# Patient Record
Sex: FEMALE | Race: WHITE | HISPANIC OR LATINO | Employment: UNEMPLOYED | URBAN - METROPOLITAN AREA
[De-identification: names, ages, dates, MRNs, and addresses within clinical notes are randomized per-mention and may not be internally consistent; named-entity substitution may affect disease eponyms.]

---

## 2017-02-08 ENCOUNTER — TELEPHONE (OUTPATIENT)
Dept: INTERNAL MEDICINE | Facility: CLINIC | Age: 77
End: 2017-02-08

## 2017-02-08 DIAGNOSIS — Z00.00 ROUTINE GENERAL MEDICAL EXAMINATION AT A HEALTH CARE FACILITY: Primary | ICD-10-CM

## 2017-02-08 DIAGNOSIS — E78.5 HYPERLIPIDEMIA, UNSPECIFIED HYPERLIPIDEMIA TYPE: ICD-10-CM

## 2017-02-08 NOTE — TELEPHONE ENCOUNTER
----- Message from Gregoriamable Prabhakar sent at 2/7/2017  3:31 PM CST -----  Contact: Self/[phone] home  Type: Sooner appointment than  is able to schedule    When is the first available appointment? 04/21/2017    What is the nature of the appointment?Annual    What appointment type:EPP    Comments:Patient is calling request access to an earlier appointment. She states she received a letter informing her to be seen in the month on March/2017. Please call and advise.    Thank you!

## 2017-02-14 ENCOUNTER — OFFICE VISIT (OUTPATIENT)
Dept: INTERNAL MEDICINE | Facility: CLINIC | Age: 77
End: 2017-02-14
Payer: MEDICARE

## 2017-02-14 VITALS
HEIGHT: 60 IN | TEMPERATURE: 98 F | BODY MASS INDEX: 24.85 KG/M2 | OXYGEN SATURATION: 98 % | HEART RATE: 82 BPM | SYSTOLIC BLOOD PRESSURE: 121 MMHG | DIASTOLIC BLOOD PRESSURE: 74 MMHG | WEIGHT: 126.56 LBS

## 2017-02-14 DIAGNOSIS — Z91.09 ENVIRONMENTAL ALLERGIES: Primary | ICD-10-CM

## 2017-02-14 DIAGNOSIS — J06.9 VIRAL URI WITH COUGH: ICD-10-CM

## 2017-02-14 PROCEDURE — 1160F RVW MEDS BY RX/DR IN RCRD: CPT | Mod: S$GLB,,, | Performed by: NURSE PRACTITIONER

## 2017-02-14 PROCEDURE — 1126F AMNT PAIN NOTED NONE PRSNT: CPT | Mod: S$GLB,,, | Performed by: NURSE PRACTITIONER

## 2017-02-14 PROCEDURE — 1157F ADVNC CARE PLAN IN RCRD: CPT | Mod: S$GLB,,, | Performed by: NURSE PRACTITIONER

## 2017-02-14 PROCEDURE — 99213 OFFICE O/P EST LOW 20 MIN: CPT | Mod: S$GLB,,, | Performed by: NURSE PRACTITIONER

## 2017-02-14 PROCEDURE — 99999 PR PBB SHADOW E&M-EST. PATIENT-LVL III: CPT | Mod: PBBFAC,,, | Performed by: NURSE PRACTITIONER

## 2017-02-14 PROCEDURE — 1159F MED LIST DOCD IN RCRD: CPT | Mod: S$GLB,,, | Performed by: NURSE PRACTITIONER

## 2017-02-14 RX ORDER — PREDNISONE 20 MG/1
20 TABLET ORAL DAILY
Qty: 4 TABLET | Refills: 0 | Status: SHIPPED | OUTPATIENT
Start: 2017-02-14 | End: 2017-02-18

## 2017-02-14 RX ORDER — LORAZEPAM 0.5 MG/1
0.5 TABLET ORAL NIGHTLY
Qty: 90 TABLET | Refills: 1 | Status: CANCELLED | OUTPATIENT
Start: 2017-02-14

## 2017-02-14 NOTE — PROGRESS NOTES
Subjective:       Patient ID: Laverne Humphries is a 76 y.o. female.    Chief Complaint: Cough  Ms Humphries presents today for a cough, congestion, malaise, and chills for 2 -3 weeks. She has been taking her Flonase, allegra, and Singulair daily for symptoms with only mild relief.   Cough   This is a new problem. The current episode started 1 to 4 weeks ago. The problem has been unchanged. The problem occurs every few minutes. The cough is non-productive. Associated symptoms include chills, headaches and a sore throat. Pertinent negatives include no chest pain, ear congestion, ear pain, eye redness, fever, heartburn, hemoptysis, myalgias, nasal congestion, postnasal drip, rash, rhinorrhea, shortness of breath, sweats, weight loss or wheezing. Nothing aggravates the symptoms.     Review of Systems   Constitutional: Positive for activity change, chills and fatigue. Negative for fever and weight loss.   HENT: Positive for sinus pressure and sore throat. Negative for ear pain, postnasal drip, rhinorrhea, sneezing and voice change.    Eyes: Positive for itching. Negative for redness and visual disturbance.   Respiratory: Positive for cough. Negative for hemoptysis, shortness of breath and wheezing.    Cardiovascular: Negative for chest pain.   Gastrointestinal: Negative for diarrhea, heartburn, nausea and vomiting.   Genitourinary: Negative for dysuria.   Musculoskeletal: Negative for myalgias.   Skin: Negative for rash.   Neurological: Positive for headaches.   Psychiatric/Behavioral: Negative for confusion.       Objective:      Physical Exam   Constitutional: She is oriented to person, place, and time. She appears well-developed and well-nourished. No distress.   HENT:   Head: Atraumatic.   Left Ear: Tympanic membrane and ear canal normal.   Nose: No mucosal edema, rhinorrhea or sinus tenderness. Right sinus exhibits no maxillary sinus tenderness and no frontal sinus tenderness. Left sinus exhibits no maxillary sinus  tenderness and no frontal sinus tenderness.   Mouth/Throat: Oropharyngeal exudate, posterior oropharyngeal edema and posterior oropharyngeal erythema present. No tonsillar abscesses.   Right TM obscured by cerumen   Eyes: No scleral icterus.   Neck: Normal range of motion. Neck supple.   Cardiovascular: Normal rate, regular rhythm and normal heart sounds.  Exam reveals no gallop and no friction rub.    No murmur heard.  Pulmonary/Chest: Effort normal and breath sounds normal. No respiratory distress. She has no wheezes. She has no rales.   Lymphadenopathy:     She has no cervical adenopathy.   Neurological: She is alert and oriented to person, place, and time.   Skin: Skin is warm and dry. She is not diaphoretic.   Psychiatric: She has a normal mood and affect. Her behavior is normal.   Nursing note and vitals reviewed.      Assessment:       1. Environmental allergies    2. Viral URI with cough        Plan:   1. Environmental allergies  - Continue allergy medications as prescribed.     2. Viral URI with cough  - predniSONE (DELTASONE) 20 MG tablet; Take 1 tablet (20 mg total) by mouth once daily.  Dispense: 4 tablet; Refill: 0      Pt has been given instructions populated from [employer] database and has verbalized understanding of the after visit summary and information contained wherein.    Follow up with a primary care provider. May go to ER for acute shortness of breath, lightheadedness, fever, or any other emergent complaints or changes in condition.

## 2017-02-14 NOTE — MR AVS SNAPSHOT
[facility]  [address]  [facility] [address]  Phone: [phone]  Fax: [phone]                  Laverne Humphries   2017 2:00 PM   Office Visit    Description:  Female : 1940   Provider:  Mckenna Godwin NP   Department:  [facility]           Reason for Visit     Cough           Diagnoses this Visit        Comments    Environmental allergies    -  Primary     Viral URI with cough                To Do List           Future Appointments        Provider Department Dept Phone    3/15/2017 7:00 AM LAB, APPOINTMENT [facility] [phone]    3/17/2017 2:30 PM Fran Castro MD [facility] [phone]      Goals (5 Years of Data)     None       These Medications        Disp Refills Start End    predniSONE (DELTASONE) 20 MG tablet 4 tablet 0 2017    Take 1 tablet (20 mg total) by mouth once daily. - Oral    Pharmacy: [facility] #[id number] - [place] - [address]  #: [phone]         [facility]     [facility] Care [facility] -  Assistance  Registered nurses in the [facility] provide clinical advisement, health education, appointment booking, and other advisory services.  Call for this free service at [phone].             Medications           Message regarding Medications     Verify the changes and/or additions to your medication regime listed below are the same as discussed with your clinician today.  If any of these changes or additions are incorrect, please notify your healthcare provider.        START taking these NEW medications        Refills    predniSONE (DELTASONE) 20 MG tablet 0    Sig: Take 1 tablet (20 mg total) by mouth once daily.    Class: Normal    Route: Oral           Verify that the below list of medications is an accurate representation of the medications you are currently taking.  If none reported, the list may be  blank. If incorrect, please contact your healthcare provider. Carry this list with you in case of emergency.           Current Medications     albuterol 90 mcg/actuation inhaler Inhale 2 puffs into the lungs every 4 (four) hours as needed for Wheezing or Shortness of Breath.    fexofenadine (ALLEGRA) 180 MG tablet Take 180 mg by mouth once daily.    fluticasone (FLONASE) 50 mcg/actuation nasal spray 2 sprays by Each Nare route once daily.    lorazepam (ATIVAN) 0.5 MG tablet Take 1 tablet (0.5 mg total) by mouth nightly.    montelukast (SINGULAIR) 10 mg tablet Take 1 tablet (10 mg total) by mouth every evening.    pantoprazole (PROTONIX) 40 MG tablet TAKE 1 TABLET ONE TIME DAILY    predniSONE (DELTASONE) 20 MG tablet Take 1 tablet (20 mg total) by mouth once daily.           Clinical Reference Information           Your Vitals Were     BP Pulse Temp Height Weight SpO2    121/74 82 98 °F (36.7 °C) (Oral) 5' (1.524 m) 57.4 kg (126 lb 8.7 oz) 98%    BMI                24.71 kg/m2          Blood Pressure          Most Recent Value    BP  121/74      Allergies as of 2/14/2017     Codeine    Sulfa (Sulfonamide Antibiotics)      Immunizations Administered on Date of Encounter - 2/14/2017     None      [facility] Sign-Up     Activating your [facility] account is as easy as 1-2-3!     1) Visit [email or web address], select Sign Up Now, enter this activation code and your date of birth, then select Next.  [email or web address]  Expires: 3/31/2017  2:06 PM      2) Create a username and password to use when you visit [facility] in the future and select a security question in case you lose your password and select Next.    3) Enter your e-mail address and click Sign Up!    Additional Information  If you have questions, please e-mail [email or web address] or call [phone] to talk to our [facility] staff. Remember, [facility] is NOT to be used for urgent needs. For medical emergencies, dial 911.         Language Assistance Services      ATTENTION: Language assistance services are available, free of charge. Please call [phone].      ATENCIÓN: Si habla chikaañol, tiene a balderrama disposición servicios gratuitos de asistencia lingüística. Llame al [phone].     CHÚ Ý: N?u b?n nói Ti?ng Vi?t, có các d?ch v? h? tr? ngôn ng? mi?n phí dành cho b?n. G?i s? [phone].         [facility] complies with applicable Federal civil rights laws and does not discriminate on the basis of race, color, national origin, age, disability, or sex.

## 2017-02-14 NOTE — TELEPHONE ENCOUNTER
----- Message from Ángela Aguilar sent at 2/14/2017  8:08 AM CST -----  Contact: Patient  Refill    lorazepam (ATIVAN) 0.5 MG tablet   Sig - Route: Take 1 tablet (0.5 mg total) by mouth nightly. - Oral    90 day fills    [facility] - [place], [place] - [address] [facility] [phone] (Phone)  [phone] (Fax)    Thanks!

## 2017-02-14 NOTE — TELEPHONE ENCOUNTER
Chart reviewed - last rx was for 90 tablets / 90-day supply with 1 refill, prescribed 11/2016. Patient should have refill left on prescription - please have her check with pharmacy.

## 2017-03-15 ENCOUNTER — LAB VISIT (OUTPATIENT)
Dept: LAB | Facility: HOSPITAL | Age: 77
End: 2017-03-15
Attending: INTERNAL MEDICINE
Payer: MEDICARE

## 2017-03-15 DIAGNOSIS — E78.5 HYPERLIPIDEMIA, UNSPECIFIED HYPERLIPIDEMIA TYPE: ICD-10-CM

## 2017-03-15 LAB
ALBUMIN SERPL BCP-MCNC: 3.7 G/DL
ALP SERPL-CCNC: 59 U/L
ALT SERPL W/O P-5'-P-CCNC: 14 U/L
ANION GAP SERPL CALC-SCNC: 9 MMOL/L
AST SERPL-CCNC: 21 U/L
BASOPHILS # BLD AUTO: 0.02 K/UL
BASOPHILS NFR BLD: 0.3 %
BILIRUB SERPL-MCNC: 1.5 MG/DL
BUN SERPL-MCNC: 15 MG/DL
CALCIUM SERPL-MCNC: 9.8 MG/DL
CHLORIDE SERPL-SCNC: 105 MMOL/L
CHOLEST/HDLC SERPL: 4.5 {RATIO}
CO2 SERPL-SCNC: 26 MMOL/L
CREAT SERPL-MCNC: 1 MG/DL
DIFFERENTIAL METHOD: NORMAL
EOSINOPHIL # BLD AUTO: 0.2 K/UL
EOSINOPHIL NFR BLD: 2.8 %
ERYTHROCYTE [DISTWIDTH] IN BLOOD BY AUTOMATED COUNT: 13.9 %
EST. GFR  (AFRICAN AMERICAN): >60 ML/MIN/1.73 M^2
EST. GFR  (NON AFRICAN AMERICAN): 54.1 ML/MIN/1.73 M^2
GLUCOSE SERPL-MCNC: 96 MG/DL
HCT VFR BLD AUTO: 39.4 %
HDL/CHOLESTEROL RATIO: 22.4 %
HDLC SERPL-MCNC: 232 MG/DL
HDLC SERPL-MCNC: 52 MG/DL
HGB BLD-MCNC: 12.7 G/DL
LDLC SERPL CALC-MCNC: 151.2 MG/DL
LYMPHOCYTES # BLD AUTO: 2.5 K/UL
LYMPHOCYTES NFR BLD: 33.8 %
MCH RBC QN AUTO: 28.6 PG
MCHC RBC AUTO-ENTMCNC: 32.2 %
MCV RBC AUTO: 89 FL
MONOCYTES # BLD AUTO: 0.6 K/UL
MONOCYTES NFR BLD: 8.6 %
NEUTROPHILS # BLD AUTO: 3.9 K/UL
NEUTROPHILS NFR BLD: 54.1 %
NONHDLC SERPL-MCNC: 180 MG/DL
PLATELET # BLD AUTO: 257 K/UL
PMV BLD AUTO: 11.2 FL
POTASSIUM SERPL-SCNC: 4.8 MMOL/L
PROT SERPL-MCNC: 7.4 G/DL
RBC # BLD AUTO: 4.44 M/UL
SODIUM SERPL-SCNC: 140 MMOL/L
TRIGL SERPL-MCNC: 144 MG/DL
WBC # BLD AUTO: 7.24 K/UL

## 2017-03-15 PROCEDURE — 36415 COLL VENOUS BLD VENIPUNCTURE: CPT

## 2017-03-15 PROCEDURE — 80061 LIPID PANEL: CPT

## 2017-03-15 PROCEDURE — 80053 COMPREHEN METABOLIC PANEL: CPT

## 2017-03-15 PROCEDURE — 85025 COMPLETE CBC W/AUTO DIFF WBC: CPT

## 2017-03-17 ENCOUNTER — OFFICE VISIT (OUTPATIENT)
Dept: INTERNAL MEDICINE | Facility: CLINIC | Age: 77
End: 2017-03-17
Payer: MEDICARE

## 2017-03-17 VITALS
HEART RATE: 60 BPM | HEIGHT: 60 IN | DIASTOLIC BLOOD PRESSURE: 58 MMHG | BODY MASS INDEX: 24.15 KG/M2 | SYSTOLIC BLOOD PRESSURE: 110 MMHG | WEIGHT: 123 LBS

## 2017-03-17 DIAGNOSIS — N18.30 CHRONIC KIDNEY DISEASE, STAGE III (MODERATE): ICD-10-CM

## 2017-03-17 DIAGNOSIS — R19.7 DIARRHEA, UNSPECIFIED TYPE: ICD-10-CM

## 2017-03-17 DIAGNOSIS — E78.5 HYPERLIPIDEMIA, UNSPECIFIED HYPERLIPIDEMIA TYPE: Primary | ICD-10-CM

## 2017-03-17 DIAGNOSIS — K21.00 REFLUX ESOPHAGITIS: ICD-10-CM

## 2017-03-17 PROCEDURE — 1157F ADVNC CARE PLAN IN RCRD: CPT | Mod: S$GLB,,, | Performed by: INTERNAL MEDICINE

## 2017-03-17 PROCEDURE — 1160F RVW MEDS BY RX/DR IN RCRD: CPT | Mod: S$GLB,,, | Performed by: INTERNAL MEDICINE

## 2017-03-17 PROCEDURE — 1126F AMNT PAIN NOTED NONE PRSNT: CPT | Mod: S$GLB,,, | Performed by: INTERNAL MEDICINE

## 2017-03-17 PROCEDURE — 99999 PR PBB SHADOW E&M-EST. PATIENT-LVL III: CPT | Mod: PBBFAC,,, | Performed by: INTERNAL MEDICINE

## 2017-03-17 PROCEDURE — 99499 UNLISTED E&M SERVICE: CPT | Mod: S$GLB,,, | Performed by: INTERNAL MEDICINE

## 2017-03-17 PROCEDURE — 1159F MED LIST DOCD IN RCRD: CPT | Mod: S$GLB,,, | Performed by: INTERNAL MEDICINE

## 2017-03-17 PROCEDURE — 99214 OFFICE O/P EST MOD 30 MIN: CPT | Mod: S$GLB,,, | Performed by: INTERNAL MEDICINE

## 2017-03-17 RX ORDER — DIPHENOXYLATE HYDROCHLORIDE AND ATROPINE SULFATE 2.5; .025 MG/1; MG/1
1 TABLET ORAL 4 TIMES DAILY PRN
Qty: 30 TABLET | Refills: 0 | Status: SHIPPED | OUTPATIENT
Start: 2017-03-17 | End: 2017-03-27

## 2017-03-17 NOTE — PATIENT INSTRUCTIONS

## 2017-03-17 NOTE — PROGRESS NOTES
Subjective:       Patient ID: Laverne Humphries is a 76 y.o. female.    Chief Complaint: Annual Exam and Diarrhea    HPI Comments: Mrs. Laverne Humphries is here today for follow up.  She relates that she has been doing well overall.  However, she relates today that she has been experiencing diarrhea since this past Tuesday.      Diarrhea    This is a new problem. The current episode started in the past 7 days. The problem occurs 5 to 10 times per day. The problem has been gradually improving. The stool consistency is described as watery. The patient states that diarrhea does not awaken her from sleep. Associated symptoms include bloating, chills, increased flatus and sweats. Pertinent negatives include no abdominal pain, arthralgias, coughing, fever or vomiting. Exacerbated by: Did eat 2 day old beans on Monday and the next day started having diarrhea. There are no known risk factors. She has tried anti-motility drug for the symptoms. The treatment provided moderate relief. Her past medical history is significant for irritable bowel syndrome.     Review of Systems   Constitutional: Positive for chills. Negative for activity change, appetite change, fatigue and fever.   HENT: Negative for congestion, ear discharge and ear pain.    Eyes: Negative for visual disturbance.   Respiratory: Negative for cough, shortness of breath and wheezing.    Cardiovascular: Negative for chest pain, palpitations and leg swelling.   Gastrointestinal: Positive for bloating, diarrhea and flatus. Negative for abdominal pain, blood in stool, constipation and vomiting.   Endocrine: Negative for polydipsia, polyphagia and polyuria.   Genitourinary: Negative for difficulty urinating and dysuria.   Musculoskeletal: Negative for arthralgias.   Skin: Negative for color change, pallor and rash.   Allergic/Immunologic: Negative for environmental allergies and food allergies.   Neurological: Negative for syncope and numbness.   Hematological:  Negative for adenopathy. Does not bruise/bleed easily.   Psychiatric/Behavioral: Negative for behavioral problems.       Objective:      Physical Exam   Constitutional: She is oriented to person, place, and time. She appears well-developed and well-nourished.   HENT:   Head: Normocephalic and atraumatic.   Mouth/Throat: Oropharynx is clear and moist.   Eyes: Pupils are equal, round, and reactive to light.   Neck: Normal range of motion. Neck supple.   Cardiovascular: Normal rate, regular rhythm, normal heart sounds and intact distal pulses.  Exam reveals no gallop and no friction rub.    No murmur heard.  Pulmonary/Chest: Effort normal and breath sounds normal. No respiratory distress. She has no wheezes.   Abdominal: Soft. She exhibits no distension. Bowel sounds are increased. There is no tenderness.   Musculoskeletal: Normal range of motion.   Neurological: She is alert and oriented to person, place, and time.   Skin: Skin is warm and dry.   Psychiatric: She has a normal mood and affect. Her behavior is normal.   Nursing note and vitals reviewed.      Assessment:       1. Hyperlipidemia, unspecified hyperlipidemia type    2. Diarrhea, unspecified type    3. Reflux esophagitis    4. Chronic kidney disease, stage III (moderate)        Plan:       Laverne was seen today for annual exam and diarrhea.    Diagnoses and all orders for this visit:    Hyperlipidemia, unspecified hyperlipidemia type  Comments:  Elevated total cholesterol with recent labs. Encouraged proper diet and exercise; will recheck lipids panel in 6 months.  Orders:  -     CBC auto differential; Future  -     Comprehensive metabolic panel; Future  -     Lipid panel; Future    Diarrhea, unspecified type  -     diphenoxylate-atropine 2.5-0.025 mg (LOMOTIL) 2.5-0.025 mg per tablet; Take 1 tablet by mouth 4 (four) times daily as needed for Diarrhea.    Reflux esophagitis  Comments:  Stable with current medication regimen.    Chronic kidney disease,  stage III (moderate)  Comments:  Stable and controlled.  Orders:  -     CBC auto differential; Future  -     Comprehensive metabolic panel; Future            Return in about 6 months (around 9/17/2017) for Follow up with PCP, SOONER IF NEEDED.

## 2017-04-12 RX ORDER — PANTOPRAZOLE SODIUM 40 MG/1
TABLET, DELAYED RELEASE ORAL
Qty: 90 TABLET | Refills: 3 | Status: SHIPPED | OUTPATIENT
Start: 2017-04-12 | End: 2018-04-04

## 2017-04-25 ENCOUNTER — TELEPHONE (OUTPATIENT)
Dept: ALLERGY | Facility: CLINIC | Age: 77
End: 2017-04-25

## 2017-04-25 NOTE — TELEPHONE ENCOUNTER
Spoke to patient.  She states she is using Flonase and allegra in the morning and singulair in the evening.  She states the albuterol burns her chest when she uses it.  She is sleeping on 2 pillows due to difficulty breathing.  She states this started last Thursday and she doesn;t feel she can go until next week without something being done.

## 2017-04-25 NOTE — TELEPHONE ENCOUNTER
----- Message from Kiki Mata MA sent at 4/25/2017 11:27 AM CDT -----  Contact: self/[phone]  Pt c/o cough and shortness of breathe. She stated that the prescribed meds are not working. She would like to speak with someone ASAP. Please advise.    Thanks

## 2017-04-27 ENCOUNTER — OFFICE VISIT (OUTPATIENT)
Dept: INTERNAL MEDICINE | Facility: CLINIC | Age: 77
End: 2017-04-27
Payer: MEDICARE

## 2017-04-27 ENCOUNTER — HOSPITAL ENCOUNTER (OUTPATIENT)
Dept: RADIOLOGY | Facility: HOSPITAL | Age: 77
Discharge: HOME | End: 2017-04-27
Attending: INTERNAL MEDICINE
Payer: MEDICARE

## 2017-04-27 VITALS
TEMPERATURE: 98 F | WEIGHT: 125.44 LBS | SYSTOLIC BLOOD PRESSURE: 122 MMHG | HEART RATE: 81 BPM | DIASTOLIC BLOOD PRESSURE: 60 MMHG | HEIGHT: 60 IN | OXYGEN SATURATION: 99 % | BODY MASS INDEX: 24.63 KG/M2

## 2017-04-27 DIAGNOSIS — J06.9 VIRAL URI WITH COUGH: Primary | ICD-10-CM

## 2017-04-27 DIAGNOSIS — R05.9 COUGH: ICD-10-CM

## 2017-04-27 DIAGNOSIS — R06.02 SHORTNESS OF BREATH: ICD-10-CM

## 2017-04-27 PROCEDURE — 71020 XR CHEST PA AND LATERAL: CPT | Mod: TC

## 2017-04-27 PROCEDURE — 1160F RVW MEDS BY RX/DR IN RCRD: CPT | Mod: S$GLB,,, | Performed by: INTERNAL MEDICINE

## 2017-04-27 PROCEDURE — 1126F AMNT PAIN NOTED NONE PRSNT: CPT | Mod: S$GLB,,, | Performed by: INTERNAL MEDICINE

## 2017-04-27 PROCEDURE — 1159F MED LIST DOCD IN RCRD: CPT | Mod: S$GLB,,, | Performed by: INTERNAL MEDICINE

## 2017-04-27 PROCEDURE — 99999 PR PBB SHADOW E&M-EST. PATIENT-LVL IV: CPT | Mod: PBBFAC,,, | Performed by: INTERNAL MEDICINE

## 2017-04-27 PROCEDURE — 99214 OFFICE O/P EST MOD 30 MIN: CPT | Mod: S$GLB,,, | Performed by: INTERNAL MEDICINE

## 2017-04-27 PROCEDURE — 99499 UNLISTED E&M SERVICE: CPT | Mod: S$GLB,,, | Performed by: INTERNAL MEDICINE

## 2017-04-27 PROCEDURE — 71020 XR CHEST PA AND LATERAL: CPT | Mod: 26,,, | Performed by: RADIOLOGY

## 2017-04-27 RX ORDER — BENZONATATE 200 MG/1
200 CAPSULE ORAL 3 TIMES DAILY PRN
Qty: 30 CAPSULE | Refills: 0 | Status: SHIPPED | OUTPATIENT
Start: 2017-04-27 | End: 2017-05-07

## 2017-04-27 NOTE — PATIENT INSTRUCTIONS
Mucinex (not d or dm) over the counter, 600mg by mouth twice a day with lots of water.      Viral Upper Respiratory Illness (Adult)  You have a viral upper respiratory illness (URI), which is another term for the common cold. This illness is contagious during the first few days. It is spread through the air by coughing and sneezing. It may also be spread by direct contact (touching the sick person and then touching your own eyes, nose, or mouth). Frequent handwashing will decrease risk of spread. Most viral illnesses go away within 7 to 10 days with rest and simple home remedies. Sometimes the illness may last for several weeks. Antibiotics will not kill a virus, and they are generally not prescribed for this condition.    Home care  · If symptoms are severe, rest at home for the first 2 to 3 days. When you resume activity, don't let yourself get too tired.  · Avoid being exposed to cigarette smoke (yours or others).  · You may use acetaminophen or ibuprofen to control pain and fever, unless another medicine was prescribed. (Note: If you have chronic liver or kidney disease, have ever had a stomach ulcer or gastrointestinal bleeding, or are taking blood-thinning medicines, talk with your healthcare provider before using these medicines.) Aspirin should never be given to anyone under 18 years of age who is ill with a viral infection or fever. It may cause severe liver or brain damage.  · Your appetite may be poor, so a light diet is fine. Avoid dehydration by drinking 6 to 8 glasses of fluids per day (water, soft drinks, juices, tea, or soup). Extra fluids will help loosen secretions in the nose and lungs.  · Over-the-counter cold medicines will not shorten the length of time youre sick, but they may be helpful for the following symptoms: cough, sore throat, and nasal and sinus congestion. (Note: Do not use decongestants if you have high blood pressure.)  Follow-up care  Follow up with your healthcare provider, or  as advised.  When to seek medical advice  Call your healthcare provider right away if any of these occur:  · Cough with lots of colored sputum (mucus)  · Severe headache; face, neck, or ear pain  · Difficulty swallowing due to throat pain  · Fever of 100.4°F (38°C)  Call 911, or get immediate medical care  Call emergency services right away if any of these occur:  · Chest pain, shortness of breath, wheezing, or difficulty breathing  · Coughing up blood  · Inability to swallow due to throat pain  Date Last Reviewed: 9/13/2015  © 5375-7807 [employer]. [address] [place] [address]. All rights reserved. This information is not intended as a substitute for professional medical care. Always follow your healthcare professional's instructions.

## 2017-04-27 NOTE — PROGRESS NOTES
Subjective:       Patient ID: Laverne Humphries is a 76 y.o. female.    Chief Complaint: Cough    HPI  77 yo F pt of Dr. Castro and Dr. Lopez presents for urgent eval of cough with shortness of breath.     She sees allergy last in 2016 for allergic rhinitis, GERD and chronic cough. No xh asthma. singulair during winter and spring, allegra, flonase, protonix.     Cough x 4 days, fatigue, chills. No fever. Cough is non-productive except for some white sputum.   She continues on her allegra, singulair. No sick contacts.     She rarely uses albuterol because she feels burning in her chest when she takes it.   No wheezing.   Review of Systems   Constitutional: Negative for fever.   Respiratory: Positive for cough. Negative for shortness of breath.    Cardiovascular: Negative for chest pain.   Musculoskeletal: Negative.    Skin: Negative.        Objective:   /60  Pulse 81  Temp 97.9 °F (36.6 °C)  Ht 5' (1.524 m)  Wt 56.9 kg (125 lb 7.1 oz)  SpO2 99% Comment: ra  BMI 24.5 kg/m2     Physical Exam   Constitutional: She is oriented to person, place, and time. She appears well-developed and well-nourished. No distress.   HENT:   Head: Normocephalic and atraumatic.   Cardiovascular: Normal rate and regular rhythm.    Pulmonary/Chest: Effort normal. No respiratory distress. She has no wheezes. She has no rales.   Neurological: She is alert and oriented to person, place, and time.   Skin: Skin is warm and dry. She is not diaphoretic.   Psychiatric: She has a normal mood and affect. Her behavior is normal.       Assessment:       1. Viral URI with cough    2. Cough    3. Shortness of breath        Plan:       Laverne was seen today for cough.    Diagnoses and all orders for this visit:    Viral URI with cough  -     benzonatate (TESSALON) 200 MG capsule; Take 1 capsule (200 mg total) by mouth 3 (three) times daily as needed for Cough.  -     X-Ray Chest PA And Lateral; Future negative for xray

## 2017-04-27 NOTE — MR AVS SNAPSHOT
[facility] - Internal Medicine  [address]  [place] [address]  Phone: [phone]  Fax: [phone]                  Laverne Humphries   2017 10:00 AM   Office Visit    Description:  Female : 1940   Provider:  Luh Holt MD   Department:  [facility] - Internal Medicine           Reason for Visit     Cough           Diagnoses this Visit        Comments    Viral URI with cough    -  Primary     Cough         Shortness of breath                To Do List           Goals (5 Years of Data)     None       These Medications        Disp Refills Start End    benzonatate (TESSALON) 200 MG capsule 30 capsule 0 2017    Take 1 capsule (200 mg total) by mouth 3 (three) times daily as needed for Cough. - Oral    Pharmacy: [facility] [address] Ph #: [phone]       Prior authorization:  Waiting for payer response      [facility]     [facility] Nurse Care Line -  Assistance  Unless otherwise directed by your provider, please contact [facility], our nurse care line that is available for  assistance.     Registered nurses in the [facility] provide: appointment scheduling, clinical advisement, health education, and other advisory services.  Call: [phone] (toll free)               Medications           Message regarding Medications     Verify the changes and/or additions to your medication regime listed below are the same as discussed with your clinician today.  If any of these changes or additions are incorrect, please notify your healthcare provider.        START taking these NEW medications        Refills    benzonatate (TESSALON) 200 MG capsule 0    Sig: Take 1 capsule (200 mg total) by mouth 3 (three) times daily as needed for Cough.    Class: Normal    Route: Oral    Prior authorization:  Waiting for payer response           Verify that the below list of medications is an  accurate representation of the medications you are currently taking.  If none reported, the list may be blank. If incorrect, please contact your healthcare provider. Carry this list with you in case of emergency.           Current Medications     albuterol 90 mcg/actuation inhaler Inhale 2 puffs into the lungs every 4 (four) hours as needed for Wheezing or Shortness of Breath.    fexofenadine (ALLEGRA) 180 MG tablet Take 180 mg by mouth once daily.    fluticasone (FLONASE) 50 mcg/actuation nasal spray 2 sprays by Each Nare route once daily.    lorazepam (ATIVAN) 0.5 MG tablet Take 1 tablet (0.5 mg total) by mouth nightly.    montelukast (SINGULAIR) 10 mg tablet Take 1 tablet (10 mg total) by mouth every evening.    pantoprazole (PROTONIX) 40 MG tablet TAKE 1 TABLET ONE TIME DAILY    benzonatate (TESSALON) 200 MG capsule Take 1 capsule (200 mg total) by mouth 3 (three) times daily as needed for Cough.           Clinical Reference Information           Your Vitals Were     BP Pulse Temp Height Weight SpO2    122/60 81 97.9 °F (36.6 °C) 5' (1.524 m) 56.9 kg (125 lb 7.1 oz) 99%    BMI                24.5 kg/m2          Blood Pressure          Most Recent Value    BP  122/60      Allergies as of 4/27/2017     Codeine    Sulfa (Sulfonamide Antibiotics)      Immunizations Administered on Date of Encounter - 4/27/2017     None      Orders Placed During Today's Visit     Future Labs/Procedures Expected by Expires    X-Ray Chest PA And Lateral  4/27/2017 4/27/2018      Instructions    Mucinex (not d or dm) over the counter, 600mg by mouth twice a day with lots of water.      Viral Upper Respiratory Illness (Adult)  You have a viral upper respiratory illness (URI), which is another term for the common cold. This illness is contagious during the first few days. It is spread through the air by coughing and sneezing. It may also be spread by direct contact (touching the sick person and then touching your own eyes, nose, or  mouth). Frequent handwashing will decrease risk of spread. Most viral illnesses go away within 7 to 10 days with rest and simple home remedies. Sometimes the illness may last for several weeks. Antibiotics will not kill a virus, and they are generally not prescribed for this condition.    Home care  · If symptoms are severe, rest at home for the first 2 to 3 days. When you resume activity, don't let yourself get too tired.  · Avoid being exposed to cigarette smoke (yours or others).  · You may use acetaminophen or ibuprofen to control pain and fever, unless another medicine was prescribed. (Note: If you have chronic liver or kidney disease, have ever had a stomach ulcer or gastrointestinal bleeding, or are taking blood-thinning medicines, talk with your healthcare provider before using these medicines.) Aspirin should never be given to anyone under 18 years of age who is ill with a viral infection or fever. It may cause severe liver or brain damage.  · Your appetite may be poor, so a light diet is fine. Avoid dehydration by drinking 6 to 8 glasses of fluids per day (water, soft drinks, juices, tea, or soup). Extra fluids will help loosen secretions in the nose and lungs.  · Over-the-counter cold medicines will not shorten the length of time youre sick, but they may be helpful for the following symptoms: cough, sore throat, and nasal and sinus congestion. (Note: Do not use decongestants if you have high blood pressure.)  Follow-up care  Follow up with your healthcare provider, or as advised.  When to seek medical advice  Call your healthcare provider right away if any of these occur:  · Cough with lots of colored sputum (mucus)  · Severe headache; face, neck, or ear pain  · Difficulty swallowing due to throat pain  · Fever of 100.4°F (38°C)  Call 911, or get immediate medical care  Call emergency services right away if any of these occur:  · Chest pain, shortness of breath, wheezing, or difficulty  breathing  · Coughing up blood  · Inability to swallow due to throat pain  Date Last Reviewed: 9/13/2015  © 9211-1432 [employer]. [address] [place] [address], [place] [address]. All rights reserved. This information is not intended as a substitute for professional medical care. Always follow your healthcare professional's instructions.             Language Assistance Services     ATTENTION: Language assistance services are available, free of charge. Please call [phone].      ATENCIÓN: Si habla español, tiene a balderrama disposición servicios gratuitos de asistencia lingüística. Llame al [phone].     CHÚ Ý: N?u b?n nói Ti?ng Vi?t, có các d?ch v? h? tr? ngôn ng? mi?n phí dành cho b?n. G?i s? [phone].         [facility] complies with applicable Federal civil rights laws and does not discriminate on the basis of race, color, national origin, age, disability, or sex.

## 2017-04-28 RX ORDER — LORAZEPAM 0.5 MG/1
TABLET ORAL
Qty: 90 TABLET | Refills: 1 | Status: SHIPPED | OUTPATIENT
Start: 2017-04-28 | End: 2017-11-04 | Stop reason: SDUPTHER

## 2017-05-08 ENCOUNTER — OFFICE VISIT (OUTPATIENT)
Dept: ALLERGY | Facility: CLINIC | Age: 77
End: 2017-05-08
Payer: MEDICARE

## 2017-05-08 VITALS
DIASTOLIC BLOOD PRESSURE: 58 MMHG | BODY MASS INDEX: 23.55 KG/M2 | WEIGHT: 124.75 LBS | SYSTOLIC BLOOD PRESSURE: 132 MMHG | HEIGHT: 61 IN

## 2017-05-08 DIAGNOSIS — K21.9 GASTROESOPHAGEAL REFLUX DISEASE, ESOPHAGITIS PRESENCE NOT SPECIFIED: ICD-10-CM

## 2017-05-08 DIAGNOSIS — J30.1 ALLERGIC RHINITIS DUE TO POLLEN, UNSPECIFIED RHINITIS SEASONALITY: ICD-10-CM

## 2017-05-08 DIAGNOSIS — J40 BRONCHITIS: Primary | ICD-10-CM

## 2017-05-08 PROCEDURE — 99999 PR PBB SHADOW E&M-EST. PATIENT-LVL III: CPT | Mod: PBBFAC,,, | Performed by: ALLERGY & IMMUNOLOGY

## 2017-05-08 PROCEDURE — 1160F RVW MEDS BY RX/DR IN RCRD: CPT | Mod: S$GLB,,, | Performed by: ALLERGY & IMMUNOLOGY

## 2017-05-08 PROCEDURE — 99499 UNLISTED E&M SERVICE: CPT | Mod: S$GLB,,, | Performed by: ALLERGY & IMMUNOLOGY

## 2017-05-08 PROCEDURE — 1126F AMNT PAIN NOTED NONE PRSNT: CPT | Mod: S$GLB,,, | Performed by: ALLERGY & IMMUNOLOGY

## 2017-05-08 PROCEDURE — 1159F MED LIST DOCD IN RCRD: CPT | Mod: S$GLB,,, | Performed by: ALLERGY & IMMUNOLOGY

## 2017-05-08 PROCEDURE — 99214 OFFICE O/P EST MOD 30 MIN: CPT | Mod: S$GLB,,, | Performed by: ALLERGY & IMMUNOLOGY

## 2017-05-08 RX ORDER — LEVALBUTEROL TARTRATE 45 UG/1
1-2 AEROSOL, METERED ORAL EVERY 4 HOURS PRN
Qty: 1 INHALER | Refills: 3 | Status: SHIPPED | OUTPATIENT
Start: 2017-05-08 | End: 2017-06-01

## 2017-05-08 RX ORDER — AZITHROMYCIN 250 MG/1
TABLET, FILM COATED ORAL
Qty: 6 TABLET | Refills: 0 | Status: SHIPPED | OUTPATIENT
Start: 2017-05-08 | End: 2017-06-01

## 2017-05-08 NOTE — PROGRESS NOTES
Subjective:       Patient ID: Laverne Humphries is a 76 y.o. female.    Chief Complaint:    Cough x ~3 weeks    LV 5/6/16    HPI:     77 yo female with a history of allergic rhinitis, GERD, and chronic/recurrent cough.  Skin test in March 2013 w multiple positives, as below.    Presents w 3 week hx increased cough, occ sob. Saw primary care after 4-5 d of cough. Unremarkable CXR. No improvement in cough w tessalon perles. Doesn't like using albuterol b/c it seems to irritate chest. SOB is only occ. No assoc fever. Cough present day and night but doesn't disturb sleep.  Minimal rhinitis sx's assoc w cough.  Last had oral steroids in Feb for viral URI w cough    Has been taking singulair, allegra, flonase 2 sen daily since cough has started.    She does have a hx of GERD and is on protonix 40 mg daily on empty stomach. Denies obvious exacerbation of GERD sx's. For GERD precautions, eats dinner early, has HOB raised.  She denies fever.    Has DM covers in place.    No wheeze outside of infection.      Environmental History:   Pets in the home: none.   Moon: tile or linoleum floors   Climate Control: central or room air conditioning   Dust Mite Controls: Dust mite controls are already in place.   Tobacco Smoke in Home: no     Review of Systems   Constitutional: Negative for fever, chills, appetite change, fatigue and unexpected weight change.   HENT: Negative for hearing loss, ear pain, nosebleeds, congestion, sore throat, rhinorrhea, sneezing, postnasal drip, sinus pressure, tinnitus and ear discharge.    Eyes: Negative for pain, discharge, redness and itching.   Respiratory: Positive for cough. Negative for chest tightness, shortness of breath and wheezing.    Cardiovascular: Negative for chest pain, palpitations and leg swelling.   Gastrointestinal: Negative for nausea, vomiting, abdominal pain, diarrhea, constipation, blood in stool and abdominal distention.   Genitourinary: Negative for dysuria, urgency,  frequency and difficulty urinating.   Musculoskeletal: Negative for back pain, joint swelling and arthralgias.   Skin: Negative for color change, pallor, rash and wound.   Neurological: Negative for dizziness, seizures, light-headedness, numbness and headaches.   Hematological: Negative for adenopathy. Does not bruise/bleed easily.   Psychiatric/Behavioral: Negative for sleep disturbance and dysphoric mood. The patient is not nervous/anxious.         Objective:    Physical Exam   Constitutional: She is oriented to person, place, and time. She appears well-developed and well-nourished. She is cooperative. No distress.   HENT:   Head: Normocephalic and atraumatic.   Right Ear: Tympanic membrane, external ear and ear canal normal.   Left Ear: Tympanic membrane, external ear and ear canal normal.   Mouth/Throat: Normal dentition.        2+ pale nasal turbinates  Eyes: EOM are normal. Pupils are equal, round, and reactive to light. Right conjunctiva is not injected.   Neck: Neck supple. No thyromegaly present.   Cardiovascular: Normal rate, regular rhythm, normal heart sounds and intact distal pulses.  Exam reveals no gallop and no friction rub.    No murmur heard.  Pulmonary/Chest: Effort normal and breath sounds normal. No respiratory distress. She has no wheezes. She has no rales.   Abdominal: Soft. Bowel sounds are normal. She exhibits no distension. There is no tenderness.   Lymphadenopathy:     She has no cervical adenopathy.   Neurological: She is alert and oriented to person, place, and time.   Skin: Skin is warm and dry. No rash noted. No cyanosis or erythema. Nails show no clubbing.   Psychiatric: She has a normal mood and affect. Her behavior is normal.       Laboratory:     3/13/13   Inhalant Skin Testing   4+ Dust mites (D.p. And D.f.)   3+ White francesca, Mixed birch, Box elder, Bald cypress   2+ Cat, Dog, American elm, Hackberry, Red maple, Red mulberry, Mixed oak, Black willow, Lambs quarter, Marsh elder,  Bahia grass, Bermuda grass, Jared grass, Kaleb grass   1+ Cockroach, Red cedar, Eastern cottonwood, Mugwort, Rough pigweed, English plantain, Mixed ragweed, Russian thistle, Acremonium, Alternaria, Epicoccum, Fusarium, Pullularia, Rhizopus, Rhodotorula  With adequate histamine and saline controls     8/18/2010   PFTs   FEV1 1.75 89%   FVC 2.12 84%   FEV1/%           Assessment:     1. Bronchitis   2. Allergic rhinitis  3. GERD    Plan:     1. Increase flonase to 2 sen BID  2. Continue Fexofenadine 180 mg daily.  3. singulair  4. xopenex instead of albuterol prn  5. protonix as per GI  6. Azithromycin for bronchitis  7. Nasal saline rinses  FU if no improvement, o/w yearly

## 2017-06-01 ENCOUNTER — OFFICE VISIT (OUTPATIENT)
Dept: INTERNAL MEDICINE | Facility: CLINIC | Age: 77
End: 2017-06-01
Payer: MEDICARE

## 2017-06-01 ENCOUNTER — TELEPHONE (OUTPATIENT)
Dept: INTERNAL MEDICINE | Facility: CLINIC | Age: 77
End: 2017-06-01

## 2017-06-01 VITALS
SYSTOLIC BLOOD PRESSURE: 110 MMHG | BODY MASS INDEX: 23.6 KG/M2 | WEIGHT: 125 LBS | HEIGHT: 61 IN | DIASTOLIC BLOOD PRESSURE: 60 MMHG | HEART RATE: 72 BPM

## 2017-06-01 DIAGNOSIS — M72.0 DUPUYTREN'S CONTRACTURE OF LEFT HAND: Primary | ICD-10-CM

## 2017-06-01 PROCEDURE — 1125F AMNT PAIN NOTED PAIN PRSNT: CPT | Mod: S$GLB,,, | Performed by: INTERNAL MEDICINE

## 2017-06-01 PROCEDURE — 99999 PR PBB SHADOW E&M-EST. PATIENT-LVL III: CPT | Mod: PBBFAC,,, | Performed by: INTERNAL MEDICINE

## 2017-06-01 PROCEDURE — 1159F MED LIST DOCD IN RCRD: CPT | Mod: S$GLB,,, | Performed by: INTERNAL MEDICINE

## 2017-06-01 PROCEDURE — 99213 OFFICE O/P EST LOW 20 MIN: CPT | Mod: S$GLB,,, | Performed by: INTERNAL MEDICINE

## 2017-06-01 NOTE — TELEPHONE ENCOUNTER
----- Message from Jerrod Ye sent at 5/31/2017 11:17 AM CDT -----  Contact: Self [phone]  Type: Orders Request    What orders/ testing are being requested? Hand Specialist for bumps on hands    Is there a future appointment scheduled for the patient with PCP? No    Comments:Requesting a call back, advice    Thanks

## 2017-06-07 NOTE — PROGRESS NOTES
Subjective:       Patient ID: Laverne Humphries is a 76 y.o. female.    Chief Complaint: Hand Pain (L) and Arm Pain (L)    Hand Pain    The incident occurred more than 1 week ago. There was no injury mechanism. The pain is present in the left hand. The quality of the pain is described as aching. The pain does not radiate. The pain is mild.   Wrist/Forearm Injury       Review of Systems    Objective:      Physical Exam   Musculoskeletal:        Arms:      Assessment:       1. Dupuytren's contracture of left hand        Plan:       Laverne was seen today for hand pain and arm pain.    Diagnoses and all orders for this visit:    Dupuytren's contracture of left hand  Comments:  discussed disease process, etc.  Asymptomatic at present.  Ortho if worsening.        Return if symptoms worsen or fail to improve.

## 2017-08-10 ENCOUNTER — TELEPHONE (OUTPATIENT)
Dept: INTERNAL MEDICINE | Facility: CLINIC | Age: 77
End: 2017-08-10

## 2017-08-17 ENCOUNTER — OFFICE VISIT (OUTPATIENT)
Dept: INTERNAL MEDICINE | Facility: CLINIC | Age: 77
End: 2017-08-17
Payer: MEDICARE

## 2017-08-17 VITALS
HEART RATE: 80 BPM | HEIGHT: 61 IN | DIASTOLIC BLOOD PRESSURE: 60 MMHG | RESPIRATION RATE: 12 BRPM | BODY MASS INDEX: 23.35 KG/M2 | TEMPERATURE: 98 F | WEIGHT: 123.69 LBS | SYSTOLIC BLOOD PRESSURE: 120 MMHG

## 2017-08-17 DIAGNOSIS — M54.31 RIGHT SIDED SCIATICA: Primary | ICD-10-CM

## 2017-08-17 PROCEDURE — 3008F BODY MASS INDEX DOCD: CPT | Mod: S$GLB,,, | Performed by: INTERNAL MEDICINE

## 2017-08-17 PROCEDURE — 99499 UNLISTED E&M SERVICE: CPT | Mod: S$GLB,,, | Performed by: INTERNAL MEDICINE

## 2017-08-17 PROCEDURE — 99999 PR PBB SHADOW E&M-EST. PATIENT-LVL III: CPT | Mod: PBBFAC,,, | Performed by: INTERNAL MEDICINE

## 2017-08-17 PROCEDURE — 99213 OFFICE O/P EST LOW 20 MIN: CPT | Mod: S$GLB,,, | Performed by: INTERNAL MEDICINE

## 2017-08-17 PROCEDURE — 1159F MED LIST DOCD IN RCRD: CPT | Mod: S$GLB,,, | Performed by: INTERNAL MEDICINE

## 2017-08-17 RX ORDER — METHYLPREDNISOLONE 4 MG/1
TABLET ORAL
Qty: 1 PACKAGE | Refills: 0 | Status: SHIPPED | OUTPATIENT
Start: 2017-08-17 | End: 2017-09-07

## 2017-08-17 NOTE — PROGRESS NOTES
Subjective:       Patient ID: Laverne Humphries is a 76 y.o. female.    Chief Complaint: Back Pain    HPI     Patient is a 76 year old female here today for back pain.  Sx began on Monday morning when she was moving in her bed and felt right lower back pain when twisting. She reports no change in her usual activites the day before. She gardens frequently, very active and denies any heavy lifting or falls/trauma but says she is active so she could have done any different movement. She reports that the pain hurts more with twisting the torso, located right lower back into the buttocks. Does not radiate down the leg. No RLE numbness/tingling. No saddle anesthesia. No urine/stool incontinence. No fever/chills. Tried ibuprofen which helped. Tylenol did not help.    Review of Systems   Constitutional: Negative for chills and fever.   Genitourinary: Negative for dysuria.   Musculoskeletal: Positive for back pain. Negative for gait problem.       Objective:      Physical Exam   Constitutional: She is oriented to person, place, and time. She appears well-developed and well-nourished. No distress.   HENT:   Head: Normocephalic and atraumatic.   Musculoskeletal:   + TTP over Right SI joint  Normal ROM of R hip with internal/extenal rotation  No TTP of lumbar paraspinal or spine  Negative SLR bilaterally  LE strength is 5/5 bilaterally  Sensation of bilateral LE is equal intact   Neurological: She is alert and oriented to person, place, and time.   Skin: Skin is warm and dry. She is not diaphoretic.   Nursing note and vitals reviewed.      Assessment:       1. Right sided sciatica        Plan:       Lower Back Pain c/w R side sciatica  Stretching exercise reviewed  Heat compress PRN  Given CKD 3 status, discussed avoid NSAID use, would try a Medrol Dose Pack first  If pain worsens or does not improve, please let us know

## 2017-09-15 ENCOUNTER — OFFICE VISIT (OUTPATIENT)
Dept: INTERNAL MEDICINE | Facility: CLINIC | Age: 77
End: 2017-09-15
Payer: MEDICARE

## 2017-09-15 ENCOUNTER — NURSE TRIAGE (OUTPATIENT)
Dept: ADMINISTRATIVE | Facility: CLINIC | Age: 77
End: 2017-09-15

## 2017-09-15 VITALS
HEIGHT: 62 IN | HEART RATE: 77 BPM | BODY MASS INDEX: 22.68 KG/M2 | OXYGEN SATURATION: 99 % | DIASTOLIC BLOOD PRESSURE: 66 MMHG | SYSTOLIC BLOOD PRESSURE: 120 MMHG | WEIGHT: 123.25 LBS
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
## 2017-09-15 DIAGNOSIS — L50.9 URTICARIA: Primary | ICD-10-CM

## 2017-09-15 PROCEDURE — 1159F MED LIST DOCD IN RCRD: CPT | Mod: S$GLB,,, | Performed by: NURSE PRACTITIONER

## 2017-09-15 PROCEDURE — 3008F BODY MASS INDEX DOCD: CPT | Mod: S$GLB,,, | Performed by: NURSE PRACTITIONER

## 2017-09-15 PROCEDURE — 96372 THER/PROPH/DIAG INJ SC/IM: CPT | Mod: S$GLB,,, | Performed by: NURSE PRACTITIONER

## 2017-09-15 PROCEDURE — 1125F AMNT PAIN NOTED PAIN PRSNT: CPT | Mod: S$GLB,,, | Performed by: NURSE PRACTITIONER

## 2017-09-15 PROCEDURE — 99213 OFFICE O/P EST LOW 20 MIN: CPT | Mod: 25,S$GLB,, | Performed by: NURSE PRACTITIONER

## 2017-09-15 PROCEDURE — 99999 PR PBB SHADOW E&M-EST. PATIENT-LVL III: CPT | Mod: PBBFAC,,, | Performed by: NURSE PRACTITIONER

## 2017-09-15 RX ORDER — BETAMETHASONE SODIUM PHOSPHATE AND BETAMETHASONE ACETATE 3; 3 MG/ML; MG/ML
6 INJECTION, SUSPENSION INTRA-ARTICULAR; INTRALESIONAL; INTRAMUSCULAR; SOFT TISSUE
Status: COMPLETED | OUTPATIENT
Start: 2017-09-15 | End: 2017-09-15

## 2017-09-15 RX ORDER — TRIAMCINOLONE ACETONIDE 1 MG/G
OINTMENT TOPICAL 2 TIMES DAILY
Qty: 30 G | Refills: 0 | Status: SHIPPED | OUTPATIENT
Start: 2017-09-15 | End: 2018-03-08

## 2017-09-15 RX ADMIN — BETAMETHASONE SODIUM PHOSPHATE AND BETAMETHASONE ACETATE 6 MG: 3; 3 INJECTION, SUSPENSION INTRA-ARTICULAR; INTRALESIONAL; INTRAMUSCULAR; SOFT TISSUE at 11:09

## 2017-09-15 NOTE — MEDICAL/APP STUDENT
Subjective:       Patient ID: Laverne Humphries is a 76 y.o. female.    Chief Complaint: Insect Bite (R. arm this AM swollen, itch and burn )    HPI     Pt was watering garden yesterday morning and suddenly noticed burning to her right arm near elbow. Did not touch bushes. Does not recall a bite or sting. Burning and stinging traveling up arm. Occasional itching. Takes allegra in AM and montelukast in evening. Used cortisone yesterday and today. Used ice.   Review of Systems   Constitutional: Negative for chills and fever.   Respiratory: Negative for shortness of breath.    Cardiovascular: Negative for chest pain and palpitations.   Musculoskeletal: Positive for myalgias (pain to entire right arm and shoulder).   Skin: Positive for rash.   Allergic/Immunologic: Positive for environmental allergies.   Neurological: Negative for dizziness, light-headedness and headaches.       Objective:      Physical Exam   Constitutional: She appears well-developed and well-nourished.   Skin: Skin is warm and dry. Rash noted.        Vitals reviewed.      Assessment:       No diagnosis found.    Plan:

## 2017-09-15 NOTE — TELEPHONE ENCOUNTER
Reason for Disposition   Patient wants to be seen    Protocols used: ST INSECT BITE-A-OH    Laverne is calling to report that she was outside watering her plants yesterday and she felt something bite her.  Did not see what it was.  Today area is red and painful to touch.  Appointment scheduled.

## 2017-09-15 NOTE — PROGRESS NOTES
INTERNAL MEDICINE PROGRESS/URGENT CARE NOTE    CHIEF COMPLAINT     Chief Complaint   Patient presents with    Insect Bite     R. arm this AM swollen, itch and burn        HPI     Laverne Humphries is a 76 y.o. female who presents for an urgent visit today.  Yesterday was watering neighbors lawn. Felt burning and stinging to the right arm.   Treating with cortisone cream and ice without relief.   Pain to the whole arm.     3 lesion to the upper forearm/elbow     Past Medical History:  Past Medical History:   Diagnosis Date    Anxiety     Asthma     mild    Cataract     IBS (irritable bowel syndrome)     Reflux esophagitis     Torus palatinus        Home Medications:  Prior to Admission medications    Medication Sig Start Date End Date Taking? Authorizing Provider   albuterol 90 mcg/actuation inhaler Inhale 2 puffs into the lungs every 4 (four) hours as needed for Wheezing or Shortness of Breath. 11/29/16   Pasha Lopez MD   fexofenadine (ALLEGRA) 180 MG tablet Take 180 mg by mouth once daily.    Historical Provider, MD   fluticasone (FLONASE) 50 mcg/actuation nasal spray 2 sprays by Each Nare route once daily. 5/6/16   Pasha Lopez MD   lorazepam (ATIVAN) 0.5 MG tablet TAKE 1 TABLET EVERY NIGHT 4/28/17   Fran Castro MD   montelukast (SINGULAIR) 10 mg tablet Take 1 tablet (10 mg total) by mouth every evening. 5/6/16   Pasha Lopez MD   pantoprazole (PROTONIX) 40 MG tablet TAKE 1 TABLET ONE TIME DAILY 4/12/17   Fran Castro MD       Review of Systems:  Review of Systems   Constitutional: Negative for chills, fatigue, fever and unexpected weight change.   HENT: Negative for congestion, hearing loss, rhinorrhea and sinus pressure.    Eyes: Negative for pain, redness and visual disturbance.   Respiratory: Negative for cough and shortness of breath.    Cardiovascular: Negative for chest pain and palpitations.   Gastrointestinal: Negative for abdominal distention, abdominal  "pain, constipation, diarrhea, nausea and vomiting.   Endocrine: Negative for polydipsia, polyphagia and polyuria.   Genitourinary: Negative for dysuria, frequency, urgency and vaginal discharge.   Musculoskeletal: Negative for arthralgias, gait problem and myalgias.   Skin: Positive for rash. Negative for color change.   Allergic/Immunologic: Negative for environmental allergies and immunocompromised state.   Neurological: Negative for dizziness, weakness, light-headedness and headaches.   Hematological: Negative for adenopathy. Does not bruise/bleed easily.   Psychiatric/Behavioral: Negative for confusion and sleep disturbance. The patient is not nervous/anxious.        Health Maintainence:   Immunizations:  Health Maintenance       Date Due Completion Date    Influenza Vaccine 08/01/2017 9/14/2016    DEXA SCAN 04/04/2019 4/4/2016    TETANUS VACCINE 02/09/2022 2/9/2012    Override on 2/9/2012: Done    Lipid Panel 03/15/2022 3/15/2017           PHYSICAL EXAM     /66 (BP Location: Right arm, Patient Position: Sitting, BP Method: Large (Manual))   Pulse 77   Ht 5' 2" (1.575 m)   Wt 55.9 kg (123 lb 3.8 oz)   SpO2 99%   BMI 22.54 kg/m²     Physical Exam   Constitutional: She is oriented to person, place, and time. She appears well-developed and well-nourished.   HENT:   Head: Normocephalic and atraumatic.   Eyes: Pupils are equal, round, and reactive to light.   Cardiovascular: Normal rate and regular rhythm.    Pulmonary/Chest: Effort normal.   Neurological: She is alert and oriented to person, place, and time.   Skin: Rash noted. Rash is urticarial (with punctate lesion to the superior lesion. no fluctuance, no induration. +erythema, warmth to touch and TTP ).        Psychiatric: She has a normal mood and affect.       LABS     No results found for: LABA1C, HGBA1C  CMP  Sodium   Date Value Ref Range Status   03/15/2017 140 136 - 145 mmol/L Final     Potassium   Date Value Ref Range Status   03/15/2017 4.8 " 3.5 - 5.1 mmol/L Final     Chloride   Date Value Ref Range Status   03/15/2017 105 95 - 110 mmol/L Final     CO2   Date Value Ref Range Status   03/15/2017 26 23 - 29 mmol/L Final     Glucose   Date Value Ref Range Status   03/15/2017 96 70 - 110 mg/dL Final     BUN, Bld   Date Value Ref Range Status   03/15/2017 15 8 - 23 mg/dL Final     Creatinine   Date Value Ref Range Status   03/15/2017 1.0 0.5 - 1.4 mg/dL Final     Calcium   Date Value Ref Range Status   03/15/2017 9.8 8.7 - 10.5 mg/dL Final     Total Protein   Date Value Ref Range Status   03/15/2017 7.4 6.0 - 8.4 g/dL Final     Albumin   Date Value Ref Range Status   03/15/2017 3.7 3.5 - 5.2 g/dL Final     Total Bilirubin   Date Value Ref Range Status   03/15/2017 1.5 (H) 0.1 - 1.0 mg/dL Final     Comment:     For infants and newborns, interpretation of results should be based  on gestational age, weight and in agreement with clinical  observations.  Premature Infant recommended reference ranges:  Up to 24 hours.............<8.0 mg/dL  Up to 48 hours............<12.0 mg/dL  3-5 days..................<15.0 mg/dL  6-29 days.................<15.0 mg/dL       Alkaline Phosphatase   Date Value Ref Range Status   03/15/2017 59 55 - 135 U/L Final     AST   Date Value Ref Range Status   03/15/2017 21 10 - 40 U/L Final     ALT   Date Value Ref Range Status   03/15/2017 14 10 - 44 U/L Final     Anion Gap   Date Value Ref Range Status   03/15/2017 9 8 - 16 mmol/L Final     eGFR if    Date Value Ref Range Status   03/15/2017 >60.0 >60 mL/min/1.73 m^2 Final     eGFR if non    Date Value Ref Range Status   03/15/2017 54.1 (A) >60 mL/min/1.73 m^2 Final     Comment:     Calculation used to obtain the estimated glomerular filtration  rate (eGFR) is the CKD-EPI equation. Since race is unknown   in our information system, the eGFR values for   -American and Non--American patients are given   for each creatinine result.       Lab  Results   Component Value Date    WBC 7.24 03/15/2017    HGB 12.7 03/15/2017    HCT 39.4 03/15/2017    MCV 89 03/15/2017     03/15/2017     Lab Results   Component Value Date    CHOL 232 (H) 03/15/2017    CHOL 208 (H) 03/17/2016    CHOL 230 (H) 03/13/2015     Lab Results   Component Value Date    HDL 52 03/15/2017    HDL 55 03/17/2016    HDL 56 03/13/2015     Lab Results   Component Value Date    LDLCALC 151.2 03/15/2017    LDLCALC 122.0 03/17/2016    LDLCALC 144.6 03/13/2015     Lab Results   Component Value Date    TRIG 144 03/15/2017    TRIG 155 (H) 03/17/2016    TRIG 147 03/13/2015     Lab Results   Component Value Date    CHOLHDL 22.4 03/15/2017    CHOLHDL 26.4 03/17/2016    CHOLHDL 24.3 03/13/2015     Lab Results   Component Value Date    TSH 1.493 02/17/2014       ASSESSMENT/PLAN     Laverne Humphries is a 76 y.o. female with  Past Medical History:   Diagnosis Date    Anxiety     Asthma     mild    Cataract     IBS (irritable bowel syndrome)     Reflux esophagitis     Torus palatinus      Urticaria- from insect venom. Will admin IM celestone today. May use triamcinolone ointment as needed   -     betamethasone acetate-betamethasone sodium phosphate injection 6 mg; Inject 1 mL (6 mg total) into the muscle one time.  -     triamcinolone acetonide 0.1% (KENALOG) 0.1 % ointment; Apply topically 2 (two) times daily.  Dispense: 30 g; Refill: 0    Insect bite, initial encounter  -     triamcinolone acetonide 0.1% (KENALOG) 0.1 % ointment; Apply topically 2 (two) times daily.  Dispense: 30 g; Refill: 0          Follow up as needed         Patient education provided from Jennifer. Patient was counseled on when and how to seek emergent care.       Shaunna ALVARADO, APRN, FNP-c   Department of Internal Medicine - [facility]  11:22 AM

## 2017-10-10 ENCOUNTER — IMMUNIZATION (OUTPATIENT)
Dept: INTERNAL MEDICINE | Facility: CLINIC | Age: 77
End: 2017-10-10
Payer: MEDICARE

## 2017-10-10 ENCOUNTER — OFFICE VISIT (OUTPATIENT)
Dept: INTERNAL MEDICINE | Facility: CLINIC | Age: 77
End: 2017-10-10
Payer: MEDICARE

## 2017-10-10 VITALS
DIASTOLIC BLOOD PRESSURE: 58 MMHG | HEIGHT: 62 IN | HEART RATE: 88 BPM | BODY MASS INDEX: 22.78 KG/M2 | SYSTOLIC BLOOD PRESSURE: 122 MMHG | WEIGHT: 123.81 LBS

## 2017-10-10 DIAGNOSIS — Z12.31 ENCOUNTER FOR SCREENING MAMMOGRAM FOR BREAST CANCER: ICD-10-CM

## 2017-10-10 DIAGNOSIS — N18.30 CHRONIC KIDNEY DISEASE, STAGE III (MODERATE): ICD-10-CM

## 2017-10-10 DIAGNOSIS — F41.9 ANXIETY: ICD-10-CM

## 2017-10-10 DIAGNOSIS — I77.819 ECTATIC AORTA: ICD-10-CM

## 2017-10-10 DIAGNOSIS — Z23 NEED FOR INFLUENZA VACCINATION: ICD-10-CM

## 2017-10-10 DIAGNOSIS — K21.00 REFLUX ESOPHAGITIS: ICD-10-CM

## 2017-10-10 DIAGNOSIS — E78.5 HYPERLIPIDEMIA, UNSPECIFIED HYPERLIPIDEMIA TYPE: ICD-10-CM

## 2017-10-10 DIAGNOSIS — Z00.00 ENCOUNTER FOR PREVENTIVE HEALTH EXAMINATION: Primary | ICD-10-CM

## 2017-10-10 PROCEDURE — G0008 ADMIN INFLUENZA VIRUS VAC: HCPCS | Mod: S$GLB,,, | Performed by: INTERNAL MEDICINE

## 2017-10-10 PROCEDURE — 90662 IIV NO PRSV INCREASED AG IM: CPT | Mod: S$GLB,,, | Performed by: INTERNAL MEDICINE

## 2017-10-10 PROCEDURE — 99999 PR PBB SHADOW E&M-EST. PATIENT-LVL IV: CPT | Mod: PBBFAC,,, | Performed by: NURSE PRACTITIONER

## 2017-10-10 PROCEDURE — G0439 PPPS, SUBSEQ VISIT: HCPCS | Mod: S$GLB,,, | Performed by: NURSE PRACTITIONER

## 2017-10-10 PROCEDURE — 99499 UNLISTED E&M SERVICE: CPT | Mod: S$GLB,,, | Performed by: NURSE PRACTITIONER

## 2017-10-10 NOTE — PROGRESS NOTES
"Laverne Humphries presented for a  Medicare AWV and comprehensive Health Risk Assessment today. The following components were reviewed and updated:    · Medical history  · Family History  · Social history  · Allergies and Current Medications  · Health Risk Assessment  · Health Maintenance  · Care Team     ** See Completed Assessments for Annual Wellness Visit within the encounter summary.**       The following assessments were completed:  · Living Situation  · CAGE  · Depression Screening  · Timed Get Up and Go  · Whisper Test  · Cognitive Function Screening  · Nutrition Screening  · ADL Screening  · PAQ Screening            Vitals:    10/10/17 0850   BP: (!) 122/58   BP Location: Left arm   Patient Position: Sitting   Pulse: 88   Weight: 56.2 kg (123 lb 12.8 oz)   Height: 5' 2" (1.575 m)     Body mass index is 22.64 kg/m².     Physical Exam   Constitutional: She is oriented to person, place, and time. She appears well-developed and well-nourished. No distress.   HENT:   Head: Normocephalic and atraumatic.   Cardiovascular: Normal rate, regular rhythm and normal heart sounds.    No murmur heard.  Pulmonary/Chest: Effort normal and breath sounds normal. No respiratory distress. She has no wheezes. She has no rales.   Musculoskeletal: She exhibits no edema, tenderness or deformity.   Neurological: She is alert and oriented to person, place, and time.   Skin: Skin is warm and dry. She is not diaphoretic.   Psychiatric: She has a normal mood and affect. Her behavior is normal. She expresses no homicidal and no suicidal ideation. She expresses no suicidal plans and no homicidal plans.   Vitals reviewed.        Diagnoses and health risks identified today and associated recommendations/orders:    1. Encounter for preventive health examination  Abnormal Whisper Test-declined referral to Audiology  - Mammo Digital Screening Bilateral With CAD; Future  Dexa scan due 2019.    2. Ectatic aorta  Stable.   Seen on CXR from " 4/27/2017.  Followed by PCP.     3. Encounter for screening mammogram for breast cancer  - Mammo Digital Screening Bilateral With CAD; Future    4. Chronic kidney disease, stage III (moderate)  Stable.   Followed by PCP.     5. Anxiety  Stable.   Continue current medication.  Followed by PCP.     6. Hyperlipidemia, unspecified hyperlipidemia type  Stable.   Followed by PCP.     7. Reflux esophagitis  Stable.   Continue current medication.  Followed by PCP.     8. Need for influenza vaccination  Flu vaccine today.      Provided Leovidia with a 5-10 year written screening schedule and personal prevention plan. Recommendations were developed using the USPSTF age appropriate recommendations. Education, counseling, and referrals were provided as needed. After Visit Summary printed and given to patient which includes a list of additional screenings\tests needed.    Return for follow up with PCP as advised. Return in 1 year for HRA.    Connie Bermudez NP

## 2017-10-10 NOTE — PATIENT INSTRUCTIONS
Counseling and Referral of Other Preventative  (Italic type indicates deductible and co-insurance are waived)    Patient Name: Laverne Humphries  Today's Date: 10/10/2017      SERVICE LIMITATIONS RECOMMENDATION    Vaccines    · Pneumococcal (once after 65)    · Influenza (annually)    · Hepatitis B (if medium/high risk)    · Prevnar 13      Hepatitis B medium/high risk factors:       - End-stage renal disease       - Hemophiliacs who received Factor VII or         IX concentrates       - Clients of institutions for the mentally             retarded       - Persons who live in the same house as          a HepB carrier       - Homosexual men       - Illicit injectable drug abusers     Pneumococcal: Done, no repeat necessary     Influenza: Scheduled     Hepatitis B: N/A     Prevnar 13: Done, no repeat necessary    Mammogram (biennial age 50-74)  Annually (age 40 or over)  Last done 9/2016, ordered.    Pap (up to age 70 and after 70 if unknown history or abnormal study last 10 years)    N/A     The USPSTF recommends against screening for cervical cancer in women older than age 65 years who have had adequate prior screening and are not otherwise at high risk for cervical cancer.      Colorectal cancer screening (to age 75)    · Fecal occult blood test (annual)  · Flexible sigmoidoscopy (5y)  · Screening colonoscopy (10y)  · Barium enema   N/A    Diabetes self-management training (no USPSTF recommendations)  Requires referral by treating physician for patient with diabetes or renal disease. 10 hours of initial DSMT sessions of no less than 30 minutes each in a continuous 12-month period. 2 hours of follow-up DSMT in subsequent years.  N/A    Bone mass measurements (age 65 & older, biennial)  Requires diagnosis related to osteoporosis or estrogen deficiency. Biennial benefit unless patient has history of long-term glucocorticoid  Last done 4/2016, recommend to repeat every 2-4  years    Glaucoma screening (no USPSTF  recommendation)  Diabetes mellitus, family history   , age 50 or over    American, age 65 or over  Recommend follow up with eye care professional regularly    Medical nutrition therapy for diabetes or renal disease (no recommended schedule)  Requires referral by treating physician for patient with diabetes or renal disease or kidney transplant within the past 3 years.  Can be provided in same year as diabetes self-management training (DSMT), and CMS recommends medical nutrition therapy take place after DSMT. Up to 3 hours for initial year and 2 hours in subsequent years.  N/A    Cardiovascular screening blood tests (every 5 years)  · Fasting lipid panel  Order as a panel if possible  Last done 3/2017, recommend to repeat every 5  years    Diabetes screening tests (at least every 3 years, Medicare covers annually or at 6-month intervals for prediabetic patients)  · Fasting blood sugar (FBS) or glucose tolerance test (GTT)  Patient must be diagnosed with one of the following:       - Hypertension       - Dyslipidemia       - Obesity (BMI 30kg/m2)       - Previous elevated impaired FBS or GTT       ... or any two of the following:       - Overweight (BMI 25 but <30)       - Family history of diabetes       - Age 65 or older       - History of gestational diabetes or birth of baby weighing more than 9 pounds  Last done 3/2017, recommend to repeat every year    HIV screening (annually for increased risk patients)  · HIV-1 and HIV-2 by EIA, or ANTONIA, rapid antibody test or oral mucosa transudate  Patients must be at increased risk for HIV infection per USPSTF guidelines or pregnant. Tests covered annually for patient at increased risk or as requested by the patient. Pregnant patients may receive up to 3 tests during pregnancy.  Risks discussed, screening is not recommended    Smoking cessation counseling (up to 8 sessions per year)  Patients must be asymptomatic of tobacco-related conditions to  receive as a preventative service.  Non-smoker    Subsequent annual wellness visit  At least 12 months since last AWV  Return in one year     The following information is provided to all patients.  This information is to help you find resources for any of the problems found today that may be affecting your health:                Living healthy guide: www.[facility].[facility]      Understanding Diabetes: www.diabetes.org      Eating healthy: www.cdc.gov/healthyweight      CDC home safety checklist: www.cdc.gov/steadi/patient.html      Agency on Aging: www.goea.[place].[facility]      Alcoholics anonymous (AA): www.aa.org      Physical Activity: www.connie.nih.gov/tq8llvh      Tobacco use: www.quitwithusla.org

## 2017-10-17 ENCOUNTER — HOSPITAL ENCOUNTER (OUTPATIENT)
Dept: RADIOLOGY | Facility: HOSPITAL | Age: 77
Discharge: HOME | End: 2017-10-17
Attending: NURSE PRACTITIONER
Payer: MEDICARE

## 2017-10-17 VITALS — BODY MASS INDEX: 22.63 KG/M2 | HEIGHT: 62 IN | WEIGHT: 123 LBS

## 2017-10-17 DIAGNOSIS — Z12.31 VISIT FOR SCREENING MAMMOGRAM: ICD-10-CM

## 2017-10-17 DIAGNOSIS — Z00.00 ENCOUNTER FOR PREVENTIVE HEALTH EXAMINATION: ICD-10-CM

## 2017-10-17 DIAGNOSIS — Z12.31 ENCOUNTER FOR SCREENING MAMMOGRAM FOR BREAST CANCER: ICD-10-CM

## 2017-10-17 PROCEDURE — 77067 SCR MAMMO BI INCL CAD: CPT | Mod: 26,,, | Performed by: RADIOLOGY

## 2017-10-17 PROCEDURE — 77063 BREAST TOMOSYNTHESIS BI: CPT | Mod: 26,,, | Performed by: RADIOLOGY

## 2017-10-17 PROCEDURE — 77067 SCR MAMMO BI INCL CAD: CPT | Mod: TC

## 2017-10-18 ENCOUNTER — TELEPHONE (OUTPATIENT)
Dept: RADIOLOGY | Facility: HOSPITAL | Age: 77
End: 2017-10-18

## 2017-10-18 NOTE — TELEPHONE ENCOUNTER
Spoke with patient and explained mammogram findings.Patient expressed understanding of results. Patient is scheduled for a abnormal mammogram follow up appointment at [facility] on 10/23/17

## 2017-10-23 ENCOUNTER — HOSPITAL ENCOUNTER (OUTPATIENT)
Dept: RADIOLOGY | Facility: HOSPITAL | Age: 77
Discharge: HOME | End: 2017-10-23
Attending: NURSE PRACTITIONER
Payer: MEDICARE

## 2017-10-23 DIAGNOSIS — R92.8 ABNORMAL MAMMOGRAM: ICD-10-CM

## 2017-10-23 PROCEDURE — 77065 DX MAMMO INCL CAD UNI: CPT | Mod: 26,,, | Performed by: RADIOLOGY

## 2017-10-23 PROCEDURE — 77061 BREAST TOMOSYNTHESIS UNI: CPT | Mod: TC

## 2017-10-23 PROCEDURE — 77061 BREAST TOMOSYNTHESIS UNI: CPT | Mod: 26,,, | Performed by: RADIOLOGY

## 2017-11-06 RX ORDER — LORAZEPAM 0.5 MG/1
TABLET ORAL
Qty: 90 TABLET | Refills: 1 | Status: SHIPPED | OUTPATIENT
Start: 2017-11-06 | End: 2018-05-10 | Stop reason: SDUPTHER

## 2018-01-23 ENCOUNTER — TELEPHONE (OUTPATIENT)
Dept: INTERNAL MEDICINE | Facility: CLINIC | Age: 78
End: 2018-01-23

## 2018-01-23 NOTE — TELEPHONE ENCOUNTER
----- Message from Almaz Slater sent at 1/23/2018 11:39 AM CST -----  Contact: Patient [phone]  The Physical appointment is on 4/4/18. Need lab orders.    Thank you

## 2018-03-08 ENCOUNTER — OFFICE VISIT (OUTPATIENT)
Dept: INTERNAL MEDICINE | Facility: CLINIC | Age: 78
End: 2018-03-08
Payer: MEDICARE

## 2018-03-08 VITALS
WEIGHT: 123.88 LBS | SYSTOLIC BLOOD PRESSURE: 120 MMHG | HEIGHT: 62 IN | OXYGEN SATURATION: 96 % | DIASTOLIC BLOOD PRESSURE: 60 MMHG | HEART RATE: 70 BPM | BODY MASS INDEX: 22.8 KG/M2

## 2018-03-08 DIAGNOSIS — M54.42 ACUTE LEFT-SIDED LOW BACK PAIN WITH LEFT-SIDED SCIATICA: Primary | ICD-10-CM

## 2018-03-08 PROCEDURE — 99999 PR PBB SHADOW E&M-EST. PATIENT-LVL IV: CPT | Mod: PBBFAC,,, | Performed by: PHYSICIAN ASSISTANT

## 2018-03-08 PROCEDURE — 99213 OFFICE O/P EST LOW 20 MIN: CPT | Mod: S$GLB,,, | Performed by: PHYSICIAN ASSISTANT

## 2018-03-08 PROCEDURE — 99499 UNLISTED E&M SERVICE: CPT | Mod: S$GLB,,, | Performed by: PHYSICIAN ASSISTANT

## 2018-03-08 RX ORDER — METHYLPREDNISOLONE 4 MG/1
TABLET ORAL
Qty: 1 PACKAGE | Refills: 0 | Status: SHIPPED | OUTPATIENT
Start: 2018-03-08 | End: 2018-04-04

## 2018-03-08 NOTE — PATIENT INSTRUCTIONS

## 2018-03-08 NOTE — PROGRESS NOTES
Subjective:       Patient ID: Laverne Humphries is a 77 y.o. female.        Chief Complaint: Leg Pain    Laverne Humphries is an established patient of Fran Castro MD here today for urgent care visit.    Left sided low back pain radiating to hip and all the way down leg to foot.  She notes she has been working in the garden and doing yoga.  She has been using heating pad and taking Tylenol.  Pain started 1 week ago.  Yoga actually helped the pain earlier this week.  No numbness or tingling in the leg.  No loss of bowel or bladder.  No abdominal pain, nausea, or vomiting.  No fever or weight loss.  No saddle anesthesia.             Review of Systems   Constitutional: Negative for chills, diaphoresis, fatigue and fever.   HENT: Negative for congestion and sore throat.    Eyes: Negative for visual disturbance.   Respiratory: Negative for cough, chest tightness and shortness of breath.    Cardiovascular: Negative for chest pain, palpitations and leg swelling.   Gastrointestinal: Negative for abdominal pain, blood in stool, constipation, diarrhea, nausea and vomiting.   Genitourinary: Negative for dysuria, frequency, hematuria and urgency.   Musculoskeletal: Positive for back pain (radiating to leg). Negative for arthralgias.   Skin: Negative for rash.   Neurological: Negative for dizziness, syncope, weakness and headaches.   Psychiatric/Behavioral: Negative for dysphoric mood and sleep disturbance. The patient is not nervous/anxious.        Objective:      Physical Exam   Constitutional: She appears well-developed and well-nourished. No distress.   HENT:   Head: Normocephalic and atraumatic.   Right Ear: Tympanic membrane and external ear normal.   Left Ear: Tympanic membrane and external ear normal.   Nose: Nose normal.   Mouth/Throat: Oropharynx is clear and moist.   Eyes: Conjunctivae are normal. Pupils are equal, round, and reactive to light.   Cardiovascular: Normal rate, regular rhythm and normal heart sounds.   "Exam reveals no gallop.    No murmur heard.  Pulmonary/Chest: Effort normal and breath sounds normal. No respiratory distress.   Abdominal: Soft. Normal appearance. There is no tenderness. There is no rebound, no guarding and no CVA tenderness.   Musculoskeletal: She exhibits no edema.        Lumbar back: She exhibits spasm. She exhibits normal range of motion and no tenderness.   Neurological: She is alert. She has normal strength. No sensory deficit.   Negative SLR bilaterally   Skin: Skin is warm and dry. She is not diaphoretic.   Psychiatric: She has a normal mood and affect.   Nursing note and vitals reviewed.      Assessment:       1. Acute left-sided low back pain with left-sided sciatica        Plan:       Laverne was seen today for leg pain.    Diagnoses and all orders for this visit:    Acute left-sided low back pain with left-sided sciatica  -     methylPREDNISolone (MEDROL DOSEPACK) 4 mg tablet; use as directed    Will avoid NSAIDs secondary to CKD.  With sciatica in the past that responded well to medrol dosepak.  Rest, ice, muscle cream, heat, f/u if not improving.  Consider PT if not improving.      Pt has been given instructions populated from [employer] database and has verbalized understanding of the after visit summary and information contained wherein.    Follow up with a primary care provider. May go to ER for acute shortness of breath, lightheadedness, fever, or any other emergent complaints or changes in condition.    "This note will be shared with the patient"    Future Appointments  Date Time Provider Department Center   3/29/2018 7:30 AM LAB, APPOINTMENT [facility]   4/4/2018 2:00 PM Fran Castro MD [facility]               "

## 2018-03-29 ENCOUNTER — LAB VISIT (OUTPATIENT)
Dept: LAB | Facility: HOSPITAL | Age: 78
End: 2018-03-29
Attending: INTERNAL MEDICINE
Payer: MEDICARE

## 2018-03-29 DIAGNOSIS — N18.30 CHRONIC KIDNEY DISEASE, STAGE III (MODERATE): ICD-10-CM

## 2018-03-29 DIAGNOSIS — E78.5 HYPERLIPIDEMIA, UNSPECIFIED HYPERLIPIDEMIA TYPE: ICD-10-CM

## 2018-03-29 LAB
ALBUMIN SERPL BCP-MCNC: 3.8 G/DL
ALP SERPL-CCNC: 56 U/L
ALT SERPL W/O P-5'-P-CCNC: 13 U/L
ANION GAP SERPL CALC-SCNC: 10 MMOL/L
AST SERPL-CCNC: 19 U/L
BASOPHILS # BLD AUTO: 0.06 K/UL
BASOPHILS NFR BLD: 0.8 %
BILIRUB SERPL-MCNC: 1.4 MG/DL
BUN SERPL-MCNC: 20 MG/DL
CALCIUM SERPL-MCNC: 10.3 MG/DL
CHLORIDE SERPL-SCNC: 105 MMOL/L
CHOLEST SERPL-MCNC: 230 MG/DL
CHOLEST/HDLC SERPL: 4.3 {RATIO}
CO2 SERPL-SCNC: 25 MMOL/L
CREAT SERPL-MCNC: 1 MG/DL
DIFFERENTIAL METHOD: NORMAL
EOSINOPHIL # BLD AUTO: 0.3 K/UL
EOSINOPHIL NFR BLD: 3.9 %
ERYTHROCYTE [DISTWIDTH] IN BLOOD BY AUTOMATED COUNT: 13.8 %
EST. GFR  (AFRICAN AMERICAN): >60 ML/MIN/1.73 M^2
EST. GFR  (NON AFRICAN AMERICAN): 54 ML/MIN/1.73 M^2
GLUCOSE SERPL-MCNC: 98 MG/DL
HCT VFR BLD AUTO: 37.1 %
HDLC SERPL-MCNC: 54 MG/DL
HDLC SERPL: 23.5 %
HGB BLD-MCNC: 12.6 G/DL
LDLC SERPL CALC-MCNC: 144.4 MG/DL
LYMPHOCYTES # BLD AUTO: 2.8 K/UL
LYMPHOCYTES NFR BLD: 37.3 %
MCH RBC QN AUTO: 29 PG
MCHC RBC AUTO-ENTMCNC: 34 G/DL
MCV RBC AUTO: 86 FL
MONOCYTES # BLD AUTO: 0.7 K/UL
MONOCYTES NFR BLD: 9.9 %
NEUTROPHILS # BLD AUTO: 3.5 K/UL
NEUTROPHILS NFR BLD: 47.8 %
NONHDLC SERPL-MCNC: 176 MG/DL
PLATELET # BLD AUTO: 263 K/UL
PMV BLD AUTO: 10.8 FL
POTASSIUM SERPL-SCNC: 4 MMOL/L
PROT SERPL-MCNC: 7.1 G/DL
RBC # BLD AUTO: 4.34 M/UL
SODIUM SERPL-SCNC: 140 MMOL/L
TRIGL SERPL-MCNC: 158 MG/DL
WBC # BLD AUTO: 7.38 K/UL

## 2018-03-29 PROCEDURE — 36415 COLL VENOUS BLD VENIPUNCTURE: CPT

## 2018-03-29 PROCEDURE — 80061 LIPID PANEL: CPT

## 2018-03-29 PROCEDURE — 80053 COMPREHEN METABOLIC PANEL: CPT

## 2018-03-29 PROCEDURE — 85025 COMPLETE CBC W/AUTO DIFF WBC: CPT

## 2018-04-04 ENCOUNTER — OFFICE VISIT (OUTPATIENT)
Dept: INTERNAL MEDICINE | Facility: CLINIC | Age: 78
End: 2018-04-04
Payer: MEDICARE

## 2018-04-04 VITALS
HEIGHT: 62 IN | DIASTOLIC BLOOD PRESSURE: 62 MMHG | BODY MASS INDEX: 22.84 KG/M2 | HEART RATE: 72 BPM | WEIGHT: 124.13 LBS | SYSTOLIC BLOOD PRESSURE: 110 MMHG

## 2018-04-04 DIAGNOSIS — I77.819 ECTATIC AORTA: ICD-10-CM

## 2018-04-04 DIAGNOSIS — E78.5 HYPERLIPIDEMIA, UNSPECIFIED HYPERLIPIDEMIA TYPE: Primary | ICD-10-CM

## 2018-04-04 DIAGNOSIS — M25.552 PAIN OF LEFT HIP JOINT: ICD-10-CM

## 2018-04-04 PROCEDURE — 99499 UNLISTED E&M SERVICE: CPT | Mod: S$GLB,,, | Performed by: INTERNAL MEDICINE

## 2018-04-04 PROCEDURE — 99999 PR PBB SHADOW E&M-EST. PATIENT-LVL III: CPT | Mod: PBBFAC,,, | Performed by: INTERNAL MEDICINE

## 2018-04-04 PROCEDURE — 99214 OFFICE O/P EST MOD 30 MIN: CPT | Mod: S$GLB,,, | Performed by: INTERNAL MEDICINE

## 2018-04-04 RX ORDER — MONTELUKAST SODIUM 10 MG/1
10 TABLET ORAL NIGHTLY
Qty: 30 TABLET | Refills: 11 | Status: SHIPPED | OUTPATIENT
Start: 2018-04-04 | End: 2018-04-04 | Stop reason: SDUPTHER

## 2018-04-04 RX ORDER — TRIAMCINOLONE ACETONIDE 1 MG/G
OINTMENT TOPICAL 2 TIMES DAILY
Qty: 15 G | Refills: 1 | Status: SHIPPED | OUTPATIENT
Start: 2018-04-04 | End: 2018-04-04 | Stop reason: SDUPTHER

## 2018-04-04 RX ORDER — FLUTICASONE PROPIONATE 50 MCG
2 SPRAY, SUSPENSION (ML) NASAL DAILY
Qty: 1 BOTTLE | Refills: 11 | Status: SHIPPED | OUTPATIENT
Start: 2018-04-04 | End: 2018-04-04 | Stop reason: SDUPTHER

## 2018-04-04 RX ORDER — MONTELUKAST SODIUM 10 MG/1
10 TABLET ORAL NIGHTLY
Qty: 30 TABLET | Refills: 11 | Status: SHIPPED | OUTPATIENT
Start: 2018-04-04 | End: 2019-11-11 | Stop reason: SDUPTHER

## 2018-04-04 RX ORDER — FLUTICASONE PROPIONATE 50 MCG
2 SPRAY, SUSPENSION (ML) NASAL DAILY
Qty: 1 BOTTLE | Refills: 11 | Status: SHIPPED | OUTPATIENT
Start: 2018-04-04 | End: 2019-06-11

## 2018-04-04 RX ORDER — TRIAMCINOLONE ACETONIDE 1 MG/G
OINTMENT TOPICAL 2 TIMES DAILY
Qty: 15 G | Refills: 1 | Status: SHIPPED | OUTPATIENT
Start: 2018-04-04 | End: 2019-03-26

## 2018-04-05 NOTE — PROGRESS NOTES
Subjective:       Patient ID: Laverne Humphries is a 77 y.o. female.    Chief Complaint: Annual Exam; Back Pain; and Fatigue    Back Pain   This is a chronic problem. The problem occurs constantly. The problem has been waxing and waning since onset. The pain is present in the lumbar spine. The quality of the pain is described as aching. The pain radiates to the left thigh. The pain is mild. The symptoms are aggravated by position. Pertinent negatives include no abdominal pain, chest pain, dysuria or weakness.     Review of Systems   Constitutional: Negative for fatigue.   HENT: Negative for sore throat.    Eyes: Negative for visual disturbance.   Respiratory: Negative for shortness of breath.    Cardiovascular: Negative for chest pain and palpitations.   Gastrointestinal: Negative for abdominal pain.   Genitourinary: Negative for dysuria, frequency and hematuria.   Musculoskeletal: Positive for arthralgias and back pain. Negative for joint swelling.   Skin: Negative for rash.   Neurological: Negative for speech difficulty and weakness.   Psychiatric/Behavioral: Negative for dysphoric mood.       Objective:      Physical Exam   Constitutional: She is oriented to person, place, and time. She appears well-developed and well-nourished. No distress.   HENT:   Head: Normocephalic and atraumatic.   Mouth/Throat: Oropharynx is clear and moist.   Eyes: Conjunctivae are normal. Pupils are equal, round, and reactive to light.   Neck: Normal range of motion. Neck supple.   Cardiovascular: Normal rate, regular rhythm and normal heart sounds.    Pulmonary/Chest: Effort normal and breath sounds normal. She has no wheezes.   Abdominal: Soft. Bowel sounds are normal. There is no tenderness.   Musculoskeletal: Normal range of motion. She exhibits no edema, tenderness or deformity.        Left hip: She exhibits normal range of motion, normal strength, no tenderness, no bony tenderness and no deformity.   Neurological: She is alert and  oriented to person, place, and time. No cranial nerve deficit.   Skin: No erythema.   Psychiatric: She has a normal mood and affect.   Vitals reviewed.      Assessment:       1. Hyperlipidemia, unspecified hyperlipidemia type    2. Pain of left hip joint    3. Ectatic aorta        Plan:       Laverne was seen today for annual exam, back pain and fatigue.    Diagnoses and all orders for this visit:    Hyperlipidemia, unspecified hyperlipidemia type    Pain of left hip joint  -     X-Ray Hip 2 or 3 views Left; Future    Ectatic aorta    Other orders  -     Discontinue: fluticasone (FLONASE) 50 mcg/actuation nasal spray; 2 sprays (100 mcg total) by Each Nare route once daily.  -     Discontinue: montelukast (SINGULAIR) 10 mg tablet; Take 1 tablet (10 mg total) by mouth every evening.  -     Discontinue: triamcinolone acetonide 0.1% (KENALOG) 0.1 % ointment; Apply topically 2 (two) times daily.  -     triamcinolone acetonide 0.1% (KENALOG) 0.1 % ointment; Apply topically 2 (two) times daily.  -     montelukast (SINGULAIR) 10 mg tablet; Take 1 tablet (10 mg total) by mouth every evening.  -     fluticasone (FLONASE) 50 mcg/actuation nasal spray; 2 sprays (100 mcg total) by Each Nare route once daily.        Follow-up in about 1 year (around 4/4/2019) for PHYSICAL EXAM, WITH LAB BEFORE.

## 2018-05-11 RX ORDER — LORAZEPAM 0.5 MG/1
TABLET ORAL
Qty: 90 TABLET | Refills: 1 | Status: SHIPPED | OUTPATIENT
Start: 2018-05-11 | End: 2018-11-19 | Stop reason: SDUPTHER

## 2018-05-22 ENCOUNTER — PES CALL (OUTPATIENT)
Dept: ADMINISTRATIVE | Facility: CLINIC | Age: 78
End: 2018-05-22

## 2018-07-09 ENCOUNTER — OFFICE VISIT (OUTPATIENT)
Dept: INTERNAL MEDICINE | Facility: CLINIC | Age: 78
End: 2018-07-09
Payer: MEDICARE

## 2018-07-09 VITALS
HEIGHT: 62 IN | BODY MASS INDEX: 22.08 KG/M2 | DIASTOLIC BLOOD PRESSURE: 64 MMHG | WEIGHT: 120 LBS | HEART RATE: 88 BPM | SYSTOLIC BLOOD PRESSURE: 126 MMHG

## 2018-07-09 DIAGNOSIS — I77.819 ECTATIC AORTA: ICD-10-CM

## 2018-07-09 DIAGNOSIS — K21.00 REFLUX ESOPHAGITIS: ICD-10-CM

## 2018-07-09 DIAGNOSIS — N18.30 CHRONIC KIDNEY DISEASE, STAGE III (MODERATE): ICD-10-CM

## 2018-07-09 DIAGNOSIS — F41.9 ANXIETY: ICD-10-CM

## 2018-07-09 DIAGNOSIS — Z91.09 ENVIRONMENTAL ALLERGIES: ICD-10-CM

## 2018-07-09 DIAGNOSIS — Z00.00 ENCOUNTER FOR PREVENTIVE HEALTH EXAMINATION: Primary | ICD-10-CM

## 2018-07-09 DIAGNOSIS — E78.5 HYPERLIPIDEMIA, UNSPECIFIED HYPERLIPIDEMIA TYPE: ICD-10-CM

## 2018-07-09 PROCEDURE — 99499 UNLISTED E&M SERVICE: CPT | Mod: HCNC,S$GLB,, | Performed by: NURSE PRACTITIONER

## 2018-07-09 PROCEDURE — 99999 PR PBB SHADOW E&M-EST. PATIENT-LVL III: CPT | Mod: PBBFAC,,, | Performed by: NURSE PRACTITIONER

## 2018-07-09 PROCEDURE — G0439 PPPS, SUBSEQ VISIT: HCPCS | Mod: S$GLB,,, | Performed by: NURSE PRACTITIONER

## 2018-07-09 RX ORDER — FERROUS SULFATE, DRIED 160(50) MG
1 TABLET, EXTENDED RELEASE ORAL DAILY
COMMUNITY

## 2018-07-09 NOTE — PROGRESS NOTES
I offered to discuss end of life issues, including information on how to make advance directives that the patient could use to name someone who would make medical decisions on their behalf if they became too ill to make themselves.    ___Patient declined  _X_Patient has Advance Directive, advised patient to bring copy to PCP.

## 2018-07-09 NOTE — PROGRESS NOTES
"Laverne Humphries presented for a  Medicare AWV and comprehensive Health Risk Assessment today. The following components were reviewed and updated:    · Medical history  · Family History  · Social history  · Allergies and Current Medications  · Health Risk Assessment  · Health Maintenance  · Care Team     ** See Completed Assessments for Annual Wellness Visit within the encounter summary.**       The following assessments were completed:  · Living Situation  · CAGE  · Depression Screening  · Timed Get Up and Go  · Whisper Test  · Cognitive Function Screening  · Nutrition Screening  · ADL Screening  · PAQ Screening            Vitals:    07/09/18 0843   BP: 126/64   BP Location: Left arm   Patient Position: Sitting   Pulse: 88   Weight: 54.4 kg (120 lb)   Height: 5' 2" (1.575 m)     Body mass index is 21.95 kg/m².     Physical Exam   Constitutional: She is oriented to person, place, and time. She appears well-developed and well-nourished. No distress.   HENT:   Head: Normocephalic and atraumatic.   Cardiovascular: Normal rate, regular rhythm and normal heart sounds.    No murmur heard.  Pulmonary/Chest: Effort normal and breath sounds normal. No respiratory distress. She has no wheezes. She has no rales.   Musculoskeletal: She exhibits no edema, tenderness or deformity.   Neurological: She is alert and oriented to person, place, and time.   Skin: Skin is warm and dry. She is not diaphoretic.   Psychiatric: She has a normal mood and affect. Her behavior is normal. She expresses no homicidal and no suicidal ideation. She expresses no suicidal plans and no homicidal plans.   Vitals reviewed.        Diagnoses and health risks identified today and associated recommendations/orders:    1. Encounter for preventive health examination  Health Maintenance reviewed.    2. Ectatic aorta  Stable.   Noted on imaging 4/27/2017.  Followed by PCP.     3. Hyperlipidemia, unspecified hyperlipidemia type  Stable.   Followed by PCP. "     4. Chronic kidney disease, stage III (moderate)  Stable.   Followed by PCP.     5. Anxiety  Stable.   Continue current medication.  Followed by PCP.     6. Reflux esophagitis  Stable.   Continue current medication.  Followed by PCP.     7. Environmental allergies  Stable.   Continue current medications.  Followed by PCP.       Provided Leovidia with a 5-10 year written screening schedule and personal prevention plan. Recommendations were developed using the USPSTF age appropriate recommendations. Education, counseling, and referrals were provided as needed. After Visit Summary printed and given to patient which includes a list of additional screenings\tests needed.    Follow-up for annual with PCP 4/2019 and as needed. Return in 1 year for AWV.    Connie Bermudez NP

## 2018-07-09 NOTE — PATIENT INSTRUCTIONS
Counseling and Referral of Other Preventative  (Italic type indicates deductible and co-insurance are waived)    Patient Name: Laverne Diboll  Today's Date: 7/9/2018    Health Maintenance       Date Due Completion Date    Influenza Vaccine 08/01/2018 10/10/2017 (Done)    Override on 10/10/2017: Done    DEXA SCAN 04/04/2019 4/4/2016    TETANUS VACCINE 02/09/2022 2/9/2012    Override on 2/9/2012: Done    Lipid Panel 03/29/2023 3/29/2018        No orders of the defined types were placed in this encounter.    The following information is provided to all patients.  This information is to help you find resources for any of the problems found today that may be affecting your health:                Living healthy guide: www.[facility].[place].gov      Understanding Diabetes: www.diabetes.org      Eating healthy: www.cdc.gov/healthyweight      [facility] home safety checklist: www.cdc.gov/steadi/patient.html      Agency on Aging: www.goea.[place].gov      Alcoholics anonymous (AA): www.aa.org      Physical Activity: www.connie.nih.gov/we4tjzg      Tobacco use: www.quitwithusla.org

## 2018-10-04 ENCOUNTER — OFFICE VISIT (OUTPATIENT)
Dept: URGENT CARE | Facility: CLINIC | Age: 78
End: 2018-10-04
Payer: MEDICARE

## 2018-10-04 VITALS
TEMPERATURE: 97 F | WEIGHT: 120 LBS | OXYGEN SATURATION: 97 % | DIASTOLIC BLOOD PRESSURE: 75 MMHG | SYSTOLIC BLOOD PRESSURE: 135 MMHG | RESPIRATION RATE: 18 BRPM | BODY MASS INDEX: 23.56 KG/M2 | HEIGHT: 60 IN | HEART RATE: 89 BPM

## 2018-10-04 DIAGNOSIS — J40 BRONCHITIS: Primary | ICD-10-CM

## 2018-10-04 DIAGNOSIS — J30.9 ALLERGIC RHINITIS, UNSPECIFIED SEASONALITY, UNSPECIFIED TRIGGER: ICD-10-CM

## 2018-10-04 PROCEDURE — 99214 OFFICE O/P EST MOD 30 MIN: CPT | Mod: S$GLB,,, | Performed by: NURSE PRACTITIONER

## 2018-10-04 PROCEDURE — 1101F PT FALLS ASSESS-DOCD LE1/YR: CPT | Mod: CPTII,S$GLB,, | Performed by: NURSE PRACTITIONER

## 2018-10-04 RX ORDER — PREDNISONE 20 MG/1
20 TABLET ORAL DAILY
Qty: 5 TABLET | Refills: 0 | Status: SHIPPED | OUTPATIENT
Start: 2018-10-04 | End: 2018-10-09

## 2018-10-04 RX ORDER — BENZONATATE 200 MG/1
200 CAPSULE ORAL 3 TIMES DAILY PRN
Qty: 30 CAPSULE | Refills: 0 | Status: SHIPPED | OUTPATIENT
Start: 2018-10-04 | End: 2018-10-14

## 2018-10-04 NOTE — PROGRESS NOTES
Subjective:       Patient ID: Laverne Humphries is a 77 y.o. female.    Vitals:  height is 5' (1.524 m) and weight is 54.4 kg (120 lb). Her oral temperature is 97.1 °F (36.2 °C). Her blood pressure is 135/75 and her pulse is 89. Her respiration is 18 and oxygen saturation is 97%.     Chief Complaint: Cough    Patient presents with complaints of worsening cough x 5 days. Patient denies any fevers, chills, SOB, abdominal pain, nausea, vomiting, ear pains, or sore throat. Patient reports using her home Albuterol inhaler and taking her allergy medications for her symptoms.       Cough   This is a new problem. The current episode started in the past 7 days. The problem has been gradually worsening (9/30/2018). The problem occurs every few minutes. The cough is non-productive. Associated symptoms include headaches. Pertinent negatives include no chest pain, chills, ear pain, eye redness, fever, myalgias, sore throat or shortness of breath. The symptoms are aggravated by lying down. She has tried nothing for the symptoms. The treatment provided no relief. Her past medical history is significant for asthma, bronchiectasis, bronchitis, COPD, emphysema, environmental allergies and pneumonia.     Review of Systems   Constitution: Positive for malaise/fatigue. Negative for chills and fever.   HENT: Positive for congestion. Negative for ear pain, hoarse voice and sore throat.    Eyes: Negative for discharge and redness.   Cardiovascular: Negative for chest pain, dyspnea on exertion and leg swelling.   Respiratory: Positive for cough. Negative for shortness of breath and sputum production.    Musculoskeletal: Negative for myalgias.   Gastrointestinal: Negative for abdominal pain and nausea.   Neurological: Positive for headaches.   Allergic/Immunologic: Positive for environmental allergies.       Objective:      Physical Exam   Constitutional: She is oriented to person, place, and time. She appears well-developed and  well-nourished. She is cooperative.  Non-toxic appearance. She does not appear ill. No distress.   HENT:   Head: Normocephalic and atraumatic.   Right Ear: Hearing, tympanic membrane, external ear and ear canal normal.   Left Ear: Hearing, tympanic membrane, external ear and ear canal normal.   Nose: Mucosal edema present. No rhinorrhea or nasal deformity. No epistaxis. Right sinus exhibits frontal sinus tenderness. Right sinus exhibits no maxillary sinus tenderness. Left sinus exhibits frontal sinus tenderness. Left sinus exhibits no maxillary sinus tenderness.   Mouth/Throat: Uvula is midline, oropharynx is clear and moist and mucous membranes are normal. No trismus in the jaw. Normal dentition. No uvula swelling. No posterior oropharyngeal erythema.   Eyes: Conjunctivae and lids are normal. Right eye exhibits no discharge. Left eye exhibits no discharge. No scleral icterus.   Sclera clear bilat   Neck: Trachea normal, normal range of motion, full passive range of motion without pain and phonation normal. Neck supple.   Cardiovascular: Normal rate, regular rhythm, normal heart sounds, intact distal pulses and normal pulses.   Pulmonary/Chest: Effort normal and breath sounds normal. No respiratory distress. She has no decreased breath sounds. She has no wheezes. She has no rhonchi. She has no rales.   Harsh Cough.  No wheezes.   Abdominal: Soft. Normal appearance and bowel sounds are normal. She exhibits no distension, no pulsatile midline mass and no mass. There is no tenderness.   Musculoskeletal: Normal range of motion. She exhibits no edema or deformity.   Neurological: She is alert and oriented to person, place, and time. She exhibits normal muscle tone. Coordination normal.   Skin: Skin is warm, dry and intact. She is not diaphoretic. No pallor.   Psychiatric: She has a normal mood and affect. Her speech is normal and behavior is normal. Judgment and thought content normal. Cognition and memory are normal.    Nursing note and vitals reviewed.      Assessment:       1. Bronchitis    2. Allergic rhinitis, unspecified seasonality, unspecified trigger        Plan:         Bronchitis  -     benzonatate (TESSALON) 200 MG capsule; Take 1 capsule (200 mg total) by mouth 3 (three) times daily as needed.  Dispense: 30 capsule; Refill: 0  -     predniSONE (DELTASONE) 20 MG tablet; Take 1 tablet (20 mg total) by mouth once daily. for 5 days  Dispense: 5 tablet; Refill: 0    Allergic rhinitis, unspecified seasonality, unspecified trigger          Patient Instructions     Continue home Albuterol inhaler  Continue home Allergy medications  Please drink plenty of fluids.  Please get plenty of rest.  Please return here or go to the Emergency Department for any concerns or worsening of condition.  If not allergic, please take over the counter Tylenol (Acetaminophen) and/or Motrin (Ibuprofen) as directed for control of pain and/or fever.  Please follow up with your primary care doctor or specialist as needed.    If you  smoke, please stop smoking.    Bronchitis, Viral (Adult)    You have a viral bronchitis. Bronchitis is inflammation and swelling of the lining of the lungs. This is often caused by an infection. Symptoms include a dry, hacking cough that is worse at night. The cough may bring up yellow-green mucus. You may also feel short of breath or wheeze. Other symptoms may include tiredness, chest discomfort, and chills.  Bronchitis that is caused by a virus is not treated with antibiotics. Instead, medicines may be given to help relieve symptoms. Symptoms can last up to 2 weeks, although the cough may last much longer.  This illness is contagious during the first few days and is spread through the air by coughing and sneezing, or by direct contact (touching the sick person and then touching your own eyes, nose, or mouth).  Most viral illnesses resolve within 10 to 14 days with rest and simple home remedies, although they may  sometimes last for several weeks.  Home care  · If symptoms are severe, rest at home for the first 2 to 3 days. When you go back to your usual activities, don't let yourself get too tired.  · Do not smoke. Also avoid being exposed to secondhand smoke.  · You may use over-the-counter medicine to control fever or pain, unless another pain medicine was prescribed. (Note: If you have chronic liver or kidney disease or have ever had a stomach ulcer or gastrointestinal bleeding, talk with your healthcare provider before using these medicines. Also talk to your provider if you are taking medicine to prevent blood clots.) Aspirin should never be given to anyone younger than 18 years of age who is ill with a viral infection or fever. It may cause severe liver or brain damage.  · Your appetite may be poor, so a light diet is fine. Avoid dehydration by drinking 6 to 8 glasses of fluids per day (such as water, soft drinks, sports drinks, juices, tea, or soup). Extra fluids will help loosen secretions in the nose and lungs.  · Over-the-counter cough, cold, and sore-throat medicines will not shorten the length of the illness, but they may help to reduce symptoms. (Note: Do not use decongestants if you have high blood pressure.)  Follow-up care  Follow up with your healthcare provider, or as advised. If you had an X-ray or ECG (electrocardiogram), a specialist will review it. You will be notified of any new findings that may affect your care.  Note: If you are age 65 or older, or if you have a chronic lung disease or condition that affects your immune system, or you smoke, talk to your healthcare provider about having pneumococcal vaccinations and a yearly influenza vaccination (flu shot).  When to seek medical advice  Call your healthcare provider right away if any of these occur:  · Fever of 100.4°F (38°C) or higher  · Coughing up increased amounts of colored sputum  · Weakness, drowsiness, headache, facial pain, ear pain, or a  stiff neck  Call 911, or get immediate medical care  Contact emergency services right away if any of these occur:  · Coughing up blood  · Worsening weakness, drowsiness, headache, or stiff neck  · Trouble breathing, wheezing, or pain with breathing  Date Last Reviewed: 9/13/2015  © 9869-6760 [employer]. [address] [place] [address], [place] [address]. All rights reserved. This information is not intended as a substitute for professional medical care. Always follow your healthcare professional's instructions.        Allergic Rhinitis  Allergic rhinitis is an allergic reaction that affects the nose, and often the eyes. Its often known as nasal allergies. Nasal allergies are often due to things in the environment that are breathed in. Depending what you are sensitive to, nasal allergies may occur only during certain seasons. Or they may occur year round. Common indoor allergens include house dust mites, mold, cockroaches, and pet dander. Outdoor allergens include pollen from trees, grasses, and weeds.   Symptoms include a drippy, stuffy, and itchy nose. They also include sneezing and red and itchy eyes. You may feel tired more often. Severe allergies may also affect your breathing and trigger a condition called asthma.   Tests can be done to see what allergens are affecting you. You may be referred to an allergy specialist for testing and further evaluation.  Home care  Your healthcare provider may prescribe medicines to help relieve allergy symptoms. These may include oral medicines, nasal sprays, or eye drops.  Ask your provider for advice on how to avoid substances that you are allergic to. Below are a few tips for each type of allergen.  Pet dander:  · Do not have pets with fur and feathers.  · If you can't avoid having a pet, keep it out of your bedroom and off upholstered furniture.  Pollen:  · When pollen counts are high, keep windows of your car and home closed. If possible, use an air conditioner  instead.  · Wear a filter mask when mowing or doing yard work.  House dust mites:  · Wash bedding every week in warm water and detergent and dry on a hot setting.  · Cover the mattress, box spring, and pillows with allergy covers.   · If possible, sleep in a room with no carpet, curtains, or upholstered furniture.  Cockroaches:  · Store food in sealed containers.  · Remove garbage from the home promptly.  · Fix water leaks  Mold:  · Keep humidity low by using a dehumidifier or air conditioner. Keep the dehumidifier and air conditioner clean and free of mold.  · Clean moldy areas with bleach and water.  In general:  · Vacuum once or twice a week. If possible, use a vacuum with a high-efficiency particulate air (HEPA) filter.  · Do not smoke. Avoid cigarette smoke. Cigarette smoke is an irritant that can make symptoms worse.  Follow-up care  Follow up as advised by the healthcare provider or our staff. If you were referred to an allergy specialist, make this appointment promptly.  When to seek medical advice  Call your healthcare provider right away if the following occur:  · Coughing or wheezing  · Fever greater than 100.4°F (38°C)  · Hives (raised red bumps)  · Continuing symptoms, new symptoms, or worsening symptoms  Call 911 right away if you have:  · Trouble breathing  · Severe swelling of the face or severe itching of the eyes or mouth  Date Last Reviewed: 3/1/2017  © 6912-3079 [employer]. [address] [place] [address], [place] [address]. All rights reserved. This information is not intended as a substitute for professional medical care. Always follow your healthcare professional's instructions.      Please return here or go to the Emergency Department for any concerns or worsening of condition.  If you were prescribed antibiotics, please take them to completion.  If you were prescribed a narcotic medication, do not drive or operate heavy equipment or machinery while taking these medications.  Please  follow up with your primary care doctor or specialist as needed.    If you  smoke, please stop smoking.

## 2018-10-04 NOTE — PATIENT INSTRUCTIONS
Continue home Albuterol inhaler  Continue home Allergy medications  Please drink plenty of fluids.  Please get plenty of rest.  Please return here or go to the Emergency Department for any concerns or worsening of condition.  If not allergic, please take over the counter Tylenol (Acetaminophen) and/or Motrin (Ibuprofen) as directed for control of pain and/or fever.  Please follow up with your primary care doctor or specialist as needed.    If you  smoke, please stop smoking.    Bronchitis, Viral (Adult)    You have a viral bronchitis. Bronchitis is inflammation and swelling of the lining of the lungs. This is often caused by an infection. Symptoms include a dry, hacking cough that is worse at night. The cough may bring up yellow-green mucus. You may also feel short of breath or wheeze. Other symptoms may include tiredness, chest discomfort, and chills.  Bronchitis that is caused by a virus is not treated with antibiotics. Instead, medicines may be given to help relieve symptoms. Symptoms can last up to 2 weeks, although the cough may last much longer.  This illness is contagious during the first few days and is spread through the air by coughing and sneezing, or by direct contact (touching the sick person and then touching your own eyes, nose, or mouth).  Most viral illnesses resolve within 10 to 14 days with rest and simple home remedies, although they may sometimes last for several weeks.  Home care  · If symptoms are severe, rest at home for the first 2 to 3 days. When you go back to your usual activities, don't let yourself get too tired.  · Do not smoke. Also avoid being exposed to secondhand smoke.  · You may use over-the-counter medicine to control fever or pain, unless another pain medicine was prescribed. (Note: If you have chronic liver or kidney disease or have ever had a stomach ulcer or gastrointestinal bleeding, talk with your healthcare provider before using these medicines. Also talk to your  provider if you are taking medicine to prevent blood clots.) Aspirin should never be given to anyone younger than 18 years of age who is ill with a viral infection or fever. It may cause severe liver or brain damage.  · Your appetite may be poor, so a light diet is fine. Avoid dehydration by drinking 6 to 8 glasses of fluids per day (such as water, soft drinks, sports drinks, juices, tea, or soup). Extra fluids will help loosen secretions in the nose and lungs.  · Over-the-counter cough, cold, and sore-throat medicines will not shorten the length of the illness, but they may help to reduce symptoms. (Note: Do not use decongestants if you have high blood pressure.)  Follow-up care  Follow up with your healthcare provider, or as advised. If you had an X-ray or ECG (electrocardiogram), a specialist will review it. You will be notified of any new findings that may affect your care.  Note: If you are age 65 or older, or if you have a chronic lung disease or condition that affects your immune system, or you smoke, talk to your healthcare provider about having pneumococcal vaccinations and a yearly influenza vaccination (flu shot).  When to seek medical advice  Call your healthcare provider right away if any of these occur:  · Fever of 100.4°F (38°C) or higher  · Coughing up increased amounts of colored sputum  · Weakness, drowsiness, headache, facial pain, ear pain, or a stiff neck  Call 911, or get immediate medical care  Contact emergency services right away if any of these occur:  · Coughing up blood  · Worsening weakness, drowsiness, headache, or stiff neck  · Trouble breathing, wheezing, or pain with breathing  Date Last Reviewed: 9/13/2015 © 2000-2017 [employer]. [address] [place] [address], [place] [address]. All rights reserved. This information is not intended as a substitute for professional medical care. Always follow your healthcare professional's instructions.        Allergic Rhinitis  Allergic  rhinitis is an allergic reaction that affects the nose, and often the eyes. Its often known as nasal allergies. Nasal allergies are often due to things in the environment that are breathed in. Depending what you are sensitive to, nasal allergies may occur only during certain seasons. Or they may occur year round. Common indoor allergens include house dust mites, mold, cockroaches, and pet dander. Outdoor allergens include pollen from trees, grasses, and weeds.   Symptoms include a drippy, stuffy, and itchy nose. They also include sneezing and red and itchy eyes. You may feel tired more often. Severe allergies may also affect your breathing and trigger a condition called asthma.   Tests can be done to see what allergens are affecting you. You may be referred to an allergy specialist for testing and further evaluation.  Home care  Your healthcare provider may prescribe medicines to help relieve allergy symptoms. These may include oral medicines, nasal sprays, or eye drops.  Ask your provider for advice on how to avoid substances that you are allergic to. Below are a few tips for each type of allergen.  Pet dander:  · Do not have pets with fur and feathers.  · If you can't avoid having a pet, keep it out of your bedroom and off upholstered furniture.  Pollen:  · When pollen counts are high, keep windows of your car and home closed. If possible, use an air conditioner instead.  · Wear a filter mask when mowing or doing yard work.  House dust mites:  · Wash bedding every week in warm water and detergent and dry on a hot setting.  · Cover the mattress, box spring, and pillows with allergy covers.   · If possible, sleep in a room with no carpet, curtains, or upholstered furniture.  Cockroaches:  · Store food in sealed containers.  · Remove garbage from the home promptly.  · Fix water leaks  Mold:  · Keep humidity low by using a dehumidifier or air conditioner. Keep the dehumidifier and air conditioner clean and free of  mold.  · Clean moldy areas with bleach and water.  In general:  · Vacuum once or twice a week. If possible, use a vacuum with a high-efficiency particulate air (HEPA) filter.  · Do not smoke. Avoid cigarette smoke. Cigarette smoke is an irritant that can make symptoms worse.  Follow-up care  Follow up as advised by the healthcare provider or our staff. If you were referred to an allergy specialist, make this appointment promptly.  When to seek medical advice  Call your healthcare provider right away if the following occur:  · Coughing or wheezing  · Fever greater than 100.4°F (38°C)  · Hives (raised red bumps)  · Continuing symptoms, new symptoms, or worsening symptoms  Call 911 right away if you have:  · Trouble breathing  · Severe swelling of the face or severe itching of the eyes or mouth  Date Last Reviewed: 3/1/2017  © 7317-5010 [employer]. [address] [place] [address]. All rights reserved. This information is not intended as a substitute for professional medical care. Always follow your healthcare professional's instructions.      Please return here or go to the Emergency Department for any concerns or worsening of condition.  If you were prescribed antibiotics, please take them to completion.  If you were prescribed a narcotic medication, do not drive or operate heavy equipment or machinery while taking these medications.  Please follow up with your primary care doctor or specialist as needed.    If you  smoke, please stop smoking.

## 2018-10-12 ENCOUNTER — TELEPHONE (OUTPATIENT)
Dept: INTERNAL MEDICINE | Facility: CLINIC | Age: 78
End: 2018-10-12

## 2018-10-12 DIAGNOSIS — Z12.31 ENCOUNTER FOR SCREENING MAMMOGRAM FOR MALIGNANT NEOPLASM OF BREAST: Primary | ICD-10-CM

## 2018-10-12 NOTE — TELEPHONE ENCOUNTER
----- Message from Ramona Posey sent at 10/12/2018  1:39 PM CDT -----  Contact: Pt Home [phone]  Caller is requesting to schedule their annual screening mammogram appointment. Order is not listed in Epic.  Please enter order and contact patient to schedule.    Where would they like the mammogram performed?:  Patient is not sure yet.   Additional information:

## 2018-10-24 ENCOUNTER — HOSPITAL ENCOUNTER (OUTPATIENT)
Dept: RADIOLOGY | Facility: HOSPITAL | Age: 78
Discharge: HOME | End: 2018-10-24
Attending: INTERNAL MEDICINE
Payer: MEDICARE

## 2018-10-24 DIAGNOSIS — Z12.31 ENCOUNTER FOR SCREENING MAMMOGRAM FOR MALIGNANT NEOPLASM OF BREAST: ICD-10-CM

## 2018-10-24 PROCEDURE — 77067 SCR MAMMO BI INCL CAD: CPT | Mod: TC

## 2018-10-24 PROCEDURE — 77067 SCR MAMMO BI INCL CAD: CPT | Mod: 26,,, | Performed by: RADIOLOGY

## 2018-10-24 PROCEDURE — 77063 BREAST TOMOSYNTHESIS BI: CPT | Mod: 26,,, | Performed by: RADIOLOGY

## 2018-10-24 PROCEDURE — 77063 BREAST TOMOSYNTHESIS BI: CPT | Mod: TC

## 2018-11-08 ENCOUNTER — TELEPHONE (OUTPATIENT)
Dept: INTERNAL MEDICINE | Facility: CLINIC | Age: 78
End: 2018-11-08

## 2018-11-08 NOTE — TELEPHONE ENCOUNTER
----- Message from Fran Castro MD sent at 11/8/2018 11:06 AM CST -----  Mammogram OK, repeat in 1 yr

## 2018-11-08 NOTE — LETTER
November 8, 2018    Laverne Humphries  [address] [place] [id number]             [facility]  [address]  [facility] [address]  Phone: [phone]  Fax: [phone] November 8, 2018     No Recipients    Patient: Laverne Humphries   Address: [address] [place] [id number]   YOB: 1940   Date of Visit: 11/8/2018       Dear Ms. Humphries,    Your mammogram did not show any significant findings,  according to the radiologists interpretation.    Please remember that some cancers (about 10-15%) cannot be  found by mammography alone, and that early detection  requires a combination of monthly self-examination, yearly  physical examination, and periodic mammography.    Please continue regular breast self-examinations and report  any changes that concern you, even before your next  appointment.  If you have any further questions, please  contact your doctor.      Sincerely,        Fran Castro MD

## 2018-11-19 RX ORDER — LORAZEPAM 0.5 MG/1
TABLET ORAL
Qty: 90 TABLET | Refills: 1 | Status: SHIPPED | OUTPATIENT
Start: 2018-11-19 | End: 2019-04-10 | Stop reason: SDUPTHER

## 2018-12-04 ENCOUNTER — OFFICE VISIT (OUTPATIENT)
Dept: OPTOMETRY | Facility: CLINIC | Age: 78
End: 2018-12-04
Payer: COMMERCIAL

## 2018-12-04 DIAGNOSIS — Z13.5 GLAUCOMA SCREENING: ICD-10-CM

## 2018-12-04 DIAGNOSIS — H52.4 PRESBYOPIA: ICD-10-CM

## 2018-12-04 DIAGNOSIS — H04.123 DRY EYES, BILATERAL: Primary | ICD-10-CM

## 2018-12-04 PROCEDURE — 99999 PR PBB SHADOW E&M-EST. PATIENT-LVL II: CPT | Mod: PBBFAC,,, | Performed by: OPTOMETRIST

## 2018-12-04 PROCEDURE — 92015 DETERMINE REFRACTIVE STATE: CPT | Mod: S$GLB,,, | Performed by: OPTOMETRIST

## 2018-12-04 PROCEDURE — 92004 COMPRE OPH EXAM NEW PT 1/>: CPT | Mod: S$GLB,,, | Performed by: OPTOMETRIST

## 2018-12-04 NOTE — PROGRESS NOTES
HPI     DLS:4/8/15  Pt states she is not having any VA problems. States that sometimes she has   a pinching  Feeling in the corner of the right eye, doesn't happen every   day.   No f/ f  Refresh gtts     Last edited by Alfred Pond, OD on 12/4/2018 10:46 AM. (History)        ROS     Positive for: Eyes (cat surgery OU)    Negative for: Constitutional, Gastrointestinal, Neurological, Skin,   Genitourinary, Musculoskeletal, HENT, Endocrine, Cardiovascular,   Respiratory, Psychiatric, Allergic/Imm, Heme/Lymph    Last edited by Alfred Pond, OD on 12/4/2018 10:46 AM. (History)        Assessment /Plan     For exam results, see Encounter Report.    Dry eyes, bilateral    Glaucoma screening    Presbyopia      1. Mild pco sp pciol OU--pt happy w spex  2. Dry eye sx--pt to increase AT use    PLAN:    rtc 1 yr

## 2019-01-04 ENCOUNTER — OFFICE VISIT (OUTPATIENT)
Dept: INTERNAL MEDICINE | Facility: CLINIC | Age: 79
End: 2019-01-04
Payer: MEDICARE

## 2019-01-04 VITALS
HEIGHT: 60 IN | HEART RATE: 72 BPM | SYSTOLIC BLOOD PRESSURE: 120 MMHG | BODY MASS INDEX: 24.54 KG/M2 | DIASTOLIC BLOOD PRESSURE: 64 MMHG | WEIGHT: 125 LBS

## 2019-01-04 DIAGNOSIS — S83.411A SPRAIN OF MEDIAL COLLATERAL LIGAMENT OF RIGHT KNEE, INITIAL ENCOUNTER: Primary | ICD-10-CM

## 2019-01-04 DIAGNOSIS — N18.30 CHRONIC KIDNEY DISEASE, STAGE III (MODERATE): ICD-10-CM

## 2019-01-04 DIAGNOSIS — I77.819 ECTATIC AORTA: ICD-10-CM

## 2019-01-04 PROCEDURE — 1101F PR PT FALLS ASSESS DOC 0-1 FALLS W/OUT INJ PAST YR: ICD-10-PCS | Mod: CPTII,HCNC,S$GLB, | Performed by: INTERNAL MEDICINE

## 2019-01-04 PROCEDURE — 99212 PR OFFICE/OUTPT VISIT, EST, LEVL II, 10-19 MIN: ICD-10-PCS | Mod: HCNC,S$GLB,, | Performed by: INTERNAL MEDICINE

## 2019-01-04 PROCEDURE — 99212 OFFICE O/P EST SF 10 MIN: CPT | Mod: HCNC,S$GLB,, | Performed by: INTERNAL MEDICINE

## 2019-01-04 PROCEDURE — 99499 UNLISTED E&M SERVICE: CPT | Mod: HCNC,S$GLB,, | Performed by: INTERNAL MEDICINE

## 2019-01-04 PROCEDURE — 99999 PR PBB SHADOW E&M-EST. PATIENT-LVL III: CPT | Mod: PBBFAC,HCNC,, | Performed by: INTERNAL MEDICINE

## 2019-01-04 PROCEDURE — 99999 PR PBB SHADOW E&M-EST. PATIENT-LVL III: ICD-10-PCS | Mod: PBBFAC,HCNC,, | Performed by: INTERNAL MEDICINE

## 2019-01-04 PROCEDURE — 1101F PT FALLS ASSESS-DOCD LE1/YR: CPT | Mod: CPTII,HCNC,S$GLB, | Performed by: INTERNAL MEDICINE

## 2019-01-04 PROCEDURE — 99499 RISK ADDL DX/OHS AUDIT: ICD-10-PCS | Mod: HCNC,S$GLB,, | Performed by: INTERNAL MEDICINE

## 2019-01-04 RX ORDER — MELOXICAM 7.5 MG/1
7.5 TABLET ORAL DAILY
Qty: 30 TABLET | Refills: 1 | Status: SHIPPED | OUTPATIENT
Start: 2019-01-04 | End: 2019-04-12 | Stop reason: ALTCHOICE

## 2019-01-04 NOTE — PROGRESS NOTES
Subjective:       Patient ID: Laverne Humphries is a 78 y.o. female.    Chief Complaint: Leg Pain (R) and Hyperlipidemia    Leg Pain    The incident occurred more than 1 week ago. The incident occurred at the gym. The injury mechanism was a twisting injury. The pain is present in the right knee. The quality of the pain is described as aching. The pain is mild. The pain has been fluctuating since onset. Pertinent negatives include no inability to bear weight or muscle weakness. The symptoms are aggravated by palpation.     Review of Systems    Objective:      Physical Exam   Musculoskeletal:        Right knee: She exhibits normal range of motion, no swelling, no bony tenderness and no MCL laxity. Tenderness found. MCL tenderness noted.       Assessment:       1. Sprain of medial collateral ligament of right knee, initial encounter    2. Chronic kidney disease, stage III (moderate)    3. Ectatic aorta        Plan:       Laverne was seen today for leg pain and hyperlipidemia.    Diagnoses and all orders for this visit:    Sprain of medial collateral ligament of right knee, initial encounter  Comments:  ice, Mobic, support sleeve  Orders:  -     meloxicam (MOBIC) 7.5 MG tablet; Take 1 tablet (7.5 mg total) by mouth once daily.    Chronic kidney disease, stage III (moderate)  Comments:  stable    Ectatic aorta  Comments:  cont current regimen        Follow-up if symptoms worsen or fail to improve.

## 2019-03-26 ENCOUNTER — OFFICE VISIT (OUTPATIENT)
Dept: URGENT CARE | Facility: CLINIC | Age: 79
End: 2019-03-26
Payer: MEDICARE

## 2019-03-26 VITALS
BODY MASS INDEX: 24.54 KG/M2 | OXYGEN SATURATION: 100 % | WEIGHT: 125 LBS | HEART RATE: 94 BPM | RESPIRATION RATE: 19 BRPM | DIASTOLIC BLOOD PRESSURE: 71 MMHG | SYSTOLIC BLOOD PRESSURE: 142 MMHG | HEIGHT: 60 IN | TEMPERATURE: 97 F

## 2019-03-26 DIAGNOSIS — R23.8 SKIN IRRITATION: Primary | ICD-10-CM

## 2019-03-26 DIAGNOSIS — R39.15 URINARY URGENCY: ICD-10-CM

## 2019-03-26 LAB
BILIRUB UR QL STRIP: NEGATIVE
GLUCOSE UR QL STRIP: NEGATIVE
KETONES UR QL STRIP: NEGATIVE
LEUKOCYTE ESTERASE UR QL STRIP: POSITIVE
PH, POC UA: 5 (ref 5–8)
POC BLOOD, URINE: NEGATIVE
POC NITRATES, URINE: NEGATIVE
PROT UR QL STRIP: NEGATIVE
SP GR UR STRIP: 1.01 (ref 1–1.03)
UROBILINOGEN UR STRIP-ACNC: 0.8 (ref 0.1–1.1)

## 2019-03-26 PROCEDURE — 1101F PT FALLS ASSESS-DOCD LE1/YR: CPT | Mod: CPTII,S$GLB,, | Performed by: NURSE PRACTITIONER

## 2019-03-26 PROCEDURE — 81003 URINALYSIS AUTO W/O SCOPE: CPT | Mod: QW,S$GLB,, | Performed by: NURSE PRACTITIONER

## 2019-03-26 PROCEDURE — 87086 URINE CULTURE/COLONY COUNT: CPT | Mod: HCNC

## 2019-03-26 PROCEDURE — 81003 POCT URINALYSIS, DIPSTICK, AUTOMATED, W/O SCOPE: ICD-10-PCS | Mod: QW,S$GLB,, | Performed by: NURSE PRACTITIONER

## 2019-03-26 PROCEDURE — 99214 OFFICE O/P EST MOD 30 MIN: CPT | Mod: 25,S$GLB,, | Performed by: NURSE PRACTITIONER

## 2019-03-26 PROCEDURE — 1101F PR PT FALLS ASSESS DOC 0-1 FALLS W/OUT INJ PAST YR: ICD-10-PCS | Mod: CPTII,S$GLB,, | Performed by: NURSE PRACTITIONER

## 2019-03-26 PROCEDURE — 99214 PR OFFICE/OUTPT VISIT, EST, LEVL IV, 30-39 MIN: ICD-10-PCS | Mod: 25,S$GLB,, | Performed by: NURSE PRACTITIONER

## 2019-03-26 RX ORDER — TRIAMCINOLONE ACETONIDE 1 MG/G
OINTMENT TOPICAL 2 TIMES DAILY
Qty: 30 G | Refills: 0 | Status: SHIPPED | OUTPATIENT
Start: 2019-03-26 | End: 2019-06-11

## 2019-03-26 RX ORDER — MULTIVIT WITH MINERALS/HERBS
1 TABLET ORAL DAILY
COMMUNITY
End: 2019-06-11

## 2019-03-26 RX ORDER — AMOXICILLIN 500 MG
CAPSULE ORAL DAILY
COMMUNITY
End: 2022-05-12

## 2019-03-26 NOTE — PATIENT INSTRUCTIONS
Understanding Contact Dermatitis     A cool, moist compress can help reduce itching.     Contact dermatitis is a common type of skin rash. Its caused by something that touches the skin and makes it irritated and inflamed. It can occur on skin on any part of the body, such as the face, neck, hands, arms, and legs. Contact dermatitis is not spread from person to person.  Often, the reaction of contact dermatitis occurs 1 to 2 days after contact with the offending agent.  How to say it  CHIQUI-tact [id number]-YC-[id number]   What causes contact dermatitis?  Its caused by something that irritates the skin, or that creates an allergic reaction on the skin. People can get contact dermatitis from many kinds of things. These include:  · Plant oils in poison ivy, oak, and sumac  · Chemicals in household , solvents, and glue  · Chemicals in makeup, soap, laundry detergent, perfume, acne cream, and hair products  · Certain medicines, such as neomycin, bacitracin, benzocaine, and thimerosal  · Metals such as nickel, found in some jewelry and watch bands   · The sticky material on the back of bandages and tape (adhesive)  · Things that can cause tiny breaks in the skin, such as wood, fiberglass, metal tools, and plant thorns  · Rubber latex in surgical gloves and other medical supplies  Dermatitis can also be caused by the skin being damp for long periods of time. This can happen from washing your hands too often, or working with wet materials.  Symptoms of contact dermatitis  Symptoms can include skin that is:  · Blistered  · Burning  · Cracked  · Dry  · Itchy  · Painful  · Red  · Rough, thickened, and leathery  · Swollen  · Warm  The blisters may ooze fluid and form crusts.  Treatment for contact dermatitis  Treatment is done to help relieve itching and reduce inflammation. The rash should go away in a few days to a few weeks. Treatments include:  · Cool, moist compress. Use a clean damp cloth. Put it on the area for 20 to 30  minutes, 5 to 6 times a day for the first 3 days.  · Steroid cream or ointment. You can apply this medicine several times a day on clean skin.  · Oral corticosteroid. Your healthcare provider may prescribe this medicine if you have severe skin symptoms on a large part of your body.  Your healthcare provider may give you a steroid injection instead of pills.  · Oral antihistamine. This medicine can help reduce itching.  · Colloidal oatmeal bath. Soaking in water with colloidal oatmeal can help soothe skin.  · Plain cream, lotion, or ointment. Cream, lotion, or ointment without medicine can help to soothe and protect your skin.  Living with contact dermatitis  Talk with your healthcare provider about what may have caused your contact dermatitis. Patch testing may help you figure out what caused the rash so you can avoid further contact with it. Once you learn what caused your rash, make sure to avoid that substance. If your skin comes into contact with it again, make sure to wash your skin right away. If you cant avoid the substance, wear gloves or other protective clothing before you touch it. Or use a cream, lotion, or ointment to protect your skin.  When to call your healthcare provider  Call your healthcare provider right away if you have any of these:  · Fever of 100.4°F (38°C) or higher, or as directed  · Symptoms that dont get better, or get worse  · New symptoms   Date Last Reviewed: 5/1/2016  © 5559-1766 [employer]. [address] [place] [address], [place] [address]. All rights reserved. This information is not intended as a substitute for professional medical care. Always follow your healthcare professional's instructions.      -Urine culture sent to the lab, you will be contacted within 2-3 days with your results.  -Claritin or Zyrtec daily.  -Apply topical steroid to the affected skin area.  -Follow up with your primary care doctor.   Please follow up with your Primary care provider within 2-5 days  if your signs and symptoms have not resolved or worsen.     If your condition worsens or fails to improve we recommend that you receive another evaluation at the emergency room immediately or contact your primary medical clinic to discuss your concerns.   You must understand that you have received an Urgent Care treatment only and that you may be released before all of your medical problems are known or treated. You, the patient, will arrange for follow up care as instructed.

## 2019-03-26 NOTE — PROGRESS NOTES
"Subjective:       Patient ID: Laverne Humphries is a 78 y.o. female.    Vitals:  height is 5' (1.524 m) and weight is 56.7 kg (125 lb). Her oral temperature is 97 °F (36.1 °C). Her blood pressure is 142/71 (abnormal) and her pulse is 94. Her respiration is 19 and oxygen saturation is 100%.     Chief Complaint: Back Pain     Patient presents to the clinic today with complaints lower back irritation and "burning sensation across the lower back. "  Patient has an extensive history of chronic kidney disease but denies any urinary symptoms.  Claims she has been having mild urgency is requesting a urine in the clinic today.  Denies any fever or chills.  Denies any recent changes in detergents, cons medics, or lotions.  Claims that she does have an extensive history of allergies and takes Allegra as needed.    Back Pain   This is a new problem. The current episode started yesterday. The problem occurs constantly. The problem has been gradually worsening since onset. The pain is present in the lumbar spine. The quality of the pain is described as burning. The pain does not radiate. The pain is at a severity of 5/10. The pain is moderate. The pain is the same all the time. Pertinent negatives include no abdominal pain, bladder incontinence, bowel incontinence, chest pain, dysuria, fever, headaches, leg pain, numbness, paresis, paresthesias, pelvic pain, perianal numbness, tingling, weakness or weight loss. Treatments tried: Tylenol. The treatment provided no relief.       Constitution: Negative for fatigue and fever.   Cardiovascular: Negative for chest pain.   Gastrointestinal: Negative for abdominal pain and bowel incontinence.   Genitourinary: Negative for dysuria, urgency, bladder incontinence, hematuria and pelvic pain.   Musculoskeletal: Positive for back pain. Negative for muscle cramps and history of spine disorder.   Skin: Positive for erythema (  Minimal erythema seen on bilateral sacrum.  Scratch marks noted from " patient continuously scratching.  No lesions present). Negative for rash.   Neurological: Negative for coordination disturbances, headaches, numbness and tingling.       Objective:      Physical Exam   Constitutional: She is oriented to person, place, and time. She appears well-developed and well-nourished.   HENT:   Head: Normocephalic and atraumatic. Head is without abrasion, without contusion and without laceration.   Right Ear: External ear normal.   Left Ear: External ear normal.   Nose: Nose normal.   Mouth/Throat: Oropharynx is clear and moist.   Eyes: Pupils are equal, round, and reactive to light. Conjunctivae, EOM and lids are normal.   Neck: Trachea normal, full passive range of motion without pain and phonation normal. Neck supple.   Cardiovascular: Normal rate, regular rhythm and normal heart sounds.   Pulmonary/Chest: Effort normal and breath sounds normal. No stridor. No respiratory distress.   Musculoskeletal: Normal range of motion.        Lumbar back: Normal. She exhibits normal range of motion, no tenderness, no bony tenderness, no pain, no spasm and normal pulse.   Neurological: She is alert and oriented to person, place, and time.   Skin: Skin is warm, dry and intact. Capillary refill takes less than 2 seconds. No abrasion, no bruising, no burn, no ecchymosis, no laceration, no lesion and no rash noted. There is erythema (  Minimal erythema seen on bilateral sacrum.  Scratch marks noted from patient continuously scratching.  No lesions present).   Psychiatric: She has a normal mood and affect. Her speech is normal and behavior is normal. Judgment and thought content normal. Cognition and memory are normal.   Nursing note and vitals reviewed.        Results for orders placed or performed in visit on 03/26/19   POCT Urinalysis, Dipstick, Automated, W/O Scope   Result Value Ref Range    POC Blood, Urine Negative Negative    POC Bilirubin, Urine Negative Negative    POC Urobilinogen, Urine 0.8 0.1 -  1.1    POC Ketones, Urine Negative Negative    POC Protein, Urine Negative Negative    POC Nitrates, Urine Negative Negative    POC Glucose, Urine Negative Negative    pH, UA 5.0 5 - 8    POC Specific Gravity, Urine 1.010 1.003 - 1.029    POC Leukocytes, Urine Positive (A) Negative       Assessment:       1. Skin irritation    2. Urinary urgency        Plan:         Patient's urine will be sent to the lab and she will be contacted in 2-3 days with her urine culture results.  She is also educated on using the topical steroid to the affected lower back as well as antihistamines.  She is advised to continue to monitor the area for the possibility of developing shingles or other concerning lesions.  Her family and the patient are in agreement with the treatment plan.  Due to the patient describing the symptoms in the clinic today like a burning sensation as well as skin irritation she will be treated as a dermatitis there is a secondary diagnosis and concerned of possible shingles.  ER precautions red flag warnings were discussed with the patient.    Skin irritation  -     triamcinolone acetonide 0.1% (KENALOG) 0.1 % ointment; Apply topically 2 (two) times daily. for 7 days  Dispense: 30 g; Refill: 0    Urinary urgency  -     POCT Urinalysis, Dipstick, Automated, W/O Scope  -     Urine culture      Patient Instructions       Understanding Contact Dermatitis     A cool, moist compress can help reduce itching.     Contact dermatitis is a common type of skin rash. Its caused by something that touches the skin and makes it irritated and inflamed. It can occur on skin on any part of the body, such as the face, neck, hands, arms, and legs. Contact dermatitis is not spread from person to person.  Often, the reaction of contact dermatitis occurs 1 to 2 days after contact with the offending agent.  How to say it  CHIQUI-tact [id number]-PB-tis   What causes contact dermatitis?  Its caused by something that irritates the skin, or  that creates an allergic reaction on the skin. People can get contact dermatitis from many kinds of things. These include:  · Plant oils in poison ivy, oak, and sumac  · Chemicals in household , solvents, and glue  · Chemicals in makeup, soap, laundry detergent, perfume, acne cream, and hair products  · Certain medicines, such as neomycin, bacitracin, benzocaine, and thimerosal  · Metals such as nickel, found in some jewelry and watch bands   · The sticky material on the back of bandages and tape (adhesive)  · Things that can cause tiny breaks in the skin, such as wood, fiberglass, metal tools, and plant thorns  · Rubber latex in surgical gloves and other medical supplies  Dermatitis can also be caused by the skin being damp for long periods of time. This can happen from washing your hands too often, or working with wet materials.  Symptoms of contact dermatitis  Symptoms can include skin that is:  · Blistered  · Burning  · Cracked  · Dry  · Itchy  · Painful  · Red  · Rough, thickened, and leathery  · Swollen  · Warm  The blisters may ooze fluid and form crusts.  Treatment for contact dermatitis  Treatment is done to help relieve itching and reduce inflammation. The rash should go away in a few days to a few weeks. Treatments include:  · Cool, moist compress. Use a clean damp cloth. Put it on the area for 20 to 30 minutes, 5 to 6 times a day for the first 3 days.  · Steroid cream or ointment. You can apply this medicine several times a day on clean skin.  · Oral corticosteroid. Your healthcare provider may prescribe this medicine if you have severe skin symptoms on a large part of your body.  Your healthcare provider may give you a steroid injection instead of pills.  · Oral antihistamine. This medicine can help reduce itching.  · Colloidal oatmeal bath. Soaking in water with colloidal oatmeal can help soothe skin.  · Plain cream, lotion, or ointment. Cream, lotion, or ointment without medicine can help to  soothe and protect your skin.  Living with contact dermatitis  Talk with your healthcare provider about what may have caused your contact dermatitis. Patch testing may help you figure out what caused the rash so you can avoid further contact with it. Once you learn what caused your rash, make sure to avoid that substance. If your skin comes into contact with it again, make sure to wash your skin right away. If you cant avoid the substance, wear gloves or other protective clothing before you touch it. Or use a cream, lotion, or ointment to protect your skin.  When to call your healthcare provider  Call your healthcare provider right away if you have any of these:  · Fever of 100.4°F (38°C) or higher, or as directed  · Symptoms that dont get better, or get worse  · New symptoms   Date Last Reviewed: 5/1/2016 © 2000-2017 [employer]. [address] [place] [address]. All rights reserved. This information is not intended as a substitute for professional medical care. Always follow your healthcare professional's instructions.      -Urine culture sent to the lab, you will be contacted within 2-3 days with your results.  -Claritin or Zyrtec daily.  -Apply topical steroid to the affected skin area.  -Follow up with your primary care doctor.   Please follow up with your Primary care provider within 2-5 days if your signs and symptoms have not resolved or worsen.     If your condition worsens or fails to improve we recommend that you receive another evaluation at the emergency room immediately or contact your primary medical clinic to discuss your concerns.   You must understand that you have received an Urgent Care treatment only and that you may be released before all of your medical problems are known or treated. You, the patient, will arrange for follow up care as instructed.

## 2019-03-28 ENCOUNTER — TELEPHONE (OUTPATIENT)
Dept: URGENT CARE | Facility: CLINIC | Age: 79
End: 2019-03-28

## 2019-03-28 LAB — BACTERIA UR CULT: NO GROWTH

## 2019-04-10 ENCOUNTER — TELEPHONE (OUTPATIENT)
Dept: INTERNAL MEDICINE | Facility: CLINIC | Age: 79
End: 2019-04-10

## 2019-04-10 RX ORDER — LORAZEPAM 0.5 MG/1
0.5 TABLET ORAL NIGHTLY
Qty: 90 TABLET | Refills: 0 | Status: SHIPPED | OUTPATIENT
Start: 2019-04-10 | End: 2019-05-31 | Stop reason: SDUPTHER

## 2019-04-10 NOTE — TELEPHONE ENCOUNTER
----- Message from Cirilo Sheikh sent at 4/10/2019 10:41 AM CDT -----  Contact: Patient [phone]  RX request - refill or new RX.  Is this a refill or new RX:  Refill  RX name and strength:  LORazepam (ATIVAN) 0.5 MG tablet     Pharmacy name and phone # [facility] - [place], [place] - [address] [facility] [phone] (Phone) [phone] (Fax)    Please call an advise  Thank you

## 2019-04-12 ENCOUNTER — OFFICE VISIT (OUTPATIENT)
Dept: INTERNAL MEDICINE | Facility: CLINIC | Age: 79
End: 2019-04-12
Payer: MEDICARE

## 2019-04-12 VITALS
DIASTOLIC BLOOD PRESSURE: 76 MMHG | WEIGHT: 121.94 LBS | HEIGHT: 60 IN | OXYGEN SATURATION: 98 % | HEART RATE: 91 BPM | SYSTOLIC BLOOD PRESSURE: 124 MMHG | BODY MASS INDEX: 23.94 KG/M2

## 2019-04-12 DIAGNOSIS — Z91.09 ENVIRONMENTAL ALLERGIES: ICD-10-CM

## 2019-04-12 DIAGNOSIS — K21.00 REFLUX ESOPHAGITIS: ICD-10-CM

## 2019-04-12 DIAGNOSIS — I77.819 ECTATIC AORTA: ICD-10-CM

## 2019-04-12 DIAGNOSIS — N18.30 CHRONIC KIDNEY DISEASE, STAGE III (MODERATE): ICD-10-CM

## 2019-04-12 DIAGNOSIS — K58.9 IRRITABLE BOWEL SYNDROME, UNSPECIFIED TYPE: ICD-10-CM

## 2019-04-12 DIAGNOSIS — E78.5 HYPERLIPIDEMIA, UNSPECIFIED HYPERLIPIDEMIA TYPE: ICD-10-CM

## 2019-04-12 DIAGNOSIS — Z00.00 ENCOUNTER FOR PREVENTIVE HEALTH EXAMINATION: Primary | ICD-10-CM

## 2019-04-12 DIAGNOSIS — Z78.0 POST-MENOPAUSAL: ICD-10-CM

## 2019-04-12 DIAGNOSIS — F41.9 ANXIETY: ICD-10-CM

## 2019-04-12 PROCEDURE — 99999 PR PBB SHADOW E&M-EST. PATIENT-LVL IV: CPT | Mod: PBBFAC,HCNC,, | Performed by: NURSE PRACTITIONER

## 2019-04-12 PROCEDURE — G0439 PR MEDICARE ANNUAL WELLNESS SUBSEQUENT VISIT: ICD-10-PCS | Mod: HCNC,S$GLB,, | Performed by: NURSE PRACTITIONER

## 2019-04-12 PROCEDURE — 99999 PR PBB SHADOW E&M-EST. PATIENT-LVL IV: ICD-10-PCS | Mod: PBBFAC,HCNC,, | Performed by: NURSE PRACTITIONER

## 2019-04-12 PROCEDURE — G0439 PPPS, SUBSEQ VISIT: HCPCS | Mod: HCNC,S$GLB,, | Performed by: NURSE PRACTITIONER

## 2019-04-12 NOTE — PROGRESS NOTES
Laverne Humphries presented for a  Medicare AWV and comprehensive Health Risk Assessment today. The following components were reviewed and updated:    · Medical history  · Family History  · Social history  · Allergies and Current Medications  · Health Risk Assessment  · Health Maintenance  · Care Team     ** See Completed Assessments for Annual Wellness Visit within the encounter summary.**       The following assessments were completed:  · Living Situation  · CAGE  · Depression Screening  · Timed Get Up and Go  · Whisper Test  · Cognitive Function Screening  ·   ·   · Nutrition Screening  · ADL Screening  · PAQ Screening    Vitals:    04/12/19 0839   BP: 124/76   Pulse: 91   SpO2: 98%   Weight: 55.3 kg (121 lb 14.6 oz)   Height: 5' (1.524 m)     Body mass index is 23.81 kg/m².  Physical Exam   Constitutional: She is oriented to person, place, and time. She appears well-developed and well-nourished.   HENT:   Head: Normocephalic and atraumatic.   Nose: Nose normal.   Eyes: Conjunctivae and EOM are normal.   Cardiovascular: Normal rate, regular rhythm, normal heart sounds and intact distal pulses.   No murmur heard.  Pulmonary/Chest: Effort normal and breath sounds normal.   Musculoskeletal: Normal range of motion.   Neurological: She is alert and oriented to person, place, and time.   Skin: Skin is warm and dry.   Psychiatric: She has a normal mood and affect. Her behavior is normal. Judgment and thought content normal.   Nursing note and vitals reviewed.        Diagnoses and health risks identified today and associated recommendations/orders:    1. Encounter for preventive health examination  Assessment performed. Health maintenance updated. Chart review completed.    2. Post-menopausal  - DXA Bone Density Spine And Hip; Future    3. Chronic kidney disease, stage III (moderate)  Chronic. Stable. Followed by PCP.   Component      Latest Ref Rng & Units 3/29/2018   Total Bilirubin      0.1 - 1.0 mg/dL 1.4 (H)     4.  Ectatic aorta  Noted on imaging. Stable with blood pressure control. Followed by PCP.    5. Anxiety  Chronic. Stable. Followed by PCP.    6. Hyperlipidemia, unspecified hyperlipidemia type  Chronic. Elevated triglycerides. Followed by PCP.  Component      Latest Ref Rng & Units 3/29/2018   Cholesterol      120 - 199 mg/dL 230 (H)   Triglycerides      30 - 150 mg/dL 158 (H)   HDL      40 - 75 mg/dL 54   LDL Cholesterol      63.0 - 159.0 mg/dL 144.4   HDL/Chol Ratio      20.0 - 50.0 % 23.5   Total Cholesterol/HDL Ratio      2.0 - 5.0 4.3   Non-HDL Cholesterol      mg/dL 176     7. Irritable bowel syndrome, unspecified type  Chronic. Stable. Followed by PCP.    8. Reflux esophagitis  Chronic. Stable. Followed by PCP.    9. Environmental allergies  Chronic. Stable. Followed by PCP.      Provided Leovidia with a 5-10 year written screening schedule and personal prevention plan. Recommendations were developed using the USPSTF age appropriate recommendations. Education, counseling, and referrals were provided as needed. After Visit Summary printed and given to patient which includes a list of additional screenings\tests needed.    Follow up for Annual Wellness Visit in 1 year, follow up with PCP as instructed, ;sooner if problems.    IVETTE Grubbs       I offered to discuss end of life issues, including information on how to make advance directives that the patient could use to name someone who would make medical decisions on their behalf if they became too ill to make themselves.    ___Patient declined  _X_Patient is interested, has paperwork at home. Will bring PCP a copy.

## 2019-04-12 NOTE — PATIENT INSTRUCTIONS
Counseling and Referral of Other Preventative  (Italic type indicates deductible and co-insurance are waived)    Patient Name: Laverne [place]  Today's Date: 4/12/2019    Health Maintenance       Date Due Completion Date    DEXA SCAN 04/04/2019 4/4/2016    TETANUS VACCINE 02/09/2022 2/9/2012    Override on 2/9/2012: Done    Lipid Panel 03/29/2023 3/29/2018        Orders Placed This Encounter   Procedures    DXA Bone Density Spine And Hip     The following information is provided to all patients.  This information is to help you find resources for any of the problems found today that may be affecting your health:                Living healthy guide: www.[facility].[place]      Understanding Diabetes: www.diabetes.org      Eating healthy: www.cdc.gov/healthyweight      CDC home safety checklist: www.cdc.gov/steadi/patient.html      Agency on Aging: www.goea.[place].[facility]      Alcoholics anonymous (AA): www.aa.org      Physical Activity: www.connie.nih.gov/sh5ehzh      Tobacco use: www.quitwithusla.org

## 2019-05-08 ENCOUNTER — TELEPHONE (OUTPATIENT)
Dept: INTERNAL MEDICINE | Facility: CLINIC | Age: 79
End: 2019-05-08

## 2019-05-08 DIAGNOSIS — E78.5 HYPERLIPIDEMIA, UNSPECIFIED HYPERLIPIDEMIA TYPE: Primary | ICD-10-CM

## 2019-05-08 NOTE — TELEPHONE ENCOUNTER
----- Message from Emma Rodriguez sent at 5/8/2019  3:08 PM CDT -----  Contact: Patient [phone]  Doctor appointment and lab have been scheduled.  Please link lab orders to the lab appointment.  Date of doctor appointment:  05/31/19  Date of lab appointment:  05/27/19  Physical or EP: Physical        Thanks

## 2019-05-17 ENCOUNTER — PATIENT OUTREACH (OUTPATIENT)
Dept: ADMINISTRATIVE | Facility: HOSPITAL | Age: 79
End: 2019-05-17

## 2019-05-17 NOTE — PROGRESS NOTES
Health Maintenance Due   Topic Date Due    DEXA SCAN  04/04/2019     Pre-visit chart audit complete.

## 2019-05-21 ENCOUNTER — OFFICE VISIT (OUTPATIENT)
Dept: URGENT CARE | Facility: CLINIC | Age: 79
End: 2019-05-21
Payer: MEDICARE

## 2019-05-21 VITALS
BODY MASS INDEX: 23.75 KG/M2 | SYSTOLIC BLOOD PRESSURE: 136 MMHG | TEMPERATURE: 97 F | WEIGHT: 121 LBS | DIASTOLIC BLOOD PRESSURE: 68 MMHG | RESPIRATION RATE: 16 BRPM | HEART RATE: 95 BPM | HEIGHT: 60 IN | OXYGEN SATURATION: 97 %

## 2019-05-21 DIAGNOSIS — J20.8 VIRAL BRONCHITIS: Primary | ICD-10-CM

## 2019-05-21 DIAGNOSIS — R05.9 COUGH: ICD-10-CM

## 2019-05-21 PROCEDURE — 99214 PR OFFICE/OUTPT VISIT, EST, LEVL IV, 30-39 MIN: ICD-10-PCS | Mod: 25,S$GLB,, | Performed by: FAMILY MEDICINE

## 2019-05-21 PROCEDURE — 99214 OFFICE O/P EST MOD 30 MIN: CPT | Mod: 25,S$GLB,, | Performed by: FAMILY MEDICINE

## 2019-05-21 PROCEDURE — 96372 PR INJECTION,THERAP/PROPH/DIAG2ST, IM OR SUBCUT: ICD-10-PCS | Mod: S$GLB,,, | Performed by: FAMILY MEDICINE

## 2019-05-21 PROCEDURE — 96372 THER/PROPH/DIAG INJ SC/IM: CPT | Mod: S$GLB,,, | Performed by: FAMILY MEDICINE

## 2019-05-21 PROCEDURE — 1101F PT FALLS ASSESS-DOCD LE1/YR: CPT | Mod: CPTII,S$GLB,, | Performed by: FAMILY MEDICINE

## 2019-05-21 PROCEDURE — 1101F PR PT FALLS ASSESS DOC 0-1 FALLS W/OUT INJ PAST YR: ICD-10-PCS | Mod: CPTII,S$GLB,, | Performed by: FAMILY MEDICINE

## 2019-05-21 RX ORDER — BENZONATATE 200 MG/1
200 CAPSULE ORAL 3 TIMES DAILY PRN
Qty: 30 CAPSULE | Refills: 0 | Status: SHIPPED | OUTPATIENT
Start: 2019-05-21 | End: 2019-05-31

## 2019-05-21 RX ORDER — BETAMETHASONE SODIUM PHOSPHATE AND BETAMETHASONE ACETATE 3; 3 MG/ML; MG/ML
6 INJECTION, SUSPENSION INTRA-ARTICULAR; INTRALESIONAL; INTRAMUSCULAR; SOFT TISSUE
Status: COMPLETED | OUTPATIENT
Start: 2019-05-21 | End: 2019-05-21

## 2019-05-21 RX ORDER — ALBUTEROL SULFATE 90 UG/1
2 AEROSOL, METERED RESPIRATORY (INHALATION) EVERY 4 HOURS PRN
Qty: 1 INHALER | Refills: 0 | Status: SHIPPED | OUTPATIENT
Start: 2019-05-21 | End: 2019-06-11

## 2019-05-21 RX ADMIN — BETAMETHASONE SODIUM PHOSPHATE AND BETAMETHASONE ACETATE 6 MG: 3; 3 INJECTION, SUSPENSION INTRA-ARTICULAR; INTRALESIONAL; INTRAMUSCULAR; SOFT TISSUE at 09:05

## 2019-05-21 NOTE — PATIENT INSTRUCTIONS
Bronchitis, Viral (Adult)    You have a viral bronchitis. Bronchitis is inflammation and swelling of the lining of the lungs. This is often caused by an infection. Symptoms include a dry, hacking cough that is worse at night. The cough may bring up yellow-green mucus. You may also feel short of breath or wheeze. Other symptoms may include tiredness, chest discomfort, and chills.  Bronchitis that is caused by a virus is not treated with antibiotics. Instead, medicines may be given to help relieve symptoms. Symptoms can last up to 2 weeks, although the cough may last much longer.  This illness is contagious during the first few days and is spread through the air by coughing and sneezing, or by direct contact (touching the sick person and then touching your own eyes, nose, or mouth).  Most viral illnesses resolve within 10 to 14 days with rest and simple home remedies, although they may sometimes last for several weeks.  Home care  · If symptoms are severe, rest at home for the first 2 to 3 days. When you go back to your usual activities, don't let yourself get too tired.  · Do not smoke. Also avoid being exposed to secondhand smoke.  · You may use over-the-counter medicine to control fever or pain, unless another pain medicine was prescribed. (Note: If you have chronic liver or kidney disease or have ever had a stomach ulcer or gastrointestinal bleeding, talk with your healthcare provider before using these medicines. Also talk to your provider if you are taking medicine to prevent blood clots.) Aspirin should never be given to anyone younger than 18 years of age who is ill with a viral infection or fever. It may cause severe liver or brain damage.  · Your appetite may be poor, so a light diet is fine. Avoid dehydration by drinking 6 to 8 glasses of fluids per day (such as water, soft drinks, sports drinks, juices, tea, or soup). Extra fluids will help loosen secretions in the nose and lungs.  · Over-the-counter  cough, cold, and sore-throat medicines will not shorten the length of the illness, but they may help to reduce symptoms. (Note: Do not use decongestants if you have high blood pressure.)  Follow-up care  Follow up with your healthcare provider, or as advised. If you had an X-ray or ECG (electrocardiogram), a specialist will review it. You will be notified of any new findings that may affect your care.  Note: If you are age 65 or older, or if you have a chronic lung disease or condition that affects your immune system, or you smoke, talk to your healthcare provider about having pneumococcal vaccinations and a yearly influenza vaccination (flu shot).  When to seek medical advice  Call your healthcare provider right away if any of these occur:  · Fever of 100.4°F (38°C) or higher  · Coughing up increased amounts of colored sputum  · Weakness, drowsiness, headache, facial pain, ear pain, or a stiff neck  Call 911, or get immediate medical care  Contact emergency services right away if any of these occur:  · Coughing up blood  · Worsening weakness, drowsiness, headache, or stiff neck  · Trouble breathing, wheezing, or pain with breathing  Date Last Reviewed: 9/13/2015 © 2000-2017 [employer]. [address] [place] [address], [place] [address]. All rights reserved. This information is not intended as a substitute for professional medical care. Always follow your healthcare professional's instructions.      Follow up with your doctor in a few days as needed.  Return to the urgent care or go to the ER if symptoms get worse.    Sam Dobson MD

## 2019-05-21 NOTE — PROGRESS NOTES
Subjective:       Patient ID: Laverne Humphries is a 78 y.o. female.    Vitals:  height is 5' (1.524 m) and weight is 54.9 kg (121 lb). Her oral temperature is 97.4 °F (36.3 °C). Her blood pressure is 136/68 and her pulse is 95. Her respiration is 16 and oxygen saturation is 97%.     Chief Complaint: URI    Symptoms started on Sunday    URI    This is a new problem. The current episode started in the past 7 days. The problem has been unchanged. There has been no fever. Associated symptoms include congestion, coughing, ear pain and a sore throat. Pertinent negatives include no nausea, rash, sinus pain, vomiting or wheezing. She has tried acetaminophen for the symptoms. The treatment provided no relief.       Constitution: Negative for chills, sweating, fatigue and fever.   HENT: Positive for ear pain, congestion and sore throat. Negative for sinus pain, sinus pressure and voice change.    Neck: Negative for painful lymph nodes.   Eyes: Negative for eye redness.   Respiratory: Positive for cough. Negative for chest tightness, sputum production, bloody sputum, COPD, shortness of breath, stridor, wheezing and asthma.    Gastrointestinal: Negative for nausea and vomiting.   Musculoskeletal: Negative for muscle ache.   Skin: Negative for rash and erythema.   Allergic/Immunologic: Negative for seasonal allergies and asthma.   Hematologic/Lymphatic: Negative for swollen lymph nodes.       Objective:      Physical Exam   Constitutional: She is oriented to person, place, and time. She appears well-developed and well-nourished.   HENT:   Head: Normocephalic and atraumatic.   Right Ear: External ear normal.   Left Ear: External ear normal.   Mouth/Throat: Oropharynx is clear and moist.   Eyes: Pupils are equal, round, and reactive to light. EOM are normal.   Neck: Normal range of motion. Neck supple.   Cardiovascular: Normal rate, regular rhythm and normal heart sounds. Exam reveals no gallop and no friction rub.   No murmur  heard.  Pulmonary/Chest: Breath sounds normal. No respiratory distress. She has no wheezes. She has no rales.   Abdominal: Soft. Bowel sounds are normal. She exhibits no distension. There is no tenderness.   Musculoskeletal: Normal range of motion. She exhibits no edema, tenderness or deformity.   Lymphadenopathy:     She has no cervical adenopathy.   Neurological: She is alert and oriented to person, place, and time. No cranial nerve deficit. She exhibits normal muscle tone. Coordination normal.   Skin: Skin is warm. Capillary refill takes less than 2 seconds. No rash noted. No erythema. No pallor.   Psychiatric: She has a normal mood and affect. Her behavior is normal. Judgment and thought content normal.   Vitals reviewed.      Assessment:       1. Viral bronchitis    2. Cough        Plan:         Viral bronchitis  -     albuterol (PROVENTIL/VENTOLIN HFA) 90 mcg/actuation inhaler; Inhale 2 puffs into the lungs every 4 (four) hours as needed for Wheezing. Rescue  Dispense: 1 Inhaler; Refill: 0    Cough  -     benzonatate (TESSALON) 200 MG capsule; Take 1 capsule (200 mg total) by mouth 3 (three) times daily as needed for Cough.  Dispense: 30 capsule; Refill: 0  -     betamethasone acetate-betamethasone sodium phosphate injection 6 mg          Patient Instructions     Bronchitis, Viral (Adult)    You have a viral bronchitis. Bronchitis is inflammation and swelling of the lining of the lungs. This is often caused by an infection. Symptoms include a dry, hacking cough that is worse at night. The cough may bring up yellow-green mucus. You may also feel short of breath or wheeze. Other symptoms may include tiredness, chest discomfort, and chills.  Bronchitis that is caused by a virus is not treated with antibiotics. Instead, medicines may be given to help relieve symptoms. Symptoms can last up to 2 weeks, although the cough may last much longer.  This illness is contagious during the first few days and is spread  through the air by coughing and sneezing, or by direct contact (touching the sick person and then touching your own eyes, nose, or mouth).  Most viral illnesses resolve within 10 to 14 days with rest and simple home remedies, although they may sometimes last for several weeks.  Home care  · If symptoms are severe, rest at home for the first 2 to 3 days. When you go back to your usual activities, don't let yourself get too tired.  · Do not smoke. Also avoid being exposed to secondhand smoke.  · You may use over-the-counter medicine to control fever or pain, unless another pain medicine was prescribed. (Note: If you have chronic liver or kidney disease or have ever had a stomach ulcer or gastrointestinal bleeding, talk with your healthcare provider before using these medicines. Also talk to your provider if you are taking medicine to prevent blood clots.) Aspirin should never be given to anyone younger than 18 years of age who is ill with a viral infection or fever. It may cause severe liver or brain damage.  · Your appetite may be poor, so a light diet is fine. Avoid dehydration by drinking 6 to 8 glasses of fluids per day (such as water, soft drinks, sports drinks, juices, tea, or soup). Extra fluids will help loosen secretions in the nose and lungs.  · Over-the-counter cough, cold, and sore-throat medicines will not shorten the length of the illness, but they may help to reduce symptoms. (Note: Do not use decongestants if you have high blood pressure.)  Follow-up care  Follow up with your healthcare provider, or as advised. If you had an X-ray or ECG (electrocardiogram), a specialist will review it. You will be notified of any new findings that may affect your care.  Note: If you are age 65 or older, or if you have a chronic lung disease or condition that affects your immune system, or you smoke, talk to your healthcare provider about having pneumococcal vaccinations and a yearly influenza vaccination (flu  shot).  When to seek medical advice  Call your healthcare provider right away if any of these occur:  · Fever of 100.4°F (38°C) or higher  · Coughing up increased amounts of colored sputum  · Weakness, drowsiness, headache, facial pain, ear pain, or a stiff neck  Call 911, or get immediate medical care  Contact emergency services right away if any of these occur:  · Coughing up blood  · Worsening weakness, drowsiness, headache, or stiff neck  · Trouble breathing, wheezing, or pain with breathing  Date Last Reviewed: 9/13/2015 © 2000-2017 [employer]. [address] [place] [address]. All rights reserved. This information is not intended as a substitute for professional medical care. Always follow your healthcare professional's instructions.      Follow up with your doctor in a few days as needed.  Return to the urgent care or go to the ER if symptoms get worse.    Sam Dobson MD

## 2019-05-27 ENCOUNTER — LAB VISIT (OUTPATIENT)
Dept: LAB | Facility: HOSPITAL | Age: 79
End: 2019-05-27
Payer: MEDICARE

## 2019-05-27 DIAGNOSIS — E78.5 HYPERLIPIDEMIA, UNSPECIFIED HYPERLIPIDEMIA TYPE: ICD-10-CM

## 2019-05-27 LAB
ALBUMIN SERPL BCP-MCNC: 4.1 G/DL (ref 3.5–5.2)
ALP SERPL-CCNC: 63 U/L (ref 55–135)
ALT SERPL W/O P-5'-P-CCNC: 15 U/L (ref 10–44)
ANION GAP SERPL CALC-SCNC: 11 MMOL/L (ref 8–16)
AST SERPL-CCNC: 19 U/L (ref 10–40)
BASOPHILS # BLD AUTO: 0.08 K/UL (ref 0–0.2)
BASOPHILS NFR BLD: 0.9 % (ref 0–1.9)
BILIRUB SERPL-MCNC: 1.4 MG/DL (ref 0.1–1)
BUN SERPL-MCNC: 19 MG/DL (ref 8–23)
CALCIUM SERPL-MCNC: 10.5 MG/DL (ref 8.7–10.5)
CHLORIDE SERPL-SCNC: 103 MMOL/L (ref 95–110)
CHOLEST SERPL-MCNC: 210 MG/DL (ref 120–199)
CHOLEST/HDLC SERPL: 3.8 {RATIO} (ref 2–5)
CO2 SERPL-SCNC: 26 MMOL/L (ref 23–29)
CREAT SERPL-MCNC: 1 MG/DL (ref 0.5–1.4)
DIFFERENTIAL METHOD: NORMAL
EOSINOPHIL # BLD AUTO: 0.2 K/UL (ref 0–0.5)
EOSINOPHIL NFR BLD: 1.7 % (ref 0–8)
ERYTHROCYTE [DISTWIDTH] IN BLOOD BY AUTOMATED COUNT: 13.7 % (ref 11.5–14.5)
EST. GFR  (AFRICAN AMERICAN): >60 ML/MIN/1.73 M^2
EST. GFR  (NON AFRICAN AMERICAN): 53 ML/MIN/1.73 M^2
GLUCOSE SERPL-MCNC: 105 MG/DL (ref 70–110)
HCT VFR BLD AUTO: 39.5 % (ref 37–48.5)
HDLC SERPL-MCNC: 56 MG/DL (ref 40–75)
HDLC SERPL: 26.7 % (ref 20–50)
HGB BLD-MCNC: 12.9 G/DL (ref 12–16)
LDLC SERPL CALC-MCNC: 124.8 MG/DL (ref 63–159)
LYMPHOCYTES # BLD AUTO: 3.3 K/UL (ref 1–4.8)
LYMPHOCYTES NFR BLD: 37.3 % (ref 18–48)
MCH RBC QN AUTO: 28.7 PG (ref 27–31)
MCHC RBC AUTO-ENTMCNC: 32.7 G/DL (ref 32–36)
MCV RBC AUTO: 88 FL (ref 82–98)
MONOCYTES # BLD AUTO: 0.8 K/UL (ref 0.3–1)
MONOCYTES NFR BLD: 8.6 % (ref 4–15)
NEUTROPHILS # BLD AUTO: 4.5 K/UL (ref 1.8–7.7)
NEUTROPHILS NFR BLD: 50.4 % (ref 38–73)
NONHDLC SERPL-MCNC: 154 MG/DL
PLATELET # BLD AUTO: 285 K/UL (ref 150–350)
PMV BLD AUTO: 10.7 FL (ref 9.2–12.9)
POTASSIUM SERPL-SCNC: 4.7 MMOL/L (ref 3.5–5.1)
PROT SERPL-MCNC: 7.7 G/DL (ref 6–8.4)
RBC # BLD AUTO: 4.5 M/UL (ref 4–5.4)
SODIUM SERPL-SCNC: 140 MMOL/L (ref 136–145)
TRIGL SERPL-MCNC: 146 MG/DL (ref 30–150)
WBC # BLD AUTO: 8.87 K/UL (ref 3.9–12.7)

## 2019-05-27 PROCEDURE — 36415 COLL VENOUS BLD VENIPUNCTURE: CPT | Mod: HCNC

## 2019-05-27 PROCEDURE — 85025 COMPLETE CBC W/AUTO DIFF WBC: CPT | Mod: HCNC

## 2019-05-27 PROCEDURE — 80053 COMPREHEN METABOLIC PANEL: CPT | Mod: HCNC

## 2019-05-27 PROCEDURE — 80061 LIPID PANEL: CPT | Mod: HCNC

## 2019-05-31 ENCOUNTER — DOCUMENTATION ONLY (OUTPATIENT)
Dept: INTERNAL MEDICINE | Facility: CLINIC | Age: 79
End: 2019-05-31

## 2019-05-31 ENCOUNTER — OFFICE VISIT (OUTPATIENT)
Dept: INTERNAL MEDICINE | Facility: CLINIC | Age: 79
End: 2019-05-31
Payer: MEDICARE

## 2019-05-31 VITALS
DIASTOLIC BLOOD PRESSURE: 70 MMHG | HEIGHT: 60 IN | WEIGHT: 120.38 LBS | HEART RATE: 80 BPM | SYSTOLIC BLOOD PRESSURE: 110 MMHG | BODY MASS INDEX: 23.63 KG/M2

## 2019-05-31 DIAGNOSIS — J20.8 VIRAL BRONCHITIS: ICD-10-CM

## 2019-05-31 DIAGNOSIS — E78.5 HYPERLIPIDEMIA, UNSPECIFIED HYPERLIPIDEMIA TYPE: Primary | ICD-10-CM

## 2019-05-31 DIAGNOSIS — F41.9 ANXIETY: ICD-10-CM

## 2019-05-31 DIAGNOSIS — Z12.11 SCREEN FOR COLON CANCER: ICD-10-CM

## 2019-05-31 PROCEDURE — 99214 PR OFFICE/OUTPT VISIT, EST, LEVL IV, 30-39 MIN: ICD-10-PCS | Mod: HCNC,S$GLB,, | Performed by: INTERNAL MEDICINE

## 2019-05-31 PROCEDURE — 1101F PT FALLS ASSESS-DOCD LE1/YR: CPT | Mod: HCNC,CPTII,S$GLB, | Performed by: INTERNAL MEDICINE

## 2019-05-31 PROCEDURE — 99214 OFFICE O/P EST MOD 30 MIN: CPT | Mod: HCNC,S$GLB,, | Performed by: INTERNAL MEDICINE

## 2019-05-31 PROCEDURE — 99999 PR PBB SHADOW E&M-EST. PATIENT-LVL III: CPT | Mod: PBBFAC,HCNC,, | Performed by: INTERNAL MEDICINE

## 2019-05-31 PROCEDURE — 99999 PR PBB SHADOW E&M-EST. PATIENT-LVL III: ICD-10-PCS | Mod: PBBFAC,HCNC,, | Performed by: INTERNAL MEDICINE

## 2019-05-31 PROCEDURE — 1101F PR PT FALLS ASSESS DOC 0-1 FALLS W/OUT INJ PAST YR: ICD-10-PCS | Mod: HCNC,CPTII,S$GLB, | Performed by: INTERNAL MEDICINE

## 2019-05-31 RX ORDER — LORAZEPAM 0.5 MG/1
0.5 TABLET ORAL NIGHTLY
Qty: 30 TABLET | Refills: 5 | Status: SHIPPED | OUTPATIENT
Start: 2019-05-31 | End: 2019-12-09 | Stop reason: SDUPTHER

## 2019-05-31 RX ORDER — PREDNISONE 20 MG/1
20 TABLET ORAL DAILY
Qty: 10 TABLET | Refills: 0
Start: 2019-05-31 | End: 2019-06-11

## 2019-05-31 NOTE — PROGRESS NOTES
Subjective:       Patient ID: Laverne Humphries is a 78 y.o. female.    Chief Complaint: Annual Exam; Anxiety; Hyperlipidemia; Chronic Kidney Disease; Fatigue; and Breast Pain    Anxiety   Presents for follow-up visit. Patient reports no chest pain, nervous/anxious behavior, palpitations or shortness of breath.       Hyperlipidemia   This is a chronic problem. The problem is controlled. Recent lipid tests were reviewed and are normal. Pertinent negatives include no chest pain or shortness of breath. Current antihyperlipidemic treatment includes diet change. The current treatment provides significant improvement of lipids.   Fatigue   This is a new problem. The current episode started 1 to 4 weeks ago. Associated symptoms include coughing and fatigue. Pertinent negatives include no abdominal pain, chest pain, fever, joint swelling, rash, sore throat or weakness.     Review of Systems   Constitutional: Positive for fatigue. Negative for fever.   HENT: Negative for sore throat.    Eyes: Negative for visual disturbance.   Respiratory: Positive for cough. Negative for shortness of breath.    Cardiovascular: Negative for chest pain and palpitations.   Gastrointestinal: Negative for abdominal pain.   Genitourinary: Negative for dysuria, frequency and hematuria.   Musculoskeletal: Negative for joint swelling.   Skin: Negative for rash.   Neurological: Negative for speech difficulty and weakness.   Psychiatric/Behavioral: Negative for dysphoric mood. The patient is not nervous/anxious.        Objective:      Physical Exam   Constitutional: She is oriented to person, place, and time. She appears well-developed and well-nourished. No distress.   HENT:   Head: Normocephalic and atraumatic.   Mouth/Throat: Oropharynx is clear and moist.   Eyes: Pupils are equal, round, and reactive to light. Conjunctivae are normal.   Neck: Normal range of motion. Neck supple.   Cardiovascular: Normal rate, regular rhythm and normal heart  sounds.   Pulmonary/Chest: Effort normal and breath sounds normal. She has no wheezes.   Abdominal: Soft. Bowel sounds are normal. There is no tenderness.   Musculoskeletal: Normal range of motion. She exhibits no edema or tenderness.   Neurological: She is alert and oriented to person, place, and time. No cranial nerve deficit.   Skin: No erythema.   Psychiatric: She has a normal mood and affect.   Vitals reviewed.      Assessment:       1. Hyperlipidemia, unspecified hyperlipidemia type    2. Viral bronchitis    3. Screen for colon cancer    4. Anxiety        Plan:       Laverne was seen today for annual exam, anxiety, hyperlipidemia, chronic kidney disease, fatigue and breast pain.    Diagnoses and all orders for this visit:    Hyperlipidemia, unspecified hyperlipidemia type  -     CBC auto differential; Future  -     Comprehensive metabolic panel; Future  -     Lipid panel; Future    Viral bronchitis  -     predniSONE (DELTASONE) 20 MG tablet; Take 1 tablet (20 mg total) by mouth once daily. for 10 days    Screen for colon cancer  -     Fecal Immunochemical Test (iFOBT); Future    Anxiety  -     LORazepam (ATIVAN) 0.5 MG tablet; Take 1 tablet (0.5 mg total) by mouth nightly. Needs a Vacation override please        Follow up in about 1 year (around 5/31/2020) for PHYSICAL EXAM, WITH LAB BEFORE.

## 2019-06-01 ENCOUNTER — LAB VISIT (OUTPATIENT)
Dept: LAB | Facility: HOSPITAL | Age: 79
End: 2019-06-01
Attending: INTERNAL MEDICINE
Payer: MEDICARE

## 2019-06-01 DIAGNOSIS — Z12.11 SCREEN FOR COLON CANCER: ICD-10-CM

## 2019-06-01 PROCEDURE — 82274 ASSAY TEST FOR BLOOD FECAL: CPT | Mod: HCNC

## 2019-06-07 LAB — HEMOCCULT STL QL IA: NEGATIVE

## 2019-06-11 ENCOUNTER — OFFICE VISIT (OUTPATIENT)
Dept: INTERNAL MEDICINE | Facility: CLINIC | Age: 79
End: 2019-06-11
Payer: MEDICARE

## 2019-06-11 ENCOUNTER — HOSPITAL ENCOUNTER (OUTPATIENT)
Dept: RADIOLOGY | Facility: HOSPITAL | Age: 79
Discharge: HOME | End: 2019-06-11
Attending: INTERNAL MEDICINE
Payer: MEDICARE

## 2019-06-11 ENCOUNTER — TELEPHONE (OUTPATIENT)
Dept: INTERNAL MEDICINE | Facility: CLINIC | Age: 79
End: 2019-06-11

## 2019-06-11 VITALS
SYSTOLIC BLOOD PRESSURE: 123 MMHG | WEIGHT: 121.5 LBS | BODY MASS INDEX: 23.85 KG/M2 | HEIGHT: 60 IN | HEART RATE: 98 BPM | DIASTOLIC BLOOD PRESSURE: 55 MMHG

## 2019-06-11 DIAGNOSIS — M81.0 AGE-RELATED OSTEOPOROSIS WITHOUT CURRENT PATHOLOGICAL FRACTURE: ICD-10-CM

## 2019-06-11 DIAGNOSIS — R07.81 RIB PAIN ON RIGHT SIDE: Primary | ICD-10-CM

## 2019-06-11 DIAGNOSIS — R07.81 RIB PAIN ON RIGHT SIDE: ICD-10-CM

## 2019-06-11 PROCEDURE — 99999 PR PBB SHADOW E&M-EST. PATIENT-LVL III: ICD-10-PCS | Mod: PBBFAC,HCNC,, | Performed by: INTERNAL MEDICINE

## 2019-06-11 PROCEDURE — 99999 PR PBB SHADOW E&M-EST. PATIENT-LVL III: CPT | Mod: PBBFAC,HCNC,, | Performed by: INTERNAL MEDICINE

## 2019-06-11 PROCEDURE — 1101F PR PT FALLS ASSESS DOC 0-1 FALLS W/OUT INJ PAST YR: ICD-10-PCS | Mod: HCNC,CPTII,S$GLB, | Performed by: INTERNAL MEDICINE

## 2019-06-11 PROCEDURE — 99214 OFFICE O/P EST MOD 30 MIN: CPT | Mod: HCNC,S$GLB,, | Performed by: INTERNAL MEDICINE

## 2019-06-11 PROCEDURE — 1101F PT FALLS ASSESS-DOCD LE1/YR: CPT | Mod: HCNC,CPTII,S$GLB, | Performed by: INTERNAL MEDICINE

## 2019-06-11 PROCEDURE — 99214 PR OFFICE/OUTPT VISIT, EST, LEVL IV, 30-39 MIN: ICD-10-PCS | Mod: HCNC,S$GLB,, | Performed by: INTERNAL MEDICINE

## 2019-06-11 PROCEDURE — 71100 XR RIBS 2 VIEW RIGHT: ICD-10-PCS | Mod: 26,HCNC,RT, | Performed by: RADIOLOGY

## 2019-06-11 PROCEDURE — 71100 X-RAY EXAM RIBS UNI 2 VIEWS: CPT | Mod: TC,HCNC,RT

## 2019-06-11 PROCEDURE — 71100 X-RAY EXAM RIBS UNI 2 VIEWS: CPT | Mod: 26,HCNC,RT, | Performed by: RADIOLOGY

## 2019-06-11 NOTE — PROGRESS NOTES
Subjective:       Patient ID: Laverne Humphries is a 78 y.o. female.    Chief Complaint: Pain (middle back right side)    HPI - urgent care visit.  She had bronchitis a few weeks ago, with heavy coughing.  That has resolved, but right flank pain along the lower ribs has remained.  Pain is worse on palpation or with a deep breath.  Never had this before.  She says she doesn't have OP, but DXA in 2016 confirms osteoporosis.  Not a smoker    Pmh/meds:  Reviewed and reconciled in EPIC with patient during visit today.    Review of Systems   Constitutional: Negative for fever.   HENT: Negative for congestion.    Respiratory: Negative for shortness of breath.    Cardiovascular: Positive for chest pain.   Gastrointestinal: Negative for abdominal pain.   Genitourinary: Negative for difficulty urinating.   Musculoskeletal: Negative for arthralgias.   Skin: Negative for rash.   Neurological: Negative for headaches.   Psychiatric/Behavioral: Negative for sleep disturbance.       Objective:      Physical Exam   Constitutional: She is oriented to person, place, and time. She appears well-developed and well-nourished.   Friendly, lean WF in nad.    HENT:   Head: Normocephalic and atraumatic.   Cardiovascular: Normal rate, regular rhythm and normal heart sounds. Exam reveals no gallop and no friction rub.   No murmur heard.  Pulmonary/Chest: Effort normal and breath sounds normal. No respiratory distress. She has no wheezes. She has no rales. She exhibits no tenderness.   Musculoskeletal:   Tenderness along right chest wall anteriorly   Neurological: She is alert and oriented to person, place, and time.   Skin: Skin is warm and dry. No erythema.   Psychiatric: She has a normal mood and affect.   Nursing note and vitals reviewed.      Assessment:       1. Rib pain on right side    2. Age-related osteoporosis without current pathological fracture        Plan:       Laverne was seen today for pain.    Diagnoses and all orders for  this visit:    Rib pain on right side - favor fracture vs muscle strain.  Will do films for confirmation.    -     X-Ray Ribs 2 View Right; Future    Age-related osteoporosis without current pathological fracture - dxa in 2016 showed this; will repeat today and help her make a decision on therapy.  -     DXA Bone Density Spine And Hip; Future    rtc prn.    G Sebastien Kenney MD MPH  Staff Internist

## 2019-06-11 NOTE — TELEPHONE ENCOUNTER
Please call and tell her there isn't a fracture on her x-rays.  She still needs to do the dxa scan.  Tell her that the pain is most likely a muscle strain.    D

## 2019-06-15 DIAGNOSIS — J20.8 VIRAL BRONCHITIS: ICD-10-CM

## 2019-06-17 RX ORDER — ALBUTEROL SULFATE 90 UG/1
AEROSOL, METERED RESPIRATORY (INHALATION)
Qty: 18 INHALER | Refills: 0 | Status: SHIPPED | OUTPATIENT
Start: 2019-06-17 | End: 2019-10-21

## 2019-07-09 ENCOUNTER — HOSPITAL ENCOUNTER (OUTPATIENT)
Dept: RADIOLOGY | Facility: CLINIC | Age: 79
Discharge: HOME | End: 2019-07-09
Attending: INTERNAL MEDICINE
Payer: MEDICARE

## 2019-07-09 DIAGNOSIS — M81.0 AGE-RELATED OSTEOPOROSIS WITHOUT CURRENT PATHOLOGICAL FRACTURE: ICD-10-CM

## 2019-07-09 PROCEDURE — 77080 DXA BONE DENSITY AXIAL: CPT | Mod: 26,HCNC,, | Performed by: INTERNAL MEDICINE

## 2019-07-09 PROCEDURE — 77080 DEXA BONE DENSITY SPINE HIP: ICD-10-PCS | Mod: 26,HCNC,, | Performed by: INTERNAL MEDICINE

## 2019-07-09 PROCEDURE — 77080 DXA BONE DENSITY AXIAL: CPT | Mod: TC,HCNC

## 2019-07-10 ENCOUNTER — TELEPHONE (OUTPATIENT)
Dept: INTERNAL MEDICINE | Facility: CLINIC | Age: 79
End: 2019-07-10

## 2019-07-10 DIAGNOSIS — M81.0 OSTEOPOROSIS, UNSPECIFIED OSTEOPOROSIS TYPE, UNSPECIFIED PATHOLOGICAL FRACTURE PRESENCE: Primary | ICD-10-CM

## 2019-07-12 RX ORDER — ALENDRONATE SODIUM 70 MG/1
70 TABLET ORAL
Qty: 12 TABLET | Refills: 3 | Status: SHIPPED | OUTPATIENT
Start: 2019-07-12 | End: 2019-08-16

## 2019-08-14 ENCOUNTER — TELEPHONE (OUTPATIENT)
Dept: INTERNAL MEDICINE | Facility: CLINIC | Age: 79
End: 2019-08-14

## 2019-08-14 NOTE — TELEPHONE ENCOUNTER
----- Message from Sharon Scott sent at 8/14/2019 10:46 AM CDT -----  Contact: self/[phone]  Patient called in regards needing to talk with Dr Castro's medical assistant about medication that PCP order for the patient. Please call and advise. thank you.

## 2019-08-14 NOTE — TELEPHONE ENCOUNTER
You sent Fosamax to her pharmacy but wanted to let you know that she would like to wait a little while longer before taking it because of the side effects. I stressed to her that if he didn't think she needed it he would not have called her and send to her pharmacy. But she insist that she wants to wait.

## 2019-09-19 ENCOUNTER — OFFICE VISIT (OUTPATIENT)
Dept: URGENT CARE | Facility: CLINIC | Age: 79
End: 2019-09-19
Payer: MEDICARE

## 2019-09-19 VITALS
SYSTOLIC BLOOD PRESSURE: 126 MMHG | HEART RATE: 93 BPM | DIASTOLIC BLOOD PRESSURE: 70 MMHG | HEIGHT: 60 IN | WEIGHT: 115 LBS | RESPIRATION RATE: 16 BRPM | TEMPERATURE: 97 F | BODY MASS INDEX: 22.58 KG/M2 | OXYGEN SATURATION: 99 %

## 2019-09-19 DIAGNOSIS — H61.23 BILATERAL IMPACTED CERUMEN: ICD-10-CM

## 2019-09-19 DIAGNOSIS — R42 DIZZINESS: Primary | ICD-10-CM

## 2019-09-19 DIAGNOSIS — R42 VERTIGO: ICD-10-CM

## 2019-09-19 LAB
BILIRUB UR QL STRIP: NEGATIVE
GLUCOSE SERPL-MCNC: 123 MG/DL (ref 70–110)
GLUCOSE UR QL STRIP: NEGATIVE
KETONES UR QL STRIP: NEGATIVE
LEUKOCYTE ESTERASE UR QL STRIP: NEGATIVE
PH, POC UA: 5.5
POC ANION GAP: 19 MMOL/L
POC BLOOD, URINE: NEGATIVE
POC BUN: 22 MMOL/L
POC CHLORIDE: 104 MMOL/L
POC CREATININE: 0.8 MG/DL (ref 0.6–1.3)
POC HEMATOCRIT: 38 %PCV (ref 37–47)
POC HEMOGLOBIN: 12.9 G/DL (ref 12.5–16)
POC ICA: 1.36 MMOL/L
POC NITRATES, URINE: NEGATIVE
POC POTASSIUM: 4.3 MMOL/L
POC SODIUM: 140 MMOL/L
POC TCO2: 23 MMOL/L
PROT UR QL STRIP: NEGATIVE
SP GR UR STRIP: 1.02 (ref 1–1.03)
UROBILINOGEN UR STRIP-ACNC: NORMAL (ref 0.1–1.1)

## 2019-09-19 PROCEDURE — 80047 BASIC METABLC PNL IONIZED CA: CPT | Mod: QW,S$GLB,, | Performed by: NURSE PRACTITIONER

## 2019-09-19 PROCEDURE — 69209 EAR CERUMEN REMOVAL: ICD-10-PCS | Mod: 50,S$GLB,, | Performed by: NURSE PRACTITIONER

## 2019-09-19 PROCEDURE — 93005 ELECTROCARDIOGRAM TRACING: CPT | Mod: S$GLB,,, | Performed by: NURSE PRACTITIONER

## 2019-09-19 PROCEDURE — 93005 EKG 12-LEAD: ICD-10-PCS | Mod: S$GLB,,, | Performed by: NURSE PRACTITIONER

## 2019-09-19 PROCEDURE — 80047 POCT CHEMISTRY PANEL: ICD-10-PCS | Mod: QW,S$GLB,, | Performed by: NURSE PRACTITIONER

## 2019-09-19 PROCEDURE — 93010 ELECTROCARDIOGRAM REPORT: CPT | Mod: S$GLB,,, | Performed by: INTERNAL MEDICINE

## 2019-09-19 PROCEDURE — 93010 EKG 12-LEAD: ICD-10-PCS | Mod: S$GLB,,, | Performed by: INTERNAL MEDICINE

## 2019-09-19 PROCEDURE — 99214 PR OFFICE/OUTPT VISIT, EST, LEVL IV, 30-39 MIN: ICD-10-PCS | Mod: 25,S$GLB,, | Performed by: NURSE PRACTITIONER

## 2019-09-19 PROCEDURE — 81003 URINALYSIS AUTO W/O SCOPE: CPT | Mod: QW,S$GLB,, | Performed by: NURSE PRACTITIONER

## 2019-09-19 PROCEDURE — 1101F PR PT FALLS ASSESS DOC 0-1 FALLS W/OUT INJ PAST YR: ICD-10-PCS | Mod: CPTII,S$GLB,, | Performed by: NURSE PRACTITIONER

## 2019-09-19 PROCEDURE — 81003 POCT URINALYSIS, DIPSTICK, MANUAL, W/O SCOPE: ICD-10-PCS | Mod: QW,S$GLB,, | Performed by: NURSE PRACTITIONER

## 2019-09-19 PROCEDURE — 69209 REMOVE IMPACTED EAR WAX UNI: CPT | Mod: 50,S$GLB,, | Performed by: NURSE PRACTITIONER

## 2019-09-19 PROCEDURE — 1101F PT FALLS ASSESS-DOCD LE1/YR: CPT | Mod: CPTII,S$GLB,, | Performed by: NURSE PRACTITIONER

## 2019-09-19 PROCEDURE — 99214 OFFICE O/P EST MOD 30 MIN: CPT | Mod: 25,S$GLB,, | Performed by: NURSE PRACTITIONER

## 2019-09-19 RX ORDER — NEOMYCIN SULFATE, POLYMYXIN B SULFATE, HYDROCORTISONE 3.5; 10000; 1 MG/ML; [USP'U]/ML; MG/ML
3 SOLUTION/ DROPS AURICULAR (OTIC) 3 TIMES DAILY
Qty: 1 BOTTLE | Refills: 0 | Status: SHIPPED | OUTPATIENT
Start: 2019-09-19 | End: 2019-09-29

## 2019-09-19 NOTE — PROGRESS NOTES
Subjective:       Patient ID: Laverne Humphries is a 78 y.o. female.    Vitals:  height is 5' (1.524 m) and weight is 52.2 kg (115 lb). Her temperature is 97.2 °F (36.2 °C). Her blood pressure is 126/70 and her pulse is 93. Her respiration is 16 and oxygen saturation is 99%.     Chief Complaint: Dizziness      This is an 78-year-old female who presents today with complaints of dizziness that occurred this morning that lasted a few minutes.  She states that she felt the room was spinning.  She felt nauseated but did not vomit.  She is accompanied by her daughter today.  Patient denies any numbness, tingling, or weakness.  Denies any slurred speech, blurred vision, shortness of breath or chest pain. Denies any urinary changes, but daughter states that she would like to have her urine checked as well.    Dizziness:   Chronicity:  New  Onset:  Today  Progression since onset:  Resolved  Duration:  Very briefno fever, no headaches, no nausea, no vomiting, no weakness, no light-headedness and no chest pain.      Constitution: Negative for chills, fatigue and fever.   HENT: Negative for congestion and sore throat.    Neck: Negative for painful lymph nodes.   Cardiovascular: Negative for chest pain and leg swelling.   Eyes: Negative for double vision and blurred vision.   Respiratory: Negative for cough and shortness of breath.    Gastrointestinal: Positive for diarrhea. Negative for nausea and vomiting.   Genitourinary: Negative for dysuria, frequency, urgency and history of kidney stones.   Musculoskeletal: Negative for joint pain, joint swelling, muscle cramps and muscle ache.   Skin: Negative for color change, pale, rash and bruising.   Allergic/Immunologic: Negative for seasonal allergies.   Neurological: Positive for dizziness. Negative for history of vertigo, light-headedness, passing out and headaches.   Hematologic/Lymphatic: Negative for swollen lymph nodes.   Psychiatric/Behavioral: Negative for nervous/anxious,  sleep disturbance and depression. The patient is not nervous/anxious.        Objective:      Physical Exam   Constitutional: She is oriented to person, place, and time. She appears well-developed and well-nourished. She is cooperative.  Non-toxic appearance. She does not appear ill. No distress.   HENT:   Head: Normocephalic and atraumatic.   Right Ear: Hearing, tympanic membrane, external ear and ear canal normal.   Left Ear: Hearing, tympanic membrane, external ear and ear canal normal.   Nose: Nose normal. No mucosal edema, rhinorrhea or nasal deformity. No epistaxis. Right sinus exhibits no maxillary sinus tenderness and no frontal sinus tenderness. Left sinus exhibits no maxillary sinus tenderness and no frontal sinus tenderness.   Mouth/Throat: Uvula is midline, oropharynx is clear and moist and mucous membranes are normal. No trismus in the jaw. Normal dentition. No uvula swelling. No posterior oropharyngeal erythema.   Cerumen impaction noted bilaterally  Irritation noted to bilateral canals after irrigation.   Eyes: Conjunctivae and lids are normal. Right eye exhibits no discharge. Left eye exhibits no discharge. No scleral icterus.   Sclera clear bilat   Neck: Trachea normal, normal range of motion, full passive range of motion without pain and phonation normal. Neck supple.   Cardiovascular: Normal rate, regular rhythm, normal heart sounds, intact distal pulses and normal pulses.   Pulmonary/Chest: Effort normal and breath sounds normal. No respiratory distress.   Abdominal: Soft. Normal appearance and bowel sounds are normal. She exhibits no distension, no pulsatile midline mass and no mass. There is no tenderness.   Musculoskeletal: Normal range of motion. She exhibits no edema or deformity.   Neurological: She is alert and oriented to person, place, and time. She exhibits normal muscle tone. Coordination normal.   CN II-XII grossly intact.   Gait smooth and steady.   Speech is clear.   Follows all  commands.   Rapid, alternating hand movements intact.   Finger to nose intact.   Heel to shin intact.   Face is symmetrical   Hearing intact.   Strength 5/5 to all 4 extremities.   Moves all 4 extremities equally.         Skin: Skin is warm, dry and intact. She is not diaphoretic. No pallor.   Psychiatric: She has a normal mood and affect. Her speech is normal and behavior is normal. Judgment and thought content normal. Cognition and memory are normal.   Nursing note and vitals reviewed.      EK BPM, NO ECTOPY, NO ST ELEVATION  ORTHOSTATICS NEGATIVE  Results for orders placed or performed in visit on 19   POCT Chemistry Panel   Result Value Ref Range    POC Sodium 140 MMOL/L    POC Potassium 4.3 MMOL/L    POC Chloride 104 MMOL/L    POC BUN 22 MMOL/L    POC Glucose 123 (A) 70 - 110 MG/DL    POC Creatinine 0.8 0.6 - 1.3 mg/dL    POC iCA 1.36 MMOL/L    POC TCO2 23 MMOL/L    POC Hematocrit 38 37 - 47 %PCV    POC Hemoglobin 12.9 12.5 - 16 g/dL    POC Anion Gap 19 MMOL/L   POCT Urinalysis, Dipstick, Manual, W/O Scope   Result Value Ref Range    POC Blood, Urine Negative Negative    POC Bilirubin, Urine Negative Negative    POC Urobilinogen, Urine NORMAL 0.1 - 1.1    POC Ketones, Urine Negative Negative    POC Protein, Urine Negative Negative    POC Nitrates, Urine Negative Negative    POC Glucose, Urine Negative Negative    pH, UA 5.5     POC Specific Gravity, Urine 1.020 1.003 - 1.029    POC Leukocytes, Urine Negative Negative       Assessment:       1. Dizziness    2. Bilateral impacted cerumen        Plan:       EPISODE OF VERTIGO LIKELY DUE TO CERUMEN IMPACTION.  SUCCESSFULLY REMOVED WITH IRRIGATION AND ALLIGATOR CLAMPS. PATIENT STATES SHE FEELS A LOT BETTER.     Dizziness  -     EKG 12-lead; Future  -     POCT Chemistry Panel  -     Orthostatic vital signs  -     POCT Urinalysis, Dipstick, Manual, W/O Scope    Bilateral impacted cerumen  -     neomycin-polymyxin-hydrocortisone (CORTISPORIN) otic solution;  Place 3 drops into the left ear 3 (three) times daily. for 10 days  Dispense: 1 Bottle; Refill: 0            Patient Instructions     Start using debrox drops    Red Flag signs include; thunderclap headache, weakness, blurry vision or other sudden sensory deficit, new onset numbness or tingling, shortness of breath, chest pain, nausea, sweating or fatigue. Please go immediately to the Emergency Department with any of these signs.    You must understand that you've received an Urgent Care treatment only and that you may be released before all your medical problems are known or treated. You, the patient, will arrange for follow up care as instructed.  If your condition worsens we recommend that you receive another evaluation at the emergency room immediately or contact your primary medical clinics after hours call service to discuss your concerns.  Please return here or go to the Emergency Department for any concerns or worsening of condition.      Impacted Earwax    Impacted earwax is a buildup of the natural wax in the ear (cerumen). Impacted earwax is very common. It can cause symptoms such as hearing loss. It can also stop a doctor doing an exam of your ear.  Understanding earwax  Tiny glands in your ear make substances that combine with dead skin cells to form earwax. Earwax helps protect your ear canal from water, dirt, infection, and injury. Over time, earwax travels from the inner part of your ear canal to the entrance of the canal. Then it falls away naturally. But in some cases, it cant travel to the entrance of the canal. This may be because of a health condition or objects put in the ear. With age, earwax tends to become harder and less fluid. Older adults are more likely to have problems with earwax buildup.  What causes impacted earwax?  Earwax can build up because of many health conditions. Some cause a physical blockage. Others cause too much earwax to be made. Health conditions that can cause earwax  buildup include:  · Bony blockage in the ear (osteoma or exostoses)  · Infections, such as swimmers ear (external otitis)  · Skin disease, such as eczema  · Autoimmune diseases, such as lupus  · A narrowed ear canal from birth, chronic inflammation, or injury  · Too much earwax because of injury  · Too much earwax because of  water in the ear canal  Objects repeatedly placed in the ear can also cause impacted earwax. For example, putting cotton swabs in the ear may push the wax deeper into the ear. Over time, this may cause blockage. Hearing aids, swimming plugs, and swim molds can cause the same problem when used again and again.  In some cases, the cause of impacted earwax is not known.  Symptoms of impacted earwax  Excess earwax usually does not cause any symptoms, unless there is a large amount of buildup. Then it may cause symptoms such as:  · Hearing loss  · Earache  · Sense of ear fullness  · Itching in the ear  · Dizziness  · Ringing in the ears  · Cough  Treatment for impacted earwax  If you dont have symptoms, you may not need treatment. Often the earwax goes away on its own with time. If you have symptoms, you may have 1 or more treatments such as:  · Ear drops. These help to soften the earwax. This helps it leave the ear over time.  · Rinsing (irrigation) of the ear canal with water. This is done in a doctors office.  · Removal of the earwax with small tools. This is also done in a doctors office.  In rare cases, some treatments for earwax removal may cause complications such as:  · Swimmers ear (otitis external)  · Earache  · Short-term hearing loss  · Dizziness  · Water trapped in the ear canal  · Hole in the eardrum  · Ringing in the ears  · Bleeding from the ear  Talk with your health care provider about which risks apply most to you.  Dont use these at home  Health care providers do not advise use of ear candles or ear vacuum kits. These methods are not shown to work.   Preventing impacted  earwax  You may not be able to prevent impacted earwax if you have a health condition that causes it, such as eczema. In other cases, you may be able to prevent earwax buildup by:  · Using ear drops once a week  · Having routine cleaning of the ear about every 6 months  · Not using cotton swabs in the ear  When to call the health care provider  Call your health care provider right away if you have severe symptoms after earwax removal. These may include bleeding or severe ear pain.   Date Last Reviewed: 3/19/2015  © 0689-9019 [employer]. [address] [place] [address]. All rights reserved. This information is not intended as a substitute for professional medical care. Always follow your healthcare professional's instructions.        Inner Ear Problems: Causes of Dizziness (Vertigo)       Benign positional vertigo (BPV)  This is the most common cause of vertigo. BPV is also called benign positional paroxysmal vertigo (BPPV). It happens when crystals in the ear canals shift into the wrong place. Vertigo usually occurs when you move your head in a certain way. This can happen when turning in bed, bending, or looking up. Because BPV comes on quickly, you should think about if you are safe to drive or do other tasks that need your full attention.  BPV:  · Causes vertigo that last for seconds. Vertigo can occur several times a day, depending on body position.  · Doesnt cause hearing loss  · Often goes away on its own. But it but may go away sooner with treatment.  Infection or inflammation  Sometimes the semicircular canals swell and send incorrect balance signals. This problem may be caused by a viral infection. Depending on the cause, your hearing can be affected (labyrinthitis). Or your hearing can remain normal (neuronitis).  Infection or inflammation:  · Causes vertigo that lasts for hours or days. The first episode is usually the worst.  · Can cause hearing loss  · Often goes away on its  own. But it may go away sooner with treatment.  You may need vestibular rehabilitation if you have balance problems that don't go away.  Menieres disease  This condition is uncommon. It happens when there is too much fluid in the ear canals. This causes increased pressure and swelling. It affects balance and hearing signals.  Menieres disease may:  · Cause vertigo that last for hours  · Cause hearing problems that come and go. The problems are usually in one ear and get worse over time.  · Cause buzzing or ringing in the ears (tinnitus)  · Cause a feeling of fullness or pressure in the ear  · Cause any of these symptoms: vertigo, hearing loss, tinnitus, or ear fullness to last a lifetime  Date Last Reviewed: 11/1/2016  © 2220-3437 [employer]. [address] [place] [address], [place] [address]. All rights reserved. This information is not intended as a substitute for professional medical care. Always follow your healthcare professional's instructions.

## 2019-09-19 NOTE — PATIENT INSTRUCTIONS
Start using debrox drops    Red Flag signs include; thunderclap headache, weakness, blurry vision or other sudden sensory deficit, new onset numbness or tingling, shortness of breath, chest pain, nausea, sweating or fatigue. Please go immediately to the Emergency Department with any of these signs.    You must understand that you've received an Urgent Care treatment only and that you may be released before all your medical problems are known or treated. You, the patient, will arrange for follow up care as instructed.  If your condition worsens we recommend that you receive another evaluation at the emergency room immediately or contact your primary medical clinics after hours call service to discuss your concerns.  Please return here or go to the Emergency Department for any concerns or worsening of condition.      Impacted Earwax    Impacted earwax is a buildup of the natural wax in the ear (cerumen). Impacted earwax is very common. It can cause symptoms such as hearing loss. It can also stop a doctor doing an exam of your ear.  Understanding earwax  Tiny glands in your ear make substances that combine with dead skin cells to form earwax. Earwax helps protect your ear canal from water, dirt, infection, and injury. Over time, earwax travels from the inner part of your ear canal to the entrance of the canal. Then it falls away naturally. But in some cases, it cant travel to the entrance of the canal. This may be because of a health condition or objects put in the ear. With age, earwax tends to become harder and less fluid. Older adults are more likely to have problems with earwax buildup.  What causes impacted earwax?  Earwax can build up because of many health conditions. Some cause a physical blockage. Others cause too much earwax to be made. Health conditions that can cause earwax buildup include:  · Bony blockage in the ear (osteoma or exostoses)  · Infections, such as swimmers ear (external otitis)  · Skin  disease, such as eczema  · Autoimmune diseases, such as lupus  · A narrowed ear canal from birth, chronic inflammation, or injury  · Too much earwax because of injury  · Too much earwax because of  water in the ear canal  Objects repeatedly placed in the ear can also cause impacted earwax. For example, putting cotton swabs in the ear may push the wax deeper into the ear. Over time, this may cause blockage. Hearing aids, swimming plugs, and swim molds can cause the same problem when used again and again.  In some cases, the cause of impacted earwax is not known.  Symptoms of impacted earwax  Excess earwax usually does not cause any symptoms, unless there is a large amount of buildup. Then it may cause symptoms such as:  · Hearing loss  · Earache  · Sense of ear fullness  · Itching in the ear  · Dizziness  · Ringing in the ears  · Cough  Treatment for impacted earwax  If you dont have symptoms, you may not need treatment. Often the earwax goes away on its own with time. If you have symptoms, you may have 1 or more treatments such as:  · Ear drops. These help to soften the earwax. This helps it leave the ear over time.  · Rinsing (irrigation) of the ear canal with water. This is done in a doctors office.  · Removal of the earwax with small tools. This is also done in a doctors office.  In rare cases, some treatments for earwax removal may cause complications such as:  · Swimmers ear (otitis external)  · Earache  · Short-term hearing loss  · Dizziness  · Water trapped in the ear canal  · Hole in the eardrum  · Ringing in the ears  · Bleeding from the ear  Talk with your health care provider about which risks apply most to you.  Dont use these at home  Health care providers do not advise use of ear candles or ear vacuum kits. These methods are not shown to work.   Preventing impacted earwax  You may not be able to prevent impacted earwax if you have a health condition that causes it, such as eczema. In other cases,  you may be able to prevent earwax buildup by:  · Using ear drops once a week  · Having routine cleaning of the ear about every 6 months  · Not using cotton swabs in the ear  When to call the health care provider  Call your health care provider right away if you have severe symptoms after earwax removal. These may include bleeding or severe ear pain.   Date Last Reviewed: 3/19/2015  © 0482-5105 [employer]. [address] [place] [address], [place] [address]. All rights reserved. This information is not intended as a substitute for professional medical care. Always follow your healthcare professional's instructions.        Inner Ear Problems: Causes of Dizziness (Vertigo)       Benign positional vertigo (BPV)  This is the most common cause of vertigo. BPV is also called benign positional paroxysmal vertigo (BPPV). It happens when crystals in the ear canals shift into the wrong place. Vertigo usually occurs when you move your head in a certain way. This can happen when turning in bed, bending, or looking up. Because BPV comes on quickly, you should think about if you are safe to drive or do other tasks that need your full attention.  BPV:  · Causes vertigo that last for seconds. Vertigo can occur several times a day, depending on body position.  · Doesnt cause hearing loss  · Often goes away on its own. But it but may go away sooner with treatment.  Infection or inflammation  Sometimes the semicircular canals swell and send incorrect balance signals. This problem may be caused by a viral infection. Depending on the cause, your hearing can be affected (labyrinthitis). Or your hearing can remain normal (neuronitis).  Infection or inflammation:  · Causes vertigo that lasts for hours or days. The first episode is usually the worst.  · Can cause hearing loss  · Often goes away on its own. But it may go away sooner with treatment.  You may need vestibular rehabilitation if you have balance problems that don't go  away.  Menieres disease  This condition is uncommon. It happens when there is too much fluid in the ear canals. This causes increased pressure and swelling. It affects balance and hearing signals.  Menieres disease may:  · Cause vertigo that last for hours  · Cause hearing problems that come and go. The problems are usually in one ear and get worse over time.  · Cause buzzing or ringing in the ears (tinnitus)  · Cause a feeling of fullness or pressure in the ear  · Cause any of these symptoms: vertigo, hearing loss, tinnitus, or ear fullness to last a lifetime  Date Last Reviewed: 11/1/2016  © 1259-4327 [employer]. [address] [place] [address], [place] [address]. All rights reserved. This information is not intended as a substitute for professional medical care. Always follow your healthcare professional's instructions.

## 2019-09-20 NOTE — PROCEDURES
Ear Cerumen Removal  Date/Time: 9/19/2019 1:30 PM  Performed by: Kasey Pablo NP  Authorized by: Kasey Pablo NP       Local anesthetic:  None  Ceruminolytic: COLACE.  Location details:  Both ears  Procedure type: irrigation    Cerumen  Removal Results:  Cerumen completely removed  Patient tolerance:  Patient tolerated the procedure well with no immediate complications

## 2019-10-16 ENCOUNTER — TELEPHONE (OUTPATIENT)
Dept: INTERNAL MEDICINE | Facility: CLINIC | Age: 79
End: 2019-10-16

## 2019-10-16 DIAGNOSIS — Z12.31 ENCOUNTER FOR SCREENING MAMMOGRAM FOR MALIGNANT NEOPLASM OF BREAST: Primary | ICD-10-CM

## 2019-10-16 NOTE — TELEPHONE ENCOUNTER
----- Message from Ramona Posey sent at 10/16/2019 11:46 AM CDT -----  Contact: Pt self Home [phone]   Caller is requesting to schedule their annual screening mammogram appointment. Order is not listed in Epic.  Please enter order and contact patient to schedule.  Where would they like the mammogram performed?:  At the [facility] location   Would the patient rather receive a phone call back or a response via [facility]?:   A phone call please.

## 2019-10-21 ENCOUNTER — OFFICE VISIT (OUTPATIENT)
Dept: URGENT CARE | Facility: CLINIC | Age: 79
End: 2019-10-21
Payer: MEDICARE

## 2019-10-21 VITALS
DIASTOLIC BLOOD PRESSURE: 70 MMHG | WEIGHT: 115 LBS | BODY MASS INDEX: 22.58 KG/M2 | RESPIRATION RATE: 16 BRPM | SYSTOLIC BLOOD PRESSURE: 144 MMHG | HEART RATE: 83 BPM | HEIGHT: 60 IN | OXYGEN SATURATION: 99 % | TEMPERATURE: 97 F

## 2019-10-21 DIAGNOSIS — J40 BRONCHITIS: Primary | ICD-10-CM

## 2019-10-21 PROCEDURE — 99214 OFFICE O/P EST MOD 30 MIN: CPT | Mod: 25,S$GLB,, | Performed by: PHYSICIAN ASSISTANT

## 2019-10-21 PROCEDURE — 94640 PR INHAL RX, AIRWAY OBST/DX SPUTUM INDUCT: ICD-10-PCS | Mod: S$GLB,,, | Performed by: PHYSICIAN ASSISTANT

## 2019-10-21 PROCEDURE — 94640 AIRWAY INHALATION TREATMENT: CPT | Mod: S$GLB,,, | Performed by: PHYSICIAN ASSISTANT

## 2019-10-21 PROCEDURE — 1101F PR PT FALLS ASSESS DOC 0-1 FALLS W/OUT INJ PAST YR: ICD-10-PCS | Mod: CPTII,S$GLB,, | Performed by: PHYSICIAN ASSISTANT

## 2019-10-21 PROCEDURE — 1101F PT FALLS ASSESS-DOCD LE1/YR: CPT | Mod: CPTII,S$GLB,, | Performed by: PHYSICIAN ASSISTANT

## 2019-10-21 PROCEDURE — 99214 PR OFFICE/OUTPT VISIT, EST, LEVL IV, 30-39 MIN: ICD-10-PCS | Mod: 25,S$GLB,, | Performed by: PHYSICIAN ASSISTANT

## 2019-10-21 RX ORDER — AZITHROMYCIN 250 MG/1
TABLET, FILM COATED ORAL
Qty: 6 TABLET | Refills: 0 | Status: SHIPPED | OUTPATIENT
Start: 2019-10-21 | End: 2019-10-21

## 2019-10-21 RX ORDER — BENZONATATE 100 MG/1
200 CAPSULE ORAL 3 TIMES DAILY PRN
Qty: 20 CAPSULE | Refills: 0 | Status: SHIPPED | OUTPATIENT
Start: 2019-10-21 | End: 2019-10-21

## 2019-10-21 RX ORDER — ALBUTEROL SULFATE 90 UG/1
2 AEROSOL, METERED RESPIRATORY (INHALATION) EVERY 6 HOURS PRN
Qty: 1 INHALER | Refills: 0 | Status: SHIPPED | OUTPATIENT
Start: 2019-10-21 | End: 2019-11-11 | Stop reason: SDUPTHER

## 2019-10-21 RX ORDER — ALBUTEROL SULFATE 0.83 MG/ML
2.5 SOLUTION RESPIRATORY (INHALATION) EVERY 6 HOURS PRN
Qty: 1 BOX | Refills: 0 | Status: SHIPPED | OUTPATIENT
Start: 2019-10-21 | End: 2019-10-21 | Stop reason: CLARIF

## 2019-10-21 RX ORDER — ALBUTEROL SULFATE 0.83 MG/ML
2.5 SOLUTION RESPIRATORY (INHALATION) EVERY 6 HOURS PRN
Qty: 1 BOX | Refills: 0 | Status: SHIPPED | OUTPATIENT
Start: 2019-10-21 | End: 2019-10-21

## 2019-10-21 RX ORDER — AZITHROMYCIN 250 MG/1
TABLET, FILM COATED ORAL
Qty: 6 TABLET | Refills: 0 | Status: SHIPPED | OUTPATIENT
Start: 2019-10-21 | End: 2019-12-31

## 2019-10-21 RX ORDER — BENZONATATE 100 MG/1
200 CAPSULE ORAL 3 TIMES DAILY PRN
Qty: 20 CAPSULE | Refills: 0 | Status: SHIPPED | OUTPATIENT
Start: 2019-10-21 | End: 2019-12-31

## 2019-10-21 RX ORDER — ALBUTEROL SULFATE 0.83 MG/ML
2.5 SOLUTION RESPIRATORY (INHALATION)
Status: COMPLETED | OUTPATIENT
Start: 2019-10-21 | End: 2019-10-21

## 2019-10-21 RX ADMIN — ALBUTEROL SULFATE 2.5 MG: 0.83 SOLUTION RESPIRATORY (INHALATION) at 10:10

## 2019-10-21 NOTE — PATIENT INSTRUCTIONS
Bronchitis, Antibiotic Treatment (Adult)    Bronchitis is an infection of the air passages (bronchial tubes) in your lungs. It often occurs when you have a cold. This illness is contagious during the first few days and is spread through the air by coughing and sneezing, or by direct contact (touching the sick person and then touching your own eyes, nose, or mouth).  Symptoms of bronchitis include cough with mucus (phlegm) and low-grade fever. Bronchitis usually lasts 7 to 14 days. Mild cases can be treated with simple home remedies. More severe infection is treated with an antibiotic.  Home care  Follow these guidelines when caring for yourself at home:  · If your symptoms are severe, rest at home for the first 2 to 3 days. When you go back to your usual activities, don't let yourself get too tired.  · Do not smoke. Also avoid being exposed to secondhand smoke.  · You may use over-the-counter medicines to control fever or pain, unless another medicine was prescribed. (Note: If you have chronic liver or kidney disease or have ever had a stomach ulcer or gastrointestinal bleeding, talk with your healthcare provider before using these medicines. Also talk to your provider if you are taking medicine to prevent blood clots.) Aspirin should never be given to anyone younger than 18 years of age who is ill with a viral infection or fever. It may cause severe liver or brain damage.  · Your appetite may be poor, so a light diet is fine. Avoid dehydration by drinking 6 to 8 glasses of fluids per day (such as water, soft drinks, sports drinks, juices, tea, or soup). Extra fluids will help loosen secretions in the nose and lungs.  · Over-the-counter cough, cold, and sore-throat medicines will not shorten the length of the illness, but they may be helpful to reduce symptoms. (Note: Do not use decongestants if you have high blood pressure.)  · Finish all antibiotic medicine. Do this even if you are feeling better after only a  few days.  Follow-up care  Follow up with your healthcare provider, or as advised. If you had an X-ray or ECG (electrocardiogram), a specialist will review it. You will be notified of any new findings that may affect your care.  Note: If you are age 65 or older, or if you have a chronic lung disease or condition that affects your immune system, or you smoke, talk to your healthcare provider about having pneumococcal vaccinations and a yearly influenza vaccination (flu shot).  When to seek medical advice  Call your healthcare provider right away if any of these occur:  · Fever of 100.4°F (38°C) or higher  · Coughing up increased amounts of colored sputum  · Weakness, drowsiness, headache, facial pain, ear pain, or a stiff neck  Call 911, or get immediate medical care  Contact emergency services right away if any of these occur.  · Coughing up blood  · Worsening weakness, drowsiness, headache, or stiff neck  · Trouble breathing, wheezing, or pain with breathing  Date Last Reviewed: 9/13/2015 © 2000-2017 [employer]. [address] [place] [address]. All rights reserved. This information is not intended as a substitute for professional medical care. Always follow your healthcare professional's instructions.    Please follow up with your Primary care provider within 2-5 days if your signs and symptoms have not resolved or worsen.     If your condition worsens or fails to improve we recommend that you receive another evaluation at the emergency room immediately or contact your primary medical clinic to discuss your concerns.   You must understand that you have received an Urgent Care treatment only and that you may be released before all of your medical problems are known or treated. You, the patient, will arrange for follow up care as instructed.     RED FLAGS/WARNING SYMPTOMS DISCUSSED WITH PATIENT THAT WOULD WARRANT EMERGENT MEDICAL ATTENTION. PATIENT VERBALIZED UNDERSTANDING.

## 2019-10-21 NOTE — PROGRESS NOTES
Subjective:       Patient ID: Laverne Humphries is a 78 y.o. female.    Vitals:  height is 5' (1.524 m) and weight is 52.2 kg (115 lb). Her temperature is 97.2 °F (36.2 °C). Her blood pressure is 144/70 (abnormal) and her pulse is 83. Her respiration is 16 and oxygen saturation is 99%.     Chief Complaint: Cough    Patient presents with cough for about a week and a half.  She reports coughing spells and feeling short of breath.  She has had a inhaler in the past that it seems to help.  She denies any fever, chest pain, wheezing, sore throat, congestion.     Cough   The current episode started in the past 7 days. The problem has been gradually worsening. The problem occurs hourly. The cough is productive of sputum. Associated symptoms include chills and shortness of breath. Pertinent negatives include no ear pain, eye redness, fever, hemoptysis, myalgias, rash, sore throat or wheezing. Nothing aggravates the symptoms. Treatments tried: otc meds. The treatment provided mild relief.       Constitution: Positive for chills. Negative for sweating, fatigue and fever.   HENT: Negative for ear pain, congestion, sinus pain, sinus pressure, sore throat and voice change.    Neck: Negative for painful lymph nodes.   Eyes: Negative for eye redness.   Respiratory: Positive for chest tightness, cough, sputum production and shortness of breath. Negative for bloody sputum, COPD, stridor, wheezing and asthma.    Gastrointestinal: Negative for nausea and vomiting.   Musculoskeletal: Negative for muscle ache.   Skin: Negative for rash.   Allergic/Immunologic: Negative for seasonal allergies and asthma.   Hematologic/Lymphatic: Negative for swollen lymph nodes.       Objective:      Physical Exam   Constitutional: She is oriented to person, place, and time. She appears well-developed and well-nourished. She is cooperative.  Non-toxic appearance. She does not have a sickly appearance. She does not appear ill. No distress.   HENT:   Head:  Normocephalic and atraumatic.   Right Ear: Hearing, tympanic membrane, external ear and ear canal normal.   Left Ear: Hearing, tympanic membrane, external ear and ear canal normal.   Nose: Nose normal. No mucosal edema, rhinorrhea or nasal deformity. No epistaxis. Right sinus exhibits no maxillary sinus tenderness and no frontal sinus tenderness. Left sinus exhibits no maxillary sinus tenderness and no frontal sinus tenderness.   Mouth/Throat: Uvula is midline, oropharynx is clear and moist and mucous membranes are normal. No trismus in the jaw. Normal dentition. No uvula swelling. No oropharyngeal exudate, posterior oropharyngeal edema or posterior oropharyngeal erythema.   Eyes: Conjunctivae and lids are normal. No scleral icterus.   Neck: Trachea normal, full passive range of motion without pain and phonation normal. Neck supple. No neck rigidity. No edema and no erythema present.   Cardiovascular: Normal rate, regular rhythm, normal heart sounds, intact distal pulses and normal pulses.   Pulmonary/Chest: Effort normal and breath sounds normal. No respiratory distress. She has no decreased breath sounds. She has no rhonchi.   Abdominal: Normal appearance.   Musculoskeletal: Normal range of motion. She exhibits no edema or deformity.   Neurological: She is alert and oriented to person, place, and time. She exhibits normal muscle tone. Coordination normal.   Skin: Skin is warm, dry, intact, not diaphoretic and not pale.   Psychiatric: She has a normal mood and affect. Her speech is normal and behavior is normal. Judgment and thought content normal. Cognition and memory are normal.   Nursing note and vitals reviewed.        Assessment:       1. Bronchitis        Plan:         Bronchitis  -     Discontinue: azithromycin (ZITHROMAX Z-BEKAH) 250 MG tablet; Take 2 tablets (500 mg) on  Day 1,  followed by 1 tablet (250 mg) once daily on Days 2 through 5.  Dispense: 6 tablet; Refill: 0  -     albuterol (PROVENTIL/VENTOLIN  HFA) 90 mcg/actuation inhaler; Inhale 2 puffs into the lungs every 6 (six) hours as needed for Wheezing or Shortness of Breath. Rescue  Dispense: 1 Inhaler; Refill: 0  -     Discontinue: benzonatate (TESSALON PERLES) 100 MG capsule; Take 2 capsules (200 mg total) by mouth 3 (three) times daily as needed for Cough.  Dispense: 20 capsule; Refill: 0  -     Discontinue: albuterol (PROVENTIL) 2.5 mg /3 mL (0.083 %) nebulizer solution; Take 3 mLs (2.5 mg total) by nebulization every 6 (six) hours as needed for Wheezing. Rescue  Dispense: 1 Box; Refill: 0  -     albuterol nebulizer solution 2.5 mg  -     azithromycin (ZITHROMAX Z-BEKAH) 250 MG tablet; Take 2 tablets (500 mg) on  Day 1,  followed by 1 tablet (250 mg) once daily on Days 2 through 5.  Dispense: 6 tablet; Refill: 0  -     benzonatate (TESSALON PERLES) 100 MG capsule; Take 2 capsules (200 mg total) by mouth 3 (three) times daily as needed for Cough.  Dispense: 20 capsule; Refill: 0    Other orders  -     Discontinue: albuterol (PROVENTIL) 2.5 mg /3 mL (0.083 %) nebulizer solution; Take 3 mLs (2.5 mg total) by nebulization every 6 (six) hours as needed for Wheezing. Rescue  Dispense: 1 Box; Refill: 0      Patient Instructions     Bronchitis, Antibiotic Treatment (Adult)    Bronchitis is an infection of the air passages (bronchial tubes) in your lungs. It often occurs when you have a cold. This illness is contagious during the first few days and is spread through the air by coughing and sneezing, or by direct contact (touching the sick person and then touching your own eyes, nose, or mouth).  Symptoms of bronchitis include cough with mucus (phlegm) and low-grade fever. Bronchitis usually lasts 7 to 14 days. Mild cases can be treated with simple home remedies. More severe infection is treated with an antibiotic.  Home care  Follow these guidelines when caring for yourself at home:  · If your symptoms are severe, rest at home for the first 2 to 3 days. When you go  back to your usual activities, don't let yourself get too tired.  · Do not smoke. Also avoid being exposed to secondhand smoke.  · You may use over-the-counter medicines to control fever or pain, unless another medicine was prescribed. (Note: If you have chronic liver or kidney disease or have ever had a stomach ulcer or gastrointestinal bleeding, talk with your healthcare provider before using these medicines. Also talk to your provider if you are taking medicine to prevent blood clots.) Aspirin should never be given to anyone younger than 18 years of age who is ill with a viral infection or fever. It may cause severe liver or brain damage.  · Your appetite may be poor, so a light diet is fine. Avoid dehydration by drinking 6 to 8 glasses of fluids per day (such as water, soft drinks, sports drinks, juices, tea, or soup). Extra fluids will help loosen secretions in the nose and lungs.  · Over-the-counter cough, cold, and sore-throat medicines will not shorten the length of the illness, but they may be helpful to reduce symptoms. (Note: Do not use decongestants if you have high blood pressure.)  · Finish all antibiotic medicine. Do this even if you are feeling better after only a few days.  Follow-up care  Follow up with your healthcare provider, or as advised. If you had an X-ray or ECG (electrocardiogram), a specialist will review it. You will be notified of any new findings that may affect your care.  Note: If you are age 65 or older, or if you have a chronic lung disease or condition that affects your immune system, or you smoke, talk to your healthcare provider about having pneumococcal vaccinations and a yearly influenza vaccination (flu shot).  When to seek medical advice  Call your healthcare provider right away if any of these occur:  · Fever of 100.4°F (38°C) or higher  · Coughing up increased amounts of colored sputum  · Weakness, drowsiness, headache, facial pain, ear pain, or a stiff neck  Call 911, or  get immediate medical care  Contact emergency services right away if any of these occur.  · Coughing up blood  · Worsening weakness, drowsiness, headache, or stiff neck  · Trouble breathing, wheezing, or pain with breathing  Date Last Reviewed: 9/13/2015  © 7985-7427 [employer]. [address] [place] [address]. All rights reserved. This information is not intended as a substitute for professional medical care. Always follow your healthcare professional's instructions.    Please follow up with your Primary care provider within 2-5 days if your signs and symptoms have not resolved or worsen.     If your condition worsens or fails to improve we recommend that you receive another evaluation at the emergency room immediately or contact your primary medical clinic to discuss your concerns.   You must understand that you have received an Urgent Care treatment only and that you may be released before all of your medical problems are known or treated. You, the patient, will arrange for follow up care as instructed.     RED FLAGS/WARNING SYMPTOMS DISCUSSED WITH PATIENT THAT WOULD WARRANT EMERGENT MEDICAL ATTENTION. PATIENT VERBALIZED UNDERSTANDING.

## 2019-10-25 ENCOUNTER — HOSPITAL ENCOUNTER (OUTPATIENT)
Dept: RADIOLOGY | Facility: HOSPITAL | Age: 79
Discharge: HOME | End: 2019-10-25
Attending: INTERNAL MEDICINE
Payer: MEDICARE

## 2019-10-25 DIAGNOSIS — Z12.31 ENCOUNTER FOR SCREENING MAMMOGRAM FOR MALIGNANT NEOPLASM OF BREAST: ICD-10-CM

## 2019-10-25 PROCEDURE — 77063 MAMMO DIGITAL SCREENING BILAT WITH TOMOSYNTHESIS_CAD: ICD-10-PCS | Mod: 26,HCNC,, | Performed by: RADIOLOGY

## 2019-10-25 PROCEDURE — 77063 BREAST TOMOSYNTHESIS BI: CPT | Mod: 26,HCNC,, | Performed by: RADIOLOGY

## 2019-10-25 PROCEDURE — 77067 SCR MAMMO BI INCL CAD: CPT | Mod: TC,HCNC

## 2019-10-25 PROCEDURE — 77067 MAMMO DIGITAL SCREENING BILAT WITH TOMOSYNTHESIS_CAD: ICD-10-PCS | Mod: 26,HCNC,, | Performed by: RADIOLOGY

## 2019-10-25 PROCEDURE — 77067 SCR MAMMO BI INCL CAD: CPT | Mod: 26,HCNC,, | Performed by: RADIOLOGY

## 2019-11-08 ENCOUNTER — PATIENT OUTREACH (OUTPATIENT)
Dept: ADMINISTRATIVE | Facility: OTHER | Age: 79
End: 2019-11-08

## 2019-11-11 ENCOUNTER — OFFICE VISIT (OUTPATIENT)
Dept: ALLERGY | Facility: CLINIC | Age: 79
End: 2019-11-11
Payer: MEDICARE

## 2019-11-11 VITALS
BODY MASS INDEX: 23.72 KG/M2 | HEIGHT: 60 IN | DIASTOLIC BLOOD PRESSURE: 48 MMHG | WEIGHT: 120.81 LBS | SYSTOLIC BLOOD PRESSURE: 120 MMHG

## 2019-11-11 DIAGNOSIS — R05.9 COUGH: ICD-10-CM

## 2019-11-11 DIAGNOSIS — J30.89 CHRONIC NONSEASONAL ALLERGIC RHINITIS DUE TO POLLEN: Primary | ICD-10-CM

## 2019-11-11 PROCEDURE — 1101F PR PT FALLS ASSESS DOC 0-1 FALLS W/OUT INJ PAST YR: ICD-10-PCS | Mod: HCNC,CPTII,S$GLB, | Performed by: ALLERGY & IMMUNOLOGY

## 2019-11-11 PROCEDURE — 99999 PR PBB SHADOW E&M-EST. PATIENT-LVL III: CPT | Mod: PBBFAC,HCNC,, | Performed by: ALLERGY & IMMUNOLOGY

## 2019-11-11 PROCEDURE — 99214 OFFICE O/P EST MOD 30 MIN: CPT | Mod: HCNC,S$GLB,, | Performed by: ALLERGY & IMMUNOLOGY

## 2019-11-11 PROCEDURE — 1101F PT FALLS ASSESS-DOCD LE1/YR: CPT | Mod: HCNC,CPTII,S$GLB, | Performed by: ALLERGY & IMMUNOLOGY

## 2019-11-11 PROCEDURE — 99999 PR PBB SHADOW E&M-EST. PATIENT-LVL III: ICD-10-PCS | Mod: PBBFAC,HCNC,, | Performed by: ALLERGY & IMMUNOLOGY

## 2019-11-11 PROCEDURE — 99214 PR OFFICE/OUTPT VISIT, EST, LEVL IV, 30-39 MIN: ICD-10-PCS | Mod: HCNC,S$GLB,, | Performed by: ALLERGY & IMMUNOLOGY

## 2019-11-11 RX ORDER — FLUTICASONE PROPIONATE 50 MCG
2 SPRAY, SUSPENSION (ML) NASAL DAILY
Qty: 1 BOTTLE | Refills: 6 | Status: SHIPPED | OUTPATIENT
Start: 2019-11-11 | End: 2020-10-05 | Stop reason: SDUPTHER

## 2019-11-11 RX ORDER — ALBUTEROL SULFATE 90 UG/1
2 AEROSOL, METERED RESPIRATORY (INHALATION) EVERY 6 HOURS PRN
Qty: 1 INHALER | Refills: 0 | Status: SHIPPED | OUTPATIENT
Start: 2019-11-11 | End: 2020-10-05 | Stop reason: SDUPTHER

## 2019-11-11 RX ORDER — MONTELUKAST SODIUM 10 MG/1
10 TABLET ORAL NIGHTLY
Qty: 30 TABLET | Refills: 11 | Status: SHIPPED | OUTPATIENT
Start: 2019-11-11 | End: 2021-02-01

## 2019-11-11 NOTE — PROGRESS NOTES
Subjective:       Patient ID: Laverne Humphries is a 78 y.o. female.    Chief Complaint:    Cough x ~3 weeks    LV 5/6/16    HPI:     79 yo female with a history of allergic rhinitis, GERD, and chronic/recurrent cough.  Skin test in March 2013 w multiple positives, as below.    Presents w flare of cough x ~ 2 months, coincident w running out of montelukast. Continues allegra, flonase.  No relief w tessalon perles. Denies overt GERD sx's. Denies wheeze, SOB.  No fever. Only sl rhinorrhea. No sleep disturbance    Environmental History:   Pets in the home: none.   Moon: tile or linoleum floors   Climate Control: central or room air conditioning   Dust Mite Controls: Dust mite controls are already in place.   Tobacco Smoke in Home: no     Review of Systems   Constitutional: Negative for fever, chills, appetite change, fatigue and unexpected weight change.   HENT: Negative for hearing loss, ear pain, nosebleeds, congestion, sore throat, rhinorrhea, sneezing, postnasal drip, sinus pressure, tinnitus and ear discharge.    Eyes: Negative for pain, discharge, redness and itching.   Respiratory: Positive for cough. Negative for chest tightness, shortness of breath and wheezing.    Cardiovascular: Negative for chest pain, palpitations and leg swelling.   Gastrointestinal: Negative for nausea, vomiting, abdominal pain, diarrhea, constipation, blood in stool and abdominal distention.   Genitourinary: Negative for dysuria, urgency, frequency and difficulty urinating.   Musculoskeletal: Negative for back pain, joint swelling and arthralgias.   Skin: Negative for color change, pallor, rash and wound.   Neurological: Negative for dizziness, seizures, light-headedness, numbness and headaches.   Hematological: Negative for adenopathy. Does not bruise/bleed easily.   Psychiatric/Behavioral: Negative for sleep disturbance and dysphoric mood. The patient is not nervous/anxious.         Objective:    Physical Exam   Constitutional:  She is oriented to person, place, and time. She appears well-developed and well-nourished. She is cooperative. No distress.   HENT:   Head: Normocephalic and atraumatic.   Right Ear: Tympanic membrane, external ear and ear canal normal.   Left Ear: Tympanic membrane, external ear and ear canal normal.   Mouth/Throat: Normal dentition.        2+ pale nasal turbinates  Eyes: EOM are normal. Pupils are equal, round, and reactive to light. Right conjunctiva is not injected.   Neck: Neck supple. No thyromegaly present.   Cardiovascular: Normal rate, regular rhythm, normal heart sounds and intact distal pulses.  Exam reveals no gallop and no friction rub.    No murmur heard.  Pulmonary/Chest: Effort normal and breath sounds normal. No respiratory distress. She has no wheezes. She has no rales.   Abdominal: Soft. Bowel sounds are normal. She exhibits no distension. There is no tenderness.   Lymphadenopathy:     She has no cervical adenopathy.   Neurological: She is alert and oriented to person, place, and time.   Skin: Skin is warm and dry. No rash noted. No cyanosis or erythema. Nails show no clubbing.   Psychiatric: She has a normal mood and affect. Her behavior is normal.       Laboratory:     3/13/13   Inhalant Skin Testing   4+ Dust mites (D.p. And D.f.)   3+ White francesca, Mixed birch, Box elder, Bald cypress   2+ Cat, Dog, American elm, Hackberry, Red maple, Red mulberry, Mixed oak, Black willow, Lambs quarter, Marsh elder, Bahia grass, Bermuda grass, Jared grass, Kaleb grass   1+ Cockroach, Red cedar, Eastern cottonwood, Mugwort, Rough pigweed, English plantain, Mixed ragweed, Russian thistle, Acremonium, Alternaria, Epicoccum, Fusarium, Pullularia, Rhizopus, Rhodotorula  With adequate histamine and saline controls             Assessment:     1.Allergic rhinitis  2. cough    Plan:     1. Increase flonase to 2 sen BID  2. Continue Fexofenadine 180 mg daily.  3. Restart singulair  4. Nasal saline rinses  5. Albuterol  prn  FU if no improvement, o/w yearly

## 2019-11-25 ENCOUNTER — IMMUNIZATION (OUTPATIENT)
Dept: PHARMACY | Facility: CLINIC | Age: 79
End: 2019-11-25
Payer: MEDICARE

## 2019-12-09 DIAGNOSIS — F41.9 ANXIETY: ICD-10-CM

## 2019-12-09 NOTE — PROGRESS NOTES
Refill Routing Note     Medication(s) are not appropriate for processing by [facility]:    Medication Outside of Protocol    Appointments  past 15m or future 3m with PCP    Date Provider   Last Visit   5/31/2019 Fran Castro MD   Next Visit   Visit date not found Fran Castro MD       Automatic Epic Protocol Generated Data:    Requested Prescriptions   Pending Prescriptions Disp Refills    LORazepam (ATIVAN) 0.5 MG tablet [Pharmacy Med Name: LORAZEPAM 0.5 MG TABLET] 30 tablet      Sig: TAKE 1 TABLET (0.5 MG TOTAL) BY MOUTH NIGHTLY. NEEDS A VACATION OVERRIDE PLEASE       Anticonvulsants Protocol Passed - 12/9/2019  8:05 AM        Passed - Visit with Authorizing provider in past 9 months or upcoming 90 days        Passed - No active pregnancy on record

## 2019-12-10 RX ORDER — LORAZEPAM 0.5 MG/1
0.5 TABLET ORAL NIGHTLY
Qty: 30 TABLET | Refills: 2 | Status: SHIPPED | OUTPATIENT
Start: 2019-12-10 | End: 2020-03-10

## 2019-12-31 ENCOUNTER — OFFICE VISIT (OUTPATIENT)
Dept: INTERNAL MEDICINE | Facility: CLINIC | Age: 79
End: 2019-12-31
Payer: MEDICARE

## 2019-12-31 VITALS
HEIGHT: 60 IN | OXYGEN SATURATION: 99 % | SYSTOLIC BLOOD PRESSURE: 124 MMHG | DIASTOLIC BLOOD PRESSURE: 64 MMHG | WEIGHT: 121.06 LBS | BODY MASS INDEX: 23.77 KG/M2 | HEART RATE: 84 BPM

## 2019-12-31 DIAGNOSIS — B37.31 VAGINAL YEAST INFECTION: Primary | ICD-10-CM

## 2019-12-31 DIAGNOSIS — R10.9 ABDOMINAL DISCOMFORT: ICD-10-CM

## 2019-12-31 LAB
BACTERIA #/AREA URNS AUTO: NORMAL /HPF
BILIRUB UR QL STRIP: NEGATIVE
CLARITY UR REFRACT.AUTO: ABNORMAL
COLOR UR AUTO: YELLOW
GLUCOSE UR QL STRIP: NEGATIVE
HGB UR QL STRIP: NEGATIVE
KETONES UR QL STRIP: NEGATIVE
LEUKOCYTE ESTERASE UR QL STRIP: ABNORMAL
MICROSCOPIC COMMENT: NORMAL
NITRITE UR QL STRIP: NEGATIVE
PH UR STRIP: 7 [PH] (ref 5–8)
PROT UR QL STRIP: NEGATIVE
SP GR UR STRIP: 1 (ref 1–1.03)
SQUAMOUS #/AREA URNS AUTO: 1 /HPF
URN SPEC COLLECT METH UR: ABNORMAL
WBC #/AREA URNS AUTO: 1 /HPF (ref 0–5)

## 2019-12-31 PROCEDURE — 81001 URINALYSIS AUTO W/SCOPE: CPT | Mod: HCNC

## 2019-12-31 PROCEDURE — 1101F PR PT FALLS ASSESS DOC 0-1 FALLS W/OUT INJ PAST YR: ICD-10-PCS | Mod: HCNC,CPTII,S$GLB, | Performed by: INTERNAL MEDICINE

## 2019-12-31 PROCEDURE — 1101F PT FALLS ASSESS-DOCD LE1/YR: CPT | Mod: HCNC,CPTII,S$GLB, | Performed by: INTERNAL MEDICINE

## 2019-12-31 PROCEDURE — 99999 PR PBB SHADOW E&M-EST. PATIENT-LVL III: CPT | Mod: PBBFAC,HCNC,, | Performed by: INTERNAL MEDICINE

## 2019-12-31 PROCEDURE — 99214 OFFICE O/P EST MOD 30 MIN: CPT | Mod: HCNC,S$GLB,, | Performed by: INTERNAL MEDICINE

## 2019-12-31 PROCEDURE — 1159F PR MEDICATION LIST DOCUMENTED IN MEDICAL RECORD: ICD-10-PCS | Mod: HCNC,S$GLB,, | Performed by: INTERNAL MEDICINE

## 2019-12-31 PROCEDURE — 99214 PR OFFICE/OUTPT VISIT, EST, LEVL IV, 30-39 MIN: ICD-10-PCS | Mod: HCNC,S$GLB,, | Performed by: INTERNAL MEDICINE

## 2019-12-31 PROCEDURE — 99999 PR PBB SHADOW E&M-EST. PATIENT-LVL III: ICD-10-PCS | Mod: PBBFAC,HCNC,, | Performed by: INTERNAL MEDICINE

## 2019-12-31 PROCEDURE — 1126F PR PAIN SEVERITY QUANTIFIED, NO PAIN PRESENT: ICD-10-PCS | Mod: HCNC,S$GLB,, | Performed by: INTERNAL MEDICINE

## 2019-12-31 PROCEDURE — 1159F MED LIST DOCD IN RCRD: CPT | Mod: HCNC,S$GLB,, | Performed by: INTERNAL MEDICINE

## 2019-12-31 PROCEDURE — 1126F AMNT PAIN NOTED NONE PRSNT: CPT | Mod: HCNC,S$GLB,, | Performed by: INTERNAL MEDICINE

## 2019-12-31 RX ORDER — HYDROCORTISONE 25 MG/G
CREAM TOPICAL 2 TIMES DAILY
Qty: 20 G | Refills: 0 | Status: SHIPPED | OUTPATIENT
Start: 2019-12-31 | End: 2021-02-01 | Stop reason: ALTCHOICE

## 2019-12-31 RX ORDER — FLUCONAZOLE 150 MG/1
150 TABLET ORAL DAILY
Qty: 1 TABLET | Refills: 1 | Status: SHIPPED | OUTPATIENT
Start: 2019-12-31 | End: 2020-01-01

## 2019-12-31 NOTE — PATIENT INSTRUCTIONS
Please take the fluconazole (Diflucan) pill once. If after 3 days the itching persist, please refill the medication and take another tablet.    Please use the hydrocortisone cream to the external area 2 times a day for 1 week.    You can also use Allegra once a day to see if this helps a little with the itching symptoms as well.    Please use over-the-counter lotions to the low back for any dry skin that can cause redness; I recommend lotions such as Eucerin, Cetaphil, Aveeno, or Neutrogena.    Please notify the office if the symptoms persist or worsen.

## 2019-12-31 NOTE — PROGRESS NOTES
Subjective:       Patient ID: Laverne Humphries is a 79 y.o. female.    Chief Complaint: Vaginal Itching    HPI    Patient of Fran Castro MD, here today for Urgent Care visit.  Seen in past by [facility].     Pt reports vaginal itching going on for the last week. No vaginal discharge. Notes urine is cloudy but denies dysuria. Tried over-the-counter creams and vaginal inserts without relief.     Notes some mild lower abdominal pain, but no diarrhea, no nausea or vomiting. Low back with some mild itching, daughter notes it was red today.    Review of Systems   All other systems reviewed and are negative.      Objective:      Physical Exam   Constitutional: No distress.   Cardiovascular: Normal rate, regular rhythm and normal heart sounds.   Pulmonary/Chest: Effort normal and breath sounds normal. No stridor. She has no wheezes. She has no rales.   Abdominal: Soft. She exhibits no distension and no mass. There is tenderness (to deep palpation b/L lower abdomen). There is no guarding.   Musculoskeletal:        Lumbar back: She exhibits no tenderness, no swelling, no laceration and no spasm.   Skin: Skin is warm and dry.   Skin of low back slightly dry but no erythema or excoriation   Nursing note and vitals reviewed.      Vitals:    12/31/19 1053   BP: 124/64   BP Location: Right arm   Patient Position: Sitting   BP Method: Medium (Manual)   Pulse: 84   SpO2: 99%   Weight: 54.9 kg (121 lb 0.5 oz)   Height: 5' (1.524 m)     Body mass index is 23.64 kg/m².    RESULTS: Reviewed labs from last 12 months    Assessment:       1. Vaginal yeast infection    2. Abdominal discomfort        Plan:   Laverne was seen today for vaginal itching.    Diagnoses and all orders for this visit:    Vaginal yeast infection:  New problem. Tx with Diflucan, mild steroid cream for itching.   -     fluconazole (DIFLUCAN) 150 MG Tab; Take 1 tablet (150 mg total) by mouth once daily. for 1 day  -     Urinalysis, Reflex to  Urine Culture Urine, Clean Catch  -     hydrocortisone 2.5 % cream; Apply topically 2 (two) times daily.    Abdominal discomfort:  Unclear etiology, check for UTI, if itching resolves but abdominal pain persists please notify the office.  -     fluconazole (DIFLUCAN) 150 MG Tab; Take 1 tablet (150 mg total) by mouth once daily. for 1 day  -     Urinalysis, Reflex to Urine Culture Urine, Clean Catch  -     hydrocortisone 2.5 % cream; Apply topically 2 (two) times daily.    Keep regular follow up appointments with Fran Castro MD.   Alfred Sheth MD  Internal Medicine    Portions of this note were completed using medical dictation software. Please excuse typographical or syntax errors that were missed on review.

## 2020-01-02 ENCOUNTER — TELEPHONE (OUTPATIENT)
Dept: INTERNAL MEDICINE | Facility: CLINIC | Age: 80
End: 2020-01-02

## 2020-01-02 NOTE — TELEPHONE ENCOUNTER
Received call from pt, informed that her urine tests are normal. There is no sign of infection, bleeding, or kidney damage. Symptoms were likely due to a yeast infection and should resolve with the fluconazole, Please notify the office if the symptoms persist or worsen. Understanding voiced.

## 2020-01-02 NOTE — TELEPHONE ENCOUNTER
Please call the patient to notify that her urine tests are normal. There is no sign of infection, bleeding, or kidney damage. I think her symptoms were likely due to a yeast infection and should resolve with the fluconazole, Please notify the office if the symptoms persist or worsen.     Please sign and close this encounter once completed and/or scheduled.

## 2020-01-27 ENCOUNTER — PES CALL (OUTPATIENT)
Dept: ADMINISTRATIVE | Facility: CLINIC | Age: 80
End: 2020-01-27

## 2020-02-05 ENCOUNTER — TELEPHONE (OUTPATIENT)
Dept: INTERNAL MEDICINE | Facility: CLINIC | Age: 80
End: 2020-02-05

## 2020-02-06 ENCOUNTER — OFFICE VISIT (OUTPATIENT)
Dept: INTERNAL MEDICINE | Facility: CLINIC | Age: 80
End: 2020-02-06
Payer: MEDICARE

## 2020-02-06 VITALS
WEIGHT: 122.56 LBS | HEART RATE: 91 BPM | BODY MASS INDEX: 24.06 KG/M2 | HEIGHT: 60 IN | SYSTOLIC BLOOD PRESSURE: 122 MMHG | DIASTOLIC BLOOD PRESSURE: 60 MMHG

## 2020-02-06 DIAGNOSIS — M81.0 AGE-RELATED OSTEOPOROSIS WITHOUT CURRENT PATHOLOGICAL FRACTURE: ICD-10-CM

## 2020-02-06 DIAGNOSIS — F41.9 ANXIETY: ICD-10-CM

## 2020-02-06 DIAGNOSIS — J84.10 CALCIFIED GRANULOMA OF LUNG: ICD-10-CM

## 2020-02-06 DIAGNOSIS — K21.00 REFLUX ESOPHAGITIS: ICD-10-CM

## 2020-02-06 DIAGNOSIS — I77.819 ECTATIC AORTA: ICD-10-CM

## 2020-02-06 DIAGNOSIS — Z00.00 ENCOUNTER FOR PREVENTIVE HEALTH EXAMINATION: Primary | ICD-10-CM

## 2020-02-06 DIAGNOSIS — E78.5 HYPERLIPIDEMIA, UNSPECIFIED HYPERLIPIDEMIA TYPE: ICD-10-CM

## 2020-02-06 DIAGNOSIS — K58.9 IRRITABLE BOWEL SYNDROME, UNSPECIFIED TYPE: ICD-10-CM

## 2020-02-06 DIAGNOSIS — N18.30 CHRONIC KIDNEY DISEASE, STAGE III (MODERATE): ICD-10-CM

## 2020-02-06 PROBLEM — J98.4 CALCIFIED GRANULOMA OF LUNG: Status: ACTIVE | Noted: 2020-02-06

## 2020-02-06 PROCEDURE — G0439 PR MEDICARE ANNUAL WELLNESS SUBSEQUENT VISIT: ICD-10-PCS | Mod: HCNC,S$GLB,, | Performed by: NURSE PRACTITIONER

## 2020-02-06 PROCEDURE — 99999 PR PBB SHADOW E&M-EST. PATIENT-LVL IV: CPT | Mod: PBBFAC,HCNC,, | Performed by: NURSE PRACTITIONER

## 2020-02-06 PROCEDURE — 99499 UNLISTED E&M SERVICE: CPT | Mod: HCNC,S$GLB,, | Performed by: NURSE PRACTITIONER

## 2020-02-06 PROCEDURE — 99999 PR PBB SHADOW E&M-EST. PATIENT-LVL IV: ICD-10-PCS | Mod: PBBFAC,HCNC,, | Performed by: NURSE PRACTITIONER

## 2020-02-06 PROCEDURE — G0439 PPPS, SUBSEQ VISIT: HCPCS | Mod: HCNC,S$GLB,, | Performed by: NURSE PRACTITIONER

## 2020-02-06 PROCEDURE — 99499 RISK ADDL DX/OHS AUDIT: ICD-10-PCS | Mod: HCNC,S$GLB,, | Performed by: NURSE PRACTITIONER

## 2020-02-06 NOTE — PATIENT INSTRUCTIONS
Counseling and Referral of Other Preventative  (Italic type indicates deductible and co-insurance are waived)    Patient Name: Laverne [place]  Today's Date: 2/6/2020    Health Maintenance       Date Due Completion Date    Shingles Vaccine (2 of 3) 04/18/2013 2/21/2013 - Obtain new shingles vaccine - SHINGRIX - when available    TETANUS VACCINE 02/09/2022 2/9/2012    Override on 2/9/2012: Done    DEXA SCAN 07/09/2022 7/9/2019    Lipid Panel 05/27/2024 5/27/2019        No orders of the defined types were placed in this encounter.    The following information is provided to all patients.  This information is to help you find resources for any of the problems found today that may be affecting your health:                Living healthy guide: www.[facility].[place].gov      Understanding Diabetes: www.diabetes.org      Eating healthy: www.cdc.gov/healthyweight      CDC home safety checklist: www.cdc.gov/steadi/patient.html      Agency on Aging: www.goea.[place].[facility]      Alcoholics anonymous (AA): www.aa.org      Physical Activity: www.connie.nih.gov/zn2quch      Tobacco use: www.quitwithusla.org

## 2020-02-06 NOTE — PROGRESS NOTES
Laverne Humphries presented for a  Medicare AWV and comprehensive Health Risk Assessment today. The following components were reviewed and updated:    · Medical history  · Family History  · Social history  · Allergies and Current Medications  · Health Risk Assessment  · Health Maintenance  · Care Team     ** See Completed Assessments for Annual Wellness Visit within the encounter summary.**       The following assessments were completed:  · Living Situation  · CAGE  · Depression Screening  · Timed Get Up and Go  · Whisper Test  · Cognitive Function Screening      ·   · Nutrition Screening  · ADL Screening  · PAQ Screening    Vitals:    02/06/20 0832 02/06/20 0854   BP: 132/68 122/60   BP Location: Right arm Right arm   Pulse: 91    Weight: 55.6 kg (122 lb 9.2 oz)    Height: 5' (1.524 m)      Body mass index is 23.94 kg/m².  Physical Exam   Constitutional: She is oriented to person, place, and time. She appears well-developed and well-nourished.   HENT:   Head: Normocephalic.   Cardiovascular: Normal rate and regular rhythm.   Pulmonary/Chest: Effort normal and breath sounds normal.   Abdominal: Soft. Bowel sounds are normal.   Musculoskeletal: Normal range of motion. She exhibits no edema.   Neurological: She is alert and oriented to person, place, and time.   Skin: Skin is warm and dry.   Psychiatric: She has a normal mood and affect.   Nursing note and vitals reviewed.        Diagnoses and health risks identified today and associated recommendations/orders:    1. Encounter for preventive health examination  Here for Health Risk Assessment/Annual Wellness Visit.  Health maintenance reviewed and updated. Follow up in one year.  Prescription given for Shingrix.    2. Ectatic aorta  Chronic, stable on current medication. Noted CXR 4/27/17. Followed by PCP..    3. Hyperlipidemia, unspecified hyperlipidemia type  Chronic, stable on current medications. Followed by PCP.    4. Calcified granuloma of lung  Chronic, stable.  Noted CXR 4/27/17. Followed by PCP.    5. Chronic kidney disease, stage III (moderate)  Chronic, stable. Followed by PCP.    6. Irritable bowel syndrome, unspecified type  Chronic, stable with diet. Followed by PCP.    7. Reflux esophagitis  Chronic, stable with diet. Followed by PCP.    8. Anxiety  Chronic, stable on current medication. Followed by PCP.    9. Age-related osteoporosis without current pathological fracture  Chronic, stable on current medication. Followed by PCP.      Provided Leovidia with a 5-10 year written screening schedule and personal prevention plan. Recommendations were developed using the USPSTF age appropriate recommendations. Education, counseling, and referrals were provided as needed. After Visit Summary printed and given to patient which includes a list of additional screenings\tests needed.    Follow up in about 4 months (around 5/31/2020).with PCP    Jessie Waller NP

## 2020-03-02 ENCOUNTER — TELEPHONE (OUTPATIENT)
Dept: INTERNAL MEDICINE | Facility: CLINIC | Age: 80
End: 2020-03-02

## 2020-03-02 DIAGNOSIS — H92.09 EAR ACHE: Primary | ICD-10-CM

## 2020-03-02 NOTE — TELEPHONE ENCOUNTER
----- Message from Neil Soto sent at 3/2/2020  9:28 AM CST -----  Contact: Self [phone]  Patient will like for MsAndrae Lisa to call her back she have to speak to her (personal), please advise.

## 2020-03-03 ENCOUNTER — OFFICE VISIT (OUTPATIENT)
Dept: OTOLARYNGOLOGY | Facility: CLINIC | Age: 80
End: 2020-03-03
Payer: MEDICARE

## 2020-03-03 VITALS — BODY MASS INDEX: 24.06 KG/M2 | WEIGHT: 122.56 LBS | HEIGHT: 60 IN

## 2020-03-03 DIAGNOSIS — H92.09 EAR ACHE: ICD-10-CM

## 2020-03-03 DIAGNOSIS — M54.2 CERVICALGIA: Primary | ICD-10-CM

## 2020-03-03 PROCEDURE — 99203 PR OFFICE/OUTPT VISIT, NEW, LEVL III, 30-44 MIN: ICD-10-PCS | Mod: HCNC,S$GLB,, | Performed by: OTOLARYNGOLOGY

## 2020-03-03 PROCEDURE — 1159F MED LIST DOCD IN RCRD: CPT | Mod: HCNC,S$GLB,, | Performed by: OTOLARYNGOLOGY

## 2020-03-03 PROCEDURE — 1126F PR PAIN SEVERITY QUANTIFIED, NO PAIN PRESENT: ICD-10-PCS | Mod: HCNC,S$GLB,, | Performed by: OTOLARYNGOLOGY

## 2020-03-03 PROCEDURE — 1101F PT FALLS ASSESS-DOCD LE1/YR: CPT | Mod: HCNC,CPTII,S$GLB, | Performed by: OTOLARYNGOLOGY

## 2020-03-03 PROCEDURE — 1126F AMNT PAIN NOTED NONE PRSNT: CPT | Mod: HCNC,S$GLB,, | Performed by: OTOLARYNGOLOGY

## 2020-03-03 PROCEDURE — 99999 PR PBB SHADOW E&M-EST. PATIENT-LVL III: CPT | Mod: PBBFAC,HCNC,, | Performed by: OTOLARYNGOLOGY

## 2020-03-03 PROCEDURE — 99203 OFFICE O/P NEW LOW 30 MIN: CPT | Mod: HCNC,S$GLB,, | Performed by: OTOLARYNGOLOGY

## 2020-03-03 PROCEDURE — 1159F PR MEDICATION LIST DOCUMENTED IN MEDICAL RECORD: ICD-10-PCS | Mod: HCNC,S$GLB,, | Performed by: OTOLARYNGOLOGY

## 2020-03-03 PROCEDURE — 1101F PR PT FALLS ASSESS DOC 0-1 FALLS W/OUT INJ PAST YR: ICD-10-PCS | Mod: HCNC,CPTII,S$GLB, | Performed by: OTOLARYNGOLOGY

## 2020-03-03 PROCEDURE — 99999 PR PBB SHADOW E&M-EST. PATIENT-LVL III: ICD-10-PCS | Mod: PBBFAC,HCNC,, | Performed by: OTOLARYNGOLOGY

## 2020-03-03 NOTE — PROGRESS NOTES
Otology/Neurotology Consultation Report       Chief Complaint: left head/neck/ear pain    History of Present Illness: 79 y.o.  female presents with left sided head, neck and ear pain. She states this has been occurring for the past two weeks. She states it starts in her neck and spreads up behind her ear, possible in her ear, and up to the side and top of her head. She denies any changes in hearing but notes she may have some hearing loss. Denies any tinnitus, ear drainage, ear surgery or trauma. Denies any recent ear problems. Does note pain when turning her head to the left. States she has taken tylenol which helped her symptoms.     Review of Systems   Constitutional: Negative.    HENT: Positive for ear pain.    Eyes: Negative.    Respiratory: Negative.    Cardiovascular: Negative.    Gastrointestinal: Negative.    Musculoskeletal: Positive for neck pain.   Skin: Negative.    Neurological: Positive for headaches.   Endo/Heme/Allergies: Positive for environmental allergies.          Past Medical History: Patient has a past medical history of Anxiety, Asthma, Cataract, IBS (irritable bowel syndrome), Reflux esophagitis, and Torus palatinus.    Past Surgical History: Patient has a past surgical history that includes Cataract extraction, bilateral; Cataract extraction w/  intraocular lens implant (Bilateral); Blepharoptosis repair (Bilateral); Cholecystectomy; Hysterectomy; and Eye surgery.    Social History: Patient reports that she has never smoked. She has never used smokeless tobacco. She reports that she does not drink alcohol or use drugs.    Family History: family history includes Asthma in her sister; Cancer in her mother; Cataracts in her father and sister; Diabetes in her father; No Known Problems in her daughter and son.    Medications:   Current Outpatient Medications on File Prior to Visit   Medication Sig Dispense Refill    albuterol (PROVENTIL/VENTOLIN HFA) 90 mcg/actuation inhaler Inhale 2 puffs into  the lungs every 6 (six) hours as needed for Wheezing or Shortness of Breath (generic preferred). Rescue 1 Inhaler 0    calcium-vitamin D3 500 mg(1,250mg) -200 unit per tablet Take 1 tablet by mouth Daily.       fish oil-omega-3 fatty acids 300-1,000 mg capsule Take by mouth once daily.      fluticasone propionate (FLONASE) 50 mcg/actuation nasal spray 2 sprays (100 mcg total) by Each Nostril route once daily. (Patient taking differently: 2 sprays by Each Nostril route daily as needed. ) 1 Bottle 6    hydrocortisone 2.5 % cream Apply topically 2 (two) times daily. 20 g 0    LORazepam (ATIVAN) 0.5 MG tablet TAKE 1 TABLET (0.5 MG TOTAL) BY MOUTH NIGHTLY. NEEDS A VACATION OVERRIDE PLEASE 30 tablet 2    montelukast (SINGULAIR) 10 mg tablet Take 1 tablet (10 mg total) by mouth every evening. (Patient taking differently: Take 10 mg by mouth nightly as needed. ) 30 tablet 11     No current facility-administered medications on file prior to visit.          Allergies: Patient is allergic to codeine and sulfa (sulfonamide antibiotics).    Physical Exam    Constitutional: Well appearing / communicating.  NAD.  Eyes: EOM I Bilaterally  Head/Face: Normocephalic.  Negative paranasal sinus pressure/tenderness.  Salivary glands WNL.  House Brackmann I Bilaterally.    Right Ear: External Auditory Canal WNL,TM w/o masses/lesions/perforations.  Auricle WNL.  Left Ear: External Auditory Canal WNL,TM w/o masses/lesions/perforations. Auricle WNL.  Nose: No gross nasal septal deviation. Inferior Turbinates 2+ bilaterally. No septal perforation. No masses/lesions. External nasal skin without masses/lesions.  Oral Cavity: Gingiva/lips WNL.  FOM Soft, no masses palpated. Oral Tongue mobile. Hard Palate WNL.   Oropharynx: BOT WNL. No masses/lesions noted. Tonsillar fossa/pharyngeal wall without lesions. Posterior oropharynx WNL.  Soft palate without masses. Midline uvula.   Neck/Lymphatic: No LAD I-VI bilaterally.  No thyromegaly.  No  masses noted on exam. Notes mild pain when turning head to left  Neuro/Psychiatric: AOx3.  Normal mood and affect.   Cardiovascular: Normal carotid pulses bilaterally, no increasing jugular venous distention noted at cervical region bilaterally.    Respiratory: Normal respiratory effort, no stridor, no retractions noted.                   Diagnoses and all orders for this visit:    Ear ache  -     Ambulatory referral/consult to ENT    Cervicalgia   patient to follow up with her primary care physician

## 2020-03-08 DIAGNOSIS — F41.9 ANXIETY: ICD-10-CM

## 2020-03-09 NOTE — PROGRESS NOTES
Refill Routing Note     Medication(s) are not appropriate for processing by [facility]:    Medication Outside of Protocol    Appointments  past 12m or future 3m with PCP    Date Provider   Last Visit   5/31/2019 Fran Castro MD   Next Visit   Visit date not found Fran Castro MD           Automatic Epic Protocol Generated Data:    Requested Prescriptions   Pending Prescriptions Disp Refills    LORazepam (ATIVAN) 0.5 MG tablet [Pharmacy Med Name: LORAZEPAM 0.5 MG TABLET] 30 tablet 2     Sig: TAKE 1 TABLET (0.5 MG TOTAL) BY MOUTH NIGHTLY. NEEDS A VACATION OVERRIDE PLEASE       Anticonvulsants Protocol Passed - 3/8/2020  1:01 PM        Passed - Visit with Authorizing provider in past 9 months or upcoming 90 days        Passed - No active pregnancy on record           Note composed:8:25 AM 03/09/2020

## 2020-03-10 DIAGNOSIS — F41.9 ANXIETY: ICD-10-CM

## 2020-03-10 RX ORDER — LORAZEPAM 0.5 MG/1
0.5 TABLET ORAL NIGHTLY
Qty: 30 TABLET | Refills: 2 | Status: SHIPPED | OUTPATIENT
Start: 2020-03-10 | End: 2020-08-13 | Stop reason: SDUPTHER

## 2020-03-10 NOTE — TELEPHONE ENCOUNTER
----- Message from Cirilo Sheikh sent at 3/10/2020  7:46 AM CDT -----  Contact: Patient [phone]  RX request - refill or new RX.  Is this a refill or new RX:  Refill  RX name and strength:  LORazepam (ATIVAN) 0.5 MG tablet   Directions:   Is this a 30 day or 90 day RX:    Pharmacy name and phone # [id number] [place] - [address] [phone] (Phone) [phone] (Fax    Please call an advise  Thank you

## 2020-03-11 RX ORDER — LORAZEPAM 0.5 MG/1
0.5 TABLET ORAL NIGHTLY
Qty: 30 TABLET | Refills: 2 | Status: SHIPPED | OUTPATIENT
Start: 2020-03-11 | End: 2020-06-22 | Stop reason: SDUPTHER

## 2020-04-30 ENCOUNTER — PATIENT MESSAGE (OUTPATIENT)
Dept: INTERNAL MEDICINE | Facility: CLINIC | Age: 80
End: 2020-04-30

## 2020-05-02 ENCOUNTER — OFFICE VISIT (OUTPATIENT)
Dept: INTERNAL MEDICINE | Facility: CLINIC | Age: 80
End: 2020-05-02
Payer: MEDICARE

## 2020-05-02 ENCOUNTER — PATIENT MESSAGE (OUTPATIENT)
Dept: INTERNAL MEDICINE | Facility: CLINIC | Age: 80
End: 2020-05-02

## 2020-05-02 ENCOUNTER — TELEPHONE (OUTPATIENT)
Dept: INTERNAL MEDICINE | Facility: CLINIC | Age: 80
End: 2020-05-02

## 2020-05-02 DIAGNOSIS — E78.5 HYPERLIPIDEMIA, UNSPECIFIED HYPERLIPIDEMIA TYPE: ICD-10-CM

## 2020-05-02 DIAGNOSIS — R20.2 NUMBNESS AND TINGLING OF BOTH FEET: Primary | ICD-10-CM

## 2020-05-02 DIAGNOSIS — N18.30 CHRONIC KIDNEY DISEASE, STAGE III (MODERATE): ICD-10-CM

## 2020-05-02 DIAGNOSIS — R20.0 NUMBNESS AND TINGLING OF BOTH FEET: Primary | ICD-10-CM

## 2020-05-02 PROCEDURE — 1101F PR PT FALLS ASSESS DOC 0-1 FALLS W/OUT INJ PAST YR: ICD-10-PCS | Mod: HCNC,CPTII,95, | Performed by: INTERNAL MEDICINE

## 2020-05-02 PROCEDURE — 1159F PR MEDICATION LIST DOCUMENTED IN MEDICAL RECORD: ICD-10-PCS | Mod: HCNC,95,, | Performed by: INTERNAL MEDICINE

## 2020-05-02 PROCEDURE — 1101F PT FALLS ASSESS-DOCD LE1/YR: CPT | Mod: HCNC,CPTII,95, | Performed by: INTERNAL MEDICINE

## 2020-05-02 PROCEDURE — 99214 OFFICE O/P EST MOD 30 MIN: CPT | Mod: HCNC,95,, | Performed by: INTERNAL MEDICINE

## 2020-05-02 PROCEDURE — 99214 PR OFFICE/OUTPT VISIT, EST, LEVL IV, 30-39 MIN: ICD-10-PCS | Mod: HCNC,95,, | Performed by: INTERNAL MEDICINE

## 2020-05-02 PROCEDURE — 1159F MED LIST DOCD IN RCRD: CPT | Mod: HCNC,95,, | Performed by: INTERNAL MEDICINE

## 2020-05-02 NOTE — PROGRESS NOTES
Subjective:       Patient ID: Laverne Humphries is a 79 y.o. female.    Chief Complaint: Follow-up (numbness in feet)    The patient location is: Home  The chief complaint leading to consultation is: Numbness bottom of feet and itching  Visit type: audiovisual  Total time spent with patient: 15 minutes    Each patient to whom he or she provides medical services by telemedicine is:  (1) informed of the relationship between the physician and patient and the respective role of any other health care provider with respect to management of the patient; and (2) notified that he or she may decline to receive medical services by telemedicine and may withdraw from such care at any time.    Notes:  HPI:  Patient seen via video visit.  Her daughter was also present.  The patient states for a week or so she is had numbness to the bottom of both feet and they feel funny when she walks.  She also has noticed some itching to the back of her hands and shoulder and side but no rash.  No trauma, minor arthritis but no severe back pain.  No change in medications.  No rash.  She has had some sciatic in the past but it usually involved up pain running down the leg and so this seemed different.  She had labs about a year ago that looked fairly good except for mild elevated cholesterol.  She did not have a thyroid level or has not had a B12 level done.      Review of Systems   Constitutional: Negative for appetite change, chills and fever.   HENT: Negative for nosebleeds, sinus pain and sore throat.    Eyes: Negative for visual disturbance.   Respiratory: Negative for cough, shortness of breath and wheezing.    Cardiovascular: Negative for chest pain and leg swelling.   Gastrointestinal: Negative for abdominal pain, constipation and diarrhea.   Genitourinary: Negative for difficulty urinating and hematuria.   Musculoskeletal: Positive for gait problem. Negative for neck pain and neck stiffness.   Skin: Negative for pallor and rash.         Some generalized itching including the back of the hands shoulder and torso but no rash   Neurological: Positive for weakness (May be slight weakness verses unusual feeling in the feet) and numbness. Negative for headaches.   Psychiatric/Behavioral: Negative for dysphoric mood and suicidal ideas. The patient is not nervous/anxious.        Objective:      Physical Exam   Constitutional: She is oriented to person, place, and time. She appears well-developed and well-nourished.   Pulmonary/Chest: No respiratory distress.   Neurological: She is alert and oriented to person, place, and time.   Psychiatric: She has a normal mood and affect. Her behavior is normal.       Assessment:       1. Numbness and tingling of both feet    2. Hyperlipidemia, unspecified hyperlipidemia type    3. Chronic kidney disease, stage III (moderate)        Plan:       Laverne was seen today for follow-up.    Diagnoses and all orders for this visit:    Numbness and tingling of both feet  -     Lipid Panel; Future  -     CBC auto differential; Future  -     Comprehensive metabolic panel; Future  -     TSH; Future  -     Vitamin B12; Future  -     Folate; Future    Hyperlipidemia, unspecified hyperlipidemia type  -     Lipid Panel; Future  -     CBC auto differential; Future  -     Comprehensive metabolic panel; Future  -     TSH; Future  -     Vitamin B12; Future  -     Folate; Future    Chronic kidney disease, stage III (moderate)  -     Lipid Panel; Future  -     CBC auto differential; Future  -     Comprehensive metabolic panel; Future  -     TSH; Future  -     Vitamin B12; Future  -     Folate; Future        review labs.  Depending on results and in-person evaluation may be needed.  Patient and daughter expressed understanding

## 2020-05-04 ENCOUNTER — LAB VISIT (OUTPATIENT)
Dept: LAB | Facility: HOSPITAL | Age: 80
End: 2020-05-04
Attending: INTERNAL MEDICINE
Payer: MEDICARE

## 2020-05-04 ENCOUNTER — PATIENT MESSAGE (OUTPATIENT)
Dept: INTERNAL MEDICINE | Facility: CLINIC | Age: 80
End: 2020-05-04

## 2020-05-04 DIAGNOSIS — E78.5 HYPERLIPIDEMIA, UNSPECIFIED HYPERLIPIDEMIA TYPE: ICD-10-CM

## 2020-05-04 DIAGNOSIS — N18.30 CHRONIC KIDNEY DISEASE, STAGE III (MODERATE): ICD-10-CM

## 2020-05-04 DIAGNOSIS — R20.2 NUMBNESS AND TINGLING OF BOTH FEET: ICD-10-CM

## 2020-05-04 DIAGNOSIS — R20.0 NUMBNESS AND TINGLING OF BOTH FEET: ICD-10-CM

## 2020-05-04 LAB
ALBUMIN SERPL BCP-MCNC: 3.8 G/DL (ref 3.5–5.2)
ALP SERPL-CCNC: 52 U/L (ref 55–135)
ALT SERPL W/O P-5'-P-CCNC: 14 U/L (ref 10–44)
ANION GAP SERPL CALC-SCNC: 10 MMOL/L (ref 8–16)
AST SERPL-CCNC: 22 U/L (ref 10–40)
BASOPHILS # BLD AUTO: 0.08 K/UL (ref 0–0.2)
BASOPHILS NFR BLD: 1.1 % (ref 0–1.9)
BILIRUB SERPL-MCNC: 1.2 MG/DL (ref 0.1–1)
BUN SERPL-MCNC: 13 MG/DL (ref 8–23)
CALCIUM SERPL-MCNC: 9.8 MG/DL (ref 8.7–10.5)
CHLORIDE SERPL-SCNC: 104 MMOL/L (ref 95–110)
CHOLEST SERPL-MCNC: 198 MG/DL (ref 120–199)
CHOLEST/HDLC SERPL: 3.2 {RATIO} (ref 2–5)
CO2 SERPL-SCNC: 25 MMOL/L (ref 23–29)
CREAT SERPL-MCNC: 0.9 MG/DL (ref 0.5–1.4)
DIFFERENTIAL METHOD: ABNORMAL
EOSINOPHIL # BLD AUTO: 0.2 K/UL (ref 0–0.5)
EOSINOPHIL NFR BLD: 2.8 % (ref 0–8)
ERYTHROCYTE [DISTWIDTH] IN BLOOD BY AUTOMATED COUNT: 13.3 % (ref 11.5–14.5)
EST. GFR  (AFRICAN AMERICAN): >60 ML/MIN/1.73 M^2
EST. GFR  (NON AFRICAN AMERICAN): >60 ML/MIN/1.73 M^2
FOLATE SERPL-MCNC: 19.7 NG/ML (ref 4–24)
GLUCOSE SERPL-MCNC: 104 MG/DL (ref 70–110)
HCT VFR BLD AUTO: 40.1 % (ref 37–48.5)
HDLC SERPL-MCNC: 61 MG/DL (ref 40–75)
HDLC SERPL: 30.8 % (ref 20–50)
HGB BLD-MCNC: 12.5 G/DL (ref 12–16)
IMM GRANULOCYTES # BLD AUTO: 0.03 K/UL (ref 0–0.04)
IMM GRANULOCYTES NFR BLD AUTO: 0.4 % (ref 0–0.5)
LDLC SERPL CALC-MCNC: 114 MG/DL (ref 63–159)
LYMPHOCYTES # BLD AUTO: 3.3 K/UL (ref 1–4.8)
LYMPHOCYTES NFR BLD: 46.1 % (ref 18–48)
MCH RBC QN AUTO: 28.4 PG (ref 27–31)
MCHC RBC AUTO-ENTMCNC: 31.2 G/DL (ref 32–36)
MCV RBC AUTO: 91 FL (ref 82–98)
MONOCYTES # BLD AUTO: 0.6 K/UL (ref 0.3–1)
MONOCYTES NFR BLD: 8.1 % (ref 4–15)
NEUTROPHILS # BLD AUTO: 3 K/UL (ref 1.8–7.7)
NEUTROPHILS NFR BLD: 41.5 % (ref 38–73)
NONHDLC SERPL-MCNC: 137 MG/DL
NRBC BLD-RTO: 0 /100 WBC
PLATELET # BLD AUTO: 245 K/UL (ref 150–350)
PMV BLD AUTO: 11.4 FL (ref 9.2–12.9)
POTASSIUM SERPL-SCNC: 4.6 MMOL/L (ref 3.5–5.1)
PROT SERPL-MCNC: 7.6 G/DL (ref 6–8.4)
RBC # BLD AUTO: 4.4 M/UL (ref 4–5.4)
SODIUM SERPL-SCNC: 139 MMOL/L (ref 136–145)
TRIGL SERPL-MCNC: 115 MG/DL (ref 30–150)
TSH SERPL DL<=0.005 MIU/L-ACNC: 1.71 UIU/ML (ref 0.4–4)
VIT B12 SERPL-MCNC: 703 PG/ML (ref 210–950)
WBC # BLD AUTO: 7.24 K/UL (ref 3.9–12.7)

## 2020-05-04 PROCEDURE — 84443 ASSAY THYROID STIM HORMONE: CPT | Mod: HCNC

## 2020-05-04 PROCEDURE — 82607 VITAMIN B-12: CPT | Mod: HCNC

## 2020-05-04 PROCEDURE — 80053 COMPREHEN METABOLIC PANEL: CPT | Mod: HCNC

## 2020-05-04 PROCEDURE — 82746 ASSAY OF FOLIC ACID SERUM: CPT | Mod: HCNC

## 2020-05-04 PROCEDURE — 85025 COMPLETE CBC W/AUTO DIFF WBC: CPT | Mod: HCNC

## 2020-05-04 PROCEDURE — 80061 LIPID PANEL: CPT | Mod: HCNC

## 2020-05-04 PROCEDURE — 36415 COLL VENOUS BLD VENIPUNCTURE: CPT | Mod: HCNC

## 2020-06-15 ENCOUNTER — TELEPHONE (OUTPATIENT)
Dept: INTERNAL MEDICINE | Facility: CLINIC | Age: 80
End: 2020-06-15

## 2020-06-15 NOTE — TELEPHONE ENCOUNTER
----- Message from Sharon Scott sent at 6/15/2020  3:24 PM CDT -----  Regarding: self/[phone]  Caller is requesting an earlier appt than we can schedule.  Caller declined first available appointment listed below. Caller will not accept being placed on the wait list and is requesting a message be sent to the provider.  When is the next available appointment:  07/22/20  Did you offer to schedule the next available appt and put the patient on the wait list?:   yes, no  What visit type: ep   Symptoms:  numbess in bottom feet/cazabon  Patient preference of timeframe to be scheduled:  ASAP  What is the reason the patient is requesting a sooner appointment? (insurance terminating, changing jobs):    Would the patient rather a call back or a response via [facility]?:    Comments:  patient stated that she was told by Dr Puente that he did not found anything wrong with her, but to seek another opinion, preferable from PCP. Please call and advise. Patient does not want to see anyone else. Thank you

## 2020-06-16 ENCOUNTER — TELEPHONE (OUTPATIENT)
Dept: INTERNAL MEDICINE | Facility: CLINIC | Age: 80
End: 2020-06-16

## 2020-06-16 NOTE — TELEPHONE ENCOUNTER
----- Message from Lisa Morgan MA sent at 6/15/2020  3:59 PM CDT -----  Regarding: FW: self/[phone]    ----- Message -----  From: Sharon Scott  Sent: 6/15/2020   3:24 PM CDT  To: [facility] Staff  Subject: self/[phone]                                Caller is requesting an earlier appt than we can schedule.  Caller declined first available appointment listed below. Caller will not accept being placed on the wait list and is requesting a message be sent to the provider.  When is the next available appointment:  07/22/20  Did you offer to schedule the next available appt and put the patient on the wait list?:   yes, no  What visit type: ep   Symptoms:  numbess in bottom feet/cazabon  Patient preference of timeframe to be scheduled:  ASAP  What is the reason the patient is requesting a sooner appointment? (insurance terminating, changing jobs):    Would the patient rather a call back or a response via [facility]?:    Comments:  patient stated that she was told by Dr Puente that he did not found anything wrong with her, but to seek another opinion, preferable from PCP. Please call and advise. Patient does not want to see anyone else. Thank you

## 2020-06-17 ENCOUNTER — OFFICE VISIT (OUTPATIENT)
Dept: INTERNAL MEDICINE | Facility: CLINIC | Age: 80
End: 2020-06-17
Payer: MEDICARE

## 2020-06-17 VITALS
HEIGHT: 60 IN | HEART RATE: 72 BPM | SYSTOLIC BLOOD PRESSURE: 120 MMHG | DIASTOLIC BLOOD PRESSURE: 68 MMHG | WEIGHT: 121.06 LBS | BODY MASS INDEX: 23.77 KG/M2

## 2020-06-17 DIAGNOSIS — M54.16 LUMBAR RADICULOPATHY: ICD-10-CM

## 2020-06-17 DIAGNOSIS — L30.9 DERMATITIS: Primary | ICD-10-CM

## 2020-06-17 PROCEDURE — 99999 PR PBB SHADOW E&M-EST. PATIENT-LVL III: ICD-10-PCS | Mod: PBBFAC,HCNC,, | Performed by: INTERNAL MEDICINE

## 2020-06-17 PROCEDURE — 1126F AMNT PAIN NOTED NONE PRSNT: CPT | Mod: HCNC,S$GLB,, | Performed by: INTERNAL MEDICINE

## 2020-06-17 PROCEDURE — 1159F PR MEDICATION LIST DOCUMENTED IN MEDICAL RECORD: ICD-10-PCS | Mod: HCNC,S$GLB,, | Performed by: INTERNAL MEDICINE

## 2020-06-17 PROCEDURE — 99213 PR OFFICE/OUTPT VISIT, EST, LEVL III, 20-29 MIN: ICD-10-PCS | Mod: HCNC,S$GLB,, | Performed by: INTERNAL MEDICINE

## 2020-06-17 PROCEDURE — 99999 PR PBB SHADOW E&M-EST. PATIENT-LVL III: CPT | Mod: PBBFAC,HCNC,, | Performed by: INTERNAL MEDICINE

## 2020-06-17 PROCEDURE — 1126F PR PAIN SEVERITY QUANTIFIED, NO PAIN PRESENT: ICD-10-PCS | Mod: HCNC,S$GLB,, | Performed by: INTERNAL MEDICINE

## 2020-06-17 PROCEDURE — 99213 OFFICE O/P EST LOW 20 MIN: CPT | Mod: HCNC,S$GLB,, | Performed by: INTERNAL MEDICINE

## 2020-06-17 PROCEDURE — 1101F PT FALLS ASSESS-DOCD LE1/YR: CPT | Mod: HCNC,CPTII,S$GLB, | Performed by: INTERNAL MEDICINE

## 2020-06-17 PROCEDURE — 1159F MED LIST DOCD IN RCRD: CPT | Mod: HCNC,S$GLB,, | Performed by: INTERNAL MEDICINE

## 2020-06-17 PROCEDURE — 1101F PR PT FALLS ASSESS DOC 0-1 FALLS W/OUT INJ PAST YR: ICD-10-PCS | Mod: HCNC,CPTII,S$GLB, | Performed by: INTERNAL MEDICINE

## 2020-06-17 RX ORDER — MELOXICAM 7.5 MG/1
7.5 TABLET ORAL DAILY
Qty: 30 TABLET | Refills: 1 | Status: SHIPPED | OUTPATIENT
Start: 2020-06-17 | End: 2021-02-01 | Stop reason: SDUPTHER

## 2020-06-17 RX ORDER — TRIAMCINOLONE ACETONIDE 1 MG/G
CREAM TOPICAL 2 TIMES DAILY
Qty: 30 G | Refills: 1 | Status: SHIPPED | OUTPATIENT
Start: 2020-06-17 | End: 2021-02-01 | Stop reason: ALTCHOICE

## 2020-06-22 ENCOUNTER — PATIENT MESSAGE (OUTPATIENT)
Dept: INTERNAL MEDICINE | Facility: CLINIC | Age: 80
End: 2020-06-22

## 2020-06-22 NOTE — PROGRESS NOTES
Subjective:       Patient ID: Laverne Humphries is a 79 y.o. female.    Chief Complaint: Leg Problem (numbness), Foot Problem (numbness), Breast Problem (nipple sensitive), Vaginal Itching (no discharge ), and Itching (hands and feet)    Vaginal Itching  The patient's primary symptoms include genital itching. The patient's pertinent negatives include no genital lesions or pelvic pain. This is a new problem. The current episode started in the past 7 days. The problem occurs daily. The problem has been waxing and waning. The pain is mild. Pertinent negatives include no abdominal pain, dysuria, frequency, hematuria, rash or sore throat.     Review of Systems   Constitutional: Negative for fatigue.   HENT: Negative for sore throat.    Eyes: Negative for visual disturbance.   Respiratory: Negative for shortness of breath.    Cardiovascular: Negative for chest pain and palpitations.   Gastrointestinal: Negative for abdominal pain.   Genitourinary: Negative for dysuria, frequency, hematuria and pelvic pain.   Musculoskeletal: Negative for joint swelling.   Skin: Negative for rash.        Breast nipple irrit at times   Neurological: Positive for numbness (below knees bilt, positional). Negative for speech difficulty and weakness.   Psychiatric/Behavioral: Negative for dysphoric mood.       Objective:      Physical Exam  Exam conducted with a chaperone present.   Chest:       Neurological:      General: No focal deficit present.      Sensory: Sensation is intact.      Motor: Motor function is intact. No weakness, atrophy or abnormal muscle tone.      Gait: Gait is intact.         Assessment:       1. Dermatitis    2. Lumbar radiculopathy        Plan:       Laverne was seen today for leg problem, foot problem, breast problem, vaginal itching and itching.    Diagnoses and all orders for this visit:    Dermatitis  Comments:  external vulvitis mght be contact dermatitis (neosporin) or atrophic-  trial of topical steroid, GYN  referral if no improvemnt  Orders:  -     triamcinolone acetonide 0.1% (KENALOG) 0.1 % cream; Apply topically 2 (two) times daily.    Lumbar radiculopathy  Comments:  trial of PT-  MRI if not helpful (?spinal stenosis)  Orders:  -     meloxicam (MOBIC) 7.5 MG tablet; Take 1 tablet (7.5 mg total) by mouth once daily.  -     Ambulatory referral/consult to Physical/Occupational Therapy; Future        Follow up if symptoms worsen or fail to improve.

## 2020-06-23 ENCOUNTER — TELEPHONE (OUTPATIENT)
Dept: INTERNAL MEDICINE | Facility: CLINIC | Age: 80
End: 2020-06-23

## 2020-06-23 DIAGNOSIS — N89.8 VAGINAL ITCHING: Primary | ICD-10-CM

## 2020-06-23 NOTE — TELEPHONE ENCOUNTER
----- Message from Kierra Cordoba DNP sent at 6/23/2020 10:38 AM CDT -----  Regarding: Referral to GYN  I placed a referral for pt based on recent phone call from daughter regarding pt's vaginal itching and reviewed Dr. Castro's recommendation during 6-17 visit. Please schedule pt with GYN, daughter states she prefers [facility] location instead of [facility].    Thanks,    Kierra Cordoba DNP, FNP-C

## 2020-06-23 NOTE — TELEPHONE ENCOUNTER
----- Message from Kasey Saeed sent at 6/22/2020  6:42 PM CDT -----  Regarding: Referral  Type:  Needs Medical Advice    Who Called: shar Alamo's daughter  Reason: Patient has vaginal itching and daughter wants a referral from you to see an obgyn doctor  Would the patient rather a call back or a response via [facility]? callback  Best Call Back Number: [phone]  Additional Information: Patient is scheduled for Wed 6/24 at 3pm

## 2020-06-24 ENCOUNTER — OFFICE VISIT (OUTPATIENT)
Dept: OBSTETRICS AND GYNECOLOGY | Facility: CLINIC | Age: 80
End: 2020-06-24
Payer: MEDICARE

## 2020-06-24 VITALS
SYSTOLIC BLOOD PRESSURE: 126 MMHG | DIASTOLIC BLOOD PRESSURE: 68 MMHG | WEIGHT: 120.81 LBS | BODY MASS INDEX: 23.72 KG/M2 | HEIGHT: 60 IN

## 2020-06-24 DIAGNOSIS — N89.8 VAGINAL ITCHING: ICD-10-CM

## 2020-06-24 DIAGNOSIS — N95.2 VAGINAL ATROPHY: Primary | ICD-10-CM

## 2020-06-24 PROCEDURE — 99999 PR PBB SHADOW E&M-EST. PATIENT-LVL IV: ICD-10-PCS | Mod: PBBFAC,HCNC,, | Performed by: OBSTETRICS & GYNECOLOGY

## 2020-06-24 PROCEDURE — 1101F PR PT FALLS ASSESS DOC 0-1 FALLS W/OUT INJ PAST YR: ICD-10-PCS | Mod: HCNC,CPTII,S$GLB, | Performed by: OBSTETRICS & GYNECOLOGY

## 2020-06-24 PROCEDURE — 1126F PR PAIN SEVERITY QUANTIFIED, NO PAIN PRESENT: ICD-10-PCS | Mod: HCNC,S$GLB,, | Performed by: OBSTETRICS & GYNECOLOGY

## 2020-06-24 PROCEDURE — 87801 DETECT AGNT MULT DNA AMPLI: CPT | Mod: HCNC

## 2020-06-24 PROCEDURE — 1159F MED LIST DOCD IN RCRD: CPT | Mod: HCNC,S$GLB,, | Performed by: OBSTETRICS & GYNECOLOGY

## 2020-06-24 PROCEDURE — 1159F PR MEDICATION LIST DOCUMENTED IN MEDICAL RECORD: ICD-10-PCS | Mod: HCNC,S$GLB,, | Performed by: OBSTETRICS & GYNECOLOGY

## 2020-06-24 PROCEDURE — 99999 PR PBB SHADOW E&M-EST. PATIENT-LVL IV: CPT | Mod: PBBFAC,HCNC,, | Performed by: OBSTETRICS & GYNECOLOGY

## 2020-06-24 PROCEDURE — 99204 OFFICE O/P NEW MOD 45 MIN: CPT | Mod: HCNC,S$GLB,, | Performed by: OBSTETRICS & GYNECOLOGY

## 2020-06-24 PROCEDURE — 87481 CANDIDA DNA AMP PROBE: CPT | Mod: 59,HCNC

## 2020-06-24 PROCEDURE — 1126F AMNT PAIN NOTED NONE PRSNT: CPT | Mod: HCNC,S$GLB,, | Performed by: OBSTETRICS & GYNECOLOGY

## 2020-06-24 PROCEDURE — 99204 PR OFFICE/OUTPT VISIT, NEW, LEVL IV, 45-59 MIN: ICD-10-PCS | Mod: HCNC,S$GLB,, | Performed by: OBSTETRICS & GYNECOLOGY

## 2020-06-24 PROCEDURE — 1101F PT FALLS ASSESS-DOCD LE1/YR: CPT | Mod: HCNC,CPTII,S$GLB, | Performed by: OBSTETRICS & GYNECOLOGY

## 2020-06-24 RX ORDER — ESTRADIOL 0.1 MG/G
1 CREAM VAGINAL
Qty: 42.5 G | Refills: 3 | Status: SHIPPED | OUTPATIENT
Start: 2020-06-25 | End: 2021-04-06

## 2020-06-24 RX ORDER — HYDROCORTISONE ACETATE PRAMOXINE HCL 2.5; 1 G/100G; G/100G
CREAM TOPICAL 3 TIMES DAILY
Qty: 1 TUBE | Refills: 3 | Status: SHIPPED | OUTPATIENT
Start: 2020-06-24 | End: 2022-03-30 | Stop reason: CLARIF

## 2020-06-24 NOTE — LETTER
August 5, 2020      Fran Castro MD  [address]  [facility] [address]           [facility] [address]  [address]  [facility] [address]  Phone: [phone]  Fax: [phone]          Patient: Laverne Humphries   MR Number: [id number]   YOB: 1940   Date of Visit: 6/24/2020       Dear Dr. Fran Castro:    Thank you for referring Laverne Humphries to me for evaluation. Attached you will find relevant portions of my assessment and plan of care.    If you have questions, please do not hesitate to call me. I look forward to following Laverne Humphries along with you.    Sincerely,    Sandra Youssef MD    Enclosure  CC:  No Recipients    If you would like to receive this communication electronically, please contact [email or web address] or [phone] to request more information on [facility] Link access.    For providers and/or their staff who would like to refer a patient to [facility], please contact us through our one-stop-shop provider referral line, [facility], at [phone].    If you feel you have received this communication in error or would no longer like to receive these types of communications, please e-mail [email or web address]

## 2020-06-24 NOTE — PROGRESS NOTES
CC: vulvar pain   Vulvar itching    Laverne Humphries is a 79 y.o. female  presents for vulvar and vaginal itching.   She feels it is swollen.       Past Medical History:   Diagnosis Date    Anxiety     Asthma     mild    Cataract     IBS (irritable bowel syndrome)     Reflux esophagitis     Torus palatinus      Past Surgical History:   Procedure Laterality Date    BELPHAROPTOSIS REPAIR Bilateral     CATARACT EXTRACTION W/  INTRAOCULAR LENS IMPLANT Bilateral     CATARACT EXTRACTION, BILATERAL      CHOLECYSTECTOMY      EYE SURGERY      HYSTERECTOMY       Family History   Problem Relation Age of Onset    Cancer Mother         pancreatic    Asthma Sister     Cataracts Sister     Cataracts Father     Diabetes Father     No Known Problems Daughter     No Known Problems Son     Amblyopia Neg Hx     Blindness Neg Hx     Glaucoma Neg Hx     Macular degeneration Neg Hx     Retinal detachment Neg Hx     Strabismus Neg Hx     Breast cancer Neg Hx     Colon cancer Neg Hx     Ovarian cancer Neg Hx      Social History     Tobacco Use    Smoking status: Never Smoker    Smokeless tobacco: Never Used   Substance Use Topics    Alcohol use: No    Drug use: No     OB History        2    Para   2    Term   2            AB        Living           SAB        TAB        Ectopic        Multiple        Live Births                     /68   Ht 5' (1.524 m)   Wt 54.8 kg (120 lb 13 oz)   LMP  (LMP Unknown)   BMI 23.59 kg/m²     ROS:  GENERAL: Denies weight gain or weight loss. Feeling well overall.   SKIN: Denies rash or lesions.   HEAD: Denies head injury or headache.   NODES: Denies enlarged lymph nodes.   CHEST: Denies chest pain or shortness of breath.   CARDIOVASCULAR: Denies palpitations or left sided chest pain.   ABDOMEN: No abdominal pain, constipation, diarrhea, nausea, vomiting or rectal bleeding.   URINARY: No frequency, dysuria, hematuria, or burning on  urination.  REPRODUCTIVE: See HPI.   BREASTS: The patient performs breast self-examination and denies pain, lumps, or nipple discharge.   HEMATOLOGIC: No easy bruisability or excessive bleeding.   MUSCULOSKELETAL: Denies joint pain or swelling.   NEUROLOGIC: Denies syncope or weakness.   PSYCHIATRIC: Denies depression, anxiety or mood swings.    PE:   APPEARANCE: Well nourished, well developed, in no acute distress.  AFFECT: WNL, alert and oriented x 3.  SKIN: No acne or hirsutism.  NECK: Neck symmetric without masses or thyromegaly.  NODES: No inguinal, cervical, axillary or femoral lymph node enlargement.  CHEST: Good respiratory effort.   ABDOMEN: Soft. No tenderness or masses. No hepatosplenomegaly. No hernias.  BREASTS: Symmetrical, no skin changes or visible lesions. No palpable masses, nipple discharge bilaterally.  PELVIC: ATROPHIC external female genitalia without lesions. Normal hair distribution. Adequate perineal body, normal urethral meatus. Vagina atrophic without lesions or discharge. No significant cystocele or rectocele. Bimanual exam shows uterus and cervix to be surgically absent. Adnexa without masses or tenderness.  RECTAL: Rectovaginal exam confirms above with normal sphincter tone, HEMORRHOIDS  EXTREMITIES: No edema.    DIAGNOSIS      ICD-10-CM ICD-9-CM    1. Vaginal itching  N89.8 698.1 Ambulatory referral/consult to Gynecology      Vaginosis Screen by DNA Probe      estradioL (ESTRACE) 0.01 % (0.1 mg/gram) vaginal cream      hydrocortisone-pramoxine (ANALPRAM-HC) 2.5-1 % Crea     Vaginitis prevention including :   a. avoiding feminine products such as deoderant soaps, body wash, bubble bath, douches, scented toilet paper, deoderant tampons or pads, baby or feminine wipes, chronic pad use, etc. and   b. avoiding other vulvovaginal irritants such as long hot baths, humidity, tight, synthetic clothing, chlorine and sitting around in wet bathing suits and   c. wearing cotton underwear, avoiding  thong underwear and no underwear to bed and   d. taking showers instead of baths and use a hair dryer on cool setting afterwards to dry and   e.wearing cotton to exercise and shower immediately after exercise and change clothes and   f. using polyurethane condoms without spermicide if sexually active and symptoms are triggered by intercourse;   Diflucan and Mycolog cream use and potential side effects;   -pelvic rest until symptoms resolve.           Patient was counseled today on A.C.S. Pap guidelines and recommendations for yearly pelvic exams, mammograms and monthly self breast exams; to see her PCP for other health maintenance.     Follow up in about 1 year (around 6/24/2021), or if symptoms worsen or fail to improve.

## 2020-06-26 ENCOUNTER — CLINICAL SUPPORT (OUTPATIENT)
Dept: REHABILITATION | Facility: HOSPITAL | Age: 80
End: 2020-06-26
Attending: INTERNAL MEDICINE
Payer: MEDICARE

## 2020-06-26 DIAGNOSIS — R29.898 WEAKNESS OF BOTH LOWER EXTREMITIES: ICD-10-CM

## 2020-06-26 DIAGNOSIS — M54.16 LUMBAR RADICULOPATHY: ICD-10-CM

## 2020-06-26 PROCEDURE — 97161 PT EVAL LOW COMPLEX 20 MIN: CPT | Mod: HCNC

## 2020-06-26 NOTE — PLAN OF CARE
[facility]  Physical Therapy Initial Evaluation    Name: Laverne Humphries  Clinic Number: [id number]    Therapy Diagnosis:   Encounter Diagnoses   Name Primary?    Lumbar radiculopathy     Weakness of both lower extremities      Physician: Fran Castro MD    Physician Orders: PT Eval and Treat   Medical Diagnosis from Referral: M54.16 (ICD-10-CM) - Lumbar radiculopathy  Evaluation Date: 6/26/2020  Authorization Period Expiration: TBD  Plan of Care Expiration: 9/04/2020  Visit # / Visits authorized: 1/ TBD    Time In: 835  Time Out: 910  Total Billable Time: 0 minutes    Visit: 82   Total: 82    Precautions: Standard    Subjective   Date of onset: 2 months ago  History of current condition - Laverne reports: her chief complaint is numbness in her legs and feet. Patient reports her numbness began about 2 months ago. Patient reports she was tested for diabetes, cholesterol, blood pressure, and her results were all normal. Patient reports her symptoms have stayed the same since it started    Major Trauma-  No  History of Cancer- No  Chills/Fever/Weight Loss- No  Night Pain- No  Constant Pain- No   Severe Progressive Weakness- No  Bowel/Bladder Dysfunction- No       Medical History:   Past Medical History:   Diagnosis Date    Anxiety     Asthma     mild    Cataract     IBS (irritable bowel syndrome)     Reflux esophagitis     Torus palatinus        Surgical History:   Laverne Humphries  has a past surgical history that includes Cataract extraction, bilateral; Cataract extraction w/  intraocular lens implant (Bilateral); Blepharoptosis repair (Bilateral); Cholecystectomy; Hysterectomy; and Eye surgery.    Medications:   Laverne has a current medication list which includes the following prescription(s): albuterol, calcium-vitamin d3, estradiol, fish oil-omega-3 fatty acids, fluticasone propionate, hydrocortisone, hydrocortisone-pramoxine, lorazepam, meloxicam, montelukast, and  triamcinolone acetonide 0.1%.    Allergies:   Review of patient's allergies indicates:   Allergen Reactions    Codeine      Other reaction(s): Syncope    Sulfa (sulfonamide antibiotics)      Other reaction(s): Stomach upset        Imaging, : None    Prior Therapy: Yes   Social History:  lives alone  Occupation: Retired  Prior Level of Function: Cooking, cleaning  Current Level of Function: Same as above     Pain: None        Pts goals: To relieve numbness     Objective     Lumbar AROM: Pain/Dysfunction with Movement:   Flexion: 90 degrees    Extension: 20 degrees    Right side bendin degrees Pain in back   Left side bendin degrees Pain in back   Right rotation: WNL degrees Pain in back   Left rotation: WNL degrees Pain in back     Myotome Right Left Pain/Dysfunction with Movement   L1,2- Hip Flexion (Femoral Nerve) 4/5 4/5    L3,4- Knee Extension (Femoral Nerve) 5/5 5/5    L4,5- Ankle Dorsiflexion (Deep Fibular Nerve) 4+/5 4+/5    L5- Big Toe Extension (Deep Fibular Nerve) 4+/5 4+/5    L5, S1- Ankle plantarflexion (Tibial Nerve) 4+/5 4+/5           Neural Tension Positive/Negative   Passive SLR w/ DF  Negative          Sensory Test (cotton ball): 100% accuracy on lower legs and feet      CMS Impairment/Limitation/Restriction for FOTO Lumbar Survey    Therapist reviewed FOTO scores for Laverne Kristel on 2020.   FOTO documents entered into [employer] - see Media section.    Limitation Score: 26%  Category: Mobility             TREATMENT     Next Visit: Nu Step, HSS, Gastroc stretch    Home Exercises and Patient Education Provided    Education provided:   - Purpose of PT    Assessment   Laverne is a 79 y.o. female referred to outpatient Physical Therapy with a medical diagnosis of lumbar radiculopathy. Pts chief complaint is increased numbness in her bilateral lower legs and feet, however, patient exhibited normal sensation with cotton ball testing. Patient was educated on the limitations that physical  therapy may have with improving her symptoms. Patient understood the max potential that physical therapy could have on her symptoms and agreed to try PT for a couple weeks.    Pt prognosis is Good.   Pt will benefit from skilled outpatient Physical Therapy to address the deficits stated above and in the chart below, provide pt/family education, and to maximize pt's level of independence.     Plan of care discussed with patient: Yes  Pt's spiritual, cultural and educational needs considered and patient is agreeable to the plan of care and goals as stated below:     Anticipated Barriers for therapy: None    Medical Necessity is demonstrated by the following  History  Co-morbidities and personal factors that may impact the plan of care Co-morbidities:   anxiety    Personal Factors:   no deficits     moderate   Examination  Body Structures and Functions, activity limitations and participation restrictions that may impact the plan of care Body Regions:   back  lower extremities    Body Systems:    Sensory    Participation Restrictions:   None    Activity limitations:   Learning and applying knowledge  no deficits    General Tasks and Commands  no deficits    Communication  no deficits    Mobility  no deficits    Self care  no deficits    Domestic Life  no deficits    Interactions/Relationships  no deficits    Life Areas  no deficits    Community and Social Life  no deficits         moderate   Clinical Presentation stable and uncomplicated low   Decision Making/ Complexity Score: low     Goals:    Long Term Goals (6 Weeks):   1. Pt will improve FOTO score to </= 10% limited to decrease perceived limitation with maintaining/changing body position.   2. Pt will improve impaired LE MMTs by 1 score to improve strength for functional tasks.  3. Pt will be compliant with HEP to supplement PT in decreasing pain with functional mobility.  4. Pt will improve LE strength by >/= 1 score to improve functional strength    Plan   Plan  of care Certification: 6/26/2020 to 9/04/2020.    Outpatient Physical Therapy 3 times weekly for 10 weeks to include the following interventions: Cervical/Lumbar Traction, Gait Training, Manual Therapy, Moist Heat/ Ice, Neuromuscular Re-ed, Patient Education, Self Care, Therapeutic Activites and Therapeutic Exercise.     Yo Esteves, PT

## 2020-06-29 ENCOUNTER — TELEPHONE (OUTPATIENT)
Dept: OBSTETRICS AND GYNECOLOGY | Facility: CLINIC | Age: 80
End: 2020-06-29

## 2020-06-29 DIAGNOSIS — B96.89 BACTERIAL VAGINOSIS: Primary | ICD-10-CM

## 2020-06-29 DIAGNOSIS — N76.0 BACTERIAL VAGINOSIS: Primary | ICD-10-CM

## 2020-06-29 LAB
BACTERIAL VAGINOSIS DNA: POSITIVE
CANDIDA GLABRATA DNA: NEGATIVE
CANDIDA KRUSEI DNA: NEGATIVE
CANDIDA RRNA VAG QL PROBE: NEGATIVE
T VAGINALIS RRNA GENITAL QL PROBE: NEGATIVE

## 2020-06-29 RX ORDER — METRONIDAZOLE 500 MG/1
500 TABLET ORAL 2 TIMES DAILY
Qty: 14 TABLET | Refills: 0 | Status: SHIPPED | OUTPATIENT
Start: 2020-06-29 | End: 2020-07-06

## 2020-06-29 NOTE — TELEPHONE ENCOUNTER
Patient was contacted and informed of lab results, patient expressed understanding and will comply.

## 2020-07-02 ENCOUNTER — PATIENT MESSAGE (OUTPATIENT)
Dept: OBSTETRICS AND GYNECOLOGY | Facility: CLINIC | Age: 80
End: 2020-07-02

## 2020-07-02 NOTE — TELEPHONE ENCOUNTER
Advised patient that it is her insurance blocking her refill not the pharmacy. Patient verbalized understanding stating that she paid a $41 copay and does not wish to pay cash. She will wait until 7/17 to refill.

## 2020-07-27 ENCOUNTER — CLINICAL SUPPORT (OUTPATIENT)
Dept: REHABILITATION | Facility: HOSPITAL | Age: 80
End: 2020-07-27
Attending: INTERNAL MEDICINE
Payer: MEDICARE

## 2020-07-27 DIAGNOSIS — R29.898 WEAKNESS OF BOTH LOWER EXTREMITIES: ICD-10-CM

## 2020-07-27 PROCEDURE — 97110 THERAPEUTIC EXERCISES: CPT | Mod: HCNC

## 2020-07-27 NOTE — PROGRESS NOTES
"    Physical Therapy Treatment Note     Name: Laverne [facility] Number: [id number]    Therapy Diagnosis:   Encounter Diagnosis   Name Primary?    Weakness of both lower extremities      Physician: Fran Castro MD    Visit Date: 7/27/2020    Physician Orders: PT Eval and Treat   Medical Diagnosis from Referral: M54.16 (ICD-10-CM) - Lumbar radiculopathy  Evaluation Date: 6/26/2020  Authorization Period Expiration: 12/31/20  Plan of Care Expiration: 9/04/2020  Visit # / Visits authorized: 2/ TBD     Time In: 7:45 am  Time Out: 8:25  Total Billable Time: 40 minutes     Visit: 91  Total: 173     Precautions: Standard    Subjective     Pt reports: both feet have been numb for about 2 months. She reports trying exercises she used to do in yoga since she has not been since March but is not getting any relief.   She was not compliant with home exercise program.  Response to previous treatment: last visit initial eval  Functional change:     Pain: 0/10  Location: bilateral feet/legs      Objective     Laverne received therapeutic exercises to develop strength, endurance and flexibility for 40 minutes including:    Nustep 5'  Gastroc wedge stretch 30" 3x  HSS 30" 3x  Piriformis stretch 30" 3x  Bridging 3x10  Clamshells 3x10  Sit<>stand 2x10       Home Exercises Provided and Patient Education Provided     Education provided:   - HEP    Written Home Exercises Provided: yes.  Exercises were reviewed and Laverne was able to demonstrate them prior to the end of the session.  Laverne demonstrated good  understanding of the education provided.     See EMR under Patient Instructions for exercises provided 7/27/2020.    Assessment     Pt tolerated first follow up visit well today with no increases in pain. Pt required cueing for all new therex today for correct performance. Pt given handout of exercises to continue at home.   Laverne is progressing well towards her goals.   Pt prognosis is Good.     Pt will continue to " benefit from skilled outpatient physical therapy to address the deficits listed in the problem list box on initial evaluation, provide pt/family education and to maximize pt's level of independence in the home and community environment.     Pt's spiritual, cultural and educational needs considered and pt agreeable to plan of care and goals.     Anticipated barriers to physical therapy: none    Goals:     Long Term Goals (6 Weeks):   1. Pt will improve FOTO score to </= 10% limited to decrease perceived limitation with maintaining/changing body position.   2. Pt will improve impaired LE MMTs by 1 score to improve strength for functional tasks.  3. Pt will be compliant with HEP to supplement PT in decreasing pain with functional mobility.  4. Pt will improve LE strength by >/= 1 score to improve functional strength    Plan     Continue with current POC.     Nu Nuñez, PT

## 2020-07-31 ENCOUNTER — CLINICAL SUPPORT (OUTPATIENT)
Dept: REHABILITATION | Facility: HOSPITAL | Age: 80
End: 2020-07-31
Attending: INTERNAL MEDICINE
Payer: MEDICARE

## 2020-07-31 DIAGNOSIS — R29.898 WEAKNESS OF BOTH LOWER EXTREMITIES: ICD-10-CM

## 2020-07-31 PROCEDURE — 97110 THERAPEUTIC EXERCISES: CPT | Mod: HCNC

## 2020-07-31 NOTE — PROGRESS NOTES
"    Physical Therapy Treatment Note     Name: Laverne [facility] Number: [id number]    Therapy Diagnosis:   Encounter Diagnosis   Name Primary?    Weakness of both lower extremities      Physician: Fran Castro MD    Visit Date: 7/31/2020    Physician Orders: PT Eval and Treat   Medical Diagnosis from Referral: M54.16 (ICD-10-CM) - Lumbar radiculopathy  Evaluation Date: 6/26/2020  Authorization Period Expiration: 12/31/20  Plan of Care Expiration: 9/04/2020  Visit # / Visits authorized: 3/ TBD     Time In: 745  Time Out: 827  Total Billable Time: 42 minutes     Visit: 90  Total: 263     Precautions: Standard    Subjective     Pt reports: she is doing good with her home exercises at home, however, she is still having numbness in both of her lower legs.   She was not compliant with home exercise program.  Response to previous treatment: last visit initial eval  Functional change:     Pain: 0/10  Location: bilateral feet/legs      Objective     Laverne received therapeutic exercises to develop strength, endurance and flexibility for 43 minutes including:    Nustep 5'  Gastroc wedge stretch 30" 3x  Solus Stretch 30 sec x 3  Seated HSS 30 sec x 3  Supine Nerve Flossing 30x  Piriformis stretch 30" 3x  Bridging 3x10  Clamshells 3x10  Sit<>stand 2x10       Home Exercises Provided and Patient Education Provided     Education provided:   - HEP    Written Home Exercises Provided: yes.  Exercises were reviewed and Laverne was able to demonstrate them prior to the end of the session.  Laverne demonstrated good  understanding of the education provided.     See EMR under Patient Instructions for exercises provided 7/27/2020.    Assessment   Patient was able to tolerate progression of therapy which included nerve gliding and stretching. Continue to progress as tolerated.   Laverne is progressing well towards her goals.   Pt prognosis is Good.     Pt will continue to benefit from skilled outpatient physical therapy to " address the deficits listed in the problem list box on initial evaluation, provide pt/family education and to maximize pt's level of independence in the home and community environment.     Pt's spiritual, cultural and educational needs considered and pt agreeable to plan of care and goals.     Anticipated barriers to physical therapy: none    Goals:     Long Term Goals (6 Weeks):   1. Pt will improve FOTO score to </= 10% limited to decrease perceived limitation with maintaining/changing body position.   2. Pt will improve impaired LE MMTs by 1 score to improve strength for functional tasks.  3. Pt will be compliant with HEP to supplement PT in decreasing pain with functional mobility.  4. Pt will improve LE strength by >/= 1 score to improve functional strength    Plan     Continue with current POC.     Yo Esteves, PT

## 2020-08-05 ENCOUNTER — CLINICAL SUPPORT (OUTPATIENT)
Dept: REHABILITATION | Facility: HOSPITAL | Age: 80
End: 2020-08-05
Attending: INTERNAL MEDICINE
Payer: MEDICARE

## 2020-08-05 DIAGNOSIS — R29.898 WEAKNESS OF BOTH LOWER EXTREMITIES: ICD-10-CM

## 2020-08-05 PROCEDURE — 97110 THERAPEUTIC EXERCISES: CPT | Mod: HCNC

## 2020-08-05 NOTE — PROGRESS NOTES
"    Physical Therapy Treatment Note     Name: Laverne [facility] Number: [id number]    Therapy Diagnosis:   Encounter Diagnosis   Name Primary?    Weakness of both lower extremities      Physician: Fran Castro MD    Visit Date: 8/5/2020    Physician Orders: PT Eval and Treat   Medical Diagnosis from Referral: M54.16 (ICD-10-CM) - Lumbar radiculopathy  Evaluation Date: 6/26/2020  Authorization Period Expiration: 12/31/20  Plan of Care Expiration: 9/04/2020  Visit # / Visits authorized: 4/ TBD     Time In: 745  Time Out: 830  Total Billable Time: 45 minutes     Visit: 90  Total: 353     Precautions: Standard    Subjective     Pt reports: she feels the same, nothing better and nothing worse.   She was not compliant with home exercise program.  Response to previous treatment: last visit initial eval  Functional change:     Pain: 0/10  Location: bilateral feet/legs      Objective     Laverne received therapeutic exercises to develop strength, endurance and flexibility for 45 minutes including:    Nustep 5'  Gastroc wedge stretch 30" 3x  Solus Stretch 30 sec x 3  Seated HSS 30 sec x 3  Supine Nerve Flossing 30x  Piriformis stretch 30" 3x  Bridging 3x10  Clamshells 3x10  Sit<>stand 2x10   Plantar Fascia Roll [id number]      Home Exercises Provided and Patient Education Provided     Education provided:   - HEP    Written Home Exercises Provided: yes.  Exercises were reviewed and Laverne was able to demonstrate them prior to the end of the session.  Laverne demonstrated good  understanding of the education provided.     See EMR under Patient Instructions for exercises provided 7/27/2020.    Assessment   Patient tolerated all therapy with improvements of symptoms after session. Patient enjoyed doing the plantar fascia roll to the plantar aspect of her feet and reported improvements of symptoms. Continue to progress   Laverne is progressing well towards her goals.   Pt prognosis is Good.     Pt will continue to " benefit from skilled outpatient physical therapy to address the deficits listed in the problem list box on initial evaluation, provide pt/family education and to maximize pt's level of independence in the home and community environment.     Pt's spiritual, cultural and educational needs considered and pt agreeable to plan of care and goals.     Anticipated barriers to physical therapy: none    Goals:     Long Term Goals (6 Weeks):   1. Pt will improve FOTO score to </= 10% limited to decrease perceived limitation with maintaining/changing body position.   2. Pt will improve impaired LE MMTs by 1 score to improve strength for functional tasks.  3. Pt will be compliant with HEP to supplement PT in decreasing pain with functional mobility.  4. Pt will improve LE strength by >/= 1 score to improve functional strength    Plan     Continue with current POC.     Yo Esteves, PT

## 2020-08-07 NOTE — PROGRESS NOTES
"    Physical Therapy Treatment Note     Name: Laverne [facility] Number: [id number]    Therapy Diagnosis:   Encounter Diagnosis   Name Primary?    Weakness of both lower extremities      Physician: Fran Castro MD    Visit Date: 8/10/2020    Physician Orders: PT Eval and Treat   Medical Diagnosis from Referral: M54.16 (ICD-10-CM) - Lumbar radiculopathy  Evaluation Date: 6/26/2020  Authorization Period Expiration: 12/31/20  Plan of Care Expiration: 9/04/2020  Visit # / Visits authorized: 2/2, 3/ 12     Time In: 745am  Time Out: 830am  Total Billable Time: 45 minutes     Visit: 90  Total: 443     Precautions: Standard    Subjective     Pt reports: she is feeling a little better   She was not compliant with home exercise program.  Response to previous treatment: no adverse effects  Functional change: none    Pain: 0/10  Location: bilateral feet/legs      Objective     Laverne received therapeutic exercises to develop strength, endurance and flexibility for 45 minutes including:    Nustep 5'  Gastroc wedge stretch 30" 3x  Solus Stretch 30 sec x 3  Seated HSS 30 sec x 3  Supine Nerve Flossing 30x -not performed   Piriformis stretch 30" 3x  Bridging 3x10  Clamshells 3x10  Sit<>stand 2x10   Plantar Fascia Roll x2 minutes  standing hip abd/ext  2x10 ea      Home Exercises Provided and Patient Education Provided     Education provided:   - HEP    Written Home Exercises Provided: yes.  Exercises were reviewed and Laverne was able to demonstrate them prior to the end of the session.  Laverne demonstrated good  understanding of the education provided.     See EMR under Patient Instructions for exercises provided 8/10/2020.    Assessment   Patient tolerated therex well today. She progressed functional BLE strength training in standing with no adverse effects.     Laverne is progressing well towards her goals.   Pt prognosis is Good.     Pt will continue to benefit from skilled outpatient physical therapy to address " the deficits listed in the problem list box on initial evaluation, provide pt/family education and to maximize pt's level of independence in the home and community environment.     Pt's spiritual, cultural and educational needs considered and pt agreeable to plan of care and goals.     Anticipated barriers to physical therapy: none    Goals:     Long Term Goals (6 Weeks):   1. Pt will improve FOTO score to </= 10% limited to decrease perceived limitation with maintaining/changing body position.   2. Pt will improve impaired LE MMTs by 1 score to improve strength for functional tasks.  3. Pt will be compliant with HEP to supplement PT in decreasing pain with functional mobility.  4. Pt will improve LE strength by >/= 1 score to improve functional strength    Plan     Continue with current POC.     Carolyne Meyer, PT

## 2020-08-10 ENCOUNTER — CLINICAL SUPPORT (OUTPATIENT)
Dept: REHABILITATION | Facility: HOSPITAL | Age: 80
End: 2020-08-10
Attending: INTERNAL MEDICINE
Payer: MEDICARE

## 2020-08-10 DIAGNOSIS — R29.898 WEAKNESS OF BOTH LOWER EXTREMITIES: ICD-10-CM

## 2020-08-10 PROCEDURE — 97110 THERAPEUTIC EXERCISES: CPT | Mod: HCNC

## 2020-08-13 DIAGNOSIS — F41.9 ANXIETY: ICD-10-CM

## 2020-08-17 RX ORDER — LORAZEPAM 0.5 MG/1
0.5 TABLET ORAL NIGHTLY
Qty: 90 TABLET | Refills: 1 | Status: SHIPPED | OUTPATIENT
Start: 2020-08-17 | End: 2021-03-05

## 2020-08-18 ENCOUNTER — CLINICAL SUPPORT (OUTPATIENT)
Dept: REHABILITATION | Facility: HOSPITAL | Age: 80
End: 2020-08-18
Attending: INTERNAL MEDICINE
Payer: MEDICARE

## 2020-08-18 DIAGNOSIS — R29.898 WEAKNESS OF BOTH LOWER EXTREMITIES: ICD-10-CM

## 2020-08-18 PROCEDURE — 97110 THERAPEUTIC EXERCISES: CPT | Mod: HCNC

## 2020-08-18 NOTE — PROGRESS NOTES
"    Physical Therapy Treatment Note     Name: Laverne [place]  Clinic Number: [id number]    Therapy Diagnosis:   Encounter Diagnosis   Name Primary?    Weakness of both lower extremities      Physician: Fran Castro MD    Visit Date: 8/18/2020    Physician Orders: PT Eval and Treat   Medical Diagnosis from Referral: M54.16 (ICD-10-CM) - Lumbar radiculopathy  Evaluation Date: 6/26/2020  Authorization Period Expiration: 12/31/20  Plan of Care Expiration: 9/04/2020  Visit # / Visits authorized: 2/2, 4/12     Time In: 1000  Time Out: 1044  Total Billable Time: 44 minutes     Visit: 90  Total: 533     Precautions: Standard    Subjective     Pt reports: she continues to feel better and is noticing improvements from rolling the frozen water bottle on the bottom of her feet.   She was not compliant with home exercise program.  Response to previous treatment: no adverse effects  Functional change: none    Pain: 0/10  Location: bilateral feet/legs      Objective     Laverne received therapeutic exercises to develop strength, endurance and flexibility for 44 minutes including:    Nustep 5'  Gastroc wedge stretch 30" 3x  Solus Stretch 30 sec x 3  Seated HSS 30 sec x 3  Supine Nerve Flossing 30x -not performed   Piriformis stretch 30" 3x  Bridging 3x10  Clamshells 3x10  SKTC 20x for 5 sec  Sit<>stand 2x10   Plantar Fascia Roll x2 minutes  standing hip abd/ext  2x10 ea      Home Exercises Provided and Patient Education Provided     Education provided:   - HEP    Written Home Exercises Provided: yes.  Exercises were reviewed and Laverne was able to demonstrate them prior to the end of the session.  Laverne demonstrated good  understanding of the education provided.     See EMR under Patient Instructions for exercises provided 8/10/2020.    Assessment   Pt continues to tolerate all therex without increased symptoms in lower legs and feet. Pt continues to report improvement of symptoms with exercises. Pt needs cueing for " technique occasionally with exercises.     Laverne is progressing well towards her goals.   Pt prognosis is Good.     Pt will continue to benefit from skilled outpatient physical therapy to address the deficits listed in the problem list box on initial evaluation, provide pt/family education and to maximize pt's level of independence in the home and community environment.     Pt's spiritual, cultural and educational needs considered and pt agreeable to plan of care and goals.     Anticipated barriers to physical therapy: none    Goals:     Long Term Goals (6 Weeks):   1. Pt will improve FOTO score to </= 10% limited to decrease perceived limitation with maintaining/changing body position.   2. Pt will improve impaired LE MMTs by 1 score to improve strength for functional tasks.  3. Pt will be compliant with HEP to supplement PT in decreasing pain with functional mobility.  4. Pt will improve LE strength by >/= 1 score to improve functional strength    Plan     Continue with current POC.     Yo Esteves, PT

## 2020-08-31 ENCOUNTER — CLINICAL SUPPORT (OUTPATIENT)
Dept: REHABILITATION | Facility: HOSPITAL | Age: 80
End: 2020-08-31
Attending: INTERNAL MEDICINE
Payer: MEDICARE

## 2020-08-31 DIAGNOSIS — R29.898 WEAKNESS OF BOTH LOWER EXTREMITIES: ICD-10-CM

## 2020-08-31 PROCEDURE — 97110 THERAPEUTIC EXERCISES: CPT | Mod: HCNC

## 2020-08-31 NOTE — PLAN OF CARE
Outpatient Therapy Discharge Summary     Name: Laverne Humphries  [facility] Number: [id number]    Therapy Diagnosis:   Encounter Diagnosis   Name Primary?    Weakness of both lower extremities      Physician: Fran Castro MD    Physician Orders: PT Eval and Treat   Medical Diagnosis from Referral: M54.16 (ICD-10-CM) - Lumbar radiculopathy  Evaluation Date: 6/26/2020        Date of Last visit: 8/31/2020  Total Visits Received: 7  Cancelled Visits: 0  No Show Visits: 0    Assessment    Goals:   Long Term Goals (6 Weeks):   1. Pt will improve FOTO score to </= 10% limited to decrease perceived limitation with maintaining/changing body position. NOT MET 8/31/2020  2. Pt will improve impaired LE MMTs by 1 score to improve strength for functional tasks. NOT MET 8/31/2020  3. Pt will be compliant with HEP to supplement PT in decreasing pain with functional mobility. MET 8/31/2020  4. Pt will improve LE strength by >/= 1 score to improve functional strength NOT MET 8/31/2020      Plan     Discharge patient from physical therapy        Discharge reason: Patient has reached the maximum rehab potential for the present time    Plan   This patient is discharged from Physical Therapy

## 2020-08-31 NOTE — PROGRESS NOTES
"    Physical Therapy Treatment Note/Discharge      Name: Laverne Humphries  [facility] Number: [id number]    Therapy Diagnosis:   Encounter Diagnosis   Name Primary?    Weakness of both lower extremities      Physician: Fran Castro MD    Visit Date: 2020    Physician Orders: PT Eval and Treat   Medical Diagnosis from Referral: M54.16 (ICD-10-CM) - Lumbar radiculopathy  Evaluation Date: 2020  Authorization Period Expiration: 20  Plan of Care Expiration: 2020  Visit # / Visits authorized: ,      Time In: 745  Time Out: 830  Total Billable Time: 45 minutes     Visit: 90  Total: 623     Precautions: Standard    Subjective     Pt reports: she is feeling good today and is comfortable with today being her last day for physical therapy.   She was compliant with home exercise program.  Response to previous treatment: no adverse effects  Functional change: none    Pain: 0/10  Location: bilateral feet/legs      Objective     Laverne received therapeutic exercises to develop strength, endurance and flexibility for 45 minutes including:    Lumbar AROM: Pain/Dysfunction with Movement:   Flexion: 90 degrees     Extension: 25 degrees     Right side bendin degrees Pain in back   Left side bendin degrees    Right rotation: WNL degrees    Left rotation: WNL degrees       Myotome Right Left Pain/Dysfunction with Movement   L1,2- Hip Flexion (Femoral Nerve) 4/5 4/5     L3,4- Knee Extension (Femoral Nerve) 5/5 5/5     L4,5- Ankle Dorsiflexion (Deep Fibular Nerve) 4+/5 4+/5     L5- Big Toe Extension (Deep Fibular Nerve) 4+/5 4+/5     L5, S1- Ankle plantarflexion (Tibial Nerve) 4+/5 4+/5                         Nustep 5'  Gastroc wedge stretch 30" 3x  Solus Stretch 30 sec x 3  Seated HSS 30 sec x 3  Supine Nerve Flossing 30x -not performed   Piriformis stretch 30" 3x  Bridging 3x10  Clamshells 3x10  SKTC 20x for 5 sec  Sit<>stand 2x10   Plantar Fascia Roll x2 minutes  standing hip abd/ext  2x10 " ea      Home Exercises Provided and Patient Education Provided     Education provided:   - HEP    Written Home Exercises Provided: yes.  Exercises were reviewed and Laverne was able to demonstrate them prior to the end of the session.  Michelleboojaylinmable demonstrated good  understanding of the education provided.     See EMR under Patient Instructions for exercises provided 8/10/2020.    Assessment   Patient has progressed well but has hit a plateau with therapy and is comfortable with continuing therapy at home. Patient is independent in all exercises and is happy with her progress and her functional ability.    Laverne is progressing well towards her goals.   Pt prognosis is Good.     Pt's spiritual, cultural and educational needs considered and pt agreeable to plan of care and goals.     Anticipated barriers to physical therapy: none    Goals:     Long Term Goals (6 Weeks):   1. Pt will improve FOTO score to </= 10% limited to decrease perceived limitation with maintaining/changing body position. NOT MET 8/31/2020  2. Pt will improve impaired LE MMTs by 1 score to improve strength for functional tasks. NOT MET 8/31/2020  3. Pt will be compliant with HEP to supplement PT in decreasing pain with functional mobility. MET 8/31/2020  4. Pt will improve LE strength by >/= 1 score to improve functional strength NOT MET 8/31/2020      Plan     Discharge patient from physical therapy    Yo Esteves, PT

## 2020-10-04 ENCOUNTER — PATIENT OUTREACH (OUTPATIENT)
Dept: ADMINISTRATIVE | Facility: OTHER | Age: 80
End: 2020-10-04

## 2020-10-05 ENCOUNTER — OFFICE VISIT (OUTPATIENT)
Dept: ALLERGY | Facility: CLINIC | Age: 80
End: 2020-10-05
Payer: MEDICARE

## 2020-10-05 ENCOUNTER — IMMUNIZATION (OUTPATIENT)
Dept: PHARMACY | Facility: CLINIC | Age: 80
End: 2020-10-05
Payer: MEDICARE

## 2020-10-05 VITALS — HEIGHT: 60 IN | WEIGHT: 121.94 LBS | BODY MASS INDEX: 23.94 KG/M2

## 2020-10-05 DIAGNOSIS — J30.89 CHRONIC NONSEASONAL ALLERGIC RHINITIS DUE TO POLLEN: Primary | ICD-10-CM

## 2020-10-05 DIAGNOSIS — R05.9 COUGH: ICD-10-CM

## 2020-10-05 PROCEDURE — 99999 PR PBB SHADOW E&M-EST. PATIENT-LVL III: ICD-10-PCS | Mod: PBBFAC,HCNC,, | Performed by: ALLERGY & IMMUNOLOGY

## 2020-10-05 PROCEDURE — 99213 PR OFFICE/OUTPT VISIT, EST, LEVL III, 20-29 MIN: ICD-10-PCS | Mod: HCNC,S$GLB,, | Performed by: ALLERGY & IMMUNOLOGY

## 2020-10-05 PROCEDURE — 1126F AMNT PAIN NOTED NONE PRSNT: CPT | Mod: HCNC,S$GLB,, | Performed by: ALLERGY & IMMUNOLOGY

## 2020-10-05 PROCEDURE — 1126F PR PAIN SEVERITY QUANTIFIED, NO PAIN PRESENT: ICD-10-PCS | Mod: HCNC,S$GLB,, | Performed by: ALLERGY & IMMUNOLOGY

## 2020-10-05 PROCEDURE — 1159F MED LIST DOCD IN RCRD: CPT | Mod: HCNC,S$GLB,, | Performed by: ALLERGY & IMMUNOLOGY

## 2020-10-05 PROCEDURE — 1101F PT FALLS ASSESS-DOCD LE1/YR: CPT | Mod: HCNC,CPTII,S$GLB, | Performed by: ALLERGY & IMMUNOLOGY

## 2020-10-05 PROCEDURE — 1101F PR PT FALLS ASSESS DOC 0-1 FALLS W/OUT INJ PAST YR: ICD-10-PCS | Mod: HCNC,CPTII,S$GLB, | Performed by: ALLERGY & IMMUNOLOGY

## 2020-10-05 PROCEDURE — 99213 OFFICE O/P EST LOW 20 MIN: CPT | Mod: HCNC,S$GLB,, | Performed by: ALLERGY & IMMUNOLOGY

## 2020-10-05 PROCEDURE — 1159F PR MEDICATION LIST DOCUMENTED IN MEDICAL RECORD: ICD-10-PCS | Mod: HCNC,S$GLB,, | Performed by: ALLERGY & IMMUNOLOGY

## 2020-10-05 PROCEDURE — 99999 PR PBB SHADOW E&M-EST. PATIENT-LVL III: CPT | Mod: PBBFAC,HCNC,, | Performed by: ALLERGY & IMMUNOLOGY

## 2020-10-05 RX ORDER — FLUTICASONE PROPIONATE 50 MCG
2 SPRAY, SUSPENSION (ML) NASAL DAILY
Qty: 48 G | Refills: 4 | Status: SHIPPED | OUTPATIENT
Start: 2020-10-05 | End: 2022-07-27

## 2020-10-05 RX ORDER — ALBUTEROL SULFATE 90 UG/1
2 AEROSOL, METERED RESPIRATORY (INHALATION) EVERY 6 HOURS PRN
Qty: 18 G | Refills: 3 | Status: SHIPPED | OUTPATIENT
Start: 2020-10-05 | End: 2021-07-22 | Stop reason: SDUPTHER

## 2020-10-05 NOTE — PROGRESS NOTES
Subjective:       Patient ID: Laverne Humphries is a 79 y.o. female.    Chief Complaint:    Fu allergic rhinitis    LV 11/11/19    HPI:     80 yo female with a history of allergic rhinitis, GERD, and chronic/recurrent cough.  Skin test in March 2013 w multiple positives, as below.    AR sx's well controlled over last year w routine flonase 2 sen 1-2 times daily, routine fexofenadine. Also on montelukast, sometimes taken prn.  No relief w tessalon perles. Denies overt GERD sx's. Denies wheeze, SOB.  Over last week has noted cough w exertion on few occasions. Needs albuterol refill. States it's been about a year since she last recalls needing albuterol.  Denies interval sinus or lower resp infections.    Environmental History:   Pets in the home: none.    Moon: tile or linoleum floors   Climate Control: central or room air conditioning   Dust Mite Controls: Dust mite controls are already in place.   Tobacco Smoke in Home: no     Review of Systems   Constitutional: Negative for fever, chills, appetite change, fatigue and unexpected weight change.   HENT: Negative for hearing loss, ear pain, nosebleeds, congestion, sore throat, rhinorrhea, sneezing, postnasal drip, sinus pressure, tinnitus and ear discharge.    Eyes: Negative for pain, discharge, redness and itching.   Respiratory: Positive for cough. Negative for chest tightness, shortness of breath and wheezing.    Cardiovascular: Negative for chest pain, palpitations and leg swelling.   Gastrointestinal: Negative for nausea, vomiting, abdominal pain, diarrhea, constipation, blood in stool and abdominal distention.   Genitourinary: Negative for dysuria, urgency, frequency and difficulty urinating.   Musculoskeletal: Negative for back pain, joint swelling and arthralgias.   Skin: Negative for color change, pallor, rash and wound.   Neurological: Negative for dizziness, seizures, light-headedness, numbness and headaches.   Hematological: Negative for adenopathy.  Does not bruise/bleed easily.   Psychiatric/Behavioral: Negative for sleep disturbance and dysphoric mood. The patient is not nervous/anxious.         Objective:    Physical Exam   Constitutional: She is oriented to person, place, and time. She appears well-developed and well-nourished. She is cooperative. No distress.   HENT:   Head: Normocephalic and atraumatic.   Right Ear: Tympanic membrane, external ear and ear canal normal.   Left Ear: Tympanic membrane, external ear and ear canal normal.   Mouth/Throat: Normal dentition.        2+ pale nasal turbinates  Eyes: EOM are normal. Pupils are equal, round, and reactive to light. Right conjunctiva is not injected.   Neck: Neck supple. No thyromegaly present.   Cardiovascular: Normal rate, regular rhythm, normal heart sounds and intact distal pulses.  Exam reveals no gallop and no friction rub.    No murmur heard.  Pulmonary/Chest: Effort normal and breath sounds normal. No respiratory distress. She has no wheezes. She has no rales.   Abdominal: Soft. Bowel sounds are normal. She exhibits no distension. There is no tenderness.   Lymphadenopathy:     She has no cervical adenopathy.   Neurological: She is alert and oriented to person, place, and time.   Skin: Skin is warm and dry. No rash noted. No cyanosis or erythema. Nails show no clubbing.   Psychiatric: She has a normal mood and affect. Her behavior is normal.       Laboratory:     3/13/13   Inhalant Skin Testing   4+ Dust mites (D.p. And D.f.)   3+ White francesca, Mixed birch, Box elder, Bald cypress   2+ Cat, Dog, American elm, Hackberry, Red maple, Red mulberry, Mixed oak, Black willow, Lambs quarter, Marsh elder, Bahia grass, Bermuda grass, Jared grass, Kaleb grass   1+ Cockroach, Red cedar, Eastern cottonwood, Mugwort, Rough pigweed, English plantain, Mixed ragweed, Russian thistle, Acremonium, Alternaria, Epicoccum, Fusarium, Pullularia, Rhizopus, Rhodotorula  With adequate histamine and saline controls              Assessment:     1.Allergic rhinitis  2. cough    Plan:     1.  flonase to 2 sen 1-2 times daily  2. Continue Fexofenadine 180 mg daily prn  3. singulair  4. Nasal saline rinses  5. Albuterol prn  FU if no improvement, o/w yearly

## 2020-10-26 ENCOUNTER — TELEPHONE (OUTPATIENT)
Dept: INTERNAL MEDICINE | Facility: CLINIC | Age: 80
End: 2020-10-26

## 2020-10-26 DIAGNOSIS — Z12.31 ENCOUNTER FOR SCREENING MAMMOGRAM FOR MALIGNANT NEOPLASM OF BREAST: Primary | ICD-10-CM

## 2020-10-26 NOTE — TELEPHONE ENCOUNTER
----- Message from Jillian Augustin sent at 10/26/2020  2:31 PM CDT -----  WOULD LIKE TO GET MEDICAL ADVICE.        Would the patient rather a call back or a response via [facility]?:  CALL BACK     Comments:  DARLEEN [phone] WOULD LIKE TO HAVE ORDERS OUT IN FOR HER MAMMOGRAM

## 2020-11-05 ENCOUNTER — HOSPITAL ENCOUNTER (OUTPATIENT)
Dept: RADIOLOGY | Facility: HOSPITAL | Age: 80
Discharge: HOME | End: 2020-11-05
Attending: INTERNAL MEDICINE
Payer: MEDICARE

## 2020-11-05 DIAGNOSIS — Z12.31 ENCOUNTER FOR SCREENING MAMMOGRAM FOR MALIGNANT NEOPLASM OF BREAST: ICD-10-CM

## 2020-11-05 PROCEDURE — 77067 MAMMO DIGITAL SCREENING BILAT WITH TOMO: ICD-10-PCS | Mod: 26,HCNC,, | Performed by: RADIOLOGY

## 2020-11-05 PROCEDURE — 77067 SCR MAMMO BI INCL CAD: CPT | Mod: 26,HCNC,, | Performed by: RADIOLOGY

## 2020-11-05 PROCEDURE — 77063 MAMMO DIGITAL SCREENING BILAT WITH TOMO: ICD-10-PCS | Mod: 26,HCNC,, | Performed by: RADIOLOGY

## 2020-11-05 PROCEDURE — 77063 BREAST TOMOSYNTHESIS BI: CPT | Mod: 26,HCNC,, | Performed by: RADIOLOGY

## 2020-11-05 PROCEDURE — 77067 SCR MAMMO BI INCL CAD: CPT | Mod: TC,HCNC

## 2020-11-06 ENCOUNTER — OFFICE VISIT (OUTPATIENT)
Dept: URGENT CARE | Facility: CLINIC | Age: 80
End: 2020-11-06
Payer: MEDICARE

## 2020-11-06 VITALS
RESPIRATION RATE: 17 BRPM | WEIGHT: 121 LBS | HEIGHT: 60 IN | OXYGEN SATURATION: 98 % | SYSTOLIC BLOOD PRESSURE: 148 MMHG | HEART RATE: 86 BPM | BODY MASS INDEX: 23.75 KG/M2 | TEMPERATURE: 98 F | DIASTOLIC BLOOD PRESSURE: 56 MMHG

## 2020-11-06 DIAGNOSIS — H92.02 ACUTE OTALGIA, LEFT: Primary | ICD-10-CM

## 2020-11-06 PROCEDURE — 69210 REMOVE IMPACTED EAR WAX UNI: CPT | Mod: S$GLB,,, | Performed by: STUDENT IN AN ORGANIZED HEALTH CARE EDUCATION/TRAINING PROGRAM

## 2020-11-06 PROCEDURE — 69210 EAR CERUMEN REMOVAL: ICD-10-PCS | Mod: S$GLB,,, | Performed by: STUDENT IN AN ORGANIZED HEALTH CARE EDUCATION/TRAINING PROGRAM

## 2020-11-06 PROCEDURE — 99213 PR OFFICE/OUTPT VISIT, EST, LEVL III, 20-29 MIN: ICD-10-PCS | Mod: 25,S$GLB,, | Performed by: STUDENT IN AN ORGANIZED HEALTH CARE EDUCATION/TRAINING PROGRAM

## 2020-11-06 PROCEDURE — 99213 OFFICE O/P EST LOW 20 MIN: CPT | Mod: 25,S$GLB,, | Performed by: STUDENT IN AN ORGANIZED HEALTH CARE EDUCATION/TRAINING PROGRAM

## 2020-11-06 RX ORDER — NEOMYCIN SULFATE, POLYMYXIN B SULFATE AND HYDROCORTISONE 10; 3.5; 1 MG/ML; MG/ML; [USP'U]/ML
3 SUSPENSION/ DROPS AURICULAR (OTIC) 3 TIMES DAILY
Qty: 10 ML | Refills: 0 | Status: SHIPPED | OUTPATIENT
Start: 2020-11-06 | End: 2020-11-09

## 2020-11-06 NOTE — PROGRESS NOTES
Subjective:       Patient ID: Laverne Humphries is a 79 y.o. female.    Vitals:  height is 5' (1.524 m) and weight is 54.9 kg (121 lb). Her oral temperature is 97.6 °F (36.4 °C). Her blood pressure is 148/56 (abnormal) and her pulse is 86. Her respiration is 17 and oxygen saturation is 98%.     Chief Complaint: Otalgia    Otalgia   There is pain in the left ear. This is a new problem. The current episode started in the past 7 days (x2 days). The problem occurs constantly. The problem has been gradually worsening. There has been no fever. The pain is at a severity of 4/10. The pain is moderate. Pertinent negatives include no coughing, rash, sore throat or vomiting. She has tried acetaminophen for the symptoms. The treatment provided mild relief.       Constitution: Negative for chills, sweating, fatigue and fever.   HENT: Positive for ear pain. Negative for congestion, sinus pain, sinus pressure, sore throat and voice change.    Neck: Negative for painful lymph nodes.   Eyes: Negative for eye redness.   Respiratory: Negative for chest tightness, cough, sputum production, bloody sputum, COPD, shortness of breath, stridor, wheezing and asthma.    Gastrointestinal: Negative for nausea and vomiting.   Musculoskeletal: Negative for muscle ache.   Skin: Negative for rash.   Allergic/Immunologic: Negative for seasonal allergies and asthma.   Hematologic/Lymphatic: Negative for swollen lymph nodes.       Objective:      Physical Exam   Constitutional: She is oriented to person, place, and time. She does not appear ill. No distress.   HENT:   Ears:   Right Ear: Tympanic membrane and ear canal normal.   Left Ear: Tympanic membrane and external ear normal.      Comments: Hardened cerumen just beyond opening of ear canal. No impaction  Nose: Nose normal.   Mouth/Throat: Mucous membranes are moist. No oropharyngeal exudate or posterior oropharyngeal erythema. Oropharynx is clear.   Neck: Neck supple.   Pulmonary/Chest: No  respiratory distress.   Lymphadenopathy:     She has no cervical adenopathy.   Neurological: She is alert and oriented to person, place, and time.   Skin: Skin is warm and dry. Psychiatric: Her behavior is normal. Mood normal.   Nursing note and vitals reviewed.        Assessment:       1. Acute otalgia, left        Plan:         Acute otalgia, left  -     neomycin-polymyxin-hydrocortisone (CORTISPORIN) 3.5-10,000-1 mg/mL-unit/mL-% otic suspension; Place 3 drops into the left ear 3 (three) times daily. for 3 days  Dispense: 10 mL; Refill: 0  -     Ear Cerumen Removal           No signs of infection. Hard cerumen removed from canal with a curette. Post exam normal. Patient inquired about antibiotic ear drops just in case. I agreed to send to pharmacy as a prophylactic measure. Diagnosis and plan discussed with the patient as well as the expected course and duration of her  symptoms.  Use prescription and/or OTC medications as directed. She was advised to follow up with her PCP. Ok to return to [facility] sooner if needed.  All questions and concerns were addressed prior to discharge. Patient verbalized understanding and was happy with the plan of care.

## 2020-11-06 NOTE — PROCEDURES
Ear Cerumen Removal    Date/Time: 11/6/2020 9:00 AM  Performed by: Francine Hester MD  Authorized by: Francine Hester MD       Local anesthetic:  None  Location details:  Left ear  Procedure type: curette    Cerumen  Removal Results:  Cerumen completely removed  Patient tolerance:  Patient tolerated the procedure well with no immediate complications

## 2020-11-12 ENCOUNTER — OFFICE VISIT (OUTPATIENT)
Dept: INTERNAL MEDICINE | Facility: CLINIC | Age: 80
End: 2020-11-12
Payer: MEDICARE

## 2020-11-12 ENCOUNTER — OFFICE VISIT (OUTPATIENT)
Dept: OPTOMETRY | Facility: CLINIC | Age: 80
End: 2020-11-12
Payer: COMMERCIAL

## 2020-11-12 VITALS
BODY MASS INDEX: 23.5 KG/M2 | OXYGEN SATURATION: 99 % | WEIGHT: 119.69 LBS | HEART RATE: 96 BPM | DIASTOLIC BLOOD PRESSURE: 60 MMHG | SYSTOLIC BLOOD PRESSURE: 130 MMHG | HEIGHT: 60 IN

## 2020-11-12 DIAGNOSIS — Z13.5 GLAUCOMA SCREENING: ICD-10-CM

## 2020-11-12 DIAGNOSIS — M54.12 CERVICAL RADICULOPATHY: Primary | ICD-10-CM

## 2020-11-12 DIAGNOSIS — H52.4 PRESBYOPIA: Primary | ICD-10-CM

## 2020-11-12 PROCEDURE — 1101F PT FALLS ASSESS-DOCD LE1/YR: CPT | Mod: HCNC,CPTII,S$GLB, | Performed by: INTERNAL MEDICINE

## 2020-11-12 PROCEDURE — 1159F MED LIST DOCD IN RCRD: CPT | Mod: HCNC,S$GLB,, | Performed by: INTERNAL MEDICINE

## 2020-11-12 PROCEDURE — 99999 PR PBB SHADOW E&M-EST. PATIENT-LVL III: CPT | Mod: PBBFAC,,, | Performed by: OPTOMETRIST

## 2020-11-12 PROCEDURE — 1159F PR MEDICATION LIST DOCUMENTED IN MEDICAL RECORD: ICD-10-PCS | Mod: HCNC,S$GLB,, | Performed by: INTERNAL MEDICINE

## 2020-11-12 PROCEDURE — 99213 OFFICE O/P EST LOW 20 MIN: CPT | Mod: HCNC,S$GLB,, | Performed by: INTERNAL MEDICINE

## 2020-11-12 PROCEDURE — 1125F AMNT PAIN NOTED PAIN PRSNT: CPT | Mod: HCNC,S$GLB,, | Performed by: INTERNAL MEDICINE

## 2020-11-12 PROCEDURE — 99999 PR PBB SHADOW E&M-EST. PATIENT-LVL III: ICD-10-PCS | Mod: PBBFAC,,, | Performed by: OPTOMETRIST

## 2020-11-12 PROCEDURE — 1125F PR PAIN SEVERITY QUANTIFIED, PAIN PRESENT: ICD-10-PCS | Mod: HCNC,S$GLB,, | Performed by: INTERNAL MEDICINE

## 2020-11-12 PROCEDURE — 92015 DETERMINE REFRACTIVE STATE: CPT | Mod: S$GLB,,, | Performed by: OPTOMETRIST

## 2020-11-12 PROCEDURE — 3288F PR FALLS RISK ASSESSMENT DOCUMENTED: ICD-10-PCS | Mod: HCNC,CPTII,S$GLB, | Performed by: INTERNAL MEDICINE

## 2020-11-12 PROCEDURE — 92015 PR REFRACTION: ICD-10-PCS | Mod: S$GLB,,, | Performed by: OPTOMETRIST

## 2020-11-12 PROCEDURE — 99999 PR PBB SHADOW E&M-EST. PATIENT-LVL III: CPT | Mod: PBBFAC,HCNC,, | Performed by: INTERNAL MEDICINE

## 2020-11-12 PROCEDURE — 99213 PR OFFICE/OUTPT VISIT, EST, LEVL III, 20-29 MIN: ICD-10-PCS | Mod: HCNC,S$GLB,, | Performed by: INTERNAL MEDICINE

## 2020-11-12 PROCEDURE — 92014 COMPRE OPH EXAM EST PT 1/>: CPT | Mod: S$GLB,,, | Performed by: OPTOMETRIST

## 2020-11-12 PROCEDURE — 1101F PR PT FALLS ASSESS DOC 0-1 FALLS W/OUT INJ PAST YR: ICD-10-PCS | Mod: HCNC,CPTII,S$GLB, | Performed by: INTERNAL MEDICINE

## 2020-11-12 PROCEDURE — 99999 PR PBB SHADOW E&M-EST. PATIENT-LVL III: ICD-10-PCS | Mod: PBBFAC,HCNC,, | Performed by: INTERNAL MEDICINE

## 2020-11-12 PROCEDURE — 92014 PR EYE EXAM, EST PATIENT,COMPREHESV: ICD-10-PCS | Mod: S$GLB,,, | Performed by: OPTOMETRIST

## 2020-11-12 PROCEDURE — 3288F FALL RISK ASSESSMENT DOCD: CPT | Mod: HCNC,CPTII,S$GLB, | Performed by: INTERNAL MEDICINE

## 2020-11-12 RX ORDER — METHYLPREDNISOLONE 4 MG/1
TABLET ORAL
Qty: 1 PACKAGE | Refills: 0 | Status: SHIPPED | OUTPATIENT
Start: 2020-11-12 | End: 2020-12-03

## 2020-11-12 RX ORDER — GABAPENTIN 100 MG/1
100 CAPSULE ORAL 3 TIMES DAILY
Qty: 90 CAPSULE | Refills: 11 | Status: SHIPPED | OUTPATIENT
Start: 2020-11-12 | End: 2020-12-29

## 2020-11-12 NOTE — PROGRESS NOTES
HPI     DLS 12/4/2018  Pt states she has notice VA changes. St states a few days ago while   watching TV her VA became blurry for a few minutes.  No f/f  Refresh gtts    Last edited by Caryn Patel MA on 11/12/2020  7:29 AM. (History)        ROS     Positive for: Eyes (cat surgery OU)    Negative for: Constitutional, Gastrointestinal, Neurological, Skin,   Genitourinary, Musculoskeletal, HENT, Endocrine, Cardiovascular,   Respiratory, Psychiatric, Allergic/Imm, Heme/Lymph    Last edited by Alfred Pond, OD on 11/12/2020  7:40 AM. (History)        Assessment /Plan     For exam results, see Encounter Report.    Presbyopia    Glaucoma screening      1. Mild pco sp pciol OU--pt wishes new Rx  2. Dry eye sx--again advised pt to increase REFRESH ATs to QID    PLAN:    rtc 1 yr

## 2020-11-12 NOTE — PROGRESS NOTES
Subjective:       Patient ID: Laverne Humphries is a 79 y.o. female.    Chief Complaint: Otalgia    79 year odl lady complains of severe pain over left scalp from occiput over to ear and up to crown of head.  Stops at midline.  Scalp hurts to lightest touch.  No rash    Review of Systems   Constitutional: Negative for activity change, chills, fatigue and fever.   HENT: Negative for congestion, ear pain, nosebleeds, postnasal drip, sinus pressure and sore throat.    Eyes: Negative.  Negative for visual disturbance.   Respiratory: Negative for cough, chest tightness, shortness of breath and wheezing.    Cardiovascular: Negative for chest pain.   Gastrointestinal: Negative for abdominal pain, diarrhea, nausea and vomiting.   Genitourinary: Negative for difficulty urinating, dysuria, frequency and urgency.   Musculoskeletal: Negative for arthralgias and neck stiffness.   Skin: Negative for rash.   Neurological: Negative for dizziness, weakness and headaches.   Psychiatric/Behavioral: Negative for sleep disturbance. The patient is not nervous/anxious.        Objective:      Physical Exam  Constitutional:       General: She is awake.      Appearance: Normal appearance. She is well-developed, well-groomed and normal weight.   HENT:      Head:     Neurological:      Mental Status: She is alert.   Psychiatric:         Behavior: Behavior is cooperative.         Assessment:       1. Cervical radiculopathy        Plan:   Laverne was seen today for otalgia.    Diagnoses and all orders for this visit:    Cervical radiculopathy  -     Ambulatory referral/consult to Neurology; Future    Other orders  -     gabapentin (NEURONTIN) 100 MG capsule; Take 1 capsule (100 mg total) by mouth 3 (three) times daily.  -     methylPREDNISolone (MEDROL DOSEPACK) 4 mg tablet; use as directed

## 2020-11-17 ENCOUNTER — TELEPHONE (OUTPATIENT)
Dept: INTERNAL MEDICINE | Facility: CLINIC | Age: 80
End: 2020-11-17

## 2020-11-17 NOTE — TELEPHONE ENCOUNTER
----- Message from Sharon Scott sent at 11/17/2020 10:15 AM CST -----  Contact: [phone]  Patient is returning a phone call.  Who left a message for the patient: Dr Castro's ma  Does patient know what this is regarding:  yes  Comments:       
Spoke to pt and rescheduled her neurology appointment for a sooner appointment   
Awake/Alert/Cooperative

## 2020-11-17 NOTE — TELEPHONE ENCOUNTER
----- Message from Sharon Scott sent at 11/17/2020  8:06 AM CST -----  Contact: [phone]  Patient called, requested a courtesy call from Dr Castro's medical assistant about neurology appointment. Thank you.

## 2020-12-11 ENCOUNTER — PES CALL (OUTPATIENT)
Dept: ADMINISTRATIVE | Facility: CLINIC | Age: 80
End: 2020-12-11

## 2020-12-29 ENCOUNTER — OFFICE VISIT (OUTPATIENT)
Dept: NEUROLOGY | Facility: CLINIC | Age: 80
End: 2020-12-29
Payer: MEDICARE

## 2020-12-29 VITALS
HEIGHT: 60 IN | SYSTOLIC BLOOD PRESSURE: 128 MMHG | BODY MASS INDEX: 24.71 KG/M2 | HEART RATE: 95 BPM | DIASTOLIC BLOOD PRESSURE: 67 MMHG | WEIGHT: 125.88 LBS

## 2020-12-29 DIAGNOSIS — M54.9 DORSALGIA, UNSPECIFIED: ICD-10-CM

## 2020-12-29 DIAGNOSIS — M54.12 RADICULOPATHY, CERVICAL REGION: ICD-10-CM

## 2020-12-29 DIAGNOSIS — M54.81 OCCIPITAL NEURALGIA OF LEFT SIDE: Primary | ICD-10-CM

## 2020-12-29 PROCEDURE — 99204 PR OFFICE/OUTPT VISIT, NEW, LEVL IV, 45-59 MIN: ICD-10-PCS | Mod: HCNC,S$GLB,, | Performed by: PSYCHIATRY & NEUROLOGY

## 2020-12-29 PROCEDURE — 1159F MED LIST DOCD IN RCRD: CPT | Mod: HCNC,S$GLB,, | Performed by: PSYCHIATRY & NEUROLOGY

## 2020-12-29 PROCEDURE — 1126F PR PAIN SEVERITY QUANTIFIED, NO PAIN PRESENT: ICD-10-PCS | Mod: HCNC,S$GLB,, | Performed by: PSYCHIATRY & NEUROLOGY

## 2020-12-29 PROCEDURE — 1101F PR PT FALLS ASSESS DOC 0-1 FALLS W/OUT INJ PAST YR: ICD-10-PCS | Mod: HCNC,CPTII,S$GLB, | Performed by: PSYCHIATRY & NEUROLOGY

## 2020-12-29 PROCEDURE — 1101F PT FALLS ASSESS-DOCD LE1/YR: CPT | Mod: HCNC,CPTII,S$GLB, | Performed by: PSYCHIATRY & NEUROLOGY

## 2020-12-29 PROCEDURE — 99204 OFFICE O/P NEW MOD 45 MIN: CPT | Mod: HCNC,S$GLB,, | Performed by: PSYCHIATRY & NEUROLOGY

## 2020-12-29 PROCEDURE — 3288F FALL RISK ASSESSMENT DOCD: CPT | Mod: HCNC,CPTII,S$GLB, | Performed by: PSYCHIATRY & NEUROLOGY

## 2020-12-29 PROCEDURE — 99999 PR PBB SHADOW E&M-EST. PATIENT-LVL IV: CPT | Mod: PBBFAC,HCNC,, | Performed by: PSYCHIATRY & NEUROLOGY

## 2020-12-29 PROCEDURE — 3288F PR FALLS RISK ASSESSMENT DOCUMENTED: ICD-10-PCS | Mod: HCNC,CPTII,S$GLB, | Performed by: PSYCHIATRY & NEUROLOGY

## 2020-12-29 PROCEDURE — 99999 PR PBB SHADOW E&M-EST. PATIENT-LVL IV: ICD-10-PCS | Mod: PBBFAC,HCNC,, | Performed by: PSYCHIATRY & NEUROLOGY

## 2020-12-29 PROCEDURE — 1159F PR MEDICATION LIST DOCUMENTED IN MEDICAL RECORD: ICD-10-PCS | Mod: HCNC,S$GLB,, | Performed by: PSYCHIATRY & NEUROLOGY

## 2020-12-29 PROCEDURE — 1126F AMNT PAIN NOTED NONE PRSNT: CPT | Mod: HCNC,S$GLB,, | Performed by: PSYCHIATRY & NEUROLOGY

## 2020-12-29 RX ORDER — GABAPENTIN 300 MG/1
300 CAPSULE ORAL 3 TIMES DAILY
Qty: 270 CAPSULE | Refills: 3 | Status: SHIPPED | OUTPATIENT
Start: 2020-12-29 | End: 2021-02-01

## 2020-12-29 NOTE — PROGRESS NOTES
[facility] NEUROLOGY  [facility], [place]    Date: 12/29/20  Patient Name: Laverne Humphries   MRN: [id number]   PCP: Fran Castro  Referring Provider: Self, Aaareferral    Assessment:   Laverne Humphries is a 80 y.o. female Presenting for evaluation of multifocal neuropathic pain.  Head pain most consistent with occipital neuralgia, increasing gabapentin- patient willing to consider injections in future  Patient has had persistent cervical lumbar radicular pain no improvement after 2 months of physical therapy.  Proceeding to MRI C and L-spine for possible involvement of pain management.  Increasing gabapentin dose to 300 mg t.i.d..  Plan:     Problem List Items Addressed This Visit     None      Visit Diagnoses     Occipital neuralgia of left side    -  Primary    Relevant Medications    gabapentin (NEURONTIN) 300 MG capsule    Dorsalgia, unspecified        Relevant Orders    MRI Lumbar Spine Without Contrast    Radiculopathy, cervical region        Relevant Medications    gabapentin (NEURONTIN) 300 MG capsule    Other Relevant Orders    MRI Cervical Spine Without Contrast        Coleman Guerra MD  [facility]   Department of Neurology    Patient note was created using MModal Dictation.  Any errors in syntax or even information may not have been identified and edited on initial review prior to signing this note.  Subjective:   HPI:   Ms. Laverne Humphries is a 80 y.o. female presenting for evaluation of multifocal neuropathic pain.  Patient presents today with her daughter who contributes to the history.  The patient reports pain from head to toe.  She complains burning and stinging pain radiating from her left occiput up and over the parietal region her head.  She describes extreme scalp tenderness stating that even blowing her hair dry hurts.  She endorses chronic neck pain and stiffness as well as chronic lumbago with occasional associated sciatica.  She describes burning  and stinging paresthesias across the balls of both feet without distinct sensory loss.  She has completed 2 months of physical therapy with no improvement in her symptoms and has been tried on low-dose gabapentin without substantial change in her symptoms      PAST MEDICAL HISTORY:  Past Medical History:   Diagnosis Date    Anxiety     Asthma     mild    Cataract     IBS (irritable bowel syndrome)     Reflux esophagitis     Torus palatinus        PAST SURGICAL HISTORY:  Past Surgical History:   Procedure Laterality Date    BELPHAROPTOSIS REPAIR Bilateral     CATARACT EXTRACTION W/  INTRAOCULAR LENS IMPLANT Bilateral     CATARACT EXTRACTION, BILATERAL      CHOLECYSTECTOMY      EYE SURGERY      HYSTERECTOMY         CURRENT MEDS:  Current Outpatient Medications   Medication Sig Dispense Refill    albuterol (PROVENTIL/VENTOLIN HFA) 90 mcg/actuation inhaler Inhale 2 puffs into the lungs every 6 (six) hours as needed for Wheezing or Shortness of Breath (generic preferred). Rescue 18 g 3    calcium-vitamin D3 500 mg(1,250mg) -200 unit per tablet Take 1 tablet by mouth Daily.       estradioL (ESTRACE) 0.01 % (0.1 mg/gram) vaginal cream Place 1 g vaginally twice a week. 42.5 g 3    fish oil-omega-3 fatty acids 300-1,000 mg capsule Take by mouth once daily.      fluticasone propionate (FLONASE) 50 mcg/actuation nasal spray 2 sprays (100 mcg total) by Each Nostril route once daily. 3 mo supply 48 g 4    gabapentin (NEURONTIN) 300 MG capsule Take 1 capsule (300 mg total) by mouth 3 (three) times daily. 270 capsule 3    hydrocortisone 2.5 % cream Apply topically 2 (two) times daily. 20 g 0    hydrocortisone-pramoxine (ANALPRAM-HC) 2.5-1 % Crea Place rectally 3 (three) times daily. 1 Tube 3    LORazepam (ATIVAN) 0.5 MG tablet Take 1 tablet (0.5 mg total) by mouth nightly. Needs a Vacation override please 90 tablet 1    meloxicam (MOBIC) 7.5 MG tablet Take 1 tablet (7.5 mg total) by mouth once daily. 30  tablet 1    montelukast (SINGULAIR) 10 mg tablet Take 1 tablet (10 mg total) by mouth every evening. (Patient taking differently: Take 10 mg by mouth nightly as needed. ) 30 tablet 11    triamcinolone acetonide 0.1% (KENALOG) 0.1 % cream Apply topically 2 (two) times daily. 30 g 1     No current facility-administered medications for this visit.        ALLERGIES:  Review of patient's allergies indicates:   Allergen Reactions    Codeine      Other reaction(s): Syncope    Sulfa (sulfonamide antibiotics)      Other reaction(s): Stomach upset       FAMILY HISTORY:  Family History   Problem Relation Age of Onset    Cancer Mother         pancreatic    Asthma Sister     Cataracts Sister     Cataracts Father     Diabetes Father     No Known Problems Daughter     No Known Problems Son     Amblyopia Neg Hx     Blindness Neg Hx     Glaucoma Neg Hx     Macular degeneration Neg Hx     Retinal detachment Neg Hx     Strabismus Neg Hx     Breast cancer Neg Hx     Colon cancer Neg Hx     Ovarian cancer Neg Hx        SOCIAL HISTORY:  Social History     Tobacco Use    Smoking status: Never Smoker    Smokeless tobacco: Never Used   Substance Use Topics    Alcohol use: No    Drug use: No       Review of Systems:  12 system review of systems is negative except for the symptoms mentioned in HPI.      Objective:     Vitals:    12/29/20 0931   BP: 128/67   Pulse: 95   Weight: 57.1 kg (125 lb 14.1 oz)   Height: 5' (1.524 m)     General: NAD, well nourished   Eyes: no tearing, discharge, no erythema   ENT: moist mucous membranes of the oral cavity, nares patent    Neck: Supple, full range of motion  Cardiovascular: Warm and well perfused, pulses equal and symmetrical  Lungs: Normal work of breathing, normal chest wall excursions  Skin: No rash, lesions, or breakdown on exposed skin  Psychiatry: Mood and affect are appropriate   Abdomen: soft, non tender, non distended  Extremeties: No cyanosis, clubbing or  edema.    Neurological   MENTAL STATUS: Alert and oriented to person, place, and time. Attention and concentration within normal limits. Speech without dysarthria, able to name and repeat without difficulty. Recent and remote memory within normal limits   CRANIAL NERVES: Visual fields intact. PERRL. EOMI. Facial sensation intact. Face symmetrical. Hearing grossly intact. Full shoulder shrug bilaterally. Tongue protrudes midline   SENSORY: Sensation is intact to light touch throughout.    MOTOR: Normal bulk and tone.   5/5 deltoid, biceps, triceps, interosseous, hand  bilaterally. 4/5 iliopsoas, knee extension/flexion, foot dorsi/plantarflexion bilaterally.    REFLEXES: Symmetric and 1+ throughout.   CEREBELLAR/COORDINATION/GAIT: Gait steady with normal arm swing and stride length. Normal rapid alternating movements.

## 2021-01-05 ENCOUNTER — HOSPITAL ENCOUNTER (OUTPATIENT)
Dept: RADIOLOGY | Facility: HOSPITAL | Age: 81
Discharge: HOME | End: 2021-01-05
Attending: PSYCHIATRY & NEUROLOGY
Payer: MEDICARE

## 2021-01-05 DIAGNOSIS — M54.9 DORSALGIA, UNSPECIFIED: ICD-10-CM

## 2021-01-05 DIAGNOSIS — M54.12 RADICULOPATHY, CERVICAL REGION: ICD-10-CM

## 2021-01-05 PROCEDURE — 72148 MRI LUMBAR SPINE WITHOUT CONTRAST: ICD-10-PCS | Mod: 26,HCNC,, | Performed by: RADIOLOGY

## 2021-01-05 PROCEDURE — 72148 MRI LUMBAR SPINE W/O DYE: CPT | Mod: TC,HCNC

## 2021-01-05 PROCEDURE — 72141 MRI NECK SPINE W/O DYE: CPT | Mod: 26,HCNC,, | Performed by: RADIOLOGY

## 2021-01-05 PROCEDURE — 72141 MRI NECK SPINE W/O DYE: CPT | Mod: TC,HCNC

## 2021-01-05 PROCEDURE — 72141 MRI CERVICAL SPINE WITHOUT CONTRAST: ICD-10-PCS | Mod: 26,HCNC,, | Performed by: RADIOLOGY

## 2021-01-05 PROCEDURE — 72148 MRI LUMBAR SPINE W/O DYE: CPT | Mod: 26,HCNC,, | Performed by: RADIOLOGY

## 2021-01-06 ENCOUNTER — PATIENT MESSAGE (OUTPATIENT)
Dept: NEUROLOGY | Facility: CLINIC | Age: 81
End: 2021-01-06

## 2021-01-07 ENCOUNTER — TELEPHONE (OUTPATIENT)
Dept: INTERNAL MEDICINE | Facility: CLINIC | Age: 81
End: 2021-01-07

## 2021-01-08 ENCOUNTER — IMMUNIZATION (OUTPATIENT)
Dept: INTERNAL MEDICINE | Facility: CLINIC | Age: 81
End: 2021-01-08
Payer: MEDICARE

## 2021-01-08 DIAGNOSIS — Z23 NEED FOR VACCINATION: ICD-10-CM

## 2021-01-08 PROCEDURE — 91300 COVID-19, MRNA, LNP-S, PF, 30 MCG/0.3 ML DOSE VACCINE: CPT | Mod: PBBFAC | Performed by: INTERNAL MEDICINE

## 2021-01-14 DIAGNOSIS — M54.16 LUMBAR RADICULOPATHY: ICD-10-CM

## 2021-01-14 DIAGNOSIS — M54.12 CERVICAL RADICULOPATHY: Primary | ICD-10-CM

## 2021-01-27 ENCOUNTER — LAB VISIT (OUTPATIENT)
Dept: LAB | Facility: HOSPITAL | Age: 81
End: 2021-01-27
Attending: INTERNAL MEDICINE
Payer: MEDICARE

## 2021-01-27 DIAGNOSIS — E78.5 HYPERLIPIDEMIA, UNSPECIFIED HYPERLIPIDEMIA TYPE: ICD-10-CM

## 2021-01-27 LAB
ALBUMIN SERPL BCP-MCNC: 3.6 G/DL (ref 3.5–5.2)
ALP SERPL-CCNC: 57 U/L (ref 55–135)
ALT SERPL W/O P-5'-P-CCNC: 12 U/L (ref 10–44)
ANION GAP SERPL CALC-SCNC: 8 MMOL/L (ref 8–16)
AST SERPL-CCNC: 19 U/L (ref 10–40)
BASOPHILS # BLD AUTO: 0.08 K/UL (ref 0–0.2)
BASOPHILS NFR BLD: 0.9 % (ref 0–1.9)
BILIRUB SERPL-MCNC: 1.4 MG/DL (ref 0.1–1)
BUN SERPL-MCNC: 19 MG/DL (ref 8–23)
CALCIUM SERPL-MCNC: 10.2 MG/DL (ref 8.7–10.5)
CHLORIDE SERPL-SCNC: 103 MMOL/L (ref 95–110)
CHOLEST SERPL-MCNC: 185 MG/DL (ref 120–199)
CHOLEST/HDLC SERPL: 3 {RATIO} (ref 2–5)
CO2 SERPL-SCNC: 28 MMOL/L (ref 23–29)
CREAT SERPL-MCNC: 0.9 MG/DL (ref 0.5–1.4)
DIFFERENTIAL METHOD: ABNORMAL
EOSINOPHIL # BLD AUTO: 0.3 K/UL (ref 0–0.5)
EOSINOPHIL NFR BLD: 3.4 % (ref 0–8)
ERYTHROCYTE [DISTWIDTH] IN BLOOD BY AUTOMATED COUNT: 13.3 % (ref 11.5–14.5)
EST. GFR  (AFRICAN AMERICAN): >60 ML/MIN/1.73 M^2
EST. GFR  (NON AFRICAN AMERICAN): 59.7 ML/MIN/1.73 M^2
GLUCOSE SERPL-MCNC: 96 MG/DL (ref 70–110)
HCT VFR BLD AUTO: 35.1 % (ref 37–48.5)
HDLC SERPL-MCNC: 62 MG/DL (ref 40–75)
HDLC SERPL: 33.5 % (ref 20–50)
HGB BLD-MCNC: 11.3 G/DL (ref 12–16)
IMM GRANULOCYTES # BLD AUTO: 0.06 K/UL (ref 0–0.04)
IMM GRANULOCYTES NFR BLD AUTO: 0.7 % (ref 0–0.5)
LDLC SERPL CALC-MCNC: 106.2 MG/DL (ref 63–159)
LYMPHOCYTES # BLD AUTO: 3 K/UL (ref 1–4.8)
LYMPHOCYTES NFR BLD: 34.7 % (ref 18–48)
MCH RBC QN AUTO: 29 PG (ref 27–31)
MCHC RBC AUTO-ENTMCNC: 32.2 G/DL (ref 32–36)
MCV RBC AUTO: 90 FL (ref 82–98)
MONOCYTES # BLD AUTO: 0.7 K/UL (ref 0.3–1)
MONOCYTES NFR BLD: 7.9 % (ref 4–15)
NEUTROPHILS # BLD AUTO: 4.4 K/UL (ref 1.8–7.7)
NEUTROPHILS NFR BLD: 52.4 % (ref 38–73)
NONHDLC SERPL-MCNC: 123 MG/DL
NRBC BLD-RTO: 0 /100 WBC
PLATELET # BLD AUTO: 301 K/UL (ref 150–350)
PMV BLD AUTO: 11.2 FL (ref 9.2–12.9)
POTASSIUM SERPL-SCNC: 4.5 MMOL/L (ref 3.5–5.1)
PROT SERPL-MCNC: 7.6 G/DL (ref 6–8.4)
RBC # BLD AUTO: 3.9 M/UL (ref 4–5.4)
SODIUM SERPL-SCNC: 139 MMOL/L (ref 136–145)
TRIGL SERPL-MCNC: 84 MG/DL (ref 30–150)
WBC # BLD AUTO: 8.49 K/UL (ref 3.9–12.7)

## 2021-01-27 PROCEDURE — 85025 COMPLETE CBC W/AUTO DIFF WBC: CPT

## 2021-01-27 PROCEDURE — 80061 LIPID PANEL: CPT

## 2021-01-27 PROCEDURE — 36415 COLL VENOUS BLD VENIPUNCTURE: CPT

## 2021-01-27 PROCEDURE — 80053 COMPREHEN METABOLIC PANEL: CPT

## 2021-01-30 ENCOUNTER — IMMUNIZATION (OUTPATIENT)
Dept: INTERNAL MEDICINE | Facility: CLINIC | Age: 81
End: 2021-01-30
Payer: MEDICARE

## 2021-01-30 DIAGNOSIS — Z23 NEED FOR VACCINATION: Primary | ICD-10-CM

## 2021-01-30 PROCEDURE — 0002A PR IMMUNIZ ADMIN, SARS-COV-2 COVID-19 VACC, 30MCG/0.3ML, 2ND DOSE: CPT | Mod: PBBFAC | Performed by: INTERNAL MEDICINE

## 2021-01-30 PROCEDURE — 91300 PR SARS-COV- 2 COVID-19 VACCINE, NO PRSV, 30MCG/0.3ML, IM: CPT | Mod: PBBFAC | Performed by: INTERNAL MEDICINE

## 2021-01-30 RX ADMIN — RNA INGREDIENT BNT-162B2 0.3 ML: 0.23 INJECTION, SUSPENSION INTRAMUSCULAR at 09:01

## 2021-02-01 ENCOUNTER — OFFICE VISIT (OUTPATIENT)
Dept: INTERNAL MEDICINE | Facility: CLINIC | Age: 81
End: 2021-02-01
Payer: MEDICARE

## 2021-02-01 VITALS
WEIGHT: 123.88 LBS | HEART RATE: 68 BPM | HEIGHT: 60 IN | DIASTOLIC BLOOD PRESSURE: 68 MMHG | BODY MASS INDEX: 24.32 KG/M2 | SYSTOLIC BLOOD PRESSURE: 110 MMHG

## 2021-02-01 DIAGNOSIS — J84.10 CALCIFIED GRANULOMA OF LUNG: ICD-10-CM

## 2021-02-01 DIAGNOSIS — E78.5 HYPERLIPIDEMIA, UNSPECIFIED HYPERLIPIDEMIA TYPE: Primary | ICD-10-CM

## 2021-02-01 DIAGNOSIS — I77.819 ECTATIC AORTA: ICD-10-CM

## 2021-02-01 DIAGNOSIS — M54.81 OCCIPITAL NEURALGIA OF LEFT SIDE: ICD-10-CM

## 2021-02-01 DIAGNOSIS — M54.12 RADICULOPATHY, CERVICAL REGION: ICD-10-CM

## 2021-02-01 DIAGNOSIS — M54.16 LUMBAR RADICULOPATHY: ICD-10-CM

## 2021-02-01 DIAGNOSIS — N18.31 STAGE 3A CHRONIC KIDNEY DISEASE: ICD-10-CM

## 2021-02-01 PROCEDURE — 1101F PT FALLS ASSESS-DOCD LE1/YR: CPT | Mod: CPTII,S$GLB,, | Performed by: INTERNAL MEDICINE

## 2021-02-01 PROCEDURE — 99999 PR PBB SHADOW E&M-EST. PATIENT-LVL III: CPT | Mod: PBBFAC,,, | Performed by: INTERNAL MEDICINE

## 2021-02-01 PROCEDURE — 1159F PR MEDICATION LIST DOCUMENTED IN MEDICAL RECORD: ICD-10-PCS | Mod: S$GLB,,, | Performed by: INTERNAL MEDICINE

## 2021-02-01 PROCEDURE — 1125F AMNT PAIN NOTED PAIN PRSNT: CPT | Mod: S$GLB,,, | Performed by: INTERNAL MEDICINE

## 2021-02-01 PROCEDURE — 99214 OFFICE O/P EST MOD 30 MIN: CPT | Mod: S$GLB,,, | Performed by: INTERNAL MEDICINE

## 2021-02-01 PROCEDURE — 99499 UNLISTED E&M SERVICE: CPT | Mod: S$GLB,,, | Performed by: INTERNAL MEDICINE

## 2021-02-01 PROCEDURE — 3288F FALL RISK ASSESSMENT DOCD: CPT | Mod: CPTII,S$GLB,, | Performed by: INTERNAL MEDICINE

## 2021-02-01 PROCEDURE — 1101F PR PT FALLS ASSESS DOC 0-1 FALLS W/OUT INJ PAST YR: ICD-10-PCS | Mod: CPTII,S$GLB,, | Performed by: INTERNAL MEDICINE

## 2021-02-01 PROCEDURE — 99499 RISK ADDL DX/OHS AUDIT: ICD-10-PCS | Mod: S$GLB,,, | Performed by: INTERNAL MEDICINE

## 2021-02-01 PROCEDURE — 99999 PR PBB SHADOW E&M-EST. PATIENT-LVL III: ICD-10-PCS | Mod: PBBFAC,,, | Performed by: INTERNAL MEDICINE

## 2021-02-01 PROCEDURE — 3288F PR FALLS RISK ASSESSMENT DOCUMENTED: ICD-10-PCS | Mod: CPTII,S$GLB,, | Performed by: INTERNAL MEDICINE

## 2021-02-01 PROCEDURE — 1125F PR PAIN SEVERITY QUANTIFIED, PAIN PRESENT: ICD-10-PCS | Mod: S$GLB,,, | Performed by: INTERNAL MEDICINE

## 2021-02-01 PROCEDURE — 99214 PR OFFICE/OUTPT VISIT, EST, LEVL IV, 30-39 MIN: ICD-10-PCS | Mod: S$GLB,,, | Performed by: INTERNAL MEDICINE

## 2021-02-01 PROCEDURE — 1159F MED LIST DOCD IN RCRD: CPT | Mod: S$GLB,,, | Performed by: INTERNAL MEDICINE

## 2021-02-01 RX ORDER — GABAPENTIN 300 MG/1
600 CAPSULE ORAL NIGHTLY
Qty: 180 CAPSULE | Refills: 3 | Status: SHIPPED | OUTPATIENT
Start: 2021-02-01 | End: 2021-05-21 | Stop reason: SDUPTHER

## 2021-02-01 RX ORDER — MELOXICAM 7.5 MG/1
7.5 TABLET ORAL DAILY
Qty: 30 TABLET | Refills: 12 | Status: SHIPPED | OUTPATIENT
Start: 2021-02-01 | End: 2021-05-21 | Stop reason: SDUPTHER

## 2021-02-11 ENCOUNTER — CLINICAL SUPPORT (OUTPATIENT)
Dept: REHABILITATION | Facility: HOSPITAL | Age: 81
End: 2021-02-11
Attending: PSYCHIATRY & NEUROLOGY
Payer: MEDICARE

## 2021-02-11 DIAGNOSIS — M54.12 CERVICAL RADICULOPATHY: ICD-10-CM

## 2021-02-11 DIAGNOSIS — R29.898 UPPER EXTREMITY WEAKNESS: ICD-10-CM

## 2021-02-11 DIAGNOSIS — M54.16 LUMBAR RADICULOPATHY: ICD-10-CM

## 2021-02-11 DIAGNOSIS — M53.82 DECREASED ROM OF INTERVERTEBRAL DISCS OF CERVICAL SPINE: ICD-10-CM

## 2021-02-11 DIAGNOSIS — R29.898 WEAKNESS OF BOTH LOWER EXTREMITIES: Primary | ICD-10-CM

## 2021-02-11 DIAGNOSIS — M54.2 CERVICAL PAIN: ICD-10-CM

## 2021-02-11 PROCEDURE — 97110 THERAPEUTIC EXERCISES: CPT

## 2021-02-11 PROCEDURE — 97161 PT EVAL LOW COMPLEX 20 MIN: CPT

## 2021-02-18 ENCOUNTER — TELEPHONE (OUTPATIENT)
Dept: RESEARCH | Facility: HOSPITAL | Age: 81
End: 2021-02-18

## 2021-02-23 ENCOUNTER — CLINICAL SUPPORT (OUTPATIENT)
Dept: REHABILITATION | Facility: HOSPITAL | Age: 81
End: 2021-02-23
Attending: PSYCHIATRY & NEUROLOGY
Payer: MEDICARE

## 2021-02-23 DIAGNOSIS — M54.2 PAINFUL CERVICAL ROM: ICD-10-CM

## 2021-02-23 DIAGNOSIS — M53.82 DECREASED ROM OF INTERVERTEBRAL DISCS OF CERVICAL SPINE: ICD-10-CM

## 2021-02-23 DIAGNOSIS — R29.898 UPPER EXTREMITY WEAKNESS: ICD-10-CM

## 2021-02-23 PROCEDURE — 97110 THERAPEUTIC EXERCISES: CPT

## 2021-03-03 ENCOUNTER — CLINICAL SUPPORT (OUTPATIENT)
Dept: REHABILITATION | Facility: HOSPITAL | Age: 81
End: 2021-03-03
Payer: MEDICARE

## 2021-03-03 DIAGNOSIS — M54.2 PAINFUL CERVICAL ROM: ICD-10-CM

## 2021-03-03 DIAGNOSIS — R29.898 UPPER EXTREMITY WEAKNESS: ICD-10-CM

## 2021-03-03 DIAGNOSIS — M53.82 DECREASED ROM OF INTERVERTEBRAL DISCS OF CERVICAL SPINE: ICD-10-CM

## 2021-03-03 PROCEDURE — 97110 THERAPEUTIC EXERCISES: CPT

## 2021-03-08 ENCOUNTER — PES CALL (OUTPATIENT)
Dept: ADMINISTRATIVE | Facility: CLINIC | Age: 81
End: 2021-03-08

## 2021-03-08 ENCOUNTER — TELEPHONE (OUTPATIENT)
Dept: INTERNAL MEDICINE | Facility: CLINIC | Age: 81
End: 2021-03-08

## 2021-03-09 ENCOUNTER — OFFICE VISIT (OUTPATIENT)
Dept: INTERNAL MEDICINE | Facility: CLINIC | Age: 81
End: 2021-03-09
Payer: MEDICARE

## 2021-03-09 VITALS
BODY MASS INDEX: 25.45 KG/M2 | SYSTOLIC BLOOD PRESSURE: 132 MMHG | DIASTOLIC BLOOD PRESSURE: 64 MMHG | HEART RATE: 81 BPM | OXYGEN SATURATION: 100 % | HEIGHT: 60 IN | WEIGHT: 129.63 LBS

## 2021-03-09 DIAGNOSIS — N18.31 STAGE 3A CHRONIC KIDNEY DISEASE: ICD-10-CM

## 2021-03-09 DIAGNOSIS — M53.82 DECREASED ROM OF INTERVERTEBRAL DISCS OF CERVICAL SPINE: ICD-10-CM

## 2021-03-09 DIAGNOSIS — K58.9 IRRITABLE BOWEL SYNDROME, UNSPECIFIED TYPE: ICD-10-CM

## 2021-03-09 DIAGNOSIS — I77.819 ECTATIC AORTA: ICD-10-CM

## 2021-03-09 DIAGNOSIS — M54.2 PAINFUL CERVICAL ROM: ICD-10-CM

## 2021-03-09 DIAGNOSIS — M81.0 AGE-RELATED OSTEOPOROSIS WITHOUT CURRENT PATHOLOGICAL FRACTURE: ICD-10-CM

## 2021-03-09 DIAGNOSIS — Z00.00 ENCOUNTER FOR PREVENTIVE HEALTH EXAMINATION: Primary | ICD-10-CM

## 2021-03-09 DIAGNOSIS — J84.10 CALCIFIED GRANULOMA OF LUNG: ICD-10-CM

## 2021-03-09 DIAGNOSIS — E78.5 HYPERLIPIDEMIA, UNSPECIFIED HYPERLIPIDEMIA TYPE: ICD-10-CM

## 2021-03-09 DIAGNOSIS — D22.9 CHANGE IN SKIN MOLE: ICD-10-CM

## 2021-03-09 PROCEDURE — 99999 PR PBB SHADOW E&M-EST. PATIENT-LVL IV: CPT | Mod: PBBFAC,,, | Performed by: NURSE PRACTITIONER

## 2021-03-09 PROCEDURE — 3288F PR FALLS RISK ASSESSMENT DOCUMENTED: ICD-10-PCS | Mod: CPTII,S$GLB,, | Performed by: NURSE PRACTITIONER

## 2021-03-09 PROCEDURE — 1125F PR PAIN SEVERITY QUANTIFIED, PAIN PRESENT: ICD-10-PCS | Mod: S$GLB,,, | Performed by: NURSE PRACTITIONER

## 2021-03-09 PROCEDURE — 3288F FALL RISK ASSESSMENT DOCD: CPT | Mod: CPTII,S$GLB,, | Performed by: NURSE PRACTITIONER

## 2021-03-09 PROCEDURE — 1125F AMNT PAIN NOTED PAIN PRSNT: CPT | Mod: S$GLB,,, | Performed by: NURSE PRACTITIONER

## 2021-03-09 PROCEDURE — G0439 PPPS, SUBSEQ VISIT: HCPCS | Mod: S$GLB,,, | Performed by: NURSE PRACTITIONER

## 2021-03-09 PROCEDURE — 1158F ADVNC CARE PLAN TLK DOCD: CPT | Mod: S$GLB,,, | Performed by: NURSE PRACTITIONER

## 2021-03-09 PROCEDURE — G0439 PR MEDICARE ANNUAL WELLNESS SUBSEQUENT VISIT: ICD-10-PCS | Mod: S$GLB,,, | Performed by: NURSE PRACTITIONER

## 2021-03-09 PROCEDURE — 99999 PR PBB SHADOW E&M-EST. PATIENT-LVL IV: ICD-10-PCS | Mod: PBBFAC,,, | Performed by: NURSE PRACTITIONER

## 2021-03-09 PROCEDURE — 1158F PR ADVANCE CARE PLANNING DISCUSS DOCUMENTED IN MEDICAL RECORD: ICD-10-PCS | Mod: S$GLB,,, | Performed by: NURSE PRACTITIONER

## 2021-03-09 PROCEDURE — 1101F PT FALLS ASSESS-DOCD LE1/YR: CPT | Mod: CPTII,S$GLB,, | Performed by: NURSE PRACTITIONER

## 2021-03-09 PROCEDURE — 1101F PR PT FALLS ASSESS DOC 0-1 FALLS W/OUT INJ PAST YR: ICD-10-PCS | Mod: CPTII,S$GLB,, | Performed by: NURSE PRACTITIONER

## 2021-03-09 RX ORDER — DICLOFENAC SODIUM 10 MG/G
2 GEL TOPICAL DAILY
Qty: 150 G | Refills: 2 | Status: SHIPPED | OUTPATIENT
Start: 2021-03-09 | End: 2024-03-06

## 2021-03-10 ENCOUNTER — CLINICAL SUPPORT (OUTPATIENT)
Dept: REHABILITATION | Facility: HOSPITAL | Age: 81
End: 2021-03-10
Payer: MEDICARE

## 2021-03-10 DIAGNOSIS — M53.82 DECREASED ROM OF INTERVERTEBRAL DISCS OF CERVICAL SPINE: ICD-10-CM

## 2021-03-10 DIAGNOSIS — M54.2 PAINFUL CERVICAL ROM: ICD-10-CM

## 2021-03-10 DIAGNOSIS — R29.898 UPPER EXTREMITY WEAKNESS: ICD-10-CM

## 2021-03-10 PROCEDURE — 97110 THERAPEUTIC EXERCISES: CPT

## 2021-03-10 PROCEDURE — 97140 MANUAL THERAPY 1/> REGIONS: CPT

## 2021-03-15 ENCOUNTER — OFFICE VISIT (OUTPATIENT)
Dept: DERMATOLOGY | Facility: CLINIC | Age: 81
End: 2021-03-15
Payer: MEDICARE

## 2021-03-15 DIAGNOSIS — L82.1 SEBORRHEIC KERATOSIS: Primary | ICD-10-CM

## 2021-03-15 DIAGNOSIS — L72.0 MILIA: ICD-10-CM

## 2021-03-15 DIAGNOSIS — D22.9 CHANGE IN SKIN MOLE: ICD-10-CM

## 2021-03-15 DIAGNOSIS — R20.8 DYSESTHESIA: ICD-10-CM

## 2021-03-15 DIAGNOSIS — D18.00 HEMANGIOMA, UNSPECIFIED SITE: ICD-10-CM

## 2021-03-15 PROCEDURE — 99999 PR PBB SHADOW E&M-EST. PATIENT-LVL III: ICD-10-PCS | Mod: PBBFAC,,,

## 2021-03-15 PROCEDURE — 99999 PR PBB SHADOW E&M-EST. PATIENT-LVL III: CPT | Mod: PBBFAC,,,

## 2021-03-15 PROCEDURE — 99202 OFFICE O/P NEW SF 15 MIN: CPT | Mod: S$GLB,,, | Performed by: DERMATOLOGY

## 2021-03-15 PROCEDURE — 1159F PR MEDICATION LIST DOCUMENTED IN MEDICAL RECORD: ICD-10-PCS | Mod: S$GLB,,, | Performed by: DERMATOLOGY

## 2021-03-15 PROCEDURE — 99202 PR OFFICE/OUTPT VISIT, NEW, LEVL II, 15-29 MIN: ICD-10-PCS | Mod: S$GLB,,, | Performed by: DERMATOLOGY

## 2021-03-15 PROCEDURE — 1159F MED LIST DOCD IN RCRD: CPT | Mod: S$GLB,,, | Performed by: DERMATOLOGY

## 2021-03-31 ENCOUNTER — CLINICAL SUPPORT (OUTPATIENT)
Dept: REHABILITATION | Facility: HOSPITAL | Age: 81
End: 2021-03-31
Payer: MEDICARE

## 2021-03-31 DIAGNOSIS — R29.898 UPPER EXTREMITY WEAKNESS: ICD-10-CM

## 2021-03-31 DIAGNOSIS — M54.2 PAINFUL CERVICAL ROM: ICD-10-CM

## 2021-03-31 DIAGNOSIS — M53.82 DECREASED ROM OF INTERVERTEBRAL DISCS OF CERVICAL SPINE: ICD-10-CM

## 2021-03-31 PROCEDURE — 97110 THERAPEUTIC EXERCISES: CPT

## 2021-05-21 DIAGNOSIS — M54.12 RADICULOPATHY, CERVICAL REGION: ICD-10-CM

## 2021-05-21 DIAGNOSIS — M54.81 OCCIPITAL NEURALGIA OF LEFT SIDE: ICD-10-CM

## 2021-05-21 DIAGNOSIS — M54.16 LUMBAR RADICULOPATHY: ICD-10-CM

## 2021-05-21 RX ORDER — MELOXICAM 7.5 MG/1
7.5 TABLET ORAL DAILY
Qty: 30 TABLET | Refills: 12 | Status: SHIPPED | OUTPATIENT
Start: 2021-05-21 | End: 2023-02-03

## 2021-05-21 RX ORDER — GABAPENTIN 300 MG/1
600 CAPSULE ORAL NIGHTLY
Qty: 180 CAPSULE | Refills: 3 | Status: SHIPPED | OUTPATIENT
Start: 2021-05-21 | End: 2023-12-28

## 2021-07-20 ENCOUNTER — TELEPHONE (OUTPATIENT)
Dept: ALLERGY | Facility: CLINIC | Age: 81
End: 2021-07-20

## 2021-07-20 ENCOUNTER — OFFICE VISIT (OUTPATIENT)
Dept: INTERNAL MEDICINE | Facility: CLINIC | Age: 81
End: 2021-07-20
Payer: MEDICARE

## 2021-07-20 VITALS
SYSTOLIC BLOOD PRESSURE: 138 MMHG | HEIGHT: 60 IN | BODY MASS INDEX: 24.28 KG/M2 | DIASTOLIC BLOOD PRESSURE: 66 MMHG | HEART RATE: 90 BPM | WEIGHT: 123.69 LBS | OXYGEN SATURATION: 98 %

## 2021-07-20 DIAGNOSIS — J06.9 UPPER RESPIRATORY TRACT INFECTION, UNSPECIFIED TYPE: Primary | ICD-10-CM

## 2021-07-20 DIAGNOSIS — R30.0 DYSURIA: ICD-10-CM

## 2021-07-20 LAB
BILIRUB SERPL-MCNC: NORMAL MG/DL
BLOOD, POC UA: NORMAL
CLARITY UR: CLEAR
COLOR UR: YELLOW
GLUCOSE UR QL STRIP: NORMAL
KETONES UR QL STRIP: NORMAL
LEUKOCYTE ESTERASE URINE, POC: NORMAL
NITRITE, POC UA: NORMAL
PH, POC UA: 5
PROTEIN, POC: NORMAL
SPECIFIC GRAVITY, POC UA: 1.01
UROBILINOGEN, POC UA: NORMAL

## 2021-07-20 PROCEDURE — 1159F PR MEDICATION LIST DOCUMENTED IN MEDICAL RECORD: ICD-10-PCS | Mod: CPTII,S$GLB,, | Performed by: INTERNAL MEDICINE

## 2021-07-20 PROCEDURE — 81003 URINALYSIS AUTO W/O SCOPE: CPT | Performed by: INTERNAL MEDICINE

## 2021-07-20 PROCEDURE — 1159F MED LIST DOCD IN RCRD: CPT | Mod: CPTII,S$GLB,, | Performed by: INTERNAL MEDICINE

## 2021-07-20 PROCEDURE — 1126F AMNT PAIN NOTED NONE PRSNT: CPT | Mod: CPTII,S$GLB,, | Performed by: INTERNAL MEDICINE

## 2021-07-20 PROCEDURE — 1101F PR PT FALLS ASSESS DOC 0-1 FALLS W/OUT INJ PAST YR: ICD-10-PCS | Mod: CPTII,S$GLB,, | Performed by: INTERNAL MEDICINE

## 2021-07-20 PROCEDURE — 99213 OFFICE O/P EST LOW 20 MIN: CPT | Mod: 25,S$GLB,, | Performed by: INTERNAL MEDICINE

## 2021-07-20 PROCEDURE — 81003 URINALYSIS AUTO W/O SCOPE: CPT | Mod: QW,S$GLB,, | Performed by: INTERNAL MEDICINE

## 2021-07-20 PROCEDURE — 99999 PR PBB SHADOW E&M-EST. PATIENT-LVL IV: CPT | Mod: PBBFAC,,, | Performed by: INTERNAL MEDICINE

## 2021-07-20 PROCEDURE — 1101F PT FALLS ASSESS-DOCD LE1/YR: CPT | Mod: CPTII,S$GLB,, | Performed by: INTERNAL MEDICINE

## 2021-07-20 PROCEDURE — 81003 POCT URINALYSIS: ICD-10-PCS | Mod: QW,S$GLB,, | Performed by: INTERNAL MEDICINE

## 2021-07-20 PROCEDURE — 3288F FALL RISK ASSESSMENT DOCD: CPT | Mod: CPTII,S$GLB,, | Performed by: INTERNAL MEDICINE

## 2021-07-20 PROCEDURE — 99999 PR PBB SHADOW E&M-EST. PATIENT-LVL IV: ICD-10-PCS | Mod: PBBFAC,,, | Performed by: INTERNAL MEDICINE

## 2021-07-20 PROCEDURE — 3288F PR FALLS RISK ASSESSMENT DOCUMENTED: ICD-10-PCS | Mod: CPTII,S$GLB,, | Performed by: INTERNAL MEDICINE

## 2021-07-20 PROCEDURE — 1126F PR PAIN SEVERITY QUANTIFIED, NO PAIN PRESENT: ICD-10-PCS | Mod: CPTII,S$GLB,, | Performed by: INTERNAL MEDICINE

## 2021-07-20 PROCEDURE — 99213 PR OFFICE/OUTPT VISIT, EST, LEVL III, 20-29 MIN: ICD-10-PCS | Mod: 25,S$GLB,, | Performed by: INTERNAL MEDICINE

## 2021-07-21 LAB
BILIRUB UR QL STRIP: NEGATIVE
CLARITY UR REFRACT.AUTO: ABNORMAL
COLOR UR AUTO: YELLOW
GLUCOSE UR QL STRIP: NEGATIVE
HGB UR QL STRIP: NEGATIVE
KETONES UR QL STRIP: NEGATIVE
LEUKOCYTE ESTERASE UR QL STRIP: NEGATIVE
NITRITE UR QL STRIP: NEGATIVE
PH UR STRIP: 6 [PH] (ref 5–8)
PROT UR QL STRIP: NEGATIVE
SP GR UR STRIP: 1.01 (ref 1–1.03)
URN SPEC COLLECT METH UR: ABNORMAL

## 2021-07-23 ENCOUNTER — PATIENT MESSAGE (OUTPATIENT)
Dept: INTERNAL MEDICINE | Facility: CLINIC | Age: 81
End: 2021-07-23

## 2021-08-02 ENCOUNTER — TELEPHONE (OUTPATIENT)
Dept: INTERNAL MEDICINE | Facility: CLINIC | Age: 81
End: 2021-08-02

## 2021-08-02 DIAGNOSIS — K64.9 BLEEDING HEMORRHOID: Primary | ICD-10-CM

## 2021-08-04 ENCOUNTER — OFFICE VISIT (OUTPATIENT)
Dept: SURGERY | Facility: CLINIC | Age: 81
End: 2021-08-04
Payer: MEDICARE

## 2021-08-04 VITALS
HEIGHT: 60 IN | WEIGHT: 122.81 LBS | DIASTOLIC BLOOD PRESSURE: 66 MMHG | HEART RATE: 100 BPM | SYSTOLIC BLOOD PRESSURE: 144 MMHG | BODY MASS INDEX: 24.11 KG/M2

## 2021-08-04 DIAGNOSIS — K64.9 BLEEDING HEMORRHOID: ICD-10-CM

## 2021-08-04 PROCEDURE — 99204 OFFICE O/P NEW MOD 45 MIN: CPT | Mod: 25,S$GLB,, | Performed by: NURSE PRACTITIONER

## 2021-08-04 PROCEDURE — 3078F DIAST BP <80 MM HG: CPT | Mod: CPTII,S$GLB,, | Performed by: NURSE PRACTITIONER

## 2021-08-04 PROCEDURE — 3288F FALL RISK ASSESSMENT DOCD: CPT | Mod: CPTII,S$GLB,, | Performed by: NURSE PRACTITIONER

## 2021-08-04 PROCEDURE — 3288F PR FALLS RISK ASSESSMENT DOCUMENTED: ICD-10-PCS | Mod: CPTII,S$GLB,, | Performed by: NURSE PRACTITIONER

## 2021-08-04 PROCEDURE — 3077F SYST BP >= 140 MM HG: CPT | Mod: CPTII,S$GLB,, | Performed by: NURSE PRACTITIONER

## 2021-08-04 PROCEDURE — 46600 DIAGNOSTIC ANOSCOPY SPX: CPT | Mod: S$GLB,,, | Performed by: NURSE PRACTITIONER

## 2021-08-04 PROCEDURE — 1101F PR PT FALLS ASSESS DOC 0-1 FALLS W/OUT INJ PAST YR: ICD-10-PCS | Mod: CPTII,S$GLB,, | Performed by: NURSE PRACTITIONER

## 2021-08-04 PROCEDURE — 1159F PR MEDICATION LIST DOCUMENTED IN MEDICAL RECORD: ICD-10-PCS | Mod: CPTII,S$GLB,, | Performed by: NURSE PRACTITIONER

## 2021-08-04 PROCEDURE — 1126F AMNT PAIN NOTED NONE PRSNT: CPT | Mod: CPTII,S$GLB,, | Performed by: NURSE PRACTITIONER

## 2021-08-04 PROCEDURE — 1101F PT FALLS ASSESS-DOCD LE1/YR: CPT | Mod: CPTII,S$GLB,, | Performed by: NURSE PRACTITIONER

## 2021-08-04 PROCEDURE — 99999 PR PBB SHADOW E&M-EST. PATIENT-LVL III: ICD-10-PCS | Mod: PBBFAC,,, | Performed by: NURSE PRACTITIONER

## 2021-08-04 PROCEDURE — 3078F PR MOST RECENT DIASTOLIC BLOOD PRESSURE < 80 MM HG: ICD-10-PCS | Mod: CPTII,S$GLB,, | Performed by: NURSE PRACTITIONER

## 2021-08-04 PROCEDURE — 46600 PR DIAG2STIC A2SCOPY: ICD-10-PCS | Mod: S$GLB,,, | Performed by: NURSE PRACTITIONER

## 2021-08-04 PROCEDURE — 99204 PR OFFICE/OUTPT VISIT, NEW, LEVL IV, 45-59 MIN: ICD-10-PCS | Mod: 25,S$GLB,, | Performed by: NURSE PRACTITIONER

## 2021-08-04 PROCEDURE — 1159F MED LIST DOCD IN RCRD: CPT | Mod: CPTII,S$GLB,, | Performed by: NURSE PRACTITIONER

## 2021-08-04 PROCEDURE — 99999 PR PBB SHADOW E&M-EST. PATIENT-LVL III: CPT | Mod: PBBFAC,,, | Performed by: NURSE PRACTITIONER

## 2021-08-04 PROCEDURE — 3077F PR MOST RECENT SYSTOLIC BLOOD PRESSURE >= 140 MM HG: ICD-10-PCS | Mod: CPTII,S$GLB,, | Performed by: NURSE PRACTITIONER

## 2021-08-04 PROCEDURE — 1126F PR PAIN SEVERITY QUANTIFIED, NO PAIN PRESENT: ICD-10-PCS | Mod: CPTII,S$GLB,, | Performed by: NURSE PRACTITIONER

## 2021-08-04 RX ORDER — HYDROCORTISONE ACETATE 25 MG/1
25 SUPPOSITORY RECTAL 2 TIMES DAILY
Qty: 20 SUPPOSITORY | Refills: 0 | Status: SHIPPED | OUTPATIENT
Start: 2021-08-04 | End: 2021-08-14

## 2021-08-09 ENCOUNTER — OFFICE VISIT (OUTPATIENT)
Dept: INTERNAL MEDICINE | Facility: CLINIC | Age: 81
End: 2021-08-09
Payer: MEDICARE

## 2021-08-09 ENCOUNTER — LAB VISIT (OUTPATIENT)
Dept: LAB | Facility: HOSPITAL | Age: 81
End: 2021-08-09
Payer: MEDICARE

## 2021-08-09 ENCOUNTER — TELEPHONE (OUTPATIENT)
Dept: INTERNAL MEDICINE | Facility: CLINIC | Age: 81
End: 2021-08-09

## 2021-08-09 ENCOUNTER — TELEPHONE (OUTPATIENT)
Dept: OBSTETRICS AND GYNECOLOGY | Facility: CLINIC | Age: 81
End: 2021-08-09

## 2021-08-09 VITALS
OXYGEN SATURATION: 99 % | SYSTOLIC BLOOD PRESSURE: 130 MMHG | BODY MASS INDEX: 23.94 KG/M2 | HEART RATE: 86 BPM | WEIGHT: 121.94 LBS | HEIGHT: 60 IN | DIASTOLIC BLOOD PRESSURE: 62 MMHG

## 2021-08-09 DIAGNOSIS — R30.9 PAINFUL URINATION: ICD-10-CM

## 2021-08-09 DIAGNOSIS — R10.84 GENERALIZED ABDOMINAL PAIN: ICD-10-CM

## 2021-08-09 DIAGNOSIS — R10.84 GENERALIZED ABDOMINAL PAIN: Primary | ICD-10-CM

## 2021-08-09 LAB
BASOPHILS # BLD AUTO: 0.07 K/UL (ref 0–0.2)
BASOPHILS NFR BLD: 0.8 % (ref 0–1.9)
BILIRUB SERPL-MCNC: NORMAL MG/DL
BLOOD URINE, POC: NORMAL
CLARITY, POC UA: CLEAR
COLOR, POC UA: YELLOW
DIFFERENTIAL METHOD: ABNORMAL
EOSINOPHIL # BLD AUTO: 0.3 K/UL (ref 0–0.5)
EOSINOPHIL NFR BLD: 3.4 % (ref 0–8)
ERYTHROCYTE [DISTWIDTH] IN BLOOD BY AUTOMATED COUNT: 13.6 % (ref 11.5–14.5)
GLUCOSE UR QL STRIP: NORMAL
HCT VFR BLD AUTO: 36 % (ref 37–48.5)
HGB BLD-MCNC: 11.5 G/DL (ref 12–16)
IMM GRANULOCYTES # BLD AUTO: 0.07 K/UL (ref 0–0.04)
IMM GRANULOCYTES NFR BLD AUTO: 0.8 % (ref 0–0.5)
KETONES UR QL STRIP: NORMAL
LEUKOCYTE ESTERASE URINE, POC: NORMAL
LYMPHOCYTES # BLD AUTO: 3 K/UL (ref 1–4.8)
LYMPHOCYTES NFR BLD: 35.7 % (ref 18–48)
MCH RBC QN AUTO: 28.2 PG (ref 27–31)
MCHC RBC AUTO-ENTMCNC: 31.9 G/DL (ref 32–36)
MCV RBC AUTO: 88 FL (ref 82–98)
MONOCYTES # BLD AUTO: 0.7 K/UL (ref 0.3–1)
MONOCYTES NFR BLD: 7.8 % (ref 4–15)
NEUTROPHILS # BLD AUTO: 4.4 K/UL (ref 1.8–7.7)
NEUTROPHILS NFR BLD: 51.5 % (ref 38–73)
NITRITE, POC UA: NORMAL
NRBC BLD-RTO: 0 /100 WBC
PH, POC UA: 5
PLATELET # BLD AUTO: 296 K/UL (ref 150–450)
PMV BLD AUTO: 10.7 FL (ref 9.2–12.9)
PROTEIN, POC: NORMAL
RBC # BLD AUTO: 4.08 M/UL (ref 4–5.4)
SPECIFIC GRAVITY, POC UA: 1.02
UROBILINOGEN, POC UA: NORMAL
WBC # BLD AUTO: 8.49 K/UL (ref 3.9–12.7)

## 2021-08-09 PROCEDURE — 3075F SYST BP GE 130 - 139MM HG: CPT | Mod: CPTII,S$GLB,, | Performed by: NURSE PRACTITIONER

## 2021-08-09 PROCEDURE — 1125F AMNT PAIN NOTED PAIN PRSNT: CPT | Mod: CPTII,S$GLB,, | Performed by: NURSE PRACTITIONER

## 2021-08-09 PROCEDURE — 1125F PR PAIN SEVERITY QUANTIFIED, PAIN PRESENT: ICD-10-PCS | Mod: CPTII,S$GLB,, | Performed by: NURSE PRACTITIONER

## 2021-08-09 PROCEDURE — 85025 COMPLETE CBC W/AUTO DIFF WBC: CPT | Performed by: NURSE PRACTITIONER

## 2021-08-09 PROCEDURE — 99214 OFFICE O/P EST MOD 30 MIN: CPT | Mod: S$GLB,,, | Performed by: NURSE PRACTITIONER

## 2021-08-09 PROCEDURE — 99999 PR PBB SHADOW E&M-EST. PATIENT-LVL IV: ICD-10-PCS | Mod: PBBFAC,,, | Performed by: NURSE PRACTITIONER

## 2021-08-09 PROCEDURE — 3288F PR FALLS RISK ASSESSMENT DOCUMENTED: ICD-10-PCS | Mod: CPTII,S$GLB,, | Performed by: NURSE PRACTITIONER

## 2021-08-09 PROCEDURE — 3078F PR MOST RECENT DIASTOLIC BLOOD PRESSURE < 80 MM HG: ICD-10-PCS | Mod: CPTII,S$GLB,, | Performed by: NURSE PRACTITIONER

## 2021-08-09 PROCEDURE — 3288F FALL RISK ASSESSMENT DOCD: CPT | Mod: CPTII,S$GLB,, | Performed by: NURSE PRACTITIONER

## 2021-08-09 PROCEDURE — 1159F MED LIST DOCD IN RCRD: CPT | Mod: CPTII,S$GLB,, | Performed by: NURSE PRACTITIONER

## 2021-08-09 PROCEDURE — 3078F DIAST BP <80 MM HG: CPT | Mod: CPTII,S$GLB,, | Performed by: NURSE PRACTITIONER

## 2021-08-09 PROCEDURE — 1159F PR MEDICATION LIST DOCUMENTED IN MEDICAL RECORD: ICD-10-PCS | Mod: CPTII,S$GLB,, | Performed by: NURSE PRACTITIONER

## 2021-08-09 PROCEDURE — 80053 COMPREHEN METABOLIC PANEL: CPT | Performed by: NURSE PRACTITIONER

## 2021-08-09 PROCEDURE — 1101F PR PT FALLS ASSESS DOC 0-1 FALLS W/OUT INJ PAST YR: ICD-10-PCS | Mod: CPTII,S$GLB,, | Performed by: NURSE PRACTITIONER

## 2021-08-09 PROCEDURE — 36415 COLL VENOUS BLD VENIPUNCTURE: CPT | Performed by: NURSE PRACTITIONER

## 2021-08-09 PROCEDURE — 1101F PT FALLS ASSESS-DOCD LE1/YR: CPT | Mod: CPTII,S$GLB,, | Performed by: NURSE PRACTITIONER

## 2021-08-09 PROCEDURE — 87086 URINE CULTURE/COLONY COUNT: CPT | Performed by: NURSE PRACTITIONER

## 2021-08-09 PROCEDURE — 99999 PR PBB SHADOW E&M-EST. PATIENT-LVL IV: CPT | Mod: PBBFAC,,, | Performed by: NURSE PRACTITIONER

## 2021-08-09 PROCEDURE — 81002 URINALYSIS NONAUTO W/O SCOPE: CPT | Mod: S$GLB,,, | Performed by: NURSE PRACTITIONER

## 2021-08-09 PROCEDURE — 81002 POCT URINE DIPSTICK WITHOUT MICROSCOPE: ICD-10-PCS | Mod: S$GLB,,, | Performed by: NURSE PRACTITIONER

## 2021-08-09 PROCEDURE — 3075F PR MOST RECENT SYSTOLIC BLOOD PRESS GE 130-139MM HG: ICD-10-PCS | Mod: CPTII,S$GLB,, | Performed by: NURSE PRACTITIONER

## 2021-08-09 PROCEDURE — 99214 PR OFFICE/OUTPT VISIT, EST, LEVL IV, 30-39 MIN: ICD-10-PCS | Mod: S$GLB,,, | Performed by: NURSE PRACTITIONER

## 2021-08-09 RX ORDER — ZOSTER VACCINE RECOMBINANT, ADJUVANTED 50 MCG/0.5
KIT INTRAMUSCULAR
COMMUNITY
Start: 2021-05-24 | End: 2022-09-12

## 2021-08-10 LAB
ALBUMIN SERPL BCP-MCNC: 3.7 G/DL (ref 3.5–5.2)
ALP SERPL-CCNC: 56 U/L (ref 55–135)
ALT SERPL W/O P-5'-P-CCNC: 11 U/L (ref 10–44)
ANION GAP SERPL CALC-SCNC: 13 MMOL/L (ref 8–16)
AST SERPL-CCNC: 20 U/L (ref 10–40)
BACTERIA UR CULT: NORMAL
BACTERIA UR CULT: NORMAL
BILIRUB SERPL-MCNC: 1 MG/DL (ref 0.1–1)
BUN SERPL-MCNC: 18 MG/DL (ref 8–23)
CALCIUM SERPL-MCNC: 9.9 MG/DL (ref 8.7–10.5)
CHLORIDE SERPL-SCNC: 105 MMOL/L (ref 95–110)
CO2 SERPL-SCNC: 21 MMOL/L (ref 23–29)
CREAT SERPL-MCNC: 0.8 MG/DL (ref 0.5–1.4)
EST. GFR  (AFRICAN AMERICAN): >60 ML/MIN/1.73 M^2
EST. GFR  (NON AFRICAN AMERICAN): >60 ML/MIN/1.73 M^2
GLUCOSE SERPL-MCNC: 94 MG/DL (ref 70–110)
POTASSIUM SERPL-SCNC: 4.2 MMOL/L (ref 3.5–5.1)
PROT SERPL-MCNC: 7.4 G/DL (ref 6–8.4)
SODIUM SERPL-SCNC: 139 MMOL/L (ref 136–145)

## 2021-08-11 ENCOUNTER — PATIENT MESSAGE (OUTPATIENT)
Dept: INTERNAL MEDICINE | Facility: CLINIC | Age: 81
End: 2021-08-11

## 2021-08-11 ENCOUNTER — HOSPITAL ENCOUNTER (OUTPATIENT)
Dept: RADIOLOGY | Facility: HOSPITAL | Age: 81
Discharge: HOME | End: 2021-08-11
Attending: NURSE PRACTITIONER
Payer: MEDICARE

## 2021-08-11 DIAGNOSIS — R10.84 GENERALIZED ABDOMINAL PAIN: ICD-10-CM

## 2021-08-11 PROCEDURE — 76700 US EXAM ABDOM COMPLETE: CPT | Mod: 26,,, | Performed by: INTERNAL MEDICINE

## 2021-08-11 PROCEDURE — 76700 US EXAM ABDOM COMPLETE: CPT | Mod: TC

## 2021-08-11 PROCEDURE — 76700 US ABDOMEN COMPLETE: ICD-10-PCS | Mod: 26,,, | Performed by: INTERNAL MEDICINE

## 2021-08-12 ENCOUNTER — HOSPITAL ENCOUNTER (OUTPATIENT)
Dept: RADIOLOGY | Facility: OTHER | Age: 81
Discharge: HOME | End: 2021-08-12
Attending: NURSE PRACTITIONER
Payer: MEDICARE

## 2021-08-12 ENCOUNTER — PATIENT MESSAGE (OUTPATIENT)
Dept: OBSTETRICS AND GYNECOLOGY | Facility: CLINIC | Age: 81
End: 2021-08-12

## 2021-08-12 DIAGNOSIS — N95.0 POST-MENOPAUSE BLEEDING: Primary | ICD-10-CM

## 2021-08-12 DIAGNOSIS — N95.0 POST-MENOPAUSE BLEEDING: ICD-10-CM

## 2021-08-12 PROCEDURE — 76856 US EXAM PELVIC COMPLETE: CPT | Mod: TC

## 2021-08-12 PROCEDURE — 76856 US EXAM PELVIC COMPLETE: CPT | Mod: 26,,, | Performed by: RADIOLOGY

## 2021-08-12 PROCEDURE — 76830 TRANSVAGINAL US NON-OB: CPT | Mod: 26,,, | Performed by: RADIOLOGY

## 2021-08-12 PROCEDURE — 76830 US PELVIS COMP WITH TRANSVAG NON-OB (XPD): ICD-10-PCS | Mod: 26,,, | Performed by: RADIOLOGY

## 2021-08-12 PROCEDURE — 76856 US PELVIS COMP WITH TRANSVAG NON-OB (XPD): ICD-10-PCS | Mod: 26,,, | Performed by: RADIOLOGY

## 2021-08-17 ENCOUNTER — OFFICE VISIT (OUTPATIENT)
Dept: OBSTETRICS AND GYNECOLOGY | Facility: CLINIC | Age: 81
End: 2021-08-17
Payer: MEDICARE

## 2021-08-17 VITALS
SYSTOLIC BLOOD PRESSURE: 132 MMHG | WEIGHT: 120.56 LBS | DIASTOLIC BLOOD PRESSURE: 60 MMHG | BODY MASS INDEX: 23.67 KG/M2 | HEIGHT: 60 IN

## 2021-08-17 DIAGNOSIS — R14.0 ABDOMINAL BLOATING: ICD-10-CM

## 2021-08-17 DIAGNOSIS — Z90.710 S/P HYSTERECTOMY: Primary | ICD-10-CM

## 2021-08-17 DIAGNOSIS — N95.2 VAGINAL ATROPHY: ICD-10-CM

## 2021-08-17 DIAGNOSIS — R10.2 PELVIC PAIN IN FEMALE: ICD-10-CM

## 2021-08-17 PROCEDURE — 3078F DIAST BP <80 MM HG: CPT | Mod: CPTII,S$GLB,, | Performed by: OBSTETRICS & GYNECOLOGY

## 2021-08-17 PROCEDURE — 1159F MED LIST DOCD IN RCRD: CPT | Mod: CPTII,S$GLB,, | Performed by: OBSTETRICS & GYNECOLOGY

## 2021-08-17 PROCEDURE — 1159F PR MEDICATION LIST DOCUMENTED IN MEDICAL RECORD: ICD-10-PCS | Mod: CPTII,S$GLB,, | Performed by: OBSTETRICS & GYNECOLOGY

## 2021-08-17 PROCEDURE — 3288F FALL RISK ASSESSMENT DOCD: CPT | Mod: CPTII,S$GLB,, | Performed by: OBSTETRICS & GYNECOLOGY

## 2021-08-17 PROCEDURE — 1126F AMNT PAIN NOTED NONE PRSNT: CPT | Mod: CPTII,S$GLB,, | Performed by: OBSTETRICS & GYNECOLOGY

## 2021-08-17 PROCEDURE — 1126F PR PAIN SEVERITY QUANTIFIED, NO PAIN PRESENT: ICD-10-PCS | Mod: CPTII,S$GLB,, | Performed by: OBSTETRICS & GYNECOLOGY

## 2021-08-17 PROCEDURE — 1160F RVW MEDS BY RX/DR IN RCRD: CPT | Mod: CPTII,S$GLB,, | Performed by: OBSTETRICS & GYNECOLOGY

## 2021-08-17 PROCEDURE — 3078F PR MOST RECENT DIASTOLIC BLOOD PRESSURE < 80 MM HG: ICD-10-PCS | Mod: CPTII,S$GLB,, | Performed by: OBSTETRICS & GYNECOLOGY

## 2021-08-17 PROCEDURE — 1160F PR REVIEW ALL MEDS BY PRESCRIBER/CLIN PHARMACIST DOCUMENTED: ICD-10-PCS | Mod: CPTII,S$GLB,, | Performed by: OBSTETRICS & GYNECOLOGY

## 2021-08-17 PROCEDURE — 1101F PR PT FALLS ASSESS DOC 0-1 FALLS W/OUT INJ PAST YR: ICD-10-PCS | Mod: CPTII,S$GLB,, | Performed by: OBSTETRICS & GYNECOLOGY

## 2021-08-17 PROCEDURE — 3075F SYST BP GE 130 - 139MM HG: CPT | Mod: CPTII,S$GLB,, | Performed by: OBSTETRICS & GYNECOLOGY

## 2021-08-17 PROCEDURE — 1101F PT FALLS ASSESS-DOCD LE1/YR: CPT | Mod: CPTII,S$GLB,, | Performed by: OBSTETRICS & GYNECOLOGY

## 2021-08-17 PROCEDURE — 99999 PR PBB SHADOW E&M-EST. PATIENT-LVL III: CPT | Mod: PBBFAC,,, | Performed by: OBSTETRICS & GYNECOLOGY

## 2021-08-17 PROCEDURE — 99999 PR PBB SHADOW E&M-EST. PATIENT-LVL III: ICD-10-PCS | Mod: PBBFAC,,, | Performed by: OBSTETRICS & GYNECOLOGY

## 2021-08-17 PROCEDURE — 3075F PR MOST RECENT SYSTOLIC BLOOD PRESS GE 130-139MM HG: ICD-10-PCS | Mod: CPTII,S$GLB,, | Performed by: OBSTETRICS & GYNECOLOGY

## 2021-08-17 PROCEDURE — 3288F PR FALLS RISK ASSESSMENT DOCUMENTED: ICD-10-PCS | Mod: CPTII,S$GLB,, | Performed by: OBSTETRICS & GYNECOLOGY

## 2021-08-17 PROCEDURE — 99214 PR OFFICE/OUTPT VISIT, EST, LEVL IV, 30-39 MIN: ICD-10-PCS | Mod: S$GLB,,, | Performed by: OBSTETRICS & GYNECOLOGY

## 2021-08-17 PROCEDURE — 99214 OFFICE O/P EST MOD 30 MIN: CPT | Mod: S$GLB,,, | Performed by: OBSTETRICS & GYNECOLOGY

## 2021-08-17 RX ORDER — ESTRADIOL 0.1 MG/G
1 CREAM VAGINAL
Qty: 42.5 G | Refills: 3 | Status: SHIPPED | OUTPATIENT
Start: 2021-08-19 | End: 2022-03-08

## 2021-09-02 ENCOUNTER — PATIENT MESSAGE (OUTPATIENT)
Dept: NEUROLOGY | Facility: CLINIC | Age: 81
End: 2021-09-02

## 2021-09-21 ENCOUNTER — PATIENT MESSAGE (OUTPATIENT)
Dept: OTOLARYNGOLOGY | Facility: CLINIC | Age: 81
End: 2021-09-21

## 2021-09-22 ENCOUNTER — TELEPHONE (OUTPATIENT)
Dept: OTOLARYNGOLOGY | Facility: CLINIC | Age: 81
End: 2021-09-22

## 2021-09-24 ENCOUNTER — OFFICE VISIT (OUTPATIENT)
Dept: OTOLARYNGOLOGY | Facility: CLINIC | Age: 81
End: 2021-09-24
Payer: MEDICARE

## 2021-09-24 VITALS — BODY MASS INDEX: 23.38 KG/M2 | WEIGHT: 119.69 LBS

## 2021-09-24 DIAGNOSIS — H61.21 IMPACTED CERUMEN OF RIGHT EAR: ICD-10-CM

## 2021-09-24 DIAGNOSIS — H81.10 BPPV (BENIGN PAROXYSMAL POSITIONAL VERTIGO), UNSPECIFIED LATERALITY: Primary | ICD-10-CM

## 2021-09-24 PROCEDURE — 99999 PR PBB SHADOW E&M-EST. PATIENT-LVL III: CPT | Mod: PBBFAC,HCNC,, | Performed by: OTOLARYNGOLOGY

## 2021-09-24 PROCEDURE — 69210 EAR CERUMEN REMOVAL: ICD-10-PCS | Mod: HCNC,S$GLB,, | Performed by: OTOLARYNGOLOGY

## 2021-09-24 PROCEDURE — 1160F RVW MEDS BY RX/DR IN RCRD: CPT | Mod: HCNC,CPTII,S$GLB, | Performed by: OTOLARYNGOLOGY

## 2021-09-24 PROCEDURE — 1159F PR MEDICATION LIST DOCUMENTED IN MEDICAL RECORD: ICD-10-PCS | Mod: HCNC,CPTII,S$GLB, | Performed by: OTOLARYNGOLOGY

## 2021-09-24 PROCEDURE — 1126F AMNT PAIN NOTED NONE PRSNT: CPT | Mod: HCNC,CPTII,S$GLB, | Performed by: OTOLARYNGOLOGY

## 2021-09-24 PROCEDURE — 99213 OFFICE O/P EST LOW 20 MIN: CPT | Mod: 25,HCNC,S$GLB, | Performed by: OTOLARYNGOLOGY

## 2021-09-24 PROCEDURE — 99999 PR PBB SHADOW E&M-EST. PATIENT-LVL III: ICD-10-PCS | Mod: PBBFAC,HCNC,, | Performed by: OTOLARYNGOLOGY

## 2021-09-24 PROCEDURE — 1159F MED LIST DOCD IN RCRD: CPT | Mod: HCNC,CPTII,S$GLB, | Performed by: OTOLARYNGOLOGY

## 2021-09-24 PROCEDURE — 1160F PR REVIEW ALL MEDS BY PRESCRIBER/CLIN PHARMACIST DOCUMENTED: ICD-10-PCS | Mod: HCNC,CPTII,S$GLB, | Performed by: OTOLARYNGOLOGY

## 2021-09-24 PROCEDURE — 99213 PR OFFICE/OUTPT VISIT, EST, LEVL III, 20-29 MIN: ICD-10-PCS | Mod: 25,HCNC,S$GLB, | Performed by: OTOLARYNGOLOGY

## 2021-09-24 PROCEDURE — 1126F PR PAIN SEVERITY QUANTIFIED, NO PAIN PRESENT: ICD-10-PCS | Mod: HCNC,CPTII,S$GLB, | Performed by: OTOLARYNGOLOGY

## 2021-09-24 PROCEDURE — 69210 REMOVE IMPACTED EAR WAX UNI: CPT | Mod: HCNC,S$GLB,, | Performed by: OTOLARYNGOLOGY

## 2021-09-28 ENCOUNTER — PATIENT MESSAGE (OUTPATIENT)
Dept: OBSTETRICS AND GYNECOLOGY | Facility: CLINIC | Age: 81
End: 2021-09-28

## 2021-09-30 ENCOUNTER — PATIENT OUTREACH (OUTPATIENT)
Dept: ADMINISTRATIVE | Facility: OTHER | Age: 81
End: 2021-09-30

## 2021-10-01 ENCOUNTER — OFFICE VISIT (OUTPATIENT)
Dept: OBSTETRICS AND GYNECOLOGY | Facility: CLINIC | Age: 81
End: 2021-10-01
Payer: MEDICARE

## 2021-10-01 VITALS
WEIGHT: 119.69 LBS | SYSTOLIC BLOOD PRESSURE: 126 MMHG | HEIGHT: 60 IN | BODY MASS INDEX: 23.5 KG/M2 | DIASTOLIC BLOOD PRESSURE: 64 MMHG

## 2021-10-01 DIAGNOSIS — Z78.0 POSTMENOPAUSAL: ICD-10-CM

## 2021-10-01 DIAGNOSIS — Z01.419 ENCOUNTER FOR GYNECOLOGICAL EXAMINATION WITHOUT ABNORMAL FINDING: ICD-10-CM

## 2021-10-01 DIAGNOSIS — N93.9 VAGINAL BLEEDING: Primary | ICD-10-CM

## 2021-10-01 DIAGNOSIS — L92.9 GRANULATION TISSUE: ICD-10-CM

## 2021-10-01 DIAGNOSIS — N89.8 VAGINAL DISCHARGE: ICD-10-CM

## 2021-10-01 PROCEDURE — 88305 TISSUE EXAM BY PATHOLOGIST: CPT | Mod: HCNC | Performed by: PATHOLOGY

## 2021-10-01 PROCEDURE — 88305 TISSUE EXAM BY PATHOLOGIST: ICD-10-PCS | Mod: 26,HCNC,, | Performed by: PATHOLOGY

## 2021-10-01 PROCEDURE — 3078F PR MOST RECENT DIASTOLIC BLOOD PRESSURE < 80 MM HG: ICD-10-PCS | Mod: HCNC,CPTII,S$GLB, | Performed by: OBSTETRICS & GYNECOLOGY

## 2021-10-01 PROCEDURE — 1159F PR MEDICATION LIST DOCUMENTED IN MEDICAL RECORD: ICD-10-PCS | Mod: HCNC,CPTII,S$GLB, | Performed by: OBSTETRICS & GYNECOLOGY

## 2021-10-01 PROCEDURE — 99999 PR PBB SHADOW E&M-EST. PATIENT-LVL III: CPT | Mod: PBBFAC,HCNC,, | Performed by: OBSTETRICS & GYNECOLOGY

## 2021-10-01 PROCEDURE — 1159F MED LIST DOCD IN RCRD: CPT | Mod: HCNC,CPTII,S$GLB, | Performed by: OBSTETRICS & GYNECOLOGY

## 2021-10-01 PROCEDURE — 1160F RVW MEDS BY RX/DR IN RCRD: CPT | Mod: HCNC,CPTII,S$GLB, | Performed by: OBSTETRICS & GYNECOLOGY

## 2021-10-01 PROCEDURE — 99214 PR OFFICE/OUTPT VISIT, EST, LEVL IV, 30-39 MIN: ICD-10-PCS | Mod: HCNC,S$GLB,, | Performed by: OBSTETRICS & GYNECOLOGY

## 2021-10-01 PROCEDURE — 1126F PR PAIN SEVERITY QUANTIFIED, NO PAIN PRESENT: ICD-10-PCS | Mod: HCNC,CPTII,S$GLB, | Performed by: OBSTETRICS & GYNECOLOGY

## 2021-10-01 PROCEDURE — 87481 CANDIDA DNA AMP PROBE: CPT | Mod: 59,HCNC | Performed by: OBSTETRICS & GYNECOLOGY

## 2021-10-01 PROCEDURE — 3078F DIAST BP <80 MM HG: CPT | Mod: HCNC,CPTII,S$GLB, | Performed by: OBSTETRICS & GYNECOLOGY

## 2021-10-01 PROCEDURE — 88305 TISSUE EXAM BY PATHOLOGIST: CPT | Mod: 26,HCNC,, | Performed by: PATHOLOGY

## 2021-10-01 PROCEDURE — 3074F PR MOST RECENT SYSTOLIC BLOOD PRESSURE < 130 MM HG: ICD-10-PCS | Mod: HCNC,CPTII,S$GLB, | Performed by: OBSTETRICS & GYNECOLOGY

## 2021-10-01 PROCEDURE — 3074F SYST BP LT 130 MM HG: CPT | Mod: HCNC,CPTII,S$GLB, | Performed by: OBSTETRICS & GYNECOLOGY

## 2021-10-01 PROCEDURE — 99999 PR PBB SHADOW E&M-EST. PATIENT-LVL III: ICD-10-PCS | Mod: PBBFAC,HCNC,, | Performed by: OBSTETRICS & GYNECOLOGY

## 2021-10-01 PROCEDURE — 3288F PR FALLS RISK ASSESSMENT DOCUMENTED: ICD-10-PCS | Mod: HCNC,CPTII,S$GLB, | Performed by: OBSTETRICS & GYNECOLOGY

## 2021-10-01 PROCEDURE — 99214 OFFICE O/P EST MOD 30 MIN: CPT | Mod: HCNC,S$GLB,, | Performed by: OBSTETRICS & GYNECOLOGY

## 2021-10-01 PROCEDURE — 1101F PR PT FALLS ASSESS DOC 0-1 FALLS W/OUT INJ PAST YR: ICD-10-PCS | Mod: HCNC,CPTII,S$GLB, | Performed by: OBSTETRICS & GYNECOLOGY

## 2021-10-01 PROCEDURE — 1101F PT FALLS ASSESS-DOCD LE1/YR: CPT | Mod: HCNC,CPTII,S$GLB, | Performed by: OBSTETRICS & GYNECOLOGY

## 2021-10-01 PROCEDURE — 3288F FALL RISK ASSESSMENT DOCD: CPT | Mod: HCNC,CPTII,S$GLB, | Performed by: OBSTETRICS & GYNECOLOGY

## 2021-10-01 PROCEDURE — 1126F AMNT PAIN NOTED NONE PRSNT: CPT | Mod: HCNC,CPTII,S$GLB, | Performed by: OBSTETRICS & GYNECOLOGY

## 2021-10-01 PROCEDURE — 1160F PR REVIEW ALL MEDS BY PRESCRIBER/CLIN PHARMACIST DOCUMENTED: ICD-10-PCS | Mod: HCNC,CPTII,S$GLB, | Performed by: OBSTETRICS & GYNECOLOGY

## 2021-10-01 PROCEDURE — 88142 CYTOPATH C/V THIN LAYER: CPT | Mod: HCNC | Performed by: OBSTETRICS & GYNECOLOGY

## 2021-10-05 LAB
BACTERIAL VAGINOSIS DNA: NEGATIVE
CANDIDA GLABRATA DNA: NEGATIVE
CANDIDA KRUSEI DNA: NEGATIVE
CANDIDA RRNA VAG QL PROBE: NEGATIVE
T VAGINALIS RRNA GENITAL QL PROBE: NEGATIVE

## 2021-10-07 ENCOUNTER — CLINICAL SUPPORT (OUTPATIENT)
Dept: REHABILITATION | Facility: HOSPITAL | Age: 81
End: 2021-10-07
Attending: OTOLARYNGOLOGY
Payer: MEDICARE

## 2021-10-07 DIAGNOSIS — H81.11 BPPV (BENIGN PAROXYSMAL POSITIONAL VERTIGO), RIGHT: ICD-10-CM

## 2021-10-07 DIAGNOSIS — H81.10 BPPV (BENIGN PAROXYSMAL POSITIONAL VERTIGO), UNSPECIFIED LATERALITY: ICD-10-CM

## 2021-10-07 PROCEDURE — 97162 PT EVAL MOD COMPLEX 30 MIN: CPT | Mod: HCNC,PO

## 2021-10-07 PROCEDURE — 95992 CANALITH REPOSITIONING PROC: CPT | Mod: HCNC,PO

## 2021-10-08 LAB
FINAL PATHOLOGIC DIAGNOSIS: NORMAL
FINAL PATHOLOGIC DIAGNOSIS: NORMAL
GROSS: NORMAL
Lab: NORMAL
Lab: NORMAL

## 2021-10-11 ENCOUNTER — CLINICAL SUPPORT (OUTPATIENT)
Dept: REHABILITATION | Facility: HOSPITAL | Age: 81
End: 2021-10-11
Attending: OTOLARYNGOLOGY
Payer: MEDICARE

## 2021-10-11 DIAGNOSIS — H81.11 BPPV (BENIGN PAROXYSMAL POSITIONAL VERTIGO), RIGHT: ICD-10-CM

## 2021-10-11 PROCEDURE — 97112 NEUROMUSCULAR REEDUCATION: CPT | Mod: HCNC,PO

## 2021-10-19 ENCOUNTER — OFFICE VISIT (OUTPATIENT)
Dept: INTERNAL MEDICINE | Facility: CLINIC | Age: 81
End: 2021-10-19
Payer: MEDICARE

## 2021-10-19 ENCOUNTER — PATIENT MESSAGE (OUTPATIENT)
Dept: INTERNAL MEDICINE | Facility: CLINIC | Age: 81
End: 2021-10-19

## 2021-10-19 ENCOUNTER — LAB VISIT (OUTPATIENT)
Dept: LAB | Facility: HOSPITAL | Age: 81
End: 2021-10-19
Attending: INTERNAL MEDICINE
Payer: MEDICARE

## 2021-10-19 VITALS
BODY MASS INDEX: 22.78 KG/M2 | SYSTOLIC BLOOD PRESSURE: 116 MMHG | WEIGHT: 116 LBS | DIASTOLIC BLOOD PRESSURE: 74 MMHG | HEART RATE: 90 BPM | OXYGEN SATURATION: 98 % | HEIGHT: 60 IN

## 2021-10-19 DIAGNOSIS — R19.7 DIARRHEA, UNSPECIFIED TYPE: ICD-10-CM

## 2021-10-19 DIAGNOSIS — R10.30 LOWER ABDOMINAL PAIN: ICD-10-CM

## 2021-10-19 DIAGNOSIS — R10.30 LOWER ABDOMINAL PAIN: Primary | ICD-10-CM

## 2021-10-19 LAB
ANION GAP SERPL CALC-SCNC: 12 MMOL/L (ref 8–16)
BASOPHILS # BLD AUTO: 0.06 K/UL (ref 0–0.2)
BASOPHILS NFR BLD: 0.5 % (ref 0–1.9)
BUN SERPL-MCNC: 18 MG/DL (ref 8–23)
CALCIUM SERPL-MCNC: 9.8 MG/DL (ref 8.7–10.5)
CHLORIDE SERPL-SCNC: 100 MMOL/L (ref 95–110)
CO2 SERPL-SCNC: 24 MMOL/L (ref 23–29)
CREAT SERPL-MCNC: 0.9 MG/DL (ref 0.5–1.4)
CRP SERPL-MCNC: 313.6 MG/L (ref 0–8.2)
DIFFERENTIAL METHOD: ABNORMAL
EOSINOPHIL # BLD AUTO: 0.1 K/UL (ref 0–0.5)
EOSINOPHIL NFR BLD: 1 % (ref 0–8)
ERYTHROCYTE [DISTWIDTH] IN BLOOD BY AUTOMATED COUNT: 14.3 % (ref 11.5–14.5)
EST. GFR  (AFRICAN AMERICAN): >60 ML/MIN/1.73 M^2
EST. GFR  (NON AFRICAN AMERICAN): 59.7 ML/MIN/1.73 M^2
GLUCOSE SERPL-MCNC: 90 MG/DL (ref 70–110)
HCT VFR BLD AUTO: 35.3 % (ref 37–48.5)
HGB BLD-MCNC: 11.5 G/DL (ref 12–16)
IMM GRANULOCYTES # BLD AUTO: 0.06 K/UL (ref 0–0.04)
IMM GRANULOCYTES NFR BLD AUTO: 0.5 % (ref 0–0.5)
LYMPHOCYTES # BLD AUTO: 2.9 K/UL (ref 1–4.8)
LYMPHOCYTES NFR BLD: 25.7 % (ref 18–48)
MCH RBC QN AUTO: 28.3 PG (ref 27–31)
MCHC RBC AUTO-ENTMCNC: 32.6 G/DL (ref 32–36)
MCV RBC AUTO: 87 FL (ref 82–98)
MONOCYTES # BLD AUTO: 0.9 K/UL (ref 0.3–1)
MONOCYTES NFR BLD: 7.9 % (ref 4–15)
NEUTROPHILS # BLD AUTO: 7.3 K/UL (ref 1.8–7.7)
NEUTROPHILS NFR BLD: 64.4 % (ref 38–73)
NRBC BLD-RTO: 0 /100 WBC
PLATELET # BLD AUTO: 259 K/UL (ref 150–450)
PMV BLD AUTO: 11.1 FL (ref 9.2–12.9)
POTASSIUM SERPL-SCNC: 4.5 MMOL/L (ref 3.5–5.1)
RBC # BLD AUTO: 4.06 M/UL (ref 4–5.4)
SODIUM SERPL-SCNC: 136 MMOL/L (ref 136–145)
TSH SERPL DL<=0.005 MIU/L-ACNC: 2.65 UIU/ML (ref 0.4–4)
WBC # BLD AUTO: 11.34 K/UL (ref 3.9–12.7)

## 2021-10-19 PROCEDURE — 3288F PR FALLS RISK ASSESSMENT DOCUMENTED: ICD-10-PCS | Mod: HCNC,CPTII,S$GLB, | Performed by: INTERNAL MEDICINE

## 2021-10-19 PROCEDURE — 1159F MED LIST DOCD IN RCRD: CPT | Mod: HCNC,CPTII,S$GLB, | Performed by: INTERNAL MEDICINE

## 2021-10-19 PROCEDURE — 85025 COMPLETE CBC W/AUTO DIFF WBC: CPT | Mod: HCNC | Performed by: INTERNAL MEDICINE

## 2021-10-19 PROCEDURE — 1160F RVW MEDS BY RX/DR IN RCRD: CPT | Mod: HCNC,CPTII,S$GLB, | Performed by: INTERNAL MEDICINE

## 2021-10-19 PROCEDURE — 3288F FALL RISK ASSESSMENT DOCD: CPT | Mod: HCNC,CPTII,S$GLB, | Performed by: INTERNAL MEDICINE

## 2021-10-19 PROCEDURE — 84443 ASSAY THYROID STIM HORMONE: CPT | Mod: HCNC | Performed by: INTERNAL MEDICINE

## 2021-10-19 PROCEDURE — 99214 PR OFFICE/OUTPT VISIT, EST, LEVL IV, 30-39 MIN: ICD-10-PCS | Mod: HCNC,S$GLB,, | Performed by: INTERNAL MEDICINE

## 2021-10-19 PROCEDURE — 3078F PR MOST RECENT DIASTOLIC BLOOD PRESSURE < 80 MM HG: ICD-10-PCS | Mod: HCNC,CPTII,S$GLB, | Performed by: INTERNAL MEDICINE

## 2021-10-19 PROCEDURE — 99999 PR PBB SHADOW E&M-EST. PATIENT-LVL III: CPT | Mod: PBBFAC,HCNC,, | Performed by: INTERNAL MEDICINE

## 2021-10-19 PROCEDURE — 36415 COLL VENOUS BLD VENIPUNCTURE: CPT | Mod: HCNC | Performed by: INTERNAL MEDICINE

## 2021-10-19 PROCEDURE — 1101F PR PT FALLS ASSESS DOC 0-1 FALLS W/OUT INJ PAST YR: ICD-10-PCS | Mod: HCNC,CPTII,S$GLB, | Performed by: INTERNAL MEDICINE

## 2021-10-19 PROCEDURE — 3074F PR MOST RECENT SYSTOLIC BLOOD PRESSURE < 130 MM HG: ICD-10-PCS | Mod: HCNC,CPTII,S$GLB, | Performed by: INTERNAL MEDICINE

## 2021-10-19 PROCEDURE — 86140 C-REACTIVE PROTEIN: CPT | Mod: HCNC | Performed by: INTERNAL MEDICINE

## 2021-10-19 PROCEDURE — 99999 PR PBB SHADOW E&M-EST. PATIENT-LVL III: ICD-10-PCS | Mod: PBBFAC,HCNC,, | Performed by: INTERNAL MEDICINE

## 2021-10-19 PROCEDURE — 1159F PR MEDICATION LIST DOCUMENTED IN MEDICAL RECORD: ICD-10-PCS | Mod: HCNC,CPTII,S$GLB, | Performed by: INTERNAL MEDICINE

## 2021-10-19 PROCEDURE — 3074F SYST BP LT 130 MM HG: CPT | Mod: HCNC,CPTII,S$GLB, | Performed by: INTERNAL MEDICINE

## 2021-10-19 PROCEDURE — 99214 OFFICE O/P EST MOD 30 MIN: CPT | Mod: HCNC,S$GLB,, | Performed by: INTERNAL MEDICINE

## 2021-10-19 PROCEDURE — 3078F DIAST BP <80 MM HG: CPT | Mod: HCNC,CPTII,S$GLB, | Performed by: INTERNAL MEDICINE

## 2021-10-19 PROCEDURE — 1160F PR REVIEW ALL MEDS BY PRESCRIBER/CLIN PHARMACIST DOCUMENTED: ICD-10-PCS | Mod: HCNC,CPTII,S$GLB, | Performed by: INTERNAL MEDICINE

## 2021-10-19 PROCEDURE — 80048 BASIC METABOLIC PNL TOTAL CA: CPT | Mod: HCNC | Performed by: INTERNAL MEDICINE

## 2021-10-19 PROCEDURE — 1101F PT FALLS ASSESS-DOCD LE1/YR: CPT | Mod: HCNC,CPTII,S$GLB, | Performed by: INTERNAL MEDICINE

## 2021-10-20 ENCOUNTER — DOCUMENTATION ONLY (OUTPATIENT)
Dept: INTERNAL MEDICINE | Facility: CLINIC | Age: 81
End: 2021-10-20

## 2021-10-20 ENCOUNTER — HOSPITAL ENCOUNTER (OUTPATIENT)
Dept: RADIOLOGY | Facility: HOSPITAL | Age: 81
Discharge: HOME | End: 2021-10-20
Attending: INTERNAL MEDICINE
Payer: MEDICARE

## 2021-10-20 ENCOUNTER — PATIENT MESSAGE (OUTPATIENT)
Dept: INTERNAL MEDICINE | Facility: CLINIC | Age: 81
End: 2021-10-20
Payer: MEDICARE

## 2021-10-20 ENCOUNTER — PATIENT MESSAGE (OUTPATIENT)
Dept: INTERNAL MEDICINE | Facility: CLINIC | Age: 81
End: 2021-10-20

## 2021-10-20 DIAGNOSIS — R10.32 LEFT LOWER QUADRANT PAIN: ICD-10-CM

## 2021-10-20 PROCEDURE — 74177 CT ABD & PELVIS W/CONTRAST: CPT | Mod: TC,HCNC

## 2021-10-20 PROCEDURE — 74177 CT ABDOMEN PELVIS WITH CONTRAST: ICD-10-PCS | Mod: 26,HCNC,, | Performed by: RADIOLOGY

## 2021-10-20 PROCEDURE — 74177 CT ABD & PELVIS W/CONTRAST: CPT | Mod: 26,HCNC,, | Performed by: RADIOLOGY

## 2021-10-20 PROCEDURE — 25500020 PHARM REV CODE 255: Mod: HCNC | Performed by: INTERNAL MEDICINE

## 2021-10-20 PROCEDURE — A9698 NON-RAD CONTRAST MATERIALNOC: HCPCS | Mod: HCNC | Performed by: INTERNAL MEDICINE

## 2021-10-20 RX ORDER — METRONIDAZOLE 500 MG/1
500 TABLET ORAL EVERY 8 HOURS
Qty: 42 TABLET | Refills: 0 | Status: SHIPPED | OUTPATIENT
Start: 2021-10-20 | End: 2021-11-03

## 2021-10-20 RX ORDER — CIPROFLOXACIN 500 MG/1
500 TABLET ORAL 2 TIMES DAILY
Qty: 28 TABLET | Refills: 0 | Status: SHIPPED | OUTPATIENT
Start: 2021-10-20 | End: 2021-11-03

## 2021-10-20 RX ADMIN — IOHEXOL 75 ML: 350 INJECTION, SOLUTION INTRAVENOUS at 03:10

## 2021-10-20 RX ADMIN — IOHEXOL 1000 ML: 9 SOLUTION ORAL at 02:10

## 2021-10-21 ENCOUNTER — PATIENT MESSAGE (OUTPATIENT)
Dept: INTERNAL MEDICINE | Facility: CLINIC | Age: 81
End: 2021-10-21
Payer: MEDICARE

## 2021-10-21 ENCOUNTER — PATIENT MESSAGE (OUTPATIENT)
Dept: OBSTETRICS AND GYNECOLOGY | Facility: CLINIC | Age: 81
End: 2021-10-21
Payer: MEDICARE

## 2021-10-21 DIAGNOSIS — K57.92 DIVERTICULITIS: Primary | ICD-10-CM

## 2021-10-22 ENCOUNTER — LAB VISIT (OUTPATIENT)
Dept: LAB | Facility: HOSPITAL | Age: 81
End: 2021-10-22
Attending: INTERNAL MEDICINE
Payer: MEDICARE

## 2021-10-22 DIAGNOSIS — K57.92 DIVERTICULITIS: ICD-10-CM

## 2021-10-22 LAB
ANION GAP SERPL CALC-SCNC: 12 MMOL/L (ref 8–16)
BASOPHILS # BLD AUTO: 0.07 K/UL (ref 0–0.2)
BASOPHILS NFR BLD: 0.9 % (ref 0–1.9)
BUN SERPL-MCNC: 12 MG/DL (ref 8–23)
CALCIUM SERPL-MCNC: 10.2 MG/DL (ref 8.7–10.5)
CHLORIDE SERPL-SCNC: 101 MMOL/L (ref 95–110)
CO2 SERPL-SCNC: 21 MMOL/L (ref 23–29)
CREAT SERPL-MCNC: 0.8 MG/DL (ref 0.5–1.4)
CRP SERPL-MCNC: 63.1 MG/L (ref 0–8.2)
DIFFERENTIAL METHOD: ABNORMAL
EOSINOPHIL # BLD AUTO: 0.2 K/UL (ref 0–0.5)
EOSINOPHIL NFR BLD: 2.1 % (ref 0–8)
ERYTHROCYTE [DISTWIDTH] IN BLOOD BY AUTOMATED COUNT: 14.2 % (ref 11.5–14.5)
EST. GFR  (AFRICAN AMERICAN): >60 ML/MIN/1.73 M^2
EST. GFR  (NON AFRICAN AMERICAN): >60 ML/MIN/1.73 M^2
GLUCOSE SERPL-MCNC: 82 MG/DL (ref 70–110)
HCT VFR BLD AUTO: 34.4 % (ref 37–48.5)
HGB BLD-MCNC: 11.2 G/DL (ref 12–16)
IMM GRANULOCYTES # BLD AUTO: 0.06 K/UL (ref 0–0.04)
IMM GRANULOCYTES NFR BLD AUTO: 0.8 % (ref 0–0.5)
LYMPHOCYTES # BLD AUTO: 3.8 K/UL (ref 1–4.8)
LYMPHOCYTES NFR BLD: 47.7 % (ref 18–48)
MCH RBC QN AUTO: 28.4 PG (ref 27–31)
MCHC RBC AUTO-ENTMCNC: 32.6 G/DL (ref 32–36)
MCV RBC AUTO: 87 FL (ref 82–98)
MONOCYTES # BLD AUTO: 0.6 K/UL (ref 0.3–1)
MONOCYTES NFR BLD: 7.9 % (ref 4–15)
NEUTROPHILS # BLD AUTO: 3.2 K/UL (ref 1.8–7.7)
NEUTROPHILS NFR BLD: 40.6 % (ref 38–73)
NRBC BLD-RTO: 0 /100 WBC
PLATELET # BLD AUTO: 323 K/UL (ref 150–450)
PMV BLD AUTO: 10.5 FL (ref 9.2–12.9)
POTASSIUM SERPL-SCNC: 4.6 MMOL/L (ref 3.5–5.1)
RBC # BLD AUTO: 3.94 M/UL (ref 4–5.4)
SODIUM SERPL-SCNC: 134 MMOL/L (ref 136–145)
WBC # BLD AUTO: 7.98 K/UL (ref 3.9–12.7)

## 2021-10-22 PROCEDURE — 85025 COMPLETE CBC W/AUTO DIFF WBC: CPT | Mod: HCNC | Performed by: INTERNAL MEDICINE

## 2021-10-22 PROCEDURE — 86140 C-REACTIVE PROTEIN: CPT | Mod: HCNC | Performed by: INTERNAL MEDICINE

## 2021-10-22 PROCEDURE — 80048 BASIC METABOLIC PNL TOTAL CA: CPT | Mod: HCNC | Performed by: INTERNAL MEDICINE

## 2021-10-22 PROCEDURE — 36415 COLL VENOUS BLD VENIPUNCTURE: CPT | Mod: HCNC | Performed by: INTERNAL MEDICINE

## 2021-10-23 ENCOUNTER — PATIENT MESSAGE (OUTPATIENT)
Dept: INTERNAL MEDICINE | Facility: CLINIC | Age: 81
End: 2021-10-23
Payer: MEDICARE

## 2021-10-26 ENCOUNTER — PATIENT MESSAGE (OUTPATIENT)
Dept: INTERNAL MEDICINE | Facility: CLINIC | Age: 81
End: 2021-10-26
Payer: MEDICARE

## 2021-10-26 DIAGNOSIS — K57.92 DIVERTICULITIS: Primary | ICD-10-CM

## 2021-10-27 ENCOUNTER — PATIENT MESSAGE (OUTPATIENT)
Dept: INTERNAL MEDICINE | Facility: CLINIC | Age: 81
End: 2021-10-27
Payer: MEDICARE

## 2021-10-27 ENCOUNTER — LAB VISIT (OUTPATIENT)
Dept: LAB | Facility: HOSPITAL | Age: 81
End: 2021-10-27
Attending: INTERNAL MEDICINE
Payer: MEDICARE

## 2021-10-27 DIAGNOSIS — K57.92 DIVERTICULITIS: ICD-10-CM

## 2021-10-27 LAB
BASOPHILS # BLD AUTO: 0.08 K/UL (ref 0–0.2)
BASOPHILS NFR BLD: 0.9 % (ref 0–1.9)
CRP SERPL-MCNC: 7.8 MG/L (ref 0–8.2)
DIFFERENTIAL METHOD: ABNORMAL
EOSINOPHIL # BLD AUTO: 0.1 K/UL (ref 0–0.5)
EOSINOPHIL NFR BLD: 1.4 % (ref 0–8)
ERYTHROCYTE [DISTWIDTH] IN BLOOD BY AUTOMATED COUNT: 14.4 % (ref 11.5–14.5)
HCT VFR BLD AUTO: 38.7 % (ref 37–48.5)
HGB BLD-MCNC: 12.2 G/DL (ref 12–16)
IMM GRANULOCYTES # BLD AUTO: 0.08 K/UL (ref 0–0.04)
IMM GRANULOCYTES NFR BLD AUTO: 0.9 % (ref 0–0.5)
LYMPHOCYTES # BLD AUTO: 4 K/UL (ref 1–4.8)
LYMPHOCYTES NFR BLD: 47.3 % (ref 18–48)
MCH RBC QN AUTO: 27.8 PG (ref 27–31)
MCHC RBC AUTO-ENTMCNC: 31.5 G/DL (ref 32–36)
MCV RBC AUTO: 88 FL (ref 82–98)
MONOCYTES # BLD AUTO: 0.7 K/UL (ref 0.3–1)
MONOCYTES NFR BLD: 8.3 % (ref 4–15)
NEUTROPHILS # BLD AUTO: 3.5 K/UL (ref 1.8–7.7)
NEUTROPHILS NFR BLD: 41.2 % (ref 38–73)
NRBC BLD-RTO: 0 /100 WBC
PLATELET # BLD AUTO: 442 K/UL (ref 150–450)
PMV BLD AUTO: 10 FL (ref 9.2–12.9)
RBC # BLD AUTO: 4.39 M/UL (ref 4–5.4)
WBC # BLD AUTO: 8.52 K/UL (ref 3.9–12.7)

## 2021-10-27 PROCEDURE — 86140 C-REACTIVE PROTEIN: CPT | Mod: HCNC | Performed by: INTERNAL MEDICINE

## 2021-10-27 PROCEDURE — 85025 COMPLETE CBC W/AUTO DIFF WBC: CPT | Mod: HCNC | Performed by: INTERNAL MEDICINE

## 2021-10-27 PROCEDURE — 36415 COLL VENOUS BLD VENIPUNCTURE: CPT | Mod: HCNC | Performed by: INTERNAL MEDICINE

## 2021-10-28 ENCOUNTER — PATIENT MESSAGE (OUTPATIENT)
Dept: INTERNAL MEDICINE | Facility: CLINIC | Age: 81
End: 2021-10-28
Payer: MEDICARE

## 2021-11-05 ENCOUNTER — PATIENT MESSAGE (OUTPATIENT)
Dept: INTERNAL MEDICINE | Facility: CLINIC | Age: 81
End: 2021-11-05
Payer: MEDICARE

## 2021-11-05 DIAGNOSIS — K65.1 ABDOMINAL VISCERAL ABSCESS: Primary | ICD-10-CM

## 2021-11-05 DIAGNOSIS — N89.8 VAGINAL DISCHARGE: ICD-10-CM

## 2021-11-10 ENCOUNTER — HOSPITAL ENCOUNTER (OUTPATIENT)
Dept: RADIOLOGY | Facility: HOSPITAL | Age: 81
Discharge: HOME | End: 2021-11-10
Attending: INTERNAL MEDICINE
Payer: MEDICARE

## 2021-11-10 DIAGNOSIS — K65.1 ABDOMINAL VISCERAL ABSCESS: ICD-10-CM

## 2021-11-10 PROCEDURE — A9698 NON-RAD CONTRAST MATERIALNOC: HCPCS | Mod: HCNC | Performed by: INTERNAL MEDICINE

## 2021-11-10 PROCEDURE — 74177 CT ABDOMEN PELVIS WITH CONTRAST: ICD-10-PCS | Mod: 26,HCNC,, | Performed by: RADIOLOGY

## 2021-11-10 PROCEDURE — 74177 CT ABD & PELVIS W/CONTRAST: CPT | Mod: TC,HCNC

## 2021-11-10 PROCEDURE — 25500020 PHARM REV CODE 255: Mod: HCNC | Performed by: INTERNAL MEDICINE

## 2021-11-10 PROCEDURE — 74177 CT ABD & PELVIS W/CONTRAST: CPT | Mod: 26,HCNC,, | Performed by: RADIOLOGY

## 2021-11-10 RX ADMIN — IOHEXOL 1000 ML: 9 SOLUTION ORAL at 02:11

## 2021-11-10 RX ADMIN — IOHEXOL 75 ML: 350 INJECTION, SOLUTION INTRAVENOUS at 04:11

## 2021-11-11 ENCOUNTER — PATIENT MESSAGE (OUTPATIENT)
Dept: INTERNAL MEDICINE | Facility: CLINIC | Age: 81
End: 2021-11-11
Payer: MEDICARE

## 2021-11-11 DIAGNOSIS — K57.40 DIVERTICULITIS OF BOTH LARGE AND SMALL INTESTINE WITH PERFORATION AND ABSCESS WITHOUT BLEEDING: Primary | ICD-10-CM

## 2021-11-11 DIAGNOSIS — K57.92 DIVERTICULITIS: Primary | ICD-10-CM

## 2021-11-12 ENCOUNTER — OFFICE VISIT (OUTPATIENT)
Dept: SURGERY | Facility: CLINIC | Age: 81
End: 2021-11-12
Payer: MEDICARE

## 2021-11-12 VITALS
HEIGHT: 60 IN | HEART RATE: 103 BPM | SYSTOLIC BLOOD PRESSURE: 127 MMHG | DIASTOLIC BLOOD PRESSURE: 65 MMHG | BODY MASS INDEX: 22.33 KG/M2 | WEIGHT: 113.75 LBS

## 2021-11-12 DIAGNOSIS — K57.40 DIVERTICULITIS OF BOTH LARGE AND SMALL INTESTINE WITH PERFORATION AND ABSCESS WITHOUT BLEEDING: Primary | ICD-10-CM

## 2021-11-12 PROCEDURE — 3074F PR MOST RECENT SYSTOLIC BLOOD PRESSURE < 130 MM HG: ICD-10-PCS | Mod: HCNC,CPTII,S$GLB, | Performed by: NURSE PRACTITIONER

## 2021-11-12 PROCEDURE — 99214 OFFICE O/P EST MOD 30 MIN: CPT | Mod: HCNC,S$GLB,, | Performed by: NURSE PRACTITIONER

## 2021-11-12 PROCEDURE — 1159F MED LIST DOCD IN RCRD: CPT | Mod: HCNC,CPTII,S$GLB, | Performed by: NURSE PRACTITIONER

## 2021-11-12 PROCEDURE — 3074F SYST BP LT 130 MM HG: CPT | Mod: HCNC,CPTII,S$GLB, | Performed by: NURSE PRACTITIONER

## 2021-11-12 PROCEDURE — 3288F FALL RISK ASSESSMENT DOCD: CPT | Mod: HCNC,CPTII,S$GLB, | Performed by: NURSE PRACTITIONER

## 2021-11-12 PROCEDURE — 1126F PR PAIN SEVERITY QUANTIFIED, NO PAIN PRESENT: ICD-10-PCS | Mod: HCNC,CPTII,S$GLB, | Performed by: NURSE PRACTITIONER

## 2021-11-12 PROCEDURE — 3288F PR FALLS RISK ASSESSMENT DOCUMENTED: ICD-10-PCS | Mod: HCNC,CPTII,S$GLB, | Performed by: NURSE PRACTITIONER

## 2021-11-12 PROCEDURE — 1101F PT FALLS ASSESS-DOCD LE1/YR: CPT | Mod: HCNC,CPTII,S$GLB, | Performed by: NURSE PRACTITIONER

## 2021-11-12 PROCEDURE — 99999 PR PBB SHADOW E&M-EST. PATIENT-LVL IV: ICD-10-PCS | Mod: PBBFAC,HCNC,, | Performed by: NURSE PRACTITIONER

## 2021-11-12 PROCEDURE — 1101F PR PT FALLS ASSESS DOC 0-1 FALLS W/OUT INJ PAST YR: ICD-10-PCS | Mod: HCNC,CPTII,S$GLB, | Performed by: NURSE PRACTITIONER

## 2021-11-12 PROCEDURE — 1159F PR MEDICATION LIST DOCUMENTED IN MEDICAL RECORD: ICD-10-PCS | Mod: HCNC,CPTII,S$GLB, | Performed by: NURSE PRACTITIONER

## 2021-11-12 PROCEDURE — 99999 PR PBB SHADOW E&M-EST. PATIENT-LVL IV: CPT | Mod: PBBFAC,HCNC,, | Performed by: NURSE PRACTITIONER

## 2021-11-12 PROCEDURE — 1126F AMNT PAIN NOTED NONE PRSNT: CPT | Mod: HCNC,CPTII,S$GLB, | Performed by: NURSE PRACTITIONER

## 2021-11-12 PROCEDURE — 3078F PR MOST RECENT DIASTOLIC BLOOD PRESSURE < 80 MM HG: ICD-10-PCS | Mod: HCNC,CPTII,S$GLB, | Performed by: NURSE PRACTITIONER

## 2021-11-12 PROCEDURE — 99214 PR OFFICE/OUTPT VISIT, EST, LEVL IV, 30-39 MIN: ICD-10-PCS | Mod: HCNC,S$GLB,, | Performed by: NURSE PRACTITIONER

## 2021-11-12 PROCEDURE — 3078F DIAST BP <80 MM HG: CPT | Mod: HCNC,CPTII,S$GLB, | Performed by: NURSE PRACTITIONER

## 2021-11-15 ENCOUNTER — PATIENT MESSAGE (OUTPATIENT)
Dept: OBSTETRICS AND GYNECOLOGY | Facility: CLINIC | Age: 81
End: 2021-11-15

## 2021-11-15 ENCOUNTER — PATIENT MESSAGE (OUTPATIENT)
Dept: SURGERY | Facility: CLINIC | Age: 81
End: 2021-11-15
Payer: MEDICARE

## 2021-11-15 ENCOUNTER — OFFICE VISIT (OUTPATIENT)
Dept: OBSTETRICS AND GYNECOLOGY | Facility: CLINIC | Age: 81
End: 2021-11-15
Payer: MEDICARE

## 2021-11-15 VITALS — BODY MASS INDEX: 22.2 KG/M2 | WEIGHT: 113.63 LBS | DIASTOLIC BLOOD PRESSURE: 58 MMHG | SYSTOLIC BLOOD PRESSURE: 130 MMHG

## 2021-11-15 DIAGNOSIS — N89.8 VAGINAL DISCHARGE: ICD-10-CM

## 2021-11-15 DIAGNOSIS — K52.9 GASTROENTERITIS: Primary | ICD-10-CM

## 2021-11-15 PROCEDURE — 99213 PR OFFICE/OUTPT VISIT, EST, LEVL III, 20-29 MIN: ICD-10-PCS | Mod: HCNC,S$GLB,, | Performed by: OBSTETRICS & GYNECOLOGY

## 2021-11-15 PROCEDURE — 1101F PR PT FALLS ASSESS DOC 0-1 FALLS W/OUT INJ PAST YR: ICD-10-PCS | Mod: HCNC,CPTII,S$GLB, | Performed by: OBSTETRICS & GYNECOLOGY

## 2021-11-15 PROCEDURE — 1126F AMNT PAIN NOTED NONE PRSNT: CPT | Mod: HCNC,CPTII,S$GLB, | Performed by: OBSTETRICS & GYNECOLOGY

## 2021-11-15 PROCEDURE — 99213 OFFICE O/P EST LOW 20 MIN: CPT | Mod: HCNC,S$GLB,, | Performed by: OBSTETRICS & GYNECOLOGY

## 2021-11-15 PROCEDURE — 1126F PR PAIN SEVERITY QUANTIFIED, NO PAIN PRESENT: ICD-10-PCS | Mod: HCNC,CPTII,S$GLB, | Performed by: OBSTETRICS & GYNECOLOGY

## 2021-11-15 PROCEDURE — 3078F PR MOST RECENT DIASTOLIC BLOOD PRESSURE < 80 MM HG: ICD-10-PCS | Mod: HCNC,CPTII,S$GLB, | Performed by: OBSTETRICS & GYNECOLOGY

## 2021-11-15 PROCEDURE — 3288F PR FALLS RISK ASSESSMENT DOCUMENTED: ICD-10-PCS | Mod: HCNC,CPTII,S$GLB, | Performed by: OBSTETRICS & GYNECOLOGY

## 2021-11-15 PROCEDURE — 3288F FALL RISK ASSESSMENT DOCD: CPT | Mod: HCNC,CPTII,S$GLB, | Performed by: OBSTETRICS & GYNECOLOGY

## 2021-11-15 PROCEDURE — 3075F SYST BP GE 130 - 139MM HG: CPT | Mod: HCNC,CPTII,S$GLB, | Performed by: OBSTETRICS & GYNECOLOGY

## 2021-11-15 PROCEDURE — 99999 PR PBB SHADOW E&M-EST. PATIENT-LVL III: CPT | Mod: PBBFAC,HCNC,, | Performed by: OBSTETRICS & GYNECOLOGY

## 2021-11-15 PROCEDURE — 1159F PR MEDICATION LIST DOCUMENTED IN MEDICAL RECORD: ICD-10-PCS | Mod: HCNC,CPTII,S$GLB, | Performed by: OBSTETRICS & GYNECOLOGY

## 2021-11-15 PROCEDURE — 87481 CANDIDA DNA AMP PROBE: CPT | Mod: 59,HCNC | Performed by: OBSTETRICS & GYNECOLOGY

## 2021-11-15 PROCEDURE — 1159F MED LIST DOCD IN RCRD: CPT | Mod: HCNC,CPTII,S$GLB, | Performed by: OBSTETRICS & GYNECOLOGY

## 2021-11-15 PROCEDURE — 99999 PR PBB SHADOW E&M-EST. PATIENT-LVL III: ICD-10-PCS | Mod: PBBFAC,HCNC,, | Performed by: OBSTETRICS & GYNECOLOGY

## 2021-11-15 PROCEDURE — 3075F PR MOST RECENT SYSTOLIC BLOOD PRESS GE 130-139MM HG: ICD-10-PCS | Mod: HCNC,CPTII,S$GLB, | Performed by: OBSTETRICS & GYNECOLOGY

## 2021-11-15 PROCEDURE — 1101F PT FALLS ASSESS-DOCD LE1/YR: CPT | Mod: HCNC,CPTII,S$GLB, | Performed by: OBSTETRICS & GYNECOLOGY

## 2021-11-15 PROCEDURE — 3078F DIAST BP <80 MM HG: CPT | Mod: HCNC,CPTII,S$GLB, | Performed by: OBSTETRICS & GYNECOLOGY

## 2021-11-15 RX ORDER — METRONIDAZOLE 250 MG/1
250 TABLET ORAL 3 TIMES DAILY
Qty: 15 TABLET | Refills: 0 | Status: SHIPPED | OUTPATIENT
Start: 2021-11-15 | End: 2021-11-22

## 2021-11-15 RX ORDER — FLUCONAZOLE 100 MG/1
100 TABLET ORAL DAILY
Qty: 5 TABLET | Refills: 2 | Status: SHIPPED | OUTPATIENT
Start: 2021-11-15 | End: 2022-03-20 | Stop reason: ALTCHOICE

## 2021-11-26 ENCOUNTER — TELEPHONE (OUTPATIENT)
Dept: OBSTETRICS AND GYNECOLOGY | Facility: CLINIC | Age: 81
End: 2021-11-26
Payer: MEDICARE

## 2021-11-29 ENCOUNTER — PATIENT MESSAGE (OUTPATIENT)
Dept: ENDOSCOPY | Facility: HOSPITAL | Age: 81
End: 2021-11-29
Payer: MEDICARE

## 2021-11-29 DIAGNOSIS — Z12.11 SPECIAL SCREENING FOR MALIGNANT NEOPLASMS, COLON: Primary | ICD-10-CM

## 2021-11-29 RX ORDER — SODIUM, POTASSIUM,MAG SULFATES 17.5-3.13G
1 SOLUTION, RECONSTITUTED, ORAL ORAL DAILY
Qty: 1 KIT | Refills: 0 | Status: SHIPPED | OUTPATIENT
Start: 2021-11-29 | End: 2021-12-01

## 2021-12-02 ENCOUNTER — PATIENT MESSAGE (OUTPATIENT)
Dept: OBSTETRICS AND GYNECOLOGY | Facility: CLINIC | Age: 81
End: 2021-12-02
Payer: MEDICARE

## 2021-12-02 DIAGNOSIS — Z12.31 ENCOUNTER FOR SCREENING MAMMOGRAM FOR MALIGNANT NEOPLASM OF BREAST: ICD-10-CM

## 2021-12-02 DIAGNOSIS — Z12.39 ENCOUNTER FOR SCREENING FOR MALIGNANT NEOPLASM OF BREAST, UNSPECIFIED SCREENING MODALITY: Primary | ICD-10-CM

## 2021-12-12 ENCOUNTER — PATIENT MESSAGE (OUTPATIENT)
Dept: INTERNAL MEDICINE | Facility: CLINIC | Age: 81
End: 2021-12-12
Payer: MEDICARE

## 2021-12-17 ENCOUNTER — TELEPHONE (OUTPATIENT)
Dept: INTERNAL MEDICINE | Facility: CLINIC | Age: 81
End: 2021-12-17
Payer: MEDICARE

## 2021-12-17 NOTE — TELEPHONE ENCOUNTER
----- Message from Prasad Salas sent at 12/17/2021  9:05 AM CST -----  Contact: self [phone]  Pt requesting a call back in regards to annual does not want to see any other provider.    Please call and advise

## 2022-01-05 ENCOUNTER — HOSPITAL ENCOUNTER (OUTPATIENT)
Dept: RADIOLOGY | Facility: HOSPITAL | Age: 82
Discharge: HOME | End: 2022-01-05
Attending: OBSTETRICS & GYNECOLOGY
Payer: MEDICARE

## 2022-01-05 ENCOUNTER — IMMUNIZATION (OUTPATIENT)
Dept: PRIMARY CARE CLINIC | Facility: CLINIC | Age: 82
End: 2022-01-05
Payer: MEDICARE

## 2022-01-05 VITALS — HEIGHT: 60 IN | BODY MASS INDEX: 21.79 KG/M2 | WEIGHT: 111 LBS

## 2022-01-05 DIAGNOSIS — Z12.39 ENCOUNTER FOR SCREENING FOR MALIGNANT NEOPLASM OF BREAST, UNSPECIFIED SCREENING MODALITY: ICD-10-CM

## 2022-01-05 DIAGNOSIS — Z23 NEED FOR VACCINATION: Primary | ICD-10-CM

## 2022-01-05 DIAGNOSIS — Z12.31 ENCOUNTER FOR SCREENING MAMMOGRAM FOR MALIGNANT NEOPLASM OF BREAST: ICD-10-CM

## 2022-01-05 PROCEDURE — 0004A COVID-19, MRNA, LNP-S, PF, 30 MCG/0.3 ML DOSE VACCINE: CPT | Mod: CV19,PBBFAC | Performed by: INTERNAL MEDICINE

## 2022-01-05 PROCEDURE — 77067 SCR MAMMO BI INCL CAD: CPT | Mod: 26,HCNC,, | Performed by: RADIOLOGY

## 2022-01-05 PROCEDURE — 77063 BREAST TOMOSYNTHESIS BI: CPT | Mod: TC,HCNC

## 2022-01-05 PROCEDURE — 77063 MAMMO DIGITAL SCREENING BILAT WITH TOMO: ICD-10-PCS | Mod: 26,HCNC,, | Performed by: RADIOLOGY

## 2022-01-05 PROCEDURE — 77067 SCR MAMMO BI INCL CAD: CPT | Mod: TC,HCNC

## 2022-01-05 PROCEDURE — 77063 BREAST TOMOSYNTHESIS BI: CPT | Mod: 26,HCNC,, | Performed by: RADIOLOGY

## 2022-01-05 PROCEDURE — 77067 MAMMO DIGITAL SCREENING BILAT WITH TOMO: ICD-10-PCS | Mod: 26,HCNC,, | Performed by: RADIOLOGY

## 2022-01-10 ENCOUNTER — PATIENT MESSAGE (OUTPATIENT)
Dept: INTERNAL MEDICINE | Facility: CLINIC | Age: 82
End: 2022-01-10
Payer: MEDICARE

## 2022-01-13 DIAGNOSIS — Z01.818 PRE-OP TESTING: ICD-10-CM

## 2022-01-17 ENCOUNTER — LAB VISIT (OUTPATIENT)
Dept: PRIMARY CARE CLINIC | Facility: CLINIC | Age: 82
End: 2022-01-17
Payer: MEDICARE

## 2022-01-17 DIAGNOSIS — Z01.818 PRE-OP TESTING: ICD-10-CM

## 2022-01-17 PROCEDURE — U0003 INFECTIOUS AGENT DETECTION BY NUCLEIC ACID (DNA OR RNA); SEVERE ACUTE RESPIRATORY SYNDROME CORONAVIRUS 2 (SARS-COV-2) (CORONAVIRUS DISEASE [COVID-19]), AMPLIFIED PROBE TECHNIQUE, MAKING USE OF HIGH THROUGHPUT TECHNOLOGIES AS DESCRIBED BY CMS-2020-01-R: HCPCS | Mod: HCNC | Performed by: FAMILY MEDICINE

## 2022-01-17 PROCEDURE — U0005 INFEC AGEN DETEC AMPLI PROBE: HCPCS | Performed by: FAMILY MEDICINE

## 2022-01-18 LAB
SARS-COV-2 RNA RESP QL NAA+PROBE: NOT DETECTED
SARS-COV-2- CYCLE NUMBER: NORMAL

## 2022-01-20 ENCOUNTER — HOSPITAL ENCOUNTER (OUTPATIENT)
Facility: HOSPITAL | Age: 82
Discharge: HOME | End: 2022-01-20
Attending: INTERNAL MEDICINE | Admitting: INTERNAL MEDICINE
Payer: MEDICARE

## 2022-01-20 ENCOUNTER — PATIENT MESSAGE (OUTPATIENT)
Dept: OBSTETRICS AND GYNECOLOGY | Facility: CLINIC | Age: 82
End: 2022-01-20
Payer: MEDICARE

## 2022-01-20 ENCOUNTER — ANESTHESIA (OUTPATIENT)
Dept: ENDOSCOPY | Facility: HOSPITAL | Age: 82
End: 2022-01-20
Payer: MEDICARE

## 2022-01-20 ENCOUNTER — ANESTHESIA EVENT (OUTPATIENT)
Dept: ENDOSCOPY | Facility: HOSPITAL | Age: 82
End: 2022-01-20
Payer: MEDICARE

## 2022-01-20 ENCOUNTER — PATIENT MESSAGE (OUTPATIENT)
Dept: SURGERY | Facility: CLINIC | Age: 82
End: 2022-01-20
Payer: MEDICARE

## 2022-01-20 VITALS
OXYGEN SATURATION: 97 % | HEIGHT: 60 IN | RESPIRATION RATE: 16 BRPM | SYSTOLIC BLOOD PRESSURE: 111 MMHG | DIASTOLIC BLOOD PRESSURE: 67 MMHG | BODY MASS INDEX: 21.79 KG/M2 | WEIGHT: 111 LBS | HEART RATE: 110 BPM | TEMPERATURE: 98 F

## 2022-01-20 DIAGNOSIS — K57.92 DIVERTICULITIS: ICD-10-CM

## 2022-01-20 PROCEDURE — 45378 DIAGNOSTIC COLONOSCOPY: CPT | Mod: HCNC | Performed by: INTERNAL MEDICINE

## 2022-01-20 PROCEDURE — 63600175 PHARM REV CODE 636 W HCPCS: Mod: HCNC | Performed by: REGISTERED NURSE

## 2022-01-20 PROCEDURE — 43239 EGD BIOPSY SINGLE/MULTIPLE: CPT | Mod: HCNC | Performed by: INTERNAL MEDICINE

## 2022-01-20 PROCEDURE — E9220 PRA ENDO ANESTHESIA: HCPCS | Mod: HCNC,,, | Performed by: REGISTERED NURSE

## 2022-01-20 PROCEDURE — 43239 PR EGD, FLEX, W/BIOPSY, SGL/MULTI: ICD-10-PCS | Mod: HCNC,,, | Performed by: INTERNAL MEDICINE

## 2022-01-20 PROCEDURE — 88305 TISSUE EXAM BY PATHOLOGIST: CPT | Mod: 26,HCNC,, | Performed by: PATHOLOGY

## 2022-01-20 PROCEDURE — 37000009 HC ANESTHESIA EA ADD 15 MINS: Mod: HCNC | Performed by: INTERNAL MEDICINE

## 2022-01-20 PROCEDURE — 37000008 HC ANESTHESIA 1ST 15 MINUTES: Mod: HCNC | Performed by: INTERNAL MEDICINE

## 2022-01-20 PROCEDURE — 43239 EGD BIOPSY SINGLE/MULTIPLE: CPT | Mod: HCNC,,, | Performed by: INTERNAL MEDICINE

## 2022-01-20 PROCEDURE — 25000003 PHARM REV CODE 250: Mod: HCNC | Performed by: REGISTERED NURSE

## 2022-01-20 PROCEDURE — 88305 TISSUE EXAM BY PATHOLOGIST: ICD-10-PCS | Mod: 26,HCNC,, | Performed by: PATHOLOGY

## 2022-01-20 PROCEDURE — 88305 TISSUE EXAM BY PATHOLOGIST: CPT | Mod: HCNC | Performed by: PATHOLOGY

## 2022-01-20 PROCEDURE — 25000003 PHARM REV CODE 250: Mod: HCNC | Performed by: INTERNAL MEDICINE

## 2022-01-20 PROCEDURE — E9220 PRA ENDO ANESTHESIA: ICD-10-PCS | Mod: HCNC,,, | Performed by: REGISTERED NURSE

## 2022-01-20 PROCEDURE — 27201012 HC FORCEPS, HOT/COLD, DISP: Mod: HCNC | Performed by: INTERNAL MEDICINE

## 2022-01-20 PROCEDURE — 45378 PR COLONOSCOPY,DIAGNOSTIC: ICD-10-PCS | Mod: 51,52,HCNC, | Performed by: INTERNAL MEDICINE

## 2022-01-20 PROCEDURE — 45378 DIAGNOSTIC COLONOSCOPY: CPT | Mod: 51,52,HCNC, | Performed by: INTERNAL MEDICINE

## 2022-01-20 RX ORDER — PROPOFOL 10 MG/ML
VIAL (ML) INTRAVENOUS CONTINUOUS PRN
Status: DISCONTINUED | OUTPATIENT
Start: 2022-01-20 | End: 2022-01-20

## 2022-01-20 RX ORDER — SODIUM CHLORIDE 9 MG/ML
INJECTION, SOLUTION INTRAVENOUS CONTINUOUS
Status: DISCONTINUED | OUTPATIENT
Start: 2022-01-20 | End: 2022-01-20 | Stop reason: HOSPADM

## 2022-01-20 RX ORDER — LIDOCAINE HYDROCHLORIDE 20 MG/ML
INJECTION INTRAVENOUS
Status: DISCONTINUED | OUTPATIENT
Start: 2022-01-20 | End: 2022-01-20

## 2022-01-20 RX ORDER — SODIUM CHLORIDE 9 MG/ML
INJECTION, SOLUTION INTRAVENOUS CONTINUOUS PRN
Status: DISCONTINUED | OUTPATIENT
Start: 2022-01-20 | End: 2022-01-20

## 2022-01-20 RX ORDER — PROPOFOL 10 MG/ML
VIAL (ML) INTRAVENOUS
Status: DISCONTINUED | OUTPATIENT
Start: 2022-01-20 | End: 2022-01-20

## 2022-01-20 RX ADMIN — LIDOCAINE HYDROCHLORIDE 100 MG: 20 INJECTION, SOLUTION INTRAVENOUS at 10:01

## 2022-01-20 RX ADMIN — SODIUM CHLORIDE: 0.9 INJECTION, SOLUTION INTRAVENOUS at 09:01

## 2022-01-20 RX ADMIN — PROPOFOL 215 MCG/KG/MIN: 10 INJECTION, EMULSION INTRAVENOUS at 10:01

## 2022-01-20 RX ADMIN — PROPOFOL 70 MG: 10 INJECTION, EMULSION INTRAVENOUS at 10:01

## 2022-01-20 RX ADMIN — GLYCOPYRROLATE 0.2 MG: 0.2 INJECTION, SOLUTION INTRAMUSCULAR; INTRAVITREAL at 10:01

## 2022-01-20 NOTE — PROVATION PATIENT INSTRUCTIONS
Discharge Summary/Instructions after an Endoscopic Procedure  Patient Name: Laverne Humphries  Patient MRN: [id number]  Patient YOB: 1940 Thursday, January 20, 2022  Emmanuel Sandoval MD  Dear patient,  As a result of recent federal legislation (The Federal Cures Act), you may   receive lab or pathology results from your procedure in your [facility]   account before your physician is able to contact you. Your physician or   their representative will relay the results to you with their   recommendations at their soonest availability.  Thank you,  RESTRICTIONS:  During your procedure today, you received medications for sedation.  These   medications may affect your judgment, balance and coordination.  Therefore,   for 24 hours, you have the following restrictions:   - DO NOT drive a car, operate machinery, make legal/financial decisions,   sign important papers or drink alcohol.    ACTIVITY:  Today: no heavy lifting, straining or running due to procedural   sedation/anesthesia.  The following day: return to full activity including work.  DIET:  Eat and drink normally unless instructed otherwise.     TREATMENT FOR COMMON SIDE EFFECTS:  - Mild abdominal pain, nausea, belching, bloating or excessive gas:  rest,   eat lightly and use a heating pad.  - Sore Throat: treat with throat lozenges and/or gargle with warm salt   water.  - Because air was used during the procedure, expelling large amounts of air   from your rectum or belching is normal.  - If a bowel prep was taken, you may not have a bowel movement for 1-3 days.    This is normal.  SYMPTOMS TO WATCH FOR AND REPORT TO YOUR PHYSICIAN:  1. Abdominal pain or bloating, other than gas cramps.  2. Chest pain.  3. Back pain.  4. Signs of infection such as: chills or fever occurring within 24 hours   after the procedure.  5. Rectal bleeding, which would show as bright red, maroon, or black stools.   (A tablespoon of blood from the rectum is not serious,  especially if   hemorrhoids are present.)  6. Vomiting.  7. Weakness or dizziness.  GO DIRECTLY TO THE NEAREST EMERGENCY ROOM IF YOU HAVE ANY OF THE FOLLOWING:      Difficulty breathing              Chills and/or fever over 101 F   Persistent vomiting and/or vomiting blood   Severe abdominal pain   Severe chest pain   Black, tarry stools   Bleeding- more than one tablespoon   Any other symptom or condition that you feel may need urgent attention  Your doctor recommends these additional instructions:  If any biopsies were taken, your doctors clinic will contact you in 1 to 2   weeks with any results.  - Discharge patient to home.   - Resume previous diet today.   - Perform a colonoscopy today.   - Continue present medications.   - Await pathology results.   - Return to referring physician as previously scheduled.  For questions, problems or results please call your physician - Emmanuel Sandoval MD at Work:  [phone].  [facility] [place], EMERGENCY ROOM PHONE NUMBER: [phone]  IF A COMPLICATION OR EMERGENCY SITUATION ARISES AND YOU ARE UNABLE TO REACH   YOUR PHYSICIAN - GO DIRECTLY TO THE EMERGENCY ROOM.  Emmanuel Sandoval MD  1/20/2022 10:18:33 AM  This report has been verified and signed electronically.  Dear patient,  As a result of recent federal legislation (The Federal Cures Act), you may   receive lab or pathology results from your procedure in your [facility]   account before your physician is able to contact you. Your physician or   their representative will relay the results to you with their   recommendations at their soonest availability.  Thank you,  PROVATION

## 2022-01-20 NOTE — ANESTHESIA PREPROCEDURE EVALUATION
01/20/2022  Laverne Humphries is a 81 y.o., female.    Patient Active Problem List   Diagnosis    IBS (irritable bowel syndrome)    Reflux esophagitis    Torus palatinus    Environmental allergies    Hyperlipidemia    Chronic kidney disease, stage III (moderate)    Anxiety    Ectatic aorta    Age-related osteoporosis without current pathological fracture    Calcified granuloma of lung    Lower extremity weakness    Painful cervical ROM    Decreased ROM of intervertebral discs of cervical spine    Upper extremity weakness    BPPV (benign paroxysmal positional vertigo), right     Past Medical History:   Diagnosis Date    Anxiety     Asthma     mild    Cataract     IBS (irritable bowel syndrome)     Reflux esophagitis     Torus palatinus      Past Surgical History:   Procedure Laterality Date    BELPHAROPTOSIS REPAIR Bilateral     CATARACT EXTRACTION W/  INTRAOCULAR LENS IMPLANT Bilateral     CATARACT EXTRACTION, BILATERAL      CHOLECYSTECTOMY      EYE SURGERY      HYSTERECTOMY      OOPHORECTOMY             Anesthesia Evaluation    I have reviewed the Patient Summary Reports.   I have reviewed the NPO Status.   I have reviewed the Medications.     Review of Systems  Anesthesia Hx:  No problems with previous Anesthesia    Pulmonary:   Asthma    Renal/:   Chronic Renal Disease        Physical Exam  General:  Well nourished    Airway/Jaw/Neck:  Airway Findings: Mouth Opening: Normal Tongue: Normal  General Airway Assessment: Adult  Jaw/Neck Findings:     Neck ROM: Normal ROM                 Anesthesia Plan  Type of Anesthesia, risks & benefits discussed:  Anesthesia Type:  general, MAC    Patient's Preference:   Plan Factors:          Intra-op Monitoring Plan: standard ASA monitors  Intra-op Monitoring Plan Comments:   Post Op Pain Control Plan:   Post Op Pain Control Plan Comments:      Induction:   IV  Beta Blocker:  Patient is not currently on a Beta-Blocker (No further documentation required).       Informed Consent: Patient understands risks and agrees with Anesthesia plan.  Questions answered. Anesthesia consent signed with patient.  ASA Score: 2     Day of Surgery Review of History & Physical: I have interviewed and examined the patient. I have reviewed the patient's H&P dated:  There are no significant changes.          Ready For Surgery From Anesthesia Perspective.

## 2022-01-20 NOTE — H&P
Short Stay Endoscopy History and Physical    PCP - Fran Castro MD    Procedure - EGD/Colonoscopy  ASA - per anesthesia  Mallampati - per anesthesia  History of Anesthesia problems - no  Family history Anesthesia problems -  no   Plan of anesthesia - MAC    HPI:  This is a 81 y.o. female here for evaluation of :     EGD - GERD  Colon - diverticulititis, previous polyps, ?RV fistula (patient reports passing stool from vagina)    ROS:  Constitutional: No fevers, chills, No weight loss  CV: No chest pain  Pulm: No cough, No shortness of breath  Ophtho: No vision changes  GI: see HPI  Derm: No rash    Medical History:  has a past medical history of Anxiety, Asthma, Cataract, IBS (irritable bowel syndrome), Reflux esophagitis, and Torus palatinus.    Surgical History:  has a past surgical history that includes Cataract extraction, bilateral; Cataract extraction w/  intraocular lens implant (Bilateral); Blepharoptosis repair (Bilateral); Cholecystectomy; Hysterectomy; Eye surgery; and Oophorectomy.    Family History: family history includes Asthma in her sister; Cancer in her mother; Cataracts in her father and sister; Diabetes in her father; No Known Problems in her daughter and son.. Otherwise no colon cancer, inflammatory bowel disease, or GI malignancies.    Social History:  reports that she has never smoked. She has never used smokeless tobacco. She reports that she does not drink alcohol and does not use drugs.    Review of patient's allergies indicates:   Allergen Reactions    Codeine      Other reaction(s): Syncope    Sulfa (sulfonamide antibiotics)      Other reaction(s): Stomach upset       Medications:   Medications Prior to Admission   Medication Sig Dispense Refill Last Dose    albuterol (PROVENTIL/VENTOLIN HFA) 90 mcg/actuation inhaler Inhale 2 puffs into the lungs every 6 (six) hours as needed for Wheezing or Shortness of Breath (generic preferred). Rescue 18 g 3 Past Week at Unknown time     gabapentin (NEURONTIN) 300 MG capsule Take 2 capsules (600 mg total) by mouth every evening. 180 capsule 3 Past Week at Unknown time    calcium-vitamin D3 500 mg(1,250mg) -200 unit per tablet Take 1 tablet by mouth Daily.    1/18/2022    diclofenac sodium (VOLTAREN) 1 % Gel Apply 2 g topically once daily. 150 g 2 Unknown at Unknown time    estradioL (ESTRACE) 0.01 % (0.1 mg/gram) vaginal cream PLACE 1 GRAM VAGINALLY TWICE A WEEK (Patient not taking: No sig reported) 42.5 g 3 Unknown at Unknown time    estradioL (ESTRACE) 0.01 % (0.1 mg/gram) vaginal cream Place 1 g vaginally twice a week. 42.5 g 3 Unknown at Unknown time    fish oil-omega-3 fatty acids 300-1,000 mg capsule Take by mouth once daily.   1/18/2022    fluconazole (DIFLUCAN) 100 MG tablet Take 1 tablet (100 mg total) by mouth once daily. 5 tablet 2 More than a month at Unknown time    fluticasone propionate (FLONASE) 50 mcg/actuation nasal spray 2 sprays (100 mcg total) by Each Nostril route once daily. 3 mo supply 48 g 4 1/18/2022    hydrocortisone-pramoxine (ANALPRAM-HC) 2.5-1 % Crea Place rectally 3 (three) times daily. 1 Tube 3 Unknown at Unknown time    LORazepam (ATIVAN) 0.5 MG tablet Take 1 tablet (0.5 mg total) by mouth every evening. 90 tablet 1 1/18/2022    meloxicam (MOBIC) 7.5 MG tablet Take 1 tablet (7.5 mg total) by mouth once daily. 30 tablet 12 More than a month at Unknown time    SHINGRIX, PF, 50 mcg/0.5 mL injection    Unknown at Unknown time       Physical Exam:    Vital Signs:   Vitals:    01/20/22 0905   BP: 138/65   Pulse: (!) 111   Resp: 16   Temp: 97.7 °F (36.5 °C)       Gen: NAD, lying comfortably  HENT: NCAT, oropharynx clear  Eyes: anicteric sclerae, EOMI grossly  Neck: supple, no visible masses/goiter  Cardiac: RRR  Lungs: non-labored breathing  Abd: soft, NT/ND, normoactive BS  Ext: no LE edema, warm, well perfused  Skin: skin intact on exposed body parts, no visible rashes, lesions  Neuro: A&Ox4, neuro exam grossly  intact, moves all extremities  Psych: appropriate mood, affect      Labs:  Lab Results   Component Value Date    WBC 8.52 10/27/2021    HGB 12.2 10/27/2021    HCT 38.7 10/27/2021     10/27/2021    CHOL 185 01/27/2021    TRIG 84 01/27/2021    HDL 62 01/27/2021    ALT 11 08/09/2021    AST 20 08/09/2021     (L) 10/22/2021    K 4.6 10/22/2021     10/22/2021    CREATININE 0.8 10/22/2021    BUN 12 10/22/2021    CO2 21 (L) 10/22/2021    TSH 2.647 10/19/2021       Plan:  EGD for GERD  Colonoscopy for diverticulitis f/u, previous polyps    I have explained the risks and benefits of endoscopy procedures to the patient including but not limited to bleeding, perforation, infection, and death.  The patient was asked if they understand and allowed to ask any further questions to their satisfaction.    Robbin Packer MD

## 2022-01-20 NOTE — TELEPHONE ENCOUNTER
That would be something I would refer to surgeon,   To evaluate,  LMK if they dont have one  (a fistula would be coming from bowel , so may need workup)  thanks

## 2022-01-20 NOTE — PROVATION PATIENT INSTRUCTIONS
Discharge Summary/Instructions after an Endoscopic Procedure  Patient Name: Laverne Humphries  Patient MRN: [id number]  Patient YOB: 1940 Thursday, January 20, 2022  Emmanuel Sandoval MD  Dear patient,  As a result of recent federal legislation (The Federal Cures Act), you may   receive lab or pathology results from your procedure in your [facility]   account before your physician is able to contact you. Your physician or   their representative will relay the results to you with their   recommendations at their soonest availability.  Thank you,  RESTRICTIONS:  During your procedure today, you received medications for sedation.  These   medications may affect your judgment, balance and coordination.  Therefore,   for 24 hours, you have the following restrictions:   - DO NOT drive a car, operate machinery, make legal/financial decisions,   sign important papers or drink alcohol.    ACTIVITY:  Today: no heavy lifting, straining or running due to procedural   sedation/anesthesia.  The following day: return to full activity including work.  DIET:  Eat and drink normally unless instructed otherwise.     TREATMENT FOR COMMON SIDE EFFECTS:  - Mild abdominal pain, nausea, belching, bloating or excessive gas:  rest,   eat lightly and use a heating pad.  - Sore Throat: treat with throat lozenges and/or gargle with warm salt   water.  - Because air was used during the procedure, expelling large amounts of air   from your rectum or belching is normal.  - If a bowel prep was taken, you may not have a bowel movement for 1-3 days.    This is normal.  SYMPTOMS TO WATCH FOR AND REPORT TO YOUR PHYSICIAN:  1. Abdominal pain or bloating, other than gas cramps.  2. Chest pain.  3. Back pain.  4. Signs of infection such as: chills or fever occurring within 24 hours   after the procedure.  5. Rectal bleeding, which would show as bright red, maroon, or black stools.   (A tablespoon of blood from the rectum is not serious,  especially if   hemorrhoids are present.)  6. Vomiting.  7. Weakness or dizziness.  GO DIRECTLY TO THE NEAREST EMERGENCY ROOM IF YOU HAVE ANY OF THE FOLLOWING:      Difficulty breathing              Chills and/or fever over 101 F   Persistent vomiting and/or vomiting blood   Severe abdominal pain   Severe chest pain   Black, tarry stools   Bleeding- more than one tablespoon   Any other symptom or condition that you feel may need urgent attention  Your doctor recommends these additional instructions:  If any biopsies were taken, your doctors clinic will contact you in 1 to 2   weeks with any results.  - Discharge patient to home.   - High fiber diet indefinitely.   - Continue present medications.   - Await pathology results.   - Repeat colonoscopy is not recommended due to current age (66 years or   older) for screening purposes.   - If further evaluation of the colon is needed consider a CT colonography or   CT enterography.  - Patient has a contact number available for emergencies.  The signs and   symptoms of potential delayed complications were discussed with the   patient.  Return to normal activities tomorrow.  Written discharge   instructions were provided to the patient.  For questions, problems or results please call your physician - Emmanuel Sandoval MD at Work:  [phone].  [facility] [place], EMERGENCY ROOM PHONE NUMBER: [phone]  IF A COMPLICATION OR EMERGENCY SITUATION ARISES AND YOU ARE UNABLE TO REACH   YOUR PHYSICIAN - GO DIRECTLY TO THE EMERGENCY ROOM.  Emmanuel Sandoval MD  1/20/2022 10:35:15 AM  This report has been verified and signed electronically.  Dear patient,  As a result of recent federal legislation (The Federal Cures Act), you may   receive lab or pathology results from your procedure in your [facility]   account before your physician is able to contact you. Your physician or   their representative will relay the results to you with their   recommendations at their  soonest availability.  Thank you,  PROVATION

## 2022-01-20 NOTE — TRANSFER OF CARE
Anesthesia Transfer of Care Note    Patient: Laverne Humphries    Procedure(s) Performed: Procedure(s) (LRB):  EGD (ESOPHAGOGASTRODUODENOSCOPY) any GI doc, please perform colonoscopy and EGD at same time (N/A)  COLONOSCOPY (N/A)    Patient location: PACU    Anesthesia Type: general and MAC    Transport from OR: Transported from OR on room air with adequate spontaneous ventilation    Post pain: adequate analgesia    Post assessment: no apparent anesthetic complications and tolerated procedure well    Post vital signs: stable    Level of consciousness: awake, alert and oriented    Nausea/Vomiting: no nausea/vomiting    Complications: none    Transfer of care protocol was followed      Last vitals:   Visit Vitals  /65 (BP Location: Left arm, Patient Position: Lying)   Pulse (!) 111   Temp 36.5 °C (97.7 °F) (Temporal)   Resp 16   Ht 5' (1.524 m)   Wt 50.3 kg (111 lb)   LMP  (LMP Unknown)   SpO2 100%   BMI 21.68 kg/m²

## 2022-01-25 NOTE — ANESTHESIA POSTPROCEDURE EVALUATION
Anesthesia Post Evaluation    Patient: Laverne Humphries    Procedure(s) Performed: Procedure(s) (LRB):  EGD (ESOPHAGOGASTRODUODENOSCOPY) any GI doc, please perform colonoscopy and EGD at same time (N/A)  COLONOSCOPY (N/A)    Final Anesthesia Type: general      Patient location during evaluation: GI PACU  Patient participation: Yes- Able to Participate  Level of consciousness: awake and alert and oriented  Post-procedure vital signs: reviewed and stable  Pain management: adequate  Airway patency: patent    PONV status at discharge: No PONV  Anesthetic complications: no      Cardiovascular status: hemodynamically stable  Respiratory status: unassisted, spontaneous ventilation and room air  Hydration status: euvolemic  Follow-up not needed.          Vitals Value Taken Time   /67 01/20/22 1055   Temp 36.6 °C (97.9 °F) 01/20/22 1040   Pulse 110 01/20/22 1055   Resp 16 01/20/22 1055   SpO2 97 % 01/20/22 1055         Event Time   Out of Recovery 11:14:12         Pain/Elmer Score: No data recorded

## 2022-01-27 LAB
FINAL PATHOLOGIC DIAGNOSIS: NORMAL
GROSS: NORMAL
Lab: NORMAL

## 2022-02-03 ENCOUNTER — OFFICE VISIT (OUTPATIENT)
Dept: OPTOMETRY | Facility: CLINIC | Age: 82
End: 2022-02-03
Payer: COMMERCIAL

## 2022-02-03 DIAGNOSIS — Z13.5 GLAUCOMA SCREENING: ICD-10-CM

## 2022-02-03 DIAGNOSIS — H00.15 CHALAZION LEFT LOWER EYELID: ICD-10-CM

## 2022-02-03 DIAGNOSIS — H52.4 PRESBYOPIA: Primary | ICD-10-CM

## 2022-02-03 PROCEDURE — 92014 PR EYE EXAM, EST PATIENT,COMPREHESV: ICD-10-PCS | Mod: S$GLB,,, | Performed by: OPTOMETRIST

## 2022-02-03 PROCEDURE — 99999 PR PBB SHADOW E&M-EST. PATIENT-LVL III: ICD-10-PCS | Mod: PBBFAC,,, | Performed by: OPTOMETRIST

## 2022-02-03 PROCEDURE — 92014 COMPRE OPH EXAM EST PT 1/>: CPT | Mod: S$GLB,,, | Performed by: OPTOMETRIST

## 2022-02-03 PROCEDURE — 92015 DETERMINE REFRACTIVE STATE: CPT | Mod: S$GLB,,, | Performed by: OPTOMETRIST

## 2022-02-03 PROCEDURE — 92015 PR REFRACTION: ICD-10-PCS | Mod: S$GLB,,, | Performed by: OPTOMETRIST

## 2022-02-03 PROCEDURE — 99999 PR PBB SHADOW E&M-EST. PATIENT-LVL III: CPT | Mod: PBBFAC,,, | Performed by: OPTOMETRIST

## 2022-02-03 NOTE — PROGRESS NOTES
HOWARD HU - 11/12/2020    Presents today for ocular health check.  Pt noticeable vision changes.  Wearing old spex - did not get rx fill from last visit.  No f/f  Refresh gtts  S/P PCIOL OU    Last edited by Caryn Patel MA on 2/3/2022  8:29 AM. (History)        ROS     Positive for: Eyes (cat surgery OU)    Negative for: Constitutional, Gastrointestinal, Neurological, Skin,   Genitourinary, Musculoskeletal, HENT, Endocrine, Cardiovascular,   Respiratory, Psychiatric, Allergic/Imm, Heme/Lymph    Last edited by Alfred Pond, OD on 2/3/2022  8:59 AM. (History)        Assessment /Plan     For exam results, see Encounter Report.    Presbyopia    Glaucoma screening    Chalazion left lower eyelid      1. Mild pco sp pciol OU--pt wishes new Rx  2. Dry eye sx--pt to cont w REFRESH ATs   3. Chalazion RLL X 4 weeks--discussed excision, but pt wishes to wait    PLAN:    rtc 1 yr, or will call sooner if wishes MD consult for chalazion excision

## 2022-02-04 ENCOUNTER — TELEPHONE (OUTPATIENT)
Dept: INTERNAL MEDICINE | Facility: CLINIC | Age: 82
End: 2022-02-04
Payer: MEDICARE

## 2022-02-11 ENCOUNTER — OFFICE VISIT (OUTPATIENT)
Dept: INTERNAL MEDICINE | Facility: CLINIC | Age: 82
End: 2022-02-11
Payer: MEDICARE

## 2022-02-11 VITALS
HEIGHT: 60 IN | SYSTOLIC BLOOD PRESSURE: 130 MMHG | DIASTOLIC BLOOD PRESSURE: 70 MMHG | BODY MASS INDEX: 22.03 KG/M2 | WEIGHT: 112.19 LBS | HEART RATE: 88 BPM | OXYGEN SATURATION: 98 %

## 2022-02-11 DIAGNOSIS — H00.015 HORDEOLUM EXTERNUM OF LEFT LOWER EYELID: Primary | ICD-10-CM

## 2022-02-11 PROCEDURE — 99999 PR PBB SHADOW E&M-EST. PATIENT-LVL IV: ICD-10-PCS | Mod: PBBFAC,HCNC,, | Performed by: INTERNAL MEDICINE

## 2022-02-11 PROCEDURE — 1159F PR MEDICATION LIST DOCUMENTED IN MEDICAL RECORD: ICD-10-PCS | Mod: HCNC,CPTII,S$GLB, | Performed by: INTERNAL MEDICINE

## 2022-02-11 PROCEDURE — 99213 PR OFFICE/OUTPT VISIT, EST, LEVL III, 20-29 MIN: ICD-10-PCS | Mod: HCNC,S$GLB,, | Performed by: INTERNAL MEDICINE

## 2022-02-11 PROCEDURE — 1160F PR REVIEW ALL MEDS BY PRESCRIBER/CLIN PHARMACIST DOCUMENTED: ICD-10-PCS | Mod: HCNC,CPTII,S$GLB, | Performed by: INTERNAL MEDICINE

## 2022-02-11 PROCEDURE — 99213 OFFICE O/P EST LOW 20 MIN: CPT | Mod: HCNC,S$GLB,, | Performed by: INTERNAL MEDICINE

## 2022-02-11 PROCEDURE — 3075F PR MOST RECENT SYSTOLIC BLOOD PRESS GE 130-139MM HG: ICD-10-PCS | Mod: HCNC,CPTII,S$GLB, | Performed by: INTERNAL MEDICINE

## 2022-02-11 PROCEDURE — 1126F PR PAIN SEVERITY QUANTIFIED, NO PAIN PRESENT: ICD-10-PCS | Mod: HCNC,CPTII,S$GLB, | Performed by: INTERNAL MEDICINE

## 2022-02-11 PROCEDURE — 3288F PR FALLS RISK ASSESSMENT DOCUMENTED: ICD-10-PCS | Mod: HCNC,CPTII,S$GLB, | Performed by: INTERNAL MEDICINE

## 2022-02-11 PROCEDURE — 3288F FALL RISK ASSESSMENT DOCD: CPT | Mod: HCNC,CPTII,S$GLB, | Performed by: INTERNAL MEDICINE

## 2022-02-11 PROCEDURE — 1101F PT FALLS ASSESS-DOCD LE1/YR: CPT | Mod: HCNC,CPTII,S$GLB, | Performed by: INTERNAL MEDICINE

## 2022-02-11 PROCEDURE — 3078F PR MOST RECENT DIASTOLIC BLOOD PRESSURE < 80 MM HG: ICD-10-PCS | Mod: HCNC,CPTII,S$GLB, | Performed by: INTERNAL MEDICINE

## 2022-02-11 PROCEDURE — 1160F RVW MEDS BY RX/DR IN RCRD: CPT | Mod: HCNC,CPTII,S$GLB, | Performed by: INTERNAL MEDICINE

## 2022-02-11 PROCEDURE — 1101F PR PT FALLS ASSESS DOC 0-1 FALLS W/OUT INJ PAST YR: ICD-10-PCS | Mod: HCNC,CPTII,S$GLB, | Performed by: INTERNAL MEDICINE

## 2022-02-11 PROCEDURE — 3075F SYST BP GE 130 - 139MM HG: CPT | Mod: HCNC,CPTII,S$GLB, | Performed by: INTERNAL MEDICINE

## 2022-02-11 PROCEDURE — 99999 PR PBB SHADOW E&M-EST. PATIENT-LVL IV: CPT | Mod: PBBFAC,HCNC,, | Performed by: INTERNAL MEDICINE

## 2022-02-11 PROCEDURE — 1126F AMNT PAIN NOTED NONE PRSNT: CPT | Mod: HCNC,CPTII,S$GLB, | Performed by: INTERNAL MEDICINE

## 2022-02-11 PROCEDURE — 1159F MED LIST DOCD IN RCRD: CPT | Mod: HCNC,CPTII,S$GLB, | Performed by: INTERNAL MEDICINE

## 2022-02-11 PROCEDURE — 3078F DIAST BP <80 MM HG: CPT | Mod: HCNC,CPTII,S$GLB, | Performed by: INTERNAL MEDICINE

## 2022-02-11 NOTE — PROGRESS NOTES
Subjective:       Patient ID: Laverne Humphries is a 81 y.o. female.    Chief Complaint:   Belepharitis (Left lower eyelid)    HPI - urgent care visit for this patient of Dr. Viera.  She has noticed a lesion on the lateral part of her left lower eyelid.  This itches, but has no pain.  She has rinsed her eyes with cool water without relief.  Hasn't had this before.  She has had her covid-19 vaccinations.    Pmh/meds:  Reviewed and reconciled in EPIC with patient during visit today.    Review of Systems   Constitutional: Negative for fever.   HENT: Negative for congestion.    Eyes: Positive for itching.   Respiratory: Negative for shortness of breath.    Cardiovascular: Negative for chest pain.   Gastrointestinal: Negative for abdominal pain.   Genitourinary: Negative for difficulty urinating.   Musculoskeletal: Negative for arthralgias.   Skin: Negative for rash.   Neurological: Negative for headaches.   Psychiatric/Behavioral: Negative for sleep disturbance.       Objective:      Physical Exam  Vitals reviewed.   Constitutional:       Appearance: Normal appearance.      Comments: Small, frail elderly woman   HENT:      Head: Normocephalic and atraumatic.   Eyes:      Comments: Stye left lower lid, outer portion   Neurological:      General: No focal deficit present.      Mental Status: She is alert.   Psychiatric:         Behavior: Behavior normal.         Assessment:       1. Hordeolum externum of left lower eyelid        Plan:       Laverne was seen today for belepharitis.    Diagnoses and all orders for this visit:    Hordeolum externum of left lower eyelid - discussed warm compresses, time.  May use diluted baby shampoo and a q-tip to wash inner portions of the eyelid    rtc prn    SHAHID Kenney MD MPH  Staff Internist

## 2022-02-21 ENCOUNTER — LAB VISIT (OUTPATIENT)
Dept: LAB | Facility: HOSPITAL | Age: 82
End: 2022-02-21
Payer: MEDICARE

## 2022-02-21 ENCOUNTER — PATIENT OUTREACH (OUTPATIENT)
Dept: ADMINISTRATIVE | Facility: HOSPITAL | Age: 82
End: 2022-02-21
Payer: MEDICARE

## 2022-02-21 DIAGNOSIS — E78.5 HYPERLIPIDEMIA, UNSPECIFIED HYPERLIPIDEMIA TYPE: ICD-10-CM

## 2022-02-21 LAB
ALBUMIN SERPL BCP-MCNC: 3.7 G/DL (ref 3.5–5.2)
ALP SERPL-CCNC: 58 U/L (ref 55–135)
ALT SERPL W/O P-5'-P-CCNC: 13 U/L (ref 10–44)
ANION GAP SERPL CALC-SCNC: 10 MMOL/L (ref 8–16)
AST SERPL-CCNC: 22 U/L (ref 10–40)
BASOPHILS # BLD AUTO: 0.06 K/UL (ref 0–0.2)
BASOPHILS NFR BLD: 0.9 % (ref 0–1.9)
BILIRUB SERPL-MCNC: 1.3 MG/DL (ref 0.1–1)
BUN SERPL-MCNC: 15 MG/DL (ref 8–23)
CALCIUM SERPL-MCNC: 10.6 MG/DL (ref 8.7–10.5)
CHLORIDE SERPL-SCNC: 102 MMOL/L (ref 95–110)
CHOLEST SERPL-MCNC: 182 MG/DL (ref 120–199)
CHOLEST/HDLC SERPL: 3.1 {RATIO} (ref 2–5)
CO2 SERPL-SCNC: 29 MMOL/L (ref 23–29)
CREAT SERPL-MCNC: 0.8 MG/DL (ref 0.5–1.4)
DIFFERENTIAL METHOD: ABNORMAL
EOSINOPHIL # BLD AUTO: 0.2 K/UL (ref 0–0.5)
EOSINOPHIL NFR BLD: 2.6 % (ref 0–8)
ERYTHROCYTE [DISTWIDTH] IN BLOOD BY AUTOMATED COUNT: 13.2 % (ref 11.5–14.5)
EST. GFR  (AFRICAN AMERICAN): >60 ML/MIN/1.73 M^2
EST. GFR  (NON AFRICAN AMERICAN): >60 ML/MIN/1.73 M^2
GLUCOSE SERPL-MCNC: 92 MG/DL (ref 70–110)
HCT VFR BLD AUTO: 38 % (ref 37–48.5)
HDLC SERPL-MCNC: 58 MG/DL (ref 40–75)
HDLC SERPL: 31.9 % (ref 20–50)
HGB BLD-MCNC: 11.7 G/DL (ref 12–16)
IMM GRANULOCYTES # BLD AUTO: 0.02 K/UL (ref 0–0.04)
IMM GRANULOCYTES NFR BLD AUTO: 0.3 % (ref 0–0.5)
LDLC SERPL CALC-MCNC: 106 MG/DL (ref 63–159)
LYMPHOCYTES # BLD AUTO: 3.3 K/UL (ref 1–4.8)
LYMPHOCYTES NFR BLD: 46.7 % (ref 18–48)
MCH RBC QN AUTO: 28.6 PG (ref 27–31)
MCHC RBC AUTO-ENTMCNC: 30.8 G/DL (ref 32–36)
MCV RBC AUTO: 93 FL (ref 82–98)
MONOCYTES # BLD AUTO: 0.5 K/UL (ref 0.3–1)
MONOCYTES NFR BLD: 7.4 % (ref 4–15)
NEUTROPHILS # BLD AUTO: 3 K/UL (ref 1.8–7.7)
NEUTROPHILS NFR BLD: 42.1 % (ref 38–73)
NONHDLC SERPL-MCNC: 124 MG/DL
NRBC BLD-RTO: 0 /100 WBC
PLATELET # BLD AUTO: 260 K/UL (ref 150–450)
PMV BLD AUTO: 11.3 FL (ref 9.2–12.9)
POTASSIUM SERPL-SCNC: 4.2 MMOL/L (ref 3.5–5.1)
PROT SERPL-MCNC: 7.5 G/DL (ref 6–8.4)
RBC # BLD AUTO: 4.09 M/UL (ref 4–5.4)
SODIUM SERPL-SCNC: 141 MMOL/L (ref 136–145)
TRIGL SERPL-MCNC: 90 MG/DL (ref 30–150)
WBC # BLD AUTO: 7.03 K/UL (ref 3.9–12.7)

## 2022-02-21 PROCEDURE — 85025 COMPLETE CBC W/AUTO DIFF WBC: CPT | Mod: HCNC | Performed by: INTERNAL MEDICINE

## 2022-02-21 PROCEDURE — 80053 COMPREHEN METABOLIC PANEL: CPT | Mod: HCNC | Performed by: INTERNAL MEDICINE

## 2022-02-21 PROCEDURE — 80061 LIPID PANEL: CPT | Mod: HCNC | Performed by: INTERNAL MEDICINE

## 2022-02-21 PROCEDURE — 36415 COLL VENOUS BLD VENIPUNCTURE: CPT | Mod: HCNC | Performed by: INTERNAL MEDICINE

## 2022-02-21 NOTE — PROGRESS NOTES
Health Maintenance Due   Topic Date Due    DEXA SCAN  07/09/2021    TETANUS VACCINE  02/09/2022       HM updated.    Monica Knowles LPN   Clinical Care Coordinator  [facility]ness

## 2022-02-28 ENCOUNTER — OFFICE VISIT (OUTPATIENT)
Dept: INTERNAL MEDICINE | Facility: CLINIC | Age: 82
End: 2022-02-28
Payer: MEDICARE

## 2022-02-28 VITALS
HEART RATE: 94 BPM | WEIGHT: 113.31 LBS | HEIGHT: 60 IN | RESPIRATION RATE: 18 BRPM | SYSTOLIC BLOOD PRESSURE: 122 MMHG | OXYGEN SATURATION: 99 % | BODY MASS INDEX: 22.25 KG/M2 | DIASTOLIC BLOOD PRESSURE: 80 MMHG

## 2022-02-28 DIAGNOSIS — K57.92 DIVERTICULITIS: ICD-10-CM

## 2022-02-28 DIAGNOSIS — L57.0 AK (ACTINIC KERATOSIS): ICD-10-CM

## 2022-02-28 DIAGNOSIS — I77.819 ECTATIC AORTA: ICD-10-CM

## 2022-02-28 DIAGNOSIS — N82.3 RECTOVAGINAL FISTULA: Primary | ICD-10-CM

## 2022-02-28 DIAGNOSIS — N18.31 STAGE 3A CHRONIC KIDNEY DISEASE: ICD-10-CM

## 2022-02-28 DIAGNOSIS — J84.10 CALCIFIED GRANULOMA OF LUNG: ICD-10-CM

## 2022-02-28 DIAGNOSIS — F41.9 ANXIETY: ICD-10-CM

## 2022-02-28 PROBLEM — M81.0 AGE-RELATED OSTEOPOROSIS WITHOUT CURRENT PATHOLOGICAL FRACTURE: Chronic | Status: ACTIVE | Noted: 2019-06-11

## 2022-02-28 PROBLEM — J98.4 CALCIFIED GRANULOMA OF LUNG: Chronic | Status: ACTIVE | Noted: 2020-02-06

## 2022-02-28 PROCEDURE — 3288F PR FALLS RISK ASSESSMENT DOCUMENTED: ICD-10-PCS | Mod: HCNC,CPTII,S$GLB, | Performed by: INTERNAL MEDICINE

## 2022-02-28 PROCEDURE — 99999 PR PBB SHADOW E&M-EST. PATIENT-LVL V: ICD-10-PCS | Mod: PBBFAC,HCNC,, | Performed by: INTERNAL MEDICINE

## 2022-02-28 PROCEDURE — 3079F PR MOST RECENT DIASTOLIC BLOOD PRESSURE 80-89 MM HG: ICD-10-PCS | Mod: HCNC,CPTII,S$GLB, | Performed by: INTERNAL MEDICINE

## 2022-02-28 PROCEDURE — 1160F RVW MEDS BY RX/DR IN RCRD: CPT | Mod: HCNC,CPTII,S$GLB, | Performed by: INTERNAL MEDICINE

## 2022-02-28 PROCEDURE — 1159F PR MEDICATION LIST DOCUMENTED IN MEDICAL RECORD: ICD-10-PCS | Mod: HCNC,CPTII,S$GLB, | Performed by: INTERNAL MEDICINE

## 2022-02-28 PROCEDURE — 99499 UNLISTED E&M SERVICE: CPT | Mod: S$GLB,,, | Performed by: INTERNAL MEDICINE

## 2022-02-28 PROCEDURE — 99499 RISK ADDL DX/OHS AUDIT: ICD-10-PCS | Mod: S$GLB,,, | Performed by: INTERNAL MEDICINE

## 2022-02-28 PROCEDURE — 99214 PR OFFICE/OUTPT VISIT, EST, LEVL IV, 30-39 MIN: ICD-10-PCS | Mod: HCNC,S$GLB,, | Performed by: INTERNAL MEDICINE

## 2022-02-28 PROCEDURE — 1126F PR PAIN SEVERITY QUANTIFIED, NO PAIN PRESENT: ICD-10-PCS | Mod: HCNC,CPTII,S$GLB, | Performed by: INTERNAL MEDICINE

## 2022-02-28 PROCEDURE — 99999 PR PBB SHADOW E&M-EST. PATIENT-LVL V: CPT | Mod: PBBFAC,HCNC,, | Performed by: INTERNAL MEDICINE

## 2022-02-28 PROCEDURE — 1126F AMNT PAIN NOTED NONE PRSNT: CPT | Mod: HCNC,CPTII,S$GLB, | Performed by: INTERNAL MEDICINE

## 2022-02-28 PROCEDURE — 3288F FALL RISK ASSESSMENT DOCD: CPT | Mod: HCNC,CPTII,S$GLB, | Performed by: INTERNAL MEDICINE

## 2022-02-28 PROCEDURE — 1101F PR PT FALLS ASSESS DOC 0-1 FALLS W/OUT INJ PAST YR: ICD-10-PCS | Mod: HCNC,CPTII,S$GLB, | Performed by: INTERNAL MEDICINE

## 2022-02-28 PROCEDURE — 3074F PR MOST RECENT SYSTOLIC BLOOD PRESSURE < 130 MM HG: ICD-10-PCS | Mod: HCNC,CPTII,S$GLB, | Performed by: INTERNAL MEDICINE

## 2022-02-28 PROCEDURE — 3074F SYST BP LT 130 MM HG: CPT | Mod: HCNC,CPTII,S$GLB, | Performed by: INTERNAL MEDICINE

## 2022-02-28 PROCEDURE — 3079F DIAST BP 80-89 MM HG: CPT | Mod: HCNC,CPTII,S$GLB, | Performed by: INTERNAL MEDICINE

## 2022-02-28 PROCEDURE — 99214 OFFICE O/P EST MOD 30 MIN: CPT | Mod: HCNC,S$GLB,, | Performed by: INTERNAL MEDICINE

## 2022-02-28 PROCEDURE — 1159F MED LIST DOCD IN RCRD: CPT | Mod: HCNC,CPTII,S$GLB, | Performed by: INTERNAL MEDICINE

## 2022-02-28 PROCEDURE — 1101F PT FALLS ASSESS-DOCD LE1/YR: CPT | Mod: HCNC,CPTII,S$GLB, | Performed by: INTERNAL MEDICINE

## 2022-02-28 PROCEDURE — 1160F PR REVIEW ALL MEDS BY PRESCRIBER/CLIN PHARMACIST DOCUMENTED: ICD-10-PCS | Mod: HCNC,CPTII,S$GLB, | Performed by: INTERNAL MEDICINE

## 2022-02-28 RX ORDER — LORAZEPAM 0.5 MG/1
0.5 TABLET ORAL NIGHTLY
Qty: 30 TABLET | Refills: 3 | Status: SHIPPED | OUTPATIENT
Start: 2022-02-28 | End: 2022-03-30 | Stop reason: CLARIF

## 2022-02-28 NOTE — PROGRESS NOTES
Subjective:       Patient ID: Laverne Humphries is a 81 y.o. female.    Chief Complaint: Annual Exam    Abdominal Pain  This is a new problem. The current episode started more than 1 month ago. The problem has been resolved. The pain is located in the suprapubic region. The pain is moderate. The quality of the pain is aching. Associated symptoms include a fever. Pertinent negatives include no dysuria, frequency or hematuria. Associated symptoms comments: Vaginal discharge- fecal material, and occ air released.     Review of Systems   Constitutional: Positive for fever. Negative for fatigue.   HENT: Negative for sore throat.    Eyes: Negative for visual disturbance.   Respiratory: Negative for shortness of breath.    Cardiovascular: Negative for chest pain and palpitations.   Gastrointestinal: Positive for abdominal pain.   Genitourinary: Negative for dysuria, frequency and hematuria.   Musculoskeletal: Negative for joint swelling.   Skin: Negative for rash.   Neurological: Negative for speech difficulty and weakness.   Psychiatric/Behavioral: Negative for dysphoric mood.       Objective:      Physical Exam  Vitals reviewed.   Constitutional:       General: She is not in acute distress.     Appearance: She is well-developed.   HENT:      Head: Normocephalic and atraumatic.   Eyes:      Conjunctiva/sclera: Conjunctivae normal.      Pupils: Pupils are equal, round, and reactive to light.   Cardiovascular:      Rate and Rhythm: Normal rate and regular rhythm.      Heart sounds: Normal heart sounds.   Pulmonary:      Effort: Pulmonary effort is normal.      Breath sounds: Normal breath sounds. No wheezing.   Abdominal:      General: Bowel sounds are normal.      Palpations: Abdomen is soft.      Tenderness: There is no abdominal tenderness.   Musculoskeletal:         General: No tenderness. Normal range of motion.      Cervical back: Normal range of motion and neck supple.   Skin:     Findings: No erythema.    Neurological:      Mental Status: She is alert and oriented to person, place, and time.      Cranial Nerves: No cranial nerve deficit.         Assessment:       1. Rectovaginal fistula    2. AK (actinic keratosis)    3. Anxiety    4. Ectatic aorta    5. Calcified granuloma of lung    6. Stage 3a chronic kidney disease    7. Diverticulitis        Plan:       Laverne was seen today for annual exam.    Diagnoses and all orders for this visit:    Rectovaginal fistula  Comments:  discussed with DR Anderson-  needs Urogyn and LIZZETH fallon  Orders:  -     Ambulatory referral/consult to Urogynecology; Future  -     Ambulatory referral/consult to Colorectal Surgery; Future    AK (actinic keratosis)  Comments:  right ear pinna-  will refer to derm  Orders:  -     Ambulatory referral/consult to Dermatology; Future    Anxiety  Comments:  chr, stable  Orders:  -     LORazepam (ATIVAN) 0.5 MG tablet; Take 1 tablet (0.5 mg total) by mouth every evening.    Ectatic aorta  Comments:  stable per recent scans    Calcified granuloma of lung  Comments:  stable    Stage 3a chronic kidney disease    Diverticulitis  Comments:  recent event-  may be related to RVF        Follow up in about 6 months (around 8/28/2022) for F/U APPOINTMENT WITH ME.

## 2022-03-07 ENCOUNTER — PATIENT OUTREACH (OUTPATIENT)
Dept: ADMINISTRATIVE | Facility: OTHER | Age: 82
End: 2022-03-07
Payer: MEDICARE

## 2022-03-07 NOTE — PROGRESS NOTES
Health Maintenance Due   Topic Date Due    DEXA Scan  07/09/2021    TETANUS VACCINE  02/09/2022     Updates were requested from care everywhere.  Chart was reviewed for overdue Proactive [facility] Encounters ([facility]) topics (CRS, Breast Cancer Screening, Eye exam)  Health Maintenance has been updated.  LINKS immunization registry triggered.  Immunizations were reconciled.

## 2022-03-08 ENCOUNTER — OFFICE VISIT (OUTPATIENT)
Dept: UROGYNECOLOGY | Facility: CLINIC | Age: 82
End: 2022-03-08
Payer: MEDICARE

## 2022-03-08 VITALS
DIASTOLIC BLOOD PRESSURE: 80 MMHG | SYSTOLIC BLOOD PRESSURE: 144 MMHG | WEIGHT: 113.56 LBS | BODY MASS INDEX: 22.29 KG/M2 | HEIGHT: 60 IN

## 2022-03-08 DIAGNOSIS — N95.2 VAGINAL ATROPHY: ICD-10-CM

## 2022-03-08 DIAGNOSIS — K57.90 DIVERTICULOSIS: ICD-10-CM

## 2022-03-08 DIAGNOSIS — L92.9 GRANULATION TISSUE: ICD-10-CM

## 2022-03-08 DIAGNOSIS — N82.3 RECTOVAGINAL FISTULA: Primary | ICD-10-CM

## 2022-03-08 PROCEDURE — 51701 PR INSERTION OF NON-INDWELLING BLADDER CATHETERIZATION FOR RESIDUAL UR: ICD-10-PCS | Mod: S$GLB,,, | Performed by: NURSE PRACTITIONER

## 2022-03-08 PROCEDURE — 1101F PT FALLS ASSESS-DOCD LE1/YR: CPT | Mod: CPTII,S$GLB,, | Performed by: NURSE PRACTITIONER

## 2022-03-08 PROCEDURE — 99999 PR PBB SHADOW E&M-EST. PATIENT-LVL IV: ICD-10-PCS | Mod: PBBFAC,,, | Performed by: NURSE PRACTITIONER

## 2022-03-08 PROCEDURE — 87086 URINE CULTURE/COLONY COUNT: CPT | Performed by: NURSE PRACTITIONER

## 2022-03-08 PROCEDURE — 1160F PR REVIEW ALL MEDS BY PRESCRIBER/CLIN PHARMACIST DOCUMENTED: ICD-10-PCS | Mod: CPTII,S$GLB,, | Performed by: NURSE PRACTITIONER

## 2022-03-08 PROCEDURE — 3077F PR MOST RECENT SYSTOLIC BLOOD PRESSURE >= 140 MM HG: ICD-10-PCS | Mod: CPTII,S$GLB,, | Performed by: NURSE PRACTITIONER

## 2022-03-08 PROCEDURE — 99214 PR OFFICE/OUTPT VISIT, EST, LEVL IV, 30-39 MIN: ICD-10-PCS | Mod: 25,S$GLB,, | Performed by: NURSE PRACTITIONER

## 2022-03-08 PROCEDURE — 1159F MED LIST DOCD IN RCRD: CPT | Mod: CPTII,S$GLB,, | Performed by: NURSE PRACTITIONER

## 2022-03-08 PROCEDURE — 51701 INSERT BLADDER CATHETER: CPT | Mod: S$GLB,,, | Performed by: NURSE PRACTITIONER

## 2022-03-08 PROCEDURE — 1101F PR PT FALLS ASSESS DOC 0-1 FALLS W/OUT INJ PAST YR: ICD-10-PCS | Mod: CPTII,S$GLB,, | Performed by: NURSE PRACTITIONER

## 2022-03-08 PROCEDURE — 1160F RVW MEDS BY RX/DR IN RCRD: CPT | Mod: CPTII,S$GLB,, | Performed by: NURSE PRACTITIONER

## 2022-03-08 PROCEDURE — 99214 OFFICE O/P EST MOD 30 MIN: CPT | Mod: 25,S$GLB,, | Performed by: NURSE PRACTITIONER

## 2022-03-08 PROCEDURE — 3079F PR MOST RECENT DIASTOLIC BLOOD PRESSURE 80-89 MM HG: ICD-10-PCS | Mod: CPTII,S$GLB,, | Performed by: NURSE PRACTITIONER

## 2022-03-08 PROCEDURE — 3288F PR FALLS RISK ASSESSMENT DOCUMENTED: ICD-10-PCS | Mod: CPTII,S$GLB,, | Performed by: NURSE PRACTITIONER

## 2022-03-08 PROCEDURE — 1126F PR PAIN SEVERITY QUANTIFIED, NO PAIN PRESENT: ICD-10-PCS | Mod: CPTII,S$GLB,, | Performed by: NURSE PRACTITIONER

## 2022-03-08 PROCEDURE — 3077F SYST BP >= 140 MM HG: CPT | Mod: CPTII,S$GLB,, | Performed by: NURSE PRACTITIONER

## 2022-03-08 PROCEDURE — 3079F DIAST BP 80-89 MM HG: CPT | Mod: CPTII,S$GLB,, | Performed by: NURSE PRACTITIONER

## 2022-03-08 PROCEDURE — 3288F FALL RISK ASSESSMENT DOCD: CPT | Mod: CPTII,S$GLB,, | Performed by: NURSE PRACTITIONER

## 2022-03-08 PROCEDURE — 1126F AMNT PAIN NOTED NONE PRSNT: CPT | Mod: CPTII,S$GLB,, | Performed by: NURSE PRACTITIONER

## 2022-03-08 PROCEDURE — 99999 PR PBB SHADOW E&M-EST. PATIENT-LVL IV: CPT | Mod: PBBFAC,,, | Performed by: NURSE PRACTITIONER

## 2022-03-08 PROCEDURE — 1159F PR MEDICATION LIST DOCUMENTED IN MEDICAL RECORD: ICD-10-PCS | Mod: CPTII,S$GLB,, | Performed by: NURSE PRACTITIONER

## 2022-03-08 RX ORDER — ESTRADIOL 0.1 MG/G
1 CREAM VAGINAL DAILY
Qty: 42.5 G | Refills: 3 | Status: SHIPPED | OUTPATIENT
Start: 2022-03-08 | End: 2023-02-03

## 2022-03-08 NOTE — PATIENT INSTRUCTIONS
1. Concern for rectovaginal fistula  --CT of abdomen pelvis with and without oral and rectal contrast  --keep appointment with colon rectal surgery    2. Vaginal granulation tissue  --vaginal estrogen cream 1 gm nightly    3. Diverticulosis/ diverticulitis  --  Start daily fiber.  Take 1 tsp of fiber powder (psyllium or other sugar-free powder).  Mix in 8 oz of water.  Take x 3-5 days.  Then, increase fiber by 1 tsp every 3-5 days until stool is easy to pass.  Stop and continue at that dose.   Do not exceed 6 tsps/day.  May also use over the counter stool softener 1-2 x/day.  AVOID laxatives.    4. Will call with a plan after you have CT scan and seeing colon rectal surgery.

## 2022-03-08 NOTE — PROGRESS NOTES
[facility]EENA [facility] - OBGYN [facility]  [address]  [facility] [id number]    Laverne [place]  [id number]  1940 March 8, 2022    Consulting Physician: Fran Castro MD     Primary M.D.: Fran Castro MD    Chief Complaint   Patient presents with    Consult       HPI:     1)  UI:  (--) DREAD < (+) UUI  Mostly at night insensible loss  (+) pads:3/day, usually minimum wetness   Daytime frequency: Q 1 hours.  Nocturia: No:  (--) dysuria,  (--) hematuria,  (--) frequent UTIs.  (+) complete bladder emptying.     2)  POP:  Absent..  Symptoms:(+)  Vaginal discharge.  (+) vaginal bleeding. (+) vaginal discharge--reports intermittent stool. (--) sexually active.   (+)  Vaginal dryness.  (--) vaginal estrogen use.   01/2022  Vaginal biopsy: VAGINAL BIOPSY SHOWS SQUAMOUS MUCOSA WITH MARKED ACUTE AND CHRONIC   INFLAMMATION AND GRANULATION TISSUE FORMATION PRESENT.  NO MALIGNANCY OR   DYSPLASIA IDENTIFIED.     3)  BM:  (--) constipation/straining.  (--) chronic diarrhea. (--) hematochezia.  (--) fecal incontinence. (--)fecal smearing/urgency.  (+) complete evacuation.  Eats high fiber diet. Adds flax seed to smoothie    4)concern for RVF  --sees stool when wiping almost daily  --has periods of time when she does not see stool (when she in on antibiotics for diverticulitis)    Past Medical History  Past Medical History:   Diagnosis Date    Anxiety     Asthma     mild    Cataract     IBS (irritable bowel syndrome)     Reflux esophagitis     Torus palatinus         Past Surgical History  Past Surgical History:   Procedure Laterality Date    BELPHAROPTOSIS REPAIR Bilateral     CATARACT EXTRACTION W/  INTRAOCULAR LENS IMPLANT Bilateral     CATARACT EXTRACTION, BILATERAL      CHOLECYSTECTOMY      COLONOSCOPY N/A 1/20/2022    Procedure: COLONOSCOPY;  Surgeon: Emmanuel Sandoval MD;  Location: [facility] ([address] [place] [address]);  Service: Endoscopy;  Laterality: N/A;    ESOPHAGOGASTRODUODENOSCOPY N/A 1/20/2022    Procedure: EGD  "(ESOPHAGOGASTRODUODENOSCOPY) any GI doc, please perform colonoscopy and EGD at same time;  Surgeon: Emmanuel Sandoval MD;  Location: [facility] ([address] [place] [address]);  Service: Endoscopy;  Laterality: N/A;  EGD & Colonoscopy orders combined-MS  fully vaccinated   covid test  @ [facility]    EYE SURGERY      HYSTERECTOMY      OOPHORECTOMY        Appendectomy age 18    Hysterectomy: Yes - Date: 42.  Indication: fibroids.    Type: xlap  Cervix present: No  Ovaries present: No  Other procedures at time of hysterectomy:  Open pascual    Past Ob History     x 2.  .    Largest infant weight: " normal size"      Gynecologic History  LMP: No LMP recorded (lmp unknown). Patient is postmenopausal.  Age of menarche: unsure  Age of menopause: 40's  Menstrual history: reports normal  Pap test: 10/2021 normal.  History of abnormal paps: No.  History of STIs:  No  Mammogram: Date of last: 2022.  Result: Normal  Colonoscopy: Date of last: 2022.  Result:  Internal hemorrhoids.                          - Diverticulosis in the sigmoid colon.                          - The colonoscope was only able to be passed to                          25cm proximal to the anus. Due to dense pelvic                          adhesions and restricted mobility of the colon the                          scope was unable to be advanced beyond this                          location in spite of using pressure and placing                          the patient on her back. .   DEXA:  Date of last: 2016  Result:  Osteoporosis of the femoral neck. There is a significant increase in BMD at the lumbar spine (2.9%) when compared to previous study.   RECOMMENDATIONS:  1) Adequate calcium and Vitamin D therapy  2) Appropriate exercise  3) Consider medical therapy including bisphosphonates, raloxifene, calcitonin, denosumab, teriparatide.  4) Consider repeat BMD in 2- 4 years.     Family History  Family History   Problem Relation Age of Onset    " Cancer Mother         pancreatic    Asthma Sister     Cataracts Sister     Cataracts Father     Diabetes Father     No Known Problems Daughter     No Known Problems Son     Amblyopia Neg Hx     Blindness Neg Hx     Glaucoma Neg Hx     Macular degeneration Neg Hx     Retinal detachment Neg Hx     Strabismus Neg Hx     Breast cancer Neg Hx     Colon cancer Neg Hx     Ovarian cancer Neg Hx       Colon CA: No  Breast CA: No  GYN CA: No   CA: No    Social History  Social History     Tobacco Use   Smoking Status Never Smoker   Smokeless Tobacco Never Used   .    Social History     Substance and Sexual Activity   Alcohol Use No   .    Social History     Substance and Sexual Activity   Drug Use No   .  The patient is .  Resides in [place] [address].      Allergies  Review of patient's allergies indicates:   Allergen Reactions    Codeine      Other reaction(s): Syncope    Sulfa (sulfonamide antibiotics)      Other reaction(s): Stomach upset       Medications  Current Outpatient Medications on File Prior to Visit   Medication Sig Dispense Refill    albuterol (PROVENTIL/VENTOLIN HFA) 90 mcg/actuation inhaler Inhale 2 puffs into the lungs every 6 (six) hours as needed for Wheezing or Shortness of Breath (generic preferred). Rescue 18 g 3    calcium-vitamin D3 500 mg(1,250mg) -200 unit per tablet Take 1 tablet by mouth Daily.       diclofenac sodium (VOLTAREN) 1 % Gel Apply 2 g topically once daily. 150 g 2    estradioL (ESTRACE) 0.01 % (0.1 mg/gram) vaginal cream PLACE 1 GRAM VAGINALLY TWICE A WEEK 42.5 g 3    estradioL (ESTRACE) 0.01 % (0.1 mg/gram) vaginal cream Place 1 g vaginally twice a week. 42.5 g 3    fish oil-omega-3 fatty acids 300-1,000 mg capsule Take by mouth once daily.      fluticasone propionate (FLONASE) 50 mcg/actuation nasal spray 2 sprays (100 mcg total) by Each Nostril route once daily. 3 mo supply 48 g 4    gabapentin (NEURONTIN) 300 MG capsule Take 2 capsules (600 mg  total) by mouth every evening. 180 capsule 3    LORazepam (ATIVAN) 0.5 MG tablet Take 1 tablet (0.5 mg total) by mouth every evening. 30 tablet 3    fluconazole (DIFLUCAN) 100 MG tablet Take 1 tablet (100 mg total) by mouth once daily. (Patient not taking: Reported on 3/8/2022) 5 tablet 2    hydrocortisone-pramoxine (ANALPRAM-HC) 2.5-1 % Crea Place rectally 3 (three) times daily. (Patient not taking: No sig reported) 1 Tube 3    meloxicam (MOBIC) 7.5 MG tablet Take 1 tablet (7.5 mg total) by mouth once daily. (Patient not taking: Reported on 3/8/2022) 30 tablet 12    SHINGRIX, PF, 50 mcg/0.5 mL injection        No current facility-administered medications on file prior to visit.       Review of Systems A 14 point ROS was reviewed with pertinent positives as noted above in the history of present illness.      Constitutional: negative  Eyes: negative  Endocrine: negative  Gastrointestinal: negative  Cardiovascular: negative  Respiratory: negative  Allergic/Immunologic: negative  Integumentary: negative  Psychiatric: negative  Musculoskeletal: negative   Ear/Nose/Throat: negative  Neurologic: negative  Genitourinary: SEE HPI  Hematologic/Lymphatic: negative   Breast: negative    Urogynecologic Exam  BP (!) 144/80 (BP Location: Left arm, Patient Position: Sitting, BP Method: Medium (Manual))   Ht 5' (1.524 m)   Wt 51.5 kg (113 lb 8.6 oz)   LMP  (LMP Unknown)   BMI 22.17 kg/m²     GENERAL APPEARANCE:  The patient is well-developed, well-nourished.  Neck:  Supple with no thyromegaly, no carotid bruits.  Heart:  Regular rate and rhythm, no murmurs, rubs or gallops.  Lungs:  Clear.  No CVA tenderness.  Abdomen:  Soft, nontender, nondistended, no hepatosplenomegaly.      PELVIC:    External genitalia:  Normal Bartholins, Skenes and labia bilaterally.    Urethra:  No caruncle, diverticulum or masses.  (+) hypermobility.    Vagina:  Atrophy (+) , no bladder masses or tender, no discharge.  0.5 cm area in L cuff angle  noted-- tender to palpation  Cervix:  absent  Uterus: uterus absent  Adnexa: approximately 3cm fullness noted just above L cuff.    POP-Q:  Aa -2; Ba -2; C -6; Ap -1; Bp -1; D n/a.  Genital hiatus 3, perineal body 2 total vaginal length 9.      NEUROLOGIC:  Cranial nerves 2 through 12 intact.  Strength 5/5.  DTRs 2+ lower extremities.  S2 through 4 normal.  Sacral reflexes intact.    EXT: ROJAS, 2+ pulses bilaterally, no C/C/E    COUGH STRESS TEST:  negative  KEGEL: 1 /5    RECTAL:    External:  Normal, (+) hemorrhoids, (--) dovetailing.   Internal:   (--) tenderness, (--) masses, Normal resting tone, Normal active tone.    PVR: 20 mL    Dr. Gimenez present during exam    Impression    1. Rectovaginal fistula        Initial Plan  The patient was counseled regarding these issues. The patient was given a summary sheet containing each of these issues with possible options for evaluation and management. When appropriate, we also reviewed computer-generated diagrams specific to their diagnoses..  All questions were addressed to the patient's satisfaction.     1. Concern for rectovaginal fistula  --CT of abdomen pelvis with and without oral and rectal contrast  --keep appointment with colon rectal surgery    2. Vaginal granulation tissue  --vaginal estrogen cream 1 gm nightly    3. Diverticulosis/ diverticulitis  --  Start daily fiber.  Take 1 tsp of fiber powder (psyllium or other sugar-free powder).  Mix in 8 oz of water.  Take x 3-5 days.  Then, increase fiber by 1 tsp every 3-5 days until stool is easy to pass.  Stop and continue at that dose.   Do not exceed 6 tsps/day.  May also use over the counter stool softener 1-2 x/day.  AVOID laxatives.    4. Will call with a plan after you have CT scan and seeing colon rectal surgery.     I spent a total of 30 minutes on the day of the visit.  This includes face to face time and non-face to face time preparing to see the patient (eg, review of tests), obtaining and/or reviewing  separately obtained history, documenting clinical information in the electronic or other health record, independently interpreting results and communicating results to the patient/family/caregiver, or care coordinator.    Thank you for requesting consultation of your patient.  I look forward to participating in their care.    Ana Charles  Female Pelvic Medicine and Reconstructive Surgery  [facility] [place], [place]

## 2022-03-09 ENCOUNTER — TELEPHONE (OUTPATIENT)
Dept: SURGERY | Facility: CLINIC | Age: 82
End: 2022-03-09
Payer: MEDICARE

## 2022-03-09 ENCOUNTER — HOSPITAL ENCOUNTER (OUTPATIENT)
Dept: RADIOLOGY | Facility: HOSPITAL | Age: 82
Discharge: HOME | End: 2022-03-09
Attending: NURSE PRACTITIONER
Payer: MEDICARE

## 2022-03-09 DIAGNOSIS — N82.3 RECTOVAGINAL FISTULA: ICD-10-CM

## 2022-03-09 LAB — BACTERIA UR CULT: NO GROWTH

## 2022-03-09 PROCEDURE — 74178 CT ABD&PLV WO CNTR FLWD CNTR: CPT | Mod: TC

## 2022-03-09 PROCEDURE — 74178 CT ABDOMEN PELVIS W WO CONTRAST: ICD-10-PCS | Mod: 26,,, | Performed by: RADIOLOGY

## 2022-03-09 PROCEDURE — 74178 CT ABD&PLV WO CNTR FLWD CNTR: CPT | Mod: 26,,, | Performed by: RADIOLOGY

## 2022-03-11 ENCOUNTER — OFFICE VISIT (OUTPATIENT)
Dept: SURGERY | Facility: CLINIC | Age: 82
End: 2022-03-11
Payer: MEDICARE

## 2022-03-11 VITALS
BODY MASS INDEX: 22.06 KG/M2 | HEIGHT: 60 IN | HEART RATE: 106 BPM | WEIGHT: 112.38 LBS | SYSTOLIC BLOOD PRESSURE: 142 MMHG | DIASTOLIC BLOOD PRESSURE: 63 MMHG

## 2022-03-11 DIAGNOSIS — N82.3 RECTOVAGINAL FISTULA: ICD-10-CM

## 2022-03-11 PROCEDURE — 3288F PR FALLS RISK ASSESSMENT DOCUMENTED: ICD-10-PCS | Mod: CPTII,S$GLB,, | Performed by: SURGERY

## 2022-03-11 PROCEDURE — 3077F SYST BP >= 140 MM HG: CPT | Mod: CPTII,S$GLB,, | Performed by: SURGERY

## 2022-03-11 PROCEDURE — 3077F PR MOST RECENT SYSTOLIC BLOOD PRESSURE >= 140 MM HG: ICD-10-PCS | Mod: CPTII,S$GLB,, | Performed by: SURGERY

## 2022-03-11 PROCEDURE — 3078F PR MOST RECENT DIASTOLIC BLOOD PRESSURE < 80 MM HG: ICD-10-PCS | Mod: CPTII,S$GLB,, | Performed by: SURGERY

## 2022-03-11 PROCEDURE — 99214 OFFICE O/P EST MOD 30 MIN: CPT | Mod: S$GLB,,, | Performed by: SURGERY

## 2022-03-11 PROCEDURE — 1101F PT FALLS ASSESS-DOCD LE1/YR: CPT | Mod: CPTII,S$GLB,, | Performed by: SURGERY

## 2022-03-11 PROCEDURE — 99214 PR OFFICE/OUTPT VISIT, EST, LEVL IV, 30-39 MIN: ICD-10-PCS | Mod: S$GLB,,, | Performed by: SURGERY

## 2022-03-11 PROCEDURE — 1126F AMNT PAIN NOTED NONE PRSNT: CPT | Mod: CPTII,S$GLB,, | Performed by: SURGERY

## 2022-03-11 PROCEDURE — 99999 PR PBB SHADOW E&M-EST. PATIENT-LVL V: CPT | Mod: PBBFAC,,, | Performed by: SURGERY

## 2022-03-11 PROCEDURE — 99999 PR PBB SHADOW E&M-EST. PATIENT-LVL V: ICD-10-PCS | Mod: PBBFAC,,, | Performed by: SURGERY

## 2022-03-11 PROCEDURE — 1126F PR PAIN SEVERITY QUANTIFIED, NO PAIN PRESENT: ICD-10-PCS | Mod: CPTII,S$GLB,, | Performed by: SURGERY

## 2022-03-11 PROCEDURE — 1159F PR MEDICATION LIST DOCUMENTED IN MEDICAL RECORD: ICD-10-PCS | Mod: CPTII,S$GLB,, | Performed by: SURGERY

## 2022-03-11 PROCEDURE — 1101F PR PT FALLS ASSESS DOC 0-1 FALLS W/OUT INJ PAST YR: ICD-10-PCS | Mod: CPTII,S$GLB,, | Performed by: SURGERY

## 2022-03-11 PROCEDURE — 3288F FALL RISK ASSESSMENT DOCD: CPT | Mod: CPTII,S$GLB,, | Performed by: SURGERY

## 2022-03-11 PROCEDURE — 1159F MED LIST DOCD IN RCRD: CPT | Mod: CPTII,S$GLB,, | Performed by: SURGERY

## 2022-03-11 PROCEDURE — 3078F DIAST BP <80 MM HG: CPT | Mod: CPTII,S$GLB,, | Performed by: SURGERY

## 2022-03-11 RX ORDER — NEOMYCIN SULFATE 500 MG/1
TABLET ORAL
Qty: 6 TABLET | Refills: 0 | Status: ON HOLD | OUTPATIENT
Start: 2022-03-11 | End: 2022-04-01 | Stop reason: HOSPADM

## 2022-03-11 RX ORDER — METRONIDAZOLE 500 MG/1
TABLET ORAL
Qty: 3 TABLET | Refills: 0 | Status: ON HOLD | OUTPATIENT
Start: 2022-03-11 | End: 2022-04-01 | Stop reason: HOSPADM

## 2022-03-11 NOTE — H&P (VIEW-ONLY)
CRS Office Visit History and Physical    Referring Md:   Fran Castro Md  [address] [place] [address]    SUBJECTIVE:     Chief Complaint: colovaginal fistula     History of Present Illness:  The patient is a new patient to this practice.   Course is as follows:  Laverne Humphries is a 81 y.o. 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  She reports drainage from her vagina that started about 6 months ago.  She was then diagnosed with diverticulitis in November and treated with antibiotics.  She has not had any further episodes since that time.  She had an attempted colonoscopy that was incomplete due to pelvic adhesions.  No gross evidence of malignancy.  She is slowly regaining her appetite since her episode of diverticulitis and regaining her strength.  She remains physically active, performing all household activities independently.       Last Colonoscopy: 1/2022, incomplete due to pelvic adhesions    Impression:            - Internal hemorrhoids.                          - Diverticulosis in the sigmoid colon.                          - The colonoscope was only able to be passed to                          25cm proximal to the anus. Due to dense pelvic                          adhesions and restricted mobility of the colon the                          scope was unable to be advanced beyond this                          location in spite of using pressure and placing                          the patient on her back.                          - No specimens collected.   Recommendation:        - Discharge patient to home.                          - High fiber diet indefinitely.                          - Continue present medications.                          - Await pathology results.                          - Repeat colonoscopy is not recommended due to                          current age (66 years or older) for screening                           Pt to call rohan for forteo purposes.                          - If further evaluation of the colon is needed                          consider a CT colonography or CT enterography.                          - Patient has a contact number available for                          emergencies. The signs and symptoms of potential                          delayed complications were discussed with the                          patient. Return to normal activities tomorrow.                          Written discharge instructions were provided to                          the patient.   Attending Participation:        I was present and participated during the entire procedure,        including non-key portions.   Emmanuel Sandoval MD   1/20/2022 10:35:15 AM     Review of patient's allergies indicates:   Allergen Reactions    Codeine      Other reaction(s): Syncope    Sulfa (sulfonamide antibiotics)      Other reaction(s): Stomach upset       Past Medical History:   Diagnosis Date    Anxiety     Asthma     mild    Cataract     IBS (irritable bowel syndrome)     Reflux esophagitis     Torus palatinus      Past Surgical History:   Procedure Laterality Date    BELPHAROPTOSIS REPAIR Bilateral     CATARACT EXTRACTION W/  INTRAOCULAR LENS IMPLANT Bilateral     CATARACT EXTRACTION, BILATERAL      CHOLECYSTECTOMY      COLONOSCOPY N/A 1/20/2022    Procedure: COLONOSCOPY;  Surgeon: Emmanuel Sandoval MD;  Location: [facility] ([address] [place] [address]);  Service: Endoscopy;  Laterality: N/A;    ESOPHAGOGASTRODUODENOSCOPY N/A 1/20/2022    Procedure: EGD (ESOPHAGOGASTRODUODENOSCOPY) any GI doc, please perform colonoscopy and EGD at same time;  Surgeon: Emmanuel Sandoval MD;  Location: [facility] ([address] [place] [address]);  Service: Endoscopy;  Laterality: N/A;  EGD & Colonoscopy orders combined-MS  fully vaccinated  1/13 covid test 1/17 @ [facility]    EYE SURGERY      HYSTERECTOMY      OOPHORECTOMY       Family History   Problem Relation Age of Onset    Cancer Mother          pancreatic    Asthma Sister     Cataracts Sister     Cataracts Father     Diabetes Father     No Known Problems Daughter     No Known Problems Son     Amblyopia Neg Hx     Blindness Neg Hx     Glaucoma Neg Hx     Macular degeneration Neg Hx     Retinal detachment Neg Hx     Strabismus Neg Hx     Breast cancer Neg Hx     Colon cancer Neg Hx     Ovarian cancer Neg Hx      Social History     Tobacco Use    Smoking status: Never Smoker    Smokeless tobacco: Never Used   Substance Use Topics    Alcohol use: No    Drug use: No        Review of Systems:  Review of Systems   Constitutional: Negative.    HENT: Negative.    Eyes: Negative.    Respiratory: Negative.    Cardiovascular: Negative.    Gastrointestinal:        See HPI   Genitourinary: Negative.    Musculoskeletal: Negative.    Skin: Negative.    Neurological: Negative.    Psychiatric/Behavioral: Negative.        OBJECTIVE:     Vital Signs (Most Recent)  BP (!) 142/63 (BP Location: Left arm, Patient Position: Sitting, BP Method: Medium (Automatic))   Pulse 106   Ht 5' (1.524 m)   Wt 51 kg (112 lb 6.4 oz)   LMP  (LMP Unknown)   BMI 21.95 kg/m²     Physical Exam:  General: 81 y.o. female in no distress   Neuro: alert and oriented x 4.  Moves all extremities.     HEENT: normocephalic, atraumatic, PERRL, EOMI   Respiratory: respirations are even and unlabored  Cardiac: regular rate and rhythm  Abdomen: soft, NTND   Extremities: Warm dry and intact  Skin: no rashes    Labs:   Component Ref Range & Units 2 wk ago   (2/21/22) 4 mo ago   (10/27/21) 4 mo ago   (10/22/21) 4 mo ago   (10/19/21) 7 mo ago   (8/9/21) 1 yr ago   (1/27/21) 1 yr ago   (5/4/20)   WBC 3.90 - 12.70 K/uL 7.03  8.52  7.98  11.34  8.49  8.49  7.24    RBC 4.00 - 5.40 M/uL 4.09  4.39  3.94 Low   4.06  4.08  3.90 Low   4.40    Hemoglobin 12.0 - 16.0 g/dL 11.7 Low   12.2  11.2 Low   11.5 Low   11.5 Low   11.3 Low   12.5    Hematocrit 37.0 - 48.5 % 38.0  38.7  34.4 Low   35.3 Low    36.0 Low   35.1 Low   40.1    MCV 82 - 98 fL 93  88  87  87  88  90  91    MCH 27.0 - 31.0 pg 28.6  27.8  28.4  28.3  28.2  29.0  28.4    MCHC 32.0 - 36.0 g/dL 30.8 Low   31.5 Low   32.6  32.6  31.9 Low   32.2  31.2 Low     RDW 11.5 - 14.5 % 13.2  14.4  14.2  14.3  13.6  13.3  13.3    Platelets 150 - 450 K/uL 260  442  323  259  296  301 R  245 R    MPV 9.2 - 12.9 fL 11.3  10.0  10.5  11.1  10.7  11.2  11.4    Immature Granulocytes 0.0 - 0.5 % 0.3  0.9 High   0.8 High   0.5  0.8 High   0.7 High   0.4    Gran # (ANC) 1.8 - 7.7 K/uL 3.0  3.5  3.2  7.3  4.4  4.4  3.0    Immature Grans (Abs) 0.00 - 0.04 K/uL 0.02  0.08 High  CM  0.06 High  CM  0.06 High  CM  0.07 High  CM        Component Ref Range & Units 2 wk ago   (2/21/22) 4 mo ago   (10/22/21) 4 mo ago   (10/19/21) 7 mo ago   (8/9/21) 1 yr ago   (1/27/21) 1 yr ago   (5/4/20) 2 yr ago   (5/27/19)   Sodium 136 - 145 mmol/L 141  134 Low   136  139  139  139  140    Potassium 3.5 - 5.1 mmol/L 4.2  4.6  4.5  4.2  4.5  4.6  4.7    Chloride 95 - 110 mmol/L 102  101  100  105  103  104  103    CO2 23 - 29 mmol/L 29  21 Low   24  21 Low   28  25  26    Glucose 70 - 110 mg/dL 92  82  90  94  96  104  105    BUN 8 - 23 mg/dL 15  12  18  18  19  13  19    Creatinine 0.5 - 1.4 mg/dL 0.8  0.8  0.9  0.8  0.9  0.9  1.0    Calcium 8.7 - 10.5 mg/dL 10.6 High   10.2  9.8  9.9  10.2  9.8  10.5    Total Protein 6.0 - 8.4 g/dL 7.5    7.4  7.6  7.6  7.7    Albumin 3.5 - 5.2 g/dL 3.7    3.7  3.6  3.8  4.1    Total Bilirubin 0.1 - 1.0 mg/dL 1.3 High     1.0 CM  1.4 High  CM  1.2 High  CM  1.4 High  CM       Alkaline Phosphatase 55 - 135 U/L 58    56  57  52 Low   63    AST 10 - 40 U/L 22    20  19  22  19    ALT 10 - 44 U/L 13    11  12  14  15    Anion Gap 8 - 16 mmol/L 10  12  12  13  8  10  11    eGFR if African American >60 mL/min/1.73 m^2 >60.0  >60.0  >60.0  >60.0  >60.0  >60.0  >60    eGFR if non African American >60 mL/min/1.73 m^2 >60.0  >60.0 CM  59.7 Abnormal  CM  >60.0 CM   59.7 Abnormal  CM  >60.0 CM  53 Abnormal  CM    Comment: Calculation used to obtain the estimated glomerular filtration   rate (eGFR) is the CKD-EPI equation.    Resulting Agency  [facility]            Imaging:   CT abd/pel  Impression:     Colovaginal fistula, as above.  Mild wall thickening/inflammatory changes of the colon and adjacent bladder noted.  No fluid collections, free air, or evidence of bowel obstruction.     This report was flagged in Epic as abnormal.        Electronically signed by: Albert Guzmán MD  Date:                                            03/09/2022  Time:                                           15:50      ASSESSMENT/PLAN:     Laverne was seen today for recto vaginal fistula.    Diagnoses and all orders for this visit:    Rectovaginal fistula  Comments:  discussed with DR Anderson-  needs Urogyn and LIZZETH fallon  Orders:  -     Ambulatory referral/consult to Colorectal Surgery  -     Case Request Operating Room: XI ROBOTIC COLECTOMY with flexible sigmoidoscopy    Other orders  -     neomycin (MYCIFRADIN) 500 mg Tab; Take 2 tabs at 1400 (2pm) 1500 (3 pm) 2300 (11 pm)  -     metroNIDAZOLE (FLAGYL) 500 MG tablet; Take 1 tab at 1400 (2pm) 1500  (3 pm) 2300 (11pm)        81 y.o. 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  Patient's clinical picture consistent with diverticular disease.   - I discussed the etiology of the patient's disease with her and her daughter. I suspect she has a colovaginal fistula from diverticular disease, cannot completely exclude malignancy given incomplete colonoscopy. We discussed the risks, benefits and alternatives of robotic sigmoid colectomy.  Although she is 81, she has an excellent functional status and very few medical comorbidities.  We discussed the risk of anastomotic leak.  Her nephew, Dr. Robert Enrique inquired about  ureteral catheters and intraoperative IcG.  I discussed with the patient and her daughter that I do not routinely use ureteral catheters or IcG as there is no literature to date that shows decreased rates of ureteral injury only increased detection of injury.   - Will plan for robotic sigmoid colectomy pending scheduling of robotic cases  - mechanical and antibiotic bowel prep     Alison Mcclain MD  Staff Surgeon  Colon & Rectal Surgery

## 2022-03-11 NOTE — PROGRESS NOTES
CRS Office Visit History and Physical    Referring Md:   Fran Castro Md  [address] [place] [address]    SUBJECTIVE:     Chief Complaint: colovaginal fistula     History of Present Illness:  The patient is a new patient to this practice.   Course is as follows:  Laverne Humphries is a 81 y.o. 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  She reports drainage from her vagina that started about 6 months ago.  She was then diagnosed with diverticulitis in November and treated with antibiotics.  She has not had any further episodes since that time.  She had an attempted colonoscopy that was incomplete due to pelvic adhesions.  No gross evidence of malignancy.  She is slowly regaining her appetite since her episode of diverticulitis and regaining her strength.  She remains physically active, performing all household activities independently.       Last Colonoscopy: 1/2022, incomplete due to pelvic adhesions    Impression:            - Internal hemorrhoids.                          - Diverticulosis in the sigmoid colon.                          - The colonoscope was only able to be passed to                          25cm proximal to the anus. Due to dense pelvic                          adhesions and restricted mobility of the colon the                          scope was unable to be advanced beyond this                          location in spite of using pressure and placing                          the patient on her back.                          - No specimens collected.   Recommendation:        - Discharge patient to home.                          - High fiber diet indefinitely.                          - Continue present medications.                          - Await pathology results.                          - Repeat colonoscopy is not recommended due to                          current age (66 years or older) for screening                           purposes.                          - If further evaluation of the colon is needed                          consider a CT colonography or CT enterography.                          - Patient has a contact number available for                          emergencies. The signs and symptoms of potential                          delayed complications were discussed with the                          patient. Return to normal activities tomorrow.                          Written discharge instructions were provided to                          the patient.   Attending Participation:        I was present and participated during the entire procedure,        including non-key portions.   Emmanuel Sandoval MD   1/20/2022 10:35:15 AM     Review of patient's allergies indicates:   Allergen Reactions    Codeine      Other reaction(s): Syncope    Sulfa (sulfonamide antibiotics)      Other reaction(s): Stomach upset       Past Medical History:   Diagnosis Date    Anxiety     Asthma     mild    Cataract     IBS (irritable bowel syndrome)     Reflux esophagitis     Torus palatinus      Past Surgical History:   Procedure Laterality Date    BELPHAROPTOSIS REPAIR Bilateral     CATARACT EXTRACTION W/  INTRAOCULAR LENS IMPLANT Bilateral     CATARACT EXTRACTION, BILATERAL      CHOLECYSTECTOMY      COLONOSCOPY N/A 1/20/2022    Procedure: COLONOSCOPY;  Surgeon: Emmanuel Sandoval MD;  Location: [facility] ([address] [place] [address]);  Service: Endoscopy;  Laterality: N/A;    ESOPHAGOGASTRODUODENOSCOPY N/A 1/20/2022    Procedure: EGD (ESOPHAGOGASTRODUODENOSCOPY) any GI doc, please perform colonoscopy and EGD at same time;  Surgeon: Emmanuel Sandoval MD;  Location: [facility] ([address] [place] [address]);  Service: Endoscopy;  Laterality: N/A;  EGD & Colonoscopy orders combined-MS  fully vaccinated  1/13 [facility]id test [facility]/17 [facility]    EYE SURGERY      HYSTERECTOMY      OOPHORECTOMY       Family History   Problem Relation Age of Onset    Cancer Mother          pancreatic    Asthma Sister     Cataracts Sister     Cataracts Father     Diabetes Father     No Known Problems Daughter     No Known Problems Son     Amblyopia Neg Hx     Blindness Neg Hx     Glaucoma Neg Hx     Macular degeneration Neg Hx     Retinal detachment Neg Hx     Strabismus Neg Hx     Breast cancer Neg Hx     Colon cancer Neg Hx     Ovarian cancer Neg Hx      Social History     Tobacco Use    Smoking status: Never Smoker    Smokeless tobacco: Never Used   Substance Use Topics    Alcohol use: No    Drug use: No        Review of Systems:  Review of Systems   Constitutional: Negative.    HENT: Negative.    Eyes: Negative.    Respiratory: Negative.    Cardiovascular: Negative.    Gastrointestinal:        See HPI   Genitourinary: Negative.    Musculoskeletal: Negative.    Skin: Negative.    Neurological: Negative.    Psychiatric/Behavioral: Negative.        OBJECTIVE:     Vital Signs (Most Recent)  BP (!) 142/63 (BP Location: Left arm, Patient Position: Sitting, BP Method: Medium (Automatic))   Pulse 106   Ht 5' (1.524 m)   Wt 51 kg (112 lb 6.4 oz)   LMP  (LMP Unknown)   BMI 21.95 kg/m²     Physical Exam:  General: 81 y.o. female in no distress   Neuro: alert and oriented x 4.  Moves all extremities.     HEENT: normocephalic, atraumatic, PERRL, EOMI   Respiratory: respirations are even and unlabored  Cardiac: regular rate and rhythm  Abdomen: soft, NTND   Extremities: Warm dry and intact  Skin: no rashes    Labs:   Component Ref Range & Units 2 wk ago   (2/21/22) 4 mo ago   (10/27/21) 4 mo ago   (10/22/21) 4 mo ago   (10/19/21) 7 mo ago   (8/9/21) 1 yr ago   (1/27/21) 1 yr ago   (5/4/20)   WBC 3.90 - 12.70 K/uL 7.03  8.52  7.98  11.34  8.49  8.49  7.24    RBC 4.00 - 5.40 M/uL 4.09  4.39  3.94 Low   4.06  4.08  3.90 Low   4.40    Hemoglobin 12.0 - 16.0 g/dL 11.7 Low   12.2  11.2 Low   11.5 Low   11.5 Low   11.3 Low   12.5    Hematocrit 37.0 - 48.5 % 38.0  38.7  34.4 Low   35.3 Low    36.0 Low   35.1 Low   40.1    MCV 82 - 98 fL 93  88  87  87  88  90  91    MCH 27.0 - 31.0 pg 28.6  27.8  28.4  28.3  28.2  29.0  28.4    MCHC 32.0 - 36.0 g/dL 30.8 Low   31.5 Low   32.6  32.6  31.9 Low   32.2  31.2 Low     RDW 11.5 - 14.5 % 13.2  14.4  14.2  14.3  13.6  13.3  13.3    Platelets 150 - 450 K/uL 260  442  323  259  296  301 R  245 R    MPV 9.2 - 12.9 fL 11.3  10.0  10.5  11.1  10.7  11.2  11.4    Immature Granulocytes 0.0 - 0.5 % 0.3  0.9 High   0.8 High   0.5  0.8 High   0.7 High   0.4    Gran # (ANC) 1.8 - 7.7 K/uL 3.0  3.5  3.2  7.3  4.4  4.4  3.0    Immature Grans (Abs) 0.00 - 0.04 K/uL 0.02  0.08 High  CM  0.06 High  CM  0.06 High  CM  0.07 High  CM        Component Ref Range & Units 2 wk ago   (2/21/22) 4 mo ago   (10/22/21) 4 mo ago   (10/19/21) 7 mo ago   (8/9/21) 1 yr ago   (1/27/21) 1 yr ago   (5/4/20) 2 yr ago   (5/27/19)   Sodium 136 - 145 mmol/L 141  134 Low   136  139  139  139  140    Potassium 3.5 - 5.1 mmol/L 4.2  4.6  4.5  4.2  4.5  4.6  4.7    Chloride 95 - 110 mmol/L 102  101  100  105  103  104  103    CO2 23 - 29 mmol/L 29  21 Low   24  21 Low   28  25  26    Glucose 70 - 110 mg/dL 92  82  90  94  96  104  105    BUN 8 - 23 mg/dL 15  12  18  18  19  13  19    Creatinine 0.5 - 1.4 mg/dL 0.8  0.8  0.9  0.8  0.9  0.9  1.0    Calcium 8.7 - 10.5 mg/dL 10.6 High   10.2  9.8  9.9  10.2  9.8  10.5    Total Protein 6.0 - 8.4 g/dL 7.5    7.4  7.6  7.6  7.7    Albumin 3.5 - 5.2 g/dL 3.7    3.7  3.6  3.8  4.1    Total Bilirubin 0.1 - 1.0 mg/dL 1.3 High     1.0 CM  1.4 High  CM  1.2 High  CM  1.4 High  CM       Alkaline Phosphatase 55 - 135 U/L 58    56  57  52 Low   63    AST 10 - 40 U/L 22    20  19  22  19    ALT 10 - 44 U/L 13    11  12  14  15    Anion Gap 8 - 16 mmol/L 10  12  12  13  8  10  11    eGFR if African American >60 mL/min/1.73 m^2 >60.0  >60.0  >60.0  >60.0  >60.0  >60.0  >60    eGFR if non African American >60 mL/min/1.73 m^2 >60.0  >60.0 CM  59.7 Abnormal  CM  >60.0 CM   59.7 Abnormal  CM  >60.0 CM  53 Abnormal  CM    Comment: Calculation used to obtain the estimated glomerular filtration   rate (eGFR) is the CKD-EPI equation.    Resulting Agency  [facility]            Imaging:   CT abd/pel  Impression:     Colovaginal fistula, as above.  Mild wall thickening/inflammatory changes of the colon and adjacent bladder noted.  No fluid collections, free air, or evidence of bowel obstruction.     This report was flagged in Epic as abnormal.        Electronically signed by: Albert Guzmán MD  Date:                                            03/09/2022  Time:                                           15:50      ASSESSMENT/PLAN:     Laverne was seen today for recto vaginal fistula.    Diagnoses and all orders for this visit:    Rectovaginal fistula  Comments:  discussed with DR Anderson-  needs Urogyn and LIZZETH fallon  Orders:  -     Ambulatory referral/consult to Colorectal Surgery  -     Case Request Operating Room: XI ROBOTIC COLECTOMY with flexible sigmoidoscopy    Other orders  -     neomycin (MYCIFRADIN) 500 mg Tab; Take 2 tabs at 1400 (2pm) 1500 (3 pm) 2300 (11 pm)  -     metroNIDAZOLE (FLAGYL) 500 MG tablet; Take 1 tab at 1400 (2pm) 1500  (3 pm) 2300 (11pm)        81 y.o. 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  Patient's clinical picture consistent with diverticular disease.   - I discussed the etiology of the patient's disease with her and her daughter. I suspect she has a colovaginal fistula from diverticular disease, cannot completely exclude malignancy given incomplete colonoscopy. We discussed the risks, benefits and alternatives of robotic sigmoid colectomy.  Although she is 81, she has an excellent functional status and very few medical comorbidities.  We discussed the risk of anastomotic leak.  Her nephew, Dr. Robert Enrique inquired about  ureteral catheters and intraoperative IcG.  I discussed with the patient and her daughter that I do not routinely use ureteral catheters or IcG as there is no literature to date that shows decreased rates of ureteral injury only increased detection of injury.   - Will plan for robotic sigmoid colectomy pending scheduling of robotic cases  - mechanical and antibiotic bowel prep     Alison Mcclain MD  Staff Surgeon  Colon & Rectal Surgery

## 2022-03-15 NOTE — TELEPHONE ENCOUNTER
----- Message from Quynh Delacruz sent at 2/4/2022 10:46 AM CST -----  Regarding: lab order needed  Contact: patient [phone]  Doctor appointment and lab have been scheduled.  Please link lab orders to the lab appointment.  Date of doctor appointment:  2-  Date of lab appointment:  2-  Physical or F/U: annual  Comments:           Patient is here today for pelvic US follow up and EMB.      Latex:  Patient denies allergy to latex.  Medications reviewed with patient.  Tobacco use verified.  Allergies verified.    Patient would like communication of their results via:     Cell Phone:   Telephone Information:   Mobile [phone]     Okay to leave a message containing results? Yes     Patient's current [facility] status: Inactive - Information given.     Chaperone needed:  No

## 2022-03-21 ENCOUNTER — PATIENT MESSAGE (OUTPATIENT)
Dept: SURGERY | Facility: CLINIC | Age: 82
End: 2022-03-21
Payer: MEDICARE

## 2022-03-28 ENCOUNTER — OFFICE VISIT (OUTPATIENT)
Dept: INTERNAL MEDICINE | Facility: CLINIC | Age: 82
End: 2022-03-28
Attending: FAMILY MEDICINE
Payer: MEDICARE

## 2022-03-28 VITALS
HEIGHT: 60 IN | HEART RATE: 97 BPM | BODY MASS INDEX: 22.21 KG/M2 | WEIGHT: 113.13 LBS | OXYGEN SATURATION: 98 % | SYSTOLIC BLOOD PRESSURE: 132 MMHG | DIASTOLIC BLOOD PRESSURE: 68 MMHG | TEMPERATURE: 98 F

## 2022-03-28 DIAGNOSIS — R05.9 COUGH: Primary | ICD-10-CM

## 2022-03-28 LAB
SARS-COV-2 RNA RESP QL NAA+PROBE: NOT DETECTED
SARS-COV-2- CYCLE NUMBER: NORMAL

## 2022-03-28 PROCEDURE — 1101F PR PT FALLS ASSESS DOC 0-1 FALLS W/OUT INJ PAST YR: ICD-10-PCS | Mod: CPTII,S$GLB,, | Performed by: FAMILY MEDICINE

## 2022-03-28 PROCEDURE — 99999 PR PBB SHADOW E&M-EST. PATIENT-LVL III: CPT | Mod: PBBFAC,,, | Performed by: FAMILY MEDICINE

## 2022-03-28 PROCEDURE — 3078F PR MOST RECENT DIASTOLIC BLOOD PRESSURE < 80 MM HG: ICD-10-PCS | Mod: CPTII,S$GLB,, | Performed by: FAMILY MEDICINE

## 2022-03-28 PROCEDURE — 1159F MED LIST DOCD IN RCRD: CPT | Mod: CPTII,S$GLB,, | Performed by: FAMILY MEDICINE

## 2022-03-28 PROCEDURE — 3075F SYST BP GE 130 - 139MM HG: CPT | Mod: CPTII,S$GLB,, | Performed by: FAMILY MEDICINE

## 2022-03-28 PROCEDURE — 3075F PR MOST RECENT SYSTOLIC BLOOD PRESS GE 130-139MM HG: ICD-10-PCS | Mod: CPTII,S$GLB,, | Performed by: FAMILY MEDICINE

## 2022-03-28 PROCEDURE — 1160F PR REVIEW ALL MEDS BY PRESCRIBER/CLIN PHARMACIST DOCUMENTED: ICD-10-PCS | Mod: CPTII,S$GLB,, | Performed by: FAMILY MEDICINE

## 2022-03-28 PROCEDURE — U0003 INFECTIOUS AGENT DETECTION BY NUCLEIC ACID (DNA OR RNA); SEVERE ACUTE RESPIRATORY SYNDROME CORONAVIRUS 2 (SARS-COV-2) (CORONAVIRUS DISEASE [COVID-19]), AMPLIFIED PROBE TECHNIQUE, MAKING USE OF HIGH THROUGHPUT TECHNOLOGIES AS DESCRIBED BY CMS-2020-01-R: HCPCS | Performed by: FAMILY MEDICINE

## 2022-03-28 PROCEDURE — 99999 PR PBB SHADOW E&M-EST. PATIENT-LVL III: ICD-10-PCS | Mod: PBBFAC,,, | Performed by: FAMILY MEDICINE

## 2022-03-28 PROCEDURE — 1126F AMNT PAIN NOTED NONE PRSNT: CPT | Mod: CPTII,S$GLB,, | Performed by: FAMILY MEDICINE

## 2022-03-28 PROCEDURE — 3078F DIAST BP <80 MM HG: CPT | Mod: CPTII,S$GLB,, | Performed by: FAMILY MEDICINE

## 2022-03-28 PROCEDURE — 3288F FALL RISK ASSESSMENT DOCD: CPT | Mod: CPTII,S$GLB,, | Performed by: FAMILY MEDICINE

## 2022-03-28 PROCEDURE — 1101F PT FALLS ASSESS-DOCD LE1/YR: CPT | Mod: CPTII,S$GLB,, | Performed by: FAMILY MEDICINE

## 2022-03-28 PROCEDURE — 1159F PR MEDICATION LIST DOCUMENTED IN MEDICAL RECORD: ICD-10-PCS | Mod: CPTII,S$GLB,, | Performed by: FAMILY MEDICINE

## 2022-03-28 PROCEDURE — 3288F PR FALLS RISK ASSESSMENT DOCUMENTED: ICD-10-PCS | Mod: CPTII,S$GLB,, | Performed by: FAMILY MEDICINE

## 2022-03-28 PROCEDURE — 1126F PR PAIN SEVERITY QUANTIFIED, NO PAIN PRESENT: ICD-10-PCS | Mod: CPTII,S$GLB,, | Performed by: FAMILY MEDICINE

## 2022-03-28 PROCEDURE — U0005 INFEC AGEN DETEC AMPLI PROBE: HCPCS | Performed by: FAMILY MEDICINE

## 2022-03-28 PROCEDURE — 99213 PR OFFICE/OUTPT VISIT, EST, LEVL III, 20-29 MIN: ICD-10-PCS | Mod: S$GLB,,, | Performed by: FAMILY MEDICINE

## 2022-03-28 PROCEDURE — 99213 OFFICE O/P EST LOW 20 MIN: CPT | Mod: S$GLB,,, | Performed by: FAMILY MEDICINE

## 2022-03-28 PROCEDURE — 1160F RVW MEDS BY RX/DR IN RCRD: CPT | Mod: CPTII,S$GLB,, | Performed by: FAMILY MEDICINE

## 2022-03-28 RX ORDER — BENZONATATE 200 MG/1
200 CAPSULE ORAL 3 TIMES DAILY PRN
Qty: 30 CAPSULE | Refills: 1 | Status: SHIPPED | OUTPATIENT
Start: 2022-03-28 | End: 2022-07-27

## 2022-03-28 NOTE — PROGRESS NOTES
Subjective:       Patient ID: Laverne Humphries is a 81 y.o. female.    Chief Complaint: Cough    Patient complains of cough that is not improving. No fever, chills or significant dyspnea. Clear Sputum production. No  fatigue and malaise noted.    Cough  This is a new problem. The current episode started in the past 7 days. The problem has been waxing and waning. The problem occurs every few minutes. The cough is non-productive. Pertinent negatives include no chest pain, chills, fever, headaches, myalgias, postnasal drip, rash, rhinorrhea, sore throat, shortness of breath or wheezing. Nothing aggravates the symptoms. She has tried nothing for the symptoms. Her past medical history is significant for environmental allergies. There is no history of asthma, bronchiectasis, bronchitis, COPD, emphysema or pneumonia.     Review of Systems   Constitutional: Positive for fatigue. Negative for appetite change, chills, diaphoresis and fever.   HENT: Negative for congestion, postnasal drip, rhinorrhea, sore throat and trouble swallowing.    Eyes: Negative for visual disturbance.   Respiratory: Positive for cough. Negative for choking, chest tightness, shortness of breath and wheezing.    Cardiovascular: Negative for chest pain and leg swelling.   Gastrointestinal: Negative for abdominal distention, abdominal pain, diarrhea, nausea and vomiting.   Genitourinary: Negative for difficulty urinating and hematuria.   Musculoskeletal: Negative for arthralgias and myalgias.   Skin: Negative for rash.   Allergic/Immunologic: Positive for environmental allergies.   Neurological: Negative for weakness, light-headedness and headaches.   Hematological: Does not bruise/bleed easily.   Psychiatric/Behavioral: Negative for decreased concentration and dysphoric mood.       Objective:      Physical Exam  Vitals and nursing note reviewed.   Constitutional:       General: She is not in acute distress.     Appearance: She is well-developed. She is  not ill-appearing, toxic-appearing or diaphoretic.   HENT:      Head: Normocephalic.      Right Ear: Tympanic membrane, ear canal and external ear normal.      Left Ear: Tympanic membrane, ear canal and external ear normal.      Nose: Nose normal.      Mouth/Throat:      Pharynx: No oropharyngeal exudate.   Eyes:      General: No scleral icterus.        Right eye: No discharge.         Left eye: No discharge.      Conjunctiva/sclera: Conjunctivae normal.   Neck:      Thyroid: No thyromegaly.   Cardiovascular:      Rate and Rhythm: Normal rate and regular rhythm.      Heart sounds: Normal heart sounds. No murmur heard.    No friction rub. No gallop.   Pulmonary:      Effort: Pulmonary effort is normal. No respiratory distress.      Breath sounds: Normal breath sounds. No wheezing or rales.   Chest:      Chest wall: No tenderness.   Abdominal:      Palpations: Abdomen is soft.      Tenderness: There is no abdominal tenderness.   Musculoskeletal:      Cervical back: Normal range of motion and neck supple.   Lymphadenopathy:      Cervical: No cervical adenopathy.   Skin:     General: Skin is warm and dry.      Findings: No rash.   Neurological:      Mental Status: She is alert and oriented to person, place, and time.         Assessment:       1. Cough        Plan:     Medication List with Changes/Refills   New Medications    BENZONATATE (TESSALON) 200 MG CAPSULE    Take 1 capsule (200 mg total) by mouth 3 (three) times daily as needed for Cough.   Current Medications    ALBUTEROL (PROVENTIL/VENTOLIN HFA) 90 MCG/ACTUATION INHALER    Inhale 2 puffs into the lungs every 6 (six) hours as needed for Wheezing or Shortness of Breath (generic preferred). Rescue    CALCIUM-VITAMIN D3 500 MG(1,250MG) -200 UNIT PER TABLET    Take 1 tablet by mouth Daily.     DICLOFENAC SODIUM (VOLTAREN) 1 % GEL    Apply 2 g topically once daily.    ESTRADIOL (ESTRACE) 0.01 % (0.1 MG/GRAM) VAGINAL CREAM    Place 1 g vaginally once daily.    FISH  OIL-OMEGA-3 FATTY ACIDS 300-1,000 MG CAPSULE    Take by mouth once daily.    FLUTICASONE PROPIONATE (FLONASE) 50 MCG/ACTUATION NASAL SPRAY    2 sprays (100 mcg total) by Each Nostril route once daily. 3 mo supply    GABAPENTIN (NEURONTIN) 300 MG CAPSULE    Take 2 capsules (600 mg total) by mouth every evening.    HYDROCORTISONE-PRAMOXINE (ANALPRAM-HC) 2.5-1 % CREA    Place rectally 3 (three) times daily.    LORAZEPAM (ATIVAN) 0.5 MG TABLET    Take 1 tablet (0.5 mg total) by mouth every evening.    MELOXICAM (MOBIC) 7.5 MG TABLET    Take 1 tablet (7.5 mg total) by mouth once daily.    METRONIDAZOLE (FLAGYL) 500 MG TABLET    Take 1 tab at 1400 (2pm) 1500  (3 pm) 2300 (11pm)    NEOMYCIN (MYCIFRADIN) 500 MG TAB    Take 2 tabs at 1400 (2pm) 1500 (3 pm) 2300 (11 pm)    SHINGRIX, PF, 50 MCG/0.5 ML INJECTION         Laverne was seen today for cough.    Diagnoses and all orders for this visit:    Cough  -     COVID-19 Routine Screening  -     benzonatate (TESSALON) 200 MG capsule; Take 1 capsule (200 mg total) by mouth 3 (three) times daily as needed for Cough.      See meds, orders, follow up, routing and instructions sections of encounter and AVS. Discussed with patient and provided on AVS.    Call prn for worsening or persisting sx.  Has surgery schedule for Friday - notify pcp and surgical team if still having problems.

## 2022-03-30 ENCOUNTER — TELEPHONE (OUTPATIENT)
Dept: SURGERY | Facility: CLINIC | Age: 82
End: 2022-03-30
Payer: MEDICARE

## 2022-03-30 ENCOUNTER — ANESTHESIA EVENT (OUTPATIENT)
Dept: SURGERY | Facility: OTHER | Age: 82
DRG: 392 | End: 2022-03-30
Payer: MEDICARE

## 2022-03-30 ENCOUNTER — HOSPITAL ENCOUNTER (OUTPATIENT)
Dept: PREADMISSION TESTING | Facility: OTHER | Age: 82
Discharge: HOME | DRG: 392 | End: 2022-03-30
Attending: SURGERY
Payer: MEDICARE

## 2022-03-30 VITALS
TEMPERATURE: 98 F | OXYGEN SATURATION: 99 % | HEART RATE: 89 BPM | WEIGHT: 113 LBS | HEIGHT: 60 IN | DIASTOLIC BLOOD PRESSURE: 66 MMHG | SYSTOLIC BLOOD PRESSURE: 151 MMHG | BODY MASS INDEX: 22.19 KG/M2

## 2022-03-30 LAB
ABO + RH BLD: NORMAL
BLD GP AB SCN CELLS X3 SERPL QL: NORMAL

## 2022-03-30 PROCEDURE — 36415 COLL VENOUS BLD VENIPUNCTURE: CPT | Performed by: NURSE PRACTITIONER

## 2022-03-30 PROCEDURE — 86901 BLOOD TYPING SEROLOGIC RH(D): CPT | Performed by: NURSE PRACTITIONER

## 2022-03-30 PROCEDURE — 86900 BLOOD TYPING SEROLOGIC ABO: CPT | Performed by: NURSE PRACTITIONER

## 2022-03-30 RX ORDER — LIDOCAINE HYDROCHLORIDE 10 MG/ML
0.5 INJECTION, SOLUTION EPIDURAL; INFILTRATION; INTRACAUDAL; PERINEURAL ONCE
Status: CANCELLED | OUTPATIENT
Start: 2022-03-30 | End: 2022-03-30

## 2022-03-30 RX ORDER — SODIUM CHLORIDE, SODIUM LACTATE, POTASSIUM CHLORIDE, CALCIUM CHLORIDE 600; 310; 30; 20 MG/100ML; MG/100ML; MG/100ML; MG/100ML
INJECTION, SOLUTION INTRAVENOUS CONTINUOUS
Status: CANCELLED | OUTPATIENT
Start: 2022-03-30

## 2022-03-30 NOTE — ANESTHESIA PREPROCEDURE EVALUATION
03/30/2022  Laverne Humphries is a 81 y.o., female.      Pre-op Assessment    I have reviewed the Patient Summary Reports.     I have reviewed the Nursing Notes. I have reviewed the NPO Status.   I have reviewed the Medications.     Review of Systems  Anesthesia Hx:  Denies Family Hx of Anesthesia complications.   Denies Personal Hx of Anesthesia complications.   Social:  Non-Smoker    Hematology/Oncology:     Oncology Normal     EENT/Dental:EENT/Dental Normal   Cardiovascular:  Cardiovascular Normal     Pulmonary:   Asthma    Renal/:   Chronic Renal Disease, CRI    Hepatic/GI:   GERD    Musculoskeletal:  Musculoskeletal Normal    Neurological:  Neurology Normal    Endocrine:  Endocrine Normal    Dermatological:  Skin Normal    Psych:  Psychiatric Normal           Physical Exam    Airway:  Mallampati: II       Dental:  Partial Dentures        Anesthesia Plan  Type of Anesthesia, risks & benefits discussed:    Anesthesia Type: Gen ETT  Intra-op Monitoring Plan: Standard ASA Monitors  Post Op Pain Control Plan: multimodal analgesia  Induction:  IV  Airway Plan: Video  Informed Consent: Informed consent signed with the Patient and all parties understand the risks and agree with anesthesia plan.  All questions answered.   ASA Score: 2  Anesthesia Plan Notes: Hct. 38 on 2/21    Ready For Surgery From Anesthesia Perspective.     .

## 2022-03-30 NOTE — DISCHARGE INSTRUCTIONS
Information to Prepare you for your Surgery    PRE-ADMIT TESTING -  [phone]    [facility]          Your surgery has been scheduled at [facility]. We are pleased to have the opportunity to serve you. For Further Information please call [phone].    On the day of surgery please report to the Information Desk on the 1st floor.    CONTACT YOUR PHYSICIAN'S OFFICE THE DAY PRIOR TO YOUR SURGERY TO OBTAIN YOUR ARRIVAL TIME.     The evening before surgery do not eat anything after 9 p.m. ( this includes hard candy, chewing gum and mints).  You may only have GATORADE, POWERADE AND WATER  from 9 p.m. until you leave your home.   DO NOT DRINK ANY LIQUIDS ON THE WAY TO THE HOSPITAL.      Why does your anesthesiologist allow you to drink Gatorade/Powerade before surgery?  Gatorade/Powerade helps to increase your comfort before surgery and to decrease your nausea after surgery. The carbohydrates in Gatorade/Powerade help reduce your body's stress response to surgery.  If you are a diabetic-drink only water prior to surgery.      Current Visitor policy(12/27/2021) - Patients may have 2 visitors pre and post procedure. Only 2 visitors will be allowed in the Surgical building with the patient.     SPECIAL MEDICATION INSTRUCTIONS: TAKE medications checked off by the Anesthesiologist on your Medication List.    Angiogram Patients: Take medications as instructed by your physician, including aspirin.     Surgery Patients:    If you take ASPIRIN - Your PHYSICIAN/SURGEON will need to inform you IF/OR when you need to stop taking aspirin prior to your surgery.     Do Not take any medications containing IBUPROFEN.    Do Not Wear any make-up (especially eye make-up) to surgery. Please remove any false eyelashes or eyelash extensions. If you arrive the day of surgery with makeup/eyelashes on you will be required to remove prior to surgery. (There is a risk of corneal  abrasions if eye makeup/eyelash extensions are not removed)      Leave all valuables at home.   Do Not wear any jewelry or watches, including any metal in body piercings. Jewelry must be removed prior to coming to the hospital.  There is a possibility that rings that are unable to be removed may be cut off if they are on the surgical extremity.    Please remove all hair extensions, wigs, clips and any other metal accessories/ ornaments from your hair.  These items may pose a flammable/fire risk in Surgery and must be removed.    Do not shave your surgical area at least 5 days prior to your surgery. The surgical prep will be performed at the hospital according to Infection Control regulations.    Contact Lens must be removed before surgery. Either do not wear the contact lens or bring a case and solution for storage.  Please bring a container for eyeglasses or dentures as required.  Bring any paperwork your physician has provided, such as consent forms,  history and physicals, doctor's orders, etc.   Bring comfortable clothes that are loose fitting to wear upon discharge. Take into consideration the type of surgery being performed.  Maintain your diet as advised per your physician the day prior to surgery.      Adequate rest the night before surgery is advised.   Park in the Parking lot behind the hospital or in the [email or web address] [facility] across the street from the parking lot. Parking is complimentary.  If you will be discharged the same day as your procedure, please arrange for a responsible adult to drive you home or to accompany you if traveling by taxi.   YOU WILL NOT BE PERMITTED TO DRIVE OR TO LEAVE THE HOSPITAL ALONE AFTER SURGERY.   If you are being discharged the same day, it is strongly recommended that you arrange for someone to remain with you for the first 24 hrs following your surgery.    The Surgeon will speak to your family/visitor after your surgery regarding the outcome of your surgery and post op  care.  The Surgeon may speak to you after your surgery, but there is a possibility you may not remember the details.  Please check with your family members regarding the conversation with the Surgeon.    We strongly recommend whoever is bringing you home be present for discharge instructions.  This will ensure a thorough understanding for your post op home care.    ALL CHILDREN MUST ALWAYS BE ACCOMPANIED BY AN ADULT.    Visitors-Refer to current Visitor policy handouts.    Thank you for your cooperation.  The Staff of [facility].            Bathing Instructions with Hibiclens    Shower the evening before and morning of your procedure with Hibiclens:  Wash your face with water and your regular face wash/soap  Apply Hibiclens directly on your skin or on a wet washcloth and wash gently. When showering: Move away from the shower stream when applying Hibiclens to avoid rinsing off too soon.  Rinse thoroughly with warm water  Do not dilute Hibiclens        Dry off as usual, do not use any deodorant, powder, body lotions, perfume, after shave or cologne.

## 2022-04-01 ENCOUNTER — PATIENT MESSAGE (OUTPATIENT)
Dept: ADMINISTRATIVE | Facility: OTHER | Age: 82
End: 2022-04-01
Payer: MEDICARE

## 2022-04-01 ENCOUNTER — HOSPITAL ENCOUNTER (INPATIENT)
Facility: OTHER | Age: 82
LOS: 1 days | Discharge: HOME | DRG: 392 | End: 2022-04-01
Attending: SURGERY | Admitting: SURGERY
Payer: MEDICARE

## 2022-04-01 ENCOUNTER — ANESTHESIA (OUTPATIENT)
Dept: SURGERY | Facility: OTHER | Age: 82
DRG: 392 | End: 2022-04-01
Payer: MEDICARE

## 2022-04-01 DIAGNOSIS — I48.91 ATRIAL FIBRILLATION, UNSPECIFIED TYPE: Primary | ICD-10-CM

## 2022-04-01 DIAGNOSIS — N82.3 RECTOVAGINAL FISTULA: Primary | ICD-10-CM

## 2022-04-01 DIAGNOSIS — I48.0 PAROXYSMAL ATRIAL FIBRILLATION: ICD-10-CM

## 2022-04-01 DIAGNOSIS — K57.92 DIVERTICULITIS: ICD-10-CM

## 2022-04-01 DIAGNOSIS — I48.91 ATRIAL FIBRILLATION: ICD-10-CM

## 2022-04-01 LAB
AV MEAN GRADIENT: 13 MMHG
AV PEAK GRADIENT: 18 MMHG
AV REGURGITATION PRESSURE HALF TIME: 833 MS
BSA FOR ECHO PROCEDURE: 1.47 M2
CV ECHO LV RWT: 0.4 CM
DOP CALC AO PEAK VEL: 2.13 M/S
DOP CALC AO VTI: 42.05 CM
DOP CALC LVOT AREA: 2.8 CM2
DOP CALC LVOT DIAMETER: 1.9 CM
E WAVE DECELERATION TIME: 158.94 MSEC
E/A RATIO: 0.97
E/E' RATIO: 30 M/S
ECHO LV POSTERIOR WALL: 0.66 CM (ref 0.6–1.1)
EJECTION FRACTION: 65 %
FRACTIONAL SHORTENING: 29 % (ref 28–44)
INTERVENTRICULAR SEPTUM: 0.73 CM (ref 0.6–1.1)
IVRT: 83.73 MSEC
LA MAJOR: 3.6 CM
LA MINOR: 3.49 CM
LA WIDTH: 3.55 CM
LEFT ATRIUM SIZE: 2.62 CM
LEFT ATRIUM VOLUME INDEX MOD: 20.5 ML/M2
LEFT ATRIUM VOLUME INDEX: 19.2 ML/M2
LEFT ATRIUM VOLUME MOD: 30 CM3
LEFT ATRIUM VOLUME: 28.02 CM3
LEFT INTERNAL DIMENSION IN SYSTOLE: 2.35 CM (ref 2.1–4)
LEFT VENTRICLE DIASTOLIC VOLUME INDEX: 30.8 ML/M2
LEFT VENTRICLE DIASTOLIC VOLUME: 44.97 ML
LEFT VENTRICLE MASS INDEX: 39 G/M2
LEFT VENTRICLE SYSTOLIC VOLUME INDEX: 13.1 ML/M2
LEFT VENTRICLE SYSTOLIC VOLUME: 19.13 ML
LEFT VENTRICULAR INTERNAL DIMENSION IN DIASTOLE: 3.33 CM (ref 3.5–6)
LEFT VENTRICULAR MASS: 57.37 G
LV LATERAL E/E' RATIO: 30 M/S
LV SEPTAL E/E' RATIO: 30 M/S
MV PEAK A VEL: 0.93 M/S
MV PEAK E VEL: 0.9 M/S
MV STENOSIS PRESSURE HALF TIME: 46.09 MS
MV VALVE AREA P 1/2 METHOD: 4.77 CM2
PISA MRMAX VEL: 0.05 M/S
PISA TR MAX VEL: 2.87 M/S
PULM VEIN S/D RATIO: 1.33
PV PEAK D VEL: 0.33 M/S
PV PEAK S VEL: 0.44 M/S
PV PEAK VELOCITY: 0.51 CM/S
RA MAJOR: 3.71 CM
RA PRESSURE: 3 MMHG
RA WIDTH: 3.65 CM
SINUS: 2.5 CM
STJ: 2.2 CM
TDI LATERAL: 0.03 M/S
TDI SEPTAL: 0.03 M/S
TDI: 0.03 M/S
TR MAX PG: 33 MMHG
TRICUSPID ANNULAR PLANE SYSTOLIC EXCURSION: 1.62 CM
TV REST PULMONARY ARTERY PRESSURE: 36 MMHG

## 2022-04-01 PROCEDURE — C9290 INJ, BUPIVACAINE LIPOSOME: HCPCS | Performed by: ANESTHESIOLOGY

## 2022-04-01 PROCEDURE — 99223 1ST HOSP IP/OBS HIGH 75: CPT | Mod: ,,, | Performed by: INTERNAL MEDICINE

## 2022-04-01 PROCEDURE — 63600175 PHARM REV CODE 636 W HCPCS

## 2022-04-01 PROCEDURE — 12000002 HC ACUTE/MED SURGE SEMI-PRIVATE ROOM

## 2022-04-01 PROCEDURE — 64488 TAP BLOCK BI INJECTION: CPT | Performed by: ANESTHESIOLOGY

## 2022-04-01 PROCEDURE — 93010 EKG 12-LEAD: ICD-10-PCS | Mod: ,,, | Performed by: INTERNAL MEDICINE

## 2022-04-01 PROCEDURE — 63600175 PHARM REV CODE 636 W HCPCS: Performed by: ANESTHESIOLOGY

## 2022-04-01 PROCEDURE — 93010 ELECTROCARDIOGRAM REPORT: CPT | Mod: ,,, | Performed by: INTERNAL MEDICINE

## 2022-04-01 PROCEDURE — 99223 PR INITIAL HOSPITAL CARE,LEVL III: ICD-10-PCS | Mod: ,,, | Performed by: INTERNAL MEDICINE

## 2022-04-01 PROCEDURE — 25000003 PHARM REV CODE 250: Performed by: ANESTHESIOLOGY

## 2022-04-01 PROCEDURE — 71000016 HC POSTOP RECOV ADDL HR: Performed by: SURGERY

## 2022-04-01 PROCEDURE — 93005 ELECTROCARDIOGRAM TRACING: CPT

## 2022-04-01 PROCEDURE — 25000003 PHARM REV CODE 250: Performed by: NURSE PRACTITIONER

## 2022-04-01 PROCEDURE — 63600175 PHARM REV CODE 636 W HCPCS: Performed by: NURSE PRACTITIONER

## 2022-04-01 PROCEDURE — 25000003 PHARM REV CODE 250: Performed by: INTERNAL MEDICINE

## 2022-04-01 PROCEDURE — 71000015 HC POSTOP RECOV 1ST HR: Performed by: SURGERY

## 2022-04-01 RX ORDER — SODIUM CHLORIDE 0.9 % (FLUSH) 0.9 %
3 SYRINGE (ML) INJECTION
Status: DISCONTINUED | OUTPATIENT
Start: 2022-04-01 | End: 2022-04-01 | Stop reason: HOSPADM

## 2022-04-01 RX ORDER — METOPROLOL TARTRATE 25 MG/1
25 TABLET, FILM COATED ORAL 2 TIMES DAILY
Status: DISCONTINUED | OUTPATIENT
Start: 2022-04-01 | End: 2022-04-01 | Stop reason: HOSPADM

## 2022-04-01 RX ORDER — SODIUM CHLORIDE, SODIUM LACTATE, POTASSIUM CHLORIDE, CALCIUM CHLORIDE 600; 310; 30; 20 MG/100ML; MG/100ML; MG/100ML; MG/100ML
INJECTION, SOLUTION INTRAVENOUS CONTINUOUS
Status: DISCONTINUED | OUTPATIENT
Start: 2022-04-01 | End: 2022-04-01 | Stop reason: HOSPADM

## 2022-04-01 RX ORDER — MUPIROCIN 20 MG/G
OINTMENT TOPICAL
Status: DISCONTINUED | OUTPATIENT
Start: 2022-04-01 | End: 2022-04-26

## 2022-04-01 RX ORDER — SCOLOPAMINE TRANSDERMAL SYSTEM 1 MG/1
1 PATCH, EXTENDED RELEASE TRANSDERMAL
Status: DISCONTINUED | OUTPATIENT
Start: 2022-04-01 | End: 2022-04-26

## 2022-04-01 RX ORDER — ONDANSETRON 2 MG/ML
INJECTION INTRAMUSCULAR; INTRAVENOUS
Status: DISCONTINUED | OUTPATIENT
Start: 2022-04-01 | End: 2022-04-01 | Stop reason: HOSPADM

## 2022-04-01 RX ORDER — LIDOCAINE HYDROCHLORIDE 10 MG/ML
0.5 INJECTION, SOLUTION EPIDURAL; INFILTRATION; INTRACAUDAL; PERINEURAL ONCE
Status: DISCONTINUED | OUTPATIENT
Start: 2022-04-01 | End: 2022-04-01 | Stop reason: HOSPADM

## 2022-04-01 RX ORDER — METRONIDAZOLE 500 MG/100ML
500 INJECTION, SOLUTION INTRAVENOUS
Status: DISCONTINUED | OUTPATIENT
Start: 2022-04-01 | End: 2022-04-26

## 2022-04-01 RX ORDER — METOPROLOL TARTRATE 25 MG/1
25 TABLET, FILM COATED ORAL 2 TIMES DAILY
Qty: 60 TABLET | Refills: 11 | Status: SHIPPED | OUTPATIENT
Start: 2022-04-01 | End: 2022-04-05

## 2022-04-01 RX ORDER — PROCHLORPERAZINE EDISYLATE 5 MG/ML
5 INJECTION INTRAMUSCULAR; INTRAVENOUS EVERY 30 MIN PRN
Status: CANCELLED | OUTPATIENT
Start: 2022-04-01

## 2022-04-01 RX ORDER — METOPROLOL TARTRATE 1 MG/ML
5 INJECTION, SOLUTION INTRAVENOUS ONCE
Status: COMPLETED | OUTPATIENT
Start: 2022-04-01 | End: 2022-04-01

## 2022-04-01 RX ORDER — LORAZEPAM 0.5 MG/1
0.5 TABLET ORAL NIGHTLY
COMMUNITY
End: 2022-07-01

## 2022-04-01 RX ORDER — MEPERIDINE HYDROCHLORIDE 25 MG/ML
12.5 INJECTION INTRAMUSCULAR; INTRAVENOUS; SUBCUTANEOUS ONCE AS NEEDED
Status: CANCELLED | OUTPATIENT
Start: 2022-04-01 | End: 2022-04-02

## 2022-04-01 RX ORDER — IBUPROFEN 600 MG/1
600 TABLET ORAL
Status: DISCONTINUED | OUTPATIENT
Start: 2022-04-01 | End: 2022-04-01

## 2022-04-01 RX ORDER — HEPARIN SODIUM 5000 [USP'U]/ML
5000 INJECTION, SOLUTION INTRAVENOUS; SUBCUTANEOUS
Status: DISCONTINUED | OUTPATIENT
Start: 2022-04-01 | End: 2022-04-26

## 2022-04-01 RX ORDER — METOPROLOL TARTRATE 1 MG/ML
INJECTION, SOLUTION INTRAVENOUS
Status: DISCONTINUED
Start: 2022-04-01 | End: 2022-04-01 | Stop reason: HOSPADM

## 2022-04-01 RX ORDER — CEFTRIAXONE 2 G/50ML
2 INJECTION, SOLUTION INTRAVENOUS
Status: DISCONTINUED | OUTPATIENT
Start: 2022-04-01 | End: 2022-04-26

## 2022-04-01 RX ORDER — BUPIVACAINE HYDROCHLORIDE 2.5 MG/ML
INJECTION, SOLUTION EPIDURAL; INFILTRATION; INTRACAUDAL
Status: COMPLETED | OUTPATIENT
Start: 2022-04-01 | End: 2022-04-01

## 2022-04-01 RX ORDER — HYDROMORPHONE HYDROCHLORIDE 2 MG/ML
0.4 INJECTION, SOLUTION INTRAMUSCULAR; INTRAVENOUS; SUBCUTANEOUS EVERY 5 MIN PRN
Status: CANCELLED | OUTPATIENT
Start: 2022-04-01

## 2022-04-01 RX ORDER — OXYCODONE HYDROCHLORIDE 5 MG/1
5 TABLET ORAL
Status: CANCELLED | OUTPATIENT
Start: 2022-04-01

## 2022-04-01 RX ORDER — CARBOXYMETHYLCELLULOSE SODIUM 5 MG/ML
1 SOLUTION/ DROPS OPHTHALMIC 3 TIMES DAILY PRN
COMMUNITY

## 2022-04-01 RX ORDER — LIDOCAINE HYDROCHLORIDE 10 MG/ML
1 INJECTION, SOLUTION EPIDURAL; INFILTRATION; INTRACAUDAL; PERINEURAL ONCE
Status: DISCONTINUED | OUTPATIENT
Start: 2022-04-01 | End: 2022-04-26

## 2022-04-01 RX ORDER — SODIUM CHLORIDE 9 MG/ML
INJECTION, SOLUTION INTRAVENOUS CONTINUOUS
Status: DISCONTINUED | OUTPATIENT
Start: 2022-04-01 | End: 2022-04-26

## 2022-04-01 RX ORDER — FENTANYL CITRATE 50 UG/ML
INJECTION, SOLUTION INTRAMUSCULAR; INTRAVENOUS
Status: DISCONTINUED | OUTPATIENT
Start: 2022-04-01 | End: 2022-04-01 | Stop reason: HOSPADM

## 2022-04-01 RX ORDER — ACETAMINOPHEN 500 MG
1000 TABLET ORAL
Status: COMPLETED | OUTPATIENT
Start: 2022-04-01 | End: 2022-04-01

## 2022-04-01 RX ADMIN — ACETAMINOPHEN 1000 MG: 500 TABLET, FILM COATED ORAL at 06:04

## 2022-04-01 RX ADMIN — HEPARIN SODIUM 5000 UNITS: 5000 INJECTION INTRAVENOUS; SUBCUTANEOUS at 06:04

## 2022-04-01 RX ADMIN — METOPROLOL TARTRATE 5 MG: 5 INJECTION, SOLUTION INTRAVENOUS at 08:04

## 2022-04-01 RX ADMIN — SODIUM CHLORIDE, SODIUM LACTATE, POTASSIUM CHLORIDE, AND CALCIUM CHLORIDE: 600; 310; 30; 20 INJECTION, SOLUTION INTRAVENOUS at 07:04

## 2022-04-01 RX ADMIN — MUPIROCIN: 20 OINTMENT TOPICAL at 06:04

## 2022-04-01 RX ADMIN — ONDANSETRON HYDROCHLORIDE 4 MG: 2 INJECTION INTRAMUSCULAR; INTRAVENOUS at 07:04

## 2022-04-01 RX ADMIN — METOPROLOL TARTRATE 25 MG: 25 TABLET, FILM COATED ORAL at 09:04

## 2022-04-01 RX ADMIN — FENTANYL CITRATE 100 MCG: 50 INJECTION, SOLUTION INTRAMUSCULAR; INTRAVENOUS at 07:04

## 2022-04-01 RX ADMIN — BUPIVACAINE 20 ML: 13.3 INJECTION, SUSPENSION, LIPOSOMAL INFILTRATION at 07:04

## 2022-04-01 RX ADMIN — SCOLOPAMINE TRANSDERMAL SYSTEM 1 PATCH: 1 PATCH, EXTENDED RELEASE TRANSDERMAL at 06:04

## 2022-04-01 RX ADMIN — BUPIVACAINE HYDROCHLORIDE 30 ML: 2.5 INJECTION, SOLUTION EPIDURAL; INFILTRATION; INTRACAUDAL; PERINEURAL at 07:04

## 2022-04-01 NOTE — PLAN OF CARE
Colorectal surgery interim summary:     Ms. Humphries developed new onset atrial fibrillation while in the holding area.  -150s.  Cardiology called, at bedside assessing patient.  I called Ms. Humphries' daughter and updated her.  Given that she has no history of a fib, will cancel her surgery pending cardiac workup.      Alison Mcclain MD  Staff Surgeon   Colon & Rectal Surgery

## 2022-04-01 NOTE — PATIENT INSTRUCTIONS
Take metoprolol daily  Follow up with Dr. Yeh as outpatient   Will tentatively plan to re-schedule surgery for 4/26.

## 2022-04-01 NOTE — ANESTHESIA PROCEDURE NOTES
Peripheral Block    Patient location during procedure: holding area   Block not for primary anesthetic.  Reason for block: at surgeon's request and post-op pain management   Post-op Pain Location: abdomen   Timeout: 4/1/2022 7:25 AM     Staffing  Authorizing Provider: Esau Foster MD  Performing Provider: Esau Foster MD    Preanesthetic Checklist  Completed: patient identified, IV checked, site marked, risks and benefits discussed, surgical consent, monitors and equipment checked, pre-op evaluation and timeout performed  Peripheral Block  Patient position: supine  Prep: ChloraPrep  Patient monitoring: heart rate, cardiac monitor, continuous pulse ox, continuous capnometry and frequent blood pressure checks  Block type: TAP  Laterality: bilateral  Injection technique: single shot  Needle  Needle type: Tuohy   Needle gauge: 21 G  Needle length: 4 in  Needle localization: anatomical landmarks and ultrasound guidance   -ultrasound image captured on disc.  Assessment  Injection assessment: negative aspiration, negative parasthesia and local visualized surrounding nerve  Paresthesia pain: none  Heart rate change: no  Slow fractionated injection: yes  Pain Tolerance: no complaints and comfortable throughout block  Medications:    Medications: bupivacaine (pf) (MARCAINE) injection 0.25% - Perineural   30 mL - 4/1/2022 7:30:00 AM    Additional Notes  Patient tolerated well.  See [facility] RN record for vitals.    Exparel 20ml total

## 2022-04-01 NOTE — PLAN OF CARE
Laverne CELIS [place] has met all discharge criteria from Phase II. Vital Signs are stable, ambulating  without difficulty. Discharge instructions given, patient verbalized understanding. Discharged from facility via wheelchair in stable condition.

## 2022-04-01 NOTE — CONSULTS
[facility])  Cardiology  Consult Note    Patient Name: Lvaerne Humphries  MRN: [id number]  Admission Date: 4/1/2022  Hospital Length of Stay: 0 days  Code Status: No Order   Attending Provider: Alison Mcclain MD   Consulting Provider: Lisa Yeh MD  Primary Care Physician: Fran Castro MD  Principal Problem:<principal problem not specified>    Patient information was obtained from patient and past medical records.     Inpatient consult to Cardiology  Consult performed by: Lisa Yeh MD  Consult ordered by: Alison Mcclain MD  Reason for consult: Atrial fibrillation        Subjective:     Chief Complaint:  Elective surgery for colovaginal fistula.     HPI:     Laverne Humphries is a 81 y.o.female with no history of any cardiac issues. She has had diverticulitis and developed a colovaginal fistula. She came in with plans for elective surgery today on 4/1/2022. She was noted to be in atrial fibrillation with a ventricular response rate of about 120-140 bpm. She denies any palpitations, chest pain or shortness of breath. She says she has fair exercise tolerance. It is unclear for how long she has been in atrial fibrillation. An ECG from 2019 reveals she was in sinus rhythm at that time.    Past Medical History:   Diagnosis Date    Anxiety     Asthma     mild    Cataract     IBS (irritable bowel syndrome)     Reflux esophagitis     Torus palatinus        Past Surgical History:   Procedure Laterality Date    BELPHAROPTOSIS REPAIR Bilateral     CATARACT EXTRACTION W/  INTRAOCULAR LENS IMPLANT Bilateral     CATARACT EXTRACTION, BILATERAL      CHOLECYSTECTOMY      COLONOSCOPY N/A 1/20/2022    Procedure: COLONOSCOPY;  Surgeon: Emmanuel Sandoval MD;  Location: [address];  Service: Endoscopy;  Laterality: N/A;    ESOPHAGOGASTRODUODENOSCOPY N/A 1/20/2022    Procedure: EGD (ESOPHAGOGASTRODUODENOSCOPY) any GI doc, please perform colonoscopy and EGD at same time;  Surgeon: Emmanuel  BENIGNO Sandoval MD;  Location: [address]);  Service: Endoscopy;  Laterality: N/A;  EGD & Colonoscopy orders combined-MS  fully vaccinated  1/13 covid test 1/17 @ [facility]    EYE SURGERY      HYSTERECTOMY      OOPHORECTOMY         Review of patient's allergies indicates:   Allergen Reactions    Codeine      Other reaction(s): Syncope    Sulfa (sulfonamide antibiotics)      Other reaction(s): Stomach upset       No current facility-administered medications on file prior to encounter.     Current Outpatient Medications on File Prior to Encounter   Medication Sig    albuterol (PROVENTIL/VENTOLIN HFA) 90 mcg/actuation inhaler Inhale 2 puffs into the lungs every 6 (six) hours as needed for Wheezing or Shortness of Breath (generic preferred). Rescue    calcium-vitamin D3 500 mg(1,250mg) -200 unit per tablet Take 1 tablet by mouth Daily.     diclofenac sodium (VOLTAREN) 1 % Gel Apply 2 g topically once daily.    estradioL (ESTRACE) 0.01 % (0.1 mg/gram) vaginal cream Place 1 g vaginally once daily.    fish oil-omega-3 fatty acids 300-1,000 mg capsule Take by mouth once daily.    fluticasone propionate (FLONASE) 50 mcg/actuation nasal spray 2 sprays (100 mcg total) by Each Nostril route once daily. 3 mo supply    metroNIDAZOLE (FLAGYL) 500 MG tablet Take 1 tab at 1400 (2pm) 1500  (3 pm) 2300 (11pm)    neomycin (MYCIFRADIN) 500 mg Tab Take 2 tabs at 1400 (2pm) 1500 (3 pm) 2300 (11 pm)    carboxymethylcellulose (REFRESH PLUS) 0.5 % Dpet 1 drop 3 (three) times daily as needed.    gabapentin (NEURONTIN) 300 MG capsule Take 2 capsules (600 mg total) by mouth every evening.    LORazepam (ATIVAN) 0.5 MG tablet Take 0.5 mg by mouth every evening.    meloxicam (MOBIC) 7.5 MG tablet Take 1 tablet (7.5 mg total) by mouth once daily.    SHINGRIX, PF, 50 mcg/0.5 mL injection      Family History     Problem Relation (Age of Onset)    Asthma Sister    Cancer Mother    Cataracts Sister, Father    Diabetes Father     No Known Problems Daughter, Son        Tobacco Use    Smoking status: Never Smoker    Smokeless tobacco: Never Used   Substance and Sexual Activity    Alcohol use: No    Drug use: No    Sexual activity: Not Currently     Partners: Male     Review of Systems   Constitutional: Negative for chills, fever and malaise/fatigue.   HENT: Negative for nosebleeds and tinnitus.    Eyes: Negative for double vision, vision loss in left eye and vision loss in right eye.   Cardiovascular: Negative for chest pain, claudication, dyspnea on exertion, irregular heartbeat, leg swelling, near-syncope, orthopnea, palpitations, paroxysmal nocturnal dyspnea and syncope.   Respiratory: Negative for cough, hemoptysis, shortness of breath and wheezing.    Endocrine: Negative for cold intolerance and heat intolerance.   Hematologic/Lymphatic: Negative for bleeding problem. Does not bruise/bleed easily.   Skin: Negative for color change and rash.   Musculoskeletal: Negative for back pain, falls, muscle weakness and myalgias.   Gastrointestinal: Negative for abdominal pain, diarrhea, dysphagia, heartburn, hematemesis, hematochezia, hemorrhoids, jaundice, melena, nausea and vomiting.   Genitourinary: Negative for dysuria and hematuria.        Vaginal drainage.   Neurological: Negative for dizziness, focal weakness, headaches, light-headedness, loss of balance, numbness, tremors, vertigo and weakness.   Psychiatric/Behavioral: Negative for altered mental status, depression and memory loss. The patient is not nervous/anxious.    Allergic/Immunologic: Negative for hives and persistent infections.     Objective:     Vital Signs (Most Recent):  Temp: 97.6 °F (36.4 °C) (04/01/22 0611)  Pulse: 90 (04/01/22 0611)  Resp: 18 (04/01/22 0611)  BP: 135/73 (04/01/22 0611)  SpO2: 98 % (04/01/22 0611) Vital Signs (24h Range):  Temp:  [97.6 °F (36.4 °C)] 97.6 °F (36.4 °C)  Pulse:  [90] 90  Resp:  [18] 18  SpO2:  [98 %] 98 %  BP: (135)/(73) 135/73     Weight:  51.3 kg (113 lb)  Body mass index is 22.07 kg/m².    SpO2: 98 %       No intake or output data in the 24 hours ending 04/01/22 0843    Lines/Drains/Airways     Peripheral Intravenous Line  Duration                Peripheral IV - Single Lumen 11/10/21 1603 22 G Left Antecubital 141 days         Peripheral IV - Single Lumen 04/01/22 0719 18 G Left Forearm <1 day                Physical Exam  Constitutional:       General: She is not in acute distress.     Appearance: Normal appearance. She is well-developed. She is not ill-appearing or toxic-appearing.   Eyes:      Conjunctiva/sclera:      Right eye: Right conjunctiva is not injected. No hemorrhage.     Left eye: Left conjunctiva is not injected. No hemorrhage.  Neck:      Vascular: No JVD.   Cardiovascular:      Rate and Rhythm: Tachycardia present. Rhythm irregularly irregular.      Heart sounds: S1 normal and S2 normal. No murmur heard.    No gallop.   Pulmonary:      Effort: Pulmonary effort is normal.      Breath sounds: Normal breath sounds.   Chest:      Chest wall: No swelling or tenderness.   Abdominal:      Tenderness: There is no abdominal tenderness.   Musculoskeletal:      Right ankle: No swelling.      Left ankle: No swelling.   Skin:     General: Skin is warm and dry.      Findings: No rash.   Neurological:      General: No focal deficit present.      Mental Status: She is alert and oriented to person, place, and time. She is not disoriented.   Psychiatric:         Attention and Perception: Attention normal.         Mood and Affect: Mood normal.         Speech: Speech normal.         Behavior: Behavior normal.         Thought Content: Thought content normal.         Cognition and Memory: Cognition and memory normal.         Judgment: Judgment normal.       Current Medications:     LIDOcaine (PF) 10 mg/ml (1%)  1 mL Intradermal Once    LIDOcaine (PF) 10 mg/ml (1%)  0.5 mL Intradermal Once    metoprolol        metoprolol  5 mg Intravenous Once     scopolamine  1 patch Transdermal Q3 Days     Current Laboratory Results:    No results found for this or any previous visit (from the past 24 hour(s)).  Current Imaging Results:    No orders to display       Assessment and Plan:     There are no hospital problems to display for this patient.    Assessment and Plan:    1. Atrial Fibrillation    4/1/2022: Diagnosed with paroxysmal atrial fibrillation.    Fast VRR. Asymptomatic.   CHA2DS2VASc=3 (A2Sc).   Metoprolol 5 mg ivp now and metoprolol 25 mg Q12.   Do Echo.   Appears reasonable to delay initiation of anticoagulation until after surgery completed in setting of low [id number] score.    2. Chronic Anticoagulation   4/1/2022: Diagnosed with paroxysmal atrial fibrillation.    CHA2DS2VASc=3 (A2Sc).   Plan anticoagulation with apixiban 2.5 mg Q12 after planned surgery.    3. Colovaginal Fistula   4/1/2022: Plan was surgery.   Dr. Alison Mcclain.    4. Primary Care   Dr. Joe Forde.       VTE Risk Mitigation (From admission, onward)         Ordered     IP VTE LOW RISK PATIENT  Once         04/01/22 0549     Place HUMERA hose  Until discontinued         04/01/22 0549     Place sequential compression device  Until discontinued         04/01/22 0549     heparin (porcine) injection 5,000 Units  On Call Procedure         04/01/22 0549                Thank you for your consult.    I will follow-up with patient. Please contact us if you have any additional questions.    Lisa Yeh MD  Cardiology   [facility]

## 2022-04-01 NOTE — INTERVAL H&P NOTE
The patient has been examined and the H&P has been reviewed:    I concur with the findings and no changes have occurred since H&P was written.    Surgery risks, benefits and alternative options discussed and understood by patient/family.      Discussed expected post-op course.  Potential need for an ostomy.     Alison Mcclain MD  Staff Surgeon   Colon & Rectal Surgery

## 2022-04-01 NOTE — PLAN OF CARE
The patient verbalized understanding of the surgical procedure as explained and documented on the consent form  by the physician and  has no further questions at this time. The surgical consent was signed by the patient and the patient's signature was verified by the RN.

## 2022-04-01 NOTE — DISCHARGE SUMMARY
[facility] - Surgery ([place])  Discharge Note  Short Stay    Procedure: cancelled    OUTCOME: Patient developed new atrial fibrillation in holding area and case was canclled.     DISPOSITION: Home or Self Care    FINAL DIAGNOSIS:  <principal problem not specified>    FOLLOWUP:   1. With Dr. Mcclain and Dr. Yeh (cardiology)      DISCHARGE INSTRUCTIONS:     1. Take metoprolol daily   2. Dr. Mcclain's office will contact you for follow up appointment and to re-schedule surgery   3. Follow up with Dr. Ruba Mcclain MD  Staff Surgeon   Colon & Rectal Surgery

## 2022-04-04 ENCOUNTER — PATIENT MESSAGE (OUTPATIENT)
Dept: SURGERY | Facility: CLINIC | Age: 82
End: 2022-04-04
Payer: MEDICARE

## 2022-04-04 VITALS
TEMPERATURE: 98 F | DIASTOLIC BLOOD PRESSURE: 58 MMHG | OXYGEN SATURATION: 99 % | HEIGHT: 60 IN | WEIGHT: 113 LBS | BODY MASS INDEX: 22.19 KG/M2 | RESPIRATION RATE: 16 BRPM | HEART RATE: 98 BPM | SYSTOLIC BLOOD PRESSURE: 104 MMHG

## 2022-04-05 ENCOUNTER — PATIENT MESSAGE (OUTPATIENT)
Dept: CARDIOLOGY | Facility: CLINIC | Age: 82
End: 2022-04-05

## 2022-04-05 ENCOUNTER — OFFICE VISIT (OUTPATIENT)
Dept: CARDIOLOGY | Facility: CLINIC | Age: 82
End: 2022-04-05
Attending: INTERNAL MEDICINE
Payer: MEDICARE

## 2022-04-05 VITALS
BODY MASS INDEX: 22.03 KG/M2 | OXYGEN SATURATION: 97 % | SYSTOLIC BLOOD PRESSURE: 138 MMHG | HEART RATE: 73 BPM | HEIGHT: 60 IN | DIASTOLIC BLOOD PRESSURE: 62 MMHG | WEIGHT: 112.19 LBS

## 2022-04-05 DIAGNOSIS — Z01.810 PRE-OPERATIVE CARDIOVASCULAR EXAMINATION: ICD-10-CM

## 2022-04-05 DIAGNOSIS — I48.0 PAROXYSMAL ATRIAL FIBRILLATION: ICD-10-CM

## 2022-04-05 DIAGNOSIS — I48.11 LONGSTANDING PERSISTENT ATRIAL FIBRILLATION: ICD-10-CM

## 2022-04-05 PROBLEM — I48.91 ATRIAL FIBRILLATION: Status: ACTIVE | Noted: 2022-04-05

## 2022-04-05 PROCEDURE — 1111F PR DISCHARGE MEDS RECONCILED W/ CURRENT OUTPATIENT MED LIST: ICD-10-PCS | Mod: CPTII,S$GLB,, | Performed by: INTERNAL MEDICINE

## 2022-04-05 PROCEDURE — 3288F FALL RISK ASSESSMENT DOCD: CPT | Mod: CPTII,S$GLB,, | Performed by: INTERNAL MEDICINE

## 2022-04-05 PROCEDURE — 3078F DIAST BP <80 MM HG: CPT | Mod: CPTII,S$GLB,, | Performed by: INTERNAL MEDICINE

## 2022-04-05 PROCEDURE — 99215 OFFICE O/P EST HI 40 MIN: CPT | Mod: 25,S$GLB,, | Performed by: INTERNAL MEDICINE

## 2022-04-05 PROCEDURE — 3078F PR MOST RECENT DIASTOLIC BLOOD PRESSURE < 80 MM HG: ICD-10-PCS | Mod: CPTII,S$GLB,, | Performed by: INTERNAL MEDICINE

## 2022-04-05 PROCEDURE — 1126F AMNT PAIN NOTED NONE PRSNT: CPT | Mod: CPTII,S$GLB,, | Performed by: INTERNAL MEDICINE

## 2022-04-05 PROCEDURE — 1126F PR PAIN SEVERITY QUANTIFIED, NO PAIN PRESENT: ICD-10-PCS | Mod: CPTII,S$GLB,, | Performed by: INTERNAL MEDICINE

## 2022-04-05 PROCEDURE — 99999 PR PBB SHADOW E&M-EST. PATIENT-LVL IV: CPT | Mod: PBBFAC,,, | Performed by: INTERNAL MEDICINE

## 2022-04-05 PROCEDURE — 3075F SYST BP GE 130 - 139MM HG: CPT | Mod: CPTII,S$GLB,, | Performed by: INTERNAL MEDICINE

## 2022-04-05 PROCEDURE — 1160F PR REVIEW ALL MEDS BY PRESCRIBER/CLIN PHARMACIST DOCUMENTED: ICD-10-PCS | Mod: CPTII,S$GLB,, | Performed by: INTERNAL MEDICINE

## 2022-04-05 PROCEDURE — 1159F MED LIST DOCD IN RCRD: CPT | Mod: CPTII,S$GLB,, | Performed by: INTERNAL MEDICINE

## 2022-04-05 PROCEDURE — 99215 PR OFFICE/OUTPT VISIT, EST, LEVL V, 40-54 MIN: ICD-10-PCS | Mod: 25,S$GLB,, | Performed by: INTERNAL MEDICINE

## 2022-04-05 PROCEDURE — 93000 ELECTROCARDIOGRAM COMPLETE: CPT | Mod: S$GLB,,, | Performed by: INTERNAL MEDICINE

## 2022-04-05 PROCEDURE — 1160F RVW MEDS BY RX/DR IN RCRD: CPT | Mod: CPTII,S$GLB,, | Performed by: INTERNAL MEDICINE

## 2022-04-05 PROCEDURE — 93000 PR ELECTROCARDIOGRAM, COMPLETE: ICD-10-PCS | Mod: S$GLB,,, | Performed by: INTERNAL MEDICINE

## 2022-04-05 PROCEDURE — 1101F PT FALLS ASSESS-DOCD LE1/YR: CPT | Mod: CPTII,S$GLB,, | Performed by: INTERNAL MEDICINE

## 2022-04-05 PROCEDURE — 1159F PR MEDICATION LIST DOCUMENTED IN MEDICAL RECORD: ICD-10-PCS | Mod: CPTII,S$GLB,, | Performed by: INTERNAL MEDICINE

## 2022-04-05 PROCEDURE — 93005 ELECTROCARDIOGRAM TRACING: CPT

## 2022-04-05 PROCEDURE — 3075F PR MOST RECENT SYSTOLIC BLOOD PRESS GE 130-139MM HG: ICD-10-PCS | Mod: CPTII,S$GLB,, | Performed by: INTERNAL MEDICINE

## 2022-04-05 PROCEDURE — 1111F DSCHRG MED/CURRENT MED MERGE: CPT | Mod: CPTII,S$GLB,, | Performed by: INTERNAL MEDICINE

## 2022-04-05 PROCEDURE — 99999 PR PBB SHADOW E&M-EST. PATIENT-LVL IV: ICD-10-PCS | Mod: PBBFAC,,, | Performed by: INTERNAL MEDICINE

## 2022-04-05 PROCEDURE — 3288F PR FALLS RISK ASSESSMENT DOCUMENTED: ICD-10-PCS | Mod: CPTII,S$GLB,, | Performed by: INTERNAL MEDICINE

## 2022-04-05 PROCEDURE — 1101F PR PT FALLS ASSESS DOC 0-1 FALLS W/OUT INJ PAST YR: ICD-10-PCS | Mod: CPTII,S$GLB,, | Performed by: INTERNAL MEDICINE

## 2022-04-05 RX ORDER — METOPROLOL SUCCINATE 50 MG/1
50 TABLET, EXTENDED RELEASE ORAL DAILY
Qty: 90 TABLET | Refills: 3 | Status: SHIPPED | OUTPATIENT
Start: 2022-04-05 | End: 2022-11-09 | Stop reason: SDUPTHER

## 2022-04-05 NOTE — PROGRESS NOTES
Subjective:     Laverne Humphries is a 81 y.o. female with no history of any cardiac issues. She has had diverticulitis and developed a colovaginal fistula. She came in with plans for elective surgery on 4/1/2022. She was noted to be in atrial fibrillation with a ventricular response rate of about 120-140 bpm. She denied any palpitations, chest pain or shortness of breath. She said she had fair exercise tolerance. It was unclear for how long she had been in atrial fibrillation. An electrocardiogram from 2019 revealed she was in sinus rhythm at that time.  She was prescribed metoprolol 25 mg Q12. The surgery got rescheduled for 4/26/2022.       Atrial Fibrillation  Presents for follow-up visit. Symptoms are negative for an AICD problem, bradycardia, chest pain, dizziness, hemodynamic instability, hypertension, hypotension, palpitations, shortness of breath, syncope, tachycardia and weakness. The symptoms have been stable. Past medical history includes atrial fibrillation.       Review of Systems   Constitutional: Negative for chills, fever and malaise/fatigue.   HENT: Negative for nosebleeds and tinnitus.    Eyes: Negative for double vision, vision loss in left eye and vision loss in right eye.   Cardiovascular: Negative for chest pain, claudication, dyspnea on exertion, irregular heartbeat, leg swelling, near-syncope, orthopnea, palpitations, paroxysmal nocturnal dyspnea and syncope.   Respiratory: Negative for cough, hemoptysis, shortness of breath and wheezing.    Endocrine: Negative for cold intolerance and heat intolerance.   Hematologic/Lymphatic: Negative for bleeding problem. Does not bruise/bleed easily.   Skin: Negative for color change and rash.   Musculoskeletal: Negative for back pain, falls, muscle weakness and myalgias.   Gastrointestinal: Negative for abdominal pain, diarrhea, dysphagia, heartburn, hematemesis, hematochezia, hemorrhoids, jaundice, melena, nausea and vomiting.   Genitourinary:  Negative for dysuria and hematuria.   Neurological: Negative for dizziness, focal weakness, headaches, light-headedness, loss of balance, numbness, tremors, vertigo and weakness.   Psychiatric/Behavioral: Negative for altered mental status, depression and memory loss. The patient is not nervous/anxious.    Allergic/Immunologic: Negative for hives and persistent infections.     Current Outpatient Medications on File Prior to Visit   Medication Sig Dispense Refill    LORazepam (ATIVAN) 0.5 MG tablet Take 0.5 mg by mouth every evening.      metoprolol tartrate (LOPRESSOR) 25 MG tablet Take 1 tablet (25 mg total) by mouth 2 (two) times daily. 60 tablet 11    albuterol (PROVENTIL/VENTOLIN HFA) 90 mcg/actuation inhaler Inhale 2 puffs into the lungs every 6 (six) hours as needed for Wheezing or Shortness of Breath (generic preferred). Rescue (Patient not taking: Reported on 4/5/2022) 18 g 3    benzonatate (TESSALON) 200 MG capsule Take 1 capsule (200 mg total) by mouth 3 (three) times daily as needed for Cough. (Patient not taking: Reported on 4/5/2022) 30 capsule 1    calcium-vitamin D3 500 mg(1,250mg) -200 unit per tablet Take 1 tablet by mouth Daily.       carboxymethylcellulose (REFRESH PLUS) 0.5 % Dpet 1 drop 3 (three) times daily as needed.      diclofenac sodium (VOLTAREN) 1 % Gel Apply 2 g topically once daily. (Patient not taking: Reported on 4/5/2022) 150 g 2    estradioL (ESTRACE) 0.01 % (0.1 mg/gram) vaginal cream Place 1 g vaginally once daily. (Patient not taking: Reported on 4/5/2022) 42.5 g 3    fish oil-omega-3 fatty acids 300-1,000 mg capsule Take by mouth once daily.      fluticasone propionate (FLONASE) 50 mcg/actuation nasal spray 2 sprays (100 mcg total) by Each Nostril route once daily. 3 mo supply (Patient not taking: Reported on 4/5/2022) 48 g 4    gabapentin (NEURONTIN) 300 MG capsule Take 2 capsules (600 mg total) by mouth every evening. (Patient not taking: Reported on 4/5/2022) 180  capsule 3    meloxicam (MOBIC) 7.5 MG tablet Take 1 tablet (7.5 mg total) by mouth once daily. (Patient not taking: Reported on 4/5/2022) 30 tablet 12    SHINGRIX, PF, 50 mcg/0.5 mL injection        Current Facility-Administered Medications on File Prior to Visit   Medication Dose Route Frequency Provider Last Rate Last Admin    0.9%  NaCl infusion   Intravenous Continuous Audrey Caicedo NP        cefTRIAXone 2 gram/50 mL IVPB 2 g  2 g Intravenous On Call Procedure Audrey Caicedo NP        And    metronidazole IVPB 500 mg  500 mg Intravenous On Call Procedure Audrey Caicedo NP        heparin (porcine) injection 5,000 Units  5,000 Units Subcutaneous On Call Procedure Audrey Caicedo NP   5,000 Units at 04/01/22 0638    LIDOcaine (PF) 10 mg/ml (1%) injection 10 mg  1 mL Intradermal Once Audrey Caicedo NP        mupirocin 2 % ointment   Nasal On Call Procedure Audrey Caicedo NP   Given at 04/01/22 0637    scopolamine 1.3-1.5 mg (1 mg over 3 days) 1 patch  1 patch Transdermal Q3 Days Audrey Caicedo NP   1 patch at 04/01/22 0638       /62 (BP Location: Right arm, Patient Position: Sitting, BP Method: Medium (Manual))   Pulse 73   Ht 5' (1.524 m)   Wt 50.9 kg (112 lb 3.4 oz)   LMP  (LMP Unknown)   SpO2 97%   BMI 21.92 kg/m²       Objective:     Physical Exam  Constitutional:       General: She is not in acute distress.     Appearance: Normal appearance. She is well-developed. She is not toxic-appearing or diaphoretic.   HENT:      Head: Normocephalic and atraumatic.      Nose: Nose normal.   Eyes:      General:         Right eye: No discharge.         Left eye: No discharge.      Conjunctiva/sclera:      Right eye: Right conjunctiva is not injected.      Left eye: Left conjunctiva is not injected.      Pupils: Pupils are equal.      Right eye: Pupil is round.      Left eye: Pupil is round.   Neck:      Thyroid: No thyromegaly.      Vascular: No carotid bruit or JVD.    Cardiovascular:      Rate and Rhythm: Normal rate and regular rhythm.  No extrasystoles are present.     Chest Wall: PMI is not displaced.      Pulses:           Radial pulses are 2+ on the right side and 2+ on the left side.        Femoral pulses are 2+ on the right side and 2+ on the left side.       Dorsalis pedis pulses are 2+ on the right side and 2+ on the left side.        Posterior tibial pulses are 2+ on the right side and 2+ on the left side.      Heart sounds: S1 normal and S2 normal. Murmur heard.   High-pitched blowing holosystolic murmur is present with a grade of 2/6 at the apex.    No gallop.   Pulmonary:      Effort: Pulmonary effort is normal.      Breath sounds: Normal breath sounds.   Abdominal:      Palpations: Abdomen is soft.      Tenderness: There is no abdominal tenderness.   Musculoskeletal:      Cervical back: Neck supple.      Right lower leg: Normal. No swelling. No edema.      Left lower leg: Normal. No swelling. No edema.   Lymphadenopathy:      Head:      Right side of head: No submandibular adenopathy.      Left side of head: No submandibular adenopathy.      Cervical: No cervical adenopathy.   Skin:     General: Skin is warm and dry.      Findings: No rash.      Nails: There is no clubbing.   Neurological:      General: No focal deficit present.      Mental Status: She is alert and oriented to person, place, and time. She is not disoriented.      Cranial Nerves: No cranial nerve deficit.   Psychiatric:         Attention and Perception: Attention and perception normal.         Mood and Affect: Mood and affect normal.         Speech: Speech normal.         Behavior: Behavior normal.         Thought Content: Thought content normal.         Cognition and Memory: Cognition and memory normal.         Judgment: Judgment normal.         Assessment:     1. Paroxysmal atrial fibrillation    2. Longstanding persistent atrial fibrillation    3. Pre-operative cardiovascular examination         Plan:     1. Atrial Fibrillation               4/1/2022: Diagnosed with paroxysmal atrial fibrillation.               Fast VRR. Asymptomatic.              CHA2DS2VASc=3 (A2Sc).   4/1/2022: Echo: Normal left ventricular size and systolic function. Mildly sigmoid septum. EF 65%. AF. Moderate aortic valve sclerosis. Mild AR. Moderate MR.   On metoprolol 25 mg Q12.              Appears reasonable to delay initiation of anticoagulation until after surgery completed in setting of low [id number] score.   4/5/2022: Metoprolol 25 mg Q24 to be changed to metoprolol 50 mg Q24.     2. Chronic Anticoagulation              4/1/2022: Diagnosed with paroxysmal atrial fibrillation.               CHA2DS2VASc=3 (A2Sc).              Plan anticoagulation with apixiban 2.5 mg Q12 after planned surgery.     3. Colovaginal Fistula              4/1/2022: Plan was surgery.   4/26/2022: Rescheduled.              Dr. Alison Mcclain.    4. Primary Care              Dr. Joe Forde.     5. Pre Operative Cardiovascular Evaluation   4/26/2022: Plan is partial colectomy.   Low cardiac risk.    F/u 1 months.    Lisa Yeh M.D.

## 2022-04-10 ENCOUNTER — PATIENT MESSAGE (OUTPATIENT)
Dept: CARDIOLOGY | Facility: CLINIC | Age: 82
End: 2022-04-10
Payer: MEDICARE

## 2022-04-12 ENCOUNTER — OFFICE VISIT (OUTPATIENT)
Dept: DERMATOLOGY | Facility: CLINIC | Age: 82
End: 2022-04-12
Payer: MEDICARE

## 2022-04-12 DIAGNOSIS — L30.9 DERMATITIS: Primary | ICD-10-CM

## 2022-04-12 DIAGNOSIS — L29.9 PRURITUS: ICD-10-CM

## 2022-04-12 DIAGNOSIS — L85.3 XEROSIS CUTIS: ICD-10-CM

## 2022-04-12 PROCEDURE — 99213 PR OFFICE/OUTPT VISIT, EST, LEVL III, 20-29 MIN: ICD-10-PCS | Mod: S$GLB,,, | Performed by: STUDENT IN AN ORGANIZED HEALTH CARE EDUCATION/TRAINING PROGRAM

## 2022-04-12 PROCEDURE — 1111F PR DISCHARGE MEDS RECONCILED W/ CURRENT OUTPATIENT MED LIST: ICD-10-PCS | Mod: CPTII,S$GLB,, | Performed by: STUDENT IN AN ORGANIZED HEALTH CARE EDUCATION/TRAINING PROGRAM

## 2022-04-12 PROCEDURE — 1111F DSCHRG MED/CURRENT MED MERGE: CPT | Mod: CPTII,S$GLB,, | Performed by: STUDENT IN AN ORGANIZED HEALTH CARE EDUCATION/TRAINING PROGRAM

## 2022-04-12 PROCEDURE — 99999 PR PBB SHADOW E&M-EST. PATIENT-LVL III: CPT | Mod: PBBFAC,,,

## 2022-04-12 PROCEDURE — 99999 PR PBB SHADOW E&M-EST. PATIENT-LVL III: ICD-10-PCS | Mod: PBBFAC,,,

## 2022-04-12 PROCEDURE — 99213 OFFICE O/P EST LOW 20 MIN: CPT | Mod: S$GLB,,, | Performed by: STUDENT IN AN ORGANIZED HEALTH CARE EDUCATION/TRAINING PROGRAM

## 2022-04-12 RX ORDER — TRIAMCINOLONE ACETONIDE 1 MG/G
CREAM TOPICAL 2 TIMES DAILY
Qty: 80 G | Refills: 1 | Status: SHIPPED | OUTPATIENT
Start: 2022-04-12 | End: 2023-03-14

## 2022-04-12 NOTE — PROGRESS NOTES
Subjective:       Patient ID:  Laverne Humphries is a 81 y.o. female who presents for   Chief Complaint   Patient presents with    Rash     Patient last seen by derm 3/2021 for SKs, Milia, Angiomas, and scalp dysthesia.     Presenting today for a 1week rash that start to abdomen and then spread to shoulder, back, and groin.   Reports it started as red tiny spots skin that then becomes very itchy.     Using calamine lotion to the area that has helped.   Reports starting to take metoprolol April second.   She was supposed to have a surgery April 1st for a fistular but it was delayed due to paroxysmal atrial fibrillation.  She reports using a new antibacterial soap prior to her procedure and has used it some since then.     History of seasonal allergies.   No changes in topical recently. Uses dove sensitive soap. No topical moisturizers  Reports all malignancy screening up to day      Review of Systems   Constitutional: Negative for fever, chills, fatigue and night sweats.   Gastrointestinal: Negative for nausea and vomiting.   Genitourinary: Negative for genital sores and genital itching.   Skin: Positive for itching, rash and dry skin.        Objective:    Physical Exam   Skin:   Areas Examined (abnormalities noted in diagram):   Head / Face Inspection Performed  Neck Inspection Performed  Chest / Axilla Inspection Performed  Abdomen Inspection Performed  Back Inspection Performed  RUE Inspected  LUE Inspection Performed  RLE Inspected  LLE Inspection Performed              Diagram Legend     Erythematous scaling macule/papule c/w actinic keratosis       Vascular papule c/w angioma      Pigmented verrucoid papule/plaque c/w seborrheic keratosis      Yellow umbilicated papule c/w sebaceous hyperplasia      Irregularly shaped tan macule c/w lentigo     1-2 mm smooth white papules consistent with Milia      Movable subcutaneous cyst with punctum c/w epidermal inclusion cyst      Subcutaneous movable cyst c/w  pilar cyst      Firm pink to brown papule c/w dermatofibroma      Pedunculated fleshy papule(s) c/w skin tag(s)      Evenly pigmented macule c/w junctional nevus     Mildly variegated pigmented, slightly irregular-bordered macule c/w mildly atypical nevus      Flesh colored to evenly pigmented papule c/w intradermal nevus       Pink pearly papule/plaque c/w basal cell carcinoma      Erythematous hyperkeratotic cursted plaque c/w SCC      Surgical scar with no sign of skin cancer recurrence      Open and closed comedones      Inflammatory papules and pustules      Verrucoid papule consistent consistent with wart     Erythematous eczematous patches and plaques     Dystrophic onycholytic nail with subungual debris c/w onychomycosis     Umbilicated papule    Erythematous-base heme-crusted tan verrucoid plaque consistent with inflamed seborrheic keratosis     Erythematous Silvery Scaling Plaque c/w Psoriasis     See annotation      Assessment / Plan:        Dermatitis  Pruritus  Xerosis cutis  - non specific pruritis for 1 week with no primary skin lesion noted today  - started metoprolol April 2nd and used new soap prior to her scheduled surgery April 1st  - many excoriations scatted to trunk today  - pruritis 2/2 xerosis vs new antibacterial soap vs metoprolol  - if no improvement at f/u, consider discontinue metoprolol or biopsy    - discussed dry skin care   - avoid new soap product  - start Cerave cream and anti itch  -     triamcinolone acetonide 0.1% (KENALOG) 0.1 % cream; Apply topically 2 (two) times daily. Apply to itchy irritated rash twice daily. Do not apply to face, groin, or skin folds  Dispense: 80 g; Refill: 1    Follow up in about 4 weeks (around 5/10/2022).     Patient seen by and discussed with Dr. Beckman

## 2022-04-18 ENCOUNTER — OFFICE VISIT (OUTPATIENT)
Dept: SURGERY | Facility: OTHER | Age: 82
End: 2022-04-18
Attending: SURGERY
Payer: MEDICARE

## 2022-04-18 VITALS
DIASTOLIC BLOOD PRESSURE: 60 MMHG | HEART RATE: 71 BPM | HEIGHT: 60 IN | WEIGHT: 110.88 LBS | SYSTOLIC BLOOD PRESSURE: 127 MMHG | BODY MASS INDEX: 21.77 KG/M2

## 2022-04-18 DIAGNOSIS — N82.3 RECTOVAGINAL FISTULA: Primary | ICD-10-CM

## 2022-04-18 PROCEDURE — 1159F PR MEDICATION LIST DOCUMENTED IN MEDICAL RECORD: ICD-10-PCS | Mod: CPTII,S$GLB,, | Performed by: SURGERY

## 2022-04-18 PROCEDURE — 1126F PR PAIN SEVERITY QUANTIFIED, NO PAIN PRESENT: ICD-10-PCS | Mod: CPTII,S$GLB,, | Performed by: SURGERY

## 2022-04-18 PROCEDURE — 1126F AMNT PAIN NOTED NONE PRSNT: CPT | Mod: CPTII,S$GLB,, | Performed by: SURGERY

## 2022-04-18 PROCEDURE — 3288F PR FALLS RISK ASSESSMENT DOCUMENTED: ICD-10-PCS | Mod: CPTII,S$GLB,, | Performed by: SURGERY

## 2022-04-18 PROCEDURE — 1159F MED LIST DOCD IN RCRD: CPT | Mod: CPTII,S$GLB,, | Performed by: SURGERY

## 2022-04-18 PROCEDURE — 99999 PR PBB SHADOW E&M-EST. PATIENT-LVL IV: ICD-10-PCS | Mod: PBBFAC,,, | Performed by: SURGERY

## 2022-04-18 PROCEDURE — 3288F FALL RISK ASSESSMENT DOCD: CPT | Mod: CPTII,S$GLB,, | Performed by: SURGERY

## 2022-04-18 PROCEDURE — 99214 OFFICE O/P EST MOD 30 MIN: CPT | Mod: S$GLB,,, | Performed by: SURGERY

## 2022-04-18 PROCEDURE — 1101F PT FALLS ASSESS-DOCD LE1/YR: CPT | Mod: CPTII,S$GLB,, | Performed by: SURGERY

## 2022-04-18 PROCEDURE — 1111F PR DISCHARGE MEDS RECONCILED W/ CURRENT OUTPATIENT MED LIST: ICD-10-PCS | Mod: CPTII,S$GLB,, | Performed by: SURGERY

## 2022-04-18 PROCEDURE — 1101F PR PT FALLS ASSESS DOC 0-1 FALLS W/OUT INJ PAST YR: ICD-10-PCS | Mod: CPTII,S$GLB,, | Performed by: SURGERY

## 2022-04-18 PROCEDURE — 3074F PR MOST RECENT SYSTOLIC BLOOD PRESSURE < 130 MM HG: ICD-10-PCS | Mod: CPTII,S$GLB,, | Performed by: SURGERY

## 2022-04-18 PROCEDURE — 3078F DIAST BP <80 MM HG: CPT | Mod: CPTII,S$GLB,, | Performed by: SURGERY

## 2022-04-18 PROCEDURE — 3074F SYST BP LT 130 MM HG: CPT | Mod: CPTII,S$GLB,, | Performed by: SURGERY

## 2022-04-18 PROCEDURE — 1111F DSCHRG MED/CURRENT MED MERGE: CPT | Mod: CPTII,S$GLB,, | Performed by: SURGERY

## 2022-04-18 PROCEDURE — 99214 PR OFFICE/OUTPT VISIT, EST, LEVL IV, 30-39 MIN: ICD-10-PCS | Mod: S$GLB,,, | Performed by: SURGERY

## 2022-04-18 PROCEDURE — 99999 PR PBB SHADOW E&M-EST. PATIENT-LVL IV: CPT | Mod: PBBFAC,,, | Performed by: SURGERY

## 2022-04-18 PROCEDURE — 3078F PR MOST RECENT DIASTOLIC BLOOD PRESSURE < 80 MM HG: ICD-10-PCS | Mod: CPTII,S$GLB,, | Performed by: SURGERY

## 2022-04-18 RX ORDER — METRONIDAZOLE 500 MG/1
TABLET ORAL
Qty: 3 TABLET | Refills: 0 | Status: SHIPPED | OUTPATIENT
Start: 2022-04-18 | End: 2022-04-22 | Stop reason: CLARIF

## 2022-04-18 RX ORDER — NEOMYCIN SULFATE 500 MG/1
TABLET ORAL
Qty: 6 TABLET | Refills: 0 | Status: SHIPPED | OUTPATIENT
Start: 2022-04-18 | End: 2022-04-22 | Stop reason: CLARIF

## 2022-04-18 NOTE — PROGRESS NOTES
CRS Office Visit History and Physical    Referring Md:   No referring provider defined for this encounter.    SUBJECTIVE:     Chief Complaint: pre-op    History of Present Illness:  The patient is a new patient to this practice.   Course is as follows:  Laverne Humphries is a 81 y.o. female presents for follow up after recent surgery for colovaginal fistula was cancelled due to new onset a fib.  She was seen by cardiology and started on metoprolol.  Since that time, she has done well.  No further episodes of a fib.  No chest pain, no shortness of breath.  Continues to have intermittent vaginal drainage.     Last Colonoscopy: 1/20/22    Review of patient's allergies indicates:   Allergen Reactions    Codeine      Other reaction(s): Syncope    Sulfa (sulfonamide antibiotics)      Other reaction(s): Stomach upset       Past Medical History:   Diagnosis Date    Anxiety     Asthma     mild    Cataract     IBS (irritable bowel syndrome)     Reflux esophagitis     Torus palatinus      Past Surgical History:   Procedure Laterality Date    BELPHAROPTOSIS REPAIR Bilateral     CATARACT EXTRACTION W/  INTRAOCULAR LENS IMPLANT Bilateral     CATARACT EXTRACTION, BILATERAL      CHOLECYSTECTOMY      COLONOSCOPY N/A 1/20/2022    Procedure: COLONOSCOPY;  Surgeon: Emmanuel Sandoval MD;  Location: [address]);  Service: Endoscopy;  Laterality: N/A;    ESOPHAGOGASTRODUODENOSCOPY N/A 1/20/2022    Procedure: EGD (ESOPHAGOGASTRODUODENOSCOPY) any GI doc, please perform colonoscopy and EGD at same time;  Surgeon: Emmanuel Sandoval MD;  Location: [address]);  Service: Endoscopy;  Laterality: N/A;  EGD & Colonoscopy orders combined-MS  fully vaccinated  1/13 covid test 1/17 @ [facility]    EYE SURGERY      HYSTERECTOMY      OOPHORECTOMY       Family History   Problem Relation Age of Onset    Cancer Mother         pancreatic    Asthma Sister     Cataracts Sister     Cataracts Father     Diabetes  Father     No Known Problems Daughter     No Known Problems Son     Amblyopia Neg Hx     Blindness Neg Hx     Glaucoma Neg Hx     Macular degeneration Neg Hx     Retinal detachment Neg Hx     Strabismus Neg Hx     Breast cancer Neg Hx     Colon cancer Neg Hx     Ovarian cancer Neg Hx      Social History     Tobacco Use    Smoking status: Never Smoker    Smokeless tobacco: Never Used   Substance Use Topics    Alcohol use: No    Drug use: No        Review of Systems:  Review of Systems   Constitutional: Negative.    HENT: Negative.    Eyes: Negative.    Respiratory: Negative.    Cardiovascular: Negative.    Gastrointestinal:        See HPI   Genitourinary: Negative.    Musculoskeletal: Negative.    Skin: Negative.    Neurological: Negative.    Psychiatric/Behavioral: Negative.        OBJECTIVE:     Vital Signs (Most Recent)  /60 (BP Location: Right arm, Patient Position: Sitting, BP Method: Large (Automatic))   Pulse 71   Ht 5' (1.524 m)   Wt 50.3 kg (110 lb 14.3 oz)   LMP  (LMP Unknown)   BMI 21.66 kg/m²     Physical Exam:  General: 81 y.o. female in no distress   Neuro: alert and oriented x 4.  Moves all extremities.     HEENT: normocephalic, atraumatic, PERRL, EOMI   Respiratory: respirations are even and unlabored  Cardiac: regular rate and rhythm  Abdomen: soft, NTND  Extremities: Warm dry and intact  Skin: no rashes      Labs:     Component Ref Range & Units 1 mo ago   (2/21/22) 5 mo ago   (10/27/21) 5 mo ago   (10/22/21) 6 mo ago   (10/19/21) 8 mo ago   (8/9/21) 1 yr ago   (1/27/21) 1 yr ago   (5/4/20)   WBC 3.90 - 12.70 K/uL 7.03  8.52  7.98  11.34  8.49  8.49  7.24    RBC 4.00 - 5.40 M/uL 4.09  4.39  3.94 Low   4.06  4.08  3.90 Low   4.40    Hemoglobin 12.0 - 16.0 g/dL 11.7 Low   12.2  11.2 Low   11.5 Low   11.5 Low   11.3 Low   12.5    Hematocrit 37.0 - 48.5 % 38.0  38.7  34.4 Low   35.3 Low   36.0 Low   35.1 Low   40.1    MCV 82 - 98 fL 93  88  87  87  88  90  91    MCH 27.0 - 31.0  pg 28.6  27.8  28.4  28.3  28.2  29.0  28.4    MCHC 32.0 - 36.0 g/dL 30.8 Low   31.5 Low   32.6  32.6  31.9 Low   32.2  31.2 Low     RDW 11.5 - 14.5 % 13.2  14.4  14.2  14.3  13.6  13.3  13.3    Platelets 150 - 450 K/uL 260  442  323  259  296            Imaging:     Impression:     Colovaginal fistula, as above.  Mild wall thickening/inflammatory changes of the colon and adjacent bladder noted.  No fluid collections, free air, or evidence of bowel obstruction.     This report was flagged in Epic as abnormal.        Electronically signed by: Albert Guzmán MD  Date:                                            03/09/2022    ASSESSMENT/PLAN:     Diagnoses and all orders for this visit:    Rectovaginal fistula    Other orders  -     neomycin (MYCIFRADIN) 500 mg Tab; Take 2 tabs at 1400 (2pm) 1500 (3 pm) 2300 (11 pm) the day before surgery  -     metroNIDAZOLE (FLAGYL) 500 MG tablet; Take 1 tab at 1400 (2pm) 1500  (3 pm) 2300 (11pm)        81 y.o. 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  Continues on metoprolol.  Has had cardiology follow up.  Continues to do all ADLs and exercise without chest pain or shortness of breath  - Discussed doing limited bowel prep this time.  Will take antibiotics and laxative, but if nausea or vomiting, will stop.  Okay if stool is not clear.    - Pre-op appt.   - To OR on 4/26.  Consent obtained.     Alison Mcclain MD  Staff Surgeon  Colon & Rectal Surgery

## 2022-04-18 NOTE — H&P (VIEW-ONLY)
CRS Office Visit History and Physical    Referring Md:   No referring provider defined for this encounter.    SUBJECTIVE:     Chief Complaint: pre-op    History of Present Illness:  The patient is a new patient to this practice.   Course is as follows:  Laverne Humphries is a 81 y.o. female presents for follow up after recent surgery for colovaginal fistula was cancelled due to new onset a fib.  She was seen by cardiology and started on metoprolol.  Since that time, she has done well.  No further episodes of a fib.  No chest pain, no shortness of breath.  Continues to have intermittent vaginal drainage.     Last Colonoscopy: 1/20/22    Review of patient's allergies indicates:   Allergen Reactions    Codeine      Other reaction(s): Syncope    Sulfa (sulfonamide antibiotics)      Other reaction(s): Stomach upset       Past Medical History:   Diagnosis Date    Anxiety     Asthma     mild    Cataract     IBS (irritable bowel syndrome)     Reflux esophagitis     Torus palatinus      Past Surgical History:   Procedure Laterality Date    BELPHAROPTOSIS REPAIR Bilateral     CATARACT EXTRACTION W/  INTRAOCULAR LENS IMPLANT Bilateral     CATARACT EXTRACTION, BILATERAL      CHOLECYSTECTOMY      COLONOSCOPY N/A 1/20/2022    Procedure: COLONOSCOPY;  Surgeon: Emmanuel Sandoval MD;  Location: [address]);  Service: Endoscopy;  Laterality: N/A;    ESOPHAGOGASTRODUODENOSCOPY N/A 1/20/2022    Procedure: EGD (ESOPHAGOGASTRODUODENOSCOPY) any GI doc, please perform colonoscopy and EGD at same time;  Surgeon: Emmanuel Sandoval MD;  Location: [address]);  Service: Endoscopy;  Laterality: N/A;  EGD & Colonoscopy orders combined-MS  fully vaccinated  1/13 covid test 1/17 @ [facility]    EYE SURGERY      HYSTERECTOMY      OOPHORECTOMY       Family History   Problem Relation Age of Onset    Cancer Mother         pancreatic    Asthma Sister     Cataracts Sister     Cataracts Father     Diabetes  Father     No Known Problems Daughter     No Known Problems Son     Amblyopia Neg Hx     Blindness Neg Hx     Glaucoma Neg Hx     Macular degeneration Neg Hx     Retinal detachment Neg Hx     Strabismus Neg Hx     Breast cancer Neg Hx     Colon cancer Neg Hx     Ovarian cancer Neg Hx      Social History     Tobacco Use    Smoking status: Never Smoker    Smokeless tobacco: Never Used   Substance Use Topics    Alcohol use: No    Drug use: No        Review of Systems:  Review of Systems   Constitutional: Negative.    HENT: Negative.    Eyes: Negative.    Respiratory: Negative.    Cardiovascular: Negative.    Gastrointestinal:        See HPI   Genitourinary: Negative.    Musculoskeletal: Negative.    Skin: Negative.    Neurological: Negative.    Psychiatric/Behavioral: Negative.        OBJECTIVE:     Vital Signs (Most Recent)  /60 (BP Location: Right arm, Patient Position: Sitting, BP Method: Large (Automatic))   Pulse 71   Ht 5' (1.524 m)   Wt 50.3 kg (110 lb 14.3 oz)   LMP  (LMP Unknown)   BMI 21.66 kg/m²     Physical Exam:  General: 81 y.o. female in no distress   Neuro: alert and oriented x 4.  Moves all extremities.     HEENT: normocephalic, atraumatic, PERRL, EOMI   Respiratory: respirations are even and unlabored  Cardiac: regular rate and rhythm  Abdomen: soft, NTND  Extremities: Warm dry and intact  Skin: no rashes      Labs:     Component Ref Range & Units 1 mo ago   (2/21/22) 5 mo ago   (10/27/21) 5 mo ago   (10/22/21) 6 mo ago   (10/19/21) 8 mo ago   (8/9/21) 1 yr ago   (1/27/21) 1 yr ago   (5/4/20)   WBC 3.90 - 12.70 K/uL 7.03  8.52  7.98  11.34  8.49  8.49  7.24    RBC 4.00 - 5.40 M/uL 4.09  4.39  3.94 Low   4.06  4.08  3.90 Low   4.40    Hemoglobin 12.0 - 16.0 g/dL 11.7 Low   12.2  11.2 Low   11.5 Low   11.5 Low   11.3 Low   12.5    Hematocrit 37.0 - 48.5 % 38.0  38.7  34.4 Low   35.3 Low   36.0 Low   35.1 Low   40.1    MCV 82 - 98 fL 93  88  87  87  88  90  91    MCH 27.0 - 31.0  pg 28.6  27.8  28.4  28.3  28.2  29.0  28.4    MCHC 32.0 - 36.0 g/dL 30.8 Low   31.5 Low   32.6  32.6  31.9 Low   32.2  31.2 Low     RDW 11.5 - 14.5 % 13.2  14.4  14.2  14.3  13.6  13.3  13.3    Platelets 150 - 450 K/uL 260  442  323  259  296            Imaging:     Impression:     Colovaginal fistula, as above.  Mild wall thickening/inflammatory changes of the colon and adjacent bladder noted.  No fluid collections, free air, or evidence of bowel obstruction.     This report was flagged in Epic as abnormal.        Electronically signed by: Albert Guzmán MD  Date:                                            03/09/2022    ASSESSMENT/PLAN:     Diagnoses and all orders for this visit:    Rectovaginal fistula    Other orders  -     neomycin (MYCIFRADIN) 500 mg Tab; Take 2 tabs at 1400 (2pm) 1500 (3 pm) 2300 (11 pm) the day before surgery  -     metroNIDAZOLE (FLAGYL) 500 MG tablet; Take 1 tab at 1400 (2pm) 1500  (3 pm) 2300 (11pm)        81 y.o. 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  Continues on metoprolol.  Has had cardiology follow up.  Continues to do all ADLs and exercise without chest pain or shortness of breath  - Discussed doing limited bowel prep this time.  Will take antibiotics and laxative, but if nausea or vomiting, will stop.  Okay if stool is not clear.    - Pre-op appt.   - To OR on 4/26.  Consent obtained.     Alison Mcclain MD  Staff Surgeon  Colon & Rectal Surgery

## 2022-04-21 ENCOUNTER — PATIENT MESSAGE (OUTPATIENT)
Dept: DERMATOLOGY | Facility: CLINIC | Age: 82
End: 2022-04-21
Payer: MEDICARE

## 2022-04-21 ENCOUNTER — PATIENT MESSAGE (OUTPATIENT)
Dept: DERMATOLOGY | Facility: CLINIC | Age: 82
End: 2022-04-21

## 2022-04-21 ENCOUNTER — OFFICE VISIT (OUTPATIENT)
Dept: DERMATOLOGY | Facility: CLINIC | Age: 82
End: 2022-04-21
Payer: MEDICARE

## 2022-04-21 DIAGNOSIS — L29.9 PRURITUS: Primary | ICD-10-CM

## 2022-04-21 DIAGNOSIS — L85.3 XEROSIS CUTIS: ICD-10-CM

## 2022-04-21 PROCEDURE — 1101F PT FALLS ASSESS-DOCD LE1/YR: CPT | Mod: CPTII,S$GLB,, | Performed by: STUDENT IN AN ORGANIZED HEALTH CARE EDUCATION/TRAINING PROGRAM

## 2022-04-21 PROCEDURE — 1126F PR PAIN SEVERITY QUANTIFIED, NO PAIN PRESENT: ICD-10-PCS | Mod: CPTII,S$GLB,, | Performed by: STUDENT IN AN ORGANIZED HEALTH CARE EDUCATION/TRAINING PROGRAM

## 2022-04-21 PROCEDURE — 1111F DSCHRG MED/CURRENT MED MERGE: CPT | Mod: CPTII,S$GLB,, | Performed by: STUDENT IN AN ORGANIZED HEALTH CARE EDUCATION/TRAINING PROGRAM

## 2022-04-21 PROCEDURE — 3288F PR FALLS RISK ASSESSMENT DOCUMENTED: ICD-10-PCS | Mod: CPTII,S$GLB,, | Performed by: STUDENT IN AN ORGANIZED HEALTH CARE EDUCATION/TRAINING PROGRAM

## 2022-04-21 PROCEDURE — 1126F AMNT PAIN NOTED NONE PRSNT: CPT | Mod: CPTII,S$GLB,, | Performed by: STUDENT IN AN ORGANIZED HEALTH CARE EDUCATION/TRAINING PROGRAM

## 2022-04-21 PROCEDURE — 99213 OFFICE O/P EST LOW 20 MIN: CPT | Mod: S$GLB,,, | Performed by: STUDENT IN AN ORGANIZED HEALTH CARE EDUCATION/TRAINING PROGRAM

## 2022-04-21 PROCEDURE — 1159F MED LIST DOCD IN RCRD: CPT | Mod: CPTII,S$GLB,, | Performed by: STUDENT IN AN ORGANIZED HEALTH CARE EDUCATION/TRAINING PROGRAM

## 2022-04-21 PROCEDURE — 1111F PR DISCHARGE MEDS RECONCILED W/ CURRENT OUTPATIENT MED LIST: ICD-10-PCS | Mod: CPTII,S$GLB,, | Performed by: STUDENT IN AN ORGANIZED HEALTH CARE EDUCATION/TRAINING PROGRAM

## 2022-04-21 PROCEDURE — 3288F FALL RISK ASSESSMENT DOCD: CPT | Mod: CPTII,S$GLB,, | Performed by: STUDENT IN AN ORGANIZED HEALTH CARE EDUCATION/TRAINING PROGRAM

## 2022-04-21 PROCEDURE — 1159F PR MEDICATION LIST DOCUMENTED IN MEDICAL RECORD: ICD-10-PCS | Mod: CPTII,S$GLB,, | Performed by: STUDENT IN AN ORGANIZED HEALTH CARE EDUCATION/TRAINING PROGRAM

## 2022-04-21 PROCEDURE — 1160F PR REVIEW ALL MEDS BY PRESCRIBER/CLIN PHARMACIST DOCUMENTED: ICD-10-PCS | Mod: CPTII,S$GLB,, | Performed by: STUDENT IN AN ORGANIZED HEALTH CARE EDUCATION/TRAINING PROGRAM

## 2022-04-21 PROCEDURE — 99999 PR PBB SHADOW E&M-EST. PATIENT-LVL III: ICD-10-PCS | Mod: PBBFAC,,, | Performed by: STUDENT IN AN ORGANIZED HEALTH CARE EDUCATION/TRAINING PROGRAM

## 2022-04-21 PROCEDURE — 1101F PR PT FALLS ASSESS DOC 0-1 FALLS W/OUT INJ PAST YR: ICD-10-PCS | Mod: CPTII,S$GLB,, | Performed by: STUDENT IN AN ORGANIZED HEALTH CARE EDUCATION/TRAINING PROGRAM

## 2022-04-21 PROCEDURE — 99213 PR OFFICE/OUTPT VISIT, EST, LEVL III, 20-29 MIN: ICD-10-PCS | Mod: S$GLB,,, | Performed by: STUDENT IN AN ORGANIZED HEALTH CARE EDUCATION/TRAINING PROGRAM

## 2022-04-21 PROCEDURE — 1160F RVW MEDS BY RX/DR IN RCRD: CPT | Mod: CPTII,S$GLB,, | Performed by: STUDENT IN AN ORGANIZED HEALTH CARE EDUCATION/TRAINING PROGRAM

## 2022-04-21 PROCEDURE — 99999 PR PBB SHADOW E&M-EST. PATIENT-LVL III: CPT | Mod: PBBFAC,,, | Performed by: STUDENT IN AN ORGANIZED HEALTH CARE EDUCATION/TRAINING PROGRAM

## 2022-04-21 NOTE — PROGRESS NOTES
Subjective:       Patient ID:  Laverne Humphries is a 81 y.o. female who presents for   Chief Complaint   Patient presents with    Dermatitis     Back     Presents for f/u pruritus on back and arms. LOV 1wk ago when pt was starrted on TAC cream and Cerave anti-itch cream. Pt notes that rash has greatly improved, but she still notices itching at the previous areas of involvement. Using TAC cream very sparingly, using anti-itch cream daily. Uses dove soap in shower, no long using hot showers.    Pt has surgery in 1 week, asking if she can still get this done.    Dermatitis - Follow-up  Symptom course: unchanged  Affected locations: back  Signs / symptoms: burning and itching  Severity: moderate        Review of Systems   Constitutional: Negative.    Skin: Positive for itching and dry skin. Negative for rash.        Objective:    Physical Exam   Constitutional: She appears well-developed and well-nourished. No distress.   Neurological: She is alert and oriented to person, place, and time. She is not disoriented.   Psychiatric: She has a normal mood and affect.   Skin:   Areas Examined (abnormalities noted in diagram):   Head / Face Inspection Performed  Neck Inspection Performed  Chest / Axilla Inspection Performed  Abdomen Inspection Performed  Back Inspection Performed  RUE Inspected  LUE Inspection Performed  RLE Inspected  LLE Inspection Performed              Diagram Legend     Erythematous scaling macule/papule c/w actinic keratosis       Vascular papule c/w angioma      Pigmented verrucoid papule/plaque c/w seborrheic keratosis      Yellow umbilicated papule c/w sebaceous hyperplasia      Irregularly shaped tan macule c/w lentigo     1-2 mm smooth white papules consistent with Milia      Movable subcutaneous cyst with punctum c/w epidermal inclusion cyst      Subcutaneous movable cyst c/w pilar cyst      Firm pink to brown papule c/w dermatofibroma      Pedunculated fleshy papule(s) c/w skin tag(s)       Evenly pigmented macule c/w junctional nevus     Mildly variegated pigmented, slightly irregular-bordered macule c/w mildly atypical nevus      Flesh colored to evenly pigmented papule c/w intradermal nevus       Pink pearly papule/plaque c/w basal cell carcinoma      Erythematous hyperkeratotic cursted plaque c/w SCC      Surgical scar with no sign of skin cancer recurrence      Open and closed comedones      Inflammatory papules and pustules      Verrucoid papule consistent consistent with wart     Erythematous eczematous patches and plaques     Dystrophic onycholytic nail with subungual debris c/w onychomycosis     Umbilicated papule    Erythematous-base heme-crusted tan verrucoid plaque consistent with inflamed seborrheic keratosis     Erythematous Silvery Scaling Plaque c/w Psoriasis     See annotation      Assessment / Plan:        Pruritus  Discussed that pruritus can persist with xerosis. Encouraged pt to continue following good skin care regimen with moisturizing cream and gentle soap in luke warm showers.  - Increase use of TAC cream AAA    Xerosis cutis  Improving with gentle skin care.  - Pt is only 1 wk out since changing habits. She is encouraged to continue these  - Pt is cleared for surgery from derm perspective           No follow-ups on file.

## 2022-04-22 NOTE — PRE ADMISSION SCREENING
Pre admit phone call completed.    Instructions given to patient about NPO status as follows:     The evening before surgery do not eat anything after 9 p.m. ( this includes hard candy, chewing gum and mints).  You may only have GATORADE, POWERADE AND WATER from 9 p.m. until you leave your home. DO NOT  DRINK ANY LIQUIDS ON THE WAY TO THE HOSPITAL.      Patient was also instructed on the below information:    2 visitors allowed.     Park in the Parking lot behind the hospital or in the [employer] [facility] across the street from the parking lot.  Parking is complimentary.  If you will be discharged the same day as your procedure, please arrange for a responsible adult to drive you home or  to accompany you if traveling by taxi.  YOU WILL NOT BE PERMITTED TO DRIVE OR TO LEAVE THE HOSPITAL ALONE AFTER SURGERY.  It is strongly recommended that you arrange for someone to remain with you for the first 24 hrs following your surgery.

## 2022-04-25 ENCOUNTER — TELEPHONE (OUTPATIENT)
Dept: SURGERY | Facility: CLINIC | Age: 82
End: 2022-04-25
Payer: MEDICARE

## 2022-04-25 NOTE — TELEPHONE ENCOUNTER
----- Message from Dandy Platt sent at 4/25/2022  1:05 PM CDT -----  Contact: Cally(daughter)[phone]  Caller requesting a call back regarding her moms procedure scheduled tomorrow.  Call would like to know arrival time.

## 2022-04-26 ENCOUNTER — ANESTHESIA (OUTPATIENT)
Dept: SURGERY | Facility: OTHER | Age: 82
DRG: 330 | End: 2022-04-26
Payer: MEDICARE

## 2022-04-26 ENCOUNTER — HOSPITAL ENCOUNTER (INPATIENT)
Facility: OTHER | Age: 82
LOS: 3 days | Discharge: HOME | DRG: 330 | End: 2022-04-29
Attending: SURGERY | Admitting: SURGERY
Payer: MEDICARE

## 2022-04-26 ENCOUNTER — ANESTHESIA EVENT (OUTPATIENT)
Dept: SURGERY | Facility: OTHER | Age: 82
DRG: 330 | End: 2022-04-26
Payer: MEDICARE

## 2022-04-26 DIAGNOSIS — R29.898 WEAKNESS OF BOTH LOWER EXTREMITIES: Primary | ICD-10-CM

## 2022-04-26 DIAGNOSIS — K63.89 COLONIC MASS: ICD-10-CM

## 2022-04-26 LAB
ABO + RH BLD: NORMAL
BLD GP AB SCN CELLS X3 SERPL QL: NORMAL

## 2022-04-26 PROCEDURE — 63600175 PHARM REV CODE 636 W HCPCS

## 2022-04-26 PROCEDURE — 25000003 PHARM REV CODE 250

## 2022-04-26 PROCEDURE — 71000039 HC RECOVERY, EACH ADD'L HOUR: Performed by: SURGERY

## 2022-04-26 PROCEDURE — 64488 TAP BLOCK BI INJECTION: CPT | Performed by: ANESTHESIOLOGY

## 2022-04-26 PROCEDURE — 63600175 PHARM REV CODE 636 W HCPCS: Performed by: ANESTHESIOLOGY

## 2022-04-26 PROCEDURE — 44213 LAP MOBIL SPLENIC FL ADD-ON: CPT | Mod: ,,, | Performed by: SURGERY

## 2022-04-26 PROCEDURE — 25000003 PHARM REV CODE 250: Performed by: NURSE PRACTITIONER

## 2022-04-26 PROCEDURE — 86901 BLOOD TYPING SEROLOGIC RH(D): CPT | Performed by: SURGERY

## 2022-04-26 PROCEDURE — 71000033 HC RECOVERY, INTIAL HOUR: Performed by: SURGERY

## 2022-04-26 PROCEDURE — 63600175 PHARM REV CODE 636 W HCPCS: Performed by: SURGERY

## 2022-04-26 PROCEDURE — 88307 TISSUE EXAM BY PATHOLOGIST: CPT | Performed by: PATHOLOGY

## 2022-04-26 PROCEDURE — 27000221 HC OXYGEN, UP TO 24 HOURS

## 2022-04-26 PROCEDURE — 88307 TISSUE EXAM BY PATHOLOGIST: CPT | Mod: 26,,, | Performed by: PATHOLOGY

## 2022-04-26 PROCEDURE — 25000003 PHARM REV CODE 250: Performed by: SURGERY

## 2022-04-26 PROCEDURE — 36415 COLL VENOUS BLD VENIPUNCTURE: CPT | Performed by: SURGERY

## 2022-04-26 PROCEDURE — 25000003 PHARM REV CODE 250: Performed by: ANESTHESIOLOGY

## 2022-04-26 PROCEDURE — 37000008 HC ANESTHESIA 1ST 15 MINUTES: Performed by: SURGERY

## 2022-04-26 PROCEDURE — C9290 INJ, BUPIVACAINE LIPOSOME: HCPCS | Performed by: ANESTHESIOLOGY

## 2022-04-26 PROCEDURE — 94761 N-INVAS EAR/PLS OXIMETRY MLT: CPT

## 2022-04-26 PROCEDURE — 11000001 HC ACUTE MED/SURG PRIVATE ROOM

## 2022-04-26 PROCEDURE — 44207 L COLECTOMY/COLOPROCTOSTOMY: CPT | Mod: ,,, | Performed by: SURGERY

## 2022-04-26 PROCEDURE — 37000009 HC ANESTHESIA EA ADD 15 MINS: Performed by: SURGERY

## 2022-04-26 PROCEDURE — 94799 UNLISTED PULMONARY SVC/PX: CPT

## 2022-04-26 PROCEDURE — 44213 PR LAP, SURG MOBIL SPLENIC FL DUR PTL COLECTOMY: ICD-10-PCS | Mod: ,,, | Performed by: SURGERY

## 2022-04-26 PROCEDURE — 63600175 PHARM REV CODE 636 W HCPCS: Performed by: NURSE PRACTITIONER

## 2022-04-26 PROCEDURE — S0030 INJECTION, METRONIDAZOLE: HCPCS | Performed by: NURSE PRACTITIONER

## 2022-04-26 PROCEDURE — 36000712 HC OR TIME LEV V 1ST 15 MIN: Performed by: SURGERY

## 2022-04-26 PROCEDURE — 27100025 HC TUBING, SET FLUID WARMER: Performed by: ANESTHESIOLOGY

## 2022-04-26 PROCEDURE — P9045 ALBUMIN (HUMAN), 5%, 250 ML: HCPCS | Mod: JG

## 2022-04-26 PROCEDURE — 36000713 HC OR TIME LEV V EA ADD 15 MIN: Performed by: SURGERY

## 2022-04-26 PROCEDURE — 88307 PR  SURG PATH,LEVEL V: ICD-10-PCS | Mod: 26,,, | Performed by: PATHOLOGY

## 2022-04-26 PROCEDURE — 27201423 OPTIME MED/SURG SUP & DEVICES STERILE SUPPLY: Performed by: SURGERY

## 2022-04-26 PROCEDURE — 44207 PR LAP,SURG,COLECTOMY,W/ANAST: ICD-10-PCS | Mod: ,,, | Performed by: SURGERY

## 2022-04-26 RX ORDER — ENOXAPARIN SODIUM 100 MG/ML
40 INJECTION SUBCUTANEOUS EVERY 24 HOURS
Status: DISCONTINUED | OUTPATIENT
Start: 2022-04-27 | End: 2022-04-29 | Stop reason: HOSPADM

## 2022-04-26 RX ORDER — SODIUM CHLORIDE 9 MG/ML
INJECTION, SOLUTION INTRAVENOUS CONTINUOUS
Status: DISCONTINUED | OUTPATIENT
Start: 2022-04-26 | End: 2022-04-27

## 2022-04-26 RX ORDER — LIDOCAINE HYDROCHLORIDE 10 MG/ML
1 INJECTION, SOLUTION EPIDURAL; INFILTRATION; INTRACAUDAL; PERINEURAL ONCE
Status: DISCONTINUED | OUTPATIENT
Start: 2022-04-26 | End: 2023-11-09

## 2022-04-26 RX ORDER — ACETAMINOPHEN 10 MG/ML
INJECTION, SOLUTION INTRAVENOUS
Status: DISCONTINUED | OUTPATIENT
Start: 2022-04-26 | End: 2022-04-26

## 2022-04-26 RX ORDER — IBUPROFEN 600 MG/1
600 TABLET ORAL
Status: DISCONTINUED | OUTPATIENT
Start: 2022-04-26 | End: 2022-04-26

## 2022-04-26 RX ORDER — MUPIROCIN 20 MG/G
OINTMENT TOPICAL
Status: DISCONTINUED | OUTPATIENT
Start: 2022-04-26 | End: 2023-11-09

## 2022-04-26 RX ORDER — BUPIVACAINE HYDROCHLORIDE 2.5 MG/ML
INJECTION, SOLUTION EPIDURAL; INFILTRATION; INTRACAUDAL
Status: COMPLETED | OUTPATIENT
Start: 2022-04-26 | End: 2022-04-26

## 2022-04-26 RX ORDER — KETAMINE HCL IN 0.9 % NACL 50 MG/5 ML
SYRINGE (ML) INTRAVENOUS
Status: DISCONTINUED | OUTPATIENT
Start: 2022-04-26 | End: 2022-04-26

## 2022-04-26 RX ORDER — INDOCYANINE GREEN AND WATER 25 MG
KIT INJECTION
Status: DISCONTINUED | OUTPATIENT
Start: 2022-04-26 | End: 2022-04-26

## 2022-04-26 RX ORDER — ROCURONIUM BROMIDE 10 MG/ML
INJECTION, SOLUTION INTRAVENOUS
Status: DISCONTINUED | OUTPATIENT
Start: 2022-04-26 | End: 2022-04-26

## 2022-04-26 RX ORDER — ACETAMINOPHEN 500 MG
1000 TABLET ORAL
Status: COMPLETED | OUTPATIENT
Start: 2022-04-26 | End: 2022-04-26

## 2022-04-26 RX ORDER — CARBOXYMETHYLCELLULOSE SODIUM 10 MG/ML
1 GEL OPHTHALMIC 3 TIMES DAILY
Status: DISCONTINUED | OUTPATIENT
Start: 2022-04-26 | End: 2022-04-29 | Stop reason: HOSPADM

## 2022-04-26 RX ORDER — LIDOCAINE HYDROCHLORIDE 20 MG/ML
INJECTION INTRAVENOUS
Status: DISCONTINUED | OUTPATIENT
Start: 2022-04-26 | End: 2022-04-26

## 2022-04-26 RX ORDER — HYDROMORPHONE HYDROCHLORIDE 2 MG/ML
INJECTION, SOLUTION INTRAMUSCULAR; INTRAVENOUS; SUBCUTANEOUS
Status: DISCONTINUED | OUTPATIENT
Start: 2022-04-26 | End: 2022-04-26

## 2022-04-26 RX ORDER — ONDANSETRON 2 MG/ML
4 INJECTION INTRAMUSCULAR; INTRAVENOUS EVERY 8 HOURS PRN
Status: DISCONTINUED | OUTPATIENT
Start: 2022-04-26 | End: 2022-04-29 | Stop reason: HOSPADM

## 2022-04-26 RX ORDER — ONDANSETRON 2 MG/ML
INJECTION INTRAMUSCULAR; INTRAVENOUS
Status: DISCONTINUED | OUTPATIENT
Start: 2022-04-26 | End: 2022-04-26

## 2022-04-26 RX ORDER — HYDROMORPHONE HYDROCHLORIDE 2 MG/ML
0.25 INJECTION, SOLUTION INTRAMUSCULAR; INTRAVENOUS; SUBCUTANEOUS EVERY 4 HOURS PRN
Status: DISCONTINUED | OUTPATIENT
Start: 2022-04-26 | End: 2022-04-29 | Stop reason: HOSPADM

## 2022-04-26 RX ORDER — ALBUTEROL SULFATE 90 UG/1
2 AEROSOL, METERED RESPIRATORY (INHALATION) EVERY 6 HOURS PRN
Status: DISCONTINUED | OUTPATIENT
Start: 2022-04-26 | End: 2022-04-29 | Stop reason: HOSPADM

## 2022-04-26 RX ORDER — FENTANYL CITRATE 50 UG/ML
INJECTION, SOLUTION INTRAMUSCULAR; INTRAVENOUS
Status: DISCONTINUED | OUTPATIENT
Start: 2022-04-26 | End: 2022-04-26

## 2022-04-26 RX ORDER — ACETAMINOPHEN 500 MG
1000 TABLET ORAL EVERY 8 HOURS
Status: DISCONTINUED | OUTPATIENT
Start: 2022-04-27 | End: 2022-04-29 | Stop reason: HOSPADM

## 2022-04-26 RX ORDER — METRONIDAZOLE 500 MG/100ML
500 INJECTION, SOLUTION INTRAVENOUS
Status: COMPLETED | OUTPATIENT
Start: 2022-04-26 | End: 2022-04-26

## 2022-04-26 RX ORDER — HYDROMORPHONE HYDROCHLORIDE 2 MG/ML
0.4 INJECTION, SOLUTION INTRAMUSCULAR; INTRAVENOUS; SUBCUTANEOUS EVERY 5 MIN PRN
Status: DISCONTINUED | OUTPATIENT
Start: 2022-04-26 | End: 2022-04-26 | Stop reason: HOSPADM

## 2022-04-26 RX ORDER — GABAPENTIN 100 MG/1
200 CAPSULE ORAL
Status: DISCONTINUED | OUTPATIENT
Start: 2022-04-26 | End: 2022-04-26

## 2022-04-26 RX ORDER — GABAPENTIN 300 MG/1
600 CAPSULE ORAL NIGHTLY
Status: DISCONTINUED | OUTPATIENT
Start: 2022-04-26 | End: 2022-04-29 | Stop reason: HOSPADM

## 2022-04-26 RX ORDER — PROCHLORPERAZINE EDISYLATE 5 MG/ML
5 INJECTION INTRAMUSCULAR; INTRAVENOUS EVERY 30 MIN PRN
Status: DISCONTINUED | OUTPATIENT
Start: 2022-04-26 | End: 2022-04-26 | Stop reason: HOSPADM

## 2022-04-26 RX ORDER — IBUPROFEN 400 MG/1
800 TABLET ORAL EVERY 8 HOURS
Status: DISCONTINUED | OUTPATIENT
Start: 2022-04-27 | End: 2022-04-29 | Stop reason: HOSPADM

## 2022-04-26 RX ORDER — ALVIMOPAN 12 MG/1
12 CAPSULE ORAL 2 TIMES DAILY
Status: DISCONTINUED | OUTPATIENT
Start: 2022-04-26 | End: 2022-04-29 | Stop reason: HOSPADM

## 2022-04-26 RX ORDER — METRONIDAZOLE 500 MG/100ML
500 INJECTION, SOLUTION INTRAVENOUS
Status: DISPENSED | OUTPATIENT
Start: 2022-04-26 | End: 2022-04-26

## 2022-04-26 RX ORDER — ACETAMINOPHEN 10 MG/ML
1000 INJECTION, SOLUTION INTRAVENOUS EVERY 8 HOURS
Status: COMPLETED | OUTPATIENT
Start: 2022-04-26 | End: 2022-04-27

## 2022-04-26 RX ORDER — MUPIROCIN 20 MG/G
OINTMENT TOPICAL 2 TIMES DAILY
Status: DISCONTINUED | OUTPATIENT
Start: 2022-04-26 | End: 2022-04-29 | Stop reason: HOSPADM

## 2022-04-26 RX ORDER — OXYCODONE HYDROCHLORIDE 5 MG/1
5 TABLET ORAL
Status: DISCONTINUED | OUTPATIENT
Start: 2022-04-26 | End: 2022-04-26 | Stop reason: HOSPADM

## 2022-04-26 RX ORDER — SODIUM CHLORIDE 0.9 % (FLUSH) 0.9 %
10 SYRINGE (ML) INJECTION
Status: DISCONTINUED | OUTPATIENT
Start: 2022-04-26 | End: 2022-04-29 | Stop reason: HOSPADM

## 2022-04-26 RX ORDER — OXYCODONE HYDROCHLORIDE 5 MG/1
5 TABLET ORAL EVERY 4 HOURS PRN
Status: DISCONTINUED | OUTPATIENT
Start: 2022-04-26 | End: 2022-04-29 | Stop reason: HOSPADM

## 2022-04-26 RX ORDER — ALBUMIN HUMAN 50 G/1000ML
SOLUTION INTRAVENOUS CONTINUOUS PRN
Status: DISCONTINUED | OUTPATIENT
Start: 2022-04-26 | End: 2022-04-26

## 2022-04-26 RX ORDER — PROPOFOL 10 MG/ML
VIAL (ML) INTRAVENOUS
Status: DISCONTINUED | OUTPATIENT
Start: 2022-04-26 | End: 2022-04-26

## 2022-04-26 RX ORDER — MEPERIDINE HYDROCHLORIDE 25 MG/ML
12.5 INJECTION INTRAMUSCULAR; INTRAVENOUS; SUBCUTANEOUS ONCE AS NEEDED
Status: DISCONTINUED | OUTPATIENT
Start: 2022-04-26 | End: 2022-04-26 | Stop reason: HOSPADM

## 2022-04-26 RX ORDER — DEXAMETHASONE SODIUM PHOSPHATE 4 MG/ML
INJECTION, SOLUTION INTRA-ARTICULAR; INTRALESIONAL; INTRAMUSCULAR; INTRAVENOUS; SOFT TISSUE
Status: DISCONTINUED | OUTPATIENT
Start: 2022-04-26 | End: 2022-04-26

## 2022-04-26 RX ORDER — HEPARIN SODIUM 5000 [USP'U]/ML
5000 INJECTION, SOLUTION INTRAVENOUS; SUBCUTANEOUS ONCE
Status: COMPLETED | OUTPATIENT
Start: 2022-04-26 | End: 2022-04-26

## 2022-04-26 RX ORDER — CEFTRIAXONE 2 G/50ML
2 INJECTION, SOLUTION INTRAVENOUS
Status: COMPLETED | OUTPATIENT
Start: 2022-04-26 | End: 2022-04-26

## 2022-04-26 RX ORDER — SODIUM CHLORIDE 0.9 % (FLUSH) 0.9 %
3 SYRINGE (ML) INJECTION
Status: DISCONTINUED | OUTPATIENT
Start: 2022-04-26 | End: 2022-04-29 | Stop reason: HOSPADM

## 2022-04-26 RX ORDER — METOPROLOL SUCCINATE 50 MG/1
50 TABLET, EXTENDED RELEASE ORAL DAILY
Status: DISCONTINUED | OUTPATIENT
Start: 2022-04-27 | End: 2022-04-29 | Stop reason: HOSPADM

## 2022-04-26 RX ORDER — TRAMADOL HYDROCHLORIDE 50 MG/1
50 TABLET ORAL EVERY 6 HOURS PRN
Status: DISCONTINUED | OUTPATIENT
Start: 2022-04-26 | End: 2022-04-29 | Stop reason: HOSPADM

## 2022-04-26 RX ORDER — FLUTICASONE PROPIONATE 50 MCG
2 SPRAY, SUSPENSION (ML) NASAL DAILY
Status: DISCONTINUED | OUTPATIENT
Start: 2022-04-26 | End: 2022-04-29 | Stop reason: HOSPADM

## 2022-04-26 RX ORDER — PHENYLEPHRINE HYDROCHLORIDE 10 MG/ML
INJECTION INTRAVENOUS
Status: DISCONTINUED | OUTPATIENT
Start: 2022-04-26 | End: 2022-04-26

## 2022-04-26 RX ADMIN — TRAMADOL HYDROCHLORIDE 50 MG: 50 TABLET, COATED ORAL at 07:04

## 2022-04-26 RX ADMIN — PHENYLEPHRINE HYDROCHLORIDE 100 MCG: 10 INJECTION INTRAVENOUS at 12:04

## 2022-04-26 RX ADMIN — ONDANSETRON HYDROCHLORIDE 4 MG: 2 INJECTION INTRAMUSCULAR; INTRAVENOUS at 01:04

## 2022-04-26 RX ADMIN — IBUPROFEN 800 MG: 800 INJECTION INTRAVENOUS at 10:04

## 2022-04-26 RX ADMIN — BUPIVACAINE HYDROCHLORIDE 40 ML: 2.5 INJECTION, SOLUTION EPIDURAL; INFILTRATION; INTRACAUDAL; PERINEURAL at 08:04

## 2022-04-26 RX ADMIN — ACETAMINOPHEN 1000 MG: 10 INJECTION INTRAVENOUS at 09:04

## 2022-04-26 RX ADMIN — ALVIMOPAN 12 MG: 12 CAPSULE ORAL at 09:04

## 2022-04-26 RX ADMIN — SODIUM CHLORIDE, SODIUM LACTATE, POTASSIUM CHLORIDE, AND CALCIUM CHLORIDE: .6; .31; .03; .02 INJECTION, SOLUTION INTRAVENOUS at 07:04

## 2022-04-26 RX ADMIN — ALBUMIN (HUMAN): 12.5 SOLUTION INTRAVENOUS at 08:04

## 2022-04-26 RX ADMIN — ROCURONIUM BROMIDE 10 MG: 10 SOLUTION INTRAVENOUS at 12:04

## 2022-04-26 RX ADMIN — SODIUM CHLORIDE, SODIUM LACTATE, POTASSIUM CHLORIDE, AND CALCIUM CHLORIDE: .6; .31; .03; .02 INJECTION, SOLUTION INTRAVENOUS at 10:04

## 2022-04-26 RX ADMIN — MUPIROCIN: 20 OINTMENT TOPICAL at 09:04

## 2022-04-26 RX ADMIN — Medication 15 MG: at 09:04

## 2022-04-26 RX ADMIN — BUPIVACAINE 20 ML: 13.3 INJECTION, SUSPENSION, LIPOSOMAL INFILTRATION at 08:04

## 2022-04-26 RX ADMIN — ROCURONIUM BROMIDE 10 MG: 10 SOLUTION INTRAVENOUS at 10:04

## 2022-04-26 RX ADMIN — PHENYLEPHRINE HYDROCHLORIDE 100 MCG: 10 INJECTION INTRAVENOUS at 11:04

## 2022-04-26 RX ADMIN — ACETAMINOPHEN 1000 MG: 500 TABLET, FILM COATED ORAL at 07:04

## 2022-04-26 RX ADMIN — PHENYLEPHRINE HYDROCHLORIDE 200 MCG: 10 INJECTION INTRAVENOUS at 09:04

## 2022-04-26 RX ADMIN — FENTANYL CITRATE 100 MCG: 50 INJECTION, SOLUTION INTRAMUSCULAR; INTRAVENOUS at 08:04

## 2022-04-26 RX ADMIN — CEFTRIAXONE 2 G: 2 INJECTION, SOLUTION INTRAVENOUS at 08:04

## 2022-04-26 RX ADMIN — Medication 10 MG: at 10:04

## 2022-04-26 RX ADMIN — DEXAMETHASONE SODIUM PHOSPHATE 8 MG: 4 INJECTION, SOLUTION INTRAMUSCULAR; INTRAVENOUS at 08:04

## 2022-04-26 RX ADMIN — SUGAMMADEX 200 MG: 100 INJECTION, SOLUTION INTRAVENOUS at 01:04

## 2022-04-26 RX ADMIN — CARBOXYMETHYLCELLULOSE SODIUM 1 DROP: 10 GEL OPHTHALMIC at 09:04

## 2022-04-26 RX ADMIN — HEPARIN SODIUM 5000 UNITS: 5000 INJECTION INTRAVENOUS; SUBCUTANEOUS at 07:04

## 2022-04-26 RX ADMIN — FENTANYL CITRATE 50 MCG: 50 INJECTION, SOLUTION INTRAMUSCULAR; INTRAVENOUS at 08:04

## 2022-04-26 RX ADMIN — LIDOCAINE HYDROCHLORIDE 50 MG: 20 INJECTION, SOLUTION INTRAVENOUS at 08:04

## 2022-04-26 RX ADMIN — Medication 25 MG: at 08:04

## 2022-04-26 RX ADMIN — ROCURONIUM BROMIDE 40 MG: 10 SOLUTION INTRAVENOUS at 08:04

## 2022-04-26 RX ADMIN — PHENYLEPHRINE HYDROCHLORIDE 100 MCG: 10 INJECTION INTRAVENOUS at 10:04

## 2022-04-26 RX ADMIN — FENTANYL CITRATE 50 MCG: 50 INJECTION, SOLUTION INTRAMUSCULAR; INTRAVENOUS at 12:04

## 2022-04-26 RX ADMIN — CARBOXYMETHYLCELLULOSE SODIUM 2 DROP: 2.5 SOLUTION/ DROPS OPHTHALMIC at 08:04

## 2022-04-26 RX ADMIN — MUPIROCIN: 20 OINTMENT TOPICAL at 07:04

## 2022-04-26 RX ADMIN — SODIUM CHLORIDE: 0.9 INJECTION, SOLUTION INTRAVENOUS at 03:04

## 2022-04-26 RX ADMIN — PROPOFOL 150 MG: 10 INJECTION, EMULSION INTRAVENOUS at 08:04

## 2022-04-26 RX ADMIN — METRONIDAZOLE 500 MG: 500 INJECTION, SOLUTION INTRAVENOUS at 08:04

## 2022-04-26 RX ADMIN — ROCURONIUM BROMIDE 10 MG: 10 SOLUTION INTRAVENOUS at 11:04

## 2022-04-26 RX ADMIN — PHENYLEPHRINE HYDROCHLORIDE 100 MCG: 10 INJECTION INTRAVENOUS at 01:04

## 2022-04-26 RX ADMIN — INDOCYANINE GREEN 12.5 MG: KIT INTRAVENOUS at 12:04

## 2022-04-26 RX ADMIN — ROCURONIUM BROMIDE 10 MG: 10 SOLUTION INTRAVENOUS at 09:04

## 2022-04-26 RX ADMIN — ALBUMIN (HUMAN): 12.5 SOLUTION INTRAVENOUS at 12:04

## 2022-04-26 RX ADMIN — ACETAMINOPHEN 1000 MG: 10 INJECTION, SOLUTION INTRAVENOUS at 01:04

## 2022-04-26 RX ADMIN — GLYCOPYRROLATE 0.2 MG: 0.2 INJECTION, SOLUTION INTRAMUSCULAR; INTRAVITREAL at 08:04

## 2022-04-26 RX ADMIN — GABAPENTIN 600 MG: 300 CAPSULE ORAL at 09:04

## 2022-04-26 RX ADMIN — PHENYLEPHRINE HYDROCHLORIDE 100 MCG: 10 INJECTION INTRAVENOUS at 09:04

## 2022-04-26 RX ADMIN — HYDROMORPHONE HYDROCHLORIDE 0.5 MG: 2 INJECTION INTRAMUSCULAR; INTRAVENOUS; SUBCUTANEOUS at 12:04

## 2022-04-26 RX ADMIN — CARBOXYMETHYLCELLULOSE SODIUM 1 DROP: 10 GEL OPHTHALMIC at 05:04

## 2022-04-26 NOTE — ANESTHESIA POSTPROCEDURE EVALUATION
Anesthesia Post Evaluation    Patient: Laverne Humphries    Procedure(s) Performed: Procedure(s) (LRB):  XI ROBOTIC COLECTOMY with flexible sigmoidoscopy (N/A)  SIGMOIDOSCOPY, FLEXIBLE    Final Anesthesia Type: general      Patient location during evaluation: PACU  Patient participation: Yes- Able to Participate  Level of consciousness: awake and alert  Post-procedure vital signs: reviewed and stable  Pain management: adequate  Airway patency: patent    PONV status at discharge: No PONV  Anesthetic complications: no      Cardiovascular status: blood pressure returned to baseline  Respiratory status: unassisted  Hydration status: euvolemic  Follow-up not needed.          Vitals Value Taken Time   /63 04/26/22 1527   Temp 36.6 °C (97.8 °F) 04/26/22 1410   Pulse 74 04/26/22 1527   Resp 18 04/26/22 1527   SpO2 95 % 04/26/22 1527         Event Time   Out of Recovery 15:04:35         Pain/Elmer Score: Pain Rating Prior to Med Admin: 0 (4/26/2022  7:26 AM)  Elmer Score: 9 (4/26/2022  2:40 PM)

## 2022-04-26 NOTE — TRANSFER OF CARE
Anesthesia Transfer of Care Note    Patient: Laverne Humhpries    Procedure(s) Performed: Procedure(s) (LRB):  XI ROBOTIC COLECTOMY with flexible sigmoidoscopy (N/A)  SIGMOIDOSCOPY, FLEXIBLE    Patient location: PACU    Anesthesia Type: general    Transport from OR: Transported from OR on 6-10 L/min O2 by face mask with adequate spontaneous ventilation    Post pain: adequate analgesia    Post assessment: no apparent anesthetic complications and tolerated procedure well    Post vital signs: stable    Level of consciousness: sedated and responds to stimulation    Nausea/Vomiting: no nausea/vomiting    Complications: none    Transfer of care protocol was followed      Last vitals:   Visit Vitals  /61 (BP Location: Right arm, Patient Position: Sitting)   Pulse 70   Temp 36.6 °C (97.8 °F) (Oral)   Resp 18   Ht 5' (1.524 m)   Wt 51.3 kg (113 lb)   LMP  (LMP Unknown)   SpO2 99%   Breastfeeding No   BMI 22.07 kg/m²

## 2022-04-26 NOTE — OP NOTE
Date of surgery: 04/26/2022    Operative Report  Pre-operative diagnosis: colovaginal fistula  Post-operative diagnosis: Same as above    Procedure:  1. Robotic-assisted sigmoid colectomy   2. Mobilization of splenic flexure     Findings:  1. Dense intra-abdominal adhesions   2. Negative leak test     Surgeon: Alison Mcclain MD   Assistant: Chilo Houston MD     Indication: Ms. Humphries is an 81 year old woman with a history of recurrent diverticulitis and colovaginal fistula  who is scheduled to undergo robotic sigmoid colectomy. The benefits, risks, and alternatives were discussed with the patient, they were given the opportunity to ask questions and they elected to proceed with operative intervention after signing written consent.    Procedure:  After pre-operative assessment and review of informed consent, the patient was taken to the operating room and received general anesthesia. A maldonado catheter was then placed under strict sterile precautions. Pre-operative antibiotics were administered if indicated and the patient was placed in lithotomy position. The abdomen was prepped and draped in the usual sterile fashion and a timeout was performed according to [facility] Quality and Safety guidelines.      An incision was made at duggan's point and a verress needle was inserted into the abdominal cavity.  Insufflation was turned on and pneumoperitoneum achieved. An 8 mm trocar with a laparoscope was used to enter the abdominal cavity. Contents were inspected and there were no injuries from entry.  Three additional trocars were placed in a line from ASIS to palmers point under direct laparoscopic visualization.  The patient was noted to have significant adhesions that were cleared bluntly in order to facilitate trocar placement.  An additional 5 mm airseal trocar was placed in the right upper quadrant.  The patient was then placed in steep trendelenberg. The [employer] robot was then brought onto the field and docked. A  small grasping retractor, fenestrated bipolar and monopolar scissors were introduced.      The patient's omentum was adherent to the abdominal wall.  This was mobilized and retracted.  The small bowel was significantly adherent to the pelvis and the sigmoid colon. This was carefully mobilized and retracted out of the pelvis. The sigmoid colon was redundant and adherent to the vaginal cuff.  This was mobilized sharply and the sigmoid was able to be retracted out of the pelvis.  The proximal portion of the sigmoid was densely adherent to the left sidewall.  We performed a medical to lateral dissection.   The sigmoid mesentery was scored along the groove and we were able to identify the plane between the mesentery and the retroperitoneum. We were able to identify the ureter and this was swept down and out of our field of dissection.  We developed this plane cephalad.  The PILAR pedicle was then skeletonized.  This was isolated and a high ligation performed with the vessel sealer.  Hemostasis was appropriate.       We then began our lateral mobilization of the colon. We mobilized the lateral attachments along the white line of toldt and were able to join our medial dissection plane.  We then turned our attention distally and began our mobilization posteriorly in the mesorectal plane.  We carried this dissection out laterally, until we were distal to the involved segment of sigmoid.  We then carefully dissected the sigmoid away from the pelvic sidewall.  The ureter was identified throughout this dissection.   Once the sigmoid was fully mobilized, we identified a healthy area of rectum to transect.  I divided mesorectum with the vessel sealer and transected the rectum with two sureform 45 blue loads.      In order to have adequate reach of our colon conduit to the pelvis, we mobilized the splenic flexure.  The omentum was dissected off of the transverse colon and we were able to enter the lesser sac.  The splenocolic  attachments were divided and the colon was fully mobilized off of the retroperitoneum.  At this point, we felt that we would have a tension free conduit.     We the identified our divided PILAR pedicle and divided the mesentery just proximal to this.  The colon conduit was healthy appearing without significant diverticula.  At this point, anesthesia gave 5 cc of IcG and we assessed perfusion of our colon conduit, which was well perfused.      I then made a large colotomy at the point of our divided mesentery.  The 29 mm EEA anvil was passed into the abdominal cavity and guided into the colon conduit.  The colon distal to this was transected with a sureform blue 45 mm stapler.  The specimen was placed out of the way. The colon conduit easily reached to the rectal stump. Dr. Houston then went below and guided the 29 mm EEA stapler through the rectal stump.  The two ends of the stapler were mated, and we ensured that the colon was properly oriented. The stapler was fired and a baker-type anastomosis was created.  The stapler was removed, and the anastomotic rings were inspected and were intact.  The pelvis was then filled with saline, the proximal colon was occluded and flexible sigmoidoscopy was performed. The anastomosis was healthy appearing without active bleeding and the leak test was negative.  The irrigation was suctioned from the pelvis.  The omentum was brought down to the pelvis and placed over the anastomosis.     The robotic instruments were then removed and the robot was undocked.  A locking grasper was placed on the colon specimen.  We then enlarged our 12 mm trocar incision and placed a small wound protector in the wound.  The specimens were delivered through this site and sent for pathology.  We then closed the fascia at the colon extraction site with a running #1 PDS.  The wounds were irrigated and skin was reapproximated monocryl suture and dermabond.  Prior to closure and after final closure instrument,  needle, and sponge counts were correct.    The procedure was completed without complication and was well-tolerated. The patient was then brought to the post-anesthesia care unit in stable condition. I was present for the entire operation.    Complications: None  Estimated blood loss: 150 mL  Disposition: PACU    Alison Mcclain MD  Staff Surgeon   Colon & Rectal Surgery

## 2022-04-26 NOTE — ANESTHESIA PROCEDURE NOTES
Intubation    Date/Time: 4/26/2022 8:47 AM  Performed by: Donny Hooks CRNA  Authorized by: Elfego Saini MD     Intubation:     Induction:  Intravenous    Intubated:  Postinduction    Mask Ventilation:  Easy mask    Attempts:  1    Attempted By:  CRNA    Method of Intubation:  Video laryngoscopy    Blade:  Minaya 3    Laryngeal View Grade: Grade I - full view of cords      Difficult Airway Encountered?: No      Complications:  None    Airway Device:  Oral endotracheal tube    Airway Device Size:  7.0    Style/Cuff Inflation:  Cuffed (inflated to minimal occlusive pressure)    Tube secured:  21    Secured at:  The lips    Placement Verified By:  Capnometry    Complicating Factors:  None    Findings Post-Intubation:  BS equal bilateral and atraumatic/condition of teeth unchanged

## 2022-04-26 NOTE — BRIEF OP NOTE
[facility] ([place])  Brief Operative Note    SUMMARY     Surgery Date: 4/26/2022     Surgeon(s) and Role:     * Alison Mcclain MD - Primary    Assisting Surgeon: None    Pre-op Diagnosis:  Rectovaginal fistula [N82.3]    Post-op Diagnosis:  Post-Op Diagnosis Codes:     * Rectovaginal fistula [N82.3]    Procedure(s) (LRB):  XI ROBOTIC COLECTOMY with flexible sigmoidoscopy (N/A)  SIGMOIDOSCOPY, FLEXIBLE    Anesthesia: Monitor Anesthesia Care    Operative Findings: Significant dense adhesions from previous surgeries, diverticular disease, and colovaginal fistula    Estimated Blood Loss: 100 mL    Estimated Blood Loss has been documented.         Specimens:   Specimen (24h ago, onward)             Start     Ordered    04/26/22 1331  Specimen to Pathology, Surgery General Surgery  Once        Comments: Pre-op Diagnosis: Rectovaginal fistula [N82.3]Procedure(s):XI ROBOTIC COLECTOMY with flexible sigmoidoscopySIGMOIDOSCOPY, FLEXIBLE Number of specimens: 1Name of specimens: 1. Sigmoid colon     References:    Click here for ordering Quick Tip   Question Answer Comment   Procedure Type: General Surgery    Release to patient Immediate        04/26/22 2907                [id number]

## 2022-04-26 NOTE — INTERVAL H&P NOTE
The patient has been examined and the H&P has been reviewed:    I concur with the findings and no changes have occurred since H&P was written.    Surgery risks, benefits and alternative options discussed and understood by patient/family.      Alison Mcclain MD  Staff Surgeon   Colon & Rectal Surgery

## 2022-04-26 NOTE — ANESTHESIA PREPROCEDURE EVALUATION
04/26/2022  Laverne Humphries is a 81 y.o., female.      Pre-op Assessment    I have reviewed the Patient Summary Reports.     I have reviewed the Nursing Notes. I have reviewed the NPO Status.   I have reviewed the Medications.     Review of Systems  Anesthesia Hx:  Denies Family Hx of Anesthesia complications.   Denies Personal Hx of Anesthesia complications.   Social:  Non-Smoker    Hematology/Oncology:     Oncology Normal     EENT/Dental:EENT/Dental Normal   Cardiovascular:  Cardiovascular Normal     Pulmonary:   Asthma    Renal/:   Chronic Renal Disease, CRI    Hepatic/GI:   GERD    Musculoskeletal:  Musculoskeletal Normal    Neurological:  Neurology Normal    Endocrine:  Endocrine Normal    Dermatological:  Skin Normal    Psych:  Psychiatric Normal           Physical Exam    Airway:  Mallampati: II       Dental:  Partial Dentures        Anesthesia Plan  Type of Anesthesia, risks & benefits discussed:    Anesthesia Type: Gen ETT  Intra-op Monitoring Plan: Standard ASA Monitors  Post Op Pain Control Plan: multimodal analgesia and peripheral nerve block  Induction:  IV  Airway Plan: Video  Informed Consent: Informed consent signed with the Patient and all parties understand the risks and agree with anesthesia plan.  All questions answered.   ASA Score: 3  Anesthesia Plan Notes: Hct. 38 on 2/21  TAPS  Surgery cancelled on 4/1/2022 due to new onset afib with rapid response    4/1/2022:  Summary    · The left ventricle is normal in size with mild eccentric hypertrophy - mildly sigmoid septum - and normal systolic function.  · The estimated ejection fraction is 65%.  · A diastolic pattern consistent with atrial fibrillation observed.  · Normal right ventricular size with normal right ventricular systolic function.  · Mild aortic regurgitation.  · Moderate aortic valve sclerosis.  · Moderate mitral  regurgitation.  · Moderate tricuspid regurgitation.  · The estimated PA systolic pressure is 36 mmHg.  · Normal central venous pressure (3 mmHg).        Ready For Surgery From Anesthesia Perspective.     .

## 2022-04-26 NOTE — ANESTHESIA PROCEDURE NOTES
Peripheral Block    Patient location during procedure: holding area   Block not for primary anesthetic.  Reason for block: at surgeon's request and post-op pain management   Post-op Pain Location: bowel resection   Timeout: 4/26/2022 8:04 AM   End time: 4/26/2022 8:12 AM    Staffing  Authorizing Provider: Elfego Saini MD  Performing Provider: Elfego Saini MD    Preanesthetic Checklist  Completed: patient identified, IV checked, site marked, risks and benefits discussed, surgical consent, monitors and equipment checked, pre-op evaluation and timeout performed  Peripheral Block  Patient position: supine  Prep: ChloraPrep  Patient monitoring: heart rate, cardiac monitor, continuous pulse ox and frequent blood pressure checks  Block type: TAP  Laterality: bilateral  Injection technique: single shot  Needle  Needle gauge: 22 G  Needle length: 3.5 in  Needle localization: ultrasound guidance   -ultrasound image captured on disc.  Assessment  Injection assessment: negative aspiration, negative parasthesia and local visualized surrounding nerve  Paresthesia pain: none  Heart rate change: no  Slow fractionated injection: yes  Pain Tolerance: comfortable throughout block and no complaints  Medications:    Medications: bupivacaine (pf) (MARCAINE) injection 0.25% - Perineural   40 mL - 4/26/2022 8:12:00 AM    Additional Notes  Bilateral TAPS with bupiv 0.25% 20cc plus exparel 10cc to each side

## 2022-04-27 LAB
ANION GAP SERPL CALC-SCNC: 10 MMOL/L (ref 8–16)
ANISOCYTOSIS BLD QL SMEAR: SLIGHT
BASOPHILS # BLD AUTO: ABNORMAL K/UL (ref 0–0.2)
BASOPHILS NFR BLD: 0 % (ref 0–1.9)
BUN SERPL-MCNC: 14 MG/DL (ref 8–23)
BURR CELLS BLD QL SMEAR: ABNORMAL
CALCIUM SERPL-MCNC: 9.5 MG/DL (ref 8.7–10.5)
CHLORIDE SERPL-SCNC: 102 MMOL/L (ref 95–110)
CO2 SERPL-SCNC: 23 MMOL/L (ref 23–29)
CREAT SERPL-MCNC: 0.9 MG/DL (ref 0.5–1.4)
DIFFERENTIAL METHOD: ABNORMAL
EOSINOPHIL # BLD AUTO: ABNORMAL K/UL (ref 0–0.5)
EOSINOPHIL NFR BLD: 0 % (ref 0–8)
ERYTHROCYTE [DISTWIDTH] IN BLOOD BY AUTOMATED COUNT: 13.8 % (ref 11.5–14.5)
EST. GFR  (AFRICAN AMERICAN): >60 ML/MIN/1.73 M^2
EST. GFR  (NON AFRICAN AMERICAN): 59 ML/MIN/1.73 M^2
GLUCOSE SERPL-MCNC: 135 MG/DL (ref 70–110)
HCT VFR BLD AUTO: 33.7 % (ref 37–48.5)
HGB BLD-MCNC: 11.1 G/DL (ref 12–16)
IMM GRANULOCYTES # BLD AUTO: ABNORMAL K/UL (ref 0–0.04)
IMM GRANULOCYTES NFR BLD AUTO: ABNORMAL % (ref 0–0.5)
LYMPHOCYTES # BLD AUTO: ABNORMAL K/UL (ref 1–4.8)
LYMPHOCYTES NFR BLD: 17 % (ref 18–48)
MCH RBC QN AUTO: 29.7 PG (ref 27–31)
MCHC RBC AUTO-ENTMCNC: 32.9 G/DL (ref 32–36)
MCV RBC AUTO: 90 FL (ref 82–98)
MONOCYTES # BLD AUTO: ABNORMAL K/UL (ref 0.3–1)
MONOCYTES NFR BLD: 6 % (ref 4–15)
NEUTROPHILS NFR BLD: 67 % (ref 38–73)
NEUTS BAND NFR BLD MANUAL: 10 %
NRBC BLD-RTO: 0 /100 WBC
OVALOCYTES BLD QL SMEAR: ABNORMAL
PLATELET # BLD AUTO: 199 K/UL (ref 150–450)
PMV BLD AUTO: 10.4 FL (ref 9.2–12.9)
POIKILOCYTOSIS BLD QL SMEAR: SLIGHT
POTASSIUM SERPL-SCNC: 4.4 MMOL/L (ref 3.5–5.1)
RBC # BLD AUTO: 3.74 M/UL (ref 4–5.4)
SODIUM SERPL-SCNC: 135 MMOL/L (ref 136–145)
WBC # BLD AUTO: 8.04 K/UL (ref 3.9–12.7)

## 2022-04-27 PROCEDURE — 97166 OT EVAL MOD COMPLEX 45 MIN: CPT

## 2022-04-27 PROCEDURE — 97161 PT EVAL LOW COMPLEX 20 MIN: CPT

## 2022-04-27 PROCEDURE — 25000003 PHARM REV CODE 250: Performed by: SURGERY

## 2022-04-27 PROCEDURE — 80048 BASIC METABOLIC PNL TOTAL CA: CPT | Performed by: SURGERY

## 2022-04-27 PROCEDURE — 85007 BL SMEAR W/DIFF WBC COUNT: CPT | Performed by: SURGERY

## 2022-04-27 PROCEDURE — 85027 COMPLETE CBC AUTOMATED: CPT | Performed by: SURGERY

## 2022-04-27 PROCEDURE — 11000001 HC ACUTE MED/SURG PRIVATE ROOM

## 2022-04-27 PROCEDURE — 63600175 PHARM REV CODE 636 W HCPCS: Performed by: SURGERY

## 2022-04-27 PROCEDURE — 99900035 HC TECH TIME PER 15 MIN (STAT)

## 2022-04-27 PROCEDURE — 36415 COLL VENOUS BLD VENIPUNCTURE: CPT | Performed by: SURGERY

## 2022-04-27 PROCEDURE — 94761 N-INVAS EAR/PLS OXIMETRY MLT: CPT

## 2022-04-27 RX ADMIN — ACETAMINOPHEN 1000 MG: 500 TABLET, FILM COATED ORAL at 09:04

## 2022-04-27 RX ADMIN — CARBOXYMETHYLCELLULOSE SODIUM 1 DROP: 10 GEL OPHTHALMIC at 09:04

## 2022-04-27 RX ADMIN — ACETAMINOPHEN 1000 MG: 10 INJECTION INTRAVENOUS at 03:04

## 2022-04-27 RX ADMIN — IBUPROFEN 800 MG: 400 TABLET, FILM COATED ORAL at 09:04

## 2022-04-27 RX ADMIN — METOPROLOL SUCCINATE 50 MG: 50 TABLET, EXTENDED RELEASE ORAL at 08:04

## 2022-04-27 RX ADMIN — CARBOXYMETHYLCELLULOSE SODIUM 1 DROP: 10 GEL OPHTHALMIC at 08:04

## 2022-04-27 RX ADMIN — CARBOXYMETHYLCELLULOSE SODIUM 1 DROP: 10 GEL OPHTHALMIC at 03:04

## 2022-04-27 RX ADMIN — MUPIROCIN: 20 OINTMENT TOPICAL at 08:04

## 2022-04-27 RX ADMIN — ACETAMINOPHEN 1000 MG: 10 INJECTION INTRAVENOUS at 06:04

## 2022-04-27 RX ADMIN — IBUPROFEN 800 MG: 800 INJECTION INTRAVENOUS at 02:04

## 2022-04-27 RX ADMIN — ENOXAPARIN SODIUM 40 MG: 100 INJECTION SUBCUTANEOUS at 04:04

## 2022-04-27 RX ADMIN — GABAPENTIN 600 MG: 300 CAPSULE ORAL at 09:04

## 2022-04-27 RX ADMIN — ALVIMOPAN 12 MG: 12 CAPSULE ORAL at 08:04

## 2022-04-27 RX ADMIN — ALVIMOPAN 12 MG: 12 CAPSULE ORAL at 09:04

## 2022-04-27 RX ADMIN — MUPIROCIN: 20 OINTMENT TOPICAL at 09:04

## 2022-04-27 RX ADMIN — IBUPROFEN 800 MG: 800 INJECTION INTRAVENOUS at 05:04

## 2022-04-27 NOTE — PLAN OF CARE
04/27/22 1752   Discharge Assessment   Assessment Type Discharge Planning Assessment   Confirmed/corrected address, phone number and insurance Yes   Confirmed Demographics Correct on Facesheet   Source of Information family   Communicated ULISES with patient/caregiver Yes   Reason For Admission Rectovaginal fistula   Lives With alone   Do you expect to return to your current living situation? Yes   Do you have help at home or someone to help you manage your care at home? Yes   Who are your caregiver(s) and their phone number(s)? She has no regular caregivers, as she was independent.  Cally Clements (Daughter)   [phone]  /  Al Kristel - son [phone] will assist after d/c as needed..   Prior to hospitilization cognitive status: Alert/Oriented   Current cognitive status: Alert/Oriented   Walking or Climbing Stairs Difficulty none   Dressing/Bathing Difficulty none   Equipment Currently Used at Home none   Readmission within 30 days? No   Patient currently being followed by outpatient case management? No   Do you currently have service(s) that help you manage your care at home? No   Do you take prescription medications? Yes   Do you have prescription coverage? Yes   Coverage HU[employer]NA MANAGED MEDICARE - HUMANA MEDICARE HMO   Do you have any problems affording any of your prescribed medications? No   Is the patient taking medications as prescribed? yes   Who is going to help you get home at discharge? Cally Clements (Daughter)   [phone]   How do you get to doctors appointments? car, drives self   Are you on dialysis? No   Do you take coumadin? No   Discharge Plan A Home   DME Needed Upon Discharge  none   Discharge Plan discussed with: Adult children   Discharge Barriers Identified None   Restorationist - Med Surg ([facility])  Initial Discharge Assessment       Primary Care Provider: Fran Castro MD    Admission Diagnosis: Rectovaginal fistula [N82.3]  Colonic mass [K63.89]    Admission Date: 4/26/2022  Expected  Discharge Date:     Discharge Barriers Identified: (P) None    Payor: HUMANA MANAGED MEDICARE / Plan: HUMANA MEDICARE HMO / Product Type: Capitation /     Extended Emergency Contact Information  Primary Emergency Contact: Cally Clements   [facility]  Home Phone: [phone]  Work Phone: [phone]  Mobile Phone: [phone]  Relation: Daughter    Discharge Plan A: (P) Home         [id number] [place] - [address]  [address]  [place] [address]  Phone: [phone] Fax: [phone]    [facility] - [place], [place] - [address]  [address] [facility] [address] [facility] [address]  Phone: [phone] Fax: [phone]    [facility]  [address]  [place] [address]  Phone: [phone] Fax: [phone]    [facility]  [address]  [facility] [address]  Phone: [phone] Fax: [phone]      Initial Assessment (most recent)       Adult Discharge Assessment - 04/27/22 1752          Discharge Assessment    Assessment Type Discharge Planning Assessment     Confirmed/corrected address, phone number and insurance Yes     Confirmed Demographics Correct on Facesheet     Source of Information family     Communicated ULISES with patient/caregiver Yes     Reason For Admission Rectovaginal fistula (P)      Lives With alone (P)      Do you expect to return to your current living situation? Yes (P)      Do you have help at home or someone to help you manage your care at home? Yes (P)      Who are your caregiver(s) and their phone number(s)? She has no regular caregivers, as she was independent.  Cally Clements (Daughter)   [phone]  /  Catalino Humphries - son [phone] will assist after d/c as needed.. (P)      Prior to hospitilization cognitive status: Alert/Oriented (P)      Current cognitive status: Alert/Oriented (P)      Walking or Climbing Stairs Difficulty none (P)      Dressing/Bathing Difficulty none (P)       Equipment Currently Used at Home none (P)      Readmission within 30 days? No (P)      Patient currently being followed by outpatient case management? No (P)      Do you currently have service(s) that help you manage your care at home? No (P)      Do you take prescription medications? Yes (P)      Do you have prescription coverage? Yes (P)      Coverage HUMANA MANAGED MEDICARE - HUMANA MEDICARE O (P)      Do you have any problems affording any of your prescribed medications? No (P)      Is the patient taking medications as prescribed? yes (P)      Who is going to help you get home at discharge? Cally Clements (Daughter)   [phone] (P)      How do you get to doctors appointments? car, drives self (P)      Are you on dialysis? No (P)      Do you take coumadin? No (P)      Discharge Plan A Home (P)      DME Needed Upon Discharge  none (P)      Discharge Plan discussed with: Adult children (P)      Discharge Barriers Identified None (P)                     Cally Clements (Daughter)   [phone]  /  Catalino Humphries - son [phone] will assist after d/c as needed..

## 2022-04-27 NOTE — PLAN OF CARE
Problem: Physical Therapy  Goal: Physical Therapy Goal  Description: Goals to be met by: 4/27/22    Patient will perform the following to increase strength, improve mobility, and return to prior level of function:    1. Supine <> sit with modified independence.  2. Sit<>stand with SPV with least restrictive assistive device.  3. Gait x 50 feet with SBA with least restrictive assistive device.  4. Ascend/descend 1 step(s) without AD and with CGA.       Outcome: Ongoing, Progressing     PT orders received. PT evaluation completed and goals/POC established. Pt tolerated evaluation well with no adverse reactions. Pt performed bedside ambulation with assistance. PT will continue to follow and progress goals as tolerated. Recommend discharge to home with HHPT and family assistance as needed.

## 2022-04-27 NOTE — PT/OT/SLP EVAL
Occupational Therapy   Co-Evaluation    Name: Laverne Humphries  MRN: [id number]  Admitting Diagnosis:  Rectovaginal fistula  Recent Surgery: Procedure(s) (LRB):  XI ROBOTIC COLECTOMY with flexible sigmoidoscopy (N/A)  SIGMOIDOSCOPY, FLEXIBLE 1 Day Post-Op    Recommendations:     Discharge Recommendations: home with home health, home health PT, home health OT, other (see comments) (and family assist)  Discharge Equipment Recommendations:  bedside commode, other (see comments) (TTB vs shower chair, defer to HH)  Barriers to discharge:  None    Assessment:     Laverne Humphries is a 81 y.o. female with a medical diagnosis of Rectovaginal fistula.  She presents with the following performance deficits affecting function: weakness, gait instability, impaired endurance, impaired balance, decreased lower extremity function, decreased safety awareness, impaired self care skills, impaired functional mobilty.      Initial OT evaluation completed, with treatment to follow.  Balance impaired, endorses weakness.  Continue OT services to restore patient functioning.     Rehab Prognosis: Good; patient would benefit from acute skilled OT services to address these deficits and reach maximum level of function.       Plan:     Patient to be seen 4 x/week to address the above listed problems via self-care/home management, therapeutic activities, therapeutic exercises  · Plan of Care Expires: 06/01/22  · Plan of Care Reviewed with: patient, grandchild(conor)    Subjective     Chief Complaint: Weakness  Patient/Family Comments/goals: Home    Occupational Profile:  Living Environment: Patient lives alone in a one story home with one step to enter and one step within the home.  There is a tub/shower combination with grab bars and standard height toilet.  Previous level of function: Independent  Roles and Routines: Mother, grandmother; drives; completes all IADL tasks including washing car; Jew every Sunday  Equipment Used at Home:   "none  Assistance upon Discharge: Daughter, son and granddaughter    Pain/Comfort:  · Pain Rating 1: 0/10 ("weakness")  · Pain Rating Post-Intervention 1: 0/10 (no change)    Patients cultural, spiritual, Scientologist conflicts given the current situation: no    Objective:     Communicated with: Lisset prior to session.  Patient found HOB elevated with bed alarm, peripheral IV, telemetry upon OT entry to room.    General Precautions: Standard, anti-coagulation medicine, fall   Orthopedic Precautions:N/A   Braces: N/A  Respiratory Status: Room air    Occupational Performance:    Bed Mobility:    · Patient completed Scooting/Bridging with stand by assistance  · Patient completed Supine to Sit with stand by assistance    Functional Mobility/Transfers:  · Patient completed Sit <> Stand Transfer with contact guard assistance  with  hand-held assist   · Patient completed Bed <> Chair Transfer using Step Transfer technique with minimum assistance with hand-held assist for balance    Activities of Daily Living:  · Lower Body Dressing: contact guard assistance doffing socks, donning slip on shoes at EOB    Cognitive/Visual Perceptual:  Cognitive/Psychosocial Skills:     -       Oriented to: Person, Place, Time and Situation   -       Follows Commands/attention:Follows two-step commands  -       Communication: clear/fluent  -       Memory: No Deficits noted  -       Safety awareness/insight to disability: impaired   -       Mood/Affect/Coping skills/emotional control: Appropriate to situation, Cooperative and Pleasant  Visual/Perceptual:      -No overt deficits noted    Physical Exam:  Postural examination/scapula alignment:    -       Rounded shoulders  Skin integrity: Visible skin intact  Edema:  None noted  Sensation:    -       Intact  light/touch (B) UEs  Upper Extremity Range of Motion:     -       Right Upper Extremity: WFL  -       Left Upper Extremity: WFL  Upper Extremity Strength:    -       Right Upper Extremity: " WFL  -       Left Upper Extremity: WFL   Strength:    -       Right Upper Extremity: WFL  -       Left Upper Extremity: WFL    AMPAC 6 Click ADL:  AMPAC Total Score: 17    Treatment & Education:  Educated on role of OT, POC, assist with transfers with staff, not to leave patient UIC alone.  Patient and granddaughter verbalized understanding.  Education:    Patient left up in chair with all lines intact, call button in reach, Rakeshia notified and granddaughter and PCT present    GOALS:   Multidisciplinary Problems     Occupational Therapy Goals        Problem: Occupational Therapy    Goal Priority Disciplines Outcome Interventions   Occupational Therapy Goal     OT, PT/OT Ongoing, Progressing    Description: Goals to be met by: 05/04/2022     Patient will increase functional independence with ADLs by performing:    LE Dressing with Supervision.  Grooming while standing at sink with Supervision.  Toileting from bedside commode with Supervision for hygiene and clothing management.   Stand pivot transfers with Supervision.  Step transfer with Supervision  Toilet transfer to bedside commode with Supervision.                     History:     Past Medical History:   Diagnosis Date    Allergy     Anxiety     Arthritis     Asthma     mild    Cataract     Hepatitis     IBS (irritable bowel syndrome)     Reflux esophagitis     Torus palatinus        Past Surgical History:   Procedure Laterality Date    BELPHAROPTOSIS REPAIR Bilateral     CATARACT EXTRACTION W/  INTRAOCULAR LENS IMPLANT Bilateral     CATARACT EXTRACTION, BILATERAL      CHOLECYSTECTOMY      COLONOSCOPY N/A 01/20/2022    Procedure: COLONOSCOPY;  Surgeon: Emmanuel Sandoval MD;  Location: [address];  Service: Endoscopy;  Laterality: N/A;    ESOPHAGOGASTRODUODENOSCOPY N/A 01/20/2022    Procedure: EGD (ESOPHAGOGASTRODUODENOSCOPY) any GI doc, please perform colonoscopy and EGD at same time;  Surgeon: Emmanuel Sandoval MD;  [facility] (4[facility] FLR);  Service: Endoscopy;  Laterality: N/A;  EGD & Colonoscopy orders combined-MS  fully vaccinated  1/13 covid test 1/17 @ [facility]    EYE SURGERY      FLEXIBLE SIGMOIDOSCOPY  4/26/2022    Procedure: SIGMOIDOSCOPY, FLEXIBLE;  Surgeon: Alison Mcclain MD;  [facility];  Service: Colon and Rectal;;    HYSTERECTOMY      OOPHORECTOMY      ROBOT-ASSISTED LAPAROSCOPIC COLECTOMY USING DA DIANA XI N/A 4/26/2022    Procedure: XI ROBOTIC COLECTOMY with flexible sigmoidoscopy;  Surgeon: Alison Mcclain MD;  Location: [facility];  Service: Colon and Rectal;  Laterality: N/A;       Time Tracking:     OT Date of Treatment: 04/27/22  OT Start Time: 0742  OT Stop Time: 0756  OT Total Time (min): 14 min    Billable Minutes:Evaluation 14     Overlap with PT for portions of session due to complex nature of patient, tolerance for therapeutic modalities, and safety with mobility to decrease fall risk for patient and caregiver injury requiring two skilled therapists to provide interventions.    4/27/2022

## 2022-04-27 NOTE — PROGRESS NOTES
Surgery Inpatient Progress Note    Date: 04/27/2022    Overnight events: Passing flatus.  Burping as well.  Up to chair this am.      O:   Vitals:    04/27/22 0832   BP: (!) 100/49   Pulse:    Resp:    Temp:        Physical Exam   Gen: well developed female, NAD   HEENT: normocephalic, atraumatic, PERRL, EOMI   Cv: RRR, no murmurs  Resp: nonlabored, CTAB   Abd: soft, distended, appropriately tender with incisions well approximated, no erythema or drainage   MSk: no gross deformities     UOP 1450     Labs: pending     Assessment and plan:   Laverne Humphries is a 81 y.o. female who is POD 1 s/p robotic sigmoid colectomy and colovaginal fistula takedown     - Patient with hypotension this am, asymptomatic, labs pending.   - Continue IVF at 40 cc for now  - Briseno discontinued, UOP appropriate over past 24 hours  - Continue ambulation, OOB   - Patient with return of bowel function.  Possible DC tomorrow.       Alison Mcclain MD  Staff Surgeon   Colon & Rectal Surgery

## 2022-04-27 NOTE — PLAN OF CARE
Problem: Occupational Therapy  Goal: Occupational Therapy Goal  Description: Goals to be met by: 05/04/2022     Patient will increase functional independence with ADLs by performing:    LE Dressing with Supervision.  Grooming while standing at sink with Supervision.  Toileting from bedside commode with Supervision for hygiene and clothing management.   Stand pivot transfers with Supervision.  Step transfer with Supervision  Toilet transfer to bedside commode with Supervision.    Outcome: Ongoing, Progressing  Initial OT evaluation completed, with treatment to follow.  Balance impaired, endorses weakness.  Continue OT services to restore patient functioning.

## 2022-04-27 NOTE — PT/OT/SLP EVAL
Physical Therapy Evaluation    Patient Name:  Laverne Humphries   MRN:  [id number]    Recommendations:     Discharge Recommendations:  home health PT, home health OT   Discharge Equipment Recommendations: bedside commode (TTB vs. SC (defer to HH))   Barriers to discharge: medical treatment    Assessment:     Laverne Humphries is a 81 y.o. female admitted with a medical diagnosis of Rectovaginal fistula; she is s/p robotic colectomy with flexible sigmoidoscopy. She has a past medical history that includes but is not limited to CKD, A-fib, HLD, anxiety, BPPV, UE weakness, IBS, reflux esophagitis, and osteoporosis.  She presents with the following impairments/functional limitations:  weakness, impaired endurance, impaired self care skills, impaired functional mobilty, impaired balance, gait instability, decreased lower extremity function.    PT orders received. PT evaluation completed and goals/POC established. Pt tolerated evaluation well with no adverse reactions. Pt performed bedside ambulation with assistance. PT will continue to follow and progress goals as tolerated. Recommend discharge to home with HHPT and family assistance as needed.     Rehab Prognosis: Good; patient would benefit from acute skilled PT services to address these deficits and reach maximum level of function.    Recent Surgery: Procedure(s) (LRB):  XI ROBOTIC COLECTOMY with flexible sigmoidoscopy (N/A)  SIGMOIDOSCOPY, FLEXIBLE 1 Day Post-Op    Plan:     During this hospitalization, patient to be seen 4 x/week to address the identified rehab impairments via gait training, therapeutic activities, therapeutic exercises, neuromuscular re-education and progress toward the following goals:    · Plan of Care Expires:  05/27/22    Subjective     Chief Complaint: weakness with mobility  Patient/Family Comments/goals: Pt noted she feels better than she did yesterday.   Pain/Comfort:  · Pain Rating 1: 0/10    Patients cultural, spiritual, Mormonism  conflicts given the current situation: no    Living Environment:  Pt lives alone in a SSH with threshold step to enter; there is also a step between the kitchen and the living room in the home. Prior to admission, patients level of function was independent and ambulatory, and very active.  Equipment used at home: none.  DME owned (not currently used): none.  Upon discharge, patient will have assistance from family.    Objective:     Communicated with KRISTAN Fleming prior to session.  Patient found HOB elevated with bed alarm, peripheral IV  upon PT entry to room.    General Precautions: Standard, fall   Orthopedic Precautions:N/A   Braces: N/A  Respiratory Status: Room air    Exams:  · Cognition:   · Patient is oriented to self, place, time and situation.  · Pt follows approximately 100% of simple commands.    · Mood: Pleasant and cooperative.   · Safety Awareness: intact  · Musculoskeletal:  · Posture:  WFL  · LE ROM/Strength:   · R ROM: WFL  · L ROM: WFL  · R Strength:   · WFL  · L Strength:   · WFL   · Neuromuscular:  · Sensation: Intact to light touch bilateral LEs.   · Tone/Reflexes: No impairments identified with functional mobility. No formal testing performed.   · Balance:   · Static sitting: SPV  · Dynamic sitting: SBA  · Static standing: CGA  · Dynamic standing: Min A  · Visual-vestibular: No impairments identified with functional mobility. No formal testing performed.  · Integument: Visible skin intact  · Cardiopulmonary:  · Edema: none noted     Functional Mobility:  · Bed Mobility:     · Supine to Sit: stand by assistance with use of bed features  · Transfers:     · Sit to Stand:  contact guard assistance with hand-held assist  · Gait: x12 feet with minimum assistance with hand-held assist. Pt with decreased speed, jordan, and bilateral step length. Pt with decreased stability with two losses of balance requiring minimum assistance to recover.     Therapeutic Activities and Exercises:  Bed mobility,  transfer, and gait training as described above.    PT educated patient re:   · PT plan of care/role of PT  · Fall and safety precautions  · Gait deviations  · Use of call light (don't get up without assistance)  Pt verbalized understanding     The patient is safe to transfer with RN assist, whiteboard updated.   Patient encouraged to sit up in chair throughout the day to prevent deconditioning.     AM-PAC 6 CLICK MOBILITY  Total Score:17     Patient left up in chair with all lines intact, call button in reach, RN notified and granddaughter present.    GOALS:   Multidisciplinary Problems     Physical Therapy Goals        Problem: Physical Therapy    Goal Priority Disciplines Outcome Goal Variances Interventions   Physical Therapy Goal     PT, PT/OT Ongoing, Progressing     Description: Goals to be met by: 4/27/22    Patient will perform the following to increase strength, improve mobility, and return to prior level of function:    1. Supine <> sit with modified independence.  2. Sit<>stand with SPV with least restrictive assistive device.  3. Gait x 50 feet with SBA with least restrictive assistive device.  4. Ascend/descend 1 step(s) without AD and with CGA.                        History:     Past Medical History:   Diagnosis Date    Allergy     Anxiety     Arthritis     Asthma     mild    Cataract     Hepatitis     IBS (irritable bowel syndrome)     Reflux esophagitis     Torus palatinus        Past Surgical History:   Procedure Laterality Date    BELPHAROPTOSIS REPAIR Bilateral     CATARACT EXTRACTION W/  INTRAOCULAR LENS IMPLANT Bilateral     CATARACT EXTRACTION, BILATERAL      CHOLECYSTECTOMY      COLONOSCOPY N/A 01/20/2022    Procedure: COLONOSCOPY;  Surgeon: Emmanuel Sandoval MD;  Location: [address];  Service: Endoscopy;  Laterality: N/A;    ESOPHAGOGASTRODUODENOSCOPY N/A 01/20/2022    Procedure: EGD (ESOPHAGOGASTRODUODENOSCOPY) any GI doc, please perform colonoscopy and EGD at  same time;  Surgeon: Emmanuel Sandoval MD;  Location: [facility] (4TH FLR);  Service: Endoscopy;  Laterality: N/A;  EGD & Colonoscopy orders combined-MS  fully vaccinated  1/13 covid test 1/17 @ [facility]    EYE SURGERY      FLEXIBLE SIGMOIDOSCOPY  4/26/2022    Procedure: SIGMOIDOSCOPY, FLEXIBLE;  Surgeon: Alison Mcclain MD;  Location: [facility];  Service: Colon and Rectal;;    HYSTERECTOMY      OOPHORECTOMY      ROBOT-ASSISTED LAPAROSCOPIC COLECTOMY USING DA DIANA XI N/A 4/26/2022    Procedure: XI ROBOTIC COLECTOMY with flexible sigmoidoscopy;  Surgeon: Alison Mcclain MD;  Location: [facility];  Service: Colon and Rectal;  Laterality: N/A;       Time Tracking:     PT Received On: 04/27/22  PT Start Time: 0741     PT Stop Time: 0755  PT Total Time (min): 14 min     Billable Minutes: Evaluation 14      04/27/2022

## 2022-04-28 ENCOUNTER — PATIENT MESSAGE (OUTPATIENT)
Dept: SURGERY | Facility: OTHER | Age: 82
End: 2022-04-28
Payer: MEDICARE

## 2022-04-28 PROCEDURE — 25000003 PHARM REV CODE 250: Performed by: SURGERY

## 2022-04-28 PROCEDURE — 97116 GAIT TRAINING THERAPY: CPT | Mod: CQ

## 2022-04-28 PROCEDURE — 94761 N-INVAS EAR/PLS OXIMETRY MLT: CPT

## 2022-04-28 PROCEDURE — 99900035 HC TECH TIME PER 15 MIN (STAT)

## 2022-04-28 PROCEDURE — 11000001 HC ACUTE MED/SURG PRIVATE ROOM

## 2022-04-28 PROCEDURE — 63600175 PHARM REV CODE 636 W HCPCS: Performed by: SURGERY

## 2022-04-28 PROCEDURE — 97110 THERAPEUTIC EXERCISES: CPT | Mod: CQ

## 2022-04-28 RX ORDER — FAMOTIDINE 20 MG/1
20 TABLET, FILM COATED ORAL DAILY
Status: DISCONTINUED | OUTPATIENT
Start: 2022-04-28 | End: 2022-04-29 | Stop reason: HOSPADM

## 2022-04-28 RX ADMIN — FAMOTIDINE 20 MG: 20 TABLET ORAL at 09:04

## 2022-04-28 RX ADMIN — MUPIROCIN: 20 OINTMENT TOPICAL at 10:04

## 2022-04-28 RX ADMIN — GABAPENTIN 600 MG: 300 CAPSULE ORAL at 10:04

## 2022-04-28 RX ADMIN — IBUPROFEN 800 MG: 400 TABLET, FILM COATED ORAL at 06:04

## 2022-04-28 RX ADMIN — CARBOXYMETHYLCELLULOSE SODIUM 1 DROP: 10 GEL OPHTHALMIC at 10:04

## 2022-04-28 RX ADMIN — MUPIROCIN: 20 OINTMENT TOPICAL at 09:04

## 2022-04-28 RX ADMIN — ALVIMOPAN 12 MG: 12 CAPSULE ORAL at 10:04

## 2022-04-28 RX ADMIN — ACETAMINOPHEN 1000 MG: 500 TABLET, FILM COATED ORAL at 10:04

## 2022-04-28 RX ADMIN — ACETAMINOPHEN 1000 MG: 500 TABLET, FILM COATED ORAL at 02:04

## 2022-04-28 RX ADMIN — ACETAMINOPHEN 1000 MG: 500 TABLET, FILM COATED ORAL at 06:04

## 2022-04-28 RX ADMIN — ALVIMOPAN 12 MG: 12 CAPSULE ORAL at 09:04

## 2022-04-28 RX ADMIN — IBUPROFEN 800 MG: 400 TABLET, FILM COATED ORAL at 10:04

## 2022-04-28 RX ADMIN — METOPROLOL SUCCINATE 50 MG: 50 TABLET, EXTENDED RELEASE ORAL at 09:04

## 2022-04-28 RX ADMIN — IBUPROFEN 800 MG: 400 TABLET, FILM COATED ORAL at 02:04

## 2022-04-28 RX ADMIN — CARBOXYMETHYLCELLULOSE SODIUM 1 DROP: 10 GEL OPHTHALMIC at 02:04

## 2022-04-28 RX ADMIN — ONDANSETRON 4 MG: 2 INJECTION INTRAMUSCULAR; INTRAVENOUS at 04:04

## 2022-04-28 RX ADMIN — CARBOXYMETHYLCELLULOSE SODIUM 1 DROP: 10 GEL OPHTHALMIC at 09:04

## 2022-04-28 RX ADMIN — ENOXAPARIN SODIUM 40 MG: 100 INJECTION SUBCUTANEOUS at 04:04

## 2022-04-28 NOTE — PT/OT/SLP PROGRESS
Physical Therapy Treatment    Patient Name:  Laverne Humphries   MRN:  [id number]    Recommendations:     Discharge Recommendations:  home with home health, home health PT, home health OT (and family assist)   Discharge Equipment Recommendations: bedside commode, walker, rolling   Barriers to discharge: None    Assessment:     Laverne Humphries is a 81 y.o. female admitted with a medical diagnosis of Rectovaginal fistula.  She presents with the following impairments/functional limitations:  weakness, impaired endurance, impaired self care skills, impaired functional mobilty, gait instability, impaired balance, decreased safety awareness, impaired cardiopulmonary response to activity ; pt w/ good mobility today, daughter requesting a walker for home use (would make her more independent).    Rehab Prognosis: Good; patient would benefit from acute skilled PT services to address these deficits and reach maximum level of function.    Recent Surgery: Procedure(s) (LRB):  XI ROBOTIC COLECTOMY with flexible sigmoidoscopy (N/A)  SIGMOIDOSCOPY, FLEXIBLE 2 Days Post-Op    Plan:     During this hospitalization, patient to be seen 4 x/week to address the identified rehab impairments via gait training, therapeutic activities, therapeutic exercises, neuromuscular re-education and progress toward the following goals:    · Plan of Care Expires:  05/27/22    Subjective     Chief Complaint: none stated  Patient/Family Comments/goals: pt agreeable to session, son and daughter present in room.   Pain/Comfort:  · Pain Rating 1: 0/10  · Pain Rating Post-Intervention 1: 0/10      Objective:     Communicated with nurse prior to session.  Patient found ambulatory in bathroom with assistance from daughter,with peripheral IV, telemetry upon PT entry to room.     General Precautions: Standard, anti-coagulation medicine, fall   Orthopedic Precautions:N/A   Braces: N/A  Respiratory Status: Room air     Functional Mobility:  · Transfers:      · Sit to Stand:  contact guard assistance with rolling walker  · Gait: amb'd ~120' w/ RW and CGA, cueing for safety w/ RW, O2:95% on RA at rest, 92% on RA w/ amb.HR:80's      AM-PAC 6 CLICK MOBILITY  Turning over in bed (including adjusting bedclothes, sheets and blankets)?: 3  Sitting down on and standing up from a chair with arms (e.g., wheelchair, bedside commode, etc.): 3  Moving from lying on back to sitting on the side of the bed?: 3  Moving to and from a bed to a chair (including a wheelchair)?: 3  Need to walk in hospital room?: 3  Climbing 3-5 steps with a railing?: 3  Basic Mobility Total Score: 18       Therapeutic Activities and Exercises:   perfd' seated LE ex's of AP's, hip flex, LAQ's x 10 ea.     Patient left up in chair with all lines intact, call button in reach, nurse notified and family present..    GOALS:   Multidisciplinary Problems     Physical Therapy Goals        Problem: Physical Therapy    Goal Priority Disciplines Outcome Goal Variances Interventions   Physical Therapy Goal     PT, PT/OT Ongoing, Progressing     Description: Goals to be met by: 4/27/22    Patient will perform the following to increase strength, improve mobility, and return to prior level of function:    1. Supine <> sit with modified independence.  2. Sit<>stand with SPV with least restrictive assistive device.  3. Gait x 50 feet with SBA with least restrictive assistive device.  4. Ascend/descend 1 step(s) without AD and with CGA.                        Time Tracking:     PT Received On: 04/28/22  PT Start Time: 0922     PT Stop Time: 0947  PT Total Time (min): 25 min     Billable Minutes: Gait Training 15, There ex's 10 min.    Treatment Type: Treatment  PT/PTA: PTA     PTA Visit Number: 1 04/28/2022

## 2022-04-28 NOTE — PLAN OF CARE
Pt AAO x 4. Pt up with assist to bathroom. Voiding yellow urine. Tolerating diet well. Lap sites x 5 with dermabond CDI. Denies any c/o discomfort. Family @ bedside. Pt had a BM today. Safety maintained. Call light in reach. VSS.  Problem: Adult Inpatient Plan of Care  Goal: Plan of Care Review  Outcome: Ongoing, Progressing  Goal: Patient-Specific Goal (Individualized)  Outcome: Ongoing, Progressing  Goal: Absence of Hospital-Acquired Illness or Injury  Outcome: Ongoing, Progressing  Goal: Optimal Comfort and Wellbeing  Outcome: Ongoing, Progressing  Goal: Readiness for Transition of Care  Outcome: Ongoing, Progressing     Problem: Infection  Goal: Absence of Infection Signs and Symptoms  Outcome: Ongoing, Progressing     Problem: Fall Injury Risk  Goal: Absence of Fall and Fall-Related Injury  Outcome: Ongoing, Progressing

## 2022-04-28 NOTE — PROGRESS NOTES
Surgery Inpatient Progress Note    Date: 04/28/2022    Overnight events:     O:   Vitals:    04/28/22 0346   BP: 139/63   Pulse: 84   Resp: 18   Temp: 97.8 °F (36.6 °C)       Physical Exam   Gen: elderly female, NAD     Assessment and plan:   Laverne Humphries is a 81 y.o. female who presents with       Alison Mcclain MD  Staff Surgeon   Colon & Rectal Surgery

## 2022-04-29 VITALS
SYSTOLIC BLOOD PRESSURE: 113 MMHG | OXYGEN SATURATION: 96 % | DIASTOLIC BLOOD PRESSURE: 55 MMHG | WEIGHT: 114.63 LBS | TEMPERATURE: 97 F | BODY MASS INDEX: 22.51 KG/M2 | RESPIRATION RATE: 16 BRPM | HEART RATE: 78 BPM | HEIGHT: 60 IN

## 2022-04-29 LAB — CRP SERPL-MCNC: 321.7 MG/L (ref 0–8.2)

## 2022-04-29 PROCEDURE — 94761 N-INVAS EAR/PLS OXIMETRY MLT: CPT

## 2022-04-29 PROCEDURE — 25000003 PHARM REV CODE 250: Performed by: SURGERY

## 2022-04-29 PROCEDURE — 36415 COLL VENOUS BLD VENIPUNCTURE: CPT | Performed by: SURGERY

## 2022-04-29 PROCEDURE — 86140 C-REACTIVE PROTEIN: CPT | Performed by: SURGERY

## 2022-04-29 RX ORDER — HYDROCODONE BITARTRATE AND ACETAMINOPHEN 5; 325 MG/1; MG/1
1 TABLET ORAL EVERY 6 HOURS PRN
Qty: 20 TABLET | Refills: 0 | Status: SHIPPED | OUTPATIENT
Start: 2022-04-29 | End: 2022-07-27

## 2022-04-29 RX ADMIN — IBUPROFEN 800 MG: 400 TABLET, FILM COATED ORAL at 06:04

## 2022-04-29 RX ADMIN — ALVIMOPAN 12 MG: 12 CAPSULE ORAL at 09:04

## 2022-04-29 RX ADMIN — METOPROLOL SUCCINATE 50 MG: 50 TABLET, EXTENDED RELEASE ORAL at 09:04

## 2022-04-29 RX ADMIN — CARBOXYMETHYLCELLULOSE SODIUM 1 DROP: 10 GEL OPHTHALMIC at 09:04

## 2022-04-29 RX ADMIN — FAMOTIDINE 20 MG: 20 TABLET ORAL at 09:04

## 2022-04-29 RX ADMIN — MUPIROCIN: 20 OINTMENT TOPICAL at 09:04

## 2022-04-29 NOTE — NURSING
04/29/2022        Pt verbalized understanding D/C instructions and education provided pertinent to D/C needs. IV cath removed fully intact. No distress noted. Pt awaiting pharmacy to deliver bedside medications and then will put in transport for discharge.          Blessing Deal RN

## 2022-04-29 NOTE — HOSPITAL COURSE
Ms. Humphries underwent robotic sigmoid colectomy with colovaginal fistula takedown.  Post-operatively, she was admitted to the floor for recovery.  She was able to ambulate, void spontaneously, tolerate a diet and her pain was controlled.  She was deemed appropriate for discharge.

## 2022-04-29 NOTE — PLAN OF CARE
CHELLE. Family remains at the bedside. Pain is controlled. Comfort and safety measures provided and maintained throughout shift. SR up x2, Bed alarm in place, call light with reach.     Problem: Adult Inpatient Plan of Care  Goal: Plan of Care Review  Outcome: Ongoing, Progressing  Goal: Optimal Comfort and Wellbeing  Outcome: Ongoing, Progressing     Problem: Infection  Goal: Absence of Infection Signs and Symptoms  Outcome: Ongoing, Progressing     Problem: Fall Injury Risk  Goal: Absence of Fall and Fall-Related Injury  Outcome: Ongoing, Progressing

## 2022-04-29 NOTE — CARE UPDATE
04/28/22 2014   PRE-TX-O2   O2 Device (Oxygen Therapy) room air   SpO2 95 %   Pulse Oximetry Type Intermittent   Positioning HOB elevated 30 degrees   Inhaler   $ Inhaler Charges PRN treatment not required

## 2022-04-29 NOTE — DISCHARGE SUMMARY
[facility]  Colorectal Surgery  Discharge Summary      Patient Name: Laverne Humphries  MRN: [id number]  Admission Date: 4/26/2022  Hospital Length of Stay: 3 days  Discharge Date and Time:  04/29/2022 8:15 AM  Attending Physician: Alison Mcclain MD   Discharging Provider: Alison Mcclain MD  Primary Care Provider: Fran Castro MD     HPI:  No notes on file    Procedure(s) (LRB):  XI ROBOTIC COLECTOMY with flexible sigmoidoscopy (N/A)  SIGMOIDOSCOPY, FLEXIBLE     Hospital Course:  Ms. Humphries underwent robotic sigmoid colectomy with colovaginal fistula takedown.  Post-operatively, she was admitted to the floor for recovery.  She was able to ambulate, void spontaneously, tolerate a diet and her pain was controlled.  She was deemed appropriate for discharge.       Goals of Care Treatment Preferences:       Living Will: Yes                  Significant Diagnostic Studies:     Pending Diagnostic Studies:     Procedure Component Value Units Date/Time    Specimen to Pathology, Surgery General Surgery [[id number]] Collected: 04/26/22 1352    Order Status: Sent Lab Status: In process Updated: 04/26/22 1700    Specimen: Tissue         Final Active Diagnoses:    Diagnosis Date Noted POA    PRINCIPAL PROBLEM:  Rectovaginal fistula [N82.3] 02/28/2022 Yes    Atrial fibrillation [I48.91] 04/05/2022 Yes    Chronic kidney disease, stage III (moderate) [N18.30] 10/13/2016 Yes      Problems Resolved During this Admission:      Discharged Condition: good    Disposition: Home or Self Care    Follow Up:   Follow-up Information     Alison Mcclain MD Follow up.    Specialty: Colon and Rectal Surgery  Why: Clinic will call to arrange follow up for 2-3 weeks  Contact information:  [address]  [facility] [address]  [phone]                       Patient Instructions:   No discharge procedures on file.  Medications:  Reconciled Home Medications:      Medication List      START taking these medications     HYDROcodone-acetaminophen 5-325 mg per tablet  Commonly known as: NORCO  Take 1 tablet by mouth every 6 (six) hours as needed for Pain.        CONTINUE taking these medications    albuterol 90 mcg/actuation inhaler  Commonly known as: PROVENTIL/VENTOLIN HFA  Inhale 2 puffs into the lungs every 6 (six) hours as needed for Wheezing or Shortness of Breath (generic preferred). Rescue     benzonatate 200 MG capsule  Commonly known as: TESSALON  Take 1 capsule (200 mg total) by mouth 3 (three) times daily as needed for Cough.     calcium-vitamin D3 500 mg-5 mcg (200 unit) per tablet  Commonly known as: OS-GRAY 500 + D3  Take 1 tablet by mouth Daily.     carboxymethylcellulose 0.5 % Dpet  Commonly known as: REFRESH PLUS  1 drop 3 (three) times daily as needed.     diclofenac sodium 1 % Gel  Commonly known as: VOLTAREN  Apply 2 g topically once daily.     estradioL 0.01 % (0.1 mg/gram) vaginal cream  Commonly known as: ESTRACE  Place 1 g vaginally once daily.     fish oil-omega-3 fatty acids 300-1,000 mg capsule  Take by mouth once daily.     fluticasone propionate 50 mcg/actuation nasal spray  Commonly known as: FLONASE  2 sprays (100 mcg total) by Each Nostril route once daily. 3 mo supply     gabapentin 300 MG capsule  Commonly known as: NEURONTIN  Take 2 capsules (600 mg total) by mouth every evening.     LORazepam 0.5 MG tablet  Commonly known as: ATIVAN  Take 0.5 mg by mouth every evening.     meloxicam 7.5 MG tablet  Commonly known as: MOBIC  Take 1 tablet (7.5 mg total) by mouth once daily.     metoprolol succinate 50 MG 24 hr tablet  Commonly known as: TOPROL-XL  Take 1 tablet (50 mg total) by mouth once daily.     SHINGRIX (PF) 50 mcg/0.5 mL injection  Generic drug: varicella-zoster [id number] (PF)     triamcinolone acetonide 0.1% 0.1 % cream  Commonly known as: KENALOG  Apply topically 2 (two) times daily. Apply to itchy irritated rash twice daily. Do not apply to face, groin, or skin folds            Alison  MD Sarahi  Colorectal Surgery  [facility])

## 2022-04-29 NOTE — DISCHARGE INSTRUCTIONS
Take tylenol and ibuprofen for pain.  Take pain medication for breakthrough pain.  Do not take more than 3000 mg tylenol in 24 hours  Okay to shower.  Do not soak in tub for 2 weeks  Okay to resume daily activities.    Call [phone] for fever > 101, inability to tolerate PO, redness or drainage from incisions

## 2022-05-01 ENCOUNTER — NURSE TRIAGE (OUTPATIENT)
Dept: ADMINISTRATIVE | Facility: CLINIC | Age: 82
End: 2022-05-01
Payer: MEDICARE

## 2022-05-01 NOTE — TELEPHONE ENCOUNTER
Vanessa, pt's daughter calling on call nurse states Laverne is 5 days post colectomy and c/o Right side pain (under armpit and on side of right breast) and RUQ abdominal pain when coughing, currently rated as 3/10. /67, O2 Sats 95%RA, P 92, T 98.2. Reports Laverne does not want to take hydrocodone which was prescribed for post op pain. Advised per triage protocol to discuss with physician within next 24 hours, informed high priority message will be sent to physician during office hours. Home care instructions provided for the mean time. Instructed to contact on call nurse with new/worsening symptoms. Verbalized understanding.   Reason for Disposition   [1] MILD-MODERATE post-op pain (e.g., pain scale 1-7) AND [2] not controlled with pain medications    Additional Information   Negative: Sounds like a life-threatening emergency to the triager   Negative: [1] Widespread rash AND [2] bright red, sunburn-like   Negative: [1] SEVERE headache AND [2] after spinal (epidural) anesthesia   Negative: [1] Vomiting AND [2] persists > 4 hours   Negative: [1] Vomiting AND [2] abdomen looks much more swollen than usual   Negative: [1] Drinking very little AND [2] dehydration suspected (e.g., no urine > 12 hours, very dry mouth, very lightheaded)   Negative: Patient sounds very sick or weak to the triager   Negative: Sounds like a serious complication to the triager   Negative: Fever > 100.4 F (38.0 C)   Negative: [1] SEVERE post-op pain (e.g., excruciating, pain scale 8-10) AND [2] not controlled with pain medications   Negative: [1] Caller has URGENT question AND [2] triager unable to answer question   Negative: [1] Headache AND [2] after spinal (epidural) anesthesia AND [3] not severe   Negative: Fever present > 3 days (72 hours)    Protocols used: POST-OP SYMPTOMS AND [id number]

## 2022-05-02 ENCOUNTER — TELEPHONE (OUTPATIENT)
Dept: SURGERY | Facility: CLINIC | Age: 82
End: 2022-05-02
Payer: MEDICARE

## 2022-05-02 LAB
FINAL PATHOLOGIC DIAGNOSIS: NORMAL
GROSS: NORMAL
Lab: NORMAL

## 2022-05-02 NOTE — TELEPHONE ENCOUNTER
Left message stating that we received her message and to return phone call to speak further. Callback number provided.

## 2022-05-02 NOTE — TELEPHONE ENCOUNTER
Returning call to Cally (pt daughter). Instructed to give call back regarding mother's pain post op.     ----- Message from Shanique Gordillo sent at 5/2/2022  3:21 PM CDT -----  Please contact patients daughter at the number below for postop call  Daughter return call from earlier today      Patient daughter @#  [phone] (Cally)

## 2022-05-03 ENCOUNTER — TELEPHONE (OUTPATIENT)
Dept: SURGERY | Facility: CLINIC | Age: 82
End: 2022-05-03
Payer: MEDICARE

## 2022-05-03 NOTE — TELEPHONE ENCOUNTER
"Spoke with patient's daughter, Cally, concerning her mother post op colectomy. She states "that's she is currently a 2/10 and some coughing but less than previous days, mild fatigue and reflux with frequent belching. She had 5 BM day of discharge, but none since." Informed daughter that update will be given if needed to be seen sooner than upcoming appt.   "

## 2022-05-03 NOTE — TELEPHONE ENCOUNTER
Returned call. Tylenol q6, no bm in past 4 days, Passing gas. Vomiting 1x yesterday. Ate lunch today. Tolerated well. 02 WNL and bp 138/70's. No SOB. + Pain to right of breast possibly positional. No temp.     Instructed on Miralax BID. Will update tomorrow. If no bm by 1200 tomorrow will order CT at that time.

## 2022-05-09 ENCOUNTER — PATIENT MESSAGE (OUTPATIENT)
Dept: SURGERY | Facility: OTHER | Age: 82
End: 2022-05-09
Payer: MEDICARE

## 2022-05-10 ENCOUNTER — PATIENT MESSAGE (OUTPATIENT)
Dept: SURGERY | Facility: OTHER | Age: 82
End: 2022-05-10
Payer: MEDICARE

## 2022-05-11 NOTE — PROGRESS NOTES
Colon & Rectal Surgery Clinic Follow Up    HPI:   Laverne Humphries is a 81 y.o. female who presents for follow up after robotic sigmoid colectomy for colovaginal fistula on 4/26.  Continues to have fatigue and decreased appetite, but this is improving.  No fevers or dysuria.  No vaginal drainage.       Objective:   Vitals:    05/13/22 0844   BP: (!) 149/60   Pulse: 85        Physical Exam   Gen: well developed female, NAD   HEENT: normocephalic, atraumatic, PERRL, EOMI   CV: RRR, no murmurs  Resp: nonlabored, CTAB   Abd: soft, incisions well approximated without erythema or drainage    SEGMENTS SIGMOID COLON:   DIVERTICULOSIS WITH PERICOLONIC CHRONIC INFLAMMATION AND CYST CONSISTENT WITH   AN AREA OF FISTULA TRACT FORMATION       Assessment and Plan:   Laverne Humphries  is a 81 y.o. female who presents for follow up after robotic colovaginal fistula take down    - Fatigue and decreased appetite may continue for next 2-4 weeks but should continue to improve.  Small, frequent meals.   - Wounds healing well  - Call for fever> 101, urinary symptoms, inability to tolerate PO   - follow up on 6/3       Alison Mcclain MD  Staff Surgeon   Colon & Rectal Surgery

## 2022-05-12 ENCOUNTER — PATIENT MESSAGE (OUTPATIENT)
Dept: CARDIOLOGY | Facility: CLINIC | Age: 82
End: 2022-05-12

## 2022-05-12 ENCOUNTER — OFFICE VISIT (OUTPATIENT)
Dept: CARDIOLOGY | Facility: CLINIC | Age: 82
End: 2022-05-12
Attending: INTERNAL MEDICINE
Payer: MEDICARE

## 2022-05-12 VITALS
SYSTOLIC BLOOD PRESSURE: 118 MMHG | BODY MASS INDEX: 21.03 KG/M2 | HEIGHT: 60 IN | HEART RATE: 77 BPM | DIASTOLIC BLOOD PRESSURE: 52 MMHG | OXYGEN SATURATION: 99 % | WEIGHT: 107.13 LBS

## 2022-05-12 DIAGNOSIS — Z79.01 CHRONIC ANTICOAGULATION: ICD-10-CM

## 2022-05-12 DIAGNOSIS — N82.3 RECTOVAGINAL FISTULA: ICD-10-CM

## 2022-05-12 DIAGNOSIS — I48.0 PAROXYSMAL ATRIAL FIBRILLATION: ICD-10-CM

## 2022-05-12 PROBLEM — Z01.810 PRE-OPERATIVE CARDIOVASCULAR EXAMINATION: Status: RESOLVED | Noted: 2022-04-05 | Resolved: 2022-05-12

## 2022-05-12 PROCEDURE — 1159F PR MEDICATION LIST DOCUMENTED IN MEDICAL RECORD: ICD-10-PCS | Mod: CPTII,S$GLB,, | Performed by: INTERNAL MEDICINE

## 2022-05-12 PROCEDURE — 3074F SYST BP LT 130 MM HG: CPT | Mod: CPTII,S$GLB,, | Performed by: INTERNAL MEDICINE

## 2022-05-12 PROCEDURE — 1160F RVW MEDS BY RX/DR IN RCRD: CPT | Mod: CPTII,S$GLB,, | Performed by: INTERNAL MEDICINE

## 2022-05-12 PROCEDURE — 99999 PR PBB SHADOW E&M-EST. PATIENT-LVL III: CPT | Mod: PBBFAC,,, | Performed by: INTERNAL MEDICINE

## 2022-05-12 PROCEDURE — 1111F DSCHRG MED/CURRENT MED MERGE: CPT | Mod: CPTII,S$GLB,, | Performed by: INTERNAL MEDICINE

## 2022-05-12 PROCEDURE — 3074F PR MOST RECENT SYSTOLIC BLOOD PRESSURE < 130 MM HG: ICD-10-PCS | Mod: CPTII,S$GLB,, | Performed by: INTERNAL MEDICINE

## 2022-05-12 PROCEDURE — 3078F PR MOST RECENT DIASTOLIC BLOOD PRESSURE < 80 MM HG: ICD-10-PCS | Mod: CPTII,S$GLB,, | Performed by: INTERNAL MEDICINE

## 2022-05-12 PROCEDURE — 1160F PR REVIEW ALL MEDS BY PRESCRIBER/CLIN PHARMACIST DOCUMENTED: ICD-10-PCS | Mod: CPTII,S$GLB,, | Performed by: INTERNAL MEDICINE

## 2022-05-12 PROCEDURE — 3078F DIAST BP <80 MM HG: CPT | Mod: CPTII,S$GLB,, | Performed by: INTERNAL MEDICINE

## 2022-05-12 PROCEDURE — 99214 PR OFFICE/OUTPT VISIT, EST, LEVL IV, 30-39 MIN: ICD-10-PCS | Mod: S$GLB,,, | Performed by: INTERNAL MEDICINE

## 2022-05-12 PROCEDURE — 99999 PR PBB SHADOW E&M-EST. PATIENT-LVL III: ICD-10-PCS | Mod: PBBFAC,,, | Performed by: INTERNAL MEDICINE

## 2022-05-12 PROCEDURE — 1159F MED LIST DOCD IN RCRD: CPT | Mod: CPTII,S$GLB,, | Performed by: INTERNAL MEDICINE

## 2022-05-12 PROCEDURE — 99214 OFFICE O/P EST MOD 30 MIN: CPT | Mod: S$GLB,,, | Performed by: INTERNAL MEDICINE

## 2022-05-12 PROCEDURE — 1111F PR DISCHARGE MEDS RECONCILED W/ CURRENT OUTPATIENT MED LIST: ICD-10-PCS | Mod: CPTII,S$GLB,, | Performed by: INTERNAL MEDICINE

## 2022-05-12 NOTE — PROGRESS NOTES
Subjective:     Laverne Humphries is a 81 y.o. female with no history of any cardiac issues. She has had diverticulitis and developed a colovaginal fistula. She came in with plans for elective surgery on 4/1/2022. She was noted to be in atrial fibrillation with a ventricular response rate of about 120-140 bpm. She denied any palpitations, chest pain or shortness of breath. She said she had fair exercise tolerance. It was unclear for how long she had been in atrial fibrillation. An electrocardiogram from 2019 revealed she was in sinus rhythm at that time. She was prescribed metoprolol 25 mg Q12. The surgery got rescheduled for 4/26/2022. Non exertional shortness ogf breath. No palpitations or weak spells. Feeling well overall.      Atrial Fibrillation  Presents for follow-up visit. Symptoms are negative for an AICD problem, bradycardia, chest pain, dizziness, hemodynamic instability, hypertension, hypotension, palpitations, shortness of breath, syncope, tachycardia and weakness. The symptoms have been stable. Past medical history includes atrial fibrillation.       Review of Systems   Constitutional: Negative for chills, fever and malaise/fatigue.   HENT: Negative for nosebleeds and tinnitus.    Eyes: Negative for double vision, vision loss in left eye and vision loss in right eye.   Cardiovascular: Negative for chest pain, claudication, dyspnea on exertion, irregular heartbeat, leg swelling, near-syncope, orthopnea, palpitations, paroxysmal nocturnal dyspnea and syncope.   Respiratory: Negative for cough, hemoptysis, shortness of breath and wheezing.    Endocrine: Negative for cold intolerance and heat intolerance.   Hematologic/Lymphatic: Negative for bleeding problem. Does not bruise/bleed easily.   Skin: Negative for color change and rash.   Musculoskeletal: Negative for back pain, falls, muscle weakness and myalgias.   Gastrointestinal: Negative for abdominal pain, diarrhea, dysphagia, heartburn,  hematemesis, hematochezia, hemorrhoids, jaundice, melena, nausea and vomiting.   Genitourinary: Negative for dysuria and hematuria.   Neurological: Negative for dizziness, focal weakness, headaches, light-headedness, loss of balance, numbness, tremors, vertigo and weakness.   Psychiatric/Behavioral: Negative for altered mental status, depression and memory loss. The patient is not nervous/anxious.    Allergic/Immunologic: Negative for hives and persistent infections.     Current Outpatient Medications on File Prior to Visit   Medication Sig Dispense Refill    albuterol (PROVENTIL/VENTOLIN HFA) 90 mcg/actuation inhaler Inhale 2 puffs into the lungs every 6 (six) hours as needed for Wheezing or Shortness of Breath (generic preferred). Rescue 18 g 3    benzonatate (TESSALON) 200 MG capsule Take 1 capsule (200 mg total) by mouth 3 (three) times daily as needed for Cough. 30 capsule 1    calcium-vitamin D3 500 mg(1,250mg) -200 unit per tablet Take 1 tablet by mouth Daily.       LORazepam (ATIVAN) 0.5 MG tablet Take 0.5 mg by mouth every evening.      meloxicam (MOBIC) 7.5 MG tablet Take 1 tablet (7.5 mg total) by mouth once daily. 30 tablet 12    metoprolol succinate (TOPROL-XL) 50 MG 24 hr tablet Take 1 tablet (50 mg total) by mouth once daily. 90 tablet 3    carboxymethylcellulose (REFRESH PLUS) 0.5 % Dpet 1 drop 3 (three) times daily as needed.      diclofenac sodium (VOLTAREN) 1 % Gel Apply 2 g topically once daily. 150 g 2    estradioL (ESTRACE) 0.01 % (0.1 mg/gram) vaginal cream Place 1 g vaginally once daily. 42.5 g 3    fish oil-omega-3 fatty acids 300-1,000 mg capsule Take by mouth once daily.      fluticasone propionate (FLONASE) 50 mcg/actuation nasal spray 2 sprays (100 mcg total) by Each Nostril route once daily. 3 mo supply 48 g 4    gabapentin (NEURONTIN) 300 MG capsule Take 2 capsules (600 mg total) by mouth every evening. 180 capsule 3    HYDROcodone-acetaminophen (NORCO) 5-325 mg per tablet  Take 1 tablet by mouth every 6 (six) hours as needed for Pain. 20 tablet 0    SHINGRIX, PF, 50 mcg/0.5 mL injection       triamcinolone acetonide 0.1% (KENALOG) 0.1 % cream Apply topically 2 (two) times daily. Apply to itchy irritated rash twice daily. Do not apply to face, groin, or skin folds 80 g 1     Current Facility-Administered Medications on File Prior to Visit   Medication Dose Route Frequency Provider Last Rate Last Admin    LIDOcaine (PF) 10 mg/ml (1%) injection 10 mg  1 mL Intradermal Once Audrey Caicedo NP        mupirocin 2 % ointment   Nasal On Call Procedure Audrey Caicedo NP   Given at 04/26/22 0726       BP (!) 118/52 (BP Location: Right arm, Patient Position: Sitting)   Pulse 77   Ht 5' (1.524 m)   Wt 48.6 kg (107 lb 2.3 oz)   LMP  (LMP Unknown)   SpO2 99%   BMI 20.93 kg/m²       Objective:     Physical Exam  Constitutional:       General: She is not in acute distress.     Appearance: Normal appearance. She is well-developed. She is not toxic-appearing or diaphoretic.   HENT:      Head: Normocephalic and atraumatic.      Nose: Nose normal.   Eyes:      General:         Right eye: No discharge.         Left eye: No discharge.      Conjunctiva/sclera:      Right eye: Right conjunctiva is not injected.      Left eye: Left conjunctiva is not injected.      Pupils: Pupils are equal.      Right eye: Pupil is round.      Left eye: Pupil is round.   Neck:      Thyroid: No thyromegaly.      Vascular: No carotid bruit or JVD.   Cardiovascular:      Rate and Rhythm: Normal rate and regular rhythm.  No extrasystoles are present.     Chest Wall: PMI is not displaced.      Pulses:           Radial pulses are 2+ on the right side and 2+ on the left side.        Femoral pulses are 2+ on the right side and 2+ on the left side.       Dorsalis pedis pulses are 2+ on the right side and 2+ on the left side.        Posterior tibial pulses are 2+ on the right side and 2+ on the left side.      Heart  sounds: S1 normal and S2 normal. Murmur heard.   High-pitched blowing holosystolic murmur is present with a grade of 2/6 at the apex.    No gallop.   Pulmonary:      Effort: Pulmonary effort is normal.      Breath sounds: Normal breath sounds.   Abdominal:      Palpations: Abdomen is soft.      Tenderness: There is no abdominal tenderness.   Musculoskeletal:      Cervical back: Neck supple.      Right lower leg: Normal. No swelling. No edema.      Left lower leg: Normal. No swelling. No edema.   Lymphadenopathy:      Head:      Right side of head: No submandibular adenopathy.      Left side of head: No submandibular adenopathy.      Cervical: No cervical adenopathy.   Skin:     General: Skin is warm and dry.      Findings: No rash.      Nails: There is no clubbing.   Neurological:      General: No focal deficit present.      Mental Status: She is alert and oriented to person, place, and time. She is not disoriented.      Cranial Nerves: No cranial nerve deficit.   Psychiatric:         Attention and Perception: Attention and perception normal.         Mood and Affect: Mood and affect normal.         Speech: Speech normal.         Behavior: Behavior normal.         Thought Content: Thought content normal.         Cognition and Memory: Cognition and memory normal.         Judgment: Judgment normal.         Assessment:     1. Paroxysmal atrial fibrillation    2. Chronic anticoagulation    3. Rectovaginal fistula        Plan:     1. Atrial Fibrillation               4/1/2022: Diagnosed with paroxysmal atrial fibrillation.               Fast VRR. Asymptomatic.              CHA2DS2VASc=3 (A2Sc).   4/1/2022: Echo: Normal left ventricular size and systolic function. Mildly sigmoid septum. EF 65%. AF. Moderate aortic valve sclerosis. Mild AR. Moderate MR.   4/5/2022: Metoprolol 25 mg Q24 was changed to metoprolol 50 mg Q24.   On metoprolol 50 mg Q24.             5/12/2022: Apixiban 2.5 mg Q12 to begin.      2. Chronic  Anticoagulation              4/1/2022: Diagnosed with paroxysmal atrial fibrillation.               CHA2DS2VASc=3 (A2Sc).   5/12/2022: Apixiban 2.5 mg Q12 to begin.                3. Colovaginal Fistula              4/1/2022: Plan was surgery.   4/26/2022: Underwent surgery.              Dr. Alison Mcclain.    4. Primary Care              Dr. Joe Forde.     F/u 2 months.    Lisa Yeh M.D.

## 2022-05-13 ENCOUNTER — OFFICE VISIT (OUTPATIENT)
Dept: SURGERY | Facility: OTHER | Age: 82
End: 2022-05-13
Attending: SURGERY
Payer: MEDICARE

## 2022-05-13 VITALS
DIASTOLIC BLOOD PRESSURE: 60 MMHG | WEIGHT: 106.5 LBS | HEART RATE: 85 BPM | SYSTOLIC BLOOD PRESSURE: 149 MMHG | HEIGHT: 60 IN | BODY MASS INDEX: 20.91 KG/M2

## 2022-05-13 DIAGNOSIS — N82.3 RECTOVAGINAL FISTULA: Primary | ICD-10-CM

## 2022-05-13 PROCEDURE — 3078F DIAST BP <80 MM HG: CPT | Mod: CPTII,S$GLB,, | Performed by: SURGERY

## 2022-05-13 PROCEDURE — 1159F MED LIST DOCD IN RCRD: CPT | Mod: CPTII,S$GLB,, | Performed by: SURGERY

## 2022-05-13 PROCEDURE — 99024 POSTOP FOLLOW-UP VISIT: CPT | Mod: S$GLB,,, | Performed by: SURGERY

## 2022-05-13 PROCEDURE — 1126F PR PAIN SEVERITY QUANTIFIED, NO PAIN PRESENT: ICD-10-PCS | Mod: CPTII,S$GLB,, | Performed by: SURGERY

## 2022-05-13 PROCEDURE — 1126F AMNT PAIN NOTED NONE PRSNT: CPT | Mod: CPTII,S$GLB,, | Performed by: SURGERY

## 2022-05-13 PROCEDURE — 3077F SYST BP >= 140 MM HG: CPT | Mod: CPTII,S$GLB,, | Performed by: SURGERY

## 2022-05-13 PROCEDURE — 1101F PT FALLS ASSESS-DOCD LE1/YR: CPT | Mod: CPTII,S$GLB,, | Performed by: SURGERY

## 2022-05-13 PROCEDURE — 3077F PR MOST RECENT SYSTOLIC BLOOD PRESSURE >= 140 MM HG: ICD-10-PCS | Mod: CPTII,S$GLB,, | Performed by: SURGERY

## 2022-05-13 PROCEDURE — 3288F FALL RISK ASSESSMENT DOCD: CPT | Mod: CPTII,S$GLB,, | Performed by: SURGERY

## 2022-05-13 PROCEDURE — 99024 PR POST-OP FOLLOW-UP VISIT: ICD-10-PCS | Mod: S$GLB,,, | Performed by: SURGERY

## 2022-05-13 PROCEDURE — 1159F PR MEDICATION LIST DOCUMENTED IN MEDICAL RECORD: ICD-10-PCS | Mod: CPTII,S$GLB,, | Performed by: SURGERY

## 2022-05-13 PROCEDURE — 3288F PR FALLS RISK ASSESSMENT DOCUMENTED: ICD-10-PCS | Mod: CPTII,S$GLB,, | Performed by: SURGERY

## 2022-05-13 PROCEDURE — 1101F PR PT FALLS ASSESS DOC 0-1 FALLS W/OUT INJ PAST YR: ICD-10-PCS | Mod: CPTII,S$GLB,, | Performed by: SURGERY

## 2022-05-13 PROCEDURE — 3078F PR MOST RECENT DIASTOLIC BLOOD PRESSURE < 80 MM HG: ICD-10-PCS | Mod: CPTII,S$GLB,, | Performed by: SURGERY

## 2022-05-13 PROCEDURE — 99999 PR PBB SHADOW E&M-EST. PATIENT-LVL III: ICD-10-PCS | Mod: PBBFAC,,, | Performed by: SURGERY

## 2022-05-13 PROCEDURE — 99999 PR PBB SHADOW E&M-EST. PATIENT-LVL III: CPT | Mod: PBBFAC,,, | Performed by: SURGERY

## 2022-05-30 ENCOUNTER — OFFICE VISIT (OUTPATIENT)
Dept: SURGERY | Facility: OTHER | Age: 82
End: 2022-05-30
Attending: SURGERY
Payer: MEDICARE

## 2022-05-30 ENCOUNTER — PATIENT MESSAGE (OUTPATIENT)
Dept: SURGERY | Facility: OTHER | Age: 82
End: 2022-05-30

## 2022-05-30 VITALS
BODY MASS INDEX: 20.26 KG/M2 | DIASTOLIC BLOOD PRESSURE: 61 MMHG | WEIGHT: 103.19 LBS | SYSTOLIC BLOOD PRESSURE: 138 MMHG | HEIGHT: 60 IN | HEART RATE: 75 BPM

## 2022-05-30 DIAGNOSIS — N82.3 RECTOVAGINAL FISTULA: Primary | ICD-10-CM

## 2022-05-30 PROCEDURE — 1101F PT FALLS ASSESS-DOCD LE1/YR: CPT | Mod: CPTII,S$GLB,, | Performed by: SURGERY

## 2022-05-30 PROCEDURE — 99024 POSTOP FOLLOW-UP VISIT: CPT | Mod: S$GLB,,, | Performed by: SURGERY

## 2022-05-30 PROCEDURE — 99999 PR PBB SHADOW E&M-EST. PATIENT-LVL III: CPT | Mod: PBBFAC,,, | Performed by: SURGERY

## 2022-05-30 PROCEDURE — 99024 PR POST-OP FOLLOW-UP VISIT: ICD-10-PCS | Mod: S$GLB,,, | Performed by: SURGERY

## 2022-05-30 PROCEDURE — 1101F PR PT FALLS ASSESS DOC 0-1 FALLS W/OUT INJ PAST YR: ICD-10-PCS | Mod: CPTII,S$GLB,, | Performed by: SURGERY

## 2022-05-30 PROCEDURE — 3075F PR MOST RECENT SYSTOLIC BLOOD PRESS GE 130-139MM HG: ICD-10-PCS | Mod: CPTII,S$GLB,, | Performed by: SURGERY

## 2022-05-30 PROCEDURE — 3078F PR MOST RECENT DIASTOLIC BLOOD PRESSURE < 80 MM HG: ICD-10-PCS | Mod: CPTII,S$GLB,, | Performed by: SURGERY

## 2022-05-30 PROCEDURE — 1126F PR PAIN SEVERITY QUANTIFIED, NO PAIN PRESENT: ICD-10-PCS | Mod: CPTII,S$GLB,, | Performed by: SURGERY

## 2022-05-30 PROCEDURE — 99999 PR PBB SHADOW E&M-EST. PATIENT-LVL III: ICD-10-PCS | Mod: PBBFAC,,, | Performed by: SURGERY

## 2022-05-30 PROCEDURE — 3288F PR FALLS RISK ASSESSMENT DOCUMENTED: ICD-10-PCS | Mod: CPTII,S$GLB,, | Performed by: SURGERY

## 2022-05-30 PROCEDURE — 1159F PR MEDICATION LIST DOCUMENTED IN MEDICAL RECORD: ICD-10-PCS | Mod: CPTII,S$GLB,, | Performed by: SURGERY

## 2022-05-30 PROCEDURE — 3288F FALL RISK ASSESSMENT DOCD: CPT | Mod: CPTII,S$GLB,, | Performed by: SURGERY

## 2022-05-30 PROCEDURE — 3075F SYST BP GE 130 - 139MM HG: CPT | Mod: CPTII,S$GLB,, | Performed by: SURGERY

## 2022-05-30 PROCEDURE — 1126F AMNT PAIN NOTED NONE PRSNT: CPT | Mod: CPTII,S$GLB,, | Performed by: SURGERY

## 2022-05-30 PROCEDURE — 3078F DIAST BP <80 MM HG: CPT | Mod: CPTII,S$GLB,, | Performed by: SURGERY

## 2022-05-30 PROCEDURE — 1159F MED LIST DOCD IN RCRD: CPT | Mod: CPTII,S$GLB,, | Performed by: SURGERY

## 2022-05-30 NOTE — PROGRESS NOTES
[facility] Follow Up    HPI:   Laverne Humphries is a 81 y.o. female who presents for follow up after robotic sigmoid colectomy for colovaginal fistula.  She is doing much better since her last visit.  Her appetite continues to improve.  Energy levels are improved as well, moving around the house better.  Having consistent bowel function. Very occasional right sided abdominal wall pain. No fevers. No vaginal drainage       Objective:   Vitals:    05/30/22 1017   BP: 138/61   Pulse: 75        Physical Exam   Gen: well developed female, NAD  HEENT: normocephalic, atraumatic, PERRL, EOMI   CV: RRR, no murmurs  Resp: nonlabored, CTAB   Abd: soft, incisions well approximated without erythema or drainage   MSK: no gross deformities, no cyanosis or edema    Assessment and Plan:   Laverne Humphries  is a 81 y.o. female who presents for follow up after robotic sigmoid colectomy for colovaginal fistula from diverticulitis    - patient recovered well  - no further follow up necessary. PRN.       Alison Mcclain MD  Staff Surgeon   [facility]

## 2022-07-13 ENCOUNTER — OFFICE VISIT (OUTPATIENT)
Dept: CARDIOLOGY | Facility: CLINIC | Age: 82
End: 2022-07-13
Attending: INTERNAL MEDICINE
Payer: MEDICARE

## 2022-07-13 VITALS
HEART RATE: 68 BPM | WEIGHT: 106.5 LBS | BODY MASS INDEX: 20.91 KG/M2 | SYSTOLIC BLOOD PRESSURE: 144 MMHG | DIASTOLIC BLOOD PRESSURE: 70 MMHG | HEIGHT: 60 IN

## 2022-07-13 DIAGNOSIS — Z79.01 CHRONIC ANTICOAGULATION: ICD-10-CM

## 2022-07-13 DIAGNOSIS — N82.3 RECTOVAGINAL FISTULA: ICD-10-CM

## 2022-07-13 DIAGNOSIS — I48.0 PAROXYSMAL ATRIAL FIBRILLATION: ICD-10-CM

## 2022-07-13 PROCEDURE — 1126F PR PAIN SEVERITY QUANTIFIED, NO PAIN PRESENT: ICD-10-PCS | Mod: CPTII,S$GLB,, | Performed by: INTERNAL MEDICINE

## 2022-07-13 PROCEDURE — 3077F PR MOST RECENT SYSTOLIC BLOOD PRESSURE >= 140 MM HG: ICD-10-PCS | Mod: CPTII,S$GLB,, | Performed by: INTERNAL MEDICINE

## 2022-07-13 PROCEDURE — 1160F PR REVIEW ALL MEDS BY PRESCRIBER/CLIN PHARMACIST DOCUMENTED: ICD-10-PCS | Mod: CPTII,S$GLB,, | Performed by: INTERNAL MEDICINE

## 2022-07-13 PROCEDURE — 3288F PR FALLS RISK ASSESSMENT DOCUMENTED: ICD-10-PCS | Mod: CPTII,S$GLB,, | Performed by: INTERNAL MEDICINE

## 2022-07-13 PROCEDURE — 1159F PR MEDICATION LIST DOCUMENTED IN MEDICAL RECORD: ICD-10-PCS | Mod: CPTII,S$GLB,, | Performed by: INTERNAL MEDICINE

## 2022-07-13 PROCEDURE — 1159F MED LIST DOCD IN RCRD: CPT | Mod: CPTII,S$GLB,, | Performed by: INTERNAL MEDICINE

## 2022-07-13 PROCEDURE — 1101F PR PT FALLS ASSESS DOC 0-1 FALLS W/OUT INJ PAST YR: ICD-10-PCS | Mod: CPTII,S$GLB,, | Performed by: INTERNAL MEDICINE

## 2022-07-13 PROCEDURE — 1160F RVW MEDS BY RX/DR IN RCRD: CPT | Mod: CPTII,S$GLB,, | Performed by: INTERNAL MEDICINE

## 2022-07-13 PROCEDURE — 3077F SYST BP >= 140 MM HG: CPT | Mod: CPTII,S$GLB,, | Performed by: INTERNAL MEDICINE

## 2022-07-13 PROCEDURE — 3078F PR MOST RECENT DIASTOLIC BLOOD PRESSURE < 80 MM HG: ICD-10-PCS | Mod: CPTII,S$GLB,, | Performed by: INTERNAL MEDICINE

## 2022-07-13 PROCEDURE — 3288F FALL RISK ASSESSMENT DOCD: CPT | Mod: CPTII,S$GLB,, | Performed by: INTERNAL MEDICINE

## 2022-07-13 PROCEDURE — 99214 OFFICE O/P EST MOD 30 MIN: CPT | Mod: S$GLB,,, | Performed by: INTERNAL MEDICINE

## 2022-07-13 PROCEDURE — 1126F AMNT PAIN NOTED NONE PRSNT: CPT | Mod: CPTII,S$GLB,, | Performed by: INTERNAL MEDICINE

## 2022-07-13 PROCEDURE — 99999 PR PBB SHADOW E&M-EST. PATIENT-LVL III: CPT | Mod: PBBFAC,,, | Performed by: INTERNAL MEDICINE

## 2022-07-13 PROCEDURE — 99214 PR OFFICE/OUTPT VISIT, EST, LEVL IV, 30-39 MIN: ICD-10-PCS | Mod: S$GLB,,, | Performed by: INTERNAL MEDICINE

## 2022-07-13 PROCEDURE — 1101F PT FALLS ASSESS-DOCD LE1/YR: CPT | Mod: CPTII,S$GLB,, | Performed by: INTERNAL MEDICINE

## 2022-07-13 PROCEDURE — 99999 PR PBB SHADOW E&M-EST. PATIENT-LVL III: ICD-10-PCS | Mod: PBBFAC,,, | Performed by: INTERNAL MEDICINE

## 2022-07-13 PROCEDURE — 3078F DIAST BP <80 MM HG: CPT | Mod: CPTII,S$GLB,, | Performed by: INTERNAL MEDICINE

## 2022-07-13 NOTE — PROGRESS NOTES
Subjective:     Laverne Humphries is a 81 y.o. female with no history of any cardiac issues. She has had diverticulitis and developed a colovaginal fistula. She came in with plans for elective surgery on 4/1/2022. She was noted to be in atrial fibrillation with a ventricular response rate of about 120-140 bpm. She denied any palpitations, chest pain or shortness of breath. She said she had fair exercise tolerance. It was unclear for how long she had been in atrial fibrillation. An electrocardiogram from 2019 revealed she was in sinus rhythm at that time. She was prescribed metoprolol 25 mg Q12. The surgery got rescheduled for 4/26/2022. No exertional shortness of breath. No palpitations or weak spells. No bleeding. No issues with any of her prescribed medications. Feeling well overall.      Atrial Fibrillation  Presents for follow-up visit. Symptoms are negative for an AICD problem, bradycardia, chest pain, dizziness, hemodynamic instability, hypertension, hypotension, palpitations, shortness of breath, syncope, tachycardia and weakness. The symptoms have been stable. Past medical history includes atrial fibrillation.       Review of Systems   Constitutional: Negative for chills, fever and malaise/fatigue.   HENT: Negative for nosebleeds and tinnitus.    Eyes: Negative for double vision, vision loss in left eye and vision loss in right eye.   Cardiovascular: Negative for chest pain, claudication, dyspnea on exertion, irregular heartbeat, leg swelling, near-syncope, orthopnea, palpitations, paroxysmal nocturnal dyspnea and syncope.   Respiratory: Negative for cough, hemoptysis, shortness of breath and wheezing.    Endocrine: Negative for cold intolerance and heat intolerance.   Hematologic/Lymphatic: Negative for bleeding problem. Does not bruise/bleed easily.   Skin: Negative for color change and rash.   Musculoskeletal: Negative for back pain, falls, muscle weakness and myalgias.   Gastrointestinal: Negative  for abdominal pain, diarrhea, dysphagia, heartburn, hematemesis, hematochezia, hemorrhoids, jaundice, melena, nausea and vomiting.   Genitourinary: Negative for dysuria and hematuria.   Neurological: Negative for dizziness, focal weakness, headaches, light-headedness, loss of balance, numbness, tremors, vertigo and weakness.   Psychiatric/Behavioral: Negative for altered mental status, depression and memory loss. The patient is not nervous/anxious.    Allergic/Immunologic: Negative for hives and persistent infections.       Current Outpatient Medications on File Prior to Visit   Medication Sig Dispense Refill    albuterol (PROVENTIL/VENTOLIN HFA) 90 mcg/actuation inhaler Inhale 2 puffs into the lungs every 6 (six) hours as needed for Wheezing or Shortness of Breath (generic preferred). Rescue 18 g 3    apixaban (ELIQUIS) 2.5 mg Tab Take 1 tablet (2.5 mg total) by mouth 2 (two) times daily. 60 tablet 11    calcium-vitamin D3 500 mg(1,250mg) -200 unit per tablet Take 1 tablet by mouth Daily.       carboxymethylcellulose (REFRESH PLUS) 0.5 % Dpet 1 drop 3 (three) times daily as needed.      LORazepam (ATIVAN) 0.5 MG tablet TAKE 1 TABLET BY MOUTH EVERY EVENING. 30 tablet 3    meloxicam (MOBIC) 7.5 MG tablet Take 1 tablet (7.5 mg total) by mouth once daily. (Patient taking differently: Take 7.5 mg by mouth once daily. PRN) 30 tablet 12    metoprolol succinate (TOPROL-XL) 50 MG 24 hr tablet Take 1 tablet (50 mg total) by mouth once daily. 90 tablet 3    benzonatate (TESSALON) 200 MG capsule Take 1 capsule (200 mg total) by mouth 3 (three) times daily as needed for Cough. (Patient not taking: Reported on 7/13/2022) 30 capsule 1    diclofenac sodium (VOLTAREN) 1 % Gel Apply 2 g topically once daily. (Patient not taking: Reported on 7/13/2022) 150 g 2    estradioL (ESTRACE) 0.01 % (0.1 mg/gram) vaginal cream Place 1 g vaginally once daily. (Patient not taking: Reported on 7/13/2022) 42.5 g 3    fluticasone  propionate (FLONASE) 50 mcg/actuation nasal spray 2 sprays (100 mcg total) by Each Nostril route once daily. 3 mo supply (Patient not taking: Reported on 7/13/2022) 48 g 4    gabapentin (NEURONTIN) 300 MG capsule Take 2 capsules (600 mg total) by mouth every evening. (Patient taking differently: Take 600 mg by mouth every evening. PRN) 180 capsule 3    HYDROcodone-acetaminophen (NORCO) 5-325 mg per tablet Take 1 tablet by mouth every 6 (six) hours as needed for Pain. 20 tablet 0    SHINGRIX, PF, 50 mcg/0.5 mL injection       triamcinolone acetonide 0.1% (KENALOG) 0.1 % cream Apply topically 2 (two) times daily. Apply to itchy irritated rash twice daily. Do not apply to face, groin, or skin folds 80 g 1     Current Facility-Administered Medications on File Prior to Visit   Medication Dose Route Frequency Provider Last Rate Last Admin    LIDOcaine (PF) 10 mg/ml (1%) injection 10 mg  1 mL Intradermal Once Audrey Caicedo NP        mupirocin 2 % ointment   Nasal On Call Procedure Aurdey Caicedo NP   Given at 04/26/22 0726       BP (!) 144/70 (BP Location: Left arm, Patient Position: Sitting, BP Method: Medium (Manual))   Pulse 68   Ht 5' (1.524 m)   Wt 48.3 kg (106 lb 7.7 oz)   LMP  (LMP Unknown)   BMI 20.80 kg/m²       Objective:     Physical Exam  Constitutional:       General: She is not in acute distress.     Appearance: Normal appearance. She is well-developed. She is not toxic-appearing or diaphoretic.   HENT:      Head: Normocephalic and atraumatic.      Nose: Nose normal.   Eyes:      General:         Right eye: No discharge.         Left eye: No discharge.      Conjunctiva/sclera:      Right eye: Right conjunctiva is not injected.      Left eye: Left conjunctiva is not injected.      Pupils: Pupils are equal.      Right eye: Pupil is round.      Left eye: Pupil is round.   Neck:      Thyroid: No thyromegaly.      Vascular: No carotid bruit or JVD.   Cardiovascular:      Rate and Rhythm:  Normal rate and regular rhythm.  No extrasystoles are present.     Chest Wall: PMI is not displaced.      Pulses:           Radial pulses are 2+ on the right side and 2+ on the left side.        Femoral pulses are 2+ on the right side and 2+ on the left side.       Dorsalis pedis pulses are 2+ on the right side and 2+ on the left side.        Posterior tibial pulses are 2+ on the right side and 2+ on the left side.      Heart sounds: S1 normal and S2 normal. Murmur heard.   High-pitched blowing holosystolic murmur is present with a grade of 2/6 at the apex.    No gallop.   Pulmonary:      Effort: Pulmonary effort is normal.      Breath sounds: Normal breath sounds.   Abdominal:      Palpations: Abdomen is soft.      Tenderness: There is no abdominal tenderness.   Musculoskeletal:      Cervical back: Neck supple.      Right lower leg: Normal. No swelling. No edema.      Left lower leg: Normal. No swelling. No edema.   Lymphadenopathy:      Head:      Right side of head: No submandibular adenopathy.      Left side of head: No submandibular adenopathy.      Cervical: No cervical adenopathy.   Skin:     General: Skin is warm and dry.      Findings: No rash.      Nails: There is no clubbing.   Neurological:      General: No focal deficit present.      Mental Status: She is alert and oriented to person, place, and time. She is not disoriented.      Cranial Nerves: No cranial nerve deficit.   Psychiatric:         Attention and Perception: Attention and perception normal.         Mood and Affect: Mood and affect normal.         Speech: Speech normal.         Behavior: Behavior normal.         Thought Content: Thought content normal.         Cognition and Memory: Cognition and memory normal.         Judgment: Judgment normal.         Assessment:     1. Paroxysmal atrial fibrillation    2. Chronic anticoagulation    3. Rectovaginal fistula        Plan:     1. Atrial Fibrillation               4/1/2022: Diagnosed with  paroxysmal atrial fibrillation.               Fast VRR. Asymptomatic.              CHA2DS2VASc=3 (A2Sc).   4/1/2022: Echo: Normal left ventricular size and systolic function. Mildly sigmoid septum. EF 65%. AF. Moderate aortic valve sclerosis. Mild AR. Moderate MR.   4/5/2022: Metoprolol 25 mg Q24 was changed to metoprolol 50 mg Q24.   5/12/2022: Apixiban 2.5 mg Q12 was begun.   On apixiban 2.5 mg Q12.   On metoprolol 50 mg Q24.             No clinical recurrence but never had any symptoms.     2. Chronic Anticoagulation              4/1/2022: Diagnosed with paroxysmal atrial fibrillation.               CHA2DS2VASc=3 (A2Sc).   5/12/2022: Apixiban 2.5 mg Q12 was begun.   On apixiban 2.5 mg Q12.   No aspirin or NSAID.   No Bleeding.                3. Colovaginal Fistula              4/1/2022: Plan was surgery.   4/26/2022: Underwent surgery.              Dr. Alison Mcclain.    4. Primary Care              Dr. Joe Forde.     F/u 4 months.    Lisa Yeh M.D.

## 2022-07-17 ENCOUNTER — PATIENT MESSAGE (OUTPATIENT)
Dept: CARDIOLOGY | Facility: CLINIC | Age: 82
End: 2022-07-17
Payer: MEDICARE

## 2022-07-27 ENCOUNTER — TELEPHONE (OUTPATIENT)
Dept: INTERNAL MEDICINE | Facility: CLINIC | Age: 82
End: 2022-07-27

## 2022-07-27 ENCOUNTER — OFFICE VISIT (OUTPATIENT)
Dept: INTERNAL MEDICINE | Facility: CLINIC | Age: 82
End: 2022-07-27
Payer: MEDICARE

## 2022-07-27 ENCOUNTER — HOSPITAL ENCOUNTER (OUTPATIENT)
Dept: RADIOLOGY | Facility: HOSPITAL | Age: 82
Discharge: HOME | End: 2022-07-27
Attending: NURSE PRACTITIONER
Payer: MEDICARE

## 2022-07-27 VITALS
OXYGEN SATURATION: 99 % | SYSTOLIC BLOOD PRESSURE: 134 MMHG | DIASTOLIC BLOOD PRESSURE: 64 MMHG | BODY MASS INDEX: 21.42 KG/M2 | HEIGHT: 60 IN | HEART RATE: 64 BPM | WEIGHT: 109.13 LBS

## 2022-07-27 DIAGNOSIS — G89.29 CHRONIC RIGHT SHOULDER PAIN: ICD-10-CM

## 2022-07-27 DIAGNOSIS — M25.511 CHRONIC RIGHT SHOULDER PAIN: ICD-10-CM

## 2022-07-27 DIAGNOSIS — G89.29 CHRONIC RIGHT SHOULDER PAIN: Primary | ICD-10-CM

## 2022-07-27 DIAGNOSIS — M25.511 CHRONIC RIGHT SHOULDER PAIN: Primary | ICD-10-CM

## 2022-07-27 PROCEDURE — 3075F PR MOST RECENT SYSTOLIC BLOOD PRESS GE 130-139MM HG: ICD-10-PCS | Mod: CPTII,S$GLB,, | Performed by: NURSE PRACTITIONER

## 2022-07-27 PROCEDURE — 1159F MED LIST DOCD IN RCRD: CPT | Mod: CPTII,S$GLB,, | Performed by: NURSE PRACTITIONER

## 2022-07-27 PROCEDURE — 99214 PR OFFICE/OUTPT VISIT, EST, LEVL IV, 30-39 MIN: ICD-10-PCS | Mod: S$GLB,,, | Performed by: NURSE PRACTITIONER

## 2022-07-27 PROCEDURE — 3288F PR FALLS RISK ASSESSMENT DOCUMENTED: ICD-10-PCS | Mod: CPTII,S$GLB,, | Performed by: NURSE PRACTITIONER

## 2022-07-27 PROCEDURE — 3078F PR MOST RECENT DIASTOLIC BLOOD PRESSURE < 80 MM HG: ICD-10-PCS | Mod: CPTII,S$GLB,, | Performed by: NURSE PRACTITIONER

## 2022-07-27 PROCEDURE — 1101F PR PT FALLS ASSESS DOC 0-1 FALLS W/OUT INJ PAST YR: ICD-10-PCS | Mod: CPTII,S$GLB,, | Performed by: NURSE PRACTITIONER

## 2022-07-27 PROCEDURE — 1125F PR PAIN SEVERITY QUANTIFIED, PAIN PRESENT: ICD-10-PCS | Mod: CPTII,S$GLB,, | Performed by: NURSE PRACTITIONER

## 2022-07-27 PROCEDURE — 3078F DIAST BP <80 MM HG: CPT | Mod: CPTII,S$GLB,, | Performed by: NURSE PRACTITIONER

## 2022-07-27 PROCEDURE — 3075F SYST BP GE 130 - 139MM HG: CPT | Mod: CPTII,S$GLB,, | Performed by: NURSE PRACTITIONER

## 2022-07-27 PROCEDURE — 1159F PR MEDICATION LIST DOCUMENTED IN MEDICAL RECORD: ICD-10-PCS | Mod: CPTII,S$GLB,, | Performed by: NURSE PRACTITIONER

## 2022-07-27 PROCEDURE — 73030 X-RAY EXAM OF SHOULDER: CPT | Mod: 26,RT,, | Performed by: RADIOLOGY

## 2022-07-27 PROCEDURE — 1125F AMNT PAIN NOTED PAIN PRSNT: CPT | Mod: CPTII,S$GLB,, | Performed by: NURSE PRACTITIONER

## 2022-07-27 PROCEDURE — 73030 XR SHOULDER COMPLETE 2 OR MORE VIEWS RIGHT: ICD-10-PCS | Mod: 26,RT,, | Performed by: RADIOLOGY

## 2022-07-27 PROCEDURE — 73030 X-RAY EXAM OF SHOULDER: CPT | Mod: TC,RT

## 2022-07-27 PROCEDURE — 99999 PR PBB SHADOW E&M-EST. PATIENT-LVL V: ICD-10-PCS | Mod: PBBFAC,,, | Performed by: NURSE PRACTITIONER

## 2022-07-27 PROCEDURE — 99214 OFFICE O/P EST MOD 30 MIN: CPT | Mod: S$GLB,,, | Performed by: NURSE PRACTITIONER

## 2022-07-27 PROCEDURE — 99999 PR PBB SHADOW E&M-EST. PATIENT-LVL V: CPT | Mod: PBBFAC,,, | Performed by: NURSE PRACTITIONER

## 2022-07-27 PROCEDURE — 3288F FALL RISK ASSESSMENT DOCD: CPT | Mod: CPTII,S$GLB,, | Performed by: NURSE PRACTITIONER

## 2022-07-27 PROCEDURE — 1101F PT FALLS ASSESS-DOCD LE1/YR: CPT | Mod: CPTII,S$GLB,, | Performed by: NURSE PRACTITIONER

## 2022-07-27 NOTE — TELEPHONE ENCOUNTER
Called and spoke to daughter Cally about patients possible old fracture. Appointment set for tomorrow in [facility].

## 2022-07-27 NOTE — TELEPHONE ENCOUNTER
Shoulder XRAY  FINDINGS:  Degenerative change right AC and glenohumeral joints.  There is no evidence for acute fracture line or dislocation right shoulder.  There is a small ossific focus adjacent to the posterior glenoid labrum only seen on the axillary view which may represent unfused accessory ossification center with loose body or remote fracture fragment not excluded.  Clinical correlation and further evaluation as warranted.  There is no focal bony erosion.  No consolidation in the visualized right lung.     This report was flagged in Epic as abnormal.     Impression:     Please see above        Electronically signed by: Kushal Garzon DO  Date:                                            07/27/2022  Time:                                           15:12    Referred to Ortho. Results were shared with patient by phone call also.       SIDDHARTH

## 2022-07-27 NOTE — PROGRESS NOTES
"Subjective:     Laverne Humphries     Chief Complaint   Patient presents with    Right Shoulder - Pain       HPI    Laverne is a 81 y.o. female coming in today for right shoulder pain that began about 5 day(s) ago, referred by IVETTE Wilson. Pt. describes the pain as a 6/10 currently, 10/10 at worst achy pain that does radiate to posterior arm. There was not a fall/injury/ or trauma associated with the onset of symptoms. The pain is better with rest and worse with and ROM. Pt reports taking tylenol for the pain. Unable to NSAIDs as she is on Elequis. Pt reports bursitis "years" ago and had excellent relief with CSI. Continues to do HEP consistently and does yoga. Pt. Denies any other musculoskeletal complaints at this time.     Joint instability? no  Mechanical locking/clicking? no  Affecting ADL's? yes  Affecting sleep? yes    Occupation: retired    Review of Systems   Constitutional: Negative for chills and fever.   Musculoskeletal: Positive for joint pain. Negative for back pain, falls, myalgias and neck pain.   Neurological: Negative for dizziness, tingling, focal weakness, weakness and headaches.     PAST MEDICAL HISTORY:   Past Medical History:   Diagnosis Date    Allergy     Anxiety     Arthritis     Asthma     mild    Cataract     Hepatitis     IBS (irritable bowel syndrome)     Reflux esophagitis     Torus palatinus      PAST SURGICAL HISTORY:   Past Surgical History:   Procedure Laterality Date    BELPHAROPTOSIS REPAIR Bilateral     CATARACT EXTRACTION W/  INTRAOCULAR LENS IMPLANT Bilateral     CATARACT EXTRACTION, BILATERAL      CHOLECYSTECTOMY      COLONOSCOPY N/A 01/20/2022    Procedure: COLONOSCOPY;  Surgeon: Emmanuel Sandoval MD;  Location: [address];  Service: Endoscopy;  Laterality: N/A;    ESOPHAGOGASTRODUODENOSCOPY N/A 01/20/2022    Procedure: EGD (ESOPHAGOGASTRODUODENOSCOPY) any GI doc, please perform colonoscopy and EGD at same time;  Surgeon: Emmanuel " BENIGNO Sandoval MD;  Location: [facility] (4TH FLR);  Service: Endoscopy;  Laterality: N/A;  EGD & Colonoscopy orders combined-MS  fully vaccinated  1/13 covid test 1/17 @ [facility]    EYE SURGERY      FLEXIBLE SIGMOIDOSCOPY  4/26/2022    Procedure: SIGMOIDOSCOPY, FLEXIBLE;  Surgeon: Alison Mcclain MD;  Location: [facility] OR;  Service: Colon and Rectal;;    HYSTERECTOMY      OOPHORECTOMY      ROBOT-ASSISTED LAPAROSCOPIC COLECTOMY USING DA DIANA XI N/A 4/26/2022    Procedure: XI ROBOTIC COLECTOMY with flexible sigmoidoscopy;  Surgeon: Alison Mcclain MD;  Location: [facility];  Service: Colon and Rectal;  Laterality: N/A;     FAMILY HISTORY:   Family History   Problem Relation Age of Onset    Cancer Mother         pancreatic    Asthma Sister     Cataracts Sister     Cataracts Father     Diabetes Father     No Known Problems Daughter     No Known Problems Son     Amblyopia Neg Hx     Blindness Neg Hx     Glaucoma Neg Hx     Macular degeneration Neg Hx     Retinal detachment Neg Hx     Strabismus Neg Hx     Breast cancer Neg Hx     Colon cancer Neg Hx     Ovarian cancer Neg Hx      SOCIAL HISTORY:   Social History     Socioeconomic History    Marital status:    Tobacco Use    Smoking status: Never Smoker    Smokeless tobacco: Never Used   Substance and Sexual Activity    Alcohol use: No    Drug use: No    Sexual activity: Not Currently     Partners: Male     Birth control/protection: Abstinence, None   Other Topics Concern    Are you pregnant or think you may be? No    Breast-feeding No       MEDICATIONS:   Current Outpatient Medications:     albuterol (PROVENTIL/VENTOLIN HFA) 90 mcg/actuation inhaler, Inhale 2 puffs into the lungs every 6 (six) hours as needed for Wheezing or Shortness of Breath (generic preferred). Rescue, Disp: 18 g, Rfl: 3    apixaban (ELIQUIS) 2.5 mg Tab, Take 1 tablet (2.5 mg total) by mouth 2 (two) times daily., Disp: 60 tablet, Rfl: 11    calcium-vitamin D3 500  mg(1,250mg) -200 unit per tablet, Take 1 tablet by mouth Daily. , Disp: , Rfl:     carboxymethylcellulose (REFRESH PLUS) 0.5 % Dpet, 1 drop 3 (three) times daily as needed., Disp: , Rfl:     diclofenac sodium (VOLTAREN) 1 % Gel, Apply 2 g topically once daily. (Patient not taking: No sig reported), Disp: 150 g, Rfl: 2    estradioL (ESTRACE) 0.01 % (0.1 mg/gram) vaginal cream, Place 1 g vaginally once daily. (Patient not taking: No sig reported), Disp: 42.5 g, Rfl: 3    gabapentin (NEURONTIN) 300 MG capsule, Take 2 capsules (600 mg total) by mouth every evening. (Patient taking differently: Take 600 mg by mouth every evening. PRN), Disp: 180 capsule, Rfl: 3    LORazepam (ATIVAN) 0.5 MG tablet, TAKE 1 TABLET BY MOUTH EVERY EVENING., Disp: 30 tablet, Rfl: 3    meloxicam (MOBIC) 7.5 MG tablet, Take 1 tablet (7.5 mg total) by mouth once daily. (Patient taking differently: Take 7.5 mg by mouth once daily. PRN), Disp: 30 tablet, Rfl: 12    metoprolol succinate (TOPROL-XL) 50 MG 24 hr tablet, Take 1 tablet (50 mg total) by mouth once daily., Disp: 90 tablet, Rfl: 3    SHINGRIX, PF, 50 mcg/0.5 mL injection, , Disp: , Rfl:     triamcinolone acetonide 0.1% (KENALOG) 0.1 % cream, Apply topically 2 (two) times daily. Apply to itchy irritated rash twice daily. Do not apply to face, groin, or skin folds, Disp: 80 g, Rfl: 1  No current facility-administered medications for this visit.    Facility-Administered Medications Ordered in Other Visits:     LIDOcaine (PF) 10 mg/ml (1%) injection 10 mg, 1 mL, Intradermal, Once, Aurdey Caicedo NP    mupirocin 2 % ointment, , Nasal, On Call Procedure, Audrey Caicedo NP, Given at 04/26/22 0726  ALLERGIES:   Review of patient's allergies indicates:   Allergen Reactions    Codeine      Other reaction(s): Syncope, can take tylenol    Sulfa (sulfonamide antibiotics)      Other reaction(s): Stomach upset     Reviewed office visit on 7/27/22 with IVETTE Wilson:  Chronic right shoulder pain  (Acute on chronic; was doing well until 5 days; she owes her past 3 years of being essentially pain-free to this joint, to a 'steroid injection had received').    IMAGIN. X-ray ordered due to right shoulder pain. (AP, scapular Y, and axillary views) taken 22.   2. X-ray images were reviewed personally by me and then directly with patient.  3. FINDINGS:   Degenerative change right AC and glenohumeral joints.  There is no evidence for acute fracture line or dislocation right shoulder.  There is a small ossific focus adjacent to the posterior glenoid labrum only seen on the axillary view which may represent unfused accessory ossification center with loose body or remote fracture fragment not excluded.  Clinical correlation and further evaluation as warranted.  There is no focal bony erosion.  No consolidation in the visualized right lung. This report was flagged in Epic as abnormal.  4. IMPRESSION: Please see above     Objective:     VITAL SIGNS: BP (!) 142/60   Ht 5' (1.524 m)   Wt 49.4 kg (109 lb)   LMP  (LMP Unknown)   BMI 21.29 kg/m²    General    Nursing note and vitals reviewed.  Constitutional: She is oriented to person, place, and time. She appears well-developed and well-nourished.   HENT:   Head: Normocephalic and atraumatic.   no nasal discharge, no external ear redness or discharge   Eyes:   EOM is full and smooth  No eye redness or discharge   Neck: Neck supple. No tracheal deviation present.   Cardiovascular: Normal rate.    2+ Radial pulse bilaterally  2+ Dorsalis Pedis pulse bilaterally  No LE edema appreciated   Pulmonary/Chest: Effort normal. No respiratory distress.   Abdominal: She exhibits no distension.   No rigidity   Neurological: She is alert and oriented to person, place, and time. She exhibits normal muscle tone. Coordination normal.   See details below   Psychiatric: She has a normal mood and affect. Her behavior is normal.                MUSCULOSKELETAL EXAM  Shoulder: right SHOULDER  The affected shoulder is compared to the contralateral shoulder.    Observation:      SHOULDER  No ecchymosis, edema, or erythema throughout the shoulder girdle.  No sternal, clavicular, or acromial deformities bilaterally.  No atrophy of the pectorals, deltoids, supraspinatus, infraspinatus, or biceps bilaterally.  No asymmetry of shoulders bilaterally. Bilateral anterior shoulder girdle    Posture:  Increased thoracic kyphosis and Anterior head carriage  Gait: Non-antalgic     ROM (* = with pain):  CERVICAL SPINE  decreased AROM in flexion, extension, sidebending, and rotation.    SHOULDER  Active flexion to 180° on left and 160° on right*.  Active abduction to 180° on left and 150° on right*.  Active internal rotation to T9 on left and T12 on right*.    Active external rotation to T3 on left and T2 on right*.    No scapular dyskinesia or winging.    Tenderness:  No tenderness at the SC or AC joint  No tenderness over the clavicle   No tenderness over biceps tendon or bicipital groove  + tenderness over subacromial space    Strength Testing (* = with pain):  Deltoid - 5/5 on left and 5/5 on right  Biceps - 5/5 on left and 5/5 on right  Triceps - 5/5 on left and 5/5 on right  Wrist extension - 5/5 on left and 5/5 on right  Wrist flexion - 5/5 on left and 5/5 on right   - 5/5 on left and 5/5 on right  Finger extension - 5/5 on left and 5/5 on right  Finger abduction - 5/5 on left and 5/5 on right    Special Tests:  Empty can test - negative for weakness, + for pain  Full can test - negative for weakness, + for pain  Bear hug test - negative  Belly press test - negative  Resisted internal rotation - negative  Resisted external rotation - negative    Neer's test - positive  Hawkin's-Kenrick test - positive  Cross-arm test- negative    Sulcus sign - none  AP load and shift laxity - none    Speed's test - negative  Yergason's test -  negative    Neurovascular Exam:  Spurlings test - negative bialterally  Lhermittes test - negative  2+ radial pulses bilaterally  Sensation intact to light touch in the distal median, radial, and ulnar nerve distributions bilaterally.  2+/4 reflexes at triceps, biceps, and brachioradialis  Capillary refill intact <2 seconds in all digits bilaterally    TART (Tissue texture abnormality, Asymmetry,  Restriction of motion and/or Tenderness) changes:    Head: Occipitoatlantal (OA) Joint ES-left, R-right     Cervical Spine   C1 TTA RIGHT   C2 FRS LEFT   C3 FRS LEFT   C4 Neutral   C5 Neutral   C6 Neutral   C7 ERS LEFT      Thoracic Spine   T1 Neutral   T2 Neutral   T3 Neutral   T4 Neutral   T5 Neutral   T6 FRS RIGHT   T7 FRS LEFT   T8 FRS LEFT   T9 Neutral   T10 Neutral   T11 Neutral   T12 Neutral     Rib cage: R6 external torsion on right    Upper extremity: left thoracic outlet TTA    Abdomen: left hemidiaphragm TTA    Key   F= Flexed   E = Extended   R = Rotated   S = Sidebent   TTA = tissue texture abnormality     Large Joint Aspiration/Injection  Subacromial bursa, right    Performed by: DEREK KATE  Authorized by: DEREK KATE  Consent Done?: Yes (Verbal)  Indications: Pain  Site marked: The procedure site was marked   Timeout: Prior to procedure the correct patient, procedure, and site was verified     Location: Subacromial bursa, right  Prep: Prep: Patient was prepped with Chlorhexidine and alcohol.  Skin anesthetic: Ethyl Chloride spray was used prior to skin puncture.  Ultrasound guidance for needle placement: yes  Needle size: 22 G, 2.5  Approach: Posterior  Procedure: After skin anesthetic was applied, the 22G, 2.5 needle was used to enter the subacromial bursa under US guidance. A 3 cc mixture of 1 cc of 40 mg/ml triamcinolone acetonide and 2 cc of 0.2% Naropin was injected into the bursa.   Medications: 40 mg triamcinolone acetonide 40 mg/mL  Patient tolerance: Patient tolerated the procedure  "well with no immediate complications    Ultrasound guidance was used for needle localization with SonoSite Edge 2, 9-L MHz linear probe(s). Images were saved and stored for documentation. The shoulder structures were well visualized, noting associated subacromial bursitis on US. Dynamic visualization of the 22g 2.5" needle(s) was continuous throughout the procedure and maintained good position and correct needle placement.        Triamcinolone:  NDC: [address]-4  LOT: [id number]  EXP: 20/2023      Assessment:      Encounter Diagnoses   Name Primary?    Chronic right shoulder pain Yes    Subacromial bursitis of right shoulder joint     Impingement syndrome of right shoulder     Primary osteoarthritis of right shoulder     Myalgia     Somatic dysfunction of head region     Cervical (neck) region somatic dysfunction     Somatic dysfunction of rib cage region     Somatic dysfunction of thoracic region     Upper extremity somatic dysfunction     Somatic dysfunction of abdominal region           Plan:   1. Deteriorated chronic right shoulder pain secondary to impingement from poor thoracic and scapular biomechanics with associated subacromial bursitis and underlying DJD changes.  Relief with subacromial bursa CSI in the past.  - OMT performed today to address associated biomechanical restrictions  and HEP started.   - Patient received an ultrasound guided corticosteroid injection of the right subacromial bursa today (see details above).   - recommend over-the-counter Tylenol as needed for pain control as well as ice of 20 minutes at a time.  Recommend avoiding NSAIDs on Eliquis.  - discussed option of formal physical therapy and possible ultrasound-guided intra-articular glenohumeral joint CSI as next steps if symptoms persist  -  X-ray images of right shoulder taken 7/27/22 (AP, scapular Y, and axillary views) showed degenerative changes of right AC and glenohumeral joints, small ossific focus adjacent to the " posterior glenoid labrum, likely a loose body. Images were personally reviewed with patient.    2. OMT 5-6 regions. Oral consent obtained.  Reviewed benefits and potential side effects.   - OMT indicated today due to signs and symptoms as well as local and remote somatic dysfunction findings and their related neurokinetic, lymphatic, fascial and/or arteriovenous body connections.   - OMT techniques used: Myofascial Release, Muscle Energy and Still's Technique   - Treatment was tolerated well. Improvement noted in segmental mobility post-treatment in dysfunctional regions. There were no adverse events and no complications immediately following treatment.     3. Pt. Given the following HEP:  A)  Supine Thoracic extension exercise: 10-15 reps, twice daily. Handout also given.   B) Diaphragmatic breathing exercises: 10-15 reps of inhalation and exhalation, focusing inhaling into the abdomen and lower ribs. Repeat 2-3 times a day. Handout given.   C) continue shoulder ROM and neck stretching HEP    [id number] HOME EXERCISE PROGRAM (HEP):  The patient was taught a homegoing physical therapy regimen as described above. The patient demonstrated understanding of the exercises and proper technique of their execution. This interaction took 15 minutes.     4. Follow-up as needed if pain deteriorates or new issue arises    5. Patient agreeable to today's plan and all questions were answered    This note is dictated using the M*Modal Fluency Direct word recognition program. There are word recognition mistakes that are occasionally missed on review.

## 2022-07-27 NOTE — PROGRESS NOTES
"INTERNAL MEDICINE CLINIC - SAME DAY APPOINTMENT  Progress Note    PRESENTING HISTORY     PCP: Fran Castro MD    Chief Complaint/Reason for Visit:   No chief complaint on file.       History of Present Illness & ROS : Ms. Laverne Humphries is a 81 y.o. female.    Same day appt.   New to me.   Very pleasant lady.  Reports that no change in physical level of activity, started with right shoulder pain about 5 days ago, had this a few years ago, had injections and they 'worked'. Denies a change in her level of activity.   Hurts 'all the time'. Feels 'like sharp pains".   Tried gel, heating pad, and was off the Mobic due to being on Eliquis. She is interested in steroid injection and seeing Ortho Provider, but understands that there is a risk being on the 'blood thinners'.   No chest pain or SOB.    Review of Systems:  Eyes: denies visual changes at this time denies floaters   ENT: no nasal congestion or sore throat  Respiratory: no cough or shorness of breath  Cardiovascular: no chest pain or palpitations  Gastrointestinal: no nausea or vomiting, no abdominal pain or change in bowel habits  Genitourinary: no hematuria or dysuria; denies frequency  Hematologic/Lymphatic: no easy bruising or lymphadenopathy  Neurological: no seizures or tremors  Endocrine: no heat or cold intolerance      PAST HISTORY:     Past Medical History:   Diagnosis Date    Allergy     Anxiety     Arthritis     Asthma     mild    Cataract     Hepatitis     IBS (irritable bowel syndrome)     Reflux esophagitis     Torus palatinus        Past Surgical History:   Procedure Laterality Date    BELPHAROPTOSIS REPAIR Bilateral     CATARACT EXTRACTION W/  INTRAOCULAR LENS IMPLANT Bilateral     CATARACT EXTRACTION, BILATERAL      CHOLECYSTECTOMY      COLONOSCOPY N/A 01/20/2022    Procedure: COLONOSCOPY;  Surgeon: Emmanuel Sandoval MD;  Location: [address];  Service: Endoscopy;  Laterality: N/A;    ESOPHAGOGASTRODUODENOSCOPY " N/A 01/20/2022    Procedure: EGD (ESOPHAGOGASTRODUODENOSCOPY) any GI doc, please perform colonoscopy and EGD at same time;  Surgeon: Emmanuel Sandoval MD;  Location: [address];  Service: Endoscopy;  Laterality: N/A;  EGD & Colonoscopy orders combined-MS  fully vaccinated  1/13 covid test 1/17 @ [facility]    EYE SURGERY      FLEXIBLE SIGMOIDOSCOPY  4/26/2022    Procedure: SIGMOIDOSCOPY, FLEXIBLE;  Surgeon: Alison Mcclain MD;  Location: [facility];  Service: Colon and Rectal;;    HYSTERECTOMY      OOPHORECTOMY      ROBOT-ASSISTED LAPAROSCOPIC COLECTOMY USING DA DIANA XI N/A 4/26/2022    Procedure: XI ROBOTIC COLECTOMY with flexible sigmoidoscopy;  Surgeon: Alison Mcclain MD;  Location: [facility];  Service: Colon and Rectal;  Laterality: N/A;       Family History   Problem Relation Age of Onset    Cancer Mother         pancreatic    Asthma Sister     Cataracts Sister     Cataracts Father     Diabetes Father     No Known Problems Daughter     No Known Problems Son     Amblyopia Neg Hx     Blindness Neg Hx     Glaucoma Neg Hx     Macular degeneration Neg Hx     Retinal detachment Neg Hx     Strabismus Neg Hx     Breast cancer Neg Hx     Colon cancer Neg Hx     Ovarian cancer Neg Hx        Social History     Socioeconomic History    Marital status:    Tobacco Use    Smoking status: Never Smoker    Smokeless tobacco: Never Used   Substance and Sexual Activity    Alcohol use: No    Drug use: No    Sexual activity: Not Currently     Partners: Male     Birth control/protection: Abstinence, None   Other Topics Concern    Are you pregnant or think you may be? No    Breast-feeding No       MEDICATIONS & ALLERGIES:     Current Outpatient Medications on File Prior to Visit   Medication Sig Dispense Refill    albuterol (PROVENTIL/VENTOLIN HFA) 90 mcg/actuation inhaler Inhale 2 puffs into the lungs every 6 (six) hours as needed for Wheezing or Shortness of Breath (generic preferred).  Rescue 18 g 3    apixaban (ELIQUIS) 2.5 mg Tab Take 1 tablet (2.5 mg total) by mouth 2 (two) times daily. 60 tablet 11    benzonatate (TESSALON) 200 MG capsule Take 1 capsule (200 mg total) by mouth 3 (three) times daily as needed for Cough. (Patient not taking: Reported on 7/13/2022) 30 capsule 1    calcium-vitamin D3 500 mg(1,250mg) -200 unit per tablet Take 1 tablet by mouth Daily.       carboxymethylcellulose (REFRESH PLUS) 0.5 % Dpet 1 drop 3 (three) times daily as needed.      diclofenac sodium (VOLTAREN) 1 % Gel Apply 2 g topically once daily. (Patient not taking: Reported on 7/13/2022) 150 g 2    estradioL (ESTRACE) 0.01 % (0.1 mg/gram) vaginal cream Place 1 g vaginally once daily. (Patient not taking: Reported on 7/13/2022) 42.5 g 3    fluticasone propionate (FLONASE) 50 mcg/actuation nasal spray 2 sprays (100 mcg total) by Each Nostril route once daily. 3 mo supply (Patient not taking: Reported on 7/13/2022) 48 g 4    gabapentin (NEURONTIN) 300 MG capsule Take 2 capsules (600 mg total) by mouth every evening. (Patient taking differently: Take 600 mg by mouth every evening. PRN) 180 capsule 3    HYDROcodone-acetaminophen (NORCO) 5-325 mg per tablet Take 1 tablet by mouth every 6 (six) hours as needed for Pain. 20 tablet 0    LORazepam (ATIVAN) 0.5 MG tablet TAKE 1 TABLET BY MOUTH EVERY EVENING. 30 tablet 3    meloxicam (MOBIC) 7.5 MG tablet Take 1 tablet (7.5 mg total) by mouth once daily. (Patient taking differently: Take 7.5 mg by mouth once daily. PRN) 30 tablet 12    metoprolol succinate (TOPROL-XL) 50 MG 24 hr tablet Take 1 tablet (50 mg total) by mouth once daily. 90 tablet 3    SHINGRIX, PF, 50 mcg/0.5 mL injection       triamcinolone acetonide 0.1% (KENALOG) 0.1 % cream Apply topically 2 (two) times daily. Apply to itchy irritated rash twice daily. Do not apply to face, groin, or skin folds 80 g 1     Current Facility-Administered Medications on File Prior to Visit   Medication Dose  Route Frequency Provider Last Rate Last Admin    LIDOcaine (PF) 10 mg/ml (1%) injection 10 mg  1 mL Intradermal Once Audrey Caicedo NP        mupirocin 2 % ointment   Nasal On Call Procedure Audrey Caicedo NP   Given at 04/26/22 0726        Review of patient's allergies indicates:   Allergen Reactions    Codeine      Other reaction(s): Syncope, can take tylenol    Sulfa (sulfonamide antibiotics)      Other reaction(s): Stomach upset       Medications Reconciliation:   I have reconciled the patient's home medications with the patient/family. I have updated all changes.  Refer to After-Visit Medication List.    OBJECTIVE:     Vital Signs:  There were no vitals filed for this visit.  Wt Readings from Last 3 Encounters:   07/13/22 1331 48.3 kg (106 lb 7.7 oz)   05/30/22 1017 46.8 kg (103 lb 2.8 oz)   05/13/22 0844 48.3 kg (106 lb 7.7 oz)     There is no height or weight on file to calculate BMI.   Wt Readings from Last 3 Encounters:   07/13/22 48.3 kg (106 lb 7.7 oz)   05/30/22 46.8 kg (103 lb 2.8 oz)   05/13/22 48.3 kg (106 lb 7.7 oz)     Temp Readings from Last 3 Encounters:   04/29/22 96.5 °F (35.8 °C) (Oral)   04/01/22 97.6 °F (36.4 °C) (Oral)   03/30/22 98 °F (36.7 °C)     BP Readings from Last 3 Encounters:   07/13/22 (!) 144/70   05/30/22 138/61   05/13/22 (!) 149/60     Pulse Readings from Last 3 Encounters:   07/13/22 68   05/30/22 75   05/13/22 85       Physical Exam:  (Focused Exam)  General: Well developed, well nourished. No distress.  HEENT: Head is normocephalic, atraumatic   Eyes: Clear conjunctiva.  Neck: Supple, symmetrical neck; trachea midline.  Lungs: Clear to auscultation bilaterally and normal respiratory effort.  Cardiovascular: Heart with regular rate and rhythm. No murmurs, gallops or rubs  Extremities: No LE edema. Pulses 2+ and symmetric.   Right Shoulder region: + expressed pain upon palpation of anterior region and with flexion and extension  Skin: Skin color, texture, turgor  normal. No rashes.  Musculoskeletal: Normal gait.   Neurologic:  No focal numbness or weakness.   Psychiatric: Not depressed.        Laboratory  Lab Results   Component Value Date    WBC 8.04 04/27/2022    HGB 11.1 (L) 04/27/2022    HCT 33.7 (L) 04/27/2022     04/27/2022    CHOL 182 02/21/2022    TRIG 90 02/21/2022    HDL 58 02/21/2022    ALT 13 02/21/2022    AST 22 02/21/2022     (L) 04/27/2022    K 4.4 04/27/2022     04/27/2022    CREATININE 0.9 04/27/2022    BUN 14 04/27/2022    CO2 23 04/27/2022    TSH 2.647 10/19/2021     Xray Shoulder  IMPRESSION:      Degenerative changes with no evidence of fracture or dislocation         Electronically signed by: NEMESIO CORTEZ MD  Date:                                            10/17/15  Time:                                           10:21     ASSESSMENT & PLAN:     Same day appt.   (With patient's permission, conferred on the phone with her very supportive dtr, Cally to update on findings, include on the plans and answer any questions).    Chronic right shoulder pain  (Acute on chronic; was doing well until 5 days; she owes her past 3 years of being essentially pain-free to this joint, to a 'steroid injection had received').  -     X-ray Shoulder 2 or More Views Right; Future; Expected date: 07/27/2022  -     Ambulatory referral/consult to Orthopedics; Future; Expected date: 08/03/2022 (consideration of intra-articular joint injection, if safe to do so, to afford her increase functional mobility and pain relief once again; advised to follow up with Dr. Castro or I post visit with Ortho, and can accomplish this via phone / virtual as well if she prefers)  ` continue Neurontin  (declines the need for an adjustment in the dose)    Future Appointments   Date Time Provider Department Center   7/27/2022  2:45 PM [id number] 485 LB LIMIT [facility] IM Jonathan Ruiz PCW   11/9/2022  1:15 PM Lisa Yeh MD [id number]        Medication List           Accurate as of July 27, 2022  2:25 PM. If you have any questions, ask your nurse or doctor.            CHANGE how you take these medications    gabapentin 300 MG capsule  Commonly known as: NEURONTIN  Take 2 capsules (600 mg total) by mouth every evening.  What changed: additional instructions     meloxicam 7.5 MG tablet  Commonly known as: MOBIC  Take 1 tablet (7.5 mg total) by mouth once daily.  What changed: additional instructions        CONTINUE taking these medications    albuterol 90 mcg/actuation inhaler  Commonly known as: PROVENTIL/VENTOLIN HFA  Inhale 2 puffs into the lungs every 6 (six) hours as needed for Wheezing or Shortness of Breath (generic preferred). Rescue     apixaban 2.5 mg Tab  Commonly known as: ELIQUIS  Take 1 tablet (2.5 mg total) by mouth 2 (two) times daily.     calcium-vitamin D3 500 mg-5 mcg (200 unit) per tablet  Commonly known as: OS-GRAY 500 + D3     carboxymethylcellulose 0.5 % Dpet  Commonly known as: REFRESH PLUS     diclofenac sodium 1 % Gel  Commonly known as: VOLTAREN  Apply 2 g topically once daily.     estradioL 0.01 % (0.1 mg/gram) vaginal cream  Commonly known as: ESTRACE  Place 1 g vaginally once daily.     LORazepam 0.5 MG tablet  Commonly known as: ATIVAN  TAKE 1 TABLET BY MOUTH EVERY EVENING.     metoprolol succinate 50 MG 24 hr tablet  Commonly known as: TOPROL-XL  Take 1 tablet (50 mg total) by mouth once daily.     SHINGRIX (PF) 50 mcg/0.5 mL injection  Generic drug: varicella-zoster [id number] (PF)     triamcinolone acetonide 0.1% 0.1 % cream  Commonly known as: KENALOG  Apply topically 2 (two) times daily. Apply to itchy irritated rash twice daily. Do not apply to face, groin, or skin folds        STOP taking these medications    benzonatate 200 MG capsule  Commonly known as: TESSALON  Stopped by: IVETTE Nolan     fluticasone propionate 50 mcg/actuation nasal spray  Commonly known as: FLONASE  Stopped by: IVETTE Nolan      HYDROcodone-acetaminophen 5-325 mg per tablet  Commonly known as: NORCO  Stopped by: IVETTE Nolan          Signing Physician:  IVETTE Nolan

## 2022-07-28 ENCOUNTER — OFFICE VISIT (OUTPATIENT)
Dept: SPORTS MEDICINE | Facility: CLINIC | Age: 82
End: 2022-07-28
Payer: MEDICARE

## 2022-07-28 VITALS
WEIGHT: 109 LBS | BODY MASS INDEX: 21.4 KG/M2 | SYSTOLIC BLOOD PRESSURE: 142 MMHG | DIASTOLIC BLOOD PRESSURE: 60 MMHG | HEIGHT: 60 IN

## 2022-07-28 DIAGNOSIS — M75.41 IMPINGEMENT SYNDROME OF RIGHT SHOULDER: ICD-10-CM

## 2022-07-28 DIAGNOSIS — M79.10 MYALGIA: ICD-10-CM

## 2022-07-28 DIAGNOSIS — M25.511 CHRONIC RIGHT SHOULDER PAIN: Primary | ICD-10-CM

## 2022-07-28 DIAGNOSIS — M99.09 SOMATIC DYSFUNCTION OF ABDOMINAL REGION: ICD-10-CM

## 2022-07-28 DIAGNOSIS — M99.02 SOMATIC DYSFUNCTION OF THORACIC REGION: ICD-10-CM

## 2022-07-28 DIAGNOSIS — M75.51 SUBACROMIAL BURSITIS OF RIGHT SHOULDER JOINT: ICD-10-CM

## 2022-07-28 DIAGNOSIS — M99.01 CERVICAL (NECK) REGION SOMATIC DYSFUNCTION: ICD-10-CM

## 2022-07-28 DIAGNOSIS — M99.08 SOMATIC DYSFUNCTION OF RIB CAGE REGION: ICD-10-CM

## 2022-07-28 DIAGNOSIS — M19.011 PRIMARY OSTEOARTHRITIS OF RIGHT SHOULDER: ICD-10-CM

## 2022-07-28 DIAGNOSIS — G89.29 CHRONIC RIGHT SHOULDER PAIN: Primary | ICD-10-CM

## 2022-07-28 DIAGNOSIS — M99.00 SOMATIC DYSFUNCTION OF HEAD REGION: ICD-10-CM

## 2022-07-28 DIAGNOSIS — M99.07 UPPER EXTREMITY SOMATIC DYSFUNCTION: ICD-10-CM

## 2022-07-28 PROCEDURE — 3078F DIAST BP <80 MM HG: CPT | Mod: CPTII,S$GLB,, | Performed by: NEUROMUSCULOSKELETAL MEDICINE & OMM

## 2022-07-28 PROCEDURE — 1159F PR MEDICATION LIST DOCUMENTED IN MEDICAL RECORD: ICD-10-PCS | Mod: CPTII,S$GLB,, | Performed by: NEUROMUSCULOSKELETAL MEDICINE & OMM

## 2022-07-28 PROCEDURE — 20611 DRAIN/INJ JOINT/BURSA W/US: CPT | Mod: 79,RT,S$GLB, | Performed by: NEUROMUSCULOSKELETAL MEDICINE & OMM

## 2022-07-28 PROCEDURE — 20611 PR DRAIN/ASP/INJECT MAJOR JOINT/BURSA W/US GUIDANCE: ICD-10-PCS | Mod: 79,RT,S$GLB, | Performed by: NEUROMUSCULOSKELETAL MEDICINE & OMM

## 2022-07-28 PROCEDURE — 3077F SYST BP >= 140 MM HG: CPT | Mod: CPTII,S$GLB,, | Performed by: NEUROMUSCULOSKELETAL MEDICINE & OMM

## 2022-07-28 PROCEDURE — 1160F RVW MEDS BY RX/DR IN RCRD: CPT | Mod: CPTII,S$GLB,, | Performed by: NEUROMUSCULOSKELETAL MEDICINE & OMM

## 2022-07-28 PROCEDURE — 3288F PR FALLS RISK ASSESSMENT DOCUMENTED: ICD-10-PCS | Mod: CPTII,S$GLB,, | Performed by: NEUROMUSCULOSKELETAL MEDICINE & OMM

## 2022-07-28 PROCEDURE — 3077F PR MOST RECENT SYSTOLIC BLOOD PRESSURE >= 140 MM HG: ICD-10-PCS | Mod: CPTII,S$GLB,, | Performed by: NEUROMUSCULOSKELETAL MEDICINE & OMM

## 2022-07-28 PROCEDURE — 99204 OFFICE O/P NEW MOD 45 MIN: CPT | Mod: 25,S$GLB,, | Performed by: NEUROMUSCULOSKELETAL MEDICINE & OMM

## 2022-07-28 PROCEDURE — 98927 OSTEOPATH MANJ 5-6 REGIONS: CPT | Mod: 79,S$GLB,, | Performed by: NEUROMUSCULOSKELETAL MEDICINE & OMM

## 2022-07-28 PROCEDURE — 1159F MED LIST DOCD IN RCRD: CPT | Mod: CPTII,S$GLB,, | Performed by: NEUROMUSCULOSKELETAL MEDICINE & OMM

## 2022-07-28 PROCEDURE — 1101F PR PT FALLS ASSESS DOC 0-1 FALLS W/OUT INJ PAST YR: ICD-10-PCS | Mod: CPTII,S$GLB,, | Performed by: NEUROMUSCULOSKELETAL MEDICINE & OMM

## 2022-07-28 PROCEDURE — 99999 PR PBB SHADOW E&M-EST. PATIENT-LVL III: CPT | Mod: PBBFAC,,, | Performed by: NEUROMUSCULOSKELETAL MEDICINE & OMM

## 2022-07-28 PROCEDURE — 1101F PT FALLS ASSESS-DOCD LE1/YR: CPT | Mod: CPTII,S$GLB,, | Performed by: NEUROMUSCULOSKELETAL MEDICINE & OMM

## 2022-07-28 PROCEDURE — 99204 PR OFFICE/OUTPT VISIT, NEW, LEVL IV, 45-59 MIN: ICD-10-PCS | Mod: 25,S$GLB,, | Performed by: NEUROMUSCULOSKELETAL MEDICINE & OMM

## 2022-07-28 PROCEDURE — 99999 PR PBB SHADOW E&M-EST. PATIENT-LVL III: ICD-10-PCS | Mod: PBBFAC,,, | Performed by: NEUROMUSCULOSKELETAL MEDICINE & OMM

## 2022-07-28 PROCEDURE — 3288F FALL RISK ASSESSMENT DOCD: CPT | Mod: CPTII,S$GLB,, | Performed by: NEUROMUSCULOSKELETAL MEDICINE & OMM

## 2022-07-28 PROCEDURE — 1160F PR REVIEW ALL MEDS BY PRESCRIBER/CLIN PHARMACIST DOCUMENTED: ICD-10-PCS | Mod: CPTII,S$GLB,, | Performed by: NEUROMUSCULOSKELETAL MEDICINE & OMM

## 2022-07-28 PROCEDURE — 3078F PR MOST RECENT DIASTOLIC BLOOD PRESSURE < 80 MM HG: ICD-10-PCS | Mod: CPTII,S$GLB,, | Performed by: NEUROMUSCULOSKELETAL MEDICINE & OMM

## 2022-07-28 PROCEDURE — 98927 PR OSTEOPATHIC MANIP,5-6 BODY REGN: ICD-10-PCS | Mod: 79,S$GLB,, | Performed by: NEUROMUSCULOSKELETAL MEDICINE & OMM

## 2022-07-28 RX ORDER — TRIAMCINOLONE ACETONIDE 40 MG/ML
40 INJECTION, SUSPENSION INTRA-ARTICULAR; INTRAMUSCULAR
Status: COMPLETED | OUTPATIENT
Start: 2022-07-28 | End: 2022-07-28

## 2022-07-28 RX ADMIN — TRIAMCINOLONE ACETONIDE 40 MG: 40 INJECTION, SUSPENSION INTRA-ARTICULAR; INTRAMUSCULAR at 10:07

## 2022-08-31 DIAGNOSIS — Z78.0 MENOPAUSE: ICD-10-CM

## 2022-09-05 ENCOUNTER — OFFICE VISIT (OUTPATIENT)
Dept: URGENT CARE | Facility: CLINIC | Age: 82
End: 2022-09-05
Payer: MEDICARE

## 2022-09-05 VITALS
RESPIRATION RATE: 18 BRPM | HEIGHT: 60 IN | WEIGHT: 107 LBS | OXYGEN SATURATION: 98 % | SYSTOLIC BLOOD PRESSURE: 154 MMHG | HEART RATE: 78 BPM | DIASTOLIC BLOOD PRESSURE: 73 MMHG | BODY MASS INDEX: 21.01 KG/M2 | TEMPERATURE: 98 F

## 2022-09-05 DIAGNOSIS — R05.9 COUGH: Primary | ICD-10-CM

## 2022-09-05 DIAGNOSIS — J06.9 VIRAL URI WITH COUGH: ICD-10-CM

## 2022-09-05 LAB
CTP QC/QA: YES
SARS-COV-2 RDRP RESP QL NAA+PROBE: NEGATIVE

## 2022-09-05 PROCEDURE — 1159F PR MEDICATION LIST DOCUMENTED IN MEDICAL RECORD: ICD-10-PCS | Mod: CPTII,S$GLB,,

## 2022-09-05 PROCEDURE — U0002: ICD-10-PCS | Mod: QW,S$GLB,,

## 2022-09-05 PROCEDURE — 3078F DIAST BP <80 MM HG: CPT | Mod: CPTII,S$GLB,,

## 2022-09-05 PROCEDURE — 99213 OFFICE O/P EST LOW 20 MIN: CPT | Mod: S$GLB,,,

## 2022-09-05 PROCEDURE — 1126F PR PAIN SEVERITY QUANTIFIED, NO PAIN PRESENT: ICD-10-PCS | Mod: CPTII,S$GLB,,

## 2022-09-05 PROCEDURE — 3077F PR MOST RECENT SYSTOLIC BLOOD PRESSURE >= 140 MM HG: ICD-10-PCS | Mod: CPTII,S$GLB,,

## 2022-09-05 PROCEDURE — 1159F MED LIST DOCD IN RCRD: CPT | Mod: CPTII,S$GLB,,

## 2022-09-05 PROCEDURE — 99213 PR OFFICE/OUTPT VISIT, EST, LEVL III, 20-29 MIN: ICD-10-PCS | Mod: S$GLB,,,

## 2022-09-05 PROCEDURE — 3077F SYST BP >= 140 MM HG: CPT | Mod: CPTII,S$GLB,,

## 2022-09-05 PROCEDURE — 1160F PR REVIEW ALL MEDS BY PRESCRIBER/CLIN PHARMACIST DOCUMENTED: ICD-10-PCS | Mod: CPTII,S$GLB,,

## 2022-09-05 PROCEDURE — U0002 COVID-19 LAB TEST NON-CDC: HCPCS | Mod: QW,S$GLB,,

## 2022-09-05 PROCEDURE — 3078F PR MOST RECENT DIASTOLIC BLOOD PRESSURE < 80 MM HG: ICD-10-PCS | Mod: CPTII,S$GLB,,

## 2022-09-05 PROCEDURE — 1160F RVW MEDS BY RX/DR IN RCRD: CPT | Mod: CPTII,S$GLB,,

## 2022-09-05 PROCEDURE — 1126F AMNT PAIN NOTED NONE PRSNT: CPT | Mod: CPTII,S$GLB,,

## 2022-09-05 RX ORDER — FLUTICASONE PROPIONATE 50 MCG
1 SPRAY, SUSPENSION (ML) NASAL DAILY
Qty: 9.9 ML | Refills: 0 | Status: SHIPPED | OUTPATIENT
Start: 2022-09-05 | End: 2022-10-05

## 2022-09-05 RX ORDER — AZELASTINE 1 MG/ML
1 SPRAY, METERED NASAL 2 TIMES DAILY
Qty: 30 ML | Refills: 0 | Status: SHIPPED | OUTPATIENT
Start: 2022-09-05 | End: 2023-06-05

## 2022-09-05 RX ORDER — BENZONATATE 200 MG/1
200 CAPSULE ORAL 3 TIMES DAILY PRN
Qty: 30 CAPSULE | Refills: 0 | Status: SHIPPED | OUTPATIENT
Start: 2022-09-05 | End: 2022-09-15

## 2022-09-05 NOTE — PATIENT INSTRUCTIONS
- Plain mucinex for nasal congestion.  - Flonase and azelastine for nasal congestion and post nasal drip.  - Benzonatate for cough.     - Rest.    - Drink plenty of fluids.    - Acetaminophen (tylenol) or Ibuprofen (advil,motrin) as directed as needed for fever/pain. Avoid tylenol if you have a history of liver disease. Do not take ibuprofen if you have a history of GI bleeding, kidney disease, or if you take blood thinners.   - Ibuprofen dosing for adults: 400 mg by mouth every 4-6 hours as needed. Max: 2400 mg/day; Info: use lowest effective dose, shortest effective treatment duration; give w/ food if GI upset occurs.  - Ibuprofen dosing for children: [6 mo-10 yo] Dose: 5-10 mg/kg/dose by mouth every 6-8h as needed; Max: 40 mg/kg/day; Info: use lower dose for fever <102.5 F, higher dose for fever >102.5 F; use shortest effective tx duration; give w/ food if GI upset occurs. [13 yo and older] Dose: 200-400 mg by mouth every 4-6 hours as needed; Max: 1200 mg/day; Info: use lowest effective dose, shortest effective tx duration; give w/ food if GI upset occurs.  - Tylenol dosing for adults: [By mouth route, immediate-release form] Dose: 325-1000 mg by mouth every 4-6h as needed; Max: 1 g/4h and 4 g/day from all sources. [By mouth route, extended-release form] Dose: 650-1300 mg Extended Release by mouth every 8h as needed; Max: 4 g/day from all sources.   - Tylenol dosing for children: 6-10 yo [ oral tablet/capsule ] Dose: 1 tab/cap by mouth every 4-6h as needed; Max: 5 tabs or caps/24h; Info: do not exceed 75 mg/kg/day, up to 1 g/4h and 4 g/day, from all sources. 13 yo and older [ oral tablet/capsule ] Dose: 1-2 tabs/caps by mouth every 4-6h as needed; Max: 10 tabs or caps/24h; Info: do not exceed 1 g/4h and 4 g/day from all sources.    - You must understand that you have received an Urgent Care treatment only and that you may be released before all of your medical problems are known or treated.   - You, the patient,  will arrange for follow up care as instructed.   - If your condition worsens or fails to improve we recommend that you receive another evaluation at the ER immediately or contact your PCP to discuss your concerns or return here.   - Follow up with your PCP or specialty clinic as directed in the next 1-2 weeks if not improved or as needed.  You can call [phone] to schedule an appointment with the appropriate provider.    If your symptoms do not improve or worsen, go to the emergency room immediately.

## 2022-09-05 NOTE — PROGRESS NOTES
Subjective:       Patient ID: Laverne Humphries is a 81 y.o. female.    Vitals:  height is 5' (1.524 m) and weight is 48.5 kg (107 lb). Her oral temperature is 98 °F (36.7 °C). Her blood pressure is 154/73 (abnormal) and her pulse is 78. Her respiration is 18 and oxygen saturation is 98%.     Chief Complaint: Cough    80 y/o female complains of cough accompanied by rhinorrhea and headache. She notes recent travel and a gathering for a  where there were several individuals who tested positive for COVID. Symptoms are worsening. She denies fever or chills. Having body aches.     Cough  This is a new problem. The current episode started in the past 7 days (about 4 days ago). The problem has been gradually worsening. The cough is Non-productive. Associated symptoms include headaches, postnasal drip and rhinorrhea. Pertinent negatives include no chest pain, chills, ear congestion, ear pain, eye redness, fever, heartburn, hemoptysis, myalgias, nasal congestion, rash, sore throat, shortness of breath, sweats, weight loss or wheezing. Nothing aggravates the symptoms. Treatments tried: albuterol, Tylenol. The treatment provided mild relief. Her past medical history is significant for environmental allergies.     Constitution: Negative for chills and fever.   HENT:  Positive for congestion and postnasal drip. Negative for ear pain, sinus pain, sinus pressure and sore throat.    Cardiovascular:  Negative for chest pain.   Eyes:  Negative for eye pain and eye redness.   Respiratory:  Positive for cough. Negative for sputum production, bloody sputum, shortness of breath and wheezing.    Gastrointestinal:  Negative for heartburn.   Musculoskeletal:  Negative for muscle ache.   Skin:  Negative for rash and hives.   Allergic/Immunologic: Positive for environmental allergies. Negative for hives and itching.   Neurological:  Positive for headaches. Negative for disorientation and altered mental status.    Psychiatric/Behavioral:  Negative for altered mental status and disorientation.      Objective:      Physical Exam   Constitutional: She is oriented to person, place, and time. She appears well-developed. She is cooperative.  Non-toxic appearance. She does not appear ill. No distress.      Comments:Patient sits comfortably in exam chair. Answers questions in complete sentences. Does not show any signs of distress or discoloration.        HENT:   Head: Normocephalic and atraumatic.   Ears:   Right Ear: Hearing, tympanic membrane, external ear and ear canal normal.   Left Ear: Hearing, tympanic membrane, external ear and ear canal normal.   Nose: Mucosal edema, rhinorrhea and congestion present. No nasal deformity. No epistaxis. Right sinus exhibits no maxillary sinus tenderness and no frontal sinus tenderness. Left sinus exhibits no maxillary sinus tenderness and no frontal sinus tenderness.   Mouth/Throat: Uvula is midline, oropharynx is clear and moist and mucous membranes are normal. No trismus in the jaw. Normal dentition. No uvula swelling. No oropharyngeal exudate, posterior oropharyngeal edema or posterior oropharyngeal erythema.   Eyes: Conjunctivae and lids are normal. No scleral icterus.   Neck: Trachea normal and phonation normal. Neck supple. No edema present. No erythema present. No neck rigidity present.   Cardiovascular: Normal rate, regular rhythm, normal heart sounds and normal pulses.   Pulmonary/Chest: Effort normal and breath sounds normal. No stridor. No respiratory distress. She has no decreased breath sounds. She has no wheezes. She has no rhonchi. She has no rales.   Abdominal: Normal appearance.   Musculoskeletal: Normal range of motion.         General: No deformity. Normal range of motion.   Neurological: She is alert and oriented to person, place, and time. She exhibits normal muscle tone. Coordination normal.   Skin: Skin is warm, dry, intact, not diaphoretic and not pale.    Psychiatric: Her speech is normal and behavior is normal. Judgment and thought content normal.   Nursing note and vitals reviewed.Dictation #1  MRN:[id number]  CSN:[id number]       Results for orders placed or performed in visit on 09/05/22   POCT COVID-19 Rapid Screening   Result Value Ref Range    POC Rapid COVID Negative Negative     Acceptable Yes        Assessment:       1. Cough    2. Viral URI with cough          Plan:         Cough  -     POCT COVID-19 Rapid Screening    Viral URI with cough  -     benzonatate (TESSALON) 200 MG capsule; Take 1 capsule (200 mg total) by mouth 3 (three) times daily as needed for Cough.  Dispense: 30 capsule; Refill: 0  -     fluticasone propionate (FLONASE) 50 mcg/actuation nasal spray; 1 spray (50 mcg total) by Each Nostril route once daily.  Dispense: 9.9 mL; Refill: 0  -     azelastine (ASTELIN) 137 mcg (0.1 %) nasal spray; 1 spray (137 mcg total) by Nasal route 2 (two) times daily.  Dispense: 30 mL; Refill: 0                 Patient Instructions   - Plain mucinex for nasal congestion.  - Flonase and azelastine for nasal congestion and post nasal drip.  - Benzonatate for cough.     - Rest.    - Drink plenty of fluids.    - Acetaminophen (tylenol) or Ibuprofen (advil,motrin) as directed as needed for fever/pain. Avoid tylenol if you have a history of liver disease. Do not take ibuprofen if you have a history of GI bleeding, kidney disease, or if you take blood thinners.   - Ibuprofen dosing for adults: 400 mg by mouth every 4-6 hours as needed. Max: 2400 mg/day; Info: use lowest effective dose, shortest effective treatment duration; give w/ food if GI upset occurs.  - Ibuprofen dosing for children: [6 mo-12 yo] Dose: 5-10 mg/kg/dose by mouth every 6-8h as needed; Max: 40 mg/kg/day; Info: use lower dose for fever <102.5 F, higher dose for fever >102.5 F; use shortest effective tx duration; give w/ food if GI upset occurs. [13 yo and older] Dose: 200-400 mg by  mouth every 4-6 hours as needed; Max: 1200 mg/day; Info: use lowest effective dose, shortest effective tx duration; give w/ food if GI upset occurs.  - Tylenol dosing for adults: [By mouth route, immediate-release form] Dose: 325-1000 mg by mouth every 4-6h as needed; Max: 1 g/4h and 4 g/day from all sources. [By mouth route, extended-release form] Dose: 650-1300 mg Extended Release by mouth every 8h as needed; Max: 4 g/day from all sources.   - Tylenol dosing for children: 6-12 yo [ oral tablet/capsule ] Dose: 1 tab/cap by mouth every 4-6h as needed; Max: 5 tabs or caps/24h; Info: do not exceed 75 mg/kg/day, up to 1 g/4h and 4 g/day, from all sources. 13 yo and older [ oral tablet/capsule ] Dose: 1-2 tabs/caps by mouth every 4-6h as needed; Max: 10 tabs or caps/24h; Info: do not exceed 1 g/4h and 4 g/day from all sources.    - You must understand that you have received an Urgent Care treatment only and that you may be released before all of your medical problems are known or treated.   - You, the patient, will arrange for follow up care as instructed.   - If your condition worsens or fails to improve we recommend that you receive another evaluation at the ER immediately or contact your PCP to discuss your concerns or return here.   - Follow up with your PCP or specialty clinic as directed in the next 1-2 weeks if not improved or as needed.  You can call [phone] to schedule an appointment with the appropriate provider.    If your symptoms do not improve or worsen, go to the emergency room immediately.

## 2022-09-12 ENCOUNTER — OFFICE VISIT (OUTPATIENT)
Dept: INTERNAL MEDICINE | Facility: CLINIC | Age: 82
End: 2022-09-12
Payer: MEDICARE

## 2022-09-12 VITALS
OXYGEN SATURATION: 98 % | HEART RATE: 80 BPM | SYSTOLIC BLOOD PRESSURE: 132 MMHG | DIASTOLIC BLOOD PRESSURE: 60 MMHG | BODY MASS INDEX: 20.97 KG/M2 | TEMPERATURE: 98 F | WEIGHT: 106.81 LBS | HEIGHT: 60 IN

## 2022-09-12 DIAGNOSIS — N18.31 STAGE 3A CHRONIC KIDNEY DISEASE: ICD-10-CM

## 2022-09-12 DIAGNOSIS — E78.5 HYPERLIPIDEMIA, UNSPECIFIED HYPERLIPIDEMIA TYPE: Chronic | ICD-10-CM

## 2022-09-12 DIAGNOSIS — I48.0 PAROXYSMAL ATRIAL FIBRILLATION: ICD-10-CM

## 2022-09-12 DIAGNOSIS — Z79.01 CHRONIC ANTICOAGULATION: ICD-10-CM

## 2022-09-12 DIAGNOSIS — J32.9 SINUSITIS, UNSPECIFIED CHRONICITY, UNSPECIFIED LOCATION: Primary | ICD-10-CM

## 2022-09-12 DIAGNOSIS — Z91.09 ENVIRONMENTAL ALLERGIES: ICD-10-CM

## 2022-09-12 PROCEDURE — 1126F AMNT PAIN NOTED NONE PRSNT: CPT | Mod: CPTII,S$GLB,, | Performed by: PHYSICIAN ASSISTANT

## 2022-09-12 PROCEDURE — 1159F PR MEDICATION LIST DOCUMENTED IN MEDICAL RECORD: ICD-10-PCS | Mod: CPTII,S$GLB,, | Performed by: PHYSICIAN ASSISTANT

## 2022-09-12 PROCEDURE — 99999 PR PBB SHADOW E&M-EST. PATIENT-LVL V: CPT | Mod: PBBFAC,,, | Performed by: PHYSICIAN ASSISTANT

## 2022-09-12 PROCEDURE — 99999 PR PBB SHADOW E&M-EST. PATIENT-LVL V: ICD-10-PCS | Mod: PBBFAC,,, | Performed by: PHYSICIAN ASSISTANT

## 2022-09-12 PROCEDURE — 1160F RVW MEDS BY RX/DR IN RCRD: CPT | Mod: CPTII,S$GLB,, | Performed by: PHYSICIAN ASSISTANT

## 2022-09-12 PROCEDURE — 99499 UNLISTED E&M SERVICE: CPT | Mod: HCNC,S$GLB,, | Performed by: PHYSICIAN ASSISTANT

## 2022-09-12 PROCEDURE — 3288F FALL RISK ASSESSMENT DOCD: CPT | Mod: CPTII,S$GLB,, | Performed by: PHYSICIAN ASSISTANT

## 2022-09-12 PROCEDURE — 1126F PR PAIN SEVERITY QUANTIFIED, NO PAIN PRESENT: ICD-10-PCS | Mod: CPTII,S$GLB,, | Performed by: PHYSICIAN ASSISTANT

## 2022-09-12 PROCEDURE — 3288F PR FALLS RISK ASSESSMENT DOCUMENTED: ICD-10-PCS | Mod: CPTII,S$GLB,, | Performed by: PHYSICIAN ASSISTANT

## 2022-09-12 PROCEDURE — 3078F PR MOST RECENT DIASTOLIC BLOOD PRESSURE < 80 MM HG: ICD-10-PCS | Mod: CPTII,S$GLB,, | Performed by: PHYSICIAN ASSISTANT

## 2022-09-12 PROCEDURE — 1159F MED LIST DOCD IN RCRD: CPT | Mod: CPTII,S$GLB,, | Performed by: PHYSICIAN ASSISTANT

## 2022-09-12 PROCEDURE — 3075F PR MOST RECENT SYSTOLIC BLOOD PRESS GE 130-139MM HG: ICD-10-PCS | Mod: CPTII,S$GLB,, | Performed by: PHYSICIAN ASSISTANT

## 2022-09-12 PROCEDURE — 1101F PT FALLS ASSESS-DOCD LE1/YR: CPT | Mod: CPTII,S$GLB,, | Performed by: PHYSICIAN ASSISTANT

## 2022-09-12 PROCEDURE — 1160F PR REVIEW ALL MEDS BY PRESCRIBER/CLIN PHARMACIST DOCUMENTED: ICD-10-PCS | Mod: CPTII,S$GLB,, | Performed by: PHYSICIAN ASSISTANT

## 2022-09-12 PROCEDURE — 1101F PR PT FALLS ASSESS DOC 0-1 FALLS W/OUT INJ PAST YR: ICD-10-PCS | Mod: CPTII,S$GLB,, | Performed by: PHYSICIAN ASSISTANT

## 2022-09-12 PROCEDURE — 99214 OFFICE O/P EST MOD 30 MIN: CPT | Mod: S$GLB,,, | Performed by: PHYSICIAN ASSISTANT

## 2022-09-12 PROCEDURE — 99499 RISK ADDL DX/OHS AUDIT: ICD-10-PCS | Mod: HCNC,S$GLB,, | Performed by: PHYSICIAN ASSISTANT

## 2022-09-12 PROCEDURE — 3075F SYST BP GE 130 - 139MM HG: CPT | Mod: CPTII,S$GLB,, | Performed by: PHYSICIAN ASSISTANT

## 2022-09-12 PROCEDURE — 99214 PR OFFICE/OUTPT VISIT, EST, LEVL IV, 30-39 MIN: ICD-10-PCS | Mod: S$GLB,,, | Performed by: PHYSICIAN ASSISTANT

## 2022-09-12 PROCEDURE — 3078F DIAST BP <80 MM HG: CPT | Mod: CPTII,S$GLB,, | Performed by: PHYSICIAN ASSISTANT

## 2022-09-12 RX ORDER — DOXYCYCLINE 100 MG/1
100 CAPSULE ORAL EVERY 12 HOURS
Qty: 20 CAPSULE | Refills: 0 | Status: SHIPPED | OUTPATIENT
Start: 2022-09-12 | End: 2023-02-03

## 2022-09-12 NOTE — PROGRESS NOTES
Subjective:       Patient ID: Laverne Humphries is a 81 y.o. female.        Chief Complaint: Cough    Laverne Humphries is an established patient of Fran Castro MD here today for urgent care visit.     2 weeks cough    Allegra 180 mg  Astelin and Flonase daily  Also given tessalon but no help    No longer taking singulair  No longer using nasal rinses    Described as post nasal drip that induces cough    Covid test negative    No wheezing or shortness of breath    No fever    No N/V/D         Review of Systems   Constitutional:  Negative for chills, diaphoresis, fatigue and fever.   HENT:  Positive for congestion, postnasal drip, sinus pressure and sinus pain. Negative for sore throat.    Eyes:  Negative for visual disturbance.   Respiratory:  Positive for cough. Negative for chest tightness and shortness of breath.    Cardiovascular:  Negative for chest pain, palpitations and leg swelling.   Gastrointestinal:  Negative for abdominal pain, blood in stool, constipation, diarrhea, nausea and vomiting.   Genitourinary:  Negative for dysuria, frequency, hematuria and urgency.   Musculoskeletal:  Negative for arthralgias and back pain.   Skin:  Negative for rash.   Neurological:  Negative for dizziness, syncope, weakness and headaches.   Psychiatric/Behavioral:  Negative for dysphoric mood and sleep disturbance. The patient is not nervous/anxious.      Objective:      Physical Exam  Vitals and nursing note reviewed.   Constitutional:       Appearance: Normal appearance. She is well-developed.   HENT:      Head: Normocephalic.      Right Ear: External ear normal. A middle ear effusion is present.      Left Ear: External ear normal. A middle ear effusion is present.      Nose: Mucosal edema and rhinorrhea present.      Right Sinus: Frontal sinus tenderness present. No maxillary sinus tenderness.      Left Sinus: Frontal sinus tenderness present. No maxillary sinus tenderness.      Mouth/Throat:      Pharynx:  Oropharynx is clear.   Eyes:      Pupils: Pupils are equal, round, and reactive to light.   Cardiovascular:      Rate and Rhythm: Normal rate and regular rhythm.      Heart sounds: Normal heart sounds. No murmur heard.    No friction rub. No gallop.   Pulmonary:      Effort: Pulmonary effort is normal. No respiratory distress.      Breath sounds: Normal breath sounds.   Abdominal:      Palpations: Abdomen is soft.      Tenderness: There is no abdominal tenderness.   Skin:     General: Skin is warm and dry.   Neurological:      Mental Status: She is alert.       Assessment:       1. Sinusitis, unspecified chronicity, unspecified location    2. Hyperlipidemia, unspecified hyperlipidemia type    3. Paroxysmal atrial fibrillation    4. Stage 3a chronic kidney disease    5. Chronic anticoagulation    6. Environmental allergies        Plan:       Laverne was seen today for cough.    Diagnoses and all orders for this visit:    Sinusitis, unspecified chronicity, unspecified location - sx x 2 weeks with associated sinus tenderness - will tx with doxy    Hyperlipidemia, unspecified hyperlipidemia type - stable and controlled  Lab Results   Component Value Date    CHOL 182 02/21/2022    TRIG 90 02/21/2022    HDL 58 02/21/2022    LDLCALC 106.0 02/21/2022     Paroxysmal atrial fibrillation - followed by cardiology    Stage 3a chronic kidney disease    Chronic anticoagulation    Environmental allergies - no longer on singulair, still taking allegra/flonase/astelin, rec restarting sinus rinses    Other orders  -     doxycycline (VIBRAMYCIN) 100 MG Cap; Take 1 capsule (100 mg total) by mouth every 12 (twelve) hours.    >30 minutes spent on patient encounter    Pt has been given instructions populated from [employer] database and has verbalized understanding of the after visit summary and information contained wherein.    Follow up with a primary care provider. May go to ER for acute shortness of breath, lightheadedness, fever, or any  "other emergent complaints or changes in condition.    "This note will be shared with the patient"    Future Appointments   Date Time Provider Department Center   11/9/2022  1:15 PM Lisa Yeh MD [id number]                   "

## 2022-11-04 ENCOUNTER — TELEPHONE (OUTPATIENT)
Dept: INTERNAL MEDICINE | Facility: CLINIC | Age: 82
End: 2022-11-04
Payer: MEDICARE

## 2022-11-04 NOTE — TELEPHONE ENCOUNTER
----- Message from Lisandra Branch sent at 11/4/2022  7:38 AM CDT -----  Contact: self/[phone]  Pt called in regard to becoming a new pt due to her dr had heart surgery she wants to switch to dr mujica. She was told that dr mujica was going to  take on some of Dr Butler pt. Call back ASAP.     Please advise     No

## 2022-11-04 NOTE — TELEPHONE ENCOUNTER
Called and spoke to pt   Advised her that we are not switching PCP   She was wanting a refill on Lorazapam told her that when she gets to have a weeks worth left to call us and we can call her in a 3 month supply   She sd she would call us back and do that  She did not need an appt at this time

## 2022-11-09 ENCOUNTER — OFFICE VISIT (OUTPATIENT)
Dept: CARDIOLOGY | Facility: CLINIC | Age: 82
End: 2022-11-09
Attending: INTERNAL MEDICINE
Payer: MEDICARE

## 2022-11-09 VITALS
OXYGEN SATURATION: 98 % | DIASTOLIC BLOOD PRESSURE: 59 MMHG | HEIGHT: 60 IN | BODY MASS INDEX: 22.19 KG/M2 | HEART RATE: 77 BPM | SYSTOLIC BLOOD PRESSURE: 133 MMHG | WEIGHT: 113 LBS

## 2022-11-09 DIAGNOSIS — N82.3 RECTOVAGINAL FISTULA: ICD-10-CM

## 2022-11-09 DIAGNOSIS — I48.0 PAROXYSMAL ATRIAL FIBRILLATION: ICD-10-CM

## 2022-11-09 DIAGNOSIS — Z79.01 CHRONIC ANTICOAGULATION: ICD-10-CM

## 2022-11-09 PROCEDURE — 1159F PR MEDICATION LIST DOCUMENTED IN MEDICAL RECORD: ICD-10-PCS | Mod: CPTII,S$GLB,, | Performed by: INTERNAL MEDICINE

## 2022-11-09 PROCEDURE — 1159F MED LIST DOCD IN RCRD: CPT | Mod: CPTII,S$GLB,, | Performed by: INTERNAL MEDICINE

## 2022-11-09 PROCEDURE — 3288F FALL RISK ASSESSMENT DOCD: CPT | Mod: CPTII,S$GLB,, | Performed by: INTERNAL MEDICINE

## 2022-11-09 PROCEDURE — 99214 PR OFFICE/OUTPT VISIT, EST, LEVL IV, 30-39 MIN: ICD-10-PCS | Mod: S$GLB,,, | Performed by: INTERNAL MEDICINE

## 2022-11-09 PROCEDURE — 1101F PR PT FALLS ASSESS DOC 0-1 FALLS W/OUT INJ PAST YR: ICD-10-PCS | Mod: CPTII,S$GLB,, | Performed by: INTERNAL MEDICINE

## 2022-11-09 PROCEDURE — 1126F PR PAIN SEVERITY QUANTIFIED, NO PAIN PRESENT: ICD-10-PCS | Mod: CPTII,S$GLB,, | Performed by: INTERNAL MEDICINE

## 2022-11-09 PROCEDURE — 3078F PR MOST RECENT DIASTOLIC BLOOD PRESSURE < 80 MM HG: ICD-10-PCS | Mod: CPTII,S$GLB,, | Performed by: INTERNAL MEDICINE

## 2022-11-09 PROCEDURE — 1126F AMNT PAIN NOTED NONE PRSNT: CPT | Mod: CPTII,S$GLB,, | Performed by: INTERNAL MEDICINE

## 2022-11-09 PROCEDURE — 1160F PR REVIEW ALL MEDS BY PRESCRIBER/CLIN PHARMACIST DOCUMENTED: ICD-10-PCS | Mod: CPTII,S$GLB,, | Performed by: INTERNAL MEDICINE

## 2022-11-09 PROCEDURE — 3288F PR FALLS RISK ASSESSMENT DOCUMENTED: ICD-10-PCS | Mod: CPTII,S$GLB,, | Performed by: INTERNAL MEDICINE

## 2022-11-09 PROCEDURE — 99214 OFFICE O/P EST MOD 30 MIN: CPT | Mod: S$GLB,,, | Performed by: INTERNAL MEDICINE

## 2022-11-09 PROCEDURE — 99999 PR PBB SHADOW E&M-EST. PATIENT-LVL III: CPT | Mod: PBBFAC,,, | Performed by: INTERNAL MEDICINE

## 2022-11-09 PROCEDURE — 1160F RVW MEDS BY RX/DR IN RCRD: CPT | Mod: CPTII,S$GLB,, | Performed by: INTERNAL MEDICINE

## 2022-11-09 PROCEDURE — 1101F PT FALLS ASSESS-DOCD LE1/YR: CPT | Mod: CPTII,S$GLB,, | Performed by: INTERNAL MEDICINE

## 2022-11-09 PROCEDURE — 3078F DIAST BP <80 MM HG: CPT | Mod: CPTII,S$GLB,, | Performed by: INTERNAL MEDICINE

## 2022-11-09 PROCEDURE — 3075F SYST BP GE 130 - 139MM HG: CPT | Mod: CPTII,S$GLB,, | Performed by: INTERNAL MEDICINE

## 2022-11-09 PROCEDURE — 3075F PR MOST RECENT SYSTOLIC BLOOD PRESS GE 130-139MM HG: ICD-10-PCS | Mod: CPTII,S$GLB,, | Performed by: INTERNAL MEDICINE

## 2022-11-09 PROCEDURE — 99999 PR PBB SHADOW E&M-EST. PATIENT-LVL III: ICD-10-PCS | Mod: PBBFAC,,, | Performed by: INTERNAL MEDICINE

## 2022-11-09 RX ORDER — METOPROLOL SUCCINATE 50 MG/1
50 TABLET, EXTENDED RELEASE ORAL DAILY
Qty: 90 TABLET | Refills: 3 | Status: SHIPPED | OUTPATIENT
Start: 2022-11-09 | End: 2023-03-07 | Stop reason: SDUPTHER

## 2022-11-09 NOTE — PROGRESS NOTES
Subjective:     Laverne Humphries is a 81 y.o. female with no history of any cardiac issues. She has had diverticulitis and developed a colovaginal fistula. She came in with plans for elective surgery on 4/1/2022. She was noted to be in atrial fibrillation with a ventricular response rate of about 120-140 bpm. She denied any palpitations, chest pain or shortness of breath. She said she had fair exercise tolerance. It was unclear for how long she had been in atrial fibrillation. An electrocardiogram from 2019 revealed she was in sinus rhythm at that time. She was prescribed metoprolol 25 mg Q12. The surgery got rescheduled for 4/26/2022. No exertional shortness of breath. No palpitations or weak spells. No bleeding. No issues with any of her prescribed medications. Feeling well overall.      Atrial Fibrillation  Presents for follow-up visit. Symptoms are negative for bradycardia, chest pain, dizziness, hemodynamic instability, hypertension, hypotension, palpitations, shortness of breath, syncope, tachycardia and weakness. The symptoms have been stable.     Review of Systems   Constitutional: Negative for chills, fever and malaise/fatigue.   HENT:  Negative for nosebleeds and tinnitus.    Eyes:  Negative for double vision, vision loss in left eye and vision loss in right eye.   Cardiovascular:  Negative for chest pain, claudication, dyspnea on exertion, irregular heartbeat, leg swelling, near-syncope, orthopnea, palpitations, paroxysmal nocturnal dyspnea and syncope.   Respiratory:  Negative for cough, hemoptysis, shortness of breath and wheezing.    Endocrine: Negative for cold intolerance and heat intolerance.   Hematologic/Lymphatic: Negative for bleeding problem. Does not bruise/bleed easily.   Skin:  Negative for color change and rash.   Musculoskeletal:  Negative for back pain, falls, muscle weakness and myalgias.   Gastrointestinal:  Negative for abdominal pain, diarrhea, dysphagia, heartburn, hematemesis,  hematochezia, hemorrhoids, jaundice, melena, nausea and vomiting.   Genitourinary:  Negative for dysuria and hematuria.   Neurological:  Negative for dizziness, focal weakness, headaches, light-headedness, loss of balance, numbness, tremors, vertigo and weakness.   Psychiatric/Behavioral:  Negative for altered mental status, depression and memory loss. The patient is not nervous/anxious.    Allergic/Immunologic: Negative for hives and persistent infections.       Current Outpatient Medications on File Prior to Visit   Medication Sig Dispense Refill    albuterol (PROVENTIL/VENTOLIN HFA) 90 mcg/actuation inhaler Inhale 2 puffs into the lungs every 6 (six) hours as needed for Wheezing or Shortness of Breath (generic preferred). Rescue 18 g 3    apixaban (ELIQUIS) 2.5 mg Tab Take 1 tablet (2.5 mg total) by mouth 2 (two) times daily. 60 tablet 11    calcium-vitamin D3 500 mg(1,250mg) -200 unit per tablet Take 1 tablet by mouth Daily.       carboxymethylcellulose (REFRESH PLUS) 0.5 % Dpet 1 drop 3 (three) times daily as needed.      LORazepam (ATIVAN) 0.5 MG tablet TAKE 1 TABLET BY MOUTH EVERY DAY IN THE EVENING 30 tablet 0    azelastine (ASTELIN) 137 mcg (0.1 %) nasal spray 1 spray (137 mcg total) by Nasal route 2 (two) times daily. 30 mL 0    diclofenac sodium (VOLTAREN) 1 % Gel Apply 2 g topically once daily. (Patient not taking: Reported on 9/12/2022) 150 g 2    doxycycline (VIBRAMYCIN) 100 MG Cap Take 1 capsule (100 mg total) by mouth every 12 (twelve) hours. (Patient not taking: Reported on 11/9/2022) 20 capsule 0    estradioL (ESTRACE) 0.01 % (0.1 mg/gram) vaginal cream Place 1 g vaginally once daily. (Patient not taking: Reported on 11/9/2022) 42.5 g 3    gabapentin (NEURONTIN) 300 MG capsule Take 2 capsules (600 mg total) by mouth every evening. (Patient taking differently: Take 600 mg by mouth every evening. PRN) 180 capsule 3    meloxicam (MOBIC) 7.5 MG tablet Take 1 tablet (7.5 mg total) by mouth once daily.  (Patient not taking: Reported on 11/9/2022) 30 tablet 12    metoprolol succinate (TOPROL-XL) 50 MG 24 hr tablet Take 1 tablet (50 mg total) by mouth once daily. (Patient not taking: Reported on 11/9/2022) 90 tablet 3    triamcinolone acetonide 0.1% (KENALOG) 0.1 % cream Apply topically 2 (two) times daily. Apply to itchy irritated rash twice daily. Do not apply to face, groin, or skin folds (Patient not taking: Reported on 11/9/2022) 80 g 1     Current Facility-Administered Medications on File Prior to Visit   Medication Dose Route Frequency Provider Last Rate Last Admin    LIDOcaine (PF) 10 mg/ml (1%) injection 10 mg  1 mL Intradermal Once Audrey Caicedo NP        mupirocin 2 % ointment   Nasal On Call Procedure Audrey Caicedo NP   Given at 04/26/22 0726       BP (!) 133/59 (BP Location: Left arm, Patient Position: Sitting, BP Method: Medium (Manual))   Pulse 77   Ht 5' (1.524 m)   Wt 51.2 kg (112 lb 15.8 oz)   LMP  (LMP Unknown)   SpO2 98%   BMI 22.07 kg/m²       Objective:     Physical Exam  Constitutional:       General: She is not in acute distress.     Appearance: Normal appearance. She is well-developed. She is not toxic-appearing or diaphoretic.   HENT:      Head: Normocephalic and atraumatic.      Nose: Nose normal.   Eyes:      General:         Right eye: No discharge.         Left eye: No discharge.      Conjunctiva/sclera:      Right eye: Right conjunctiva is not injected.      Left eye: Left conjunctiva is not injected.      Pupils: Pupils are equal.      Right eye: Pupil is round.      Left eye: Pupil is round.   Neck:      Thyroid: No thyromegaly.      Vascular: No carotid bruit or JVD.   Cardiovascular:      Rate and Rhythm: Normal rate and regular rhythm. No extrasystoles are present.     Chest Wall: PMI is not displaced.      Pulses:           Radial pulses are 2+ on the right side and 2+ on the left side.        Femoral pulses are 2+ on the right side and 2+ on the left side.        Dorsalis pedis pulses are 2+ on the right side and 2+ on the left side.        Posterior tibial pulses are 2+ on the right side and 2+ on the left side.      Heart sounds: S1 normal and S2 normal. Murmur heard.   High-pitched blowing holosystolic murmur is present with a grade of 2/6 at the apex.     No gallop.   Pulmonary:      Effort: Pulmonary effort is normal.      Breath sounds: Normal breath sounds.   Abdominal:      Palpations: Abdomen is soft.      Tenderness: There is no abdominal tenderness.   Musculoskeletal:      Cervical back: Neck supple.      Right lower leg: Normal. No swelling. No edema.      Left lower leg: Normal. No swelling. No edema.   Lymphadenopathy:      Head:      Right side of head: No submandibular adenopathy.      Left side of head: No submandibular adenopathy.      Cervical: No cervical adenopathy.   Skin:     General: Skin is warm and dry.      Findings: No rash.      Nails: There is no clubbing.   Neurological:      General: No focal deficit present.      Mental Status: She is alert and oriented to person, place, and time. She is not disoriented.      Cranial Nerves: No cranial nerve deficit.   Psychiatric:         Attention and Perception: Attention and perception normal.         Mood and Affect: Mood and affect normal.         Speech: Speech normal.         Behavior: Behavior normal.         Thought Content: Thought content normal.         Cognition and Memory: Cognition and memory normal.         Judgment: Judgment normal.       Assessment:     1. Paroxysmal atrial fibrillation    2. Chronic anticoagulation    3. Rectovaginal fistula        Plan:     1. Atrial Fibrillation               4/1/2022: Diagnosed with paroxysmal atrial fibrillation.               Fast VRR. Asymptomatic.              CHA2DS2VASc=3 (A2Sc).   4/1/2022: Echo: Normal left ventricular size and systolic function. Mildly sigmoid septum. EF 65%. AF. Moderate aortic valve sclerosis. Mild AR. Moderate  MR.   4/5/2022: Metoprolol 25 mg Q24 was changed to metoprolol 50 mg Q24.   5/12/2022: Apixiban 2.5 mg Q12 was begun.   On apixiban 2.5 mg Q12.   On metoprolol 50 mg Q24.             No clinical recurrence but never had any symptoms.     2. Chronic Anticoagulation              4/1/2022: Diagnosed with paroxysmal atrial fibrillation.               CHA2DS2VASc=3 (A2Sc).   5/12/2022: Apixiban 2.5 mg Q12 was begun.   On apixiban 2.5 mg Q12.   No aspirin or NSAID.   No bleeding.                3. Colovaginal Fistula              4/1/2022: Plan was surgery.   4/26/2022: Underwent surgery.              Dr. Alison Mcclain.    4. Primary Care              Dr. Fran Castro.     F/u 4 months.    Lisa Yeh M.D.

## 2022-11-10 ENCOUNTER — OFFICE VISIT (OUTPATIENT)
Dept: ALLERGY | Facility: CLINIC | Age: 82
End: 2022-11-10
Payer: MEDICARE

## 2022-11-10 VITALS
OXYGEN SATURATION: 99 % | BODY MASS INDEX: 22.29 KG/M2 | DIASTOLIC BLOOD PRESSURE: 64 MMHG | WEIGHT: 113.56 LBS | SYSTOLIC BLOOD PRESSURE: 149 MMHG | HEART RATE: 70 BPM | HEIGHT: 60 IN

## 2022-11-10 DIAGNOSIS — J30.9 ALLERGIC RHINITIS, UNSPECIFIED SEASONALITY, UNSPECIFIED TRIGGER: Primary | ICD-10-CM

## 2022-11-10 DIAGNOSIS — R05.9 COUGH, UNSPECIFIED TYPE: ICD-10-CM

## 2022-11-10 DIAGNOSIS — K21.9 GASTROESOPHAGEAL REFLUX DISEASE, UNSPECIFIED WHETHER ESOPHAGITIS PRESENT: ICD-10-CM

## 2022-11-10 PROCEDURE — 1159F PR MEDICATION LIST DOCUMENTED IN MEDICAL RECORD: ICD-10-PCS | Mod: CPTII,S$GLB,, | Performed by: ALLERGY & IMMUNOLOGY

## 2022-11-10 PROCEDURE — 99999 PR PBB SHADOW E&M-EST. PATIENT-LVL III: ICD-10-PCS | Mod: PBBFAC,,, | Performed by: ALLERGY & IMMUNOLOGY

## 2022-11-10 PROCEDURE — 3077F SYST BP >= 140 MM HG: CPT | Mod: CPTII,S$GLB,, | Performed by: ALLERGY & IMMUNOLOGY

## 2022-11-10 PROCEDURE — 1159F MED LIST DOCD IN RCRD: CPT | Mod: CPTII,S$GLB,, | Performed by: ALLERGY & IMMUNOLOGY

## 2022-11-10 PROCEDURE — 1126F PR PAIN SEVERITY QUANTIFIED, NO PAIN PRESENT: ICD-10-PCS | Mod: CPTII,S$GLB,, | Performed by: ALLERGY & IMMUNOLOGY

## 2022-11-10 PROCEDURE — 99214 OFFICE O/P EST MOD 30 MIN: CPT | Mod: S$GLB,,, | Performed by: ALLERGY & IMMUNOLOGY

## 2022-11-10 PROCEDURE — 99214 PR OFFICE/OUTPT VISIT, EST, LEVL IV, 30-39 MIN: ICD-10-PCS | Mod: S$GLB,,, | Performed by: ALLERGY & IMMUNOLOGY

## 2022-11-10 PROCEDURE — 1126F AMNT PAIN NOTED NONE PRSNT: CPT | Mod: CPTII,S$GLB,, | Performed by: ALLERGY & IMMUNOLOGY

## 2022-11-10 PROCEDURE — 3078F DIAST BP <80 MM HG: CPT | Mod: CPTII,S$GLB,, | Performed by: ALLERGY & IMMUNOLOGY

## 2022-11-10 PROCEDURE — 99999 PR PBB SHADOW E&M-EST. PATIENT-LVL III: CPT | Mod: PBBFAC,,, | Performed by: ALLERGY & IMMUNOLOGY

## 2022-11-10 PROCEDURE — 3078F PR MOST RECENT DIASTOLIC BLOOD PRESSURE < 80 MM HG: ICD-10-PCS | Mod: CPTII,S$GLB,, | Performed by: ALLERGY & IMMUNOLOGY

## 2022-11-10 PROCEDURE — 3077F PR MOST RECENT SYSTOLIC BLOOD PRESSURE >= 140 MM HG: ICD-10-PCS | Mod: CPTII,S$GLB,, | Performed by: ALLERGY & IMMUNOLOGY

## 2022-11-10 RX ORDER — ALBUTEROL SULFATE 90 UG/1
2 AEROSOL, METERED RESPIRATORY (INHALATION) EVERY 6 HOURS PRN
Qty: 18 G | Refills: 3 | Status: SHIPPED | OUTPATIENT
Start: 2022-11-10 | End: 2023-07-03 | Stop reason: SDUPTHER

## 2022-11-10 RX ORDER — TRIAMCINOLONE ACETONIDE 40 MG/ML
INJECTION, SUSPENSION INTRA-ARTICULAR; INTRAMUSCULAR
COMMUNITY
Start: 2022-07-28 | End: 2023-02-03

## 2022-11-10 RX ORDER — OMEPRAZOLE 20 MG/1
20 CAPSULE, DELAYED RELEASE ORAL DAILY
Qty: 30 CAPSULE | Refills: 2 | Status: SHIPPED | OUTPATIENT
Start: 2022-11-10 | End: 2023-10-02

## 2022-11-10 RX ORDER — TRIAMCINOLONE ACETONIDE 55 UG/1
2 SPRAY, METERED NASAL DAILY
Qty: 16.9 ML | Refills: 11 | Status: SHIPPED | OUTPATIENT
Start: 2022-11-10 | End: 2023-07-20 | Stop reason: SDUPTHER

## 2022-11-10 NOTE — PROGRESS NOTES
Subjective:       Patient ID: Laverne Humphries is a 81 y.o. female.    Chief Complaint:    Fu allergic rhinitis    LV 10/5/20    HPI:     80 yo female with a history of allergic rhinitis, GERD, and chronic/recurrent cough.  Skin test in March 2013 w multiple positives, as below.    Cough and rhinitis had been well controlled since LV until about the last 2 weeks, during which cough, post-nasal drip, and reflux sx's have increased. Notes eating sweets and supine position aggravate cough. It has been years since she was on PPI.   Has also noted epistaxis w flonase rescently.        Environmental History:   Pets in the home: none.    Moon: tile or linoleum floors   Climate Control: central or room air conditioning   Dust Mite Controls: Dust mite controls are already in place.   Tobacco Smoke in Home: no     Review of Systems   Constitutional: Negative for fever, chills, appetite change, fatigue and unexpected weight change.   HENT: Negative for hearing loss, ear pain, nosebleeds, congestion, sore throat, rhinorrhea, sneezing, postnasal drip, sinus pressure, tinnitus and ear discharge.    Eyes: Negative for pain, discharge, redness and itching.   Respiratory: Positive for cough. Negative for chest tightness, shortness of breath and wheezing.    Cardiovascular: Negative for chest pain, palpitations and leg swelling.   Gastrointestinal: Negative for nausea, vomiting, abdominal pain, diarrhea, constipation, blood in stool and abdominal distention.   Genitourinary: Negative for dysuria, urgency, frequency and difficulty urinating.   Musculoskeletal: Negative for back pain, joint swelling and arthralgias.   Skin: Negative for color change, pallor, rash and wound.   Neurological: Negative for dizziness, seizures, light-headedness, numbness and headaches.   Hematological: Negative for adenopathy. Does not bruise/bleed easily.   Psychiatric/Behavioral: Negative for sleep disturbance and dysphoric mood. The patient is not  nervous/anxious.         Objective:    Physical Exam   Constitutional: She is oriented to person, place, and time. She appears well-developed and well-nourished. She is cooperative. No distress.   HENT:   Head: Normocephalic and atraumatic.   Right Ear: Tympanic membrane, external ear and ear canal normal.   Left Ear: Tympanic membrane, external ear and ear canal normal.   Mouth/Throat: Normal dentition.        2+ pale nasal turbinates  Eyes: EOM are normal. Pupils are equal, round, and reactive to light. Right conjunctiva is not injected.   Neck: Neck supple. No thyromegaly present.   Cardiovascular: Normal rate, regular rhythm, normal heart sounds and intact distal pulses.  Exam reveals no gallop and no friction rub.    No murmur heard.  Pulmonary/Chest: Effort normal and breath sounds normal. No respiratory distress. She has no wheezes. She has no rales.   Abdominal: Soft. Bowel sounds are normal. She exhibits no distension. There is no tenderness.   Lymphadenopathy:     She has no cervical adenopathy.   Neurological: She is alert and oriented to person, place, and time.   Skin: Skin is warm and dry. No rash noted. No cyanosis or erythema. Nails show no clubbing.   Psychiatric: She has a normal mood and affect. Her behavior is normal.       Laboratory:     3/13/13   Inhalant Skin Testing   4+ Dust mites (D.p. And D.f.)   3+ White francesca, Mixed birch, Box elder, Bald cypress   2+ Cat, Dog, American elm, Hackberry, Red maple, Red mulberry, Mixed oak, Black willow, Lambs quarter, Marsh elder, Bahia grass, Bermuda grass, Jared grass, Kaleb grass   1+ Cockroach, Red cedar, Eastern cottonwood, Mugwort, Rough pigweed, English plantain, Mixed ragweed, Russian thistle, Acremonium, Alternaria, Epicoccum, Fusarium, Pullularia, Rhizopus, Rhodotorula  With adequate histamine and saline controls             Assessment:     1.Allergic rhinitis  2. cough    Plan:     1.  Nasacort 2 sen daily  2. Continue Fexofenadine 180 mg  daily prn  3. Nasal saline rinses prior to nasacort and rn  4. omeprazole  5. Albuterol prn

## 2022-11-28 ENCOUNTER — TELEPHONE (OUTPATIENT)
Dept: INTERNAL MEDICINE | Facility: CLINIC | Age: 82
End: 2022-11-28

## 2022-12-06 ENCOUNTER — OFFICE VISIT (OUTPATIENT)
Dept: INTERNAL MEDICINE | Facility: CLINIC | Age: 82
End: 2022-12-06
Payer: MEDICARE

## 2022-12-06 DIAGNOSIS — F41.9 ANXIETY DISORDER, UNSPECIFIED: ICD-10-CM

## 2022-12-06 DIAGNOSIS — I48.91 ATRIAL FIBRILLATION, UNSPECIFIED TYPE: ICD-10-CM

## 2022-12-06 DIAGNOSIS — M25.519 SHOULDER PAIN, UNSPECIFIED CHRONICITY, UNSPECIFIED LATERALITY: Primary | ICD-10-CM

## 2022-12-06 PROCEDURE — 3288F PR FALLS RISK ASSESSMENT DOCUMENTED: ICD-10-PCS | Mod: CPTII,S$GLB,, | Performed by: INTERNAL MEDICINE

## 2022-12-06 PROCEDURE — 3288F FALL RISK ASSESSMENT DOCD: CPT | Mod: CPTII,S$GLB,, | Performed by: INTERNAL MEDICINE

## 2022-12-06 PROCEDURE — 1101F PR PT FALLS ASSESS DOC 0-1 FALLS W/OUT INJ PAST YR: ICD-10-PCS | Mod: CPTII,S$GLB,, | Performed by: INTERNAL MEDICINE

## 2022-12-06 PROCEDURE — 99215 PR OFFICE/OUTPT VISIT, EST, LEVL V, 40-54 MIN: ICD-10-PCS | Mod: S$GLB,,, | Performed by: INTERNAL MEDICINE

## 2022-12-06 PROCEDURE — 99215 OFFICE O/P EST HI 40 MIN: CPT | Mod: S$GLB,,, | Performed by: INTERNAL MEDICINE

## 2022-12-06 PROCEDURE — 3078F PR MOST RECENT DIASTOLIC BLOOD PRESSURE < 80 MM HG: ICD-10-PCS | Mod: CPTII,S$GLB,, | Performed by: INTERNAL MEDICINE

## 2022-12-06 PROCEDURE — 99999 PR PBB SHADOW E&M-EST. PATIENT-LVL V: ICD-10-PCS | Mod: PBBFAC,,, | Performed by: INTERNAL MEDICINE

## 2022-12-06 PROCEDURE — 99999 PR PBB SHADOW E&M-EST. PATIENT-LVL V: CPT | Mod: PBBFAC,,, | Performed by: INTERNAL MEDICINE

## 2022-12-06 PROCEDURE — 3078F DIAST BP <80 MM HG: CPT | Mod: CPTII,S$GLB,, | Performed by: INTERNAL MEDICINE

## 2022-12-06 PROCEDURE — 1126F AMNT PAIN NOTED NONE PRSNT: CPT | Mod: CPTII,S$GLB,, | Performed by: INTERNAL MEDICINE

## 2022-12-06 PROCEDURE — 3075F SYST BP GE 130 - 139MM HG: CPT | Mod: CPTII,S$GLB,, | Performed by: INTERNAL MEDICINE

## 2022-12-06 PROCEDURE — 1126F PR PAIN SEVERITY QUANTIFIED, NO PAIN PRESENT: ICD-10-PCS | Mod: CPTII,S$GLB,, | Performed by: INTERNAL MEDICINE

## 2022-12-06 PROCEDURE — 1101F PT FALLS ASSESS-DOCD LE1/YR: CPT | Mod: CPTII,S$GLB,, | Performed by: INTERNAL MEDICINE

## 2022-12-06 PROCEDURE — 3075F PR MOST RECENT SYSTOLIC BLOOD PRESS GE 130-139MM HG: ICD-10-PCS | Mod: CPTII,S$GLB,, | Performed by: INTERNAL MEDICINE

## 2022-12-06 RX ORDER — LORAZEPAM 0.5 MG/1
0.5 TABLET ORAL NIGHTLY
Qty: 30 TABLET | Refills: 1 | Status: SHIPPED | OUTPATIENT
Start: 2022-12-06 | End: 2023-01-10 | Stop reason: SDUPTHER

## 2022-12-08 ENCOUNTER — OFFICE VISIT (OUTPATIENT)
Dept: SPORTS MEDICINE | Facility: CLINIC | Age: 82
End: 2022-12-08
Payer: MEDICARE

## 2022-12-08 VITALS
BODY MASS INDEX: 22.38 KG/M2 | HEART RATE: 95 BPM | WEIGHT: 114 LBS | HEIGHT: 60 IN | SYSTOLIC BLOOD PRESSURE: 131 MMHG | DIASTOLIC BLOOD PRESSURE: 72 MMHG

## 2022-12-08 DIAGNOSIS — M75.41 IMPINGEMENT SYNDROME OF RIGHT SHOULDER: Primary | ICD-10-CM

## 2022-12-08 DIAGNOSIS — M25.519 SHOULDER PAIN, UNSPECIFIED CHRONICITY, UNSPECIFIED LATERALITY: ICD-10-CM

## 2022-12-08 DIAGNOSIS — M19.011 PRIMARY OSTEOARTHRITIS OF RIGHT SHOULDER: ICD-10-CM

## 2022-12-08 PROCEDURE — 20610 DRAIN/INJ JOINT/BURSA W/O US: CPT | Mod: RT,S$GLB,, | Performed by: PHYSICIAN ASSISTANT

## 2022-12-08 PROCEDURE — 3078F DIAST BP <80 MM HG: CPT | Mod: CPTII,S$GLB,, | Performed by: PHYSICIAN ASSISTANT

## 2022-12-08 PROCEDURE — 1101F PT FALLS ASSESS-DOCD LE1/YR: CPT | Mod: CPTII,S$GLB,, | Performed by: PHYSICIAN ASSISTANT

## 2022-12-08 PROCEDURE — 99999 PR PBB SHADOW E&M-EST. PATIENT-LVL IV: CPT | Mod: PBBFAC,,, | Performed by: PHYSICIAN ASSISTANT

## 2022-12-08 PROCEDURE — 1160F PR REVIEW ALL MEDS BY PRESCRIBER/CLIN PHARMACIST DOCUMENTED: ICD-10-PCS | Mod: CPTII,S$GLB,, | Performed by: PHYSICIAN ASSISTANT

## 2022-12-08 PROCEDURE — 1160F RVW MEDS BY RX/DR IN RCRD: CPT | Mod: CPTII,S$GLB,, | Performed by: PHYSICIAN ASSISTANT

## 2022-12-08 PROCEDURE — 3288F PR FALLS RISK ASSESSMENT DOCUMENTED: ICD-10-PCS | Mod: CPTII,S$GLB,, | Performed by: PHYSICIAN ASSISTANT

## 2022-12-08 PROCEDURE — 99999 PR PBB SHADOW E&M-EST. PATIENT-LVL IV: ICD-10-PCS | Mod: PBBFAC,,, | Performed by: PHYSICIAN ASSISTANT

## 2022-12-08 PROCEDURE — 3288F FALL RISK ASSESSMENT DOCD: CPT | Mod: CPTII,S$GLB,, | Performed by: PHYSICIAN ASSISTANT

## 2022-12-08 PROCEDURE — 1125F PR PAIN SEVERITY QUANTIFIED, PAIN PRESENT: ICD-10-PCS | Mod: CPTII,S$GLB,, | Performed by: PHYSICIAN ASSISTANT

## 2022-12-08 PROCEDURE — 3075F PR MOST RECENT SYSTOLIC BLOOD PRESS GE 130-139MM HG: ICD-10-PCS | Mod: CPTII,S$GLB,, | Performed by: PHYSICIAN ASSISTANT

## 2022-12-08 PROCEDURE — 3078F PR MOST RECENT DIASTOLIC BLOOD PRESSURE < 80 MM HG: ICD-10-PCS | Mod: CPTII,S$GLB,, | Performed by: PHYSICIAN ASSISTANT

## 2022-12-08 PROCEDURE — 99204 PR OFFICE/OUTPT VISIT, NEW, LEVL IV, 45-59 MIN: ICD-10-PCS | Mod: 25,S$GLB,, | Performed by: PHYSICIAN ASSISTANT

## 2022-12-08 PROCEDURE — 1101F PR PT FALLS ASSESS DOC 0-1 FALLS W/OUT INJ PAST YR: ICD-10-PCS | Mod: CPTII,S$GLB,, | Performed by: PHYSICIAN ASSISTANT

## 2022-12-08 PROCEDURE — 1159F MED LIST DOCD IN RCRD: CPT | Mod: CPTII,S$GLB,, | Performed by: PHYSICIAN ASSISTANT

## 2022-12-08 PROCEDURE — 99204 OFFICE O/P NEW MOD 45 MIN: CPT | Mod: 25,S$GLB,, | Performed by: PHYSICIAN ASSISTANT

## 2022-12-08 PROCEDURE — 1159F PR MEDICATION LIST DOCUMENTED IN MEDICAL RECORD: ICD-10-PCS | Mod: CPTII,S$GLB,, | Performed by: PHYSICIAN ASSISTANT

## 2022-12-08 PROCEDURE — 20610 PR DRAIN/INJECT LARGE JOINT/BURSA: ICD-10-PCS | Mod: RT,S$GLB,, | Performed by: PHYSICIAN ASSISTANT

## 2022-12-08 PROCEDURE — 1125F AMNT PAIN NOTED PAIN PRSNT: CPT | Mod: CPTII,S$GLB,, | Performed by: PHYSICIAN ASSISTANT

## 2022-12-08 PROCEDURE — 3075F SYST BP GE 130 - 139MM HG: CPT | Mod: CPTII,S$GLB,, | Performed by: PHYSICIAN ASSISTANT

## 2022-12-08 RX ORDER — ROPIVACAINE HYDROCHLORIDE 2 MG/ML
4 INJECTION, SOLUTION EPIDURAL; INFILTRATION
Status: COMPLETED | OUTPATIENT
Start: 2022-12-08 | End: 2022-12-08

## 2022-12-08 RX ORDER — TRIAMCINOLONE ACETONIDE 40 MG/ML
40 INJECTION, SUSPENSION INTRA-ARTICULAR; INTRAMUSCULAR
Status: COMPLETED | OUTPATIENT
Start: 2022-12-08 | End: 2022-12-08

## 2022-12-08 RX ADMIN — TRIAMCINOLONE ACETONIDE 40 MG: 40 INJECTION, SUSPENSION INTRA-ARTICULAR; INTRAMUSCULAR at 10:12

## 2022-12-08 RX ADMIN — ROPIVACAINE HYDROCHLORIDE 4 ML: 2 INJECTION, SOLUTION EPIDURAL; INFILTRATION at 10:12

## 2022-12-08 NOTE — PROGRESS NOTES
CC: Right shoulder pain     82 y.o. Female presents as a new patient to me. She  has previous history of AC joint arthritis/impingement of her R shoulder. RHD. Complaint is right shoulder pain x years. Atraumatic onset. Pain localizes generally throughout the shoulder. Worse with movement. Pain is disruptive to sleep at night. Better with rest. Denies neck pain or radicular symptoms. Treatment thus far has included activity modifications, rest, and oral medication. She has also received CSI in the past that gives her relief. Last injection in 7/2022 and says it just started wearing off 2 weeks ago. Here today to discuss diagnosis and treatment options.     PMHx notable for a fib on chronic anticoagulation.  Negative for tobacco.   Negative for diabetes.    Pain Score:   9    PAST MEDICAL HISTORY:   Past Medical History:   Diagnosis Date    Allergy     Anxiety     Arthritis     Asthma     mild    Cataract     Hepatitis     IBS (irritable bowel syndrome)     Reflux esophagitis     Torus palatinus        PAST SURGICAL HISTORY:  Past Surgical History:   Procedure Laterality Date    BELPHAROPTOSIS REPAIR Bilateral     CATARACT EXTRACTION W/  INTRAOCULAR LENS IMPLANT Bilateral     CATARACT EXTRACTION, BILATERAL      CHOLECYSTECTOMY      COLONOSCOPY N/A 01/20/2022    Procedure: COLONOSCOPY;  Surgeon: Emmanuel Sandoval MD;  Location: [address]);  Service: Endoscopy;  Laterality: N/A;    ESOPHAGOGASTRODUODENOSCOPY N/A 01/20/2022    Procedure: EGD (ESOPHAGOGASTRODUODENOSCOPY) any GI doc, please perform colonoscopy and EGD at same time;  Surgeon: Emmanuel Sandoval MD;  Location: [address]);  Service: Endoscopy;  Laterality: N/A;  EGD & Colonoscopy orders combined-MS  fully vaccinated  1/13 covid test 1/17 @ [facility]    EYE SURGERY      FLEXIBLE SIGMOIDOSCOPY  4/26/2022    Procedure: SIGMOIDOSCOPY, FLEXIBLE;  Surgeon: Alison Mcclain MD;  Location: [facility] OR;  Service: Colon and Rectal;;    HYSTERECTOMY       OOPHORECTOMY      ROBOT-ASSISTED LAPAROSCOPIC COLECTOMY USING DA DIANA XI N/A 4/26/2022    Procedure: XI ROBOTIC COLECTOMY with flexible sigmoidoscopy;  Surgeon: Alison Mcclain MD;  Location: [facility];  Service: Colon and Rectal;  Laterality: N/A;       FAMILY HISTORY:  Family History   Problem Relation Age of Onset    Cancer Mother         pancreatic    Asthma Sister     Cataracts Sister     Cataracts Father     Diabetes Father     No Known Problems Daughter     No Known Problems Son     Amblyopia Neg Hx     Blindness Neg Hx     Glaucoma Neg Hx     Macular degeneration Neg Hx     Retinal detachment Neg Hx     Strabismus Neg Hx     Breast cancer Neg Hx     Colon cancer Neg Hx     Ovarian cancer Neg Hx        MEDICATIONS:    Current Outpatient Medications:     albuterol (PROVENTIL/VENTOLIN HFA) 90 mcg/actuation inhaler, Inhale 2 puffs into the lungs every 6 (six) hours as needed for Wheezing or Shortness of Breath (generic preferred). Rescue, Disp: 18 g, Rfl: 3    apixaban (ELIQUIS) 2.5 mg Tab, Take 1 tablet (2.5 mg total) by mouth 2 (two) times daily., Disp: 180 tablet, Rfl: 3    calcium-vitamin D3 500 mg(1,250mg) -200 unit per tablet, Take 1 tablet by mouth Daily. , Disp: , Rfl:     carboxymethylcellulose (REFRESH PLUS) 0.5 % Dpet, 1 drop 3 (three) times daily as needed., Disp: , Rfl:     diclofenac sodium (VOLTAREN) 1 % Gel, Apply 2 g topically once daily., Disp: 150 g, Rfl: 2    KENALOG 40 mg/mL injection, , Disp: , Rfl:     LORazepam (ATIVAN) 0.5 MG tablet, Take 1 tablet (0.5 mg total) by mouth every evening. prn, Disp: 30 tablet, Rfl: 1    meloxicam (MOBIC) 7.5 MG tablet, Take 1 tablet (7.5 mg total) by mouth once daily., Disp: 30 tablet, Rfl: 12    omeprazole (PRILOSEC) 20 MG capsule, Take 1 capsule (20 mg total) by mouth once daily. Take 30 min before breakfast, Disp: 30 capsule, Rfl: 2    sodium chloride (SALINE NASAL) 0.65 % nasal spray, 2 sprays by Nasal route as needed for Congestion., Disp: 44 mL, Rfl:  11    triamcinolone (NASACORT) 55 mcg nasal inhaler, 2 sprays by Nasal route once daily., Disp: 16.9 mL, Rfl: 11    azelastine (ASTELIN) 137 mcg (0.1 %) nasal spray, 1 spray (137 mcg total) by Nasal route 2 (two) times daily., Disp: 30 mL, Rfl: 0    doxycycline (VIBRAMYCIN) 100 MG Cap, Take 1 capsule (100 mg total) by mouth every 12 (twelve) hours. (Patient not taking: Reported on 11/9/2022), Disp: 20 capsule, Rfl: 0    estradioL (ESTRACE) 0.01 % (0.1 mg/gram) vaginal cream, Place 1 g vaginally once daily. (Patient not taking: Reported on 11/9/2022), Disp: 42.5 g, Rfl: 3    gabapentin (NEURONTIN) 300 MG capsule, Take 2 capsules (600 mg total) by mouth every evening. (Patient taking differently: Take 600 mg by mouth every evening. PRN), Disp: 180 capsule, Rfl: 3    metoprolol succinate (TOPROL-XL) 50 MG 24 hr tablet, Take 1 tablet (50 mg total) by mouth once daily. (Patient not taking: Reported on 11/10/2022), Disp: 90 tablet, Rfl: 3    triamcinolone acetonide 0.1% (KENALOG) 0.1 % cream, Apply topically 2 (two) times daily. Apply to itchy irritated rash twice daily. Do not apply to face, groin, or skin folds (Patient not taking: Reported on 11/9/2022), Disp: 80 g, Rfl: 1  No current facility-administered medications for this visit.    Facility-Administered Medications Ordered in Other Visits:     LIDOcaine (PF) 10 mg/ml (1%) injection 10 mg, 1 mL, Intradermal, Once, Audrey Caicedo NP    mupirocin 2 % ointment, , Nasal, On Call Procedure, Audrey Caicedo NP, Given at 04/26/22 4737    ALLERGIES:  Review of patient's allergies indicates:   Allergen Reactions    Codeine      Other reaction(s): Syncope, can take tylenol    Sulfa (sulfonamide antibiotics)      Other reaction(s): Stomach upset        REVIEW OF SYSTEMS:  Constitution: Negative. Negative for chills, fever and night sweats.    Hematologic/Lymphatic: Negative for bleeding problem. Does not bruise/bleed easily.   Skin: Negative for dry skin, itching and  rash.   Musculoskeletal: Negative for falls. Positive for right shoulder pain and muscle weakness.     All other review of symptoms were reviewed and found to be noncontributory.     PHYSICAL EXAMINATION:  Vitals:  /72   Pulse 95   Ht 5' (1.524 m)   Wt 51.7 kg (114 lb)   LMP  (LMP Unknown)   BMI 22.26 kg/m²    General: Well-developed well-nourished 82 y.o. femalein no acute distress   Cardiovascular: Regular rhythm by palpation of distal pulse, normal color and temperature, no concerning varicosities on symptomatic side   Lungs: No labored breathing or wheezing appreciated   Neuro: Alert and oriented ×3   Psychiatric: well oriented to person, place and time, demonstrates normal mood and affect   Skin: No rashes, lesions or ulcers, normal temperature, turgor, and texture on uninvolved extremity    Ortho/SPM Exam  Examination of the right shoulder demonstrates active forward elevation to 110, ER with arm at side to 60. Prominent tenderness at AC joint. Positive Calzada, Positive cross body. Negative belly press test. Stable shoulder. No midline neck tenderness. Negative Spurling's maneuver.     IMAGING:  Xrays done in 7/22 including AP, Outlet and Axillary Lateral of RIGHT shoulder images reviewed by me:   Degenerative change right AC and glenohumeral joints.  There is no evidence for acute fracture line or dislocation right shoulder.     ASSESSMENT:      ICD-10-CM ICD-9-CM   1. Impingement syndrome of right shoulder  M75.41 726.2   2. Shoulder pain, unspecified chronicity, unspecified laterality  M25.519 719.41   3. Primary osteoarthritis of right shoulder  M19.011 715.11       PLAN:     -Findings and treatment options were discussed with the patient  -We have discussed a variety of treatment options including medications, injections, physical therapy and other alternative treatments. I also explained the indications, risks and benefits of surgery. Patient chooses to proceed with CSI and physical therapy  at this time.    I made the decision to obtain old records of the patient including previous notes and imaging. I independently reviewed and interpreted lab results today as well as prior imaging.     1. Injection Procedure  A time out was performed, including verification of patient ID, procedure, site and side, availability of information and equipment, review of safety issues, and agreement with consent, the procedure site was marked.    After time out was performed, the patient was prepped aseptically with chloraprep swabstick. A diagnostic and therapeutic injection of 1:4cc Kenalog/Marcaine was given under sterile technique using a 22g x 1.5 needle from the Posterior  aspect of the right Subacromial in the sitting position.      Laverne Humphries had no adverse reactions to the medication. Pain decreased. She was instructed to apply ice to the joint for 20 minutes and avoid strenuous activities for 24-36 hours following the injection. She was warned of possible blood sugar and/or blood pressure changes during that time. Following that time, she can resume regular activities.    She was reminded to call the clinic immediately for any adverse side effects as explained in clinic today.    2. OTC NSAIDs as needed.  3. Ice compress to the affected area 2-3x a day for 15-20 minutes as needed for pain management.  4. RTC prn. If patient does not have some pain relief within the next few weeks, may be worth further work up with spine- has previous imaging demonstrating c-spine degenerative changes.    All of the patient's questions were answered and the patient will contact us if they have any questions or concerns in the interim.      Procedures

## 2022-12-11 VITALS
OXYGEN SATURATION: 95 % | WEIGHT: 115.31 LBS | HEART RATE: 96 BPM | SYSTOLIC BLOOD PRESSURE: 132 MMHG | TEMPERATURE: 99 F | DIASTOLIC BLOOD PRESSURE: 68 MMHG | BODY MASS INDEX: 22.52 KG/M2

## 2022-12-11 NOTE — PROGRESS NOTES
Subjective:       Patient ID: Laverne Humphries is a 82 y.o. female.    Chief Complaint: Atrial Fibrillation    HPI  She complains of pain located in her right shoulder.  Described as an ache.  3/10 in intensity.  Denies any acute trauma.  No numbness, no weakness.  She has atrial fibrillation.  Currently on Toprol     Medications: See med list     Social history:  Does not smoke, does not drink alcohol       Review of Systems   Constitutional:  Negative for chills, fatigue, fever and unexpected weight change.   Respiratory:  Negative for chest tightness and shortness of breath.    Cardiovascular:  Negative for chest pain and palpitations.   Gastrointestinal:  Negative for abdominal pain and blood in stool.   Neurological:  Negative for dizziness, syncope, numbness and headaches.     Objective:      Physical Exam  HENT:      Right Ear: External ear normal.      Left Ear: External ear normal.      Nose: Nose normal.      Mouth/Throat:      Mouth: Mucous membranes are moist.      Pharynx: Oropharynx is clear.   Eyes:      Pupils: Pupils are equal, round, and reactive to light.   Cardiovascular:      Rate and Rhythm: Normal rate and regular rhythm.      Heart sounds: No murmur heard.  Pulmonary:      Breath sounds: Normal breath sounds.   Abdominal:      General: There is no distension.      Palpations: There is no hepatomegaly or splenomegaly.      Tenderness: There is no abdominal tenderness.   Musculoskeletal:      Cervical back: Normal range of motion.   Lymphadenopathy:      Cervical: No cervical adenopathy.      Upper Body:      Right upper body: No axillary adenopathy.      Left upper body: No axillary adenopathy.   Neurological:      Cranial Nerves: No cranial nerve deficit.      Sensory: No sensory deficit.      Motor: Motor function is intact.      Deep Tendon Reflexes: Reflexes are normal and symmetric.     Right shoulder without tenderness or redness  Assessment/Plan       Assessment and plan:  1.   Shoulder pain:  Schedule sports medicine appointment  2.  Atrial fibrillation:  Continue Toprol  3.  She was here for urgent care only appointment.  She will follow-up with her primary care physician for a physical

## 2022-12-16 ENCOUNTER — OFFICE VISIT (OUTPATIENT)
Dept: URGENT CARE | Facility: CLINIC | Age: 82
End: 2022-12-16
Payer: MEDICARE

## 2022-12-16 VITALS
TEMPERATURE: 98 F | DIASTOLIC BLOOD PRESSURE: 71 MMHG | HEART RATE: 100 BPM | OXYGEN SATURATION: 97 % | SYSTOLIC BLOOD PRESSURE: 140 MMHG | WEIGHT: 114 LBS | RESPIRATION RATE: 18 BRPM | HEIGHT: 60 IN | BODY MASS INDEX: 22.38 KG/M2

## 2022-12-16 DIAGNOSIS — J40 BRONCHITIS: Primary | ICD-10-CM

## 2022-12-16 DIAGNOSIS — R60.9 MUCOSAL EDEMA: ICD-10-CM

## 2022-12-16 LAB
CTP QC/QA: YES
SARS-COV-2 RDRP RESP QL NAA+PROBE: NEGATIVE

## 2022-12-16 PROCEDURE — 3078F DIAST BP <80 MM HG: CPT | Mod: CPTII,S$GLB,, | Performed by: NURSE PRACTITIONER

## 2022-12-16 PROCEDURE — U0002: ICD-10-PCS | Mod: QW,S$GLB,, | Performed by: NURSE PRACTITIONER

## 2022-12-16 PROCEDURE — 1159F PR MEDICATION LIST DOCUMENTED IN MEDICAL RECORD: ICD-10-PCS | Mod: CPTII,S$GLB,, | Performed by: NURSE PRACTITIONER

## 2022-12-16 PROCEDURE — 3078F PR MOST RECENT DIASTOLIC BLOOD PRESSURE < 80 MM HG: ICD-10-PCS | Mod: CPTII,S$GLB,, | Performed by: NURSE PRACTITIONER

## 2022-12-16 PROCEDURE — 71046 X-RAY EXAM CHEST 2 VIEWS: CPT | Mod: FY,S$GLB,, | Performed by: RADIOLOGY

## 2022-12-16 PROCEDURE — 1160F RVW MEDS BY RX/DR IN RCRD: CPT | Mod: CPTII,S$GLB,, | Performed by: NURSE PRACTITIONER

## 2022-12-16 PROCEDURE — 3077F PR MOST RECENT SYSTOLIC BLOOD PRESSURE >= 140 MM HG: ICD-10-PCS | Mod: CPTII,S$GLB,, | Performed by: NURSE PRACTITIONER

## 2022-12-16 PROCEDURE — 1159F MED LIST DOCD IN RCRD: CPT | Mod: CPTII,S$GLB,, | Performed by: NURSE PRACTITIONER

## 2022-12-16 PROCEDURE — 1160F PR REVIEW ALL MEDS BY PRESCRIBER/CLIN PHARMACIST DOCUMENTED: ICD-10-PCS | Mod: CPTII,S$GLB,, | Performed by: NURSE PRACTITIONER

## 2022-12-16 PROCEDURE — 99214 OFFICE O/P EST MOD 30 MIN: CPT | Mod: S$GLB,,, | Performed by: NURSE PRACTITIONER

## 2022-12-16 PROCEDURE — 3077F SYST BP >= 140 MM HG: CPT | Mod: CPTII,S$GLB,, | Performed by: NURSE PRACTITIONER

## 2022-12-16 PROCEDURE — 71046 XR CHEST PA AND LATERAL: ICD-10-PCS | Mod: FY,S$GLB,, | Performed by: RADIOLOGY

## 2022-12-16 PROCEDURE — U0002 COVID-19 LAB TEST NON-CDC: HCPCS | Mod: QW,S$GLB,, | Performed by: NURSE PRACTITIONER

## 2022-12-16 PROCEDURE — 1126F AMNT PAIN NOTED NONE PRSNT: CPT | Mod: CPTII,S$GLB,, | Performed by: NURSE PRACTITIONER

## 2022-12-16 PROCEDURE — 99214 PR OFFICE/OUTPT VISIT, EST, LEVL IV, 30-39 MIN: ICD-10-PCS | Mod: S$GLB,,, | Performed by: NURSE PRACTITIONER

## 2022-12-16 PROCEDURE — 1126F PR PAIN SEVERITY QUANTIFIED, NO PAIN PRESENT: ICD-10-PCS | Mod: CPTII,S$GLB,, | Performed by: NURSE PRACTITIONER

## 2022-12-16 RX ORDER — PROMETHAZINE HYDROCHLORIDE AND DEXTROMETHORPHAN HYDROBROMIDE 6.25; 15 MG/5ML; MG/5ML
5 SYRUP ORAL EVERY 8 HOURS PRN
Qty: 118 ML | Refills: 0 | Status: SHIPPED | OUTPATIENT
Start: 2022-12-16 | End: 2022-12-26

## 2022-12-16 RX ORDER — PREDNISONE 50 MG/1
50 TABLET ORAL DAILY
Qty: 5 TABLET | Refills: 0 | Status: SHIPPED | OUTPATIENT
Start: 2022-12-16 | End: 2022-12-21

## 2022-12-16 RX ORDER — BENZONATATE 200 MG/1
200 CAPSULE ORAL 3 TIMES DAILY PRN
Qty: 30 CAPSULE | Refills: 0 | Status: SHIPPED | OUTPATIENT
Start: 2022-12-16 | End: 2022-12-26

## 2022-12-16 NOTE — PROGRESS NOTES
Subjective:       Patient ID: Laverne Humphries is a 82 y.o. female.    Vitals:  height is 5' (1.524 m) and weight is 51.7 kg (114 lb). Her temperature is 98.2 °F (36.8 °C). Her blood pressure is 140/71 (abnormal) and her pulse is 100. Her respiration is 18 and oxygen saturation is 97%.     Chief Complaint: Cough    81 y/o female presents to  today with complaint of cough, nasal and chest congestion, weakness, and fatigue x7-10 days. Took tylenol with mild relief. Denies fever. Denies chest pain. Denies n/v/d.     Cough  This is a new problem. The current episode started in the past 7 days. The problem has been unchanged. The problem occurs constantly. The cough is Non-productive. Associated symptoms include headaches, nasal congestion, postnasal drip and shortness of breath. Pertinent negatives include no chest pain, chills, ear congestion, ear pain, fever, heartburn, hemoptysis, myalgias, rash, rhinorrhea, sore throat, sweats, weight loss or wheezing.     Constitution: Negative for chills and fever.   HENT:  Positive for postnasal drip. Negative for ear pain and sore throat.    Cardiovascular:  Negative for chest pain.   Respiratory:  Positive for cough and shortness of breath. Negative for bloody sputum and wheezing.    Gastrointestinal:  Negative for heartburn.   Musculoskeletal:  Negative for muscle ache.   Skin:  Negative for rash.   Neurological:  Positive for headaches.     Objective:      Physical Exam   Constitutional: She is oriented to person, place, and time. She appears well-developed. She is cooperative.  Non-toxic appearance. She does not appear ill. No distress.   HENT:   Head: Normocephalic.   Ears:   Right Ear: Hearing, tympanic membrane, external ear and ear canal normal.   Left Ear: Hearing, tympanic membrane, external ear and ear canal normal.   Nose: Congestion present. No mucosal edema, rhinorrhea or nasal deformity. No epistaxis. Right sinus exhibits no maxillary sinus tenderness and no  frontal sinus tenderness. Left sinus exhibits no maxillary sinus tenderness and no frontal sinus tenderness.   Mouth/Throat: Uvula is midline and mucous membranes are normal. Mucous membranes are moist. No trismus in the jaw. Normal dentition. No uvula swelling. Posterior oropharyngeal erythema present. No oropharyngeal exudate or posterior oropharyngeal edema. Oropharynx is clear.   Eyes: Lids are normal. No scleral icterus.   Neck: Trachea normal and phonation normal. Neck supple. No edema present. No erythema present. No neck rigidity present.   Cardiovascular: Normal rate and regular rhythm.   Pulmonary/Chest: Effort normal and breath sounds normal. No respiratory distress. She has no decreased breath sounds. She has no rhonchi.   Abdominal: Normal appearance.   Musculoskeletal: Normal range of motion.         General: No deformity. Normal range of motion.   Neurological: She is alert and oriented to person, place, and time. She exhibits normal muscle tone. Coordination normal.   Skin: Skin is warm, dry, intact, not diaphoretic and not pale.   Psychiatric: Her speech is normal and behavior is normal. Judgment and thought content normal.   Nursing note and vitals reviewed.      Results for orders placed or performed in visit on 12/16/22   POCT COVID-19 Rapid Screening   Result Value Ref Range    POC Rapid COVID Negative Negative     Acceptable Yes       Assessment:       1. Bronchitis    2. Mucosal edema          Plan:         Bronchitis  -     POCT COVID-19 Rapid Screening  -     XR CHEST PA AND LATERAL; Future; Expected date: 12/16/2022  -     promethazine-dextromethorphan (PROMETHAZINE-DM) 6.25-15 mg/5 mL Syrp; Take 5 mLs by mouth every 8 (eight) hours as needed (cough).  Dispense: 118 mL; Refill: 0  -     benzonatate (TESSALON) 200 MG capsule; Take 1 capsule (200 mg total) by mouth 3 (three) times daily as needed for Cough.  Dispense: 30 capsule; Refill: 0    Mucosal edema  -     POCT COVID-19  Rapid Screening  -     XR CHEST PA AND LATERAL; Future; Expected date: 12/16/2022  -     predniSONE (DELTASONE) 50 MG Tab; Take 1 tablet (50 mg total) by mouth once daily. for 5 days  Dispense: 5 tablet; Refill: 0      Patient Instructions   Oral fluids  Rest  Steam (hot showers, hot tea)  Blow nose often  Avoid circulating air (such as ceiling fans) dries your airway  Avoid drinking cold drinks (worsens cough)  Therapeutic coughing to expel mucous  Sit in upright position often

## 2022-12-22 ENCOUNTER — PATIENT MESSAGE (OUTPATIENT)
Dept: SPORTS MEDICINE | Facility: CLINIC | Age: 82
End: 2022-12-22
Payer: MEDICARE

## 2022-12-27 ENCOUNTER — PATIENT MESSAGE (OUTPATIENT)
Dept: INTERNAL MEDICINE | Facility: CLINIC | Age: 82
End: 2022-12-27
Payer: MEDICARE

## 2022-12-27 DIAGNOSIS — Z12.31 ENCOUNTER FOR SCREENING MAMMOGRAM FOR BREAST CANCER: Primary | ICD-10-CM

## 2023-01-09 ENCOUNTER — TELEPHONE (OUTPATIENT)
Dept: PRIMARY CARE CLINIC | Facility: CLINIC | Age: 83
End: 2023-01-09
Payer: MEDICARE

## 2023-01-09 DIAGNOSIS — F41.9 ANXIETY DISORDER, UNSPECIFIED: ICD-10-CM

## 2023-01-09 NOTE — TELEPHONE ENCOUNTER
This is Ms. Martinez's sister/Cally's mom. That's fine but I probably won't have any new pt appt till Feb (in a 40 min appt). Does she need any refills currently?

## 2023-01-09 NOTE — TELEPHONE ENCOUNTER
[1/6 4:37 PM] Blessing Blackman, Question for you, I have a patient that was a patient of Dr. Escobedo, her family is requesting Dr. James.  Can you find out if she would be willing to take her.  Her MRN is [id number]

## 2023-01-10 RX ORDER — LORAZEPAM 0.5 MG/1
0.5 TABLET ORAL NIGHTLY PRN
Qty: 30 TABLET | Refills: 0 | Status: SHIPPED | OUTPATIENT
Start: 2023-01-26 | End: 2023-02-03 | Stop reason: SDUPTHER

## 2023-01-10 NOTE — TELEPHONE ENCOUNTER
Apt set, no need for refills at this time the only med she may need refilled is the ativan but they are ok for now, would you like any labs done before her visit?

## 2023-01-10 NOTE — TELEPHONE ENCOUNTER
Postdated a refill for lorazepam for 1/26/23 as she'll likely be due then. No labs till appt when she's seen. Thanks!

## 2023-01-20 ENCOUNTER — HOSPITAL ENCOUNTER (OUTPATIENT)
Dept: RADIOLOGY | Facility: CLINIC | Age: 83
Discharge: HOME | End: 2023-01-20
Attending: INTERNAL MEDICINE
Payer: MEDICARE

## 2023-01-20 DIAGNOSIS — Z78.0 MENOPAUSE: ICD-10-CM

## 2023-01-20 PROCEDURE — 77080 DXA BONE DENSITY AXIAL: CPT | Mod: TC,HCNC

## 2023-01-20 PROCEDURE — 77080 DEXA BONE DENSITY SPINE HIP: ICD-10-PCS | Mod: 26,HCNC,, | Performed by: INTERNAL MEDICINE

## 2023-01-20 PROCEDURE — 77080 DXA BONE DENSITY AXIAL: CPT | Mod: 26,HCNC,, | Performed by: INTERNAL MEDICINE

## 2023-01-24 ENCOUNTER — PATIENT MESSAGE (OUTPATIENT)
Dept: CARDIOLOGY | Facility: CLINIC | Age: 83
End: 2023-01-24
Payer: MEDICARE

## 2023-01-24 DIAGNOSIS — Z79.01 CHRONIC ANTICOAGULATION: ICD-10-CM

## 2023-01-24 DIAGNOSIS — I48.0 PAROXYSMAL ATRIAL FIBRILLATION: ICD-10-CM

## 2023-02-01 ENCOUNTER — PATIENT MESSAGE (OUTPATIENT)
Dept: CARDIOLOGY | Facility: CLINIC | Age: 83
End: 2023-02-01
Payer: MEDICARE

## 2023-02-02 NOTE — PROGRESS NOTES
INTERNAL MEDICINE INITIAL VISIT NOTE    CHIEF COMPLAINT     Est care    HPI     Michellebootimmy Humphries is a 82 y.o. C female who presents to [facility] care.  Accompanied by Neli - granddaughter (Cally's daughter).  Lives by herself. No assistance w/ ADLs. Able to drive. No falls.   MMG - 1/5/22 NEG.  Cscope - 1/20/22 internal hemorrhoids. Diverticulosis. Dense pelvic adhesions so could not pass past 25cm prox to anus.  DEXA - 1/20/23 osteoporosis. Takes Ca and Vit D. Declines bisphosphonates b/c of potential side effects. Walks around the house 1 hour a day. Hs weights at home.     pAFib - eliquis 2.5mg BID, toprol xl 50mg daily.  Since being on toprol since April, pt reports some weak and dizzy. This past Saturday, pt was having chills, weak and dizzy. Started w/ some cough productive of some phlegm 2 days ago. No fevers. Sweats at night. Some generalized weakness. Some tightness in the back of the L calf.   Feels upset stomach. Diarrhea this am.   Reports her BP this am was 97/55. Reports no palpitations.   Stopped toprol for 2 days and it helped. Then restarted it.   LOV Dr. Yeh 11/9/22. F/u in 4 mo.    Mod AVS, mod MR and TR on tte 4/1/22.    CKD3 - Cr baseline 0.8-0.9. last Cr 0.9, eGFR 59 4/27/22  Hgb - 11.1-12.2  PTH - needs repeat.    Colovaginal fistula seen on CT A/P 3/9/22 due to diverticulitis.   S/p robotic sigmoid colectomy w/ Dr. Mcclain 4/26/22. Followed up 5/30/22. F/u prn.  No stomach pain.     CT A/P 3/9/22 -  Mild scattered calcific atherosclerosis.  Lung bases: Mild interstitial thickening/fibrotic changes.  No lung consolidation.   2/21/22    CT A/P 11/2021 - paraseptal emphysema of the bases.  Never smoker.  CXR 2017 - The heart is not enlarged aorta is ectatic.  Lungs are clear.  Scattered granulomata seen in the chest especially in the right lower lobe area.  Moderate degenerative change seen in the spine.    MRI C spine 1/2021 -   The craniocervical junction is unremarkable.  No evidence  of osteomyelitis, marrow replacement process, or acute fracture.  Posterior osseous fusion noted at C4-5.  No paraspinal masses or inflammatory changes.  Multiple perineural cysts noted.  At C3-4, there is bilateral facet arthropathy and a broad-based posterior disc osteophyte complex, resulting in mild left-sided neural foraminal narrowing.  No spinal canal stenosis.  C4-5 is unremarkable.  At C5-6, there is a broad-based posterior disc osteophyte complex, resulting in mild bilateral neural foraminal narrowing.  No spinal canal stenosis.  At C6-7, there is a asymmetric disc osteophyte complex to the left, resulting in moderate left-sided neural foraminal narrowing and mild spinal canal stenosis.  At C7-T1, there is mild bilateral facet arthropathy.  No spinal canal stenosis or significant neural foraminal narrowing.    MRI L SPINE 1/2021  The distal cord/conus demonstrates normal size and signal.  No evidence of osteomyelitis, marrow replacement process, or acute fracture.  No paraspinal masses.  At L1-2 and L2-3, there is mild disc bulging.  No spinal canal stenosis or significant neural foraminal narrowing.  At L3-4, there is mild disc bulging and moderate facet arthropathy with mild surrounding inflammatory change.  This results in mild spinal canal stenosis and mild bilateral neural foraminal narrowing.  At L4-5, there is prominent facet arthropathy, particularly on the right side, noting joint hypertrophy and subchondral marrow edema.  There is grade 1 anterolisthesis with uncovering of the disc.  This results in mild spinal canal stenosis and mild neural foraminal narrowing.  At L5-S1, there is mild disc bulging and facet arthropathy.  No spinal canal stenosis or significant neural foraminal narrowing     Reports neck and lower back are doing ok. Does have arthritis on the right shoulder. Not bothersome.     Reports has been on lorazepam 0.5mg nightly prn for insomnia/anxiety. Has been on this medicine for a  long time. Has been on this for many years. Doesn't want to change to a different med. Discussed risks.     Rare use of albuterol. Sometimes may take allegra prn.    Past Medical History:  Past Medical History:   Diagnosis Date    Allergy     Anxiety     Arthritis     Asthma     mild    Cataract     Hepatitis     IBS (irritable bowel syndrome)     Reflux esophagitis     Torus palatinus        Past Surgical History:  Past Surgical History:   Procedure Laterality Date    BELPHAROPTOSIS REPAIR Bilateral     CATARACT EXTRACTION W/  INTRAOCULAR LENS IMPLANT Bilateral     CATARACT EXTRACTION, BILATERAL      CHOLECYSTECTOMY      COLONOSCOPY N/A 01/20/2022    Procedure: COLONOSCOPY;  Surgeon: Emmanuel Sandoval MD;  Location: [facility] (4TH FLR);  Service: Endoscopy;  Laterality: N/A;    ESOPHAGOGASTRODUODENOSCOPY N/A 01/20/2022    Procedure: EGD (ESOPHAGOGASTRODUODENOSCOPY) any GI doc, please perform colonoscopy and EGD at same time;  Surgeon: Emmanuel Sandoval MD;  [facility]  Service: Endoscopy;  Laterality: N/A;  EGD & Colonoscopy orders combined-MS  fully vaccinated  1/13 covid test 1/17 @ [facility]    EYE SURGERY      FLEXIBLE SIGMOIDOSCOPY  4/26/2022    Procedure: SIGMOIDOSCOPY, FLEXIBLE;  Surgeon: Alison Mcclain MD;  Location: [facility];  Service: Colon and Rectal;;    HYSTERECTOMY      OOPHORECTOMY      ROBOT-ASSISTED LAPAROSCOPIC COLECTOMY USING DA DIANA XI N/A 4/26/2022    Procedure: XI ROBOTIC COLECTOMY with flexible sigmoidoscopy;  Surgeon: Alison Mcclain MD;  Location: [facility];  Service: Colon and Rectal;  Laterality: N/A;       Allergies:  Review of patient's allergies indicates:   Allergen Reactions    Codeine      Other reaction(s): Syncope, can take tylenol    Sulfa (sulfonamide antibiotics)      Other reaction(s): Stomach upset       Home Medications:  Prior to Admission medications    Medication Sig Start Date End Date Taking? Authorizing Provider   albuterol (PROVENTIL/VENTOLIN HFA)  90 mcg/actuation inhaler Inhale 2 puffs into the lungs every 6 (six) hours as needed for Wheezing or Shortness of Breath (generic preferred). Rescue 11/10/22   Pasha Lopez MD   apixaban (ELIQUIS) 2.5 mg Tab Take 1 tablet (2.5 mg total) by mouth 2 (two) times daily. 1/24/23   Lisa Yeh MD   azelastine (ASTELIN) 137 mcg (0.1 %) nasal spray 1 spray (137 mcg total) by Nasal route 2 (two) times daily. 9/5/22 10/5/22  Mónica Goldberg PA-C   calcium-vitamin D3 500 mg(1,250mg) -200 unit per tablet Take 1 tablet by mouth Daily.     Historical Provider   carboxymethylcellulose (REFRESH PLUS) 0.5 % Dpet 1 drop 3 (three) times daily as needed.    Historical Provider   diclofenac sodium (VOLTAREN) 1 % Gel Apply 2 g topically once daily. 3/9/21   Fran Castro MD   doxycycline (VIBRAMYCIN) 100 MG Cap Take 1 capsule (100 mg total) by mouth every 12 (twelve) hours.  Patient not taking: Reported on 11/9/2022 9/12/22   Lexie Schwab PA-C   estradioL (ESTRACE) 0.01 % (0.1 mg/gram) vaginal cream Place 1 g vaginally once daily.  Patient not taking: Reported on 11/9/2022 3/8/22   Ana Charles NP   gabapentin (NEURONTIN) 300 MG capsule Take 2 capsules (600 mg total) by mouth every evening.  Patient taking differently: Take 600 mg by mouth every evening. PRN 5/21/21 5/21/22  Kierra Cordoba DNP   KENALOG 40 mg/mL injection  7/28/22   Historical Provider   LORazepam (ATIVAN) 0.5 MG tablet Take 1 tablet (0.5 mg total) by mouth nightly as needed for Anxiety. prn 1/26/23   Carolyne James MD   meloxicam (MOBIC) 7.5 MG tablet Take 1 tablet (7.5 mg total) by mouth once daily. 5/21/21   Kierra Cordoba DNP   metoprolol succinate (TOPROL-XL) 50 MG 24 hr tablet Take 1 tablet (50 mg total) by mouth once daily.  Patient not taking: Reported on 11/10/2022 11/9/22   Lisa Yeh MD   omeprazole (PRILOSEC) 20 MG capsule Take 1 capsule (20 mg total) by mouth once daily. Take 30 min before breakfast 11/10/22 11/10/23   Pasha Lopez MD   sodium chloride (SALINE NASAL) 0.65 % nasal spray 2 sprays by Nasal route as needed for Congestion. 11/10/22   Pasha Lopez MD   triamcinolone (NASACORT) 55 mcg nasal inhaler 2 sprays by Nasal route once daily. 11/10/22   Pasha Lopez MD   triamcinolone acetonide 0.1% (KENALOG) 0.1 % cream Apply topically 2 (two) times daily. Apply to itchy irritated rash twice daily. Do not apply to face, groin, or skin folds  Patient not taking: Reported on 11/9/2022 4/12/22   Alvin Guerra MD       Family History:  Family History   Problem Relation Age of Onset    Cancer Mother         pancreatic    Asthma Sister     Cataracts Sister     Cataracts Father     Diabetes Father     No Known Problems Daughter     No Known Problems Son     Amblyopia Neg Hx     Blindness Neg Hx     Glaucoma Neg Hx     Macular degeneration Neg Hx     Retinal detachment Neg Hx     Strabismus Neg Hx     Breast cancer Neg Hx     Colon cancer Neg Hx     Ovarian cancer Neg Hx        Social History:  Social History     Tobacco Use    Smoking status: Never    Smokeless tobacco: Never   Substance Use Topics    Alcohol use: No    Drug use: No       Review of Systems:  Review of Systems Comprehensive review of systems otherwise negative. See history/subjective section for more details.    Health Maintainence:   HM reviewed.     PHYSICAL EXAM     /62 (BP Location: Left arm, Patient Position: Sitting, BP Method: Medium (Manual))   Pulse 89   Ht 5' (1.524 m)   Wt 50.9 kg (112 lb 3.4 oz)   LMP  (LMP Unknown)   SpO2 98%   BMI 21.92 kg/m²     GEN - A+OX4, NAD   HEENT - PERRL, EOMI, OP clear. MMM. TM normal.   Neck - No thyromegaly or cervical LAD. No thyroid masses felt.  CV - RRR, no m/r   Chest - CTAB, no wheezing or rhonchi  Abd - S/NT/ND/+BS.   Ext - 2+BDP and radial pulses. No LE edema.   Neuro - PERRL, EOMI, no nystagmus, eyebrow raise, facial sensation, hearing, m of mastication, smile,  palatal raise, shoulder shrug, tongue protrusion symmetric and intact. 5/5 BUE and BLE strength. Sensation to light touch intact throughout. 2+ DTRs. Normal gait.   MSK - No spinal tenderness to palpation. Normal gait.   Skin - No rash.    LABS     Previous labs reviewed.    ASSESSMENT/PLAN     Laverne Humphries is a 82 y.o. female with  Bet[place] was seen today for establish care.    Diagnoses and all orders for this visit:    Paroxysmal atrial fibrillation - cut back on toprol to 1/2 pill daily. Check BP daily. If SBP continues to be on the lower side and HR is not >100, can go ahead and hold toprol. Will give me BP readings on Tuesday via portal.   -     CBC Auto Differential; Future; Expected date: 02/03/2023  -     TSH; Future; Expected date: 02/03/2023    Chronic anticoagulation  -     CBC Auto Differential; Future; Expected date: 02/03/2023    Moderate aortic valve stenosis - EF ok. Cont current meds.     S/P partial colectomy for colovaginal fistula - reports doing well now.     Stage 3a chronic kidney disease  -     Comprehensive Metabolic Panel; Future; Expected date: 02/03/2023  -     CBC Auto Differential; Future; Expected date: 02/03/2023  -     Vitamin D; Future; Expected date: 02/03/2023  -     PTH, intact; Future; Expected date: 02/03/2023    Normocytic anemia  -     CBC Auto Differential; Future; Expected date: 02/03/2023    Aortic atherosclerosis - not on statin. May consider depending on lipid panel.   -     Comprehensive Metabolic Panel; Future; Expected date: 02/03/2023  -     Lipid Panel; Future; Expected date: 02/03/2023    Paraseptal emphysema - non smoker. Asymptomatic.     Calcified granuloma of lung - asymptomatic.     Lab test positive for detection of COVID-19 virus - discussed isolation. Wear mask for another 5 days. Check O2 at home and if persistently <90% and feels SOB, go to ER. Otherwise symptomatic treatment. Ok to take tylenol prn.   -     POCT COVID-19 Rapid Screening; Future;  Expected date: 02/03/2023  -     POCT Influenza A/B Molecular  -     POCT COVID-19 Rapid Screening  -     COVID-19 Surveillance Program  -     pulse oximeter (PULSE OXIMETER) device; by Apply Externally route 2 (two) times a day. Use twice daily at 8 AM and 3 PM and record the value in MyChart as directed.    BELLO (generalized anxiety disorder) - risks discussed. Pt wants to cont on med. Discussed if we ever have issues w/ memory, falls, etc, we may have to reconsider different medicine for sleep.  -     LORazepam (ATIVAN) 0.5 MG tablet; Take 1 tablet (0.5 mg total) by mouth nightly as needed for Anxiety.    Insomnia, unspecified type  -     LORazepam (ATIVAN) 0.5 MG tablet; Take 1 tablet (0.5 mg total) by mouth nightly as needed for Anxiety.    Advance Care Planning     Date: 02/03/2023    Living Will  Reviewed living will and HCPOA on file. Pt wishes no changes to be made.    Pt reports does have HCPOA at home and will bring back to next visit.          RTC in 3-4 months, sooner if needed and depending on labs.    Carolyne James MD  Department of Internal Medicine - [facility]  2:23 PM

## 2023-02-03 ENCOUNTER — LAB VISIT (OUTPATIENT)
Dept: LAB | Facility: HOSPITAL | Age: 83
End: 2023-02-03
Attending: INTERNAL MEDICINE
Payer: MEDICARE

## 2023-02-03 ENCOUNTER — PATIENT MESSAGE (OUTPATIENT)
Dept: ADMINISTRATIVE | Facility: OTHER | Age: 83
End: 2023-02-03
Payer: MEDICARE

## 2023-02-03 ENCOUNTER — PATIENT MESSAGE (OUTPATIENT)
Dept: ADMINISTRATIVE | Facility: CLINIC | Age: 83
End: 2023-02-03
Payer: MEDICARE

## 2023-02-03 ENCOUNTER — OFFICE VISIT (OUTPATIENT)
Dept: PRIMARY CARE CLINIC | Facility: CLINIC | Age: 83
End: 2023-02-03
Payer: MEDICARE

## 2023-02-03 ENCOUNTER — NURSE TRIAGE (OUTPATIENT)
Dept: ADMINISTRATIVE | Facility: CLINIC | Age: 83
End: 2023-02-03
Payer: MEDICARE

## 2023-02-03 ENCOUNTER — PATIENT MESSAGE (OUTPATIENT)
Dept: PRIMARY CARE CLINIC | Facility: CLINIC | Age: 83
End: 2023-02-03

## 2023-02-03 VITALS
SYSTOLIC BLOOD PRESSURE: 134 MMHG | HEART RATE: 89 BPM | DIASTOLIC BLOOD PRESSURE: 62 MMHG | WEIGHT: 112.19 LBS | OXYGEN SATURATION: 98 % | HEIGHT: 60 IN | BODY MASS INDEX: 22.03 KG/M2

## 2023-02-03 DIAGNOSIS — Z79.01 CHRONIC ANTICOAGULATION: ICD-10-CM

## 2023-02-03 DIAGNOSIS — N18.31 STAGE 3A CHRONIC KIDNEY DISEASE: ICD-10-CM

## 2023-02-03 DIAGNOSIS — I48.0 PAROXYSMAL ATRIAL FIBRILLATION: ICD-10-CM

## 2023-02-03 DIAGNOSIS — J43.8 PARASEPTAL EMPHYSEMA: ICD-10-CM

## 2023-02-03 DIAGNOSIS — D64.9 NORMOCYTIC ANEMIA: ICD-10-CM

## 2023-02-03 DIAGNOSIS — F41.1 GAD (GENERALIZED ANXIETY DISORDER): ICD-10-CM

## 2023-02-03 DIAGNOSIS — G47.00 INSOMNIA, UNSPECIFIED TYPE: ICD-10-CM

## 2023-02-03 DIAGNOSIS — J84.10 CALCIFIED GRANULOMA OF LUNG: ICD-10-CM

## 2023-02-03 DIAGNOSIS — U07.1 LAB TEST POSITIVE FOR DETECTION OF COVID-19 VIRUS: ICD-10-CM

## 2023-02-03 DIAGNOSIS — I48.0 PAROXYSMAL ATRIAL FIBRILLATION: Primary | ICD-10-CM

## 2023-02-03 DIAGNOSIS — Z90.49 S/P PARTIAL COLECTOMY: ICD-10-CM

## 2023-02-03 DIAGNOSIS — I70.0 AORTIC ATHEROSCLEROSIS: ICD-10-CM

## 2023-02-03 DIAGNOSIS — I35.0 MODERATE AORTIC VALVE STENOSIS: ICD-10-CM

## 2023-02-03 LAB
25(OH)D3+25(OH)D2 SERPL-MCNC: 43 NG/ML (ref 30–96)
ALBUMIN SERPL BCP-MCNC: 3.7 G/DL (ref 3.5–5.2)
ALP SERPL-CCNC: 52 U/L (ref 55–135)
ALT SERPL W/O P-5'-P-CCNC: 13 U/L (ref 10–44)
ANION GAP SERPL CALC-SCNC: 9 MMOL/L (ref 8–16)
AST SERPL-CCNC: 31 U/L (ref 10–40)
BASOPHILS # BLD AUTO: 0.04 K/UL (ref 0–0.2)
BASOPHILS NFR BLD: 0.6 % (ref 0–1.9)
BILIRUB SERPL-MCNC: 0.6 MG/DL (ref 0.1–1)
BUN SERPL-MCNC: 17 MG/DL (ref 8–23)
CALCIUM SERPL-MCNC: 10.7 MG/DL (ref 8.7–10.5)
CHLORIDE SERPL-SCNC: 104 MMOL/L (ref 95–110)
CHOLEST SERPL-MCNC: 179 MG/DL (ref 120–199)
CHOLEST/HDLC SERPL: 3.1 {RATIO} (ref 2–5)
CO2 SERPL-SCNC: 25 MMOL/L (ref 23–29)
CREAT SERPL-MCNC: 0.8 MG/DL (ref 0.5–1.4)
CTP QC/QA: YES
CTP QC/QA: YES
DIFFERENTIAL METHOD: ABNORMAL
EOSINOPHIL # BLD AUTO: 0.3 K/UL (ref 0–0.5)
EOSINOPHIL NFR BLD: 4.2 % (ref 0–8)
ERYTHROCYTE [DISTWIDTH] IN BLOOD BY AUTOMATED COUNT: 13.4 % (ref 11.5–14.5)
EST. GFR  (NO RACE VARIABLE): >60 ML/MIN/1.73 M^2
GLUCOSE SERPL-MCNC: 84 MG/DL (ref 70–110)
HCT VFR BLD AUTO: 39.1 % (ref 37–48.5)
HDLC SERPL-MCNC: 58 MG/DL (ref 40–75)
HDLC SERPL: 32.4 % (ref 20–50)
HGB BLD-MCNC: 12.4 G/DL (ref 12–16)
IMM GRANULOCYTES # BLD AUTO: 0.03 K/UL (ref 0–0.04)
IMM GRANULOCYTES NFR BLD AUTO: 0.5 % (ref 0–0.5)
LDLC SERPL CALC-MCNC: 91.8 MG/DL (ref 63–159)
LYMPHOCYTES # BLD AUTO: 3.4 K/UL (ref 1–4.8)
LYMPHOCYTES NFR BLD: 51.5 % (ref 18–48)
MCH RBC QN AUTO: 28.9 PG (ref 27–31)
MCHC RBC AUTO-ENTMCNC: 31.7 G/DL (ref 32–36)
MCV RBC AUTO: 91 FL (ref 82–98)
MONOCYTES # BLD AUTO: 0.5 K/UL (ref 0.3–1)
MONOCYTES NFR BLD: 7.6 % (ref 4–15)
NEUTROPHILS # BLD AUTO: 2.4 K/UL (ref 1.8–7.7)
NEUTROPHILS NFR BLD: 35.6 % (ref 38–73)
NONHDLC SERPL-MCNC: 121 MG/DL
NRBC BLD-RTO: 0 /100 WBC
PLATELET # BLD AUTO: 245 K/UL (ref 150–450)
PMV BLD AUTO: 11.2 FL (ref 9.2–12.9)
POC MOLECULAR INFLUENZA A AGN: NEGATIVE
POC MOLECULAR INFLUENZA B AGN: NEGATIVE
POTASSIUM SERPL-SCNC: 5 MMOL/L (ref 3.5–5.1)
PROT SERPL-MCNC: 7.6 G/DL (ref 6–8.4)
PTH-INTACT SERPL-MCNC: 37 PG/ML (ref 9–77)
RBC # BLD AUTO: 4.29 M/UL (ref 4–5.4)
SARS-COV-2 RDRP RESP QL NAA+PROBE: POSITIVE
SODIUM SERPL-SCNC: 138 MMOL/L (ref 136–145)
TRIGL SERPL-MCNC: 146 MG/DL (ref 30–150)
TSH SERPL DL<=0.005 MIU/L-ACNC: 1.34 UIU/ML (ref 0.4–4)
WBC # BLD AUTO: 6.6 K/UL (ref 3.9–12.7)

## 2023-02-03 PROCEDURE — 87635 SARS-COV-2 COVID-19 AMP PRB: CPT | Mod: QW,HCNC,S$GLB, | Performed by: INTERNAL MEDICINE

## 2023-02-03 PROCEDURE — 87502 POCT INFLUENZA A/B MOLECULAR: ICD-10-PCS | Mod: QW,HCNC,S$GLB, | Performed by: INTERNAL MEDICINE

## 2023-02-03 PROCEDURE — 87502 INFLUENZA DNA AMP PROBE: CPT | Mod: QW,HCNC,S$GLB, | Performed by: INTERNAL MEDICINE

## 2023-02-03 PROCEDURE — 1158F PR ADVANCE CARE PLANNING DISCUSS DOCUMENTED IN MEDICAL RECORD: ICD-10-PCS | Mod: HCNC,CPTII,S$GLB, | Performed by: INTERNAL MEDICINE

## 2023-02-03 PROCEDURE — 36415 COLL VENOUS BLD VENIPUNCTURE: CPT | Mod: HCNC,PN | Performed by: INTERNAL MEDICINE

## 2023-02-03 PROCEDURE — 1160F PR REVIEW ALL MEDS BY PRESCRIBER/CLIN PHARMACIST DOCUMENTED: ICD-10-PCS | Mod: HCNC,CPTII,S$GLB, | Performed by: INTERNAL MEDICINE

## 2023-02-03 PROCEDURE — 1126F PR PAIN SEVERITY QUANTIFIED, NO PAIN PRESENT: ICD-10-PCS | Mod: HCNC,CPTII,S$GLB, | Performed by: INTERNAL MEDICINE

## 2023-02-03 PROCEDURE — 1101F PR PT FALLS ASSESS DOC 0-1 FALLS W/OUT INJ PAST YR: ICD-10-PCS | Mod: HCNC,CPTII,S$GLB, | Performed by: INTERNAL MEDICINE

## 2023-02-03 PROCEDURE — 1159F MED LIST DOCD IN RCRD: CPT | Mod: HCNC,CPTII,S$GLB, | Performed by: INTERNAL MEDICINE

## 2023-02-03 PROCEDURE — 1159F PR MEDICATION LIST DOCUMENTED IN MEDICAL RECORD: ICD-10-PCS | Mod: HCNC,CPTII,S$GLB, | Performed by: INTERNAL MEDICINE

## 2023-02-03 PROCEDURE — 3078F DIAST BP <80 MM HG: CPT | Mod: HCNC,CPTII,S$GLB, | Performed by: INTERNAL MEDICINE

## 2023-02-03 PROCEDURE — 1160F RVW MEDS BY RX/DR IN RCRD: CPT | Mod: HCNC,CPTII,S$GLB, | Performed by: INTERNAL MEDICINE

## 2023-02-03 PROCEDURE — 1126F AMNT PAIN NOTED NONE PRSNT: CPT | Mod: HCNC,CPTII,S$GLB, | Performed by: INTERNAL MEDICINE

## 2023-02-03 PROCEDURE — 3078F PR MOST RECENT DIASTOLIC BLOOD PRESSURE < 80 MM HG: ICD-10-PCS | Mod: HCNC,CPTII,S$GLB, | Performed by: INTERNAL MEDICINE

## 2023-02-03 PROCEDURE — 84443 ASSAY THYROID STIM HORMONE: CPT | Mod: HCNC | Performed by: INTERNAL MEDICINE

## 2023-02-03 PROCEDURE — 99999 PR PBB SHADOW E&M-EST. PATIENT-LVL IV: CPT | Mod: PBBFAC,HCNC,, | Performed by: INTERNAL MEDICINE

## 2023-02-03 PROCEDURE — 87635: ICD-10-PCS | Mod: QW,HCNC,S$GLB, | Performed by: INTERNAL MEDICINE

## 2023-02-03 PROCEDURE — 3288F PR FALLS RISK ASSESSMENT DOCUMENTED: ICD-10-PCS | Mod: HCNC,CPTII,S$GLB, | Performed by: INTERNAL MEDICINE

## 2023-02-03 PROCEDURE — 80053 COMPREHEN METABOLIC PANEL: CPT | Mod: HCNC | Performed by: INTERNAL MEDICINE

## 2023-02-03 PROCEDURE — 99215 PR OFFICE/OUTPT VISIT, EST, LEVL V, 40-54 MIN: ICD-10-PCS | Mod: HCNC,S$GLB,, | Performed by: INTERNAL MEDICINE

## 2023-02-03 PROCEDURE — 1101F PT FALLS ASSESS-DOCD LE1/YR: CPT | Mod: HCNC,CPTII,S$GLB, | Performed by: INTERNAL MEDICINE

## 2023-02-03 PROCEDURE — 3288F FALL RISK ASSESSMENT DOCD: CPT | Mod: HCNC,CPTII,S$GLB, | Performed by: INTERNAL MEDICINE

## 2023-02-03 PROCEDURE — 99215 OFFICE O/P EST HI 40 MIN: CPT | Mod: HCNC,S$GLB,, | Performed by: INTERNAL MEDICINE

## 2023-02-03 PROCEDURE — 1158F ADVNC CARE PLAN TLK DOCD: CPT | Mod: HCNC,CPTII,S$GLB, | Performed by: INTERNAL MEDICINE

## 2023-02-03 PROCEDURE — 82306 VITAMIN D 25 HYDROXY: CPT | Mod: HCNC | Performed by: INTERNAL MEDICINE

## 2023-02-03 PROCEDURE — 99999 PR PBB SHADOW E&M-EST. PATIENT-LVL IV: ICD-10-PCS | Mod: PBBFAC,HCNC,, | Performed by: INTERNAL MEDICINE

## 2023-02-03 PROCEDURE — 85025 COMPLETE CBC W/AUTO DIFF WBC: CPT | Mod: HCNC | Performed by: INTERNAL MEDICINE

## 2023-02-03 PROCEDURE — 3075F PR MOST RECENT SYSTOLIC BLOOD PRESS GE 130-139MM HG: ICD-10-PCS | Mod: HCNC,CPTII,S$GLB, | Performed by: INTERNAL MEDICINE

## 2023-02-03 PROCEDURE — 83970 ASSAY OF PARATHORMONE: CPT | Mod: HCNC | Performed by: INTERNAL MEDICINE

## 2023-02-03 PROCEDURE — 3075F SYST BP GE 130 - 139MM HG: CPT | Mod: HCNC,CPTII,S$GLB, | Performed by: INTERNAL MEDICINE

## 2023-02-03 PROCEDURE — 80061 LIPID PANEL: CPT | Mod: HCNC | Performed by: INTERNAL MEDICINE

## 2023-02-03 RX ORDER — LORAZEPAM 0.5 MG/1
0.5 TABLET ORAL NIGHTLY PRN
Qty: 30 TABLET | Refills: 5 | Status: SHIPPED | OUTPATIENT
Start: 2023-02-03 | End: 2023-04-22 | Stop reason: SDUPTHER

## 2023-02-03 NOTE — TELEPHONE ENCOUNTER
Covid Surveillance Program Enrollment Attempt #1:     Pt called for enrollment in the surveillance program. No answer. Left VM:     Hi this is an RN with [facility]. Your provider has enrolled you in one of our programs. I was calling to review some information about the program with you. I will send you a message through My Chart explaining the program.  If you would like to participate, you can sign consent through the My Chart Manny. If you need additional information or assistance completing enrollment in our program, you can call us at [phone]. Also, we will try you back    My chart enrollment program information message previously sent.     Alternate number called. Spoke with pt. Program overview provided. Pt states she will discuss with her daughter as she would need help. States her daughter has access to her my chart. Pt advised program information sent to her my chart. Advised to have daughter review and if interested she can self enroll or call us back for additional information or enrollment assistance. Advised if we did not hear, someone would check back with them tomorrow. Pt has no additional concerns or questions at this time. Pt advised to call [facility] with needs. Verbalized understanding.   Reason for Disposition   Information only question and nurse able to answer    Additional Information   Negative: Nursing judgment   Negative: Nursing judgment   Negative: Nursing judgment   Negative: Nursing judgment    Protocols used: Information Only Call - No Triage-A-[place]

## 2023-02-04 ENCOUNTER — PATIENT MESSAGE (OUTPATIENT)
Dept: ADMINISTRATIVE | Facility: OTHER | Age: 83
End: 2023-02-04
Payer: MEDICARE

## 2023-02-05 ENCOUNTER — PATIENT MESSAGE (OUTPATIENT)
Dept: ADMINISTRATIVE | Facility: OTHER | Age: 83
End: 2023-02-05
Payer: MEDICARE

## 2023-02-06 ENCOUNTER — PATIENT MESSAGE (OUTPATIENT)
Dept: ADMINISTRATIVE | Facility: OTHER | Age: 83
End: 2023-02-06
Payer: MEDICARE

## 2023-02-06 ENCOUNTER — PATIENT MESSAGE (OUTPATIENT)
Dept: ADMINISTRATIVE | Facility: CLINIC | Age: 83
End: 2023-02-06
Payer: MEDICARE

## 2023-02-06 ENCOUNTER — NURSE TRIAGE (OUTPATIENT)
Dept: ADMINISTRATIVE | Facility: CLINIC | Age: 83
End: 2023-02-06
Payer: MEDICARE

## 2023-02-06 NOTE — TELEPHONE ENCOUNTER
Pt escalated due to no response. Spoke with daughter who states will input VS. Denies Further needs.  Reason for Disposition   Health Information question, no triage required and triager able to answer question    Protocols used: Information Only Call - No Triage-A-

## 2023-02-07 ENCOUNTER — PATIENT MESSAGE (OUTPATIENT)
Dept: PRIMARY CARE CLINIC | Facility: CLINIC | Age: 83
End: 2023-02-07
Payer: MEDICARE

## 2023-02-07 ENCOUNTER — TELEPHONE (OUTPATIENT)
Dept: PRIMARY CARE CLINIC | Facility: CLINIC | Age: 83
End: 2023-02-07
Payer: MEDICARE

## 2023-02-07 NOTE — TELEPHONE ENCOUNTER
Please call and notify pt:  Blood counts, parathyroid hormone, liver, kidney and thyroid functions are normal. Calcium level is high. I recommend stopping calcium but to continue her vitamin d.   Even though cholesterol is not very high, her LDL, bad cholesterol, is above goal given her history of plaques in the aorta. Goal is <70 and hers is 91.8. at this time, I'd like to do a low dose cholesterol medicine to stabilize the plaque and to prevent cardiovascular event. If ok, will start crestor 5mg nightly.   Let me know what she says. Thanks!

## 2023-02-09 DIAGNOSIS — Z00.00 ENCOUNTER FOR MEDICARE ANNUAL WELLNESS EXAM: ICD-10-CM

## 2023-02-10 ENCOUNTER — PATIENT MESSAGE (OUTPATIENT)
Dept: PRIMARY CARE CLINIC | Facility: CLINIC | Age: 83
End: 2023-02-10
Payer: MEDICARE

## 2023-02-10 DIAGNOSIS — E78.5 HYPERLIPIDEMIA, UNSPECIFIED HYPERLIPIDEMIA TYPE: Primary | ICD-10-CM

## 2023-02-10 DIAGNOSIS — I70.0 AORTIC ATHEROSCLEROSIS: ICD-10-CM

## 2023-02-10 RX ORDER — ROSUVASTATIN CALCIUM 5 MG/1
5 TABLET, COATED ORAL NIGHTLY
Qty: 90 TABLET | Refills: 3 | Status: SHIPPED | OUTPATIENT
Start: 2023-02-10 | End: 2023-11-09 | Stop reason: SINTOL

## 2023-02-15 ENCOUNTER — HOSPITAL ENCOUNTER (OUTPATIENT)
Dept: RADIOLOGY | Facility: HOSPITAL | Age: 83
Discharge: HOME | End: 2023-02-15
Attending: ORTHOPAEDIC SURGERY
Payer: MEDICARE

## 2023-02-15 ENCOUNTER — OFFICE VISIT (OUTPATIENT)
Dept: SPORTS MEDICINE | Facility: CLINIC | Age: 83
End: 2023-02-15
Payer: MEDICARE

## 2023-02-15 VITALS
WEIGHT: 112 LBS | HEART RATE: 78 BPM | DIASTOLIC BLOOD PRESSURE: 73 MMHG | BODY MASS INDEX: 21.99 KG/M2 | SYSTOLIC BLOOD PRESSURE: 137 MMHG | HEIGHT: 60 IN

## 2023-02-15 DIAGNOSIS — M25.519 SHOULDER PAIN, UNSPECIFIED CHRONICITY, UNSPECIFIED LATERALITY: ICD-10-CM

## 2023-02-15 DIAGNOSIS — M75.121 NONTRAUMATIC COMPLETE TEAR OF RIGHT ROTATOR CUFF: Primary | ICD-10-CM

## 2023-02-15 DIAGNOSIS — G89.29 CHRONIC RIGHT SHOULDER PAIN: ICD-10-CM

## 2023-02-15 DIAGNOSIS — M25.511 CHRONIC RIGHT SHOULDER PAIN: ICD-10-CM

## 2023-02-15 PROCEDURE — 99214 PR OFFICE/OUTPT VISIT, EST, LEVL IV, 30-39 MIN: ICD-10-PCS | Mod: HCNC,S$GLB,, | Performed by: ORTHOPAEDIC SURGERY

## 2023-02-15 PROCEDURE — 3078F PR MOST RECENT DIASTOLIC BLOOD PRESSURE < 80 MM HG: ICD-10-PCS | Mod: HCNC,CPTII,S$GLB, | Performed by: ORTHOPAEDIC SURGERY

## 2023-02-15 PROCEDURE — 3078F DIAST BP <80 MM HG: CPT | Mod: HCNC,CPTII,S$GLB, | Performed by: ORTHOPAEDIC SURGERY

## 2023-02-15 PROCEDURE — 1101F PT FALLS ASSESS-DOCD LE1/YR: CPT | Mod: HCNC,CPTII,S$GLB, | Performed by: ORTHOPAEDIC SURGERY

## 2023-02-15 PROCEDURE — 1159F PR MEDICATION LIST DOCUMENTED IN MEDICAL RECORD: ICD-10-PCS | Mod: HCNC,CPTII,S$GLB, | Performed by: ORTHOPAEDIC SURGERY

## 2023-02-15 PROCEDURE — 1159F MED LIST DOCD IN RCRD: CPT | Mod: HCNC,CPTII,S$GLB, | Performed by: ORTHOPAEDIC SURGERY

## 2023-02-15 PROCEDURE — 3075F SYST BP GE 130 - 139MM HG: CPT | Mod: HCNC,CPTII,S$GLB, | Performed by: ORTHOPAEDIC SURGERY

## 2023-02-15 PROCEDURE — 3075F PR MOST RECENT SYSTOLIC BLOOD PRESS GE 130-139MM HG: ICD-10-PCS | Mod: HCNC,CPTII,S$GLB, | Performed by: ORTHOPAEDIC SURGERY

## 2023-02-15 PROCEDURE — 1101F PR PT FALLS ASSESS DOC 0-1 FALLS W/OUT INJ PAST YR: ICD-10-PCS | Mod: HCNC,CPTII,S$GLB, | Performed by: ORTHOPAEDIC SURGERY

## 2023-02-15 PROCEDURE — 99999 PR PBB SHADOW E&M-EST. PATIENT-LVL III: CPT | Mod: PBBFAC,HCNC,, | Performed by: ORTHOPAEDIC SURGERY

## 2023-02-15 PROCEDURE — 3288F FALL RISK ASSESSMENT DOCD: CPT | Mod: HCNC,CPTII,S$GLB, | Performed by: ORTHOPAEDIC SURGERY

## 2023-02-15 PROCEDURE — 1125F AMNT PAIN NOTED PAIN PRSNT: CPT | Mod: HCNC,CPTII,S$GLB, | Performed by: ORTHOPAEDIC SURGERY

## 2023-02-15 PROCEDURE — 1125F PR PAIN SEVERITY QUANTIFIED, PAIN PRESENT: ICD-10-PCS | Mod: HCNC,CPTII,S$GLB, | Performed by: ORTHOPAEDIC SURGERY

## 2023-02-15 PROCEDURE — 99214 OFFICE O/P EST MOD 30 MIN: CPT | Mod: HCNC,S$GLB,, | Performed by: ORTHOPAEDIC SURGERY

## 2023-02-15 PROCEDURE — 73030 X-RAY EXAM OF SHOULDER: CPT | Mod: TC,HCNC,RT

## 2023-02-15 PROCEDURE — 73030 X-RAY EXAM OF SHOULDER: CPT | Mod: 26,HCNC,RT, | Performed by: RADIOLOGY

## 2023-02-15 PROCEDURE — 99999 PR PBB SHADOW E&M-EST. PATIENT-LVL III: ICD-10-PCS | Mod: PBBFAC,HCNC,, | Performed by: ORTHOPAEDIC SURGERY

## 2023-02-15 PROCEDURE — 73030 XR SHOULDER COMPLETE 2 OR MORE VIEWS RIGHT: ICD-10-PCS | Mod: 26,HCNC,RT, | Performed by: RADIOLOGY

## 2023-02-15 PROCEDURE — 3288F PR FALLS RISK ASSESSMENT DOCUMENTED: ICD-10-PCS | Mod: HCNC,CPTII,S$GLB, | Performed by: ORTHOPAEDIC SURGERY

## 2023-02-15 NOTE — PROGRESS NOTES
NEW PATIENT    HISTORY OF PRESENT ILLNESS   82 y.o. Female with a history of right shoulder pain who has been having pain since about December. She has been seen by Julia Del Castilol PA-C in the past. She states she has anterior shoulder pain. She was given a CSI and reports some relief.  She states the pain is getting worse.  She has difficulty moving her sheets at night.  She can not even pull the covers over her.    Is affecting ADLs.  Pain is 8/10 at it's worst.        PAST MEDICAL HISTORY    Past Medical History:   Diagnosis Date    Allergy     Anxiety     Arthritis     Asthma     mild    Cataract     CKD (chronic kidney disease) stage 3, GFR 30-59 ml/min     Hepatitis     IBS (irritable bowel syndrome)     Moderate aortic valve stenosis     Osteoporosis     Paroxysmal atrial fibrillation     Reflux esophagitis     Torus palatinus        PAST SURGICAL HISTORY     Past Surgical History:   Procedure Laterality Date    BELPHAROPTOSIS REPAIR Bilateral     CATARACT EXTRACTION W/  INTRAOCULAR LENS IMPLANT Bilateral     CATARACT EXTRACTION, BILATERAL      CHOLECYSTECTOMY      COLONOSCOPY N/A 01/20/2022    Procedure: COLONOSCOPY;  Surgeon: Emmanuel Sandoval MD;  Location: [address]);  Service: Endoscopy;  Laterality: N/A;    ESOPHAGOGASTRODUODENOSCOPY N/A 01/20/2022    Procedure: EGD (ESOPHAGOGASTRODUODENOSCOPY) any GI doc, please perform colonoscopy and EGD at same time;  Surgeon: Emmanuel Sandoval MD;  Location: [address]);  Service: Endoscopy;  Laterality: N/A;  EGD & Colonoscopy orders combined-MS  fully vaccinated  1/13 covid test 1/17 @ [facility]    EYE SURGERY      FLEXIBLE SIGMOIDOSCOPY  4/26/2022    Procedure: SIGMOIDOSCOPY, FLEXIBLE;  Surgeon: Alison Mcclain MD;  Location: [facility];  Service: Colon and Rectal;;    HYSTERECTOMY      OOPHORECTOMY      ROBOT-ASSISTED LAPAROSCOPIC COLECTOMY USING DA DIANA XI N/A 4/26/2022    Procedure: XI ROBOTIC COLECTOMY with flexible  sigmoidoscopy;  Surgeon: Alison Mcclain MD;  Location: [facility];  Service: Colon and Rectal;  Laterality: N/A;       FAMILY HISTORY    Family History   Problem Relation Age of Onset    Cancer Mother         pancreatic    Cataracts Father     Diabetes Father     Asthma Sister     Cataracts Sister     Cancer Brother         liver and esophageal    No Known Problems Daughter     No Known Problems Son     Amblyopia Neg Hx     Blindness Neg Hx     Glaucoma Neg Hx     Macular degeneration Neg Hx     Retinal detachment Neg Hx     Strabismus Neg Hx     Breast cancer Neg Hx     Colon cancer Neg Hx     Ovarian cancer Neg Hx        SOCIAL HISTORY    Social History     Socioeconomic History    Marital status:    Tobacco Use    Smoking status: Never    Smokeless tobacco: Never   Substance and Sexual Activity    Alcohol use: No    Drug use: No    Sexual activity: Not Currently     Partners: Male     Birth control/protection: Abstinence, None   Other Topics Concern    Are you pregnant or think you may be? No    Breast-feeding No       MEDICATIONS      Current Outpatient Medications:     albuterol (PROVENTIL/VENTOLIN HFA) 90 mcg/actuation inhaler, Inhale 2 puffs into the lungs every 6 (six) hours as needed for Wheezing or Shortness of Breath (generic preferred). Rescue, Disp: 18 g, Rfl: 3    apixaban (ELIQUIS) 2.5 mg Tab, Take 1 tablet (2.5 mg total) by mouth 2 (two) times daily., Disp: 180 tablet, Rfl: 3    calcium-vitamin D3 500 mg(1,250mg) -200 unit per tablet, Take 1 tablet by mouth Daily. , Disp: , Rfl:     carboxymethylcellulose (REFRESH PLUS) 0.5 % Dpet, 1 drop 3 (three) times daily as needed., Disp: , Rfl:     diclofenac sodium (VOLTAREN) 1 % Gel, Apply 2 g topically once daily., Disp: 150 g, Rfl: 2    LORazepam (ATIVAN) 0.5 MG tablet, Take 1 tablet (0.5 mg total) by mouth nightly as needed for Anxiety., Disp: 30 tablet, Rfl: 5    metoprolol succinate (TOPROL-XL) 50 MG 24 hr tablet, Take 1 tablet (50 mg total) by mouth  once daily., Disp: 90 tablet, Rfl: 3    omeprazole (PRILOSEC) 20 MG capsule, Take 1 capsule (20 mg total) by mouth once daily. Take 30 min before breakfast, Disp: 30 capsule, Rfl: 2    rosuvastatin (CRESTOR) 5 MG tablet, Take 1 tablet (5 mg total) by mouth every evening., Disp: 90 tablet, Rfl: 3    sodium chloride (SALINE NASAL) 0.65 % nasal spray, 2 sprays by Nasal route as needed for Congestion., Disp: 44 mL, Rfl: 11    triamcinolone (NASACORT) 55 mcg nasal inhaler, 2 sprays by Nasal route once daily., Disp: 16.9 mL, Rfl: 11    triamcinolone acetonide 0.1% (KENALOG) 0.1 % cream, Apply topically 2 (two) times daily. Apply to itchy irritated rash twice daily. Do not apply to face, groin, or skin folds, Disp: 80 g, Rfl: 1    azelastine (ASTELIN) 137 mcg (0.1 %) nasal spray, 1 spray (137 mcg total) by Nasal route 2 (two) times daily., Disp: 30 mL, Rfl: 0    gabapentin (NEURONTIN) 300 MG capsule, Take 2 capsules (600 mg total) by mouth every evening. (Patient taking differently: Take 600 mg by mouth every evening. PRN), Disp: 180 capsule, Rfl: 3  No current facility-administered medications for this visit.    Facility-Administered Medications Ordered in Other Visits:     LIDOcaine (PF) 10 mg/ml (1%) injection 10 mg, 1 mL, Intradermal, Once, Audrey Caicedo NP    mupirocin 2 % ointment, , Nasal, On Call Procedure, Audrey Caicedo NP, Given at 04/26/22 0788    ALLERGIES     Review of patient's allergies indicates:   Allergen Reactions    Codeine      Other reaction(s): Syncope, can take tylenol    Sulfa (sulfonamide antibiotics)      Other reaction(s): Stomach upset         REVIEW OF SYSTEMS   Constitution: Negative. Negative for chills, fever and night sweats.   HENT: Negative for congestion and headaches.    Eyes: Negative for blurred vision, left vision loss and right vision loss.   Cardiovascular: Negative for chest pain and syncope.   Respiratory: Negative for cough and shortness of breath.    Endocrine:  Negative for polydipsia, polyphagia and polyuria.   Hematologic/Lymphatic: Negative for bleeding problem. Does not bruise/bleed easily.   Skin: Negative for dry skin, itching and rash.   Musculoskeletal: Negative for falls. Positive for right shoulder pain  Gastrointestinal: Negative for abdominal pain and bowel incontinence.   Genitourinary: Negative for bladder incontinence and nocturia.   Neurological: Negative for disturbances in coordination, loss of balance and seizures.   Psychiatric/Behavioral: Negative for depression. The patient does not have insomnia.    Allergic/Immunologic: Negative for hives and persistent infections.     PHYSICAL EXAMINATION    Vitals: /73   Pulse 78   Ht 5' (1.524 m)   Wt 50.8 kg (112 lb)   LMP  (LMP Unknown)   BMI 21.87 kg/m²     General: The patient appears active and healthy with no apparent physical problems.  No disturbance of mood or affect is demonstrated. Alert and Oriented.    HEENT: Eyes normal, pupils equally round, nose normal.    Resp: Equal and symmetrical chest rises. No wheezing    CV: Regular rate    Neck: Supple; nonpainful range of motion.    Extremities: no cyanosis, clubbing, edema, or diffuse swelling.  Palpable pulses, good capillary refill of the digits.  No coolness, discoloration, edema or obvious varicosities.    Skin: no lesions noted.    Lymphatic: no detected adenopathy in the upper or lower extremities.    Neurologic: normal mental status, normal reflexes, normal gait and balance.  Patient is alert and oriented to person, place and time.  No flaccidity or spasticity is noted.  No motor or sensory deficits are noted.  Light touch is intact    Orthopaedic:     SHOULDER EXAM - RIGHT    Inspection:   Normal skin color and appearance with no scars.  No muscle atrophy noted.  No scapular winging.      Palpation: No tenderness of the acromioclavicular joint, lateral edge of the acromion, biceps tendon, trapezius muscle or scapulothoracic bursa.       ROM:      PROM:     FE - 160°    Abd/ER -  90°  Abd/IR -  45°   Add/ER -  60°     AROM:    FE - 90°    Abd/ER -  90°  Abd/IR -  45°   Add/ER -  60° *pain with resisted ER/IR        Tests:     - Calzada, - Neer's, - Cross Arm Adduction, - Pickett, - Yerguson, + Speed. - Belly Press,  + Jobes, - Lift Off    Stability: - sulcus, - apprehension, - relocation, - load and shift, - DLS      Motor:  Rotator cuff strength is 4/5 supraspinatus, 4/5 infraspinatus, 5/5 subscapularis. Biceps, triceps and deltoid strength is 5/5.      Neuro     Distally there are no paresthesias, and sensation is intact to light touch in the median, ulnar, and radial distributions.  Reflexes are 2/2 biceps, triceps and brachioradialis.        IMAGING    X-ray Shoulder 2 or More Views Right  Narrative: EXAMINATION:  XR SHOULDER COMPLETE 2 OR MORE VIEWS RIGHT    CLINICAL HISTORY:  Pain in unspecified shoulder    TECHNIQUE:  Two or three views of the right shoulder were performed.    COMPARISON:  No 07/27/2022 ne    FINDINGS:  Mild DJD.  Small calcified density adjacent to the humeral head noted as before.  No acute fracture or dislocation.  No bone destruction identified  Impression: See above    Electronically signed by: Josep Hahn MD  Date:    02/15/2023  Time:    15:08    The patient has significant cystic changes noted at the humeral head.      IMPRESSION       ICD-10-CM ICD-9-CM   1. Nontraumatic complete tear of right rotator cuff  M75.121 727.61   2. Shoulder pain, unspecified chronicity, unspecified laterality  M25.519 719.41   3. Chronic right shoulder pain  M25.511 719.41    G89.29 338.29       MEDICATIONS PRESCRIBED      None    RECOMMENDATIONS     MRI of right shoulder ordered today  RTC after MRI to discuss surgical options  We discussed possible surgical options if needed for her right shoulder.      All questions were answered, pt will contact us for questions or concerns in the interim.

## 2023-02-17 ENCOUNTER — HOSPITAL ENCOUNTER (OUTPATIENT)
Dept: RADIOLOGY | Facility: HOSPITAL | Age: 83
Discharge: HOME | End: 2023-02-17
Attending: INTERNAL MEDICINE
Payer: MEDICARE

## 2023-02-17 VITALS — HEIGHT: 60 IN | BODY MASS INDEX: 21.99 KG/M2 | WEIGHT: 112 LBS

## 2023-02-17 DIAGNOSIS — Z12.31 ENCOUNTER FOR SCREENING MAMMOGRAM FOR BREAST CANCER: ICD-10-CM

## 2023-02-17 PROCEDURE — 77063 BREAST TOMOSYNTHESIS BI: CPT | Mod: 26,HCNC,, | Performed by: RADIOLOGY

## 2023-02-17 PROCEDURE — 77063 MAMMO DIGITAL SCREENING BILAT WITH TOMO: ICD-10-PCS | Mod: 26,HCNC,, | Performed by: RADIOLOGY

## 2023-02-17 PROCEDURE — 77067 SCR MAMMO BI INCL CAD: CPT | Mod: 26,HCNC,, | Performed by: RADIOLOGY

## 2023-02-17 PROCEDURE — 77067 SCR MAMMO BI INCL CAD: CPT | Mod: TC,HCNC,PO

## 2023-02-17 PROCEDURE — 77067 MAMMO DIGITAL SCREENING BILAT WITH TOMO: ICD-10-PCS | Mod: 26,HCNC,, | Performed by: RADIOLOGY

## 2023-02-27 ENCOUNTER — OFFICE VISIT (OUTPATIENT)
Dept: SPORTS MEDICINE | Facility: CLINIC | Age: 83
End: 2023-02-27
Payer: MEDICARE

## 2023-02-27 ENCOUNTER — HOSPITAL ENCOUNTER (OUTPATIENT)
Dept: RADIOLOGY | Facility: HOSPITAL | Age: 83
Discharge: HOME | End: 2023-02-27
Attending: ORTHOPAEDIC SURGERY
Payer: MEDICARE

## 2023-02-27 VITALS
DIASTOLIC BLOOD PRESSURE: 75 MMHG | HEIGHT: 60 IN | SYSTOLIC BLOOD PRESSURE: 148 MMHG | WEIGHT: 115 LBS | HEART RATE: 87 BPM | BODY MASS INDEX: 22.58 KG/M2

## 2023-02-27 DIAGNOSIS — M75.121 NONTRAUMATIC COMPLETE TEAR OF RIGHT ROTATOR CUFF: ICD-10-CM

## 2023-02-27 DIAGNOSIS — M25.511 CHRONIC RIGHT SHOULDER PAIN: ICD-10-CM

## 2023-02-27 DIAGNOSIS — M75.121 NONTRAUMATIC COMPLETE TEAR OF RIGHT ROTATOR CUFF: Primary | ICD-10-CM

## 2023-02-27 DIAGNOSIS — G89.29 CHRONIC RIGHT SHOULDER PAIN: ICD-10-CM

## 2023-02-27 PROCEDURE — 3288F PR FALLS RISK ASSESSMENT DOCUMENTED: ICD-10-PCS | Mod: HCNC,CPTII,S$GLB, | Performed by: ORTHOPAEDIC SURGERY

## 2023-02-27 PROCEDURE — 3077F PR MOST RECENT SYSTOLIC BLOOD PRESSURE >= 140 MM HG: ICD-10-PCS | Mod: HCNC,CPTII,S$GLB, | Performed by: ORTHOPAEDIC SURGERY

## 2023-02-27 PROCEDURE — 1126F PR PAIN SEVERITY QUANTIFIED, NO PAIN PRESENT: ICD-10-PCS | Mod: HCNC,CPTII,S$GLB, | Performed by: ORTHOPAEDIC SURGERY

## 2023-02-27 PROCEDURE — 73221 MRI JOINT UPR EXTREM W/O DYE: CPT | Mod: 26,RT,, | Performed by: RADIOLOGY

## 2023-02-27 PROCEDURE — 1101F PT FALLS ASSESS-DOCD LE1/YR: CPT | Mod: HCNC,CPTII,S$GLB, | Performed by: ORTHOPAEDIC SURGERY

## 2023-02-27 PROCEDURE — 3077F SYST BP >= 140 MM HG: CPT | Mod: HCNC,CPTII,S$GLB, | Performed by: ORTHOPAEDIC SURGERY

## 2023-02-27 PROCEDURE — 99214 OFFICE O/P EST MOD 30 MIN: CPT | Mod: HCNC,S$GLB,, | Performed by: ORTHOPAEDIC SURGERY

## 2023-02-27 PROCEDURE — 99999 PR PBB SHADOW E&M-EST. PATIENT-LVL III: CPT | Mod: PBBFAC,HCNC,, | Performed by: ORTHOPAEDIC SURGERY

## 2023-02-27 PROCEDURE — 73221 MRI JOINT UPR EXTREM W/O DYE: CPT | Mod: TC,RT

## 2023-02-27 PROCEDURE — 3078F DIAST BP <80 MM HG: CPT | Mod: HCNC,CPTII,S$GLB, | Performed by: ORTHOPAEDIC SURGERY

## 2023-02-27 PROCEDURE — 1101F PR PT FALLS ASSESS DOC 0-1 FALLS W/OUT INJ PAST YR: ICD-10-PCS | Mod: HCNC,CPTII,S$GLB, | Performed by: ORTHOPAEDIC SURGERY

## 2023-02-27 PROCEDURE — 73221 MRI SHOULDER WITHOUT CONTRAST RIGHT: ICD-10-PCS | Mod: 26,RT,, | Performed by: RADIOLOGY

## 2023-02-27 PROCEDURE — 3078F PR MOST RECENT DIASTOLIC BLOOD PRESSURE < 80 MM HG: ICD-10-PCS | Mod: HCNC,CPTII,S$GLB, | Performed by: ORTHOPAEDIC SURGERY

## 2023-02-27 PROCEDURE — 99999 PR PBB SHADOW E&M-EST. PATIENT-LVL III: ICD-10-PCS | Mod: PBBFAC,HCNC,, | Performed by: ORTHOPAEDIC SURGERY

## 2023-02-27 PROCEDURE — 3288F FALL RISK ASSESSMENT DOCD: CPT | Mod: HCNC,CPTII,S$GLB, | Performed by: ORTHOPAEDIC SURGERY

## 2023-02-27 PROCEDURE — 1159F PR MEDICATION LIST DOCUMENTED IN MEDICAL RECORD: ICD-10-PCS | Mod: HCNC,CPTII,S$GLB, | Performed by: ORTHOPAEDIC SURGERY

## 2023-02-27 PROCEDURE — 1126F AMNT PAIN NOTED NONE PRSNT: CPT | Mod: HCNC,CPTII,S$GLB, | Performed by: ORTHOPAEDIC SURGERY

## 2023-02-27 PROCEDURE — 99214 PR OFFICE/OUTPT VISIT, EST, LEVL IV, 30-39 MIN: ICD-10-PCS | Mod: HCNC,S$GLB,, | Performed by: ORTHOPAEDIC SURGERY

## 2023-02-27 PROCEDURE — 1159F MED LIST DOCD IN RCRD: CPT | Mod: HCNC,CPTII,S$GLB, | Performed by: ORTHOPAEDIC SURGERY

## 2023-02-27 NOTE — PROGRESS NOTES
ESTABLISHED PATIENT    History 2/27/2023:  Laverne returns to clinic to discuss MRI results of her right shoulder.  She is still having a lot of pain and wants to proceed with some type of management.    History 2/15/2023:   82 y.o. Female with a history of right shoulder pain who has been having pain since about December. She has been seen by Julia Del Castillo PA-C in the past. She states she has anterior shoulder pain. She was given a CSI and reports some relief.  She states the pain is getting worse.  She has difficulty moving her sheets at night.  She can not even pull the covers over her.    Is affecting ADLs.  Pain is 0/10 at it's worst.    REVIEW OF SYSTEMS   Constitution: Negative. Negative for chills, fever and night sweats.   HENT: Negative for congestion and headaches.    Eyes: Negative for blurred vision, left vision loss and right vision loss.   Cardiovascular: Negative for chest pain and syncope.   Respiratory: Negative for cough and shortness of breath.    Endocrine: Negative for polydipsia, polyphagia and polyuria.   Hematologic/Lymphatic: Negative for bleeding problem. Does not bruise/bleed easily.   Skin: Negative for dry skin, itching and rash.   Musculoskeletal: Negative for falls. Positive for right shoulder pain  Gastrointestinal: Negative for abdominal pain and bowel incontinence.   Genitourinary: Negative for bladder incontinence and nocturia.   Neurological: Negative for disturbances in coordination, loss of balance and seizures.   Psychiatric/Behavioral: Negative for depression. The patient does not have insomnia.    Allergic/Immunologic: Negative for hives and persistent infections.     PHYSICAL EXAMINATION    Vitals: BP (!) 148/75   Pulse 87   Ht 5' (1.524 m)   Wt 52.2 kg (115 lb)   LMP  (LMP Unknown)   BMI 22.46 kg/m²     General: The patient appears active and healthy with no apparent physical problems.  No disturbance of mood or affect is demonstrated. Alert and  Oriented.    HEENT: Eyes normal, pupils equally round, nose normal.    Resp: Equal and symmetrical chest rises. No wheezing    CV: Regular rate    Neck: Supple; nonpainful range of motion.    Extremities: no cyanosis, clubbing, edema, or diffuse swelling.  Palpable pulses, good capillary refill of the digits.  No coolness, discoloration, edema or obvious varicosities.    Skin: no lesions noted.    Lymphatic: no detected adenopathy in the upper or lower extremities.    Neurologic: normal mental status, normal reflexes, normal gait and balance.  Patient is alert and oriented to person, place and time.  No flaccidity or spasticity is noted.  No motor or sensory deficits are noted.  Light touch is intact    Orthopaedic:     SHOULDER EXAM - RIGHT    Inspection:   Normal skin color and appearance with no scars.  No muscle atrophy noted.  No scapular winging.      Palpation: No tenderness of the acromioclavicular joint, lateral edge of the acromion, biceps tendon, trapezius muscle or scapulothoracic bursa.      ROM:      PROM:     FE - 160°    Abd/ER -  90°  Abd/IR -  45°   Add/ER -  60°     AROM:    FE - 140°    Abd/ER -  90°  Abd/IR -  45°   Add/ER -  60° *pain with resisted ER/IR        Tests:     - Calzada, - Neer's, - Cross Arm Adduction, - Clayton, - Yerguson, + Speed. - Belly Press,  + Jobes, - Lift Off    Stability: - sulcus, - apprehension, - relocation, - load and shift, - DLS      Motor:  Rotator cuff strength is 4/5 supraspinatus, 4/5 infraspinatus, 5/5 subscapularis. Biceps, triceps and deltoid strength is 5/5.      Neuro     Distally there are no paresthesias, and sensation is intact to light touch in the median, ulnar, and radial distributions.  Reflexes are 2/2 biceps, triceps and brachioradialis.        IMAGING    MRI Shoulder Without Contrast Right  Narrative: EXAMINATION:  MRI SHOULDER WITHOUT CONTRAST RIGHT    CLINICAL HISTORY:  Pain in right shoulderShoulder pain, rotator cuff disorder suspected, xray  done;    TECHNIQUE:  MRI of right shoulder was performed on a 1.5T magnet utilizing the following sequences: Localizer; axial T2 FS; coronal T2 FS and PD FS; sagittal T1, T2 FS and PD FS.    COMPARISON:  02/15/2013    FINDINGS:  Rotator cuff: Full-thickness and full with supraspinatus tendon tear with retraction of the myotendinous junction medial to the border of the superior bony glenoid and diffuse edema signal and fatty atrophy of the muscle.  There is severe tendinopathy of the infraspinatus tendon with full-thickness tearing anteriorly associated with extensive granulation tissue/fibrosis at the footprint and retraction of the musculotendinous junction approximately 2 cm.  There is an internal degloving component superiorly with differential retraction medial to the margin of the glenoid.  The subscapularis tendon is severely tendinopathic, with high-grade partial undersurface tearing of the central tendon measuring at least 15 mm in medial to lateral dimension.  Superiorly there is full-thickness tearing and the musculotendinous junction is retracted medially to the level of the undersurface of the coracoid.Subscapularis and infraspinatus muscle bulk preserved.    Biceps: Longitudinal split tearing of the extra-articular tendon, with nonvisualization of the intra-articular course of the tendon, possibly either subluxed medially or completely torn.    Labrum: Extensive tearing and maceration.    Glenohumeral Joint: Large glenohumeral joint effusion and synovitis.  Extensive chondral loss involving the cephalad aspect of the humeral head with associated bony abutment of the undersurface of the acromion.  There is a 10 by 10 mm loose chondral body within the axillary recess of the joint but exact donor site is not determined.    Acromioclavicular joint: Os acromiale with subacromial spurring and osteoarthritis.    Misc: Subacromial/subdeltoid bursitis and subcoracoid bursitis noted.  Impression: Massive rotator  cuff tear as detailed above, with supraspinatus muscle atrophy    Glenohumeral effusion, extensive chondromalacia and large intra-articular loose body    AC joint osteoarthritis with os acromiale.    Electronically signed by: Donny Baxter Sheldon   Date:    02/27/2023  Time:    10:50  Mammo Digital Screening Bilat w/ Eliazar  Narrative: Result:  Mammo Digital Screening Bilat w/ Eliazar    History:  Patient is 82 y.o. and is seen for a screening mammogram.    Films Compared:  Prior images (if available) were compared.     Findings:   This procedure was performed using tomosynthesis.   Computer-aided detection was utilized in the interpretation of this   examination.    The breasts have scattered areas of fibroglandular density. There is no   evidence of suspicious masses, microcalcifications or architectural   distortion.  Impression:    No mammographic evidence of malignancy.    BI-RADS Category 1: Negative    Recommendation:  Routine screening mammogram in 1 year, or as clinically indicated.    Your estimated lifetime risk of breast cancer (to age 85) based on   Tyrer-Cuzick risk assessment model is 0.11 %.  According to the American   Cancer Society, patients with a lifetime breast cancer risk of 20% or   higher might benefit from supplemental screening tests. ??     The patient has significant cystic changes noted at the humeral head.      IMPRESSION       ICD-10-CM ICD-9-CM   1. Nontraumatic complete tear of right rotator cuff  M75.121 727.61         MEDICATIONS PRESCRIBED      None    RECOMMENDATIONS     Surgical versus non-surgical options discussed today; right shoulder arthroscopy with sub-acromial decompression, extensive debridement, loose body removal and possible rotator cuff repair  The patient will require pre-operative clearance with her PCP and cardiologist  RTC for pre-op with Abhay Martin PA-C  The patient understands that this surgery is limited in the sense of her massive rotator cuff repair may not be  repairable.  However I think she would benefit from a debridement versus a reverse total shoulder arthroplasty secondary to her age and comorbidities.      All questions were answered, pt will contact us for questions or concerns in the interim.

## 2023-02-28 ENCOUNTER — TELEPHONE (OUTPATIENT)
Dept: SPORTS MEDICINE | Facility: CLINIC | Age: 83
End: 2023-02-28
Payer: MEDICARE

## 2023-02-28 DIAGNOSIS — M75.41 IMPINGEMENT SYNDROME OF RIGHT SHOULDER: ICD-10-CM

## 2023-02-28 DIAGNOSIS — M75.121 NONTRAUMATIC COMPLETE TEAR OF RIGHT ROTATOR CUFF: Primary | ICD-10-CM

## 2023-02-28 NOTE — TELEPHONE ENCOUNTER
----- Message from Rowan Nuñez sent at 2/28/2023  4:35 PM CST -----  Regarding: MISSED CALL  Contact: Daughter - Cally Alamo - Daughter stated she just had a missed call regarding the pt's pre op appointment ask for a call back      Contact info  [phone]

## 2023-02-28 NOTE — TELEPHONE ENCOUNTER
LVM for patient to let him know that Abhay will be in OR on Thursday and was calling to reschedule his apt for Friday 03/10/23.  Please call the office to reschedule.  
Unable to urinate/Fever greater than (need to indicate Fahrenheit or Celsius)

## 2023-03-02 ENCOUNTER — TELEPHONE (OUTPATIENT)
Dept: PRIMARY CARE CLINIC | Facility: CLINIC | Age: 83
End: 2023-03-02
Payer: MEDICARE

## 2023-03-02 ENCOUNTER — PATIENT MESSAGE (OUTPATIENT)
Dept: CARDIOLOGY | Facility: CLINIC | Age: 83
End: 2023-03-02
Payer: MEDICARE

## 2023-03-02 DIAGNOSIS — Z01.818 PREOPERATIVE EXAMINATION: ICD-10-CM

## 2023-03-02 DIAGNOSIS — N18.31 STAGE 3A CHRONIC KIDNEY DISEASE: Primary | ICD-10-CM

## 2023-03-02 NOTE — TELEPHONE ENCOUNTER
Pt has been scheduled for pre op with you for 3/14 as Dr. James does not have any availability, are you ok with that? If so, would you like her to get any labs or EKG done beforehand?

## 2023-03-02 NOTE — TELEPHONE ENCOUNTER
----- Message from Elmer Servin MA sent at 3/1/2023  5:17 PM CST -----  Regarding: Surgical Clearance  Good afternoon,     Mrs. Humphries is scheduled for a right shoulder arthroscopy on 3/16/2023 with Dr. Wheeler.     Medical clearance is indicated prior to this. The anesthesia team will require explicit documentation regarding clearance status and perioperative medical recommendations. She has an appointment currently scheduled with both of you but not before her surgery. Just wanted to reach out to see if it was possible to get her in sooner to avoid pushing her surgery date?    Thank you for your help & let me know if I can assist in any way!        Elmer Servin MA  Medical Assistant - Dr. [facility] [place]

## 2023-03-03 ENCOUNTER — PATIENT MESSAGE (OUTPATIENT)
Dept: PREADMISSION TESTING | Facility: HOSPITAL | Age: 83
End: 2023-03-03
Payer: MEDICARE

## 2023-03-03 NOTE — ANESTHESIA PAT ROS NOTE
03/03/2023  Laverne Humphries is a 82 y.o., female.      Pre-op Assessment    I have reviewed the Patient Summary Reports.       I have reviewed the Medications.     Review of Systems  Anesthesia Hx:  No problems with previous Anesthesia   History of prior surgery of interest to airway management or planning:  Previous anesthesia: Nerve Block, General 4/26/2022  Robot-Assisted Laparoscopic Colectomy with general anesthesia.  Procedure performed at an [facility].     for 4/26/2022  Robot-Assisted Laparoscopic Colectomy.  Procedure performed at an [facility].  Airway issues documented on chart review include mask, easy, GETA, videolaryngoscope used  , view on video-laryngoscopy Grade I    Denies Family Hx of Anesthesia complications.    Denies Personal Hx of Anesthesia complications.                    Social:  Non-Smoker, No Alcohol Use       Hematology/Oncology:    Oncology Normal    -- Anemia:               Hematology Comments: Normocytic anemia                    EENT/Dental:  chronic allergic rhinitis Cataract Extractions, Torus Palatinus,  Blepharoptosis Repair          Chronic tonsillitis: ,.    Cardiovascular:  Exercise tolerance: good   Hypertension, well controlled Valvular problems/Murmurs, AS  Denies MI.     Denies CABG/stent. Dysrhythmias atrial fibrillation   Denies CHF.    hyperlipidemia   ECG has been reviewed. Paroxysmal A-fib- on  Eliquis and Toprol,  EF 65% per echo   Aortic atherosclerosis,  Mild aortic regurgitation.  Moderate aortic valve sclerosis.  Moderate mitral regurgitation.  Moderate tricuspid regurgitation.  PA systolic pressure is 36 mmHg.                         Pulmonary:    Denies COPD. Asthma mild  Denies Shortness of breath.   Calcified granuloma of lung,  paraseptal emphysema of the bases,  Lungs clear on CXR               Renal/:  Chronic Renal Disease, CKD    CKD stage 3a, GFR 53 on 2/3/2023, Creat 0.8             Hepatic/GI:     GERD, well controlled  Hepatitis History of Reflux Esophagitis, IBS,  Cholecystectomy,  Robot-Assisted Laparoscopic Colectomy,  Internal hemorrhoids, Diverticulosis,  Colovaginal fistula seen on CT          Musculoskeletal:  Arthritis   Nontraumatic complete tear of right rotator cuff,  Impingement syndrome of right shoulder,  Osteoporosis         Spine Disorders: lumbar and cervical Degenerative disease and Disc disease           OB/GYN/PEDS:  Hysterectomy, Oophorectomy           Neurological:  Neurology Normal   Denies CVA.    Denies Headaches. Denies Seizures.                                Endocrine:  Endocrine Normal Denies Diabetes. Denies Hypothyroidism.          Psych:   anxiety  Generalized Anxiety Disorder, insomnia              Anesthesia Assessment: Preoperative EQUATION    Planned Procedure: Procedure(s) (LRB):  DEBRIDEMENT, SHOULDER, ARTHROSCOPIC (Right)  ARTHROSCOPY, SHOULDER, WITH SUBACROMIAL SPACE DECOMPRESSION (Right)  REPAIR, ROTATOR CUFF, ARTHROSCOPIC (Right)  Requested Anesthesia Type:General/Regional  Surgeon: Luis Angel Wheeler MD  Service: Orthopedics  Known or anticipated Date of Surgery:3/16/2023    Surgeon notes: reviewed    Electronic QUestionnaire Assessment completed via nurse interview with patient.        Triage considerations:     The patient has no apparent active cardiac condition (No unstable coronary Syndrome such as severe unstable angina or recent [<1 month] myocardial infarction, decompensated CHF, severe valvular   disease or significant arrhythmia)    Previous anesthesia records:GETA, Nerve block for post-op pain, Easy airway, Easy intubation, and No problems  Robot-Assisted Laparoscopic Colectomy 4/26/2022:  cement Date: 04/26/22; Placement Time: 0847 (created via procedure documentation); Method of Intubation: Video Laryngoscopy; Mask Ventilation: Easy; Intubated: Postinduction; Blade: Minaya #3;  Airway Device Size: 7.0; Cuff Inflation: Minimal occlusive pressure; Placement Verified By: Capnometry; Complicating Factors: None; Intubation Findings: Bilateral breath sounds, Atraumatic/Condition of teeth unchanged; Securment: Lips; Complications: None; Removal Date: 04/26/22;  Removal Time: 1354      Last PCP note: within 1 month , within [facility]   Subspecialty notes: Cardiology: General  Patient is cleared/ optimized for surgery by PCP and Cardiology.     Other important co-morbidities: A FIB, GERD, HLD, and HTN       EKG 4/5/2022:  Vent. Rate : 069 BPM     Atrial Rate : 069 BPM      P-R Int : 196 ms          QRS Dur : 076 ms       QT Int : 380 ms       P-R-T Axes : 074 060 075 degrees      QTc Int : 407 ms   Normal sinus rhythm   Possible Left atrial enlargement   Borderline Abnormal ECG   Confirmed by Lisa Yeh MD ([id number]) on 4/6/2022 7:06:51 PM       Echo 4/1/2022:  Summary  The left ventricle is normal in size with mild eccentric hypertrophy - mildly sigmoid septum - and normal systolic function.  The estimated ejection fraction is 65%.  A diastolic pattern consistent with atrial fibrillation observed.  Normal right ventricular size with normal right ventricular systolic function.  Mild aortic regurgitation.  Moderate aortic valve sclerosis.  Moderate mitral regurgitation.  Moderate tricuspid regurgitation.  The estimated PA systolic pressure is 36 mmHg.  Normal central venous pressure (3 mmHg).              Instructions given. (See in Nurse's note)      Ht: 5'  WT: 115 lb  BMI: 22.46  Vacdcinated

## 2023-03-04 ENCOUNTER — LAB VISIT (OUTPATIENT)
Dept: LAB | Facility: HOSPITAL | Age: 83
End: 2023-03-04
Attending: INTERNAL MEDICINE
Payer: MEDICARE

## 2023-03-04 DIAGNOSIS — Z01.818 PREOPERATIVE EXAMINATION: ICD-10-CM

## 2023-03-04 DIAGNOSIS — N18.31 STAGE 3A CHRONIC KIDNEY DISEASE: ICD-10-CM

## 2023-03-04 LAB
BACTERIA #/AREA URNS AUTO: ABNORMAL /HPF
BILIRUB UR QL STRIP: NEGATIVE
CLARITY UR REFRACT.AUTO: ABNORMAL
COLOR UR AUTO: YELLOW
GLUCOSE UR QL STRIP: NEGATIVE
HGB UR QL STRIP: NEGATIVE
KETONES UR QL STRIP: NEGATIVE
LEUKOCYTE ESTERASE UR QL STRIP: ABNORMAL
MICROSCOPIC COMMENT: ABNORMAL
NITRITE UR QL STRIP: NEGATIVE
PH UR STRIP: 5 [PH] (ref 5–8)
PROT UR QL STRIP: NEGATIVE
RBC #/AREA URNS AUTO: 7 /HPF (ref 0–4)
SP GR UR STRIP: 1.02 (ref 1–1.03)
SQUAMOUS #/AREA URNS AUTO: 12 /HPF
URN SPEC COLLECT METH UR: ABNORMAL
WBC #/AREA URNS AUTO: 3 /HPF (ref 0–5)

## 2023-03-04 PROCEDURE — 81001 URINALYSIS AUTO W/SCOPE: CPT | Mod: HCNC | Performed by: INTERNAL MEDICINE

## 2023-03-07 ENCOUNTER — OFFICE VISIT (OUTPATIENT)
Dept: CARDIOLOGY | Facility: CLINIC | Age: 83
End: 2023-03-07
Attending: INTERNAL MEDICINE
Payer: MEDICARE

## 2023-03-07 VITALS
HEIGHT: 60 IN | DIASTOLIC BLOOD PRESSURE: 73 MMHG | SYSTOLIC BLOOD PRESSURE: 141 MMHG | OXYGEN SATURATION: 98 % | WEIGHT: 116.5 LBS | HEART RATE: 76 BPM | BODY MASS INDEX: 22.87 KG/M2

## 2023-03-07 DIAGNOSIS — I48.0 PAROXYSMAL ATRIAL FIBRILLATION: ICD-10-CM

## 2023-03-07 DIAGNOSIS — Z79.01 CHRONIC ANTICOAGULATION: ICD-10-CM

## 2023-03-07 PROCEDURE — 1125F PR PAIN SEVERITY QUANTIFIED, PAIN PRESENT: ICD-10-PCS | Mod: HCNC,CPTII,S$GLB, | Performed by: INTERNAL MEDICINE

## 2023-03-07 PROCEDURE — 1160F PR REVIEW ALL MEDS BY PRESCRIBER/CLIN PHARMACIST DOCUMENTED: ICD-10-PCS | Mod: HCNC,CPTII,S$GLB, | Performed by: INTERNAL MEDICINE

## 2023-03-07 PROCEDURE — 93000 ELECTROCARDIOGRAM COMPLETE: CPT | Mod: HCNC,S$GLB,, | Performed by: INTERNAL MEDICINE

## 2023-03-07 PROCEDURE — 3077F SYST BP >= 140 MM HG: CPT | Mod: HCNC,CPTII,S$GLB, | Performed by: INTERNAL MEDICINE

## 2023-03-07 PROCEDURE — 99214 PR OFFICE/OUTPT VISIT, EST, LEVL IV, 30-39 MIN: ICD-10-PCS | Mod: HCNC,25,S$GLB, | Performed by: INTERNAL MEDICINE

## 2023-03-07 PROCEDURE — 1101F PT FALLS ASSESS-DOCD LE1/YR: CPT | Mod: HCNC,CPTII,S$GLB, | Performed by: INTERNAL MEDICINE

## 2023-03-07 PROCEDURE — 93000 PR ELECTROCARDIOGRAM, COMPLETE: ICD-10-PCS | Mod: HCNC,S$GLB,, | Performed by: INTERNAL MEDICINE

## 2023-03-07 PROCEDURE — 1159F PR MEDICATION LIST DOCUMENTED IN MEDICAL RECORD: ICD-10-PCS | Mod: HCNC,CPTII,S$GLB, | Performed by: INTERNAL MEDICINE

## 2023-03-07 PROCEDURE — 99214 OFFICE O/P EST MOD 30 MIN: CPT | Mod: HCNC,25,S$GLB, | Performed by: INTERNAL MEDICINE

## 2023-03-07 PROCEDURE — 3078F PR MOST RECENT DIASTOLIC BLOOD PRESSURE < 80 MM HG: ICD-10-PCS | Mod: HCNC,CPTII,S$GLB, | Performed by: INTERNAL MEDICINE

## 2023-03-07 PROCEDURE — 1159F MED LIST DOCD IN RCRD: CPT | Mod: HCNC,CPTII,S$GLB, | Performed by: INTERNAL MEDICINE

## 2023-03-07 PROCEDURE — 99999 PR PBB SHADOW E&M-EST. PATIENT-LVL III: CPT | Mod: PBBFAC,HCNC,, | Performed by: INTERNAL MEDICINE

## 2023-03-07 PROCEDURE — 93005 ELECTROCARDIOGRAM TRACING: CPT | Mod: HCNC

## 2023-03-07 PROCEDURE — 3288F FALL RISK ASSESSMENT DOCD: CPT | Mod: HCNC,CPTII,S$GLB, | Performed by: INTERNAL MEDICINE

## 2023-03-07 PROCEDURE — 1160F RVW MEDS BY RX/DR IN RCRD: CPT | Mod: HCNC,CPTII,S$GLB, | Performed by: INTERNAL MEDICINE

## 2023-03-07 PROCEDURE — 1125F AMNT PAIN NOTED PAIN PRSNT: CPT | Mod: HCNC,CPTII,S$GLB, | Performed by: INTERNAL MEDICINE

## 2023-03-07 PROCEDURE — 3078F DIAST BP <80 MM HG: CPT | Mod: HCNC,CPTII,S$GLB, | Performed by: INTERNAL MEDICINE

## 2023-03-07 PROCEDURE — 3077F PR MOST RECENT SYSTOLIC BLOOD PRESSURE >= 140 MM HG: ICD-10-PCS | Mod: HCNC,CPTII,S$GLB, | Performed by: INTERNAL MEDICINE

## 2023-03-07 PROCEDURE — 1101F PR PT FALLS ASSESS DOC 0-1 FALLS W/OUT INJ PAST YR: ICD-10-PCS | Mod: HCNC,CPTII,S$GLB, | Performed by: INTERNAL MEDICINE

## 2023-03-07 PROCEDURE — 99999 PR PBB SHADOW E&M-EST. PATIENT-LVL III: ICD-10-PCS | Mod: PBBFAC,HCNC,, | Performed by: INTERNAL MEDICINE

## 2023-03-07 PROCEDURE — 3288F PR FALLS RISK ASSESSMENT DOCUMENTED: ICD-10-PCS | Mod: HCNC,CPTII,S$GLB, | Performed by: INTERNAL MEDICINE

## 2023-03-07 RX ORDER — METOPROLOL SUCCINATE 25 MG/1
25 TABLET, EXTENDED RELEASE ORAL DAILY
Qty: 90 TABLET | Refills: 3 | Status: SHIPPED | OUTPATIENT
Start: 2023-03-07 | End: 2024-02-21

## 2023-03-07 NOTE — PROGRESS NOTES
Subjective:     Laverne Humphries is a 82 y.o. female with no history of any cardiac issues. She has had diverticulitis and developed a colovaginal fistula. She came in with plans for elective surgery on 4/1/2022. She was noted to be in atrial fibrillation with a ventricular response rate of about 120-140 bpm. She denied any palpitations, chest pain or shortness of breath. She said she had fair exercise tolerance. It was unclear for how long she had been in atrial fibrillation. An electrocardiogram from 2019 revealed she was in sinus rhythm at that time. She was prescribed metoprolol 25 mg Q12. The surgery got rescheduled for 4/26/2022. No exertional shortness of breath. No palpitations or weak spells. No bleeding. No issues with any of her prescribed medications. Feeling well overall.      Atrial Fibrillation  Presents for follow-up visit. Symptoms are negative for bradycardia, chest pain, dizziness, hemodynamic instability, hypertension, hypotension, palpitations, shortness of breath, syncope, tachycardia and weakness. The symptoms have been stable.     Review of Systems   Constitutional: Negative for chills, fever and malaise/fatigue.   HENT:  Negative for nosebleeds and tinnitus.    Eyes:  Negative for double vision, vision loss in left eye and vision loss in right eye.   Cardiovascular:  Negative for chest pain, claudication, dyspnea on exertion, irregular heartbeat, leg swelling, near-syncope, orthopnea, palpitations, paroxysmal nocturnal dyspnea and syncope.   Respiratory:  Negative for cough, hemoptysis, shortness of breath and wheezing.    Endocrine: Negative for cold intolerance and heat intolerance.   Hematologic/Lymphatic: Negative for bleeding problem. Does not bruise/bleed easily.   Skin:  Negative for color change and rash.   Musculoskeletal:  Negative for back pain, falls, muscle weakness and myalgias.   Gastrointestinal:  Negative for abdominal pain, diarrhea, dysphagia, heartburn, hematemesis,  hematochezia, hemorrhoids, jaundice, melena, nausea and vomiting.   Genitourinary:  Negative for dysuria and hematuria.   Neurological:  Negative for dizziness, focal weakness, headaches, light-headedness, loss of balance, numbness, tremors, vertigo and weakness.   Psychiatric/Behavioral:  Negative for altered mental status, depression and memory loss. The patient is not nervous/anxious.    Allergic/Immunologic: Negative for hives and persistent infections.       Current Outpatient Medications on File Prior to Visit   Medication Sig Dispense Refill    albuterol (PROVENTIL/VENTOLIN HFA) 90 mcg/actuation inhaler Inhale 2 puffs into the lungs every 6 (six) hours as needed for Wheezing or Shortness of Breath (generic preferred). Rescue 18 g 3    apixaban (ELIQUIS) 2.5 mg Tab Take 1 tablet (2.5 mg total) by mouth 2 (two) times daily. 180 tablet 3    carboxymethylcellulose (REFRESH PLUS) 0.5 % Dpet 1 drop 3 (three) times daily as needed.      LORazepam (ATIVAN) 0.5 MG tablet Take 1 tablet (0.5 mg total) by mouth nightly as needed for Anxiety. 30 tablet 5    metoprolol succinate (TOPROL-XL) 50 MG 24 hr tablet Take 1 tablet (50 mg total) by mouth once daily. 90 tablet 3    rosuvastatin (CRESTOR) 5 MG tablet Take 1 tablet (5 mg total) by mouth every evening. 90 tablet 3    sodium chloride (SALINE NASAL) 0.65 % nasal spray 2 sprays by Nasal route as needed for Congestion. 44 mL 11    triamcinolone (NASACORT) 55 mcg nasal inhaler 2 sprays by Nasal route once daily. 16.9 mL 11    azelastine (ASTELIN) 137 mcg (0.1 %) nasal spray 1 spray (137 mcg total) by Nasal route 2 (two) times daily. 30 mL 0    calcium-vitamin D3 500 mg(1,250mg) -200 unit per tablet Take 1 tablet by mouth Daily.       diclofenac sodium (VOLTAREN) 1 % Gel Apply 2 g topically once daily. (Patient not taking: Reported on 3/7/2023) 150 g 2    gabapentin (NEURONTIN) 300 MG capsule Take 2 capsules (600 mg total) by mouth every evening. (Patient taking differently:  Take 600 mg by mouth every evening. PRN) 180 capsule 3    omeprazole (PRILOSEC) 20 MG capsule Take 1 capsule (20 mg total) by mouth once daily. Take 30 min before breakfast (Patient not taking: Reported on 3/7/2023) 30 capsule 2    triamcinolone acetonide 0.1% (KENALOG) 0.1 % cream Apply topically 2 (two) times daily. Apply to itchy irritated rash twice daily. Do not apply to face, groin, or skin folds (Patient not taking: Reported on 3/7/2023) 80 g 1     Current Facility-Administered Medications on File Prior to Visit   Medication Dose Route Frequency Provider Last Rate Last Admin    LIDOcaine (PF) 10 mg/ml (1%) injection 10 mg  1 mL Intradermal Once Audrey Caicedo NP        mupirocin 2 % ointment   Nasal On Call Procedure Audrey Caicedo NP   Given at 04/26/22 0726       BP (!) 141/73 (BP Location: Left arm, Patient Position: Sitting, BP Method: Medium (Manual))   Pulse 76   Ht 5' (1.524 m)   Wt 52.9 kg (116 lb 8.2 oz)   LMP  (LMP Unknown)   SpO2 98%   BMI 22.75 kg/m²       Objective:     Physical Exam  Constitutional:       General: She is not in acute distress.     Appearance: Normal appearance. She is well-developed. She is not toxic-appearing or diaphoretic.   HENT:      Head: Normocephalic and atraumatic.      Nose: Nose normal.   Eyes:      General:         Right eye: No discharge.         Left eye: No discharge.      Conjunctiva/sclera:      Right eye: Right conjunctiva is not injected.      Left eye: Left conjunctiva is not injected.      Pupils: Pupils are equal.      Right eye: Pupil is round.      Left eye: Pupil is round.   Neck:      Thyroid: No thyromegaly.      Vascular: No carotid bruit or JVD.   Cardiovascular:      Rate and Rhythm: Normal rate and regular rhythm. No extrasystoles are present.     Chest Wall: PMI is not displaced.      Pulses:           Radial pulses are 2+ on the right side and 2+ on the left side.        Femoral pulses are 2+ on the right side and 2+ on the left  side.       Dorsalis pedis pulses are 2+ on the right side and 2+ on the left side.        Posterior tibial pulses are 2+ on the right side and 2+ on the left side.      Heart sounds: S1 normal and S2 normal. Murmur heard.   High-pitched blowing holosystolic murmur is present with a grade of 2/6 at the apex.     No gallop.   Pulmonary:      Effort: Pulmonary effort is normal.      Breath sounds: Normal breath sounds.   Abdominal:      Palpations: Abdomen is soft.      Tenderness: There is no abdominal tenderness.   Musculoskeletal:      Cervical back: Neck supple.      Right lower leg: Normal. No swelling. No edema.      Left lower leg: Normal. No swelling. No edema.   Lymphadenopathy:      Head:      Right side of head: No submandibular adenopathy.      Left side of head: No submandibular adenopathy.      Cervical: No cervical adenopathy.   Skin:     General: Skin is warm and dry.      Findings: No rash.      Nails: There is no clubbing.   Neurological:      General: No focal deficit present.      Mental Status: She is alert and oriented to person, place, and time. She is not disoriented.      Cranial Nerves: No cranial nerve deficit.   Psychiatric:         Attention and Perception: Attention and perception normal.         Mood and Affect: Mood and affect normal.         Speech: Speech normal.         Behavior: Behavior normal.         Thought Content: Thought content normal.         Cognition and Memory: Cognition and memory normal.         Judgment: Judgment normal.       Assessment:     1. Paroxysmal atrial fibrillation    2. Chronic anticoagulation        Plan:     1. Atrial Fibrillation               4/1/2022: Diagnosed with paroxysmal atrial fibrillation.               Fast VRR. Asymptomatic.              CHA2DS2VASc=3 (A2Sc).   4/1/2022: Echo: Normal left ventricular size and systolic function. Mildly sigmoid septum. EF 65%. AF. Moderate aortic valve sclerosis. Mild AR. Moderate MR.   4/5/2022: Metoprolol 25  mg Q24 was changed to metoprolol 50 mg Q24. The dose was later reduced to metoprolol 25 mg Q24.   5/12/2022: Apixiban 2.5 mg Q12 was begun.   On apixiban 2.5 mg Q12.   On metoprolol 25 mg Q24.  No clinical recurrence but never had any symptoms.     2. Chronic Anticoagulation              4/1/2022: Diagnosed with paroxysmal atrial fibrillation.               CHA2DS2VASc=3 (A2Sc).   5/12/2022: Apixiban 2.5 mg Q12 was begun.   On apixiban 2.5 mg Q12.   No aspirin or NSAID.   No bleeding.                3. Colovaginal Fistula              4/1/2022: Plan was surgery.   4/26/2022: Underwent surgery.              Dr. Alison Mcclain.    4. Primary Care              Dr. Carolyne James.     F/u 6 months.    Lisa Yeh M.D.

## 2023-03-08 ENCOUNTER — PATIENT MESSAGE (OUTPATIENT)
Dept: PREADMISSION TESTING | Facility: HOSPITAL | Age: 83
End: 2023-03-08
Payer: MEDICARE

## 2023-03-08 ENCOUNTER — OFFICE VISIT (OUTPATIENT)
Dept: SPORTS MEDICINE | Facility: CLINIC | Age: 83
End: 2023-03-08
Payer: MEDICARE

## 2023-03-08 VITALS
HEART RATE: 83 BPM | HEIGHT: 60 IN | DIASTOLIC BLOOD PRESSURE: 70 MMHG | SYSTOLIC BLOOD PRESSURE: 130 MMHG | BODY MASS INDEX: 22.78 KG/M2 | WEIGHT: 116 LBS

## 2023-03-08 DIAGNOSIS — M75.121 NONTRAUMATIC COMPLETE TEAR OF RIGHT ROTATOR CUFF: Primary | ICD-10-CM

## 2023-03-08 DIAGNOSIS — M75.41 IMPINGEMENT SYNDROME OF RIGHT SHOULDER: ICD-10-CM

## 2023-03-08 PROCEDURE — 99999 PR PBB SHADOW E&M-EST. PATIENT-LVL III: ICD-10-PCS | Mod: PBBFAC,HCNC,, | Performed by: ORTHOPAEDIC SURGERY

## 2023-03-08 PROCEDURE — 3078F PR MOST RECENT DIASTOLIC BLOOD PRESSURE < 80 MM HG: ICD-10-PCS | Mod: HCNC,CPTII,S$GLB, | Performed by: ORTHOPAEDIC SURGERY

## 2023-03-08 PROCEDURE — 3075F PR MOST RECENT SYSTOLIC BLOOD PRESS GE 130-139MM HG: ICD-10-PCS | Mod: HCNC,CPTII,S$GLB, | Performed by: ORTHOPAEDIC SURGERY

## 2023-03-08 PROCEDURE — 1101F PR PT FALLS ASSESS DOC 0-1 FALLS W/OUT INJ PAST YR: ICD-10-PCS | Mod: HCNC,CPTII,S$GLB, | Performed by: ORTHOPAEDIC SURGERY

## 2023-03-08 PROCEDURE — 1125F PR PAIN SEVERITY QUANTIFIED, PAIN PRESENT: ICD-10-PCS | Mod: HCNC,CPTII,S$GLB, | Performed by: ORTHOPAEDIC SURGERY

## 2023-03-08 PROCEDURE — 1159F PR MEDICATION LIST DOCUMENTED IN MEDICAL RECORD: ICD-10-PCS | Mod: HCNC,CPTII,S$GLB, | Performed by: ORTHOPAEDIC SURGERY

## 2023-03-08 PROCEDURE — 1101F PT FALLS ASSESS-DOCD LE1/YR: CPT | Mod: HCNC,CPTII,S$GLB, | Performed by: ORTHOPAEDIC SURGERY

## 2023-03-08 PROCEDURE — 99999 PR PBB SHADOW E&M-EST. PATIENT-LVL III: CPT | Mod: PBBFAC,HCNC,, | Performed by: ORTHOPAEDIC SURGERY

## 2023-03-08 PROCEDURE — 3288F PR FALLS RISK ASSESSMENT DOCUMENTED: ICD-10-PCS | Mod: HCNC,CPTII,S$GLB, | Performed by: ORTHOPAEDIC SURGERY

## 2023-03-08 PROCEDURE — 3075F SYST BP GE 130 - 139MM HG: CPT | Mod: HCNC,CPTII,S$GLB, | Performed by: ORTHOPAEDIC SURGERY

## 2023-03-08 PROCEDURE — 99215 PR OFFICE/OUTPT VISIT, EST, LEVL V, 40-54 MIN: ICD-10-PCS | Mod: HCNC,S$GLB,, | Performed by: ORTHOPAEDIC SURGERY

## 2023-03-08 PROCEDURE — 1159F MED LIST DOCD IN RCRD: CPT | Mod: HCNC,CPTII,S$GLB, | Performed by: ORTHOPAEDIC SURGERY

## 2023-03-08 PROCEDURE — 99215 OFFICE O/P EST HI 40 MIN: CPT | Mod: HCNC,S$GLB,, | Performed by: ORTHOPAEDIC SURGERY

## 2023-03-08 PROCEDURE — 3288F FALL RISK ASSESSMENT DOCD: CPT | Mod: HCNC,CPTII,S$GLB, | Performed by: ORTHOPAEDIC SURGERY

## 2023-03-08 PROCEDURE — 3078F DIAST BP <80 MM HG: CPT | Mod: HCNC,CPTII,S$GLB, | Performed by: ORTHOPAEDIC SURGERY

## 2023-03-08 PROCEDURE — 1125F AMNT PAIN NOTED PAIN PRSNT: CPT | Mod: HCNC,CPTII,S$GLB, | Performed by: ORTHOPAEDIC SURGERY

## 2023-03-08 RX ORDER — MUPIROCIN 20 MG/G
OINTMENT TOPICAL
Status: CANCELLED | OUTPATIENT
Start: 2023-03-08

## 2023-03-08 RX ORDER — HYDROCODONE BITARTRATE AND ACETAMINOPHEN 7.5; 325 MG/1; MG/1
1 TABLET ORAL EVERY 4 HOURS PRN
Qty: 10 TABLET | Refills: 0 | Status: SHIPPED | OUTPATIENT
Start: 2023-03-08 | End: 2023-03-29 | Stop reason: ALTCHOICE

## 2023-03-08 RX ORDER — CEFAZOLIN SODIUM 2 G/50ML
2 SOLUTION INTRAVENOUS
Status: CANCELLED | OUTPATIENT
Start: 2023-03-08

## 2023-03-08 NOTE — H&P (VIEW-ONLY)
History 3/8/2023:  Laverne returns to clinic today to complete perioperative paperwork.  She wants to proceed with surgery.  She was advised by her cardiologist to stop her Eliquis a few days prior to the surgery and that she could restart after surgery.    History 2/27/2023:  Laverne returns to clinic to discuss MRI results of her right shoulder.  She is still having a lot of pain and wants to proceed with some type of management.    History 2/15/2023:   82 y.o. Female with a history of right shoulder pain who has been having pain since about December. She has been seen by Julia Del Castillo PA-C in the past. She states she has anterior shoulder pain. She was given a CSI and reports some relief.  She states the pain is getting worse.  She has difficulty moving her sheets at night.  She can not even pull the covers over her.    Is affecting ADLs.  Pain is 8/10 at it's worst.        PAST MEDICAL HISTORY    Past Medical History:   Diagnosis Date    Allergy     Anxiety     Arthritis     Asthma     mild    Cataract     CKD (chronic kidney disease) stage 3, GFR 30-59 ml/min     Hepatitis     IBS (irritable bowel syndrome)     Moderate aortic valve stenosis     Osteoporosis     Paroxysmal atrial fibrillation     Reflux esophagitis     Torus palatinus        PAST SURGICAL HISTORY     Past Surgical History:   Procedure Laterality Date    BELPHAROPTOSIS REPAIR Bilateral     CATARACT EXTRACTION W/  INTRAOCULAR LENS IMPLANT Bilateral     CATARACT EXTRACTION, BILATERAL      CHOLECYSTECTOMY      COLONOSCOPY N/A 01/20/2022    Procedure: COLONOSCOPY;  Surgeon: Emmanuel Sandoval MD;  Location: [address]);  Service: Endoscopy;  Laterality: N/A;    ESOPHAGOGASTRODUODENOSCOPY N/A 01/20/2022    Procedure: EGD (ESOPHAGOGASTRODUODENOSCOPY) any GI doc, please perform colonoscopy and EGD at same time;  Surgeon: Emmanuel Sandoval MD;  Location: [facility] ([address] [place] [address]);  Service: Endoscopy;  Laterality: N/A;  EGD &  Colonoscopy orders combined-MS  fully vaccinated  1/13 covid test 1/17 @ [facility]    EYE SURGERY      FLEXIBLE SIGMOIDOSCOPY  4/26/2022    Procedure: SIGMOIDOSCOPY, FLEXIBLE;  Surgeon: Alison Mcclain MD;  Location: [facility] OR;  Service: Colon and Rectal;;    HYSTERECTOMY      OOPHORECTOMY      ROBOT-ASSISTED LAPAROSCOPIC COLECTOMY USING DA DIANA XI N/A 4/26/2022    Procedure: XI ROBOTIC COLECTOMY with flexible sigmoidoscopy;  Surgeon: Alison Mcclain MD;  Location: [facility] OR;  Service: Colon and Rectal;  Laterality: N/A;       FAMILY HISTORY    Family History   Problem Relation Age of Onset    Cancer Mother         pancreatic    Cataracts Father     Diabetes Father     Asthma Sister     Cataracts Sister     Cancer Brother         liver and esophageal    No Known Problems Daughter     No Known Problems Son     Amblyopia Neg Hx     Blindness Neg Hx     Glaucoma Neg Hx     Macular degeneration Neg Hx     Retinal detachment Neg Hx     Strabismus Neg Hx     Breast cancer Neg Hx     Colon cancer Neg Hx     Ovarian cancer Neg Hx        SOCIAL HISTORY    Social History     Socioeconomic History    Marital status:    Tobacco Use    Smoking status: Never    Smokeless tobacco: Never   Substance and Sexual Activity    Alcohol use: No    Drug use: No    Sexual activity: Not Currently     Partners: Male     Birth control/protection: Abstinence, None   Other Topics Concern    Are you pregnant or think you may be? No    Breast-feeding No       MEDICATIONS      Current Outpatient Medications:     albuterol (PROVENTIL/VENTOLIN HFA) 90 mcg/actuation inhaler, Inhale 2 puffs into the lungs every 6 (six) hours as needed for Wheezing or Shortness of Breath (generic preferred). Rescue, Disp: 18 g, Rfl: 3    apixaban (ELIQUIS) 2.5 mg Tab, Take 1 tablet (2.5 mg total) by mouth 2 (two) times daily., Disp: 180 tablet, Rfl: 3    calcium-vitamin D3 500 mg(1,250mg) -200 unit per tablet, Take 1 tablet by mouth Daily. , Disp: , Rfl:      carboxymethylcellulose (REFRESH PLUS) 0.5 % Dpet, 1 drop 3 (three) times daily as needed., Disp: , Rfl:     diclofenac sodium (VOLTAREN) 1 % Gel, Apply 2 g topically once daily., Disp: 150 g, Rfl: 2    LORazepam (ATIVAN) 0.5 MG tablet, Take 1 tablet (0.5 mg total) by mouth nightly as needed for Anxiety., Disp: 30 tablet, Rfl: 5    metoprolol succinate (TOPROL-XL) 25 MG 24 hr tablet, Take 1 tablet (25 mg total) by mouth once daily., Disp: 90 tablet, Rfl: 3    omeprazole (PRILOSEC) 20 MG capsule, Take 1 capsule (20 mg total) by mouth once daily. Take 30 min before breakfast, Disp: 30 capsule, Rfl: 2    rosuvastatin (CRESTOR) 5 MG tablet, Take 1 tablet (5 mg total) by mouth every evening., Disp: 90 tablet, Rfl: 3    sodium chloride (SALINE NASAL) 0.65 % nasal spray, 2 sprays by Nasal route as needed for Congestion., Disp: 44 mL, Rfl: 11    triamcinolone (NASACORT) 55 mcg nasal inhaler, 2 sprays by Nasal route once daily., Disp: 16.9 mL, Rfl: 11    triamcinolone acetonide 0.1% (KENALOG) 0.1 % cream, Apply topically 2 (two) times daily. Apply to itchy irritated rash twice daily. Do not apply to face, groin, or skin folds, Disp: 80 g, Rfl: 1    azelastine (ASTELIN) 137 mcg (0.1 %) nasal spray, 1 spray (137 mcg total) by Nasal route 2 (two) times daily., Disp: 30 mL, Rfl: 0    gabapentin (NEURONTIN) 300 MG capsule, Take 2 capsules (600 mg total) by mouth every evening. (Patient taking differently: Take 600 mg by mouth every evening. PRN), Disp: 180 capsule, Rfl: 3  No current facility-administered medications for this visit.    Facility-Administered Medications Ordered in Other Visits:     LIDOcaine (PF) 10 mg/ml (1%) injection 10 mg, 1 mL, Intradermal, Once, Audrey Caicedo NP    mupirocin 2 % ointment, , Nasal, On Call Procedure, Audrey Caicedo NP, Given at 04/26/22 0752    ALLERGIES     Review of patient's allergies indicates:   Allergen Reactions    Codeine      Other reaction(s): Syncope, can take tylenol     Sulfa (sulfonamide antibiotics)      Other reaction(s): Stomach upset         REVIEW OF SYSTEMS   Constitution: Negative. Negative for chills, fever and night sweats.   HENT: Negative for congestion and headaches.    Eyes: Negative for blurred vision, left vision loss and right vision loss.   Cardiovascular: Negative for chest pain and syncope.   Respiratory: Negative for cough and shortness of breath.    Endocrine: Negative for polydipsia, polyphagia and polyuria.   Hematologic/Lymphatic: Negative for bleeding problem. Does not bruise/bleed easily.   Skin: Negative for dry skin, itching and rash.   Musculoskeletal: Negative for falls. Positive for right shoulder pain  Gastrointestinal: Negative for abdominal pain and bowel incontinence.   Genitourinary: Negative for bladder incontinence and nocturia.   Neurological: Negative for disturbances in coordination, loss of balance and seizures.   Psychiatric/Behavioral: Negative for depression. The patient does not have insomnia.    Allergic/Immunologic: Negative for hives and persistent infections.     PHYSICAL EXAMINATION    Vitals: /70   Pulse 83   Ht 5' (1.524 m)   Wt 52.6 kg (116 lb)   LMP  (LMP Unknown)   BMI 22.65 kg/m²     General: The patient appears active and healthy with no apparent physical problems.  No disturbance of mood or affect is demonstrated. Alert and Oriented.    HEENT: Eyes normal, pupils equally round, nose normal.    Resp: Equal and symmetrical chest rises. No wheezing    CV: Regular rate    Neck: Supple; nonpainful range of motion.    Extremities: no cyanosis, clubbing, edema, or diffuse swelling.  Palpable pulses, good capillary refill of the digits.  No coolness, discoloration, edema or obvious varicosities.    Skin: no lesions noted.    Lymphatic: no detected adenopathy in the upper or lower extremities.    Neurologic: normal mental status, normal reflexes, normal gait and balance.  Patient is alert and oriented to person, place  and time.  No flaccidity or spasticity is noted.  No motor or sensory deficits are noted.  Light touch is intact    Orthopaedic:     SHOULDER EXAM - RIGHT    Inspection:   Normal skin color and appearance with no scars.  No muscle atrophy noted.  No scapular winging.      Palpation: No tenderness of the acromioclavicular joint, lateral edge of the acromion, biceps tendon, trapezius muscle or scapulothoracic bursa.      ROM:      PROM:     FE - 160°    Abd/ER -  90°  Abd/IR -  45°   Add/ER -  60°     AROM:    FE - 90°    Abd/ER -  90°  Abd/IR -  45°   Add/ER -  60° *pain with resisted ER/IR        Tests:     - [place], - Neer's, - Cross Arm Adduction, - [place], - [place], + Speed. - Belly Press,  + [place], - Lift Off    Stability: - sulcus, - apprehension, - relocation, - load and shift, - DLS      Motor:  Rotator cuff strength is 4/5 supraspinatus, 4/5 infraspinatus, 5/5 subscapularis. Biceps, triceps and deltoid strength is 5/5.      Neuro     Distally there are no paresthesias, and sensation is intact to light touch in the median, ulnar, and radial distributions.  Reflexes are 2/2 biceps, triceps and brachioradialis.        IMAGING    MRI Shoulder Without Contrast Right  Narrative: EXAMINATION:  MRI SHOULDER WITHOUT CONTRAST RIGHT    CLINICAL HISTORY:  Pain in right shoulderShoulder pain, rotator cuff disorder suspected, xray done;    TECHNIQUE:  MRI of right shoulder was performed on a 1.5T magnet utilizing the following sequences: Localizer; axial T2 FS; coronal T2 FS and PD FS; sagittal T1, T2 FS and PD FS.    COMPARISON:  02/15/2013    FINDINGS:  Rotator cuff: Full-thickness and full with supraspinatus tendon tear with retraction of the myotendinous junction medial to the border of the superior bony glenoid and diffuse edema signal and fatty atrophy of the muscle.  There is severe tendinopathy of the infraspinatus tendon with full-thickness tearing anteriorly associated with extensive granulation  tissue/fibrosis at the footprint and retraction of the musculotendinous junction approximately 2 cm.  There is an internal degloving component superiorly with differential retraction medial to the margin of the glenoid.  The subscapularis tendon is severely tendinopathic, with high-grade partial undersurface tearing of the central tendon measuring at least 15 mm in medial to lateral dimension.  Superiorly there is full-thickness tearing and the musculotendinous junction is retracted medially to the level of the undersurface of the coracoid.Subscapularis and infraspinatus muscle bulk preserved.    Biceps: Longitudinal split tearing of the extra-articular tendon, with nonvisualization of the intra-articular course of the tendon, possibly either subluxed medially or completely torn.    Labrum: Extensive tearing and maceration.    Glenohumeral Joint: Large glenohumeral joint effusion and synovitis.  Extensive chondral loss involving the cephalad aspect of the humeral head with associated bony abutment of the undersurface of the acromion.  There is a 10 by 10 mm loose chondral body within the axillary recess of the joint but exact donor site is not determined.    Acromioclavicular joint: Os acromiale with subacromial spurring and osteoarthritis.    Misc: Subacromial/subdeltoid bursitis and subcoracoid bursitis noted.  Impression: Massive rotator cuff tear as detailed above, with supraspinatus muscle atrophy    Glenohumeral effusion, extensive chondromalacia and large intra-articular loose body    AC joint osteoarthritis with os acromiale.    Electronically signed by: Donny Upton Jr  Date:    02/27/2023  Time:    10:50  Mammo Digital Screening Bilat w/ Eliazar  Narrative: Result:  Mammo Digital Screening Bilat w/ Eliazar    History:  Patient is 82 y.o. and is seen for a screening mammogram.    Films Compared:  Prior images (if available) were compared.     Findings:   This procedure was performed using tomosynthesis.    Computer-aided detection was utilized in the interpretation of this   examination.    The breasts have scattered areas of fibroglandular density. There is no   evidence of suspicious masses, microcalcifications or architectural   distortion.  Impression:    No mammographic evidence of malignancy.    BI-RADS Category 1: Negative    Recommendation:  Routine screening mammogram in 1 year, or as clinically indicated.    Your estimated lifetime risk of breast cancer (to age 85) based on   Tyrer-Cuzick risk assessment model is 0.11 %.  According to the American   Cancer Society, patients with a lifetime breast cancer risk of 20% or   higher might benefit from supplemental screening tests. ??     The patient has significant cystic changes noted at the humeral head.      IMPRESSION       ICD-10-CM ICD-9-CM   1. Nontraumatic complete tear of right rotator cuff  M75.121 727.61   2. Impingement syndrome of right shoulder  M75.41 726.2         MEDICATIONS PRESCRIBED      None    RECOMMENDATIONS     The patient elected to proceed with operative intervention after all risks, benefits, and alternatives were discussed with the patient.  The risks of surgery include bleeding, scarring, injuries to nerves, arteries and veins, need for additional surgeries, blood clots, infections, and the risk of anesthesia.  I personally obtained informed consent from the patient and documented full history and physical, which has been placed on the chart.  Right shoulder arthroscopy with sub-acromial decompression, extensive debridement, loose body removal and possible rotator cuff repair with patch augmentation  Referral to physical therapy placed today  The patient understands that we may not be able to repair her rotator cuff.  However if we are able to repair it, I did advise her a patch augmentation may be needed.  She understood.      All questions were answered, pt will contact us for questions or concerns in the interim.

## 2023-03-08 NOTE — H&P
History 3/8/2023:  Laverne returns to clinic today to complete perioperative paperwork.  She wants to proceed with surgery.  She was advised by her cardiologist to stop her Eliquis a few days prior to the surgery and that she could restart after surgery.    History 2/27/2023:  Laverne returns to clinic to discuss MRI results of her right shoulder.  She is still having a lot of pain and wants to proceed with some type of management.    History 2/15/2023:   82 y.o. Female with a history of right shoulder pain who has been having pain since about December. She has been seen by Julia Del Castillo PA-C in the past. She states she has anterior shoulder pain. She was given a CSI and reports some relief.  She states the pain is getting worse.  She has difficulty moving her sheets at night.  She can not even pull the covers over her.    Is affecting ADLs.  Pain is 8/10 at it's worst.        PAST MEDICAL HISTORY    Past Medical History:   Diagnosis Date    Allergy     Anxiety     Arthritis     Asthma     mild    Cataract     CKD (chronic kidney disease) stage 3, GFR 30-59 ml/min     Hepatitis     IBS (irritable bowel syndrome)     Moderate aortic valve stenosis     Osteoporosis     Paroxysmal atrial fibrillation     Reflux esophagitis     Torus palatinus        PAST SURGICAL HISTORY     Past Surgical History:   Procedure Laterality Date    BELPHAROPTOSIS REPAIR Bilateral     CATARACT EXTRACTION W/  INTRAOCULAR LENS IMPLANT Bilateral     CATARACT EXTRACTION, BILATERAL      CHOLECYSTECTOMY      COLONOSCOPY N/A 01/20/2022    Procedure: COLONOSCOPY;  Surgeon: Emmanuel Sandoval MD;  Location: [address]);  Service: Endoscopy;  Laterality: N/A;    ESOPHAGOGASTRODUODENOSCOPY N/A 01/20/2022    Procedure: EGD (ESOPHAGOGASTRODUODENOSCOPY) any GI doc, please perform colonoscopy and EGD at same time;  Surgeon: Emmanuel Sandoval MD;  Location: [facility] ([address] [place] [address]);  Service: Endoscopy;  Laterality: N/A;  EGD &  Colonoscopy orders combined-MS  fully vaccinated  1/13 covid test 1/17 @ [facility]    EYE SURGERY      FLEXIBLE SIGMOIDOSCOPY  4/26/2022    Procedure: SIGMOIDOSCOPY, FLEXIBLE;  Surgeon: Alison Mcclain MD;  Location: [facility] OR;  Service: Colon and Rectal;;    HYSTERECTOMY      OOPHORECTOMY      ROBOT-ASSISTED LAPAROSCOPIC COLECTOMY USING DA DIANA XI N/A 4/26/2022    Procedure: XI ROBOTIC COLECTOMY with flexible sigmoidoscopy;  Surgeon: Alison Mcclain MD;  Location: [facility] OR;  Service: Colon and Rectal;  Laterality: N/A;       FAMILY HISTORY    Family History   Problem Relation Age of Onset    Cancer Mother         pancreatic    Cataracts Father     Diabetes Father     Asthma Sister     Cataracts Sister     Cancer Brother         liver and esophageal    No Known Problems Daughter     No Known Problems Son     Amblyopia Neg Hx     Blindness Neg Hx     Glaucoma Neg Hx     Macular degeneration Neg Hx     Retinal detachment Neg Hx     Strabismus Neg Hx     Breast cancer Neg Hx     Colon cancer Neg Hx     Ovarian cancer Neg Hx        SOCIAL HISTORY    Social History     Socioeconomic History    Marital status:    Tobacco Use    Smoking status: Never    Smokeless tobacco: Never   Substance and Sexual Activity    Alcohol use: No    Drug use: No    Sexual activity: Not Currently     Partners: Male     Birth control/protection: Abstinence, None   Other Topics Concern    Are you pregnant or think you may be? No    Breast-feeding No       MEDICATIONS      Current Outpatient Medications:     albuterol (PROVENTIL/VENTOLIN HFA) 90 mcg/actuation inhaler, Inhale 2 puffs into the lungs every 6 (six) hours as needed for Wheezing or Shortness of Breath (generic preferred). Rescue, Disp: 18 g, Rfl: 3    apixaban (ELIQUIS) 2.5 mg Tab, Take 1 tablet (2.5 mg total) by mouth 2 (two) times daily., Disp: 180 tablet, Rfl: 3    calcium-vitamin D3 500 mg(1,250mg) -200 unit per tablet, Take 1 tablet by mouth Daily. , Disp: , Rfl:      carboxymethylcellulose (REFRESH PLUS) 0.5 % Dpet, 1 drop 3 (three) times daily as needed., Disp: , Rfl:     diclofenac sodium (VOLTAREN) 1 % Gel, Apply 2 g topically once daily., Disp: 150 g, Rfl: 2    LORazepam (ATIVAN) 0.5 MG tablet, Take 1 tablet (0.5 mg total) by mouth nightly as needed for Anxiety., Disp: 30 tablet, Rfl: 5    metoprolol succinate (TOPROL-XL) 25 MG 24 hr tablet, Take 1 tablet (25 mg total) by mouth once daily., Disp: 90 tablet, Rfl: 3    omeprazole (PRILOSEC) 20 MG capsule, Take 1 capsule (20 mg total) by mouth once daily. Take 30 min before breakfast, Disp: 30 capsule, Rfl: 2    rosuvastatin (CRESTOR) 5 MG tablet, Take 1 tablet (5 mg total) by mouth every evening., Disp: 90 tablet, Rfl: 3    sodium chloride (SALINE NASAL) 0.65 % nasal spray, 2 sprays by Nasal route as needed for Congestion., Disp: 44 mL, Rfl: 11    triamcinolone (NASACORT) 55 mcg nasal inhaler, 2 sprays by Nasal route once daily., Disp: 16.9 mL, Rfl: 11    triamcinolone acetonide 0.1% (KENALOG) 0.1 % cream, Apply topically 2 (two) times daily. Apply to itchy irritated rash twice daily. Do not apply to face, groin, or skin folds, Disp: 80 g, Rfl: 1    azelastine (ASTELIN) 137 mcg (0.1 %) nasal spray, 1 spray (137 mcg total) by Nasal route 2 (two) times daily., Disp: 30 mL, Rfl: 0    gabapentin (NEURONTIN) 300 MG capsule, Take 2 capsules (600 mg total) by mouth every evening. (Patient taking differently: Take 600 mg by mouth every evening. PRN), Disp: 180 capsule, Rfl: 3  No current facility-administered medications for this visit.    Facility-Administered Medications Ordered in Other Visits:     LIDOcaine (PF) 10 mg/ml (1%) injection 10 mg, 1 mL, Intradermal, Once, Audrey Caicedo NP    mupirocin 2 % ointment, , Nasal, On Call Procedure, Audrey Caicedo NP, Given at 04/26/22 0735    ALLERGIES     Review of patient's allergies indicates:   Allergen Reactions    Codeine      Other reaction(s): Syncope, can take tylenol     Sulfa (sulfonamide antibiotics)      Other reaction(s): Stomach upset         REVIEW OF SYSTEMS   Constitution: Negative. Negative for chills, fever and night sweats.   HENT: Negative for congestion and headaches.    Eyes: Negative for blurred vision, left vision loss and right vision loss.   Cardiovascular: Negative for chest pain and syncope.   Respiratory: Negative for cough and shortness of breath.    Endocrine: Negative for polydipsia, polyphagia and polyuria.   Hematologic/Lymphatic: Negative for bleeding problem. Does not bruise/bleed easily.   Skin: Negative for dry skin, itching and rash.   Musculoskeletal: Negative for falls. Positive for right shoulder pain  Gastrointestinal: Negative for abdominal pain and bowel incontinence.   Genitourinary: Negative for bladder incontinence and nocturia.   Neurological: Negative for disturbances in coordination, loss of balance and seizures.   Psychiatric/Behavioral: Negative for depression. The patient does not have insomnia.    Allergic/Immunologic: Negative for hives and persistent infections.     PHYSICAL EXAMINATION    Vitals: /70   Pulse 83   Ht 5' (1.524 m)   Wt 52.6 kg (116 lb)   LMP  (LMP Unknown)   BMI 22.65 kg/m²     General: The patient appears active and healthy with no apparent physical problems.  No disturbance of mood or affect is demonstrated. Alert and Oriented.    HEENT: Eyes normal, pupils equally round, nose normal.    Resp: Equal and symmetrical chest rises. No wheezing    CV: Regular rate    Neck: Supple; nonpainful range of motion.    Extremities: no cyanosis, clubbing, edema, or diffuse swelling.  Palpable pulses, good capillary refill of the digits.  No coolness, discoloration, edema or obvious varicosities.    Skin: no lesions noted.    Lymphatic: no detected adenopathy in the upper or lower extremities.    Neurologic: normal mental status, normal reflexes, normal gait and balance.  Patient is alert and oriented to person, place  and time.  No flaccidity or spasticity is noted.  No motor or sensory deficits are noted.  Light touch is intact    Orthopaedic:     SHOULDER EXAM - RIGHT    Inspection:   Normal skin color and appearance with no scars.  No muscle atrophy noted.  No scapular winging.      Palpation: No tenderness of the acromioclavicular joint, lateral edge of the acromion, biceps tendon, trapezius muscle or scapulothoracic bursa.      ROM:      PROM:     FE - 160°    Abd/ER -  90°  Abd/IR -  45°   Add/ER -  60°     AROM:    FE - 90°    Abd/ER -  90°  Abd/IR -  45°   Add/ER -  60° *pain with resisted ER/IR        Tests:     - [place], - Neer's, - Cross Arm Adduction, - [place], - [place], + Speed. - Belly Press,  + [place], - Lift Off    Stability: - sulcus, - apprehension, - relocation, - load and shift, - DLS      Motor:  Rotator cuff strength is 4/5 supraspinatus, 4/5 infraspinatus, 5/5 subscapularis. Biceps, triceps and deltoid strength is 5/5.      Neuro     Distally there are no paresthesias, and sensation is intact to light touch in the median, ulnar, and radial distributions.  Reflexes are 2/2 biceps, triceps and brachioradialis.        IMAGING    MRI Shoulder Without Contrast Right  Narrative: EXAMINATION:  MRI SHOULDER WITHOUT CONTRAST RIGHT    CLINICAL HISTORY:  Pain in right shoulderShoulder pain, rotator cuff disorder suspected, xray done;    TECHNIQUE:  MRI of right shoulder was performed on a 1.5T magnet utilizing the following sequences: Localizer; axial T2 FS; coronal T2 FS and PD FS; sagittal T1, T2 FS and PD FS.    COMPARISON:  02/15/2013    FINDINGS:  Rotator cuff: Full-thickness and full with supraspinatus tendon tear with retraction of the myotendinous junction medial to the border of the superior bony glenoid and diffuse edema signal and fatty atrophy of the muscle.  There is severe tendinopathy of the infraspinatus tendon with full-thickness tearing anteriorly associated with extensive granulation  tissue/fibrosis at the footprint and retraction of the musculotendinous junction approximately 2 cm.  There is an internal degloving component superiorly with differential retraction medial to the margin of the glenoid.  The subscapularis tendon is severely tendinopathic, with high-grade partial undersurface tearing of the central tendon measuring at least 15 mm in medial to lateral dimension.  Superiorly there is full-thickness tearing and the musculotendinous junction is retracted medially to the level of the undersurface of the coracoid.Subscapularis and infraspinatus muscle bulk preserved.    Biceps: Longitudinal split tearing of the extra-articular tendon, with nonvisualization of the intra-articular course of the tendon, possibly either subluxed medially or completely torn.    Labrum: Extensive tearing and maceration.    Glenohumeral Joint: Large glenohumeral joint effusion and synovitis.  Extensive chondral loss involving the cephalad aspect of the humeral head with associated bony abutment of the undersurface of the acromion.  There is a 10 by 10 mm loose chondral body within the axillary recess of the joint but exact donor site is not determined.    Acromioclavicular joint: Os acromiale with subacromial spurring and osteoarthritis.    Misc: Subacromial/subdeltoid bursitis and subcoracoid bursitis noted.  Impression: Massive rotator cuff tear as detailed above, with supraspinatus muscle atrophy    Glenohumeral effusion, extensive chondromalacia and large intra-articular loose body    AC joint osteoarthritis with os acromiale.    Electronically signed by: Donny Upton Jr  Date:    02/27/2023  Time:    10:50  Mammo Digital Screening Bilat w/ Eliazar  Narrative: Result:  Mammo Digital Screening Bilat w/ Eliazar    History:  Patient is 82 y.o. and is seen for a screening mammogram.    Films Compared:  Prior images (if available) were compared.     Findings:   This procedure was performed using tomosynthesis.    Computer-aided detection was utilized in the interpretation of this   examination.    The breasts have scattered areas of fibroglandular density. There is no   evidence of suspicious masses, microcalcifications or architectural   distortion.  Impression:    No mammographic evidence of malignancy.    BI-RADS Category 1: Negative    Recommendation:  Routine screening mammogram in 1 year, or as clinically indicated.    Your estimated lifetime risk of breast cancer (to age 85) based on   Tyrer-Cuzick risk assessment model is 0.11 %.  According to the American   Cancer Society, patients with a lifetime breast cancer risk of 20% or   higher might benefit from supplemental screening tests. ??     The patient has significant cystic changes noted at the humeral head.      IMPRESSION       ICD-10-CM ICD-9-CM   1. Nontraumatic complete tear of right rotator cuff  M75.121 727.61   2. Impingement syndrome of right shoulder  M75.41 726.2         MEDICATIONS PRESCRIBED      None    RECOMMENDATIONS     The patient elected to proceed with operative intervention after all risks, benefits, and alternatives were discussed with the patient.  The risks of surgery include bleeding, scarring, injuries to nerves, arteries and veins, need for additional surgeries, blood clots, infections, and the risk of anesthesia.  I personally obtained informed consent from the patient and documented full history and physical, which has been placed on the chart.  Right shoulder arthroscopy with sub-acromial decompression, extensive debridement, loose body removal and possible rotator cuff repair with patch augmentation  Referral to physical therapy placed today  The patient understands that we may not be able to repair her rotator cuff.  However if we are able to repair it, I did advise her a patch augmentation may be needed.  She understood.      All questions were answered, pt will contact us for questions or concerns in the interim.

## 2023-03-08 NOTE — PROGRESS NOTES
ESTABLISHED PATIENT    History 3/8/2023:  Laverne returns to clinic today to complete perioperative paperwork.  She wants to proceed with surgery.  She was advised by her cardiologist to stop her Eliquis a few days prior to the surgery and that she could restart after surgery.    History 2/27/2023:  Laverne returns to clinic to discuss MRI results of her right shoulder.  She is still having a lot of pain and wants to proceed with some type of management.    History 2/15/2023:   82 y.o. Female with a history of right shoulder pain who has been having pain since about December. She has been seen by Julia Del Castillo PA-C in the past. She states she has anterior shoulder pain. She was given a CSI and reports some relief.  She states the pain is getting worse.  She has difficulty moving her sheets at night.  She can not even pull the covers over her.    Is affecting ADLs.  Pain is 8/10 at it's worst.        PAST MEDICAL HISTORY    Past Medical History:   Diagnosis Date    Allergy     Anxiety     Arthritis     Asthma     mild    Cataract     CKD (chronic kidney disease) stage 3, GFR 30-59 ml/min     Hepatitis     IBS (irritable bowel syndrome)     Moderate aortic valve stenosis     Osteoporosis     Paroxysmal atrial fibrillation     Reflux esophagitis     Torus palatinus        PAST SURGICAL HISTORY     Past Surgical History:   Procedure Laterality Date    BELPHAROPTOSIS REPAIR Bilateral     CATARACT EXTRACTION W/  INTRAOCULAR LENS IMPLANT Bilateral     CATARACT EXTRACTION, BILATERAL      CHOLECYSTECTOMY      COLONOSCOPY N/A 01/20/2022    Procedure: COLONOSCOPY;  Surgeon: Emmanuel Sandoval MD;  Location: [address]);  Service: Endoscopy;  Laterality: N/A;    ESOPHAGOGASTRODUODENOSCOPY N/A 01/20/2022    Procedure: EGD (ESOPHAGOGASTRODUODENOSCOPY) any GI doc, please perform colonoscopy and EGD at same time;  Surgeon: Emmanuel Sandoval MD;  Location: [facility] ([address] [place] [address]);  Service: Endoscopy;   Laterality: N/A;  EGD & Colonoscopy orders combined-MS  fully vaccinated  1/13 covid test 1/17 @ [facility]    EYE SURGERY      FLEXIBLE SIGMOIDOSCOPY  4/26/2022    Procedure: SIGMOIDOSCOPY, FLEXIBLE;  Surgeon: Alison Mcclain MD;  Location: [facility] OR;  Service: Colon and Rectal;;    HYSTERECTOMY      OOPHORECTOMY      ROBOT-ASSISTED LAPAROSCOPIC COLECTOMY USING DA DIANA XI N/A 4/26/2022    Procedure: XI ROBOTIC COLECTOMY with flexible sigmoidoscopy;  Surgeon: Alison Mcclain MD;  Location: [facility] OR;  Service: Colon and Rectal;  Laterality: N/A;       FAMILY HISTORY    Family History   Problem Relation Age of Onset    Cancer Mother         pancreatic    Cataracts Father     Diabetes Father     Asthma Sister     Cataracts Sister     Cancer Brother         liver and esophageal    No Known Problems Daughter     No Known Problems Son     Amblyopia Neg Hx     Blindness Neg Hx     Glaucoma Neg Hx     Macular degeneration Neg Hx     Retinal detachment Neg Hx     Strabismus Neg Hx     Breast cancer Neg Hx     Colon cancer Neg Hx     Ovarian cancer Neg Hx        SOCIAL HISTORY    Social History     Socioeconomic History    Marital status:    Tobacco Use    Smoking status: Never    Smokeless tobacco: Never   Substance and Sexual Activity    Alcohol use: No    Drug use: No    Sexual activity: Not Currently     Partners: Male     Birth control/protection: Abstinence, None   Other Topics Concern    Are you pregnant or think you may be? No    Breast-feeding No       MEDICATIONS      Current Outpatient Medications:     albuterol (PROVENTIL/VENTOLIN HFA) 90 mcg/actuation inhaler, Inhale 2 puffs into the lungs every 6 (six) hours as needed for Wheezing or Shortness of Breath (generic preferred). Rescue, Disp: 18 g, Rfl: 3    apixaban (ELIQUIS) 2.5 mg Tab, Take 1 tablet (2.5 mg total) by mouth 2 (two) times daily., Disp: 180 tablet, Rfl: 3    calcium-vitamin D3 500 mg(1,250mg) -200 unit per tablet, Take 1 tablet by mouth Daily. ,  Disp: , Rfl:     carboxymethylcellulose (REFRESH PLUS) 0.5 % Dpet, 1 drop 3 (three) times daily as needed., Disp: , Rfl:     diclofenac sodium (VOLTAREN) 1 % Gel, Apply 2 g topically once daily., Disp: 150 g, Rfl: 2    LORazepam (ATIVAN) 0.5 MG tablet, Take 1 tablet (0.5 mg total) by mouth nightly as needed for Anxiety., Disp: 30 tablet, Rfl: 5    metoprolol succinate (TOPROL-XL) 25 MG 24 hr tablet, Take 1 tablet (25 mg total) by mouth once daily., Disp: 90 tablet, Rfl: 3    omeprazole (PRILOSEC) 20 MG capsule, Take 1 capsule (20 mg total) by mouth once daily. Take 30 min before breakfast, Disp: 30 capsule, Rfl: 2    rosuvastatin (CRESTOR) 5 MG tablet, Take 1 tablet (5 mg total) by mouth every evening., Disp: 90 tablet, Rfl: 3    sodium chloride (SALINE NASAL) 0.65 % nasal spray, 2 sprays by Nasal route as needed for Congestion., Disp: 44 mL, Rfl: 11    triamcinolone (NASACORT) 55 mcg nasal inhaler, 2 sprays by Nasal route once daily., Disp: 16.9 mL, Rfl: 11    triamcinolone acetonide 0.1% (KENALOG) 0.1 % cream, Apply topically 2 (two) times daily. Apply to itchy irritated rash twice daily. Do not apply to face, groin, or skin folds, Disp: 80 g, Rfl: 1    azelastine (ASTELIN) 137 mcg (0.1 %) nasal spray, 1 spray (137 mcg total) by Nasal route 2 (two) times daily., Disp: 30 mL, Rfl: 0    gabapentin (NEURONTIN) 300 MG capsule, Take 2 capsules (600 mg total) by mouth every evening. (Patient taking differently: Take 600 mg by mouth every evening. PRN), Disp: 180 capsule, Rfl: 3  No current facility-administered medications for this visit.    Facility-Administered Medications Ordered in Other Visits:     LIDOcaine (PF) 10 mg/ml (1%) injection 10 mg, 1 mL, Intradermal, Once, Audrey Caicedo NP    mupirocin 2 % ointment, , Nasal, On Call Procedure, Audrey Caicedo NP, Given at 04/26/22 8746    ALLERGIES     Review of patient's allergies indicates:   Allergen Reactions    Codeine      Other reaction(s): Syncope, can  take tylenol    Sulfa (sulfonamide antibiotics)      Other reaction(s): Stomach upset         REVIEW OF SYSTEMS   Constitution: Negative. Negative for chills, fever and night sweats.   HENT: Negative for congestion and headaches.    Eyes: Negative for blurred vision, left vision loss and right vision loss.   Cardiovascular: Negative for chest pain and syncope.   Respiratory: Negative for cough and shortness of breath.    Endocrine: Negative for polydipsia, polyphagia and polyuria.   Hematologic/Lymphatic: Negative for bleeding problem. Does not bruise/bleed easily.   Skin: Negative for dry skin, itching and rash.   Musculoskeletal: Negative for falls. Positive for right shoulder pain  Gastrointestinal: Negative for abdominal pain and bowel incontinence.   Genitourinary: Negative for bladder incontinence and nocturia.   Neurological: Negative for disturbances in coordination, loss of balance and seizures.   Psychiatric/Behavioral: Negative for depression. The patient does not have insomnia.    Allergic/Immunologic: Negative for hives and persistent infections.     PHYSICAL EXAMINATION    Vitals: /70   Pulse 83   Ht 5' (1.524 m)   Wt 52.6 kg (116 lb)   LMP  (LMP Unknown)   BMI 22.65 kg/m²     General: The patient appears active and healthy with no apparent physical problems.  No disturbance of mood or affect is demonstrated. Alert and Oriented.    HEENT: Eyes normal, pupils equally round, nose normal.    Resp: Equal and symmetrical chest rises. No wheezing    CV: Regular rate    Neck: Supple; nonpainful range of motion.    Extremities: no cyanosis, clubbing, edema, or diffuse swelling.  Palpable pulses, good capillary refill of the digits.  No coolness, discoloration, edema or obvious varicosities.    Skin: no lesions noted.    Lymphatic: no detected adenopathy in the upper or lower extremities.    Neurologic: normal mental status, normal reflexes, normal gait and balance.  Patient is alert and oriented to  person, place and time.  No flaccidity or spasticity is noted.  No motor or sensory deficits are noted.  Light touch is intact    Orthopaedic:     SHOULDER EXAM - RIGHT    Inspection:   Normal skin color and appearance with no scars.  No muscle atrophy noted.  No scapular winging.      Palpation: No tenderness of the acromioclavicular joint, lateral edge of the acromion, biceps tendon, trapezius muscle or scapulothoracic bursa.      ROM:      PROM:     FE - 160°    Abd/ER -  90°  Abd/IR -  45°   Add/ER -  60°     AROM:    FE - 90°    Abd/ER -  90°  Abd/IR -  45°   Add/ER -  60° *pain with resisted ER/IR        Tests:     - Calzada, - Neer's, - Cross Arm Adduction, - Iron, - Yerguson, + Speed. - Belly Press,  + Jobes, - Lift Off    Stability: - sulcus, - apprehension, - relocation, - load and shift, - DLS      Motor:  Rotator cuff strength is 4/5 supraspinatus, 4/5 infraspinatus, 5/5 subscapularis. Biceps, triceps and deltoid strength is 5/5.      Neuro     Distally there are no paresthesias, and sensation is intact to light touch in the median, ulnar, and radial distributions.  Reflexes are 2/2 biceps, triceps and brachioradialis.        IMAGING    MRI Shoulder Without Contrast Right  Narrative: EXAMINATION:  MRI SHOULDER WITHOUT CONTRAST RIGHT    CLINICAL HISTORY:  Pain in right shoulderShoulder pain, rotator cuff disorder suspected, xray done;    TECHNIQUE:  MRI of right shoulder was performed on a 1.5T magnet utilizing the following sequences: Localizer; axial T2 FS; coronal T2 FS and PD FS; sagittal T1, T2 FS and PD FS.    COMPARISON:  02/15/2013    FINDINGS:  Rotator cuff: Full-thickness and full with supraspinatus tendon tear with retraction of the myotendinous junction medial to the border of the superior bony glenoid and diffuse edema signal and fatty atrophy of the muscle.  There is severe tendinopathy of the infraspinatus tendon with full-thickness tearing anteriorly associated with extensive  granulation tissue/fibrosis at the footprint and retraction of the musculotendinous junction approximately 2 cm.  There is an internal degloving component superiorly with differential retraction medial to the margin of the glenoid.  The subscapularis tendon is severely tendinopathic, with high-grade partial undersurface tearing of the central tendon measuring at least 15 mm in medial to lateral dimension.  Superiorly there is full-thickness tearing and the musculotendinous junction is retracted medially to the level of the undersurface of the coracoid.Subscapularis and infraspinatus muscle bulk preserved.    Biceps: Longitudinal split tearing of the extra-articular tendon, with nonvisualization of the intra-articular course of the tendon, possibly either subluxed medially or completely torn.    Labrum: Extensive tearing and maceration.    Glenohumeral Joint: Large glenohumeral joint effusion and synovitis.  Extensive chondral loss involving the cephalad aspect of the humeral head with associated bony abutment of the undersurface of the acromion.  There is a 10 by 10 mm loose chondral body within the axillary recess of the joint but exact donor site is not determined.    Acromioclavicular joint: Os acromiale with subacromial spurring and osteoarthritis.    Misc: Subacromial/subdeltoid bursitis and subcoracoid bursitis noted.  Impression: Massive rotator cuff tear as detailed above, with supraspinatus muscle atrophy    Glenohumeral effusion, extensive chondromalacia and large intra-articular loose body    AC joint osteoarthritis with os acromiale.    Electronically signed by: Donny Upton Jr  Date:    02/27/2023  Time:    10:50  Mammo Digital Screening Bilat w/ Eliazar  Narrative: Result:  Mammo Digital Screening Bilat w/ Eliazar    History:  Patient is 82 y.o. and is seen for a screening mammogram.    Films Compared:  Prior images (if available) were compared.     Findings:   This procedure was performed using  tomosynthesis.   Computer-aided detection was utilized in the interpretation of this   examination.    The breasts have scattered areas of fibroglandular density. There is no   evidence of suspicious masses, microcalcifications or architectural   distortion.  Impression:    No mammographic evidence of malignancy.    BI-RADS Category 1: Negative    Recommendation:  Routine screening mammogram in 1 year, or as clinically indicated.    Your estimated lifetime risk of breast cancer (to age 85) based on   Tyrer-Cuzick risk assessment model is 0.11 %.  According to the American   Cancer Society, patients with a lifetime breast cancer risk of 20% or   higher might benefit from supplemental screening tests. ??     The patient has significant cystic changes noted at the humeral head.      IMPRESSION       ICD-10-CM ICD-9-CM   1. Nontraumatic complete tear of right rotator cuff  M75.121 727.61   2. Impingement syndrome of right shoulder  M75.41 726.2         MEDICATIONS PRESCRIBED      None    RECOMMENDATIONS     The patient elected to proceed with operative intervention after all risks, benefits, and alternatives were discussed with the patient.  The risks of surgery include bleeding, scarring, injuries to nerves, arteries and veins, need for additional surgeries, blood clots, infections, and the risk of anesthesia.  I personally obtained informed consent from the patient and documented full history and physical, which has been placed on the chart.  Right shoulder arthroscopy with sub-acromial decompression, extensive debridement, loose body removal and possible rotator cuff repair with patch augmentation  Referral to physical therapy placed today  The patient understands that we may not be able to repair her rotator cuff.  However if we are able to repair it, I did advise her a patch augmentation may be needed.  She understood.      All questions were answered, pt will contact us for questions or concerns in the interim.

## 2023-03-13 NOTE — PROGRESS NOTES
[facility] Progress Note  3/14/2023          Reason for Visit:      had concerns including Pre-op Exam.       History of Present Illness:     Pt is here today for a preoperative exam prior to planned right rotator cuff repair on 3/16/23.    She did bloodwork for us a couple weeks ago, and we reviewed those results which were relatively unremarkable.  There was a mild normocytic anemia that appears chronic and likely due to chronic disease.  She has afib, on eliquis.  She already saw cardiology to discuss perioperative recommendations and they gave her instructions for holding the eliquis, which she started holding today.  She also has instructions to resume it the day after surgery.    Prior to the afib (which coincidentally was found when patient presented to cardiology for a preop exam last year), pt had not had any prior heart problems.      Problem List:     Patient Active Problem List   Diagnosis    IBS (irritable bowel syndrome)    Reflux esophagitis    Torus palatinus    Environmental allergies    Hyperlipidemia    Chronic kidney disease, stage III (moderate)    Anxiety    Ectatic aorta    Age-related osteoporosis without current pathological fracture    Calcified granuloma of lung    Lower extremity weakness    Painful cervical ROM    Decreased ROM of intervertebral discs of cervical spine    Upper extremity weakness    BPPV (benign paroxysmal positional vertigo), right    Rectovaginal fistula    Atrial fibrillation    Chronic anticoagulation         Medications:       Current Outpatient Medications:     albuterol (PROVENTIL/VENTOLIN HFA) 90 mcg/actuation inhaler, Inhale 2 puffs into the lungs every 6 (six) hours as needed for Wheezing or Shortness of Breath (generic preferred). Rescue, Disp: 18 g, Rfl: 3    apixaban (ELIQUIS) 2.5 mg Tab, Take 1 tablet (2.5 mg total) by mouth 2 (two) times daily., Disp: 180 tablet, Rfl: 3    calcium-vitamin D3 500 mg(1,250mg) -200 unit per tablet, Take  1 tablet by mouth Daily. , Disp: , Rfl:     carboxymethylcellulose (REFRESH PLUS) 0.5 % Dpet, 1 drop 3 (three) times daily as needed., Disp: , Rfl:     diclofenac sodium (VOLTAREN) 1 % Gel, Apply 2 g topically once daily., Disp: 150 g, Rfl: 2    HYDROcodone-acetaminophen (NORCO) 7.5-325 mg per tablet, Take 1 tablet by mouth every 4 (four) hours as needed for Pain., Disp: 10 tablet, Rfl: 0    LORazepam (ATIVAN) 0.5 MG tablet, Take 1 tablet (0.5 mg total) by mouth nightly as needed for Anxiety., Disp: 30 tablet, Rfl: 5    metoprolol succinate (TOPROL-XL) 25 MG 24 hr tablet, Take 1 tablet (25 mg total) by mouth once daily., Disp: 90 tablet, Rfl: 3    omeprazole (PRILOSEC) 20 MG capsule, Take 1 capsule (20 mg total) by mouth once daily. Take 30 min before breakfast, Disp: 30 capsule, Rfl: 2    rosuvastatin (CRESTOR) 5 MG tablet, Take 1 tablet (5 mg total) by mouth every evening., Disp: 90 tablet, Rfl: 3    sodium chloride (SALINE NASAL) 0.65 % nasal spray, 2 sprays by Nasal route as needed for Congestion., Disp: 44 mL, Rfl: 11    triamcinolone (NASACORT) 55 mcg nasal inhaler, 2 sprays by Nasal route once daily., Disp: 16.9 mL, Rfl: 11    azelastine (ASTELIN) 137 mcg (0.1 %) nasal spray, 1 spray (137 mcg total) by Nasal route 2 (two) times daily., Disp: 30 mL, Rfl: 0    gabapentin (NEURONTIN) 300 MG capsule, Take 2 capsules (600 mg total) by mouth every evening. (Patient taking differently: Take 600 mg by mouth every evening. PRN), Disp: 180 capsule, Rfl: 3  No current facility-administered medications for this visit.    Facility-Administered Medications Ordered in Other Visits:     LIDOcaine (PF) 10 mg/ml (1%) injection 10 mg, 1 mL, Intradermal, Once, Audrey Caicedo NP    mupirocin 2 % ointment, , Nasal, On Call Procedure, Audrey Caicedo NP, Given at 04/26/22 0726        Review of Systems:     Review of Systems   Constitutional:  Negative for chills and fever.   HENT:  Negative for ear pain and sore throat.     Respiratory:  Negative for cough, shortness of breath and wheezing.    Cardiovascular:  Negative for chest pain and palpitations.   Gastrointestinal:  Negative for constipation, diarrhea, nausea and vomiting.   Genitourinary:  Negative for dysuria and hematuria.   Musculoskeletal:  Negative for joint swelling and joint deformity.   Neurological:  Negative for dizziness and weakness.         Physical Exam:     Temp:    98 °F (36.7 °C)        Blood Pressure:  139/61        Pulse:   80     Respirations:  18  Weight:   52.3 kg (115 lb 4.8 oz)  Height:   5' (1.524 m)  BMI:     Body mass index is 22.52 kg/m².    Physical Exam  Constitutional:       General: She is not in acute distress.  HENT:      Right Ear: Tympanic membrane normal.      Left Ear: Tympanic membrane normal.      Nose: No congestion or rhinorrhea.      Mouth/Throat:      Pharynx: No oropharyngeal exudate or posterior oropharyngeal erythema.   Cardiovascular:      Rate and Rhythm: Normal rate and regular rhythm.   Pulmonary:      Effort: Pulmonary effort is normal. No respiratory distress.      Breath sounds: No wheezing.   Abdominal:      Palpations: Abdomen is soft.      Tenderness: There is no abdominal tenderness.   Skin:     General: Skin is warm.   Neurological:      General: No focal deficit present.      Mental Status: She is alert and oriented to person, place, and time.         Labs/Imaging/Testing:     Most recent CBC:  Lab Results   Component Value Date    WBC 7.24 03/04/2023    HGB 11.5 (L) 03/04/2023     03/04/2023    MCV 92 03/04/2023       Most recent Lipids:  Lab Results   Component Value Date    CHOL 179 02/03/2023    HDL 58 02/03/2023    LDLCALC 91.8 02/03/2023    TRIG 146 02/03/2023       Most recent Chemistry:  Lab Results   Component Value Date     03/04/2023    K 4.2 03/04/2023     03/04/2023    CO2 27 03/04/2023    BUN 16 03/04/2023    CREATININE 0.8 03/04/2023    GLU 98 03/04/2023    CALCIUM 10.2 03/04/2023     ALT 14 03/04/2023    AST 24 03/04/2023    ALKPHOS 50 (L) 03/04/2023    PROT 7.3 03/04/2023    ALBUMIN 3.8 03/04/2023       Other tests:  Lab Results   Component Value Date    TSH 1.345 02/03/2023    ZJZCYMWI97 703 05/04/2020    HHZQRHSH55WV 43 02/03/2023    .7 (H) 04/29/2022    LIPASE 20 10/31/2016    AMYLASE 103 10/31/2016             Assessment and Plan:     1. Tear of rotator cuff, unspecified laterality, unspecified tear extent, unspecified whether traumatic    2. Preoperative examination    3. Paroxysmal atrial fibrillation    4. Chronic anticoagulation     Cleared for surgery at low-moderate risk.  Pt has instructions for eliquis management from cardiology    RTC prn if any new problems.       Jacques Bowles MD  3/14/2023    This note was prepared using voice-recognition software.  Although efforts are made to proofread the note, some errors may persist in the final document.

## 2023-03-14 ENCOUNTER — OFFICE VISIT (OUTPATIENT)
Dept: PRIMARY CARE CLINIC | Facility: CLINIC | Age: 83
End: 2023-03-14
Payer: MEDICARE

## 2023-03-14 VITALS
SYSTOLIC BLOOD PRESSURE: 139 MMHG | RESPIRATION RATE: 18 BRPM | HEIGHT: 60 IN | HEART RATE: 80 BPM | TEMPERATURE: 98 F | DIASTOLIC BLOOD PRESSURE: 61 MMHG | WEIGHT: 115.31 LBS | OXYGEN SATURATION: 98 % | BODY MASS INDEX: 22.64 KG/M2

## 2023-03-14 DIAGNOSIS — Z79.01 CHRONIC ANTICOAGULATION: ICD-10-CM

## 2023-03-14 DIAGNOSIS — I48.0 PAROXYSMAL ATRIAL FIBRILLATION: ICD-10-CM

## 2023-03-14 DIAGNOSIS — M75.100 TEAR OF ROTATOR CUFF, UNSPECIFIED LATERALITY, UNSPECIFIED TEAR EXTENT, UNSPECIFIED WHETHER TRAUMATIC: Primary | ICD-10-CM

## 2023-03-14 DIAGNOSIS — Z01.818 PREOPERATIVE EXAMINATION: ICD-10-CM

## 2023-03-14 PROCEDURE — 3078F PR MOST RECENT DIASTOLIC BLOOD PRESSURE < 80 MM HG: ICD-10-PCS | Mod: HCNC,CPTII,S$GLB, | Performed by: INTERNAL MEDICINE

## 2023-03-14 PROCEDURE — 3288F PR FALLS RISK ASSESSMENT DOCUMENTED: ICD-10-PCS | Mod: HCNC,CPTII,S$GLB, | Performed by: INTERNAL MEDICINE

## 2023-03-14 PROCEDURE — 3075F PR MOST RECENT SYSTOLIC BLOOD PRESS GE 130-139MM HG: ICD-10-PCS | Mod: HCNC,CPTII,S$GLB, | Performed by: INTERNAL MEDICINE

## 2023-03-14 PROCEDURE — 3288F FALL RISK ASSESSMENT DOCD: CPT | Mod: HCNC,CPTII,S$GLB, | Performed by: INTERNAL MEDICINE

## 2023-03-14 PROCEDURE — 1125F AMNT PAIN NOTED PAIN PRSNT: CPT | Mod: HCNC,CPTII,S$GLB, | Performed by: INTERNAL MEDICINE

## 2023-03-14 PROCEDURE — 99214 PR OFFICE/OUTPT VISIT, EST, LEVL IV, 30-39 MIN: ICD-10-PCS | Mod: HCNC,S$GLB,, | Performed by: INTERNAL MEDICINE

## 2023-03-14 PROCEDURE — 99999 PR PBB SHADOW E&M-EST. PATIENT-LVL III: CPT | Mod: PBBFAC,HCNC,, | Performed by: INTERNAL MEDICINE

## 2023-03-14 PROCEDURE — 1125F PR PAIN SEVERITY QUANTIFIED, PAIN PRESENT: ICD-10-PCS | Mod: HCNC,CPTII,S$GLB, | Performed by: INTERNAL MEDICINE

## 2023-03-14 PROCEDURE — 99999 PR PBB SHADOW E&M-EST. PATIENT-LVL III: ICD-10-PCS | Mod: PBBFAC,HCNC,, | Performed by: INTERNAL MEDICINE

## 2023-03-14 PROCEDURE — 1101F PT FALLS ASSESS-DOCD LE1/YR: CPT | Mod: HCNC,CPTII,S$GLB, | Performed by: INTERNAL MEDICINE

## 2023-03-14 PROCEDURE — 3078F DIAST BP <80 MM HG: CPT | Mod: HCNC,CPTII,S$GLB, | Performed by: INTERNAL MEDICINE

## 2023-03-14 PROCEDURE — 99214 OFFICE O/P EST MOD 30 MIN: CPT | Mod: HCNC,S$GLB,, | Performed by: INTERNAL MEDICINE

## 2023-03-14 PROCEDURE — 3075F SYST BP GE 130 - 139MM HG: CPT | Mod: HCNC,CPTII,S$GLB, | Performed by: INTERNAL MEDICINE

## 2023-03-14 PROCEDURE — 1101F PR PT FALLS ASSESS DOC 0-1 FALLS W/OUT INJ PAST YR: ICD-10-PCS | Mod: HCNC,CPTII,S$GLB, | Performed by: INTERNAL MEDICINE

## 2023-03-14 NOTE — LETTER
March 14, 2023    Laverne Humphries  [address]  [place] [address]             [facility]  [address], [address]  [facility] [address] To whom it may concern:    I saw Ms. Laverne Humphries today for a general medical preoperative exam.  She has already seen cardiology for cardiac clearance prior to this planned surgery on 3/16/23.  Pt has afib and has been on eliquis and already has instructions from her cardiologist for holding that prior to surgery and resuming after surgery.  From a general medical standpoint, we can clear the patient for the planned surgery at low-moderate risk.      If you have any questions or concerns, please don't hesitate to call.    Sincerely,        Jacques Bowles MD

## 2023-03-15 ENCOUNTER — ANESTHESIA EVENT (OUTPATIENT)
Dept: SURGERY | Facility: HOSPITAL | Age: 83
End: 2023-03-15
Payer: MEDICARE

## 2023-03-16 ENCOUNTER — ANESTHESIA (OUTPATIENT)
Dept: SURGERY | Facility: HOSPITAL | Age: 83
End: 2023-03-16
Payer: MEDICARE

## 2023-03-20 ENCOUNTER — TELEPHONE (OUTPATIENT)
Dept: SPORTS MEDICINE | Facility: CLINIC | Age: 83
End: 2023-03-20
Payer: MEDICARE

## 2023-03-21 ENCOUNTER — HOSPITAL ENCOUNTER (OUTPATIENT)
Facility: HOSPITAL | Age: 83
Discharge: HOME | End: 2023-03-21
Attending: ORTHOPAEDIC SURGERY | Admitting: ORTHOPAEDIC SURGERY
Payer: MEDICARE

## 2023-03-21 VITALS
HEART RATE: 86 BPM | DIASTOLIC BLOOD PRESSURE: 66 MMHG | RESPIRATION RATE: 20 BRPM | TEMPERATURE: 98 F | SYSTOLIC BLOOD PRESSURE: 136 MMHG | OXYGEN SATURATION: 97 %

## 2023-03-21 DIAGNOSIS — M75.121 NONTRAUMATIC COMPLETE TEAR OF RIGHT ROTATOR CUFF: Primary | ICD-10-CM

## 2023-03-21 DIAGNOSIS — M75.41 IMPINGEMENT SYNDROME OF RIGHT SHOULDER: ICD-10-CM

## 2023-03-21 PROCEDURE — 71000015 HC POSTOP RECOV 1ST HR: Performed by: ORTHOPAEDIC SURGERY

## 2023-03-21 PROCEDURE — 37000008 HC ANESTHESIA 1ST 15 MINUTES: Performed by: ORTHOPAEDIC SURGERY

## 2023-03-21 PROCEDURE — 94761 N-INVAS EAR/PLS OXIMETRY MLT: CPT

## 2023-03-21 PROCEDURE — 29827 PR SHLDR ARTHROSCOP,SURG,W/ROTAT CUFF REPR: ICD-10-PCS | Mod: 22,HCNC,RT, | Performed by: ORTHOPAEDIC SURGERY

## 2023-03-21 PROCEDURE — 64416 NJX AA&/STRD BRCH PL NFS IMG: CPT | Mod: 59,RT,, | Performed by: SURGERY

## 2023-03-21 PROCEDURE — 36000711: Performed by: ORTHOPAEDIC SURGERY

## 2023-03-21 PROCEDURE — 29826 SHO ARTHRS SRG DECOMPRESSION: CPT | Mod: HCNC,RT,, | Performed by: ORTHOPAEDIC SURGERY

## 2023-03-21 PROCEDURE — 71000033 HC RECOVERY, INTIAL HOUR: Performed by: ORTHOPAEDIC SURGERY

## 2023-03-21 PROCEDURE — D9220A PRA ANESTHESIA: Mod: ANES,,, | Performed by: SURGERY

## 2023-03-21 PROCEDURE — 29823 SHO ARTHRS SRG XTNSV DBRDMT: CPT | Mod: 51,HCNC,RT, | Performed by: ORTHOPAEDIC SURGERY

## 2023-03-21 PROCEDURE — 36000710: Performed by: ORTHOPAEDIC SURGERY

## 2023-03-21 PROCEDURE — 63600175 PHARM REV CODE 636 W HCPCS: Performed by: SURGERY

## 2023-03-21 PROCEDURE — 63600175 PHARM REV CODE 636 W HCPCS: Performed by: ORTHOPAEDIC SURGERY

## 2023-03-21 PROCEDURE — 63600175 PHARM REV CODE 636 W HCPCS: Performed by: NURSE ANESTHETIST, CERTIFIED REGISTERED

## 2023-03-21 PROCEDURE — 25000003 PHARM REV CODE 250: Performed by: ORTHOPAEDIC SURGERY

## 2023-03-21 PROCEDURE — D9220A PRA ANESTHESIA: Mod: CRNA,,, | Performed by: NURSE ANESTHETIST, CERTIFIED REGISTERED

## 2023-03-21 PROCEDURE — 29823 PR SHLDR ARTHROSCOP,EXTEN DEBRIDE: ICD-10-PCS | Mod: 51,HCNC,RT, | Performed by: ORTHOPAEDIC SURGERY

## 2023-03-21 PROCEDURE — 63600175 PHARM REV CODE 636 W HCPCS: Performed by: STUDENT IN AN ORGANIZED HEALTH CARE EDUCATION/TRAINING PROGRAM

## 2023-03-21 PROCEDURE — 29826 PR SHLDR ARTHROSCOP,PART ACROMIOPLAS: ICD-10-PCS | Mod: HCNC,RT,, | Performed by: ORTHOPAEDIC SURGERY

## 2023-03-21 PROCEDURE — 64416 NJX AA&/STRD BRCH PL NFS IMG: CPT | Mod: 59,RT | Performed by: STUDENT IN AN ORGANIZED HEALTH CARE EDUCATION/TRAINING PROGRAM

## 2023-03-21 PROCEDURE — 71000016 HC POSTOP RECOV ADDL HR: Performed by: ORTHOPAEDIC SURGERY

## 2023-03-21 PROCEDURE — D9220A PRA ANESTHESIA: ICD-10-PCS | Mod: CRNA,,, | Performed by: NURSE ANESTHETIST, CERTIFIED REGISTERED

## 2023-03-21 PROCEDURE — 25000003 PHARM REV CODE 250: Performed by: NURSE ANESTHETIST, CERTIFIED REGISTERED

## 2023-03-21 PROCEDURE — 64416: ICD-10-PCS | Mod: 59,RT,, | Performed by: SURGERY

## 2023-03-21 PROCEDURE — 29827 SHO ARTHRS SRG RT8TR CUF RPR: CPT | Mod: 22,HCNC,RT, | Performed by: ORTHOPAEDIC SURGERY

## 2023-03-21 PROCEDURE — 25000003 PHARM REV CODE 250: Performed by: STUDENT IN AN ORGANIZED HEALTH CARE EDUCATION/TRAINING PROGRAM

## 2023-03-21 PROCEDURE — D9220A PRA ANESTHESIA: ICD-10-PCS | Mod: ANES,,, | Performed by: SURGERY

## 2023-03-21 PROCEDURE — 37000009 HC ANESTHESIA EA ADD 15 MINS: Performed by: ORTHOPAEDIC SURGERY

## 2023-03-21 PROCEDURE — C1713 ANCHOR/SCREW BN/BN,TIS/BN: HCPCS | Performed by: ORTHOPAEDIC SURGERY

## 2023-03-21 PROCEDURE — 27201423 OPTIME MED/SURG SUP & DEVICES STERILE SUPPLY: Performed by: ORTHOPAEDIC SURGERY

## 2023-03-21 PROCEDURE — 99900035 HC TECH TIME PER 15 MIN (STAT)

## 2023-03-21 DEVICE — IMPLANTABLE DEVICE: Type: IMPLANTABLE DEVICE | Site: SHOULDER | Status: FUNCTIONAL

## 2023-03-21 DEVICE — ANCHOR PEEK #2 SUT 4.75X19.1MM: Type: IMPLANTABLE DEVICE | Site: SHOULDER | Status: FUNCTIONAL

## 2023-03-21 RX ORDER — ROCURONIUM BROMIDE 10 MG/ML
INJECTION, SOLUTION INTRAVENOUS
Status: DISCONTINUED | OUTPATIENT
Start: 2023-03-21 | End: 2023-03-21

## 2023-03-21 RX ORDER — MUPIROCIN 20 MG/G
OINTMENT TOPICAL
Status: DISCONTINUED | OUTPATIENT
Start: 2023-03-21 | End: 2023-03-21 | Stop reason: HOSPADM

## 2023-03-21 RX ORDER — FENTANYL CITRATE 50 UG/ML
INJECTION, SOLUTION INTRAMUSCULAR; INTRAVENOUS
Status: DISCONTINUED | OUTPATIENT
Start: 2023-03-21 | End: 2023-03-21

## 2023-03-21 RX ORDER — ONDANSETRON 2 MG/ML
INJECTION INTRAMUSCULAR; INTRAVENOUS
Status: DISCONTINUED | OUTPATIENT
Start: 2023-03-21 | End: 2023-03-21

## 2023-03-21 RX ORDER — CARBOXYMETHYLCELLULOSE SODIUM 10 MG/ML
GEL OPHTHALMIC
Status: DISCONTINUED | OUTPATIENT
Start: 2023-03-21 | End: 2023-03-21

## 2023-03-21 RX ORDER — MIDAZOLAM HYDROCHLORIDE 1 MG/ML
.5-4 INJECTION INTRAMUSCULAR; INTRAVENOUS
Status: DISCONTINUED | OUTPATIENT
Start: 2023-03-21 | End: 2023-11-09

## 2023-03-21 RX ORDER — LIDOCAINE HYDROCHLORIDE 20 MG/ML
INJECTION INTRAVENOUS
Status: DISCONTINUED | OUTPATIENT
Start: 2023-03-21 | End: 2023-03-21

## 2023-03-21 RX ORDER — DEXAMETHASONE SODIUM PHOSPHATE 4 MG/ML
INJECTION, SOLUTION INTRA-ARTICULAR; INTRALESIONAL; INTRAMUSCULAR; INTRAVENOUS; SOFT TISSUE
Status: DISCONTINUED | OUTPATIENT
Start: 2023-03-21 | End: 2023-03-21

## 2023-03-21 RX ORDER — FAMOTIDINE 10 MG/ML
INJECTION INTRAVENOUS
Status: DISCONTINUED | OUTPATIENT
Start: 2023-03-21 | End: 2023-03-21

## 2023-03-21 RX ORDER — ACETAMINOPHEN 500 MG
1000 TABLET ORAL
Status: COMPLETED | OUTPATIENT
Start: 2023-03-21 | End: 2023-03-21

## 2023-03-21 RX ORDER — PROPOFOL 10 MG/ML
VIAL (ML) INTRAVENOUS
Status: DISCONTINUED | OUTPATIENT
Start: 2023-03-21 | End: 2023-03-21

## 2023-03-21 RX ORDER — FENTANYL CITRATE 50 UG/ML
25-200 INJECTION, SOLUTION INTRAMUSCULAR; INTRAVENOUS
Status: DISCONTINUED | OUTPATIENT
Start: 2023-03-21 | End: 2023-11-09

## 2023-03-21 RX ORDER — EPINEPHRINE 1 MG/ML
INJECTION, SOLUTION, CONCENTRATE INTRAVENOUS
Status: DISCONTINUED | OUTPATIENT
Start: 2023-03-21 | End: 2023-03-21 | Stop reason: HOSPADM

## 2023-03-21 RX ORDER — ROPIVACAINE HYDROCHLORIDE 5 MG/ML
INJECTION, SOLUTION EPIDURAL; INFILTRATION; PERINEURAL
Status: COMPLETED
Start: 2023-03-21 | End: 2023-03-21

## 2023-03-21 RX ORDER — ROPIVACAINE HYDROCHLORIDE 5 MG/ML
INJECTION, SOLUTION EPIDURAL; INFILTRATION; PERINEURAL
Status: COMPLETED | OUTPATIENT
Start: 2023-03-21 | End: 2023-03-21

## 2023-03-21 RX ORDER — NEOSTIGMINE METHYLSULFATE 0.5 MG/ML
INJECTION, SOLUTION INTRAVENOUS
Status: DISCONTINUED | OUTPATIENT
Start: 2023-03-21 | End: 2023-03-21

## 2023-03-21 RX ORDER — ROPIVACAINE HYDROCHLORIDE 2 MG/ML
INJECTION, SOLUTION EPIDURAL; INFILTRATION; PERINEURAL CONTINUOUS
Status: DISCONTINUED | OUTPATIENT
Start: 2023-03-21 | End: 2023-11-09

## 2023-03-21 RX ORDER — KETAMINE HYDROCHLORIDE 100 MG/ML
INJECTION, SOLUTION INTRAMUSCULAR; INTRAVENOUS
Status: DISCONTINUED | OUTPATIENT
Start: 2023-03-21 | End: 2023-03-21

## 2023-03-21 RX ORDER — BACITRACIN ZINC 500 UNIT/G
OINTMENT (GRAM) TOPICAL
Status: DISCONTINUED | OUTPATIENT
Start: 2023-03-21 | End: 2023-03-21 | Stop reason: HOSPADM

## 2023-03-21 RX ADMIN — CEFAZOLIN 2 G: 2 INJECTION, POWDER, FOR SOLUTION INTRAMUSCULAR; INTRAVENOUS at 10:03

## 2023-03-21 RX ADMIN — MIDAZOLAM 2 MG: 1 INJECTION INTRAMUSCULAR; INTRAVENOUS at 09:03

## 2023-03-21 RX ADMIN — ONDANSETRON 4 MG: 2 INJECTION, SOLUTION INTRAMUSCULAR; INTRAVENOUS at 10:03

## 2023-03-21 RX ADMIN — Medication: at 01:03

## 2023-03-21 RX ADMIN — FENTANYL CITRATE 100 MCG: 50 INJECTION, SOLUTION INTRAMUSCULAR; INTRAVENOUS at 10:03

## 2023-03-21 RX ADMIN — NEOSTIGMINE METHYLSULFATE 5 MG: 0.5 INJECTION, SOLUTION INTRAVENOUS at 01:03

## 2023-03-21 RX ADMIN — PROPOFOL 100 MG: 10 INJECTION, EMULSION INTRAVENOUS at 10:03

## 2023-03-21 RX ADMIN — ROPIVACAINE HYDROCHLORIDE 10 ML: 5 INJECTION EPIDURAL; INFILTRATION; PERINEURAL at 09:03

## 2023-03-21 RX ADMIN — ACETAMINOPHEN 1000 MG: 500 TABLET ORAL at 08:03

## 2023-03-21 RX ADMIN — FENTANYL CITRATE 100 MCG: 50 INJECTION INTRAMUSCULAR; INTRAVENOUS at 09:03

## 2023-03-21 RX ADMIN — LIDOCAINE HYDROCHLORIDE 75 MG: 20 INJECTION INTRAVENOUS at 10:03

## 2023-03-21 RX ADMIN — KETAMINE HYDROCHLORIDE 30 MG: 100 INJECTION INTRAMUSCULAR; INTRAVENOUS at 10:03

## 2023-03-21 RX ADMIN — MUPIROCIN: 20 OINTMENT TOPICAL at 08:03

## 2023-03-21 RX ADMIN — FAMOTIDINE 20 MG: 10 INJECTION, SOLUTION INTRAVENOUS at 10:03

## 2023-03-21 RX ADMIN — ROCURONIUM BROMIDE 50 MG: 10 INJECTION INTRAVENOUS at 10:03

## 2023-03-21 RX ADMIN — CARBOXYMETHYLCELLULOSE SODIUM 4 DROP: 10 GEL OPHTHALMIC at 10:03

## 2023-03-21 RX ADMIN — SODIUM CHLORIDE: 9 INJECTION, SOLUTION INTRAVENOUS at 09:03

## 2023-03-21 RX ADMIN — GLYCOPYRROLATE 0.4 MG: 0.2 INJECTION, SOLUTION INTRAMUSCULAR; INTRAVENOUS at 01:03

## 2023-03-21 RX ADMIN — SODIUM CHLORIDE: 9 INJECTION, SOLUTION INTRAVENOUS at 01:03

## 2023-03-21 RX ADMIN — DEXAMETHASONE SODIUM PHOSPHATE 8 MG: 4 INJECTION, SOLUTION INTRAMUSCULAR; INTRAVENOUS at 10:03

## 2023-03-21 NOTE — ANESTHESIA PROCEDURE NOTES
Intubation    Date/Time: 3/21/2023 10:44 AM  Performed by: Saarhi Perez CRNA  Authorized by: Jhony Mari MD     Intubation:     Induction:  Intravenous    Intubated:  Postinduction    Mask Ventilation:  Easy mask    Attempts:  1    Attempted By:  CRNA    Method of Intubation:  Blind intubation    Laryngeal View Grade: Grade I - full view of cords      Difficult Airway Encountered?: No      Complications:  None    Airway Device:  Oral endotracheal tube    Airway Device Size:  7.0    Style/Cuff Inflation:  Cuffed    Inflation Amount (mL):  7    Tube secured:  20    Secured at:  The lips    Placement Verified By:  Capnometry    Complicating Factors:  None    Findings Post-Intubation:  BS equal bilateral

## 2023-03-21 NOTE — PLAN OF CARE
VSS. Patient able to tolerate oral liquids. Patient denies c/o pain. Patient/family instructed to  medications from local pharmacy. Dressing intact. Thigh TEDs intact. Patient instructed not to wear HUMERA hose without wearing closed-back shoes or  socks due to increased risk of falls, verbalized understanding. Sling on patient. No distress noted. Patient states she is ready for discharge. Discharge instructions and [facility] instructions reviewed with patient and family, verbalized understanding. IV discontinued with catheter tip intact. Staff at bedside to help patient dress. Patient wheeled to lobby via staff.

## 2023-03-21 NOTE — PATIENT INSTRUCTIONS
POST-OPERATIVE DISCHARGE INSTRUCTIONS    Diet: Ice chips, clear liquids, and then diet as tolerated.  Drink plenty of liquids.  Ice the area at least three times a day (20 minutes per session).    Elevate the extremity above the level of the heart to help reduce swelling.  Remain non-weightbearing and wear sling at all times until further notice.  Pain medication can be taken every four to six hours as needed.  It is helpful to take pain medication prior to physical therapy.  Any activity that requires precise thinking or accuracy should be avoided for a minimum of 72 hours after surgery and while on narcotic pain medication.  This includes operating machinery and/or driving a vehicle.  All sutures/staples will be removed approximately 14 days from the time of surgery. Leave steri-strips (skin tapes) in place until sutures are removed.  If skin glue is used instead of stitches, do not apply any ointments or solutions to the incision.  Keep the incision dry.  The skin glue will peel off in 3-4 weeks.  Dressing change directions, unless otherwise instructed:     ? Shoulder scope:  Remove dressings 48 hours after surgery and start using waterproof Band-Aids over the incision sites.      All casts, splints, braces, slings, crutches, abduction pillows, etc. are to be worn as instructed.    Waldo Hose are often used following knee surgery to help with swelling.  They can be removed for 2-3 hours daily as needed.  If the hose become uncomfortable after a few days, switch to an Ace Wrap if desired.  Keep the incision dry for 10-14 days.  A waterproof dressing ([facility] or [facility]) can be used to shower.  No bath, pool, or hot tub until instructed.  You should notify our office if you notice any of the following:  A temperature greater than 101 F  Severe increased pain  Excessive drainage or redness of the incision  Calf swelling and pain  Chest pain  Your post-op follow up appointment will be approximately 14 days from the time  of surgery.  If you do not have an appointment scheduled, please call our office and schedule within 1-2 days.   14.         If you have any problems or questions, please call our office at [phone].

## 2023-03-21 NOTE — TRANSFER OF CARE
Anesthesia Transfer of Care Note    Patient: Laverne Humphries    Procedure(s) Performed: Procedure(s) (LRB):  DEBRIDEMENT, SHOULDER, ARTHROSCOPIC (Right)  ARTHROSCOPY, SHOULDER, WITH SUBACROMIAL SPACE DECOMPRESSION (Right)  REPAIR, ROTATOR CUFF, ARTHROSCOPIC (Right)  LYSIS,ADHESIONS,SHOULDER,ARTHROSCOPIC    Patient location: PACU    Anesthesia Type: general    Transport from OR: Transported from OR on 6-10 L/min O2 by face mask with adequate spontaneous ventilation    Post pain: adequate analgesia    Post assessment: no apparent anesthetic complications and tolerated procedure well    Post vital signs: stable    Level of consciousness: awake    Nausea/Vomiting: no nausea/vomiting    Complications: none    Transfer of care protocol was followed      Last vitals:   Visit Vitals  BP (!) 122/58   Pulse 87   Temp 36.8 °C (98.2 °F) (Oral)   Resp 16   LMP  (LMP Unknown)   SpO2 100%   Breastfeeding No

## 2023-03-21 NOTE — ANESTHESIA PREPROCEDURE EVALUATION
[facility]  Anesthesia Pre-Operative Evaluation         Patient Name: Laverne Humphries  YOB: 1940  MRN: [id number]    SUBJECTIVE:     Pre-operative evaluation for Procedure(s) (LRB):  DEBRIDEMENT, SHOULDER, ARTHROSCOPIC (Right)  ARTHROSCOPY, SHOULDER, WITH SUBACROMIAL SPACE DECOMPRESSION (Right)  REPAIR, ROTATOR CUFF, ARTHROSCOPIC (Right)     03/21/2023    Laverne Humphries is a 82 y.o. female w/ a significant PMHx of CKD 3, afib (on anticoagulation).    Patient now presents for the above procedure(s).      LDA: None documented.       Prev airway: Intubation     Date/Time: 4/26/2022 8:47 AM  Performed by: Donny Hooks CRNA  Authorized by: Elfego Saini MD      Intubation:     Induction:  Intravenous    Intubated:  Postinduction    Mask Ventilation:  Easy mask    Attempts:  1    Attempted By:  CRNA    Method of Intubation:  Video laryngoscopy    Blade:  Minaya 3    Laryngeal View Grade: Grade I - full view of cords      Difficult Airway Encountered?: No      Complications:  None    Airway Device:  Oral endotracheal tube    Airway Device Size:  7.0    Style/Cuff Inflation:  Cuffed (inflated to minimal occlusive pressure)    Tube secured:  21    Secured at:  The lips    Placement Verified By:  Capnometry    Complicating Factors:  None    Findings Post-Intubation:  BS equal bilateral and atraumatic/condition of teeth unchanged    Drips: None documented.      Patient Active Problem List   Diagnosis    IBS (irritable bowel syndrome)    Reflux esophagitis    Torus palatinus    Environmental allergies    Hyperlipidemia    Chronic kidney disease, stage III (moderate)    Anxiety    Ectatic aorta    Age-related osteoporosis without current pathological fracture    Calcified granuloma of lung    Lower extremity weakness    Painful cervical ROM    Decreased ROM of intervertebral discs of cervical spine    Upper extremity weakness    BPPV (benign  paroxysmal positional vertigo), right    Rectovaginal fistula    Atrial fibrillation    Chronic anticoagulation       Review of patient's allergies indicates:   Allergen Reactions    Codeine      Other reaction(s): Syncope, can take tylenol    Sulfa (sulfonamide antibiotics)      Other reaction(s): Stomach upset       Current Inpatient Medications:      Current Facility-Administered Medications on File Prior to Encounter   Medication Dose Route Frequency Provider Last Rate Last Admin    LIDOcaine (PF) 10 mg/ml (1%) injection 10 mg  1 mL Intradermal Once Audrey Caicedo NP        mupirocin 2 % ointment   Nasal On Call Procedure Audrey Caicedo NP   Given at 04/26/22 0732     Current Outpatient Medications on File Prior to Encounter   Medication Sig Dispense Refill    albuterol (PROVENTIL/VENTOLIN HFA) 90 mcg/actuation inhaler Inhale 2 puffs into the lungs every 6 (six) hours as needed for Wheezing or Shortness of Breath (generic preferred). Rescue 18 g 3    azelastine (ASTELIN) 137 mcg (0.1 %) nasal spray 1 spray (137 mcg total) by Nasal route 2 (two) times daily. 30 mL 0    calcium-vitamin D3 500 mg(1,250mg) -200 unit per tablet Take 1 tablet by mouth Daily.       carboxymethylcellulose (REFRESH PLUS) 0.5 % Dpet 1 drop 3 (three) times daily as needed.      diclofenac sodium (VOLTAREN) 1 % Gel Apply 2 g topically once daily. 150 g 2    gabapentin (NEURONTIN) 300 MG capsule Take 2 capsules (600 mg total) by mouth every evening. (Patient taking differently: Take 600 mg by mouth every evening. PRN) 180 capsule 3    LORazepam (ATIVAN) 0.5 MG tablet Take 1 tablet (0.5 mg total) by mouth nightly as needed for Anxiety. 30 tablet 5    omeprazole (PRILOSEC) 20 MG capsule Take 1 capsule (20 mg total) by mouth once daily. Take 30 min before breakfast 30 capsule 2    rosuvastatin (CRESTOR) 5 MG tablet Take 1 tablet (5 mg total) by mouth every evening. 90 tablet 3    sodium chloride (SALINE NASAL) 0.65 %  nasal spray 2 sprays by Nasal route as needed for Congestion. 44 mL 11    triamcinolone (NASACORT) 55 mcg nasal inhaler 2 sprays by Nasal route once daily. 16.9 mL 11       Past Surgical History:   Procedure Laterality Date    BELPHAROPTOSIS REPAIR Bilateral     CATARACT EXTRACTION W/  INTRAOCULAR LENS IMPLANT Bilateral     CATARACT EXTRACTION, BILATERAL      CHOLECYSTECTOMY      COLONOSCOPY N/A 01/20/2022    Procedure: COLONOSCOPY;  Surgeon: Emmanuel Sandoval MD;  Location: [facility] (4TH FLR);  Service: Endoscopy;  Laterality: N/A;    ESOPHAGOGASTRODUODENOSCOPY N/A 01/20/2022    Procedure: EGD (ESOPHAGOGASTRODUODENOSCOPY) any GI doc, please perform colonoscopy and EGD at same time;  Surgeon: Emmanuel Sandoval MD;  Location: [facility] (4TH FLR);  Service: Endoscopy;  Laterality: N/A;  EGD & Colonoscopy orders combined-MS  fully vaccinated  1/13 covid test 1/17 @ [facility]    EYE SURGERY      FLEXIBLE SIGMOIDOSCOPY  4/26/2022    Procedure: SIGMOIDOSCOPY, FLEXIBLE;  Surgeon: Alison Mcclain MD;  Location: [facility] OR;  Service: Colon and Rectal;;    HYSTERECTOMY      OOPHORECTOMY      ROBOT-ASSISTED LAPAROSCOPIC COLECTOMY USING DA DIANA XI N/A 4/26/2022    Procedure: XI ROBOTIC COLECTOMY with flexible sigmoidoscopy;  Surgeon: lAison Mcclain MD;  Location: [facility] OR;  Service: Colon and Rectal;  Laterality: N/A;       Social History     Socioeconomic History    Marital status:    Tobacco Use    Smoking status: Never    Smokeless tobacco: Never   Substance and Sexual Activity    Alcohol use: No    Drug use: No    Sexual activity: Not Currently     Partners: Male     Birth control/protection: Abstinence, None   Other Topics Concern    Are you pregnant or think you may be? No    Breast-feeding No       OBJECTIVE:     Vital Signs Range (Last 24H):         Significant Labs:  Lab Results   Component Value Date    WBC 7.24 03/04/2023    HGB 11.5 (L) 03/04/2023    HCT 37.5 03/04/2023      03/04/2023    CHOL 179 02/03/2023    TRIG 146 02/03/2023    HDL 58 02/03/2023    ALT 14 03/04/2023    AST 24 03/04/2023     03/04/2023    K 4.2 03/04/2023     03/04/2023    CREATININE 0.8 03/04/2023    BUN 16 03/04/2023    CO2 27 03/04/2023    TSH 1.345 02/03/2023       Diagnostic Studies: No relevant studies.    EKG:   Results for orders placed or performed in visit on 03/07/23   EKG 12-lead    Collection Time: 03/07/23  3:33 PM    Narrative    Test Reason : I48.0,    Vent. Rate : 074 BPM     Atrial Rate : 074 BPM     P-R Int : 186 ms          QRS Dur : 074 ms      QT Int : 394 ms       P-R-T Axes : 072 056 062 degrees     QTc Int : 437 ms    Normal sinus rhythm  Normal ECG    Confirmed by Lisa Saravia MD ([id number]) on 3/9/2023 5:27:09 PM    Referred By: ALYSON SARAVIA           Confirmed By:Lisa Saravia MD       2D ECHO:  TTE:  Results for orders placed or performed during the hospital encounter of 04/01/22   Echo Saline Bubble? No   Result Value Ref Range    BSA 1.47 m2    TDI SEPTAL 0.03 m/s    LV LATERAL E/E' RATIO 30.00 m/s    LV SEPTAL E/E' RATIO 30.00 m/s    LA WIDTH 3.55 cm    TDI LATERAL 0.03 m/s    PV PEAK VELOCITY 0.51 cm/s    LVIDd 3.33 (A) 3.5 - 6.0 cm    IVS 0.73 0.6 - 1.1 cm    Posterior Wall 0.66 0.6 - 1.1 cm    LVIDs 2.35 2.1 - 4.0 cm    FS 29 28 - 44 %    LA volume 28.02 cm3    Sinus 2.50 cm    STJ 2.20 cm    LV mass 57.37 g    LA size 2.62 cm    TAPSE 1.62 cm    Left Ventricle Relative Wall Thickness 0.40 cm    AV regurgitation pressure 1/2 time 833 ms    AV mean gradient 13 mmHg    MV valve area p 1/2 method 4.77 cm2    E/A ratio 0.97     Mean e' 0.03 m/s    E wave deceleration time 158.94 msec    IVRT 83.73 msec    Pulm vein S/D ratio 1.33     LVOT diameter 1.90 cm    LVOT area 2.8 cm2    Ao peak kishan 2.13 m/s    Ao VTI 42.05 cm    Mr max kishan 0.05 m/s    AV peak gradient 18 mmHg    E/E' ratio 30.00 m/s    MV Peak E Kishan 0.90 m/s    TR Max Kishan 2.87 m/s    MV stenosis pressure 1/2 time  46.09 ms    MV Peak A Lele 0.93 m/s    PV Peak S Lele 0.44 m/s    PV Peak D Lele 0.33 m/s    LV Systolic Volume 19.13 mL    LV Systolic Volume Index 13.1 mL/m2    LV Diastolic Volume 44.97 mL    LV Diastolic Volume Index 30.80 mL/m2    LA Volume Index 19.2 mL/m2    LV Mass Index 39 g/m2    RA Major Axis 3.71 cm    Left Atrium Minor Axis 3.49 cm    Left Atrium Major Axis 3.60 cm    Triscuspid Valve Regurgitation Peak Gradient 33 mmHg    LA Volume Index (Mod) 20.5 mL/m2    LA volume (mod) 30.00 cm3    RA Width 3.65 cm    Right Atrial Pressure (from IVC) 3 mmHg    EF 65 %    TV rest pulmonary artery pressure 36 mmHg    Narrative    · The left ventricle is normal in size with mild eccentric hypertrophy -   mildly sigmoid septum - and normal systolic function.  · The estimated ejection fraction is 65%.  · A diastolic pattern consistent with atrial fibrillation observed.  · Normal right ventricular size with normal right ventricular systolic   function.  · Mild aortic regurgitation.  · Moderate aortic valve sclerosis.  · Moderate mitral regurgitation.  · Moderate tricuspid regurgitation.  · The estimated PA systolic pressure is 36 mmHg.  · Normal central venous pressure (3 mmHg).          TREVOR:  No results found for this or any previous visit.    ASSESSMENT/PLAN:           Pre-op Assessment    I have reviewed the Patient Summary Reports.       I have reviewed the Medications.     Review of Systems  Anesthesia Hx:  No problems with previous Anesthesia  History of prior surgery of interest to airway management or planning: Previous anesthesia: Nerve Block, General 4/26/2022  Robot-Assisted Laparoscopic Colectomy with general anesthesia.  Procedure performed at an [facility]. for 4/26/2022  Robot-Assisted Laparoscopic Colectomy.  Procedure performed at an [facility]. Airway issues documented on chart review include mask, easy, GETA, videolaryngoscope used , view on video-laryngoscopy Grade I    Denies Family Hx of  Anesthesia complications.   Denies Personal Hx of Anesthesia complications.   Social:  Non-Smoker, No Alcohol Use    Hematology/Oncology:     Oncology Normal    -- Anemia: Hematology Comments: Normocytic anemia    EENT/Dental:   chronic allergic rhinitis Cataract Extractions, Torus Palatinus,  Blepharoptosis Repair Chronic tonsillitis: ,   Cardiovascular:   Exercise tolerance: good Hypertension, well controlled Valvular problems/Murmurs, AS Denies MI.   Denies CABG/stent. Dysrhythmias atrial fibrillation  Denies CHF. hyperlipidemia ECG has been reviewed. Paroxysmal A-fib- on  Eliquis and Toprol,  EF 65% per echo   Aortic atherosclerosis,  Mild aortic regurgitation.  Moderate aortic valve sclerosis.  Moderate mitral regurgitation.  Moderate tricuspid regurgitation.  PA systolic pressure is 36 mmHg.   Pulmonary:   Denies COPD. Asthma mild  Denies Shortness of breath. Calcified granuloma of lung,  paraseptal emphysema of the bases,  Lungs clear on CXR   Renal/:   Chronic Renal Disease, CKD CKD stage 3a, GFR 53 on 2/3/2023, Creat 0.8   Hepatic/GI:   GERD, well controlled Hepatitis History of Reflux Esophagitis, IBS,  Cholecystectomy,  Robot-Assisted Laparoscopic Colectomy,  Internal hemorrhoids, Diverticulosis,  Colovaginal fistula seen on CT   Musculoskeletal:   Arthritis  Nontraumatic complete tear of right rotator cuff,  Impingement syndrome of right shoulder,  Osteoporosis Spine Disorders: lumbar and cervical Degenerative disease and Disc disease    OB/GYN/PEDS:  Hysterectomy, Oophorectomy   Neurological:  Neurology Normal  Denies CVA.  Denies Headaches. Denies Seizures.    Endocrine:  Endocrine Normal Denies Diabetes. Denies Hypothyroidism.    Psych:   anxiety Generalized Anxiety Disorder, insomnia            Anesthesia Plan  Type of Anesthesia, risks & benefits discussed:    Anesthesia Type: Gen ETT  Intra-op Monitoring Plan: Standard ASA Monitors  Post Op Pain Control Plan: multimodal analgesia and peripheral  nerve block  Induction:  IV  Airway Plan: Direct, Post-Induction  Informed Consent: Informed consent signed with the Patient and all parties understand the risks and agree with anesthesia plan.  All questions answered.   ASA Score: 2  Day of Surgery Review of History & Physical: H&P Update referred to the surgeon/provider.    Ready For Surgery From Anesthesia Perspective.     .

## 2023-03-21 NOTE — BRIEF OP NOTE
[facility]  Brief Operative Note    Surgery Date: 3/21/2023     Surgeon(s) and Role:     * Luis Angel Wheeler MD - Primary    Assisting Provider:     * Abhay Martin PA-C - First Assist    No resident or fellow was available throughout the entire procedure as a result it was medically necessary for Abhay Martin PA-C to perform first assistant duties.    Pre-Operative Diagnosis: Nontraumatic complete tear of right rotator cuff [M75.121]  Impingement syndrome of right shoulder [M75.41]    Post-Operative Diagnosis: Post-Op Diagnosis Codes:     * Nontraumatic complete tear of right rotator cuff [M75.121]     * Impingement syndrome of right shoulder [M75.41]    Procedure(s) (LRB):  DEBRIDEMENT, SHOULDER, ARTHROSCOPIC (Right)  ARTHROSCOPY, SHOULDER, WITH SUBACROMIAL SPACE DECOMPRESSION (Right)  REPAIR, ROTATOR CUFF, ARTHROSCOPIC (Right)  LYSIS,ADHESIONS,SHOULDER,ARTHROSCOPIC    Anesthesia: General    Operative Findings: See Op note.    Estimated Blood Loss: * No values recorded between 3/21/2023 11:35 AM and 3/21/2023  1:49 PM *         Specimens:   Specimen (24h ago, onward)      None              Discharge Note    OUTCOME: Patient tolerated treatment/procedure well without complication and is now ready for discharge.    DISPOSITION: Home or Self Care    FINAL DIAGNOSIS:  Post-Op Diagnosis Codes:     * Nontraumatic complete tear of right rotator cuff [M75.121]     * Impingement syndrome of right shoulder [M75.41]    FOLLOWUP: In clinic    DISCHARGE INSTRUCTIONS: See attached.

## 2023-03-21 NOTE — ANESTHESIA PROCEDURE NOTES
R insterscalene catheter    Patient location during procedure: pre-op   Block not for primary anesthetic.  Reason for block: at surgeon's request and post-op pain management   Post-op Pain Location: Right shoulder pain   Start time: 3/21/2023 9:35 AM  Timeout: 3/21/2023 9:35 AM   End time: 3/21/2023 9:45 AM    Staffing  Authorizing Provider: Jhony Mari MD  Performing Provider: Sukumar Scott MD    Preanesthetic Checklist  Completed: patient identified, IV checked, site marked, risks and benefits discussed, surgical consent, monitors and equipment checked, pre-op evaluation and timeout performed  Peripheral Block  Patient position: sitting  Prep: ChloraPrep and site prepped and draped  Patient monitoring: heart rate, cardiac monitor, continuous pulse ox, continuous capnometry and frequent blood pressure checks  Block type: interscalene  Laterality: right  Injection technique: continuous  Needle  Needle type: Tuohy   Needle gauge: 18 G  Needle length: 2 in  Needle localization: anatomical landmarks and ultrasound guidance  Catheter type: non-stimulating  Catheter size: 20 G  Test dose: lidocaine 1.5% with Epi 1-to-200,000 and negative   -ultrasound image captured on disc.  Assessment  Injection assessment: negative aspiration, negative parasthesia and local visualized surrounding nerve  Paresthesia pain: none  Heart rate change: no  Slow fractionated injection: yes    Medications:    Medications: ropivacaine (NAROPIN) injection 0.5% - Perineural   10 mL - 3/21/2023 9:45:00 AM    Additional Notes  VSS.  [facility] RN monitoring vitals throughout procedure.  Patient tolerated procedure well.     20cc of 0.25% ropivacaine given for the block

## 2023-03-21 NOTE — OP NOTE
Date of Procedure: 3/21/2023    Surgeon(s) and Role:     * Luis Angel Wheeler MD - Primary    Assistant: Abhay Martin PA-C    No resident or fellow was available throughout the entire procedure as a result it was medically necessary for the above to perform first assist duties.    Preoperative Diagnosis:  Right shoulder Tear, Rotator Cuff, Tramatic S46.012A, S46.011A  Right shoulder  long head of the biceps tendon rupture  Right shoulder impingement syndrome  Right shoulder synovitis  Right shoulder loose body    Postoperative Diagnosis:   Right shoulder Tear, Rotator Cuff, Tramatic S46.012A, S46.011A  Right shoulder long head of the biceps tendon rupture  Right shoulder impingement syndrome  Right shoulder synovitis  Right shoulder loose body    Procedures Performed:  1. Right shoulder Arthroscopic massive complex rotator cuff repair CPT - 47380-15 modifier    2. Right shoulder Arthroscopic extensive debridement CPT - 04000    3. Right shoulder Arthroscopic lysis of adhesions CPT - 00988    4. Right shoulder Arthroscopic subacromial decompression and bursectomy CPT - 15433    5. Right shoulder arthroscopic loose body removal       Anesthesia: General/Regional    Complications: None    Implants:   Implant Name Type Inv. Item Serial No.  Lot No. LRB No. Used Action   ANCHOR PEEK #2 SUT 4.75X19.1MM - [id number]  ANCHOR PEEK #2 SUT 4.75X19.1MM  [employer] [id number] Right 1 Implanted   PEEK Swivelock Suture Kismet Double Loaded    [employer] [id number] Right 1 Implanted   Peek Swivelock Suture Kismet Double Loaded    [employer] [id number] Right 2 Implanted   PEEK Knotless Swivelock Suture Kismet    [employer] [id number] Right 1 Implanted   PEEK Knotless Swivelock Suture Kismet    [employer] [id number] Right 1 Implanted       Arthroscopic Findings:   Glenohumeral joint  Evaluation of the glenohumeral joint demonstrated no significant osteoarthritis involving the glenoid and the humerus.  Evaluation of the labrum  demonstrated a degenerative labrum circumferentially.  Evaluation the biceps labral complex demonstrated a previous long head of the biceps tendon rupture with a stump of the long head of the biceps tendon remaining.  Evaluation of the articular side of the rotator cuff demonstrated a massive retracted rotator cuff tear involving the entire supraspinatus and infraspinatus with retraction medial to the glenoid for the supraspinatus and posteriorly displaced near the midbody of the humerus for the infraspinatus.  There was a high-grade upper border tear involving the subscapularis.    Subacromial space  Evaluation of the subacromial space demonstrated a massive retracted rotator cuff tear again medial to the glenoid with extensive infraspinatus tearing there was a complex in nature with multiple areas of delamination.  Significant subacromial impingement was noted with calcification of the CA ligament and the beginning of acetabularization of the acromion.      Indication for Procedure: 82 y.o. Female with a history of right shoulder pain who has been having pain since about December. She has been seen by Julia Del Castillo PA-C in the past. She states she has anterior shoulder pain. She was given a CSI and reports some relief.  She states the pain is getting worse.  She has difficulty moving her sheets at night.  She can not even pull the covers over her.  Her history, physical and imaging was consistent with a massive rotator cuff tear with the above-stated diagnoses.  We had a long discussion concerning treatment options.  We did discuss a right shoulder arthroscopic extensive debridement, possible rotator cuff repair as well as a potential reverse total shoulder arthroplasty.  The patient did not want to proceed with any type of arthroplasty procedure.  She wanted to proceed with either debridement or repair depending on the quality of tissue and ability for me to repair during surgery.  She understood the  risks with the surgery as well as the need for potential future surgeries.  As well as a possibility I would not be able to repair her rotator cuff.  After preoperative medical clearance was obtained she elected proceed with surgical intervention.    Surgery in Detail:  The patient was marked identified in the holding room.  She was brought back to the operating room and placed in the lateral decubitus position.  After general endotracheal anesthesia was administered the right upper extremity was prepped and draped in usual sterile fashion for a shoulder arthroscopy. The body was secured and well padded in a bean bag. Preoperative antibiotics were given within 1 hour the procedure.  A time-out was taken prior starting.  A posterior portal was created after insufflation of the joint with sterile saline.  A diagnostic arthroscopy was performed with the above-stated findings.    Arthroscopic Extensive Debridement  Agus were brought in though a rotator interval portal and since his debridement was completed involving the rotator interval as well as the degenerative labrum circumferentially.  This was done through both an anterior and posterior portal.  We did encounter a loose body that was in the axillary pouch that was removed with assistance of the shaver and graspers.  Evaluation of the biceps-labral complex demonstrated previously ruptured long head of the biceps tendon with a stump remaining at the origin which required debridement and stabilization with the vapor.  The massive retracted rotator cuff tear was identified at this time and traction sutures were initially placed to begin mobilization of the rotator cuff.  These were done through accessory anterolateral portals.    Arthroscopic Subacromial Decompression  We then moved to the subacromial space and a lateral portal was created. A complete bursectomy was done for both visualization and removal of the pathologic bursa.  We then demarcated the borders of  the acromion.  This was done using our vapor.  We then brought in a 5.5 mm bur and performed a small subacromial decompression while maintaining the CA ligament considering she did have a massive retracted rotator cuff tear and we were concerned about potential failure in the future and the possibility of superior humeral escape.    Arthroscopic Rotator Cuff Repair  We then prepared the greater tuberosity footprint with a 5.5 bur to remove any tissue and to provide a biological bed for healing.  We then placed 3 medial row anchors with double loaded 4.75 mm peek SwiveLock anchors.  These were passed in horizontal mattress Fashions from anterior to posterior while using a few the anchors as marginal convergence sutures to help reduce the rotator cuff..    Considering how retracted and chronic rotator cuff was, we performed both an anterior and interval slide procedure.  This allowed us to get better mobilization of each rotator cuff tendon independently for our repair.  Multiple adhesions had to be removed and released to get mobilization of the chronic retracted rotator cuff tear.  This was done again with assistance of traction sutures through accessory portals as the releases were done with our vapor as well as arthroscopic scissors    The medial row anchors were then tied with arthroscopic knot techniques from posterior to anterior.  They were then brought over to 2 lateral row knotless 5.5 mm peek SwiveLock anchors for a transosseous equivalent double row rotator cuff repair. Arthroscope were then removed from the joint and the incisions were closed with 3-0 nylon sutures in simple interrupted fashion.  Adaptic, bacitracin, 4x4s and ABDs were then used.  The patient was then placed in an arc brace and extubated and taken recovery.    Post-Op Plan:  Type 3 rotator cuff repair protocol    Attestation: I was present and scrubbed for the entire procedure.      22 modifier    This was a large massive retracted  rotator cuff tear involving both the supraspinatus and infraspinatus.  This required extensive lysis of adhesions followed by an anterior and posterior interval slide in addition to accessory portals and cannulas were placed which is beyond the usual treatment for a rotator cuff repair.  In additional assistant was also needed throughout the operation to help with suture management and traction sutures.

## 2023-03-22 NOTE — ANESTHESIA POSTPROCEDURE EVALUATION
Anesthesia Post Evaluation    Patient: Laverne Humphries    Procedure(s) Performed: Procedure(s) (LRB):  DEBRIDEMENT, SHOULDER, ARTHROSCOPIC (Right)  ARTHROSCOPY, SHOULDER, WITH SUBACROMIAL SPACE DECOMPRESSION (Right)  REPAIR, ROTATOR CUFF, ARTHROSCOPIC (Right)  LYSIS,ADHESIONS,SHOULDER,ARTHROSCOPIC    Final Anesthesia Type: general      Patient location during evaluation: PACU  Patient participation: Yes- Able to Participate  Level of consciousness: awake and alert and oriented  Post-procedure vital signs: reviewed and stable  Pain management: adequate  Airway patency: patent  LUCINDA mitigation strategies: Multimodal analgesia  PONV status at discharge: No PONV  Anesthetic complications: no      Cardiovascular status: blood pressure returned to baseline and hemodynamically stable  Respiratory status: unassisted, spontaneous ventilation and room air  Hydration status: euvolemic  Follow-up not needed.          Vitals Value Taken Time   /66 03/21/23 1547   Temp 36.2 °C (97.2 °F) 03/21/23 1359   Pulse 88 03/21/23 1554   Resp 21 03/21/23 1553   SpO2 98 % 03/21/23 1554   Vitals shown include unvalidated device data.      Event Time   Out of Recovery 14:50:00         Pain/Elmer Score: Pain Rating Prior to Med Admin: 0 (3/21/2023  1:54 PM)  Elmer Score: 10 (3/21/2023  3:45 PM)

## 2023-03-22 NOTE — ANESTHESIA POST-OP PAIN MANAGEMENT
Acute Pain Service Progress Note    3/22/2023 2057    Called and spoke with patient and her daughter regarding Nimbus infusion follow up. Patient currently rating pain to right shoulder a 8/10. She began taking pain medication this morning in addition to nimbus infusion. Denies signs of local anesthetic toxicity. Daughter reports that there has been some clear leaking coming from around the insertion of right interscalene nerve catheter, however steri strips and original tegaderm appear to remain intact to skin. Encouraged daughter to reinforce dressing to help contain the leaking as needed. Also, encouraged patient to press patient demand button to help with pain to her shoulder.  All questions answered. Encouraged patient to call the [facility] 24/7 support line at [phone] or the [facility] line at [phone] for any [facility] related questions/issues. Verbalizes understanding. Will continue to follow up.

## 2023-03-22 NOTE — OPERATIVE NOTE ADDENDUM
Certification of Assistant at Surgery       Surgery Date: 3/21/2023     Participating Surgeons:  Surgeon(s) and Role:     * Luis Angel Wheeler MD - Primary    Assistant:   Abhay Martin PA-C    No resident or fellow was available throughout the entire procedure as a result it was medically necessary for Abhay Martin PA-C to perform first assist duties.      Procedures:  Procedure(s) (LRB):  DEBRIDEMENT, SHOULDER, ARTHROSCOPIC (Right)  ARTHROSCOPY, SHOULDER, WITH SUBACROMIAL SPACE DECOMPRESSION (Right)  REPAIR, ROTATOR CUFF, ARTHROSCOPIC (Right)  LYSIS,ADHESIONS,SHOULDER,ARTHROSCOPIC    Assistant Surgeon's Certification of Necessity:  I understand that section 1842 (b) (6) (d) of the Social Security Act generally prohibits Medicare Part B reasonable charge payment for the services of assistants at surgery in teaching hospitals when qualified residents are available to furnish such services. I certify that the services for which payment is claimed were medically necessary, and that no qualified resident was available to perform the services. I further understand that these services are subject to post-payment review by the Medicare carrier.      Abhay Martin PA-C    03/22/2023  8:30 AM

## 2023-03-23 ENCOUNTER — PATIENT MESSAGE (OUTPATIENT)
Dept: SPORTS MEDICINE | Facility: CLINIC | Age: 83
End: 2023-03-23
Payer: MEDICARE

## 2023-03-23 DIAGNOSIS — Z98.890 S/P RIGHT ROTATOR CUFF REPAIR: Primary | ICD-10-CM

## 2023-03-23 RX ORDER — ONDANSETRON 4 MG/1
4 TABLET, FILM COATED ORAL EVERY 8 HOURS PRN
Qty: 10 TABLET | Refills: 0 | Status: SHIPPED | OUTPATIENT
Start: 2023-03-23 | End: 2024-03-06

## 2023-03-23 RX ORDER — TRAMADOL HYDROCHLORIDE 50 MG/1
50 TABLET ORAL EVERY 6 HOURS PRN
Qty: 10 TABLET | Refills: 0 | Status: SHIPPED | OUTPATIENT
Start: 2023-03-23 | End: 2023-03-29 | Stop reason: SDUPTHER

## 2023-03-23 NOTE — ANESTHESIA POST-OP PAIN MANAGEMENT
Acute Pain Service Progress Note    3/23/2023 2678    Received call from patient's daughter, Cally Celments, regarding Nimbus pump. Reports that her mother continues to have leaking coming from around the right interscalene nerve catheter despite reinforcement of dressing. She was able to send in pictures through patient's [facility] of the insertion site of catheter showing where the leaking is coming from. It is unclear from the pictures to see if the catheter has migrated out of patient's skin. Patient is currently experiencing a moderate amount of pain to right shoulder requiring pain medication every 4 hours around the clock. Patient states that her thumb and index finger remain somewhat numb and tingling indicating that it is possible that she may be receiving some of the ropivacaine from Nimbus infusion. Discussed option of leaving the nerve catheter in place and continuing to reinforce dressing to contain leaking. Offered for patient to come in to [place] and have dressing replaced today however daughter states that patient is not feeling up to getting in the car at this time. Also discussed alternative option of discontinuing the Nimbus and relying on oral pain medications for pain management. Patient and daughter would like to continue with Nimbus infusion at this time. Daughter requesting refill of pain medication or possibly other medications that can help with pain as well as as presription of medication to treat nausea. Notified Dr. Wheeler's office of patient request for prescriptions and encouraged daughter to contact anesthesia team for any further assistance with Nimbus pump. Verbalizes understanding. Team will continue to follow up.

## 2023-03-26 ENCOUNTER — PATIENT MESSAGE (OUTPATIENT)
Dept: SPORTS MEDICINE | Facility: CLINIC | Age: 83
End: 2023-03-26
Payer: MEDICARE

## 2023-03-26 NOTE — ANESTHESIA POST-OP PAIN MANAGEMENT
Attempted to call Laverne Humphries at 11:37 AM on 03/26/2023 regarding the PNC and Nimbus pump and to remind her that the PNC should be removed today.  A voicemail was left encouraging the patient to call with any problems or questions.      Jasbir Benitez MD   Fellow  Regional Anesthesiology and Acute Pain Medicine  [facility]

## 2023-03-29 ENCOUNTER — PATIENT MESSAGE (OUTPATIENT)
Dept: SPORTS MEDICINE | Facility: CLINIC | Age: 83
End: 2023-03-29
Payer: MEDICARE

## 2023-03-31 NOTE — PROGRESS NOTES
POST-OPERATIVE EXAMINATION    82 y.o. Female who returns for follow after surgery. She is 2 weeks s/p    Procedures Performed:  1. Right shoulder Arthroscopic massive complex rotator cuff repair CPT - 99931-08 modifier     2. Right shoulder Arthroscopic extensive debridement CPT - 59346     3. Right shoulder Arthroscopic lysis of adhesions CPT - 89726     4. Right shoulder Arthroscopic subacromial decompression and bursectomy CPT - 74946     5. Right shoulder arthroscopic loose body removal     She is doing well without any issues.       PHYSICAL EXAMINATION:  LMP  (LMP Unknown)   General: Well-developed well-nourished 82 y.o. femalein no acute distress   Cardiovascular: Regular rhythm   Lungs: No labored breathing or wheezing appreciated   Neuro: Alert and oriented ×3   Psychiatric: well oriented to person, place and time, demonstrates normal mood and affect   Skin: No rashes, lesions or ulcers, normal temperature, turgor, and texture on involved extremity    ORTHOPEDIC EXAM:  Normal post-operative swelling  Normal post-operative scarring  Strength: WNL  ROM: Not tested  Tests: None today    ASSESSMENT:      ICD-10-CM ICD-9-CM   1. S/P right rotator cuff repair  Z98.890 V45.89       PLAN:       Sutures removed today  We will begin physical therapy when the patient is 6 weeks post-op with a type 3 rotator cuff repair protocol  RTC in 1 month

## 2023-04-05 ENCOUNTER — OFFICE VISIT (OUTPATIENT)
Dept: SPORTS MEDICINE | Facility: CLINIC | Age: 83
End: 2023-04-05
Payer: MEDICARE

## 2023-04-05 VITALS
HEART RATE: 93 BPM | SYSTOLIC BLOOD PRESSURE: 132 MMHG | HEIGHT: 60 IN | WEIGHT: 115 LBS | DIASTOLIC BLOOD PRESSURE: 72 MMHG | BODY MASS INDEX: 22.58 KG/M2

## 2023-04-05 DIAGNOSIS — Z98.890 S/P RIGHT ROTATOR CUFF REPAIR: Primary | ICD-10-CM

## 2023-04-05 PROCEDURE — 1101F PT FALLS ASSESS-DOCD LE1/YR: CPT | Mod: HCNC,CPTII,S$GLB, | Performed by: ORTHOPAEDIC SURGERY

## 2023-04-05 PROCEDURE — 3078F PR MOST RECENT DIASTOLIC BLOOD PRESSURE < 80 MM HG: ICD-10-PCS | Mod: HCNC,CPTII,S$GLB, | Performed by: ORTHOPAEDIC SURGERY

## 2023-04-05 PROCEDURE — 99024 PR POST-OP FOLLOW-UP VISIT: ICD-10-PCS | Mod: HCNC,S$GLB,, | Performed by: ORTHOPAEDIC SURGERY

## 2023-04-05 PROCEDURE — 99999 PR PBB SHADOW E&M-EST. PATIENT-LVL III: CPT | Mod: PBBFAC,HCNC,, | Performed by: ORTHOPAEDIC SURGERY

## 2023-04-05 PROCEDURE — 99999 PR PBB SHADOW E&M-EST. PATIENT-LVL III: ICD-10-PCS | Mod: PBBFAC,HCNC,, | Performed by: ORTHOPAEDIC SURGERY

## 2023-04-05 PROCEDURE — 3288F FALL RISK ASSESSMENT DOCD: CPT | Mod: HCNC,CPTII,S$GLB, | Performed by: ORTHOPAEDIC SURGERY

## 2023-04-05 PROCEDURE — 3075F PR MOST RECENT SYSTOLIC BLOOD PRESS GE 130-139MM HG: ICD-10-PCS | Mod: HCNC,CPTII,S$GLB, | Performed by: ORTHOPAEDIC SURGERY

## 2023-04-05 PROCEDURE — 1125F PR PAIN SEVERITY QUANTIFIED, PAIN PRESENT: ICD-10-PCS | Mod: HCNC,CPTII,S$GLB, | Performed by: ORTHOPAEDIC SURGERY

## 2023-04-05 PROCEDURE — 1125F AMNT PAIN NOTED PAIN PRSNT: CPT | Mod: HCNC,CPTII,S$GLB, | Performed by: ORTHOPAEDIC SURGERY

## 2023-04-05 PROCEDURE — 3078F DIAST BP <80 MM HG: CPT | Mod: HCNC,CPTII,S$GLB, | Performed by: ORTHOPAEDIC SURGERY

## 2023-04-05 PROCEDURE — 3288F PR FALLS RISK ASSESSMENT DOCUMENTED: ICD-10-PCS | Mod: HCNC,CPTII,S$GLB, | Performed by: ORTHOPAEDIC SURGERY

## 2023-04-05 PROCEDURE — 3075F SYST BP GE 130 - 139MM HG: CPT | Mod: HCNC,CPTII,S$GLB, | Performed by: ORTHOPAEDIC SURGERY

## 2023-04-05 PROCEDURE — 1101F PR PT FALLS ASSESS DOC 0-1 FALLS W/OUT INJ PAST YR: ICD-10-PCS | Mod: HCNC,CPTII,S$GLB, | Performed by: ORTHOPAEDIC SURGERY

## 2023-04-05 PROCEDURE — 1159F PR MEDICATION LIST DOCUMENTED IN MEDICAL RECORD: ICD-10-PCS | Mod: HCNC,CPTII,S$GLB, | Performed by: ORTHOPAEDIC SURGERY

## 2023-04-05 PROCEDURE — 99024 POSTOP FOLLOW-UP VISIT: CPT | Mod: HCNC,S$GLB,, | Performed by: ORTHOPAEDIC SURGERY

## 2023-04-05 PROCEDURE — 1159F MED LIST DOCD IN RCRD: CPT | Mod: HCNC,CPTII,S$GLB, | Performed by: ORTHOPAEDIC SURGERY

## 2023-04-13 NOTE — TELEPHONE ENCOUNTER
----- Message from Ese Worthy sent at 8/13/2020 11:35 AM CDT -----  Contact: [employer] [phone]  Requesting an RX refill or new RX.  Is this a refill or new RX:  Refill  RX name and strength: LORazepam (ATIVAN) 0.5 MG tablet  Directions (copy/paste from chart):    Is this a 30 day or 90 day RX:  90  Local pharmacy or mail order pharmacy:  mail  Pharmacy name and phone # (copy/paste from chart):   [facility] - [place], [place] - [address] [place] [address] [phone] (Phone)  [phone] (Fax)  Comments:              Yes

## 2023-04-15 ENCOUNTER — PATIENT MESSAGE (OUTPATIENT)
Dept: SPORTS MEDICINE | Facility: CLINIC | Age: 83
End: 2023-04-15
Payer: MEDICARE

## 2023-04-17 ENCOUNTER — OFFICE VISIT (OUTPATIENT)
Dept: OPTOMETRY | Facility: CLINIC | Age: 83
End: 2023-04-17
Payer: MEDICARE

## 2023-04-17 DIAGNOSIS — Z13.5 GLAUCOMA SCREENING: ICD-10-CM

## 2023-04-17 DIAGNOSIS — H04.123 DRY EYES, BILATERAL: Primary | ICD-10-CM

## 2023-04-17 DIAGNOSIS — H52.4 PRESBYOPIA: ICD-10-CM

## 2023-04-17 PROCEDURE — 3288F PR FALLS RISK ASSESSMENT DOCUMENTED: ICD-10-PCS | Mod: CPTII,S$GLB,, | Performed by: OPTOMETRIST

## 2023-04-17 PROCEDURE — 1159F MED LIST DOCD IN RCRD: CPT | Mod: CPTII,S$GLB,, | Performed by: OPTOMETRIST

## 2023-04-17 PROCEDURE — 1126F AMNT PAIN NOTED NONE PRSNT: CPT | Mod: CPTII,S$GLB,, | Performed by: OPTOMETRIST

## 2023-04-17 PROCEDURE — 92014 PR EYE EXAM, EST PATIENT,COMPREHESV: ICD-10-PCS | Mod: S$GLB,,, | Performed by: OPTOMETRIST

## 2023-04-17 PROCEDURE — 1160F PR REVIEW ALL MEDS BY PRESCRIBER/CLIN PHARMACIST DOCUMENTED: ICD-10-PCS | Mod: CPTII,S$GLB,, | Performed by: OPTOMETRIST

## 2023-04-17 PROCEDURE — 1126F PR PAIN SEVERITY QUANTIFIED, NO PAIN PRESENT: ICD-10-PCS | Mod: CPTII,S$GLB,, | Performed by: OPTOMETRIST

## 2023-04-17 PROCEDURE — 1101F PR PT FALLS ASSESS DOC 0-1 FALLS W/OUT INJ PAST YR: ICD-10-PCS | Mod: CPTII,S$GLB,, | Performed by: OPTOMETRIST

## 2023-04-17 PROCEDURE — 1101F PT FALLS ASSESS-DOCD LE1/YR: CPT | Mod: CPTII,S$GLB,, | Performed by: OPTOMETRIST

## 2023-04-17 PROCEDURE — 1160F RVW MEDS BY RX/DR IN RCRD: CPT | Mod: CPTII,S$GLB,, | Performed by: OPTOMETRIST

## 2023-04-17 PROCEDURE — 1159F PR MEDICATION LIST DOCUMENTED IN MEDICAL RECORD: ICD-10-PCS | Mod: CPTII,S$GLB,, | Performed by: OPTOMETRIST

## 2023-04-17 PROCEDURE — 99999 PR PBB SHADOW E&M-EST. PATIENT-LVL III: ICD-10-PCS | Mod: PBBFAC,,, | Performed by: OPTOMETRIST

## 2023-04-17 PROCEDURE — 92015 DETERMINE REFRACTIVE STATE: CPT | Mod: S$GLB,,, | Performed by: OPTOMETRIST

## 2023-04-17 PROCEDURE — 92015 PR REFRACTION: ICD-10-PCS | Mod: S$GLB,,, | Performed by: OPTOMETRIST

## 2023-04-17 PROCEDURE — 92014 COMPRE OPH EXAM EST PT 1/>: CPT | Mod: S$GLB,,, | Performed by: OPTOMETRIST

## 2023-04-17 PROCEDURE — 3288F FALL RISK ASSESSMENT DOCD: CPT | Mod: CPTII,S$GLB,, | Performed by: OPTOMETRIST

## 2023-04-17 PROCEDURE — 99999 PR PBB SHADOW E&M-EST. PATIENT-LVL III: CPT | Mod: PBBFAC,,, | Performed by: OPTOMETRIST

## 2023-04-17 NOTE — PROGRESS NOTES
HPI    83 Y/o female is here for routine eye exam with C/o pt states her upper   left eyelid some times itches and she scratches it a lot and also uses   cold compresses   Pt denies pain and discomfort   No f/f    Eye med: Refresh OU TID   Last edited by Ember Payton MA on 4/17/2023  8:41 AM.            Assessment /Plan     For exam results, see Encounter Report.    Dry eyes, bilateral    Glaucoma screening    Presbyopia      1. Mild pco sp pciol OU--pt happy new Rx  2. Dry eye sx, w mild allergic conj sx--pt to cont w REFRESH ATs , but OK to add otc ZADITOR/ALAWAY/or PATADAY qAM      PLAN:    Rtc 1 yr

## 2023-04-18 ENCOUNTER — PATIENT MESSAGE (OUTPATIENT)
Dept: PRIMARY CARE CLINIC | Facility: CLINIC | Age: 83
End: 2023-04-18
Payer: MEDICARE

## 2023-04-19 ENCOUNTER — PATIENT MESSAGE (OUTPATIENT)
Dept: PRIMARY CARE CLINIC | Facility: CLINIC | Age: 83
End: 2023-04-19
Payer: MEDICARE

## 2023-04-19 DIAGNOSIS — N89.8 VAGINAL IRRITATION: Primary | ICD-10-CM

## 2023-04-19 RX ORDER — NYSTATIN 100000 U/G
CREAM TOPICAL 2 TIMES DAILY
Qty: 60 G | Refills: 1 | Status: SHIPPED | OUTPATIENT
Start: 2023-04-19 | End: 2023-06-05

## 2023-04-25 ENCOUNTER — TELEPHONE (OUTPATIENT)
Dept: PRIMARY CARE CLINIC | Facility: CLINIC | Age: 83
End: 2023-04-25

## 2023-04-25 ENCOUNTER — OFFICE VISIT (OUTPATIENT)
Dept: INTERNAL MEDICINE | Facility: CLINIC | Age: 83
End: 2023-04-25
Payer: MEDICARE

## 2023-04-25 VITALS
OXYGEN SATURATION: 99 % | WEIGHT: 116.88 LBS | SYSTOLIC BLOOD PRESSURE: 132 MMHG | DIASTOLIC BLOOD PRESSURE: 60 MMHG | HEART RATE: 87 BPM | BODY MASS INDEX: 22.95 KG/M2 | HEIGHT: 60 IN

## 2023-04-25 DIAGNOSIS — B37.31 VAGINAL CANDIDIASIS: Primary | ICD-10-CM

## 2023-04-25 LAB
BILIRUB UR QL STRIP: NEGATIVE
CLARITY UR REFRACT.AUTO: CLEAR
COLOR UR AUTO: YELLOW
GLUCOSE UR QL STRIP: NEGATIVE
HGB UR QL STRIP: NEGATIVE
KETONES UR QL STRIP: NEGATIVE
LEUKOCYTE ESTERASE UR QL STRIP: NEGATIVE
NITRITE UR QL STRIP: NEGATIVE
PH UR STRIP: 7 [PH] (ref 5–8)
PROT UR QL STRIP: NEGATIVE
SP GR UR STRIP: 1.02 (ref 1–1.03)
URN SPEC COLLECT METH UR: NORMAL

## 2023-04-25 PROCEDURE — 3075F PR MOST RECENT SYSTOLIC BLOOD PRESS GE 130-139MM HG: ICD-10-PCS | Mod: HCNC,CPTII,S$GLB, | Performed by: INTERNAL MEDICINE

## 2023-04-25 PROCEDURE — 1101F PR PT FALLS ASSESS DOC 0-1 FALLS W/OUT INJ PAST YR: ICD-10-PCS | Mod: HCNC,CPTII,S$GLB, | Performed by: INTERNAL MEDICINE

## 2023-04-25 PROCEDURE — 99213 OFFICE O/P EST LOW 20 MIN: CPT | Mod: HCNC,S$GLB,, | Performed by: INTERNAL MEDICINE

## 2023-04-25 PROCEDURE — 3075F SYST BP GE 130 - 139MM HG: CPT | Mod: HCNC,CPTII,S$GLB, | Performed by: INTERNAL MEDICINE

## 2023-04-25 PROCEDURE — 1125F PR PAIN SEVERITY QUANTIFIED, PAIN PRESENT: ICD-10-PCS | Mod: HCNC,CPTII,S$GLB, | Performed by: INTERNAL MEDICINE

## 2023-04-25 PROCEDURE — 81003 URINALYSIS AUTO W/O SCOPE: CPT | Mod: HCNC | Performed by: INTERNAL MEDICINE

## 2023-04-25 PROCEDURE — 3288F PR FALLS RISK ASSESSMENT DOCUMENTED: ICD-10-PCS | Mod: HCNC,CPTII,S$GLB, | Performed by: INTERNAL MEDICINE

## 2023-04-25 PROCEDURE — 1125F AMNT PAIN NOTED PAIN PRSNT: CPT | Mod: HCNC,CPTII,S$GLB, | Performed by: INTERNAL MEDICINE

## 2023-04-25 PROCEDURE — 99999 PR PBB SHADOW E&M-EST. PATIENT-LVL IV: ICD-10-PCS | Mod: PBBFAC,HCNC,, | Performed by: INTERNAL MEDICINE

## 2023-04-25 PROCEDURE — 3078F PR MOST RECENT DIASTOLIC BLOOD PRESSURE < 80 MM HG: ICD-10-PCS | Mod: HCNC,CPTII,S$GLB, | Performed by: INTERNAL MEDICINE

## 2023-04-25 PROCEDURE — 1160F RVW MEDS BY RX/DR IN RCRD: CPT | Mod: HCNC,CPTII,S$GLB, | Performed by: INTERNAL MEDICINE

## 2023-04-25 PROCEDURE — 3288F FALL RISK ASSESSMENT DOCD: CPT | Mod: HCNC,CPTII,S$GLB, | Performed by: INTERNAL MEDICINE

## 2023-04-25 PROCEDURE — 1101F PT FALLS ASSESS-DOCD LE1/YR: CPT | Mod: HCNC,CPTII,S$GLB, | Performed by: INTERNAL MEDICINE

## 2023-04-25 PROCEDURE — 99213 PR OFFICE/OUTPT VISIT, EST, LEVL III, 20-29 MIN: ICD-10-PCS | Mod: HCNC,S$GLB,, | Performed by: INTERNAL MEDICINE

## 2023-04-25 PROCEDURE — 1159F PR MEDICATION LIST DOCUMENTED IN MEDICAL RECORD: ICD-10-PCS | Mod: HCNC,CPTII,S$GLB, | Performed by: INTERNAL MEDICINE

## 2023-04-25 PROCEDURE — 1159F MED LIST DOCD IN RCRD: CPT | Mod: HCNC,CPTII,S$GLB, | Performed by: INTERNAL MEDICINE

## 2023-04-25 PROCEDURE — 3078F DIAST BP <80 MM HG: CPT | Mod: HCNC,CPTII,S$GLB, | Performed by: INTERNAL MEDICINE

## 2023-04-25 PROCEDURE — 99999 PR PBB SHADOW E&M-EST. PATIENT-LVL IV: CPT | Mod: PBBFAC,HCNC,, | Performed by: INTERNAL MEDICINE

## 2023-04-25 PROCEDURE — 1160F PR REVIEW ALL MEDS BY PRESCRIBER/CLIN PHARMACIST DOCUMENTED: ICD-10-PCS | Mod: HCNC,CPTII,S$GLB, | Performed by: INTERNAL MEDICINE

## 2023-04-25 RX ORDER — FLUCONAZOLE 150 MG/1
150 TABLET ORAL DAILY
Qty: 2 TABLET | Refills: 0 | Status: SHIPPED | OUTPATIENT
Start: 2023-04-25 | End: 2023-04-26

## 2023-04-25 NOTE — PATIENT INSTRUCTIONS
Fluconazole 150 mg po X 1 today.  If no improvement in symptoms in 48 hours then take 2nd dose.  If no improvement after second dose then call for same day gyn appointment.

## 2023-04-25 NOTE — TELEPHONE ENCOUNTER
Per my conversation with phone staff pt c/o foamy urine and increased vaginal itching, pt has been booked for eval at [facility] today for 5pm.

## 2023-04-25 NOTE — PROGRESS NOTES
Subjective:       Patient ID: Laverne Humphries is a 82 y.o. female.    Chief Complaint: Urinary Tract Infection      Laverne uHmphries is 82 y.o. female who presents for vaginal itching.  Bubbles in urine.  No dysuria.  Vaginal itching.  Pt had rotator cuff surgery and had to use adult diapers.  Pt developed vaginal itching.  Pt contacted her PCP and was given nystatin for vaginal itching.  Pt states that nystatin is causing vaginal burning.  Pt has hx of LISA and BSO over 30 yrs ago.        Review of Systems   Constitutional:  Negative for chills and fever.   Gastrointestinal:  Negative for abdominal pain, constipation, diarrhea, nausea and vomiting.   Genitourinary:  Positive for vaginal pain. Negative for dysuria, frequency, hematuria, vaginal bleeding and vaginal discharge.       Objective:      Physical Exam  Vitals reviewed.   Constitutional:       General: She is awake.      Appearance: Normal appearance.   HENT:      Head: Normocephalic and atraumatic.      Mouth/Throat:      Mouth: Mucous membranes are moist.      Pharynx: Oropharynx is clear.   Eyes:      Extraocular Movements: Extraocular movements intact.      Conjunctiva/sclera: Conjunctivae normal.      Pupils: Pupils are equal, round, and reactive to light.   Cardiovascular:      Rate and Rhythm: Normal rate and regular rhythm.      Heart sounds: No murmur heard.    No friction rub. No gallop.   Pulmonary:      Effort: Pulmonary effort is normal.      Breath sounds: Normal breath sounds.   Abdominal:      General: Bowel sounds are normal. There is no distension.      Tenderness: There is no abdominal tenderness. There is no guarding or rebound.   Genitourinary:     General: Normal vulva.      Pubic Area: No rash.       Labia:         Right: No rash or tenderness.         Left: No rash or tenderness.       Comments: Erythema but no rash of bilateral labia    Musculoskeletal:      Right lower leg: No edema.      Left lower leg: No edema.    Lymphadenopathy:      Cervical: No cervical adenopathy.      Lower Body: No right inguinal adenopathy. No left inguinal adenopathy.   Neurological:      Mental Status: She is alert and oriented to person, place, and time.   Psychiatric:         Mood and Affect: Mood normal.         Behavior: Behavior is cooperative.       Assessment:       1. Vaginal candidiasis        Plan:     Pt with vaginal itching and burning c/w vaginal candidiasis.  On exam, pt has erythema of labia but not rash or discharge.  Will tx with Fluconazole 150 mg po X 1 today.  If no improvement in symptoms in 48 hours then take 2nd dose.  If no improvement after second dose then call for same day gyn appointment.      Laverne was seen today for urinary tract infection.    Diagnoses and all orders for this visit:    Vaginal candidiasis  -     Urinalysis, Reflex to Urine Culture Urine, Clean Catch  -     fluconazole (DIFLUCAN) 150 MG Tab; Take 1 tablet (150 mg total) by mouth once daily. If symptoms do not resolve in 2 days then take 2nd dose. for 1 day

## 2023-04-27 ENCOUNTER — PATIENT MESSAGE (OUTPATIENT)
Dept: SPORTS MEDICINE | Facility: CLINIC | Age: 83
End: 2023-04-27
Payer: MEDICARE

## 2023-05-01 ENCOUNTER — OFFICE VISIT (OUTPATIENT)
Dept: SPORTS MEDICINE | Facility: CLINIC | Age: 83
End: 2023-05-01
Payer: MEDICARE

## 2023-05-01 ENCOUNTER — PATIENT MESSAGE (OUTPATIENT)
Dept: PRIMARY CARE CLINIC | Facility: CLINIC | Age: 83
End: 2023-05-01
Payer: MEDICARE

## 2023-05-01 VITALS
HEART RATE: 83 BPM | SYSTOLIC BLOOD PRESSURE: 121 MMHG | BODY MASS INDEX: 22.78 KG/M2 | HEIGHT: 60 IN | DIASTOLIC BLOOD PRESSURE: 68 MMHG | WEIGHT: 116 LBS

## 2023-05-01 DIAGNOSIS — I89.0 LYMPHEDEMA OF EXTREMITY: ICD-10-CM

## 2023-05-01 DIAGNOSIS — Z98.890 S/P RIGHT ROTATOR CUFF REPAIR: Primary | ICD-10-CM

## 2023-05-01 PROCEDURE — 3288F PR FALLS RISK ASSESSMENT DOCUMENTED: ICD-10-PCS | Mod: HCNC,CPTII,S$GLB, | Performed by: ORTHOPAEDIC SURGERY

## 2023-05-01 PROCEDURE — 1125F PR PAIN SEVERITY QUANTIFIED, PAIN PRESENT: ICD-10-PCS | Mod: HCNC,CPTII,S$GLB, | Performed by: ORTHOPAEDIC SURGERY

## 2023-05-01 PROCEDURE — 99999 PR PBB SHADOW E&M-EST. PATIENT-LVL IV: CPT | Mod: PBBFAC,HCNC,, | Performed by: ORTHOPAEDIC SURGERY

## 2023-05-01 PROCEDURE — 3074F SYST BP LT 130 MM HG: CPT | Mod: HCNC,CPTII,S$GLB, | Performed by: ORTHOPAEDIC SURGERY

## 2023-05-01 PROCEDURE — 99024 PR POST-OP FOLLOW-UP VISIT: ICD-10-PCS | Mod: HCNC,S$GLB,, | Performed by: ORTHOPAEDIC SURGERY

## 2023-05-01 PROCEDURE — 1159F PR MEDICATION LIST DOCUMENTED IN MEDICAL RECORD: ICD-10-PCS | Mod: HCNC,CPTII,S$GLB, | Performed by: ORTHOPAEDIC SURGERY

## 2023-05-01 PROCEDURE — 3078F PR MOST RECENT DIASTOLIC BLOOD PRESSURE < 80 MM HG: ICD-10-PCS | Mod: HCNC,CPTII,S$GLB, | Performed by: ORTHOPAEDIC SURGERY

## 2023-05-01 PROCEDURE — 3074F PR MOST RECENT SYSTOLIC BLOOD PRESSURE < 130 MM HG: ICD-10-PCS | Mod: HCNC,CPTII,S$GLB, | Performed by: ORTHOPAEDIC SURGERY

## 2023-05-01 PROCEDURE — 1101F PR PT FALLS ASSESS DOC 0-1 FALLS W/OUT INJ PAST YR: ICD-10-PCS | Mod: HCNC,CPTII,S$GLB, | Performed by: ORTHOPAEDIC SURGERY

## 2023-05-01 PROCEDURE — 1159F MED LIST DOCD IN RCRD: CPT | Mod: HCNC,CPTII,S$GLB, | Performed by: ORTHOPAEDIC SURGERY

## 2023-05-01 PROCEDURE — 3078F DIAST BP <80 MM HG: CPT | Mod: HCNC,CPTII,S$GLB, | Performed by: ORTHOPAEDIC SURGERY

## 2023-05-01 PROCEDURE — 99024 POSTOP FOLLOW-UP VISIT: CPT | Mod: HCNC,S$GLB,, | Performed by: ORTHOPAEDIC SURGERY

## 2023-05-01 PROCEDURE — 1101F PT FALLS ASSESS-DOCD LE1/YR: CPT | Mod: HCNC,CPTII,S$GLB, | Performed by: ORTHOPAEDIC SURGERY

## 2023-05-01 PROCEDURE — 1125F AMNT PAIN NOTED PAIN PRSNT: CPT | Mod: HCNC,CPTII,S$GLB, | Performed by: ORTHOPAEDIC SURGERY

## 2023-05-01 PROCEDURE — 3288F FALL RISK ASSESSMENT DOCD: CPT | Mod: HCNC,CPTII,S$GLB, | Performed by: ORTHOPAEDIC SURGERY

## 2023-05-01 PROCEDURE — 99999 PR PBB SHADOW E&M-EST. PATIENT-LVL IV: ICD-10-PCS | Mod: PBBFAC,HCNC,, | Performed by: ORTHOPAEDIC SURGERY

## 2023-05-01 NOTE — PROGRESS NOTES
POST-OPERATIVE EXAMINATION    82 y.o. Female who returns for follow after surgery. She is 6 weeks s/p    Procedures Performed:  1. Right shoulder Arthroscopic massive complex rotator cuff repair CPT - 59228-57 modifier     2. Right shoulder Arthroscopic extensive debridement CPT - 45843     3. Right shoulder Arthroscopic lysis of adhesions CPT - 20221     4. Right shoulder Arthroscopic subacromial decompression and bursectomy CPT - 11808     5. Right shoulder arthroscopic loose body removal     She is doing well without any issues. She has been having some swelling and pain in her hand.      PHYSICAL EXAMINATION:  /68   Pulse 83   Ht 5' (1.524 m)   Wt 52.6 kg (116 lb)   LMP  (LMP Unknown)   BMI 22.65 kg/m²   General: Well-developed well-nourished 82 y.o. femalein no acute distress   Cardiovascular: Regular rhythm   Lungs: No labored breathing or wheezing appreciated   Neuro: Alert and oriented ×3   Psychiatric: well oriented to person, place and time, demonstrates normal mood and affect   Skin: No rashes, lesions or ulcers, normal temperature, turgor, and texture on involved extremity    ORTHOPEDIC EXAM:  Normal post-operative swelling around her shoulder.  She is still has a little bit of swelling diffusely throughout all the digits of her right hand.  No skin color changes.  It appears to be more postoperative edema.  Normal post-operative scarring  Strength: WNL  ROM: FE-80; Abd-60; ER-30  Tests: None today    ASSESSMENT:      ICD-10-CM ICD-9-CM   1. S/P right rotator cuff repair  Z98.890 V45.89   2. Lymphedema of extremity  I89.0 457.1       PLAN:       Referral to occupational therapy placed today with lymphedema treatments  Patient may discontinue use of ARC sling  RTC in 2 months

## 2023-05-03 ENCOUNTER — CLINICAL SUPPORT (OUTPATIENT)
Dept: REHABILITATION | Facility: HOSPITAL | Age: 83
End: 2023-05-03
Attending: ORTHOPAEDIC SURGERY
Payer: MEDICARE

## 2023-05-03 DIAGNOSIS — R52 PAIN: ICD-10-CM

## 2023-05-03 DIAGNOSIS — R60.0 LOCALIZED EDEMA: ICD-10-CM

## 2023-05-03 DIAGNOSIS — Z98.890 S/P RIGHT ROTATOR CUFF REPAIR: ICD-10-CM

## 2023-05-03 DIAGNOSIS — M25.611 DECREASED RANGE OF MOTION OF RIGHT SHOULDER: ICD-10-CM

## 2023-05-03 PROCEDURE — 97166 OT EVAL MOD COMPLEX 45 MIN: CPT

## 2023-05-03 PROCEDURE — 97110 THERAPEUTIC EXERCISES: CPT

## 2023-05-04 ENCOUNTER — TELEPHONE (OUTPATIENT)
Dept: SPORTS MEDICINE | Facility: CLINIC | Age: 83
End: 2023-05-04
Payer: MEDICARE

## 2023-05-04 DIAGNOSIS — I89.0 LYMPHEDEMA OF EXTREMITY: ICD-10-CM

## 2023-05-04 DIAGNOSIS — Z98.890 S/P RIGHT ROTATOR CUFF REPAIR: Primary | ICD-10-CM

## 2023-05-05 ENCOUNTER — CLINICAL SUPPORT (OUTPATIENT)
Dept: REHABILITATION | Facility: HOSPITAL | Age: 83
End: 2023-05-05
Payer: MEDICARE

## 2023-05-05 DIAGNOSIS — R60.0 LOCALIZED EDEMA: ICD-10-CM

## 2023-05-05 DIAGNOSIS — M25.611 DECREASED RANGE OF MOTION OF RIGHT SHOULDER: Primary | ICD-10-CM

## 2023-05-05 DIAGNOSIS — R52 PAIN: ICD-10-CM

## 2023-05-05 PROCEDURE — 97140 MANUAL THERAPY 1/> REGIONS: CPT

## 2023-05-05 PROCEDURE — 97110 THERAPEUTIC EXERCISES: CPT

## 2023-05-05 NOTE — PROGRESS NOTES
[facility]  Occupational Therapy Treatment Note    Date: 5/5/2023  Name: Laverne Humphries  Clinic Number: [id number]    Therapy Diagnosis:   Encounter Diagnoses   Name Primary?    Decreased range of motion of right shoulder Yes    Localized edema     Pain      Physician: Luis Angel Wheeler MD    Physician Orders:Eval and treat;  s/p RCR/lymphedema treatments into hand  Medical Diagnosis: Z98.890 (ICD-10-CM) - S/P right rotator cuff repair  Surgical Procedure and Date: 3/21/2023,   1. Right shoulder Arthroscopic massive complex rotator cuff repair    2. Right shoulder Arthroscopic extensive debridement   3. Right shoulder Arthroscopic lysis of adhesions   4. Right shoulder Arthroscopic subacromial decompression and bursectomy   5. Right shoulder arthroscopic loose body removal    Evaluation Date: 5/3/2023  Insurance Authorization Period Expiration: 05/07/2023  Plan of Care Expiration: 12 weeks; 7/28/2023  Date of Return to MD: 7/3/2023  Visit # / Visits authorized: 1 / 1  FOTO: (date and score)     Precautions:  Standard     Time In:     08:30 AM   Time Out:  09:20 AM   Total Appointment Time (timed & untimed codes): 50 minutes       SUBJECTIVE     Pt reports: pain in the hand. No issues with shoulder   She was compliant with home exercise program given last session.   Response to previous treatment:first tx  Functional change: none noted to this date     Pain: 0/10  Location: right hand     OBJECTIVE   Objective Measures updated at progress report unless specified.    Observation/Appearance: mod edema in hand      Edema. Measured in centimeters.    5/9/2023 5/9/2023     Left Right   2in. Above elbow       2in. Below elbow       Wrist Crease 14.0 cm  16.0 cm    Figure of 8       MCPs 18.4.0 cm  20.5 cm            Shoulder ROM. Measured in degrees    5/3/2022 5/3/2022     Left Right PROM   Shoulder Flexion   85 degrees    Shoulder Abduction   NT   Shoulder Extension    NT   Shoulder IR    NT   Shoulder ER   NT         Elbow and Wrist ROM. Measured in degrees.    5/9/2023 5/9/2023     Left Right   Elbow Ext/Flex WNL/WNL  12/135    Supination/Pronation WNL/WNL  66/75   Wrist Ext/Flex 55/70 39/6   Wrist RD/UD             Hand ROM. Measured in degrees.    5/9/2023 5/9/2023     Left Right           Index: MP    68              PIP       50              DIP   30              PITTS   148           Long:  MP   65              PIP   58              DIP   62              PITTS   185           Ring:   MP   55              PIP   60              DIP   32              PITTS   147           Small:  MP                   PIP                   DIP                  PITTS               Thumb: MP                    IP           Rad ADD/ABD           Pal ADD/ABD              Opposition              Strength (Dynamometer) and Pinch Strength (Pinch Gauge)  Measured in pounds.    5/9/2023 5/9/2023     Left Right   Rung II   Deferred    Key Pinch       3pt Pinch       2pt Pinch             Manual Muscle Test    5/9/2023 5/9/2023     Left Right   Wrist Extension        Wrist Flexion       Radial Deviation       Ulnar Deviation       Supination       Pronation       Elbow Extension       Elbow Flexion             Limitation/Restriction for FOTO initial eval; shoulder Survey     Therapist reviewed FOTO scores for Laverne Humphries on 5/3/2023.   FOTO documents entered into [employer] - see Media section.     Limitation Score: needs to be taken%             Treatment     Lezoilaa received the treatments listed below:     Manual therapy techniques: Manual Lymphatic Drainage were applied to the: RUE for 25 minutes, including:  - retrograde massage to RUE  - scar tissue mobilization  - passive ROM to flexion/scaption x 10 reps each     Therapeutic exercises to develop ROM for 25 minutes, including:  - shoulder rolls forward/backward x 10 reps each (2 sets)   - pendulums (CW/CCW, forward/backward, side/side) x 10 reps each (2 sets)   -  shoulder raises x 10 reps   - scap retraction x 10 reps         Patient Education and Home Exercises      Education provided:   - pillow under arm when sitting down watching TV   - Progress towards goals     Written Home Exercises Provided: Patient instructed to cont prior HEP.  Exercises were reviewed and Laverne was able to demonstrate them prior to the end of the session.  Laverne demonstrated good  understanding of the HEP provided. See EMR under Patient Instructions for exercises provided during therapy sessions.      ASSESSMENT      Good alex to tx today. Required mod verbal and visual cue for wider stance in pendulum to avoid active shoulder motion. Able to get ~85 degrees passive flexion and scaption today.      Laverne is progressing well towards her goals and there are no updates to goals at this time. Pt prognosis is fair.     Pt will continue to benefit from skilled outpatient occupational therapy to address the deficits listed in the problem list on initial evaluation, provide pt/family education and to maximize pt's level of independence in the home and community environment.     Pt's spiritual, cultural and educational needs considered and pt agreeable to plan of care and goals.    Anticipated barriers to occupational therapy: preexisting conditions     Goals:  Long term goals:   Pt will achieve shoulder AROM in flexion/scaption/abduction ~120 degrees   2.   Assess MMT when appropriate  3.   Pt will demo shoulder AROM WFL to perform daily and community activities   4.   Pt will report improvement in FOTO score by measuring 20% points.         Short term goals:   Pt will be complaint with HEP and pain management   Pt will achieve shoulder PROM in flexion and scaption ~110 degrees   Pt will report improvement in FOTO by decreasing 10% limitation points   Pt will achieve shoulder AAROM WFL to aid in ADLs   Pt will be able to demo full composite fist with R hand.    PLAN     Continue skilled  occupational therapy with individualized plan of care focusing on improving functional independence with use of RUE    Updates/Grading for next session: cont per large massive type III protocol     Shaunna Stevenson, OT

## 2023-05-08 ENCOUNTER — PATIENT MESSAGE (OUTPATIENT)
Dept: CARDIOLOGY | Facility: CLINIC | Age: 83
End: 2023-05-08
Payer: MEDICARE

## 2023-05-08 ENCOUNTER — CLINICAL SUPPORT (OUTPATIENT)
Dept: REHABILITATION | Facility: HOSPITAL | Age: 83
End: 2023-05-08
Payer: MEDICARE

## 2023-05-08 DIAGNOSIS — Z79.01 CHRONIC ANTICOAGULATION: ICD-10-CM

## 2023-05-08 DIAGNOSIS — I48.0 PAROXYSMAL ATRIAL FIBRILLATION: ICD-10-CM

## 2023-05-08 DIAGNOSIS — R60.0 LOCALIZED EDEMA: Primary | ICD-10-CM

## 2023-05-08 PROCEDURE — 97140 MANUAL THERAPY 1/> REGIONS: CPT

## 2023-05-08 PROCEDURE — 97166 OT EVAL MOD COMPLEX 45 MIN: CPT

## 2023-05-09 ENCOUNTER — CLINICAL SUPPORT (OUTPATIENT)
Dept: REHABILITATION | Facility: HOSPITAL | Age: 83
End: 2023-05-09
Payer: MEDICARE

## 2023-05-09 DIAGNOSIS — R60.0 LOCALIZED EDEMA: ICD-10-CM

## 2023-05-09 DIAGNOSIS — R52 PAIN: ICD-10-CM

## 2023-05-09 DIAGNOSIS — M25.611 DECREASED RANGE OF MOTION OF RIGHT SHOULDER: Primary | ICD-10-CM

## 2023-05-09 PROCEDURE — 97110 THERAPEUTIC EXERCISES: CPT

## 2023-05-09 PROCEDURE — 97140 MANUAL THERAPY 1/> REGIONS: CPT

## 2023-05-09 NOTE — PROGRESS NOTES
[facility]  Occupational Therapy Treatment Note    Date: 5/9/2023  Name: Laverne Humphries  Clinic Number: [id number]    Therapy Diagnosis:   Encounter Diagnoses   Name Primary?    Decreased range of motion of right shoulder Yes    Localized edema     Pain        Physician: Luis Angel Wheeler MD    Physician Orders:Eval and treat;  s/p RCR/lymphedema treatments into hand  Medical Diagnosis: Z98.890 (ICD-10-CM) - S/P right rotator cuff repair  Surgical Procedure and Date: 3/21/2023,   1. Right shoulder Arthroscopic massive complex rotator cuff repair    2. Right shoulder Arthroscopic extensive debridement   3. Right shoulder Arthroscopic lysis of adhesions   4. Right shoulder Arthroscopic subacromial decompression and bursectomy   5. Right shoulder arthroscopic loose body removal    Evaluation Date: 5/3/2023  Insurance Authorization Period Expiration: 05/07/2023  Plan of Care Expiration: 12 weeks; 7/28/2023  Date of Return to MD: 7/3/2023  Visit # / Visits authorized: 1 / 1  FOTO: (date and score)     Precautions:  Standard     Time In:     08:30 AM   Time Out:  09:20 AM   Total Appointment Time (timed & untimed codes): 50 minutes       SUBJECTIVE     Pt reports: pain in the hand. No issues with shoulder   She was compliant with home exercise program given last session.   Response to previous treatment:first tx  Functional change: none noted to this date     Pain: 0/10  Location: right hand     OBJECTIVE   Objective Measures updated at progress report unless specified.    Observation/Appearance: mod edema in hand      Edema. Measured in centimeters.    5/9/2023 5/9/2023     Left Right   2in. Above elbow       2in. Below elbow       Wrist Crease 14.0 cm  16.0 cm    Figure of 8       MCPs 18.4.0 cm  20.5 cm            Shoulder ROM. Measured in degrees    5/3/2022 5/3/2022     Left Right PROM   Shoulder Flexion   85 degrees    Shoulder Abduction   NT   Shoulder Extension    NT   Shoulder  IR   NT   Shoulder ER   NT         Elbow and Wrist ROM. Measured in degrees.    5/9/2023 5/9/2023     Left Right   Elbow Ext/Flex WNL/WNL  12/135    Supination/Pronation WNL/WNL  66/75   Wrist Ext/Flex 55/70 39/6   Wrist RD/UD             Hand ROM. Measured in degrees.    5/9/2023 5/9/2023     Left Right           Index: MP    68              PIP       50              DIP   30              PITTS   148           Long:  MP   65              PIP   58              DIP   62              PITTS   185           Ring:   MP   55              PIP   60              DIP   32              PITTS   147           Small:  MP                   PIP                   DIP                  PITTS               Thumb: MP                    IP           Rad ADD/ABD           Pal ADD/ABD              Opposition              Strength (Dynamometer) and Pinch Strength (Pinch Gauge)  Measured in pounds.    5/9/2023 5/9/2023     Left Right   Rung II   Deferred    Key Pinch       3pt Pinch       2pt Pinch             Manual Muscle Test    5/9/2023 5/9/2023     Left Right   Wrist Extension        Wrist Flexion       Radial Deviation       Ulnar Deviation       Supination       Pronation       Elbow Extension       Elbow Flexion             Limitation/Restriction for FOTO initial eval; shoulder Survey     Therapist reviewed FOTO scores for Laverne Humphries on 5/3/2023.   FOTO documents entered into [employer] - see Media section.     Limitation Score: needs to be taken%             Treatment     Leovidia received the treatments listed below:     Manual therapy techniques: Manual Lymphatic Drainage were applied to the: RUE for 30 minutes, including:  - retrograde massage to RUE  - scar tissue mobilization  - passive  gentle ROM to flexion/scaption/abduction/ER x 10 reps each       Therapeutic exercises to develop ROM for 25 minutes, including:  - shoulder rolls forward/backward x 10 reps each (2 sets)   - pendulums (CW/CCW, forward/backward, side/side) x 10  reps each (2 sets)   - shoulder raises x 10 reps (2 sets)   - scap retraction x 10 reps (2 sets)        Patient Education and Home Exercises      Education provided:   - pillow under arm when sitting down watching TV   - Progress towards goals     Written Home Exercises Provided: Patient instructed to cont prior HEP.  Exercises were reviewed and Laverne was able to demonstrate them prior to the end of the session.  Laverne demonstrated good  understanding of the HEP provided. See EMR under Patient Instructions for exercises provided during therapy sessions.      ASSESSMENT      Good alex to tx today. Min increase in GH tightness. Cont to avoid heat due to increased edema within RUE. Pt c/o hand tightness.     Laverne is progressing well towards her goals and there are no updates to goals at this time. Pt prognosis is fair.     Pt will continue to benefit from skilled outpatient occupational therapy to address the deficits listed in the problem list on initial evaluation, provide pt/family education and to maximize pt's level of independence in the home and community environment.     Pt's spiritual, cultural and educational needs considered and pt agreeable to plan of care and goals.    Anticipated barriers to occupational therapy: preexisting conditions     Goals:  Long term goals:   Pt will achieve shoulder AROM in flexion/scaption/abduction ~120 degrees   2.   Assess MMT when appropriate  3.   Pt will demo shoulder AROM WFL to perform daily and community activities   4.   Pt will report improvement in FOTO score by measuring 20% points.         Short term goals:   Pt will be complaint with HEP and pain management   Pt will achieve shoulder PROM in flexion and scaption ~110 degrees   Pt will report improvement in FOTO by decreasing 10% limitation points   Pt will achieve shoulder AAROM WFL to aid in ADLs   Pt will be able to demo full composite fist with R hand.    PLAN     Continue skilled occupational therapy  with individualized plan of care focusing on improving functional independence with use of RUE    Updates/Grading for next session: cont per large massive type III protocol     Shaunna Stevenson, OT

## 2023-05-09 NOTE — PLAN OF CARE
[facility]  Occupational Therapy Initial Evaluation    Date: 5/3/2023  Name: Laverne CELIS [facility] Number: [id number]    Therapy Diagnosis:   Encounter Diagnosis   Name Primary?    S/P right rotator cuff repair      Encounter Diagnoses   Name Primary?    S/P right rotator cuff repair     Decreased range of motion of right shoulder     Localized edema     Pain        Physician: Luis Angel Wheeler MD    Physician Orders:Eval and treat;  s/p RCR/lymphedema treatments into hand  Medical Diagnosis: Z98.890 (ICD-10-CM) - S/P right rotator cuff repair  Surgical Procedure and Date: 3/21/2023,   1. Right shoulder Arthroscopic massive complex rotator cuff repair    2. Right shoulder Arthroscopic extensive debridement   3. Right shoulder Arthroscopic lysis of adhesions   4. Right shoulder Arthroscopic subacromial decompression and bursectomy   5. Right shoulder arthroscopic loose body removal    Evaluation Date: 5/3/2023  Insurance Authorization Period Expiration: 05/07/2023  Plan of Care Expiration: 12 weeks; 7/28/2023  Date of Return to MD: 7/3/2023  Visit # / Visits authorized: 1 / 1  FOTO: (date and score)    Precautions:  Standard    Time In:     09:15 AM   Time Out:  10:09 AM   Total Appointment Time (timed & untimed codes): 52 minutes      SUBJECTIVE     Date of Onset:  >1 year       History of Current Condition/Mechanism of Injury: Prakasha reports: Laverne is here today with her daughter. Daughter stated her mother has had shoulder pain for multiple years. She was initially seen in July of 2022 for ortho. MRI was performed in Feb 2023 showing full thickness tear in supraspinatus with associated muscle atrophy, as well as full thickness tear anterior infraspinatus, with severe tissue inflammation.       Falls: 0    Involved Side: Right  Dominant Side: Right  Imaging:  EXAMINATION:  MRI SHOULDER WITHOUT CONTRAST RIGHT     CLINICAL HISTORY:  Pain in right shoulderShoulder pain, rotator  cuff disorder suspected, xray done;     TECHNIQUE:  MRI of right shoulder was performed on a 1.5T magnet utilizing the following sequences: Localizer; axial T2 FS; coronal T2 FS and PD FS; sagittal T1, T2 FS and PD FS.     COMPARISON:  02/15/2013     FINDINGS:  Rotator cuff: Full-thickness and full with supraspinatus tendon tear with retraction of the myotendinous junction medial to the border of the superior bony glenoid and diffuse edema signal and fatty atrophy of the muscle.  There is severe tendinopathy of the infraspinatus tendon with full-thickness tearing anteriorly associated with extensive granulation tissue/fibrosis at the footprint and retraction of the musculotendinous junction approximately 2 cm.  There is an internal degloving component superiorly with differential retraction medial to the margin of the glenoid.  The subscapularis tendon is severely tendinopathic, with high-grade partial undersurface tearing of the central tendon measuring at least 15 mm in medial to lateral dimension.  Superiorly there is full-thickness tearing and the musculotendinous junction is retracted medially to the level of the undersurface of the coracoid.Subscapularis and infraspinatus muscle bulk preserved.     Biceps: Longitudinal split tearing of the extra-articular tendon, with nonvisualization of the intra-articular course of the tendon, possibly either subluxed medially or completely torn.     Labrum: Extensive tearing and maceration.    Glenohumeral Joint: Large glenohumeral joint effusion and synovitis.  Extensive chondral loss involving the cephalad aspect of the humeral head with associated bony abutment of the undersurface of the acromion.  There is a 10 by 10 mm loose chondral body within the axillary recess of the joint but exact donor site is not determined.    Acromioclavicular joint: Os acromiale with subacromial spurring and osteoarthritis.  Misc: Subacromial/subdeltoid bursitis and subcoracoid bursitis  noted.     Impression:  Massive rotator cuff tear as detailed above, with supraspinatus muscle atrophy  Glenohumeral effusion, extensive chondromalacia and large intra-articular loose body     AC joint osteoarthritis with os acromiale.  Electronically signed by: Donny Upton Jr  Date:                                            02/27/2023  Time:                                           10:50  Prior Therapy: cervical ROM   Occupation:  Retired   Working presently: retired   Duties:     Functional Limitations/Social History:    Previous functional status includes: Independent with all ADLs.     Current Functional Status   Home/Living environment: lives alone      Limitation of Functional Status as follows:   ADLs/IADLs:     - Feeding: daughter prepares meals     - Bathing: grab bar, able to bath I     - Dressing/Grooming: mod I      - Driving: n/a      Leisure: likes to stay busy     Pain:  Functional Pain Scale Rating 0-10: Current 4/10 (hand), worst 4/10, best 2/10   Location: hand   Description: Tight  Aggravating Factors: Morning  Easing Factors: ice     Patient's Goals for Therapy: Pt would like to use her RUE without difficulty     Medical History:   Past Medical History:   Diagnosis Date    Allergy     Anxiety     Arthritis     Asthma     mild    Cataract     CKD (chronic kidney disease) stage 3, GFR 30-59 ml/min     Hepatitis     IBS (irritable bowel syndrome)     Moderate aortic valve stenosis     Osteoporosis     Paroxysmal atrial fibrillation     Reflux esophagitis     Torus palatinus        Surgical History:    has a past surgical history that includes Cataract extraction, bilateral; Cataract extraction w/  intraocular lens implant (Bilateral); Blepharoptosis repair (Bilateral); Cholecystectomy; Hysterectomy; Eye surgery; Oophorectomy; Esophagogastroduodenoscopy (N/A, 01/20/2022); Colonoscopy (N/A, 01/20/2022); Robot-assisted laparoscopic colectomy using da Gera Xi (N/A, 4/26/2022); Flexible  sigmoidoscopy (4/26/2022); Arthroscopic debridement of shoulder (Right, 3/21/2023); Arthroscopy of shoulder with decompression of subacromial space (Right, 3/21/2023); Arthroscopic repair of rotator cuff of shoulder (Right, 3/21/2023); and lysis, adhesions, shoulder, arthroscopic (3/21/2023).    Medications:   has a current medication list which includes the following prescription(s): albuterol, apixaban, azelastine, calcium-vitamin d3, carboxymethylcellulose, diclofenac sodium, gabapentin, lorazepam, metoprolol succinate, nystatin, omeprazole, ondansetron, rosuvastatin, sodium chloride, tramadol, and triamcinolone, and the following Facility-Administered Medications: fentanyl, lidocaine (pf) 10 mg/ml (1%), midazolam, mupirocin, and ropivacaine (pf) 2 mg/ml (0.2%).    Allergies:   Review of patient's allergies indicates:   Allergen Reactions    Codeine      Other reaction(s): Syncope, can take tylenol    Sulfa (sulfonamide antibiotics)      Other reaction(s): Stomach upset          OBJECTIVE     Observation/Appearance: mod edema in hand     Edema. Measured in centimeters.   5/9/2023 5/9/2023    Left Right   2in. Above elbow     2in. Below elbow     Wrist Crease 14.0 cm  16.0 cm    Figure of 8     MCPs 18.4.0 cm  20.5 cm         Shoulder ROM. Measured in degrees   5/3/2022 5/3/2022    Left Right PROM   Shoulder Flexion  85 degrees    Shoulder Abduction  NT   Shoulder Extension   NT   Shoulder IR  NT   Shoulder ER  NT       Elbow and Wrist ROM. Measured in degrees.   5/9/2023 5/9/2023    Left Right   Elbow Ext/Flex WNL/WNL  12/135    Supination/Pronation WNL/WNL  66/75   Wrist Ext/Flex 55/70 39/6   Wrist RD/UD         Hand ROM. Measured in degrees.   5/9/2023 5/9/2023    Left Right        Index: MP   68              PIP      50              DIP  30              PITTS  148        Long:  MP  65              PIP  58              DIP  62              PITTS  185        Ring:   MP  55              PIP  60              DIP  32               PITTS  147        Small:  MP                 PIP                 DIP                PITTS          Thumb: MP                  IP         Rad ADD/ABD         Pal ADD/ABD            Opposition          Strength (Dynamometer) and Pinch Strength (Pinch Gauge)  Measured in pounds.   5/9/2023 5/9/2023    Left Right   Rung II  Deferred    Key Pinch     3pt Pinch     2pt Pinch         Manual Muscle Test   5/9/2023 5/9/2023    Left Right   Wrist Extension      Wrist Flexion     Radial Deviation     Ulnar Deviation     Supination     Pronation     Elbow Extension     Elbow Flexion         Limitation/Restriction for FOTO initial eval; shoulder Survey    Therapist reviewed FOTO scores for Laverne CELIS [place] on 5/3/2023.   FOTO documents entered into [employer] - see Media section.    Limitation Score: needs to be taken%         Treatment   Total Treatment time (time-based codes) separate from Evaluation: 20 minutes    Laverne received the treatments listed below:       Therapeutic exercises to develop ROM for 10 minutes, including:  - CW/CCW pendulums x 10 reps each   - forward/backward x 10 reps   - side/side x 10 reps   - shoulder rolls forward/backward x 5 reps each       *fitted for size S edema glove + small edema wear compression garnet     Patient Education and Home Exercises      Education provided:   - no active shoulder ROM   - HEP 4x/day   - R hand elevated     Written Home Exercises Provided: yes.  Exercises were reviewed and Laverne was able to demonstrate them prior to the end of the session.  Laverne demonstrated fair  understanding of the education provided. See EMR under Patient Instructions for exercises provided during therapy sessions.     Pt was advised to perform these exercises free of pain, and to stop performing them if pain occurs.    Patient/Family Education: role of OT, goals for OT, scheduling/cancellations - pt verbalized understanding. Discussed insurance limitations with  patient.    ASSESSMENT     Laverne Humphries is a 82 y.o. female referred to outpatient occupational therapy and presents with a medical diagnosis of s/p arthroscopic rotator cuff repair.  Patient presents with the following therapy deficits: Decreased ROM, Decreased  strength, Decreased pinch strength, Decreased muscle strength, Decreased functional hand use, Increased pain, Edema, and Joint Stiffness and demonstrates limitations as described in the chart below. Following medical record review it is determined that pt will benefit from occupational therapy services in order to maximize pain free and/or functional use of right UE. The following goals were discussed with the patient and patient is in agreement with them as to be addressed in the treatment plan. The patient's rehab potential is Fair.     Anticipated barriers to occupational therapy:   Pt has no cultural, educational or language barriers to learning provided.    Profile and History Assessment of Occupational Performance Level of Clinical Decision Making Complexity Score   Occupational Profile:   Laverne Humphries is a 82 y.o. female who lives alone and is retired Laverne Humphries has difficulty with  ADLs and IADLs as listed previously, which  Affecting herdaily functional abilities.      Comorbidities:    has a past medical history of Allergy, Anxiety, Arthritis, Asthma, Cataract, CKD (chronic kidney disease) stage 3, GFR 30-59 ml/min, Hepatitis, IBS (irritable bowel syndrome), Moderate aortic valve stenosis, Osteoporosis, Paroxysmal atrial fibrillation, Reflux esophagitis, and Torus palatinus.    Medical and Therapy History Review:   Expanded               Performance Deficits    Physical:  Joint Mobility  Joint Stability  Muscle Power/Strength  Muscle Endurance  Edema   Strength  Pinch Strength    Cognitive:  No Deficits    Psychosocial:    Habits  Routines  Rituals     Clinical Decision Making:  moderate    Assessment  Process:  Detailed Assessments    Modification/Need for Assistance:  Minimal-Moderate Modifications/Assistance    Intervention Selection:  Multiple Treatment Options       moderate  Based on PMHX, co morbidities , data from assessments and functional level of assistance required with task and clinical presentation directly impacting function.         Goals:   The following goals were discussed with the patient and patient is in agreement with them as to be addressed in the treatment plan.     Long term goals:   Pt will achieve shoulder AROM in flexion/scaption/abduction ~120 degrees   2.   Assess MMT when appropriate  3.   Pt will demo shoulder AROM WFL to perform daily and community activities   4.   Pt will report improvement in FOTO score by measuring 20% points.       Short term goals:   Pt will be complaint with HEP and pain management   Pt will achieve shoulder PROM in flexion and scaption ~110 degrees   Pt will report improvement in FOTO by decreasing 10% limitation points   Pt will achieve shoulder AAROM WFL to aid in ADLs   Pt will be able to demo full composite fist with R hand.       PLAN   Plan of Care Certification: 5/3/2023 to 7/28/2023.     Outpatient Occupational Therapy 2-3 times weekly for 12 weeks to include the following interventions: Paraffin, Fluidotherapy, Manual therapy/joint mobilizations, Modalities for pain management, Therapeutic exercises/activities., Strengthening, Orthotic Fabrication/Fit/Training, Edema Control, Scar Management, Electrical Modalities, Joint Protection, and Energy Conservation.      Shaunna Stevenson OT      I CERTIFY THE NEED FOR THESE SERVICES FURNISHED UNDER THIS PLAN OF TREATMENT AND WHILE UNDER MY CARE  Physician's comments:      Physician's Signature: ___________________________________________________

## 2023-05-11 ENCOUNTER — CLINICAL SUPPORT (OUTPATIENT)
Dept: REHABILITATION | Facility: HOSPITAL | Age: 83
End: 2023-05-11
Payer: MEDICARE

## 2023-05-11 DIAGNOSIS — R60.0 LOCALIZED EDEMA: ICD-10-CM

## 2023-05-11 DIAGNOSIS — M25.611 DECREASED RANGE OF MOTION OF RIGHT SHOULDER: Primary | ICD-10-CM

## 2023-05-11 DIAGNOSIS — R52 PAIN: ICD-10-CM

## 2023-05-11 PROCEDURE — 97140 MANUAL THERAPY 1/> REGIONS: CPT

## 2023-05-11 PROCEDURE — 97110 THERAPEUTIC EXERCISES: CPT

## 2023-05-11 NOTE — PROGRESS NOTES
[facility]  Occupational Therapy Treatment Note    Date: 5/11/2023  Name: Laverne Humphries  Clinic Number: [id number]    Therapy Diagnosis:   Encounter Diagnoses   Name Primary?    Decreased range of motion of right shoulder Yes    Localized edema     Pain          Physician: Luis Angel Wheeler MD    Physician Orders:Eval and treat;  s/p RCR/lymphedema treatments into hand  Medical Diagnosis: Z98.890 (ICD-10-CM) - S/P right rotator cuff repair  Surgical Procedure and Date: 3/21/2023,   1. Right shoulder Arthroscopic massive complex rotator cuff repair    2. Right shoulder Arthroscopic extensive debridement   3. Right shoulder Arthroscopic lysis of adhesions   4. Right shoulder Arthroscopic subacromial decompression and bursectomy   5. Right shoulder arthroscopic loose body removal    Evaluation Date: 5/3/2023  Insurance Authorization Period Expiration: 05/07/2023  Plan of Care Expiration: 12 weeks; 7/28/2023  Date of Return to MD: 7/3/2023  Visit # / Visits authorized: 1 / 1  FOTO: (date and score)     Precautions:  Standard     Time In:     08:30 AM   Time Out:  09:20 AM   Total Appointment Time (timed & untimed codes): 50 minutes       SUBJECTIVE     Pt reports: pain in the hand. No issues with shoulder   She was compliant with home exercise program given last session.   Response to previous treatment:first tx  Functional change: none noted to this date     Pain: 0/10  Location: right hand     OBJECTIVE   Objective Measures updated at progress report unless specified.    Observation/Appearance: mod edema in hand      Edema. Measured in centimeters.    5/9/2023 5/9/2023     Left Right   2in. Above elbow       2in. Below elbow       Wrist Crease 14.0 cm  16.0 cm    Figure of 8       MCPs 18.4.0 cm  20.5 cm            Shoulder ROM. Measured in degrees    5/3/2022 5/3/2023 5/11/2023     Left Right PROM Right PROM   Shoulder Flexion   85 degrees  90   Shoulder Abduction   NT 80    Shoulder Extension    NT NT    Shoulder IR   NT NT   Shoulder ER   NT 20         Elbow and Wrist ROM. Measured in degrees.    5/9/2023 5/9/2023     Left Right   Elbow Ext/Flex WNL/WNL  12/135    Supination/Pronation WNL/WNL  66/75   Wrist Ext/Flex 55/70 39/6   Wrist RD/UD             Hand ROM. Measured in degrees.    5/9/2023 5/9/2023     Left Right           Index: MP    68              PIP       50              DIP   30              PITTS   148           Long:  MP   65              PIP   58              DIP   62              PITTS   185           Ring:   MP   55              PIP   60              DIP   32              PITTS   147                  Strength (Dynamometer) and Pinch Strength (Pinch Gauge)  Measured in pounds.    5/9/2023 5/9/2023     Left Right   Rung II   Deferred    Key Pinch       3pt Pinch       2pt Pinch             Manual Muscle Test    5/9/2023 5/9/2023     Left Right   Wrist Extension        Wrist Flexion       Radial Deviation       Ulnar Deviation       Supination       Pronation       Elbow Extension       Elbow Flexion             Limitation/Restriction for FOTO initial eval; shoulder Survey     Therapist reviewed FOTO scores for Laverne Humphries on 5/3/2023.   FOTO documents entered into [employer] - see Media section.     Limitation Score: needs to be taken%             Treatment     Leovidia received the treatments listed below:     Manual therapy techniques: Manual Lymphatic Drainage were applied to the: RUE for 30 minutes, including:  - retrograde massage to RUE  - scar tissue mobilization  - passive  gentle ROM to flexion/scaption/abduction/ER x 10 reps each       Therapeutic exercises to develop ROM for 25 minutes, including:  - shoulder rolls forward/backward x 10 reps each (2 sets)   - pendulums (CW/CCW, forward/backward, side/side) x 10 reps each (2 sets)   - shoulder raises x 10 reps (2 sets)   - scap retraction x 10 reps (2 sets)        Patient Education and Home Exercises       Education provided:   - pillow under arm when sitting down watching TV   - Progress towards goals     Written Home Exercises Provided: Patient instructed to cont prior HEP.  Exercises were reviewed and Michelleboojaylinmable was able to demonstrate them prior to the end of the session.  Laverne demonstrated good  understanding of the HEP provided. See EMR under Patient Instructions for exercises provided during therapy sessions.      ASSESSMENT     Good alex to tx today. Min increase in GH tightness. Cont to avoid heat due to increased edema within RUE. Noted improved ROM in composite fist     Laverne is progressing well towards her goals and there are no updates to goals at this time. Pt prognosis is fair.     Pt will continue to benefit from skilled outpatient occupational therapy to address the deficits listed in the problem list on initial evaluation, provide pt/family education and to maximize pt's level of independence in the home and community environment.     Pt's spiritual, cultural and educational needs considered and pt agreeable to plan of care and goals.    Anticipated barriers to occupational therapy: preexisting conditions     Goals:  Long term goals:   Pt will achieve shoulder AROM in flexion/scaption/abduction ~120 degrees   2.   Assess MMT when appropriate  3.   Pt will demo shoulder AROM WFL to perform daily and community activities   4.   Pt will report improvement in FOTO score by measuring 20% points.         Short term goals:   Pt will be complaint with HEP and pain management   Pt will achieve shoulder PROM in flexion and scaption ~110 degrees   Pt will report improvement in FOTO by decreasing 10% limitation points   Pt will achieve shoulder AAROM WFL to aid in ADLs   Pt will be able to demo full composite fist with R hand.    PLAN     Continue skilled occupational therapy with individualized plan of care focusing on improving functional independence with use of RUE    Updates/Grading for next  session: cont per large massive type III protocol     Shaunna Stevenson, OT

## 2023-05-11 NOTE — PATIENT INSTRUCTIONS
[facility]  OCCUPATIONAL THERAPY  HOME EXERCISE PROGRAM     Complete the following exercises with 10 repetitions, 3-4x/day.         Start with erect posture. Lower shoulders.       Maintaining erect posture, draw shoulders back while bringing elbows back and inward.      Sit close to table edge and rest arm on surface with elbow straight. Slide entire arm forward as far as you can, then back. Use only shoulder blade movements. Place towel under arm for easier sliding.    Copyright © [facility]. All rights reserved.           Bend forward 90º at waist, leaning on table for support.   Rock body from side to side and let arm swing freely.  Repeat 10 times. Do 3-4 sessions per day.    Bend forward 90° at waist, leaning on table for support.   Rock body in a circular pattern to move arm clockwise 10 times then counterclockwise 1 times. Do 3-4 sessions per day.    Bend forward 90º at waist, using table for support. Rock   body forward and back to swing arm. Repeat 10 times. Do 3-4 sessions   per day.  Copyright © I. All rights reserved.              Shaunna Stevenson, WILFREDOR/L   Occupational Therapist

## 2023-05-15 NOTE — PLAN OF CARE
[facility]  Occupational Therapy Initial Evaluation    Date: 5/8/2023  Name: Laverne Humphries  Clinic Number: [id number]    Therapy Diagnosis:   Encounter Diagnosis   Name Primary?    Localized edema Yes     Physician: Luis Angel Wheeler MD    Physician Orders: OT Eval and Treat  Medical Diagnosis:   Z98.890 (ICD-10-CM) - S/P right rotator cuff repair   I89.0 (ICD-10-CM) - Lymphedema of extremity     Evaluation Date: 5/8/2023  Insurance Authorization Period Expiration: 7/5/2023  Plan of Care Certification Period: 7/31/2023  Visit # / Visits authorized: 4 / 20  FOTO: see media, 1 / 3    Precautions:  Standard (cleared by MD for compression and manual lymphatic drainage)    Time In: 5:37  Time Out: 6:31  Total Appointment Time (timed & untimed codes): 54 minutes    SUBJECTIVE     Date of Onset: March 2023  Prior Therapy: No    History of Current Condition/Mechanism of Injury: Laverne Humphries is a 82 y.o. female who presents to [facility] for evaluation secondary to lymphedema. Patient was referred to therapy by Luis Angel Wheeler MD , which is the patient's orthopedic surgeon.    Surgical Procedure and Date: 3/21/2023,   1. Right shoulder Arthroscopic massive complex rotator cuff repair    2. Right shoulder Arthroscopic extensive debridement   3. Right shoulder Arthroscopic lysis of adhesions   4. Right shoulder Arthroscopic subacromial decompression and bursectomy   5. Right shoulder arthroscopic loose body removal      Patient reports edema began after surgery and has not resolved. She denies hx of cancer, lymph node removal, DVT, skin infection, peripheral vascular disease. She has been wearing edema wear size small sleeve MCP to axilla and edema glove size small provided during orthopedic OT evaluation, however, significant edema at hand persists.     Falls: No    Occupation/Working presently: retired    Functional  Limitations/Social History:    Previous functional status includes: Independent with all ADLs.     Current Functional Status   Home/Living environment: lives alone      Limitation of Functional Status as follows:   ADLs/IADLs:     - Feeding: none    - Bathing: none    - Dressing/Grooming: none    - Driving: none      Pain:  Functional Pain Scale Rating 0-10: Current 0/10, worst 0/10, best 0/10       Patient's Goals for Therapy: reduce edema in hand    Pt denies CHF, KF, DVT, CA, infection, severe PAD    Imaging: no recent imaging on file, cleared by MD    Medical History:   Past Medical History:   Diagnosis Date    Allergy     Anxiety     Arthritis     Asthma     mild    Cataract     CKD (chronic kidney disease) stage 3, GFR 30-59 ml/min     Hepatitis     IBS (irritable bowel syndrome)     Moderate aortic valve stenosis     Osteoporosis     Paroxysmal atrial fibrillation     Reflux esophagitis     Torus palatinus        Surgical History:    has a past surgical history that includes Cataract extraction, bilateral; Cataract extraction w/  intraocular lens implant (Bilateral); Blepharoptosis repair (Bilateral); Cholecystectomy; Hysterectomy; Eye surgery; Oophorectomy; Esophagogastroduodenoscopy (N/A, 01/20/2022); Colonoscopy (N/A, 01/20/2022); Robot-assisted laparoscopic colectomy using da Gera Xi (N/A, 4/26/2022); Flexible sigmoidoscopy (4/26/2022); Arthroscopic debridement of shoulder (Right, 3/21/2023); Arthroscopy of shoulder with decompression of subacromial space (Right, 3/21/2023); Arthroscopic repair of rotator cuff of shoulder (Right, 3/21/2023); and lysis, adhesions, shoulder, arthroscopic (3/21/2023).    Medications:   has a current medication list which includes the following prescription(s): albuterol, apixaban, azelastine, calcium-vitamin d3, carboxymethylcellulose, diclofenac sodium, gabapentin, lorazepam, metoprolol succinate, nystatin, omeprazole, ondansetron, rosuvastatin, sodium chloride, tramadol,  "and triamcinolone, and the following Facility-Administered Medications: fentanyl, lidocaine (pf) 10 mg/ml (1%), midazolam, mupirocin, and ropivacaine (pf) 2 mg/ml (0.2%).    Allergies:   Review of patient's allergies indicates:   Allergen Reactions    Codeine      Other reaction(s): Syncope, can take tylenol    Sulfa (sulfonamide antibiotics)      Other reaction(s): Stomach upset          OBJECTIVE     Patient arrived wearing edema wear sleeve and edema glove on RUE. Daughter present. In NAD.     Affected Areas: RUE  Stemmer Sign: positive  Shape: distal>proximal, fluid accumulation wrist to digits   Tissue Texture: soft, pliable, pitting  Skin Integrity: intact  Sensation: intact  Circulation: intact    UE Girth Measurements:  LANDMARK RIGHT UE  5/8   Axilla -   E + 4" -   E + 2"  -   Elbow 23.5 cm   W + 4" 18.5 cm   W + 2" 16.0 cm   Wrist 15.5 cm   MCP 19.0 cm   IP 6.5 cm               Treatment   Total Treatment time (time-based codes) separate from Evaluation: 25 minutes    Laverne received the treatments listed below:     Manual therapy techniques: were applied for 25 minutes, including:    MANUAL LYMPHATIC DRAINAGE (MLD):    While supine with LEs elevated stimulation at terminus, along GI region, R axillary region, drainage of entire RUE targeting upper arm, cubital region, forearm, hand, and digits with return proximally.    MULTILAYERED BANDAGING:  issued supplies and bandaged RUE with cotton stockinette, cellona padding, elastic gauze bandages, 1 6 cm Rosidal K bandage digits to wrist, to leave intact 24 hrs as tolerated, discontinue with any problems, return rolled bandages next session. Wash and wear schedules confirmed.      Edema wear sleeve donned over bandages MCP to axilla. Pt to remove sleeve at night, sultana in the AM as before.         Patient Education and Home Exercises      Education provided:   1. Educated on definition of lymphedema.  2. Explained the Complete Decongestive Therapy protocol in " depth  3. Educated on Phase 1 and 2 of protocol.  4. Reviewed treatment frequency and likely duration of weeks  5.Contraindications for treatment.  6. Plan of care and goals.  7. Educated on home management protocols.     Home Exercises and Patient Education Provided  Education provided:   - Pt was educated in lymphedema etiology and management plans.  Pt was provided with written risk reductions and precautions for managing lymphedema.     Patient/Family Education: role of OT, goals for OT, scheduling/cancellations - pt verbalized understanding. Discussed insurance limitations with patient.      ASSESSMENT     Laverne Humphries is a 82 y.o. female referred to outpatient occupational therapy and presents with a medical diagnosis of lymphedema secondary to surgery. Lymphedema, left untreated increases risk of infection, gait deviation causing orthopedic problems and poor body image. Patient presents with the following therapy deficits: lymphedema of RUE and demonstrates limitations as described in the chart below. Following medical record review it is determined that pt will benefit from complete decongestive therapy services for the treatment and management of this chronic condition. The following goals were discussed with the patient and patient is in agreement with them as to be addressed in the treatment plan. The patient's rehab potential is Good.     Anticipated barriers to occupational therapy: none  Pt has no cultural, educational or language barriers to learning provided.    Profile and History Assessment of Occupational Performance Level of Clinical Decision Making Complexity Score   Occupational Profile:   Laverne Humphries is a 82 y.o. female who lives alone and is retired Laverne Humphries has difficulty with  ADLs and IADLs as listed previously, which  Affecting herdaily functional abilities.      Comorbidities:    has a past medical history of Allergy, Anxiety, Arthritis, Asthma, Cataract, CKD  (chronic kidney disease) stage 3, GFR 30-59 ml/min, Hepatitis, IBS (irritable bowel syndrome), Moderate aortic valve stenosis, Osteoporosis, Paroxysmal atrial fibrillation, Reflux esophagitis, and Torus palatinus.    Medical and Therapy History Review:   Expanded               Performance Deficits    Physical:  Joint Mobility  Muscle Power/Strength  Edema    Cognitive:  No Deficits    Psychosocial:    No Deficits     Clinical Decision Making:  moderate    Assessment Process:  Detailed Assessments    Modification/Need for Assistance:  Minimal-Moderate Modifications/Assistance    Intervention Selection:  Several Treatment Options       moderate  Based on PMHX, co morbidities , data from assessments and functional level of assistance required with task and clinical presentation directly impacting function.       The following goals were discussed with the patient and patient is in agreement with them as to be addressed in the treatment plan.     Goals:    Short Term Goals for 2 weeks:   1. Patient and/or caregiver will demonstrate understanding of lymphedema precautions to decrease the risk of infection and lymphedema exacerbation. - Ongoing   2. Patient will tolerate daily activities wearing multilayered bandaging.- Ongoing   3. Patient and/or caregiver will order/obtain appropriate compression garments- Ongoing   4. Patient will experience a decrease in girth of affected areas of 1 cm- Ongoing      Long Term Goals for 4 weeks:   1. Patient and/or caregiver will be independent with donning and doffing of compression garments.  2. Patient and/or caregiver will be independent in self-bandaging and self MLD techniques.  3. Patient and/or caregiver will be independent with HEP and lymphedema management to help prevent edema relapse and reduce risk of infection.  4. Patient will experience a decreased in girth of affected areas of 3 cm         PLAN   Plan of Care Certification: 5/8/2023 to 7/31/2023.     Outpatient  Occupational Therapy 1 times weekly for 4 weeks to include the following interventions: Manual therapy/joint mobilizations, Therapeutic exercises/activities., and Edema Control.    Therapy Track: COMPLETE DECONGESTIVE THERAPY  Interventions: manual lymphatic drainage, multi-layer bandaging, compression garments, therapeutic exercise, self bandaging and manual lymphatic drainage training, home exercise programming.      Hortensia Abarca, OT, CLWT      I CERTIFY THE NEED FOR THESE SERVICES FURNISHED UNDER THIS PLAN OF TREATMENT AND WHILE UNDER MY CARE  Physician's comments:      Physician's Signature: ___________________________________________________

## 2023-05-17 ENCOUNTER — CLINICAL SUPPORT (OUTPATIENT)
Dept: REHABILITATION | Facility: HOSPITAL | Age: 83
End: 2023-05-17
Payer: MEDICARE

## 2023-05-17 DIAGNOSIS — M25.611 DECREASED RANGE OF MOTION OF RIGHT SHOULDER: ICD-10-CM

## 2023-05-17 DIAGNOSIS — R52 PAIN: ICD-10-CM

## 2023-05-17 DIAGNOSIS — R60.0 LOCALIZED EDEMA: Primary | ICD-10-CM

## 2023-05-17 PROCEDURE — 97140 MANUAL THERAPY 1/> REGIONS: CPT

## 2023-05-17 PROCEDURE — 97110 THERAPEUTIC EXERCISES: CPT

## 2023-05-18 NOTE — PROGRESS NOTES
[facility]  Occupational Therapy Treatment Note    Date: 5/17/2023  Name: Laverne CELIS [facility] Number: [id number]    Time In: 2:00  Time Out: 2:45  Total Billable Time: 45 minutes    Therapy Diagnosis:   Encounter Diagnoses   Name Primary?    Localized edema Yes    Decreased range of motion of right shoulder     Pain      Physician: Luis Angel Wheeler MD  Physician Orders: OT Eval and Treat  Medical Diagnosis:   Z98.890 (ICD-10-CM) - S/P right rotator cuff repair   I89.0 (ICD-10-CM) - Lymphedema of extremity      Evaluation Date: 5/8/2023  Insurance Authorization Period Expiration: 7/5/2023  Plan of Care Certification Period: 7/31/2023  Visit # / Visits authorized: 5 / 20  FOTO: see media, 1 / 3     Precautions:  Standard (cleared by MD for compression and manual lymphatic drainage)      SUBJECTIVE     Pt reports: bandages were comfortable, dorsum of hand has reduced.   Response to previous treatment:edema reduced at hand, digit edema persists   Functional change: none    Pain: 0/10    OBJECTIVE     Pt arrived wearing edema glove and edema wear sleeve, daughter present.     Objective Measures updated at progress report unless specified.    Treatment     Laverne received the treatments listed below:     Manual therapy techniques were applied for 45 minutes, including:     MANUAL LYMPHATIC DRAINAGE (MLD):    While supine with LEs elevated stimulation at terminus, along GI region, R axillary region, drainage of entire RUE targeting upper arm, cubital region, forearm, hand, and digits with return proximally.     MULTILAYERED BANDAGING:  bandaged digits and dorsum of hand with gauze rolls, Edema wear sleeve donned over MCP to axilla. Pt to remove sleeve at night, sultana in the AM as before. Gauze rolls to stay in place 48 hrs as tolerated. Discontinue immediately with any issues, numbness, pain, or tingling.     Patient Education and Home Exercises      Education provided:   1. Educated  on cause of lymphedema.  2. Explained the Complete Decongestive Therapy protocol in depth  3. Educated on Phase 1 and 2 of protocol.  4. Reviewed treatment frequency and likely duration of weeks  5. Precautions and contraindications for treatment.  6. Plan of care and goals.  7. Educated on home management protocols.          Assessment     Edema reduced at hand though persists at fingers. Increased tension applied to digits to target fluid. Pt to remove immediately if pain, numbness, or tingling occurs.     Pt would continue to benefit from skilled OT.      Laverne is progressing well towards her goals and there are no updates to goals at this time. Pt prognosis is Good.     Pt will continue to benefit from skilled outpatient occupational therapy to address the deficits listed in the problem list on initial evaluation provide pt/family education and to maximize pt's level of independence in the home and community environment.     Pt's spiritual, cultural and educational needs considered and pt agreeable to plan of care and goals.    Anticipated barriers to occupational therapy: none    Goals:     Short Term Goals for 2 weeks:   1. Patient and/or caregiver will demonstrate understanding of lymphedema precautions to decrease the risk of infection and lymphedema exacerbation. - Ongoing   2. Patient will tolerate daily activities wearing multilayered bandaging.- Ongoing   3. Patient and/or caregiver will order/obtain appropriate compression garments- Ongoing   4. Patient will experience a decrease in girth of affected areas of 1 cm- Ongoing      Long Term Goals for 4 weeks:   1. Patient and/or caregiver will be independent with donning and doffing of compression garments.  2. Patient and/or caregiver will be independent in self-bandaging and self MLD techniques.  3. Patient and/or caregiver will be independent with HEP and lymphedema management to help prevent edema relapse and reduce risk of infection.  4. Patient  will experience a decreased in girth of affected areas of 3 cm       PLAN     Continue plan of care 1 times weekly for 4 weeks to include the following interventions: Manual therapy/joint mobilizations, Therapeutic exercises/activities., and Edema Control.    Hortensia Abarca, OT, CLWT

## 2023-05-19 ENCOUNTER — CLINICAL SUPPORT (OUTPATIENT)
Dept: REHABILITATION | Facility: HOSPITAL | Age: 83
End: 2023-05-19
Payer: MEDICARE

## 2023-05-19 ENCOUNTER — PATIENT MESSAGE (OUTPATIENT)
Dept: REHABILITATION | Facility: HOSPITAL | Age: 83
End: 2023-05-19

## 2023-05-19 DIAGNOSIS — M25.611 DECREASED RANGE OF MOTION OF RIGHT SHOULDER: Primary | ICD-10-CM

## 2023-05-19 DIAGNOSIS — R60.0 LOCALIZED EDEMA: ICD-10-CM

## 2023-05-19 DIAGNOSIS — R52 PAIN: ICD-10-CM

## 2023-05-19 PROCEDURE — 97110 THERAPEUTIC EXERCISES: CPT

## 2023-05-19 PROCEDURE — 97140 MANUAL THERAPY 1/> REGIONS: CPT

## 2023-05-19 NOTE — PROGRESS NOTES
"    [facility]  Occupational Therapy Treatment Note    Date: 5/19/2023  Name: Laverne Humphries  [facility] Number: [id number]    Therapy Diagnosis:   Encounter Diagnoses   Name Primary?    Decreased range of motion of right shoulder Yes    Localized edema     Pain              Physician: Luis Angel Wheeler MD    Physician Orders:Eval and treat;  s/p RCR/lymphedema treatments into hand  Medical Diagnosis: Z98.890 (ICD-10-CM) - S/P right rotator cuff repair  Surgical Procedure and Date: 3/21/2023,   1. Right shoulder Arthroscopic massive complex rotator cuff repair    2. Right shoulder Arthroscopic extensive debridement   3. Right shoulder Arthroscopic lysis of adhesions   4. Right shoulder Arthroscopic subacromial decompression and bursectomy   5. Right shoulder arthroscopic loose body removal    Evaluation Date: 5/3/2023  Insurance Authorization Period Expiration: 05/07/2023  Plan of Care Expiration: 12 weeks; 7/28/2023  Date of Return to MD: 7/3/2023  Visit # / Visits authorized: 1 / 1  FOTO: (date and score)     Precautions:  Standard     Time In:     01:00  pM   Time Out:  02:00 pM   Total Appointment Time (timed & untimed codes): 60 minutes     8 weeks 1 day s/p (5/17/2023)   SUBJECTIVE     Pt reports: "Had pain on Saturday but I switched my pillow and the next morning it was okay."   She was compliant with home exercise program given last session.   Response to previous treatment: increased composite fist   Functional change: pulling up pants with B hands     Pain: 0/10  Location: right hand     OBJECTIVE   Objective Measures updated at progress report unless specified.    Observation/Appearance: mod edema in hand      Edema. Measured in centimeters.    5/9/2023 5/9/2023     Left Right   2in. Above elbow       2in. Below elbow       Wrist Crease 14.0 cm  16.0 cm    Figure of 8       MCPs 18.4.0 cm  20.5 cm            Shoulder ROM. Measured in degrees    5/3/2022 5/3/2023 5/11/2023     " Left Right PROM Right PROM   Shoulder Flexion   85 degrees  90   Shoulder Abduction   NT 80   Shoulder Extension    NT NT    Shoulder IR   NT NT   Shoulder ER   NT 20         Elbow and Wrist ROM. Measured in degrees.    5/9/2023 5/9/2023     Left Right   Elbow Ext/Flex WNL/WNL  12/135    Supination/Pronation WNL/WNL  66/75   Wrist Ext/Flex 55/70 39/6   Wrist RD/UD             Hand ROM. Measured in degrees.    5/9/2023 5/9/2023     Left Right           Index: MP    68              PIP       50              DIP   30              PITTS   148           Long:  MP   65              PIP   58              DIP   62              PITTS   185           Ring:   MP   55              PIP   60              DIP   32              PITTS   147                  Strength (Dynamometer) and Pinch Strength (Pinch Gauge)  Measured in pounds.    5/9/2023 5/9/2023     Left Right   Rung II   Deferred    Key Pinch       3pt Pinch       2pt Pinch             Manual Muscle Test    5/9/2023 5/9/2023     Left Right   Wrist Extension        Wrist Flexion       Radial Deviation       Ulnar Deviation       Supination       Pronation       Elbow Extension       Elbow Flexion             Limitation/Restriction for FOTO initial eval; shoulder Survey     Therapist reviewed FOTO scores for Laverne Humphries on 5/3/2023.   FOTO documents entered into [employer] - see Media section.     Limitation Score: needs to be taken%             Treatment     Leovidia received the treatments listed below:       Supervised modalities: MHP for 10 minutes to promote increased blood flow       Manual therapy techniques: Manual Lymphatic Drainage were applied to the: RUE for 30 minutes, including:  - retrograde massage to RUE  - scar tissue mobilization  - passive  gentle ROM to flexion/scaption/abduction/ER/IR  x 10 reps each (2 sets)      Therapeutic exercises to develop ROM for 25 minutes, including:  - shoulder rolls forward/backward x 10 reps each (2 sets)   - pendulums  (CW/CCW, forward/backward, side/side) x 10 reps each (2 sets)   - shoulder raises x 10 reps (2 sets)   - scap retraction/protraction x 10 reps (2 sets)  - towel walks for grasping (1 min)         Patient Education and Home Exercises      Education provided:   - pillow under arm when sitting down watching TV   - Progress towards goals     Written Home Exercises Provided: Patient instructed to cont prior HEP.  Exercises were reviewed and Laverne was able to demonstrate them prior to the end of the session.  Laverne demonstrated good  understanding of the HEP provided. See EMR under Patient Instructions for exercises provided during therapy sessions.      ASSESSMENT     Demonstrated increased composite fist today secondary to decreased edema. Able to alex min increase in shoulder passive ROM today. Tightness with scaption.     Laverne is progressing well towards her goals and there are no updates to goals at this time. Pt prognosis is fair.     Pt will continue to benefit from skilled outpatient occupational therapy to address the deficits listed in the problem list on initial evaluation, provide pt/family education and to maximize pt's level of independence in the home and community environment.     Pt's spiritual, cultural and educational needs considered and pt agreeable to plan of care and goals.    Anticipated barriers to occupational therapy: preexisting conditions     Goals:  Long term goals:   Pt will achieve shoulder AROM in flexion/scaption/abduction ~120 degrees   2.   Assess MMT when appropriate  3.   Pt will demo shoulder AROM WFL to perform daily and community activities   4.   Pt will report improvement in FOTO score by measuring 20% points.         Short term goals:   Pt will be complaint with HEP and pain management   Pt will achieve shoulder PROM in flexion and scaption ~110 degrees   Pt will report improvement in FOTO by decreasing 10% limitation points   Pt will achieve shoulder AAROM WFL to aid  in ADLs   Pt will be able to demo full composite fist with R hand.    PLAN     Continue skilled occupational therapy with individualized plan of care focusing on improving functional independence with use of RUE    Updates/Grading for next session: cont per large massive type III protocol     Shaunna Stevenson, OT

## 2023-05-23 ENCOUNTER — CLINICAL SUPPORT (OUTPATIENT)
Dept: REHABILITATION | Facility: HOSPITAL | Age: 83
End: 2023-05-23
Payer: MEDICARE

## 2023-05-23 DIAGNOSIS — R52 PAIN: ICD-10-CM

## 2023-05-23 DIAGNOSIS — R60.0 LOCALIZED EDEMA: ICD-10-CM

## 2023-05-23 DIAGNOSIS — M25.611 DECREASED RANGE OF MOTION OF RIGHT SHOULDER: Primary | ICD-10-CM

## 2023-05-23 PROCEDURE — 97110 THERAPEUTIC EXERCISES: CPT

## 2023-05-23 PROCEDURE — 97140 MANUAL THERAPY 1/> REGIONS: CPT

## 2023-05-23 NOTE — PROGRESS NOTES
"    [facility]  Occupational Therapy Treatment Note    Date: 5/23/2023  Name: Laverne Humphries  [facility] Number: [id number]    Therapy Diagnosis:   Encounter Diagnoses   Name Primary?    Decreased range of motion of right shoulder Yes    Localized edema     Pain              Physician: Luis Angel Wheeler MD    Physician Orders:Eval and treat;  s/p RCR/lymphedema treatments into hand  Medical Diagnosis: Z98.890 (ICD-10-CM) - S/P right rotator cuff repair  Surgical Procedure and Date: 3/21/2023,   1. Right shoulder Arthroscopic massive complex rotator cuff repair    2. Right shoulder Arthroscopic extensive debridement   3. Right shoulder Arthroscopic lysis of adhesions   4. Right shoulder Arthroscopic subacromial decompression and bursectomy   5. Right shoulder arthroscopic loose body removal    Evaluation Date: 5/3/2023  Insurance Authorization Period Expiration: 05/07/2023  Plan of Care Expiration: 12 weeks; 7/28/2023  Date of Return to MD: 7/3/2023  Visit # / Visits authorized: 1 / 1  FOTO: (date and score)     Precautions:  Standard     Time In:     10:08   AM   Time Out:  11:10  AM   Total Appointment Time (timed & untimed codes): 62  minutes     9 weeks 1 day s/p (5/23/2023)   SUBJECTIVE     Pt reports:"my hand feels so much better. I was able to fix my pancakes for breakfast"   She was compliant with home exercise program given last session.   Response to previous treatment: increased composite fist, decreased edema   Functional change:  preparing meals, eating, brushing teeth with R hand     Pain: 0/10  Location: right hand     OBJECTIVE   Objective Measures updated at progress report unless specified.    Observation/Appearance: mod edema in hand      Edema. Measured in centimeters.    5/9/2023 5/9/2023     Left Right   2in. Above elbow       2in. Below elbow       Wrist Crease 14.0 cm  16.0 cm    Figure of 8       MCPs 18.4.0 cm  20.5 cm            Shoulder ROM. Measured in degrees   "  5/3/2022 5/3/2023 5/11/2023     Left Right PROM Right PROM   Shoulder Flexion   85 degrees  90   Shoulder Abduction   NT 80   Shoulder Extension    NT NT    Shoulder IR   NT NT   Shoulder ER   NT 20         Elbow and Wrist ROM. Measured in degrees.    5/9/2023 5/9/2023     Left Right   Elbow Ext/Flex WNL/WNL  12/135    Supination/Pronation WNL/WNL  66/75   Wrist Ext/Flex 55/70 39/6   Wrist RD/UD             Hand ROM. Measured in degrees.    5/9/2023 5/9/2023     Left Right           Index: MP    68              PIP       50              DIP   30              PITTS   148           Long:  MP   65              PIP   58              DIP   62              PITTS   185           Ring:   MP   55              PIP   60              DIP   32              PITTS   147                  Strength (Dynamometer) and Pinch Strength (Pinch Gauge)  Measured in pounds.    5/9/2023 5/9/2023     Left Right   Rung II   Deferred    Key Pinch       3pt Pinch       2pt Pinch             Manual Muscle Test    5/9/2023 5/9/2023     Left Right   Wrist Extension        Wrist Flexion       Radial Deviation       Ulnar Deviation       Supination       Pronation       Elbow Extension       Elbow Flexion             Limitation/Restriction for FOTO initial eval; shoulder Survey     Therapist reviewed FOTO scores for Laverne Humphries on 5/3/2023.   FOTO documents entered into [employer] - see Media section.     Limitation Score: needs to be taken%             Treatment     Prakasha received the treatments listed below:       Supervised modalities: MHP for 10 minutes to promote increased blood flow         Manual therapy techniques: Manual Lymphatic Drainage were applied to the: RUE for 40 minutes, including:  - passive ROM to flexion/scaption/abduction/ER/IR  x 10 reps each (3 sets)  - gentle inferior mobs   - edema wrapping R hand         Therapeutic exercises to develop ROM for 15 minutes, including:  - shoulder rolls forward/backward x 10 reps each (2  sets)   - pendulums (CW/CCW, forward/backward, side/side) x 10 reps each (2 sets)   - shoulder raises x 10 reps (2 sets)   - scap retraction/protraction x 10 reps (2 sets)  - towel walks for grasping (1 min)         Patient Education and Home Exercises      Education provided:   - pillow under arm when sitting down watching TV   - Progress towards goals     Written Home Exercises Provided: Patient instructed to cont prior HEP.  Exercises were reviewed and Laverne was able to demonstrate them prior to the end of the session.  Laverne demonstrated good  understanding of the HEP provided. See EMR under Patient Instructions for exercises provided during therapy sessions.      ASSESSMENT     Able to alex min increase in shoulder passive ROM today. Decreased shoulder flexion ROM with elbow flexed to 90. Able to get ~ 110-115 degrees PROM shoulder flexion today.     Laverne is progressing well towards her goals and there are no updates to goals at this time. Pt prognosis is fair.     Pt will continue to benefit from skilled outpatient occupational therapy to address the deficits listed in the problem list on initial evaluation, provide pt/family education and to maximize pt's level of independence in the home and community environment.     Pt's spiritual, cultural and educational needs considered and pt agreeable to plan of care and goals.    Anticipated barriers to occupational therapy: preexisting conditions     Goals:  Long term goals:   Pt will achieve shoulder AROM in flexion/scaption/abduction ~120 degrees   2.   Assess MMT when appropriate  3.   Pt will demo shoulder AROM WFL to perform daily and community activities   4.   Pt will report improvement in FOTO score by measuring 20% points.         Short term goals:   Pt will be complaint with HEP and pain management   Pt will achieve shoulder PROM in flexion and scaption ~110 degrees   Pt will report improvement in FOTO by decreasing 10% limitation points   Pt will  achieve shoulder AAROM WFL to aid in ADLs   Pt will be able to demo full composite fist with R hand.    PLAN     Continue skilled occupational therapy with individualized plan of care focusing on improving functional independence with use of RUE    Updates/Grading for next session: cont per large massive type III protocol     Shaunna Stevenson, OT

## 2023-05-24 ENCOUNTER — CLINICAL SUPPORT (OUTPATIENT)
Dept: REHABILITATION | Facility: HOSPITAL | Age: 83
End: 2023-05-24
Attending: ORTHOPAEDIC SURGERY
Payer: MEDICARE

## 2023-05-24 DIAGNOSIS — R60.0 LOCALIZED EDEMA: Primary | ICD-10-CM

## 2023-05-24 PROCEDURE — 97140 MANUAL THERAPY 1/> REGIONS: CPT

## 2023-05-24 NOTE — PROGRESS NOTES
[facility]  Occupational Therapy Treatment Note    Date: 5/24/2023  Name: Laverne CELIS [place]  [facility] Number: [id number]    Time In: 10:00  Time Out: 10:45  Total Billable Time: 45 minutes    Therapy Diagnosis:   Encounter Diagnosis   Name Primary?    Localized edema Yes     Physician: Luis Angel Wheeler MD  Physician Orders: OT Eval and Treat  Medical Diagnosis:   Z98.890 (ICD-10-CM) - S/P right rotator cuff repair   I89.0 (ICD-10-CM) - Lymphedema of extremity      Evaluation Date: 5/8/2023  Insurance Authorization Period Expiration: 7/5/2023  Plan of Care Certification Period: 7/31/2023  Visit # / Visits authorized: 6 / 20  FOTO: see media, 1 / 3     Precautions:  Standard (cleared by MD for compression and manual lymphatic drainage)      SUBJECTIVE     Pt reports: No concerns.   Response to previous treatment:edema reduced at hand, proximal digit edema persists   Functional change: none    Pain: 0/10    OBJECTIVE     Pt arrived with digit wrap intact, granddaughter present.     Objective Measures updated at progress report unless specified.    Treatment     Laverne received the treatments listed below:     Manual therapy techniques were applied for 45 minutes, including:     MANUAL LYMPHATIC DRAINAGE (MLD):    While supine with LEs elevated stimulation at terminus, along GI region, R axillary region, drainage of entire RUE targeting upper arm, cubital region, forearm, hand, and digits with return proximally.     MULTILAYERED BANDAGING:  bandaged digits and dorsum of hand with gauze rolls, Edema wear sleeve donned over MCP to wrist. Pt to remove sleeve at night, sultana in the AM as before. Gauze rolls to stay in place 48 hrs as tolerated. Discontinue immediately with any issues, discoloration, delayed capillary refill, numbness, pain, or tingling.     Patient Education and Home Exercises      Education provided:   1. Educated on cause of lymphedema.  2. Explained the Complete Decongestive  Therapy protocol in depth  3. Educated on Phase 1 and 2 of protocol.  4. Reviewed treatment frequency and likely duration of weeks  5. Precautions and contraindications for treatment.  6. Plan of care and goals.  7. Educated on home management protocols.          Assessment     Edema persists at proximal digits. Edema wear donned MCP to wrist only. Educated on plan to wear compression sleeve and digit wrap on plane to prevent exacerbation of edema. Pt to remove immediately if pain, numbness, or tingling occurs.     Pt would continue to benefit from skilled OT.      Laverne is progressing well towards her goals and there are no updates to goals at this time. Pt prognosis is Good.     Pt will continue to benefit from skilled outpatient occupational therapy to address the deficits listed in the problem list on initial evaluation provide pt/family education and to maximize pt's level of independence in the home and community environment.     Pt's spiritual, cultural and educational needs considered and pt agreeable to plan of care and goals.    Anticipated barriers to occupational therapy: none    Goals:     Short Term Goals for 2 weeks:   1. Patient and/or caregiver will demonstrate understanding of lymphedema precautions to decrease the risk of infection and lymphedema exacerbation. - Ongoing   2. Patient will tolerate daily activities wearing multilayered bandaging.- Ongoing   3. Patient and/or caregiver will order/obtain appropriate compression garments- Ongoing   4. Patient will experience a decrease in girth of affected areas of 1 cm- Ongoing      Long Term Goals for 4 weeks:   1. Patient and/or caregiver will be independent with donning and doffing of compression garments.  2. Patient and/or caregiver will be independent in self-bandaging and self MLD techniques.  3. Patient and/or caregiver will be independent with HEP and lymphedema management to help prevent edema relapse and reduce risk of infection.  4.  Patient will experience a decreased in girth of affected areas of 3 cm       PLAN     Continue plan of care 1 times weekly for 4 weeks to include the following interventions: Manual therapy/joint mobilizations, Therapeutic exercises/activities., and Edema Control.    Hortensia Abarca, OT, CLWT

## 2023-05-26 ENCOUNTER — CLINICAL SUPPORT (OUTPATIENT)
Dept: REHABILITATION | Facility: HOSPITAL | Age: 83
End: 2023-05-26
Payer: MEDICARE

## 2023-05-26 DIAGNOSIS — M25.611 DECREASED RANGE OF MOTION OF RIGHT SHOULDER: Primary | ICD-10-CM

## 2023-05-26 DIAGNOSIS — R52 PAIN: ICD-10-CM

## 2023-05-26 DIAGNOSIS — R60.0 LOCALIZED EDEMA: ICD-10-CM

## 2023-05-26 PROCEDURE — 97110 THERAPEUTIC EXERCISES: CPT

## 2023-05-26 PROCEDURE — 97140 MANUAL THERAPY 1/> REGIONS: CPT

## 2023-06-01 ENCOUNTER — PATIENT MESSAGE (OUTPATIENT)
Dept: PRIMARY CARE CLINIC | Facility: CLINIC | Age: 83
End: 2023-06-01
Payer: MEDICARE

## 2023-06-01 ENCOUNTER — CLINICAL SUPPORT (OUTPATIENT)
Dept: REHABILITATION | Facility: HOSPITAL | Age: 83
End: 2023-06-01
Payer: MEDICARE

## 2023-06-01 DIAGNOSIS — R60.0 LOCALIZED EDEMA: ICD-10-CM

## 2023-06-01 DIAGNOSIS — M25.611 DECREASED RANGE OF MOTION OF RIGHT SHOULDER: Primary | ICD-10-CM

## 2023-06-01 DIAGNOSIS — R52 PAIN: ICD-10-CM

## 2023-06-01 PROCEDURE — 97140 MANUAL THERAPY 1/> REGIONS: CPT

## 2023-06-01 PROCEDURE — 97110 THERAPEUTIC EXERCISES: CPT

## 2023-06-01 NOTE — PROGRESS NOTES
"  [facility]  Occupational Therapy Treatment Note    Date: 6/1/2023  Name: Laverne Humphries  [facility] Number: [id number]    Therapy Diagnosis:   Encounter Diagnoses   Name Primary?    Decreased range of motion of right shoulder Yes    Localized edema     Pain          Physician: Luis Angel Wheeler MD    Physician Orders:Eval and treat;  s/p RCR/lymphedema treatments into hand  Medical Diagnosis: Z98.890 (ICD-10-CM) - S/P right rotator cuff repair  Surgical Procedure and Date: 3/21/2023,   1. Right shoulder Arthroscopic massive complex rotator cuff repair    2. Right shoulder Arthroscopic extensive debridement   3. Right shoulder Arthroscopic lysis of adhesions   4. Right shoulder Arthroscopic subacromial decompression and bursectomy   5. Right shoulder arthroscopic loose body removal    Evaluation Date: 5/3/2023  Insurance Authorization Period Expiration: 05/07/2023  Plan of Care Expiration: 12 weeks; 7/28/2023  Date of Return to MD: 7/3/2023  Visit # / Visits authorized: 1 / 1  FOTO: (date and score)     Precautions:  Standard     Time In:     08:00  AM   Time Out:  09:00  AM   Total Appointment Time (timed & untimed codes): 60  minutes     10 weeks 4 day s/p (6/1/2023)   SUBJECTIVE     Pt reports: "my hand is doing much better I still cant hold anything heavy. Like opening fridge door"   She was compliant with home exercise program given last session.   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    Observation/Appearance: mod edema in hand      Edema. Measured in centimeters.    5/9/2023 5/9/2023     Left Right   2in. Above elbow       2in. Below elbow       Wrist Crease 14.0 cm  16.0 cm    Figure of 8       MCPs 18.4.0 cm  20.5 cm            Shoulder ROM. Measured in degrees    " 5/3/2022 5/3/2023 5/11/2023 6/1/2023     Left Right PROM Right PROM Right   Shoulder Flexion   85 degrees  90    Shoulder Abduction   NT 80    Shoulder Extension    NT NT     Shoulder IR   NT NT    Shoulder ER   NT 20          Elbow and Wrist ROM. Measured in degrees.    5/9/2023 5/9/2023     Left Right   Elbow Ext/Flex WNL/WNL  12/135    Supination/Pronation WNL/WNL  66/75   Wrist Ext/Flex 55/70 39/6   Wrist RD/UD             Hand ROM. Measured in degrees.    5/9/2023 5/9/2023     Left Right           Index: MP    68              PIP       50              DIP   30              PITTS   148           Long:  MP   65              PIP   58              DIP   62              PITTS   185           Ring:   MP   55              PIP   60              DIP   32              PITTS   147                  Strength (Dynamometer) and Pinch Strength (Pinch Gauge)  Measured in pounds.    5/9/2023 5/9/2023     Left Right   Rung II   Deferred    Key Pinch       3pt Pinch       2pt Pinch             Manual Muscle Test    5/9/2023 5/9/2023     Left Right   Wrist Extension        Wrist Flexion       Radial Deviation       Ulnar Deviation       Supination       Pronation       Elbow Extension       Elbow Flexion             Limitation/Restriction for FOTO initial eval; shoulder Survey     Therapist reviewed FOTO scores for Laverne Humphries on 5/3/2023.   FOTO documents entered into [employer] - see Media section.     Limitation Score: needs to be taken%             Treatment     Laverne received the treatments listed below:     Supervised modalities: MHP for 10 minutes to promote increased blood flow       Manual therapy techniques: Manual Lymphatic Drainage were applied to the: RUE for 15 minutes, including:  - passive ROM to flexion/scaption/abduction/ER/IR  x 15 reps each   - gentle inferior mobs   - STM anterior shoulder  - edema wrapping R hand       Therapeutic exercises to develop ROM for 30 minutes, including:  - shoulder rolls  forward/backward x 10 reps each (2 sets)   - pendulums (CW/CCW, forward/backward, side/side) x 10 reps each (2 sets)   - shoulder raises x 10 reps (2 sets)   - AAROM flexion/ER/Abduction with unweighted dowel x 10 reps each   - scap retraction/protraction x 10 reps (2 sets) with towel slide   - RTC isometrics against wall with towel x 10 reps each       Patient Education and Home Exercises      Education provided:   - pillow under arm when sitting down watching TV   - Progress towards goals     Written Home Exercises Provided: Patient instructed to cont prior HEP.  Exercises were reviewed and Betoj[employer] was able to demonstrate them prior to the end of the session.  Laverne demonstrated good  understanding of the HEP provided. See EMR under Patient Instructions for exercises provided during therapy sessions.      ASSESSMENT     Good alex with ELIELOM today     Laverne is progressing well towards her goals and there are no updates to goals at this time. Pt prognosis is fair.     Pt will continue to benefit from skilled outpatient occupational therapy to address the deficits listed in the problem list on initial evaluation, provide pt/family education and to maximize pt's level of independence in the home and community environment.     Pt's spiritual, cultural and educational needs considered and pt agreeable to plan of care and goals.    Anticipated barriers to occupational therapy: preexisting conditions     Goals:  Long term goals:   Pt will achieve shoulder AROM in flexion/scaption/abduction ~120 degrees   2.   Assess MMT when appropriate  3.   Pt will demo shoulder AROM WFL to perform daily and community activities   4.   Pt will report improvement in FOTO score by measuring 20% points.         Short term goals:   Pt will be complaint with HEP and pain management   Pt will achieve shoulder PROM in flexion and scaption ~110 degrees   Pt will report improvement in FOTO by decreasing 10% limitation points   Pt will  achieve shoulder AAROM WFL to aid in ADLs   Pt will be able to demo full composite fist with R hand.    PLAN     Continue skilled occupational therapy with individualized plan of care focusing on improving functional independence with use of RUE    Updates/Grading for next session: cont per large massive type III protocol     Shaunna Stevenson, OT

## 2023-06-01 NOTE — PROGRESS NOTES
"  [facility]  Occupational Therapy Treatment Note    Date: 6/1/2023  Name: Laverne CELIS [facility] Number: [id number]    Time In: 10:00  Time Out: 10:45  Total Billable Time: 45 minutes    Therapy Diagnosis:   Encounter Diagnoses   Name Primary?    Decreased range of motion of right shoulder Yes    Localized edema     Pain      Physician: Luis Angel Wheeler MD  Physician Orders: OT Eval and Treat  Medical Diagnosis:   Z98.890 (ICD-10-CM) - S/P right rotator cuff repair   I89.0 (ICD-10-CM) - Lymphedema of extremity      Evaluation Date: 5/8/2023  Insurance Authorization Period Expiration: 7/5/2023  Plan of Care Certification Period: 7/31/2023  Visit # / Visits authorized: 10 / 20  FOTO: see media, 1 / 3     Precautions:  Standard (cleared by MD for compression and manual lymphatic drainage)      SUBJECTIVE     Pt reports: No concerns.   Response to previous treatment:edema continues to reduce. Mild edema persists at digits.   Functional change: none    Pain: 0/10    OBJECTIVE     Pt arrived with digit wrap intact, granddaughter present.     UE Girth Measurements:  LANDMARK RIGHT UE  5/8 RIGHT UE  6/1   Axilla - -   E + 4" - -   E + 2"  - -   Elbow 23.5 cm 23.5 cm   W + 4" 18.5 cm 18.0 cm   W + 2" 16.0 cm 14.0 cm   Wrist 15.5 cm 14.5 cm   MCP 19.0 cm 18.0 cm   IP 6.5 cm 6.5 cm               Treatment     Laverne received the treatments listed below:     Manual therapy techniques were applied for 45 minutes, including:     MANUAL LYMPHATIC DRAINAGE (MLD):    While supine with LEs elevated stimulation at terminus, along GI region, R axillary region, drainage of entire RUE targeting upper arm, cubital region, forearm, hand, and digits with return proximally.     MULTILAYERED BANDAGING:  bandaged digits and dorsum of hand with gauze rolls, Edema wear sleeve donned over MCP to axila. Pt to remove sleeve at night, sultana in the AM as before. Gauze rolls to stay in place 48 hrs as tolerated. " Discontinue immediately with any issues, discoloration, delayed capillary refill, numbness, pain, or tingling.     Patient Education and Home Exercises      Education provided:   1. Educated on cause of lymphedema.  2. Explained the Complete Decongestive Therapy protocol in depth  3. Educated on Phase 1 and 2 of protocol.  4. Reviewed treatment frequency and likely duration of weeks  5. Precautions and contraindications for treatment.  6. Plan of care and goals.  7. Educated on home management protocols.          Assessment     Edema greatly reduced, mild swelling persists in digits. Pt to continue wearing compression sleeve and have digits wrapped by orthopedic OT. Will continue to follow and schedule lymphatic drainage massage as needed.     Pt would continue to benefit from skilled OT.      Laverne is progressing well towards her goals and there are no updates to goals at this time. Pt prognosis is Good.     Pt will continue to benefit from skilled outpatient occupational therapy to address the deficits listed in the problem list on initial evaluation provide pt/family education and to maximize pt's level of independence in the home and community environment.     Pt's spiritual, cultural and educational needs considered and pt agreeable to plan of care and goals.    Anticipated barriers to occupational therapy: none    Goals:     Short Term Goals for 2 weeks:   1. Patient and/or caregiver will demonstrate understanding of lymphedema precautions to decrease the risk of infection and lymphedema exacerbation. - Ongoing   2. Patient will tolerate daily activities wearing multilayered bandaging.- Ongoing   3. Patient and/or caregiver will order/obtain appropriate compression garments- Ongoing   4. Patient will experience a decrease in girth of affected areas of 1 cm- Ongoing      Long Term Goals for 4 weeks:   1. Patient and/or caregiver will be independent with donning and doffing of compression garments.  2.  Patient and/or caregiver will be independent in self-bandaging and self MLD techniques.  3. Patient and/or caregiver will be independent with HEP and lymphedema management to help prevent edema relapse and reduce risk of infection.  4. Patient will experience a decreased in girth of affected areas of 3 cm       PLAN     Continue plan of care 1 times weekly for 4 weeks to include the following interventions: Manual therapy/joint mobilizations, Therapeutic exercises/activities., and Edema Control.    Hortensia Abarca, OT, CLWT

## 2023-06-02 ENCOUNTER — CLINICAL SUPPORT (OUTPATIENT)
Dept: REHABILITATION | Facility: HOSPITAL | Age: 83
End: 2023-06-02
Payer: MEDICARE

## 2023-06-02 DIAGNOSIS — R60.0 LOCALIZED EDEMA: ICD-10-CM

## 2023-06-02 DIAGNOSIS — R52 PAIN: ICD-10-CM

## 2023-06-02 DIAGNOSIS — M25.611 DECREASED RANGE OF MOTION OF RIGHT SHOULDER: Primary | ICD-10-CM

## 2023-06-02 PROCEDURE — 97110 THERAPEUTIC EXERCISES: CPT

## 2023-06-02 PROCEDURE — 97140 MANUAL THERAPY 1/> REGIONS: CPT

## 2023-06-02 NOTE — PROGRESS NOTES
"    [facility]  Occupational Therapy Treatment Note    Date: 6/2/2023  Name: Laverne Humphries  [facility] Number: [id number]    Therapy Diagnosis:   Encounter Diagnoses   Name Primary?    Decreased range of motion of right shoulder Yes    Localized edema     Pain            Physician: Luis Angel Wheeler MD    Physician Orders:Eval and treat;  s/p RCR/lymphedema treatments into hand  Medical Diagnosis: Z98.890 (ICD-10-CM) - S/P right rotator cuff repair  Surgical Procedure and Date: 3/21/2023,   1. Right shoulder Arthroscopic massive complex rotator cuff repair    2. Right shoulder Arthroscopic extensive debridement   3. Right shoulder Arthroscopic lysis of adhesions   4. Right shoulder Arthroscopic subacromial decompression and bursectomy   5. Right shoulder arthroscopic loose body removal    Evaluation Date: 5/3/2023  Insurance Authorization Period Expiration: 05/07/2023  Plan of Care Expiration: 12 weeks; 7/28/2023  Date of Return to MD: 7/3/2023  Visit # / Visits authorized: 1 / 1  FOTO: (date and score)     Precautions:  Standard     Time In:     08:00  AM   Time Out:  09:00  AM   Total Appointment Time (timed & untimed codes): 60  minutes     10 weeks 4 day s/p (6/1/2023)   SUBJECTIVE     Pt reports: "my hand is doing much better I still cant hold anything heavy. Like opening fridge door"   She was compliant with home exercise program given last session.   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    Observation/Appearance: mod edema in hand      Edema. Measured in centimeters.    5/9/2023 5/9/2023     Left Right   2in. Above elbow       2in. Below elbow       Wrist Crease 14.0 cm  16.0 cm    Figure of 8       MCPs 18.4.0 cm  20.5 cm            Shoulder ROM. Measured in degrees    " 5/3/2022 5/3/2023 5/11/2023 6/1/2023     Left Right PROM Right PROM Right   Shoulder Flexion   85 degrees  90    Shoulder Abduction   NT 80    Shoulder Extension    NT NT     Shoulder IR   NT NT    Shoulder ER   NT 20          Elbow and Wrist ROM. Measured in degrees.    5/9/2023 5/9/2023     Left Right   Elbow Ext/Flex WNL/WNL  12/135    Supination/Pronation WNL/WNL  66/75   Wrist Ext/Flex 55/70 39/6   Wrist RD/UD             Hand ROM. Measured in degrees.    5/9/2023 5/9/2023     Left Right           Index: MP    68              PIP       50              DIP   30              PITTS   148           Long:  MP   65              PIP   58              DIP   62              PITTS   185           Ring:   MP   55              PIP   60              DIP   32              PITTS   147                  Strength (Dynamometer) and Pinch Strength (Pinch Gauge)  Measured in pounds.    5/9/2023 5/9/2023     Left Right   Rung II   Deferred    Key Pinch       3pt Pinch       2pt Pinch             Manual Muscle Test    5/9/2023 5/9/2023     Left Right   Wrist Extension        Wrist Flexion       Radial Deviation       Ulnar Deviation       Supination       Pronation       Elbow Extension       Elbow Flexion             Limitation/Restriction for FOTO initial eval; shoulder Survey     Therapist reviewed FOTO scores for Laverne Humphries on 5/3/2023.   FOTO documents entered into [employer] - see Media section.     Limitation Score: needs to be taken%             Treatment     Laverne received the treatments listed below:     Supervised modalities: MHP for 10 minutes to promote increased blood flow       Manual therapy techniques: Manual Lymphatic Drainage were applied to the: RUE for 15 minutes, including:  - passive ROM to flexion/scaption/abduction/ER/IR  x 15 reps each   - gentle inferior mobs   - STM anterior shoulder  - edema wrapping R hand       Therapeutic exercises to develop ROM for 30 minutes, including:  - shoulder rolls  forward/backward x 10 reps each (2 sets)   - pendulums (CW/CCW, forward/backward, side/side) x 10 reps each (2 sets)   - shoulder raises x 10 reps (2 sets)   - AAROM flexion/ER/Abduction with unweighted dowel x 10 reps each   - scap retraction/protraction x 10 reps (2 sets) with towel slide   - RTC isometrics against wall with towel x 10 reps each       Patient Education and Home Exercises      Education provided:   - pillow under arm when sitting down watching TV   - Progress towards goals     Written Home Exercises Provided: Patient instructed to cont prior HEP.  Exercises were reviewed and Betojaylinmable was able to demonstrate them prior to the end of the session.  Laverne demonstrated good  understanding of the HEP provided. See EMR under Patient Instructions for exercises provided during therapy sessions.      ASSESSMENT     Good alex with ELIELOM today     Laverne is progressing well towards her goals and there are no updates to goals at this time. Pt prognosis is fair.     Pt will continue to benefit from skilled outpatient occupational therapy to address the deficits listed in the problem list on initial evaluation, provide pt/family education and to maximize pt's level of independence in the home and community environment.     Pt's spiritual, cultural and educational needs considered and pt agreeable to plan of care and goals.    Anticipated barriers to occupational therapy: preexisting conditions     Goals:  Long term goals:   Pt will achieve shoulder AROM in flexion/scaption/abduction ~120 degrees   2.   Assess MMT when appropriate  3.   Pt will demo shoulder AROM WFL to perform daily and community activities   4.   Pt will report improvement in FOTO score by measuring 20% points.         Short term goals:   Pt will be complaint with HEP and pain management   Pt will achieve shoulder PROM in flexion and scaption ~110 degrees   Pt will report improvement in FOTO by decreasing 10% limitation points   Pt will  achieve shoulder AAROM WFL to aid in ADLs   Pt will be able to demo full composite fist with R hand.    PLAN     Continue skilled occupational therapy with individualized plan of care focusing on improving functional independence with use of RUE    Updates/Grading for next session: cont per large massive type III protocol     Shaunna Stevenson, OT

## 2023-06-02 NOTE — PATIENT INSTRUCTIONS
[facility]  OCCUPATIONAL THERAPY  HOME EXERCISE PROGRAM   Using wall for resistance, press right fist into ball using light   Pressure. Hold 3 seconds. Repeat 10 times per set. Do 2 sets per   Session. Do 2 sessions per day.     Using wall for resistance, press back of left arm into ball using   light pressure. Hold 3-5 seconds. Repeat 10 times per set. Do 2 sets   per session. Do 2 sessions per day.     Using wall for resistance, press left arm into ball using light   pressure. Hold 3-5 seconds. Repeat 2 times per set. Do 2 sets per   session. Do 2 sessions per day.     Using door frame for resistance, press palm of right hand into ball   using light pressure. Keep elbow in at side. Hold 3-5 seconds.  Repeat 10 times per set. Do 2 sets per session. Do 2 sessions per day.   Using wall to provide resistance, and keeping right arm at side,   press back of hand into ball using light pressure. Hold 3-5 seconds.  Repeat 10 times per set. Do 2 sets per session. Do 2 sessions per day.           Lie on back holding wand. Raise arms over head.   Repeat 10 times per set. Do 2 sets per session.   Do 2 sessions per day.      Bring wand directly over head, leading with right side.   Reach back until  stretch is felt. Hold 3-5 seconds. Repeat 10   times per set. Do 2 sets per session. Do 2 sessions per day.      Holding wand with left hand palm up, push wand directly out to side,   leading with other hand palm down, until stretch is felt. Hold 3-5 seconds.  Repeat 10 times per set. Do 2 sets per session. Do 2 sessions per day.        Stand holding wand behind back. Raise arms as far as possible.  Repeat 10 times per set. Do 2  sets per session. Do 2 sessions per day.  Copyright © [employer]. All rights reserved.   Therapist: Shaunna Stevenson, CHRIS/ADOLFO

## 2023-06-05 ENCOUNTER — OFFICE VISIT (OUTPATIENT)
Dept: PRIMARY CARE CLINIC | Facility: CLINIC | Age: 83
End: 2023-06-05
Payer: MEDICARE

## 2023-06-05 VITALS
WEIGHT: 113.56 LBS | DIASTOLIC BLOOD PRESSURE: 68 MMHG | SYSTOLIC BLOOD PRESSURE: 118 MMHG | OXYGEN SATURATION: 99 % | BODY MASS INDEX: 22.29 KG/M2 | TEMPERATURE: 98 F | HEART RATE: 80 BPM | HEIGHT: 60 IN

## 2023-06-05 DIAGNOSIS — G47.00 INSOMNIA, UNSPECIFIED TYPE: ICD-10-CM

## 2023-06-05 DIAGNOSIS — N94.9 VAGINAL BURNING: ICD-10-CM

## 2023-06-05 DIAGNOSIS — I70.0 AORTIC ATHEROSCLEROSIS: ICD-10-CM

## 2023-06-05 DIAGNOSIS — N89.8 VAGINAL LESION: Primary | ICD-10-CM

## 2023-06-05 DIAGNOSIS — L29.2 VULVAR ITCHING: ICD-10-CM

## 2023-06-05 PROCEDURE — 99999 PR PBB SHADOW E&M-EST. PATIENT-LVL V: CPT | Mod: PBBFAC,,, | Performed by: INTERNAL MEDICINE

## 2023-06-05 PROCEDURE — 1126F PR PAIN SEVERITY QUANTIFIED, NO PAIN PRESENT: ICD-10-PCS | Mod: CPTII,S$GLB,, | Performed by: INTERNAL MEDICINE

## 2023-06-05 PROCEDURE — 3078F PR MOST RECENT DIASTOLIC BLOOD PRESSURE < 80 MM HG: ICD-10-PCS | Mod: CPTII,S$GLB,, | Performed by: INTERNAL MEDICINE

## 2023-06-05 PROCEDURE — 99213 OFFICE O/P EST LOW 20 MIN: CPT | Mod: S$GLB,,, | Performed by: INTERNAL MEDICINE

## 2023-06-05 PROCEDURE — 3074F PR MOST RECENT SYSTOLIC BLOOD PRESSURE < 130 MM HG: ICD-10-PCS | Mod: CPTII,S$GLB,, | Performed by: INTERNAL MEDICINE

## 2023-06-05 PROCEDURE — 99213 PR OFFICE/OUTPT VISIT, EST, LEVL III, 20-29 MIN: ICD-10-PCS | Mod: S$GLB,,, | Performed by: INTERNAL MEDICINE

## 2023-06-05 PROCEDURE — 3288F PR FALLS RISK ASSESSMENT DOCUMENTED: ICD-10-PCS | Mod: CPTII,S$GLB,, | Performed by: INTERNAL MEDICINE

## 2023-06-05 PROCEDURE — 1101F PT FALLS ASSESS-DOCD LE1/YR: CPT | Mod: CPTII,S$GLB,, | Performed by: INTERNAL MEDICINE

## 2023-06-05 PROCEDURE — 1159F MED LIST DOCD IN RCRD: CPT | Mod: CPTII,S$GLB,, | Performed by: INTERNAL MEDICINE

## 2023-06-05 PROCEDURE — 3074F SYST BP LT 130 MM HG: CPT | Mod: CPTII,S$GLB,, | Performed by: INTERNAL MEDICINE

## 2023-06-05 PROCEDURE — 99999 PR PBB SHADOW E&M-EST. PATIENT-LVL V: ICD-10-PCS | Mod: PBBFAC,,, | Performed by: INTERNAL MEDICINE

## 2023-06-05 PROCEDURE — 1160F PR REVIEW ALL MEDS BY PRESCRIBER/CLIN PHARMACIST DOCUMENTED: ICD-10-PCS | Mod: CPTII,S$GLB,, | Performed by: INTERNAL MEDICINE

## 2023-06-05 PROCEDURE — 3078F DIAST BP <80 MM HG: CPT | Mod: CPTII,S$GLB,, | Performed by: INTERNAL MEDICINE

## 2023-06-05 PROCEDURE — 1159F PR MEDICATION LIST DOCUMENTED IN MEDICAL RECORD: ICD-10-PCS | Mod: CPTII,S$GLB,, | Performed by: INTERNAL MEDICINE

## 2023-06-05 PROCEDURE — 1101F PR PT FALLS ASSESS DOC 0-1 FALLS W/OUT INJ PAST YR: ICD-10-PCS | Mod: CPTII,S$GLB,, | Performed by: INTERNAL MEDICINE

## 2023-06-05 PROCEDURE — 1126F AMNT PAIN NOTED NONE PRSNT: CPT | Mod: CPTII,S$GLB,, | Performed by: INTERNAL MEDICINE

## 2023-06-05 PROCEDURE — 3288F FALL RISK ASSESSMENT DOCD: CPT | Mod: CPTII,S$GLB,, | Performed by: INTERNAL MEDICINE

## 2023-06-05 PROCEDURE — 1160F RVW MEDS BY RX/DR IN RCRD: CPT | Mod: CPTII,S$GLB,, | Performed by: INTERNAL MEDICINE

## 2023-06-05 RX ORDER — HYDROCORTISONE 25 MG/G
CREAM TOPICAL 2 TIMES DAILY
Qty: 30 G | Refills: 0 | Status: SHIPPED | OUTPATIENT
Start: 2023-06-05 | End: 2024-03-06

## 2023-06-05 NOTE — PROGRESS NOTES
Subjective:       Patient ID: Laverne Humphries is a 82 y.o. female.    Chief Complaint: Vaginal Itching    HPI  Pt established care w/ me 2/3/23. Sister is Ms. Martinez.  Since our last visit:  MMG neg 2/17/23  Saw Dr. Wheeler in sports medicine 2/27/23. Plan for R rotator arthroscopy.  Followed up w/ Dr. Gonzáles for pAF on toprol xl 25mg qd, eliquis 2.5mg BID.   Saw Dr. Bowles for preop 3/14/23.  S/p R shoulder arthroscopy w/ complex rotator cuff repair 3/21/23.  Had post op f/u w/ Dr. Wheeler on 4/5/23 and 5/1/23. Undergoing PT.  Eye exam w/ Dr. Pond 4/17/23.   Saw Dr. Alonzo for UC visit for vaginal candidiasis s/p fluconazole x 2 4/25/23. Didn't help.   C/o some swelling in the R hand/fingers. Wearing a compression glove.  Undergoing PT and helps.     Insomnia/anxiety - has been on lorazepam 0.5mg nightly for many years (predates est care w/ me). Discussed risks of continuing this meds in elderly population. Pt understands but does not want to change to another medicine. No falls. Lives alone and no assistance w/ ADLs.     At our last visit, given aortic atherosclerosis and LDL>70, started on crestor 5mg nightly. Tolerating.     Reports has had vaginal itching/burning intermittently x 2 months. Uses a soap w/ moisturizer. Tried oil of olay. Tried cerave, estradiol (used it once or twice b/c burning when this happened) and nystatin cream - burns. Water helps (washes every day). Ice helped. This Saturday was the worse. Not leakage. No dysuria. No blood.   S/p colovaginal repair last yr.    Review of Systems  Comprehensive review of systems otherwise negative. See history/subjective section for more details.    Objective:      Physical Exam    /68 (BP Location: Left arm, Patient Position: Sitting, BP Method: Large (Manual))   Pulse 80   Temp 97.5 °F (36.4 °C) (Oral)   Ht 5' (1.524 m)   Wt 51.5 kg (113 lb 8.6 oz)   LMP  (LMP Unknown)   SpO2 99%   BMI 22.17 kg/m²     GEN - A+OX4, NAD   HEENT - PERRL,  EOMI, OP clear. MMM. TM normal.   Neck - No thyromegaly or cervical LAD. No thyroid masses felt.  CV - RRR, no m/r   Chest - CTAB, no wheezing or rhonchi  Abd - S/NT/ND/+BS.   Ext - 2+BDP and radial pulses. No LE edema. R hand swelling but no pain - in compression glove.   Skin - no rash.   MSK - pain on abduction of R shoulder   - no rash/erythema. Vaginal dryness. White lesion at the base of the inside of the post vaginal wall.     Previous labs reviewed.     Assessment/Plan     Laverne was seen today for vaginal itching.    Diagnoses and all orders for this visit:    Vaginal lesion  -     Ambulatory referral/consult to Gynecology; Future    Vaginal burning/itching - no rash. Ok to cont estrogen cream 2x/week. Trial of hydrocortisone cream externally. Avoid daily soap in the vaginal area - would recommend just washing w/ water and avoid harsh scrubbing.   -     Ambulatory referral/consult to Gynecology; Future    Insomnia, unspecified type - cont current med.     Aortic atherosclerosis - cont crestor.       Follow up in about 4 months (around 10/5/2023).      Carolyne James MD  Department of Internal Medicine - [facility]  8:14 AM

## 2023-06-06 ENCOUNTER — CLINICAL SUPPORT (OUTPATIENT)
Dept: REHABILITATION | Facility: HOSPITAL | Age: 83
End: 2023-06-06
Payer: MEDICARE

## 2023-06-06 DIAGNOSIS — M25.611 DECREASED RANGE OF MOTION OF RIGHT SHOULDER: Primary | ICD-10-CM

## 2023-06-06 DIAGNOSIS — R60.0 LOCALIZED EDEMA: ICD-10-CM

## 2023-06-06 DIAGNOSIS — R52 PAIN: ICD-10-CM

## 2023-06-06 PROCEDURE — 97110 THERAPEUTIC EXERCISES: CPT

## 2023-06-06 PROCEDURE — 97140 MANUAL THERAPY 1/> REGIONS: CPT

## 2023-06-06 NOTE — PROGRESS NOTES
"  [facility]  Occupational Therapy Treatment Note    Date: 6/6/2023  Name: Laverne Humphries  Clinic Number: [id number]    Therapy Diagnosis:   Encounter Diagnoses   Name Primary?    Decreased range of motion of right shoulder Yes    Localized edema     Pain          Physician: Luis Angel Wheeler MD    Physician Orders:Eval and treat;  s/p RCR/lymphedema treatments into hand  Medical Diagnosis: Z98.890 (ICD-10-CM) - S/P right rotator cuff repair  Surgical Procedure and Date: 3/21/2023,   1. Right shoulder Arthroscopic massive complex rotator cuff repair    2. Right shoulder Arthroscopic extensive debridement   3. Right shoulder Arthroscopic lysis of adhesions   4. Right shoulder Arthroscopic subacromial decompression and bursectomy   5. Right shoulder arthroscopic loose body removal    Evaluation Date: 5/3/2023  Insurance Authorization Period Expiration: 05/07/2023  Plan of Care Expiration: 12 weeks; 7/28/2023  Date of Return to MD: 7/3/2023  Visit # / Visits authorized: 11 / 20  FOTO: (date and score)     Precautions:  Standard     Time In:     9:55  AM   Time Out:  10:50 AM   Total Appointment Time (timed & untimed codes): 55  minutes     11 weeks 2 day s/p (6/6/2023)   SUBJECTIVE     Pt reports: "it's doing better. I can reach in a cabinet now and open a door."   She was compliant with home exercise program given last session.   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 Reaching easier and opening things easier    Pain: 0/10  Location: right hand     OBJECTIVE   Objective Measures updated at progress report unless specified.    Observation/Appearance: mod edema in hand      Edema. Measured in centimeters.    5/9/2023 5/9/2023     Left Right   2in. Above elbow       2in. Below elbow       Wrist Crease 14.0 cm  16.0 cm    Figure of 8       MCPs 18.4.0 cm  20.5 cm            Shoulder ROM. " Measured in degrees    5/3/2022 5/3/2023 5/11/2023 6/1/2023     Left Right PROM Right PROM Right   Shoulder Flexion   85 degrees  90    Shoulder Abduction   NT 80    Shoulder Extension    NT NT     Shoulder IR   NT NT    Shoulder ER   NT 20          Elbow and Wrist ROM. Measured in degrees.    5/9/2023 5/9/2023     Left Right   Elbow Ext/Flex WNL/WNL  12/135    Supination/Pronation WNL/WNL  66/75   Wrist Ext/Flex 55/70 39/6   Wrist RD/UD             Hand ROM. Measured in degrees.    5/9/2023 5/9/2023     Left Right           Index: MP    68              PIP       50              DIP   30              PITTS   148           Long:  MP   65              PIP   58              DIP   62              PITTS   185           Ring:   MP   55              PIP   60              DIP   32              PITTS   147                  Strength (Dynamometer) and Pinch Strength (Pinch Gauge)  Measured in pounds.    5/9/2023 5/9/2023     Left Right   Rung II   Deferred    Key Pinch       3pt Pinch       2pt Pinch             Manual Muscle Test    5/9/2023 5/9/2023     Left Right   Wrist Extension        Wrist Flexion       Radial Deviation       Ulnar Deviation       Supination       Pronation       Elbow Extension       Elbow Flexion             Limitation/Restriction for FOTO initial eval; shoulder Survey     Therapist reviewed FOTO scores for Laverne Humphries on 5/3/2023.   FOTO documents entered into [employer] - see Media section.     Limitation Score: needs to be taken%             Treatment     Lezoilaa received the treatments listed below:     Supervised modalities: MHP for 10 minutes to promote increased blood flow       Manual therapy techniques: Manual Lymphatic Drainage were applied to the: RUE for 15 minutes, including:  - passive ROM to flexion/scaption/abduction/ER/IR  x 15 reps each   - gentle inferior mobs   - STM anterior shoulder  - edema wrapping R hand (NT)      Therapeutic exercises to develop ROM for 30 minutes,  including:  - shoulder rolls forward/backward x 10 reps each (2 sets)   - pendulums (CW/CCW, forward/backward, side/side) x 10 reps each (2 sets)   - shoulder raises x 10 reps (2 sets)   - AAROM flexion/ER/Abduction with unweighted dowel x 10 reps each   - scap retraction/protraction x 10 reps (2 sets) with towel slide   - RTC isometrics against wall with towel x 10 reps each   -sidelying abduction and ER, 2 x 10 reps      Patient Education and Home Exercises      Education provided:   - pillow under arm when sitting down watching TV   - Progress towards goals     Written Home Exercises Provided: Patient instructed to cont prior HEP.  Exercises were reviewed and Laverne was able to demonstrate them prior to the end of the session.  Laverne demonstrated good  understanding of the HEP provided. See EMR under Patient Instructions for exercises provided during therapy sessions.      ASSESSMENT     Pt tolerated tx fairly well today. She denied pain throughout session. She participated well and is motivated. She reports she is doing a bit more, and is able to reach and open things easier.     Laverne is progressing well towards her goals and there are no updates to goals at this time. Pt prognosis is fair.     Pt will continue to benefit from skilled outpatient occupational therapy to address the deficits listed in the problem list on initial evaluation, provide pt/family education and to maximize pt's level of independence in the home and community environment.     Pt's spiritual, cultural and educational needs considered and pt agreeable to plan of care and goals.    Anticipated barriers to occupational therapy: preexisting conditions     Goals:  Long term goals:   Pt will achieve shoulder AROM in flexion/scaption/abduction ~120 degrees   2.   Assess MMT when appropriate  3.   Pt will demo shoulder AROM WFL to perform daily and community activities   4.   Pt will report improvement in FOTO score by measuring 20%  points.         Short term goals:   Pt will be complaint with HEP and pain management   Pt will achieve shoulder PROM in flexion and scaption ~110 degrees   Pt will report improvement in FOTO by decreasing 10% limitation points   Pt will achieve shoulder AAROM WFL to aid in ADLs   Pt will be able to demo full composite fist with R hand.    PLAN     Continue skilled occupational therapy with individualized plan of care focusing on improving functional independence with use of RUE    Updates/Grading for next session: cont per large massive type III protocol. Do FOTO and take measurements next session    JERRY Berg, YULIETT

## 2023-06-09 ENCOUNTER — CLINICAL SUPPORT (OUTPATIENT)
Dept: REHABILITATION | Facility: HOSPITAL | Age: 83
End: 2023-06-09
Payer: MEDICARE

## 2023-06-09 DIAGNOSIS — R52 PAIN: ICD-10-CM

## 2023-06-09 DIAGNOSIS — M25.611 DECREASED RANGE OF MOTION OF RIGHT SHOULDER: Primary | ICD-10-CM

## 2023-06-09 DIAGNOSIS — R60.0 LOCALIZED EDEMA: ICD-10-CM

## 2023-06-09 PROCEDURE — 97110 THERAPEUTIC EXERCISES: CPT

## 2023-06-09 PROCEDURE — 97140 MANUAL THERAPY 1/> REGIONS: CPT

## 2023-06-09 NOTE — PROGRESS NOTES
"    [facility]  Occupational Therapy Treatment Note    Date: 6/9/2023  Name: Laverne Humphries  [facility] Number: [id number]    Therapy Diagnosis:   Encounter Diagnoses   Name Primary?    Decreased range of motion of right shoulder Yes    Localized edema     Pain            Physician: Luis Angel Wheeler MD    Physician Orders:Eval and treat;  s/p RCR/lymphedema treatments into hand  Medical Diagnosis: Z98.890 (ICD-10-CM) - S/P right rotator cuff repair  Surgical Procedure and Date: 3/21/2023,   1. Right shoulder Arthroscopic massive complex rotator cuff repair    2. Right shoulder Arthroscopic extensive debridement   3. Right shoulder Arthroscopic lysis of adhesions   4. Right shoulder Arthroscopic subacromial decompression and bursectomy   5. Right shoulder arthroscopic loose body removal    Evaluation Date: 5/3/2023  Insurance Authorization Period Expiration: 05/07/2023  Plan of Care Expiration: 12 weeks; 7/28/2023  Date of Return to MD: 7/3/2023  Visit # / Visits authorized: 11 / 20  FOTO: (date and score)     Precautions:  Standard     Time In:     08:00  AM   Time Out:  09:00 AM   Total Appointment Time (timed & untimed codes): 60  minutes     11 weeks 2 day s/p (6/6/2023)   SUBJECTIVE     Pt reports: "I am able to flush the toilet and wipe myself after bathroom"   She was compliant with home exercise program given last session.   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 Reaching easier and opening things easier    Pain: 0/10  Location: right hand     OBJECTIVE   Objective Measures updated at progress report unless specified.    Observation/Appearance: mod edema in hand      Edema. Measured in centimeters.    5/9/2023 5/9/2023     Left Right   2in. Above elbow       2in. Below elbow       Wrist Crease 14.0 cm  16.0 cm    Figure of 8       MCPs 18.4.0 cm  20.5 cm            Shoulder ROM. " Measured in degrees    5/3/2022 5/3/2023 5/11/2023 6/9/2023     Left Right PROM Right PROM Right AROM    Shoulder Flexion  WFL 85 degrees  90 90   Shoulder Abduction  WFL NT 80 90   Shoulder Extension    NT NT     Shoulder IR   NT NT L5   Shoulder ER  70 NT 20 65         Elbow and Wrist ROM. Measured in degrees.    5/9/2023 5/9/2023     Left Right   Elbow Ext/Flex WNL/WNL  12/135    Supination/Pronation WNL/WNL  66/75   Wrist Ext/Flex 55/70 39/6   Wrist RD/UD             Hand ROM. Measured in degrees.    5/9/2023 5/9/2023     Left Right           Index: MP    68              PIP       50              DIP   30              PITTS   148           Long:  MP   65              PIP   58              DIP   62              PITTS   185           Ring:   MP   55              PIP   60              DIP   32              PITTS   147                  Strength (Dynamometer) and Pinch Strength (Pinch Gauge)  Measured in pounds.    6/9/2023 6/9/2023     Left Right   Rung II  41 20   Key Pinch       3pt Pinch       2pt Pinch             Manual Muscle Test    5/9/2023 5/9/2023     Left Right   Wrist Extension        Wrist Flexion       Radial Deviation       Ulnar Deviation       Supination       Pronation       Elbow Extension       Elbow Flexion             Limitation/Restriction for FOTO initial eval; shoulder Survey     Therapist reviewed FOTO scores for Laverne Humphries on 5/3/2023.   FOTO documents entered into [employer] - see Media section.     Limitation Score: needs to be taken%             Treatment     Leovidia received the treatments listed below:     Supervised modalities: MHP for 10 minutes to promote increased blood flow       Manual therapy techniques: Manual Lymphatic Drainage were applied to the: RUE for 15 minutes, including:  - passive ROM to flexion/scaption/abduction/ER/IR  x 10 reps each   - gentle inferior/posterior mobs   - STM anterior shoulder NT   - edema wrapping R hand (NT)      Therapeutic exercises to  develop ROM for 30 minutes, including:  - shoulder rolls forward/backward x 10 reps each (2 sets)   - pendulums (CW/CCW, forward/backward, side/side) x 10 reps each (2 sets) NT  - shoulder raises x 10 reps (2 sets)   - AAROM flexion/scaption with 1# dowel x 10 reps each   - table slides flexion on incline x 10 reps   - table slides abduction x 10 reps   - IR stretch using towel holding 10s x 4 reps   - scap retraction/protraction x 10 reps (2 sets) with towel slide   - RTC isometrics against wall with towel x 10 reps each   - sidelying abduction and ER, 2 x 10 reps      Patient Education and Home Exercises      Education provided:   - added towel IR stretch   - Progress towards goals     Written Home Exercises Provided: Patient instructed to cont prior HEP.  Exercises were reviewed and Laverne was able to demonstrate them prior to the end of the session.  Laverne demonstrated good  understanding of the HEP provided. See EMR under Patient Instructions for exercises provided during therapy sessions.      ASSESSMENT     Pt tolerated tx fairly well today. Verbalized min pain with table slides in ABDuction. Good ROM with elevated table slides. Tightness in capsule. Will cont as alex Galloway is progressing well towards her goals and there are no updates to goals at this time. Pt prognosis is fair.     Pt will continue to benefit from skilled outpatient occupational therapy to address the deficits listed in the problem list on initial evaluation, provide pt/family education and to maximize pt's level of independence in the home and community environment.     Pt's spiritual, cultural and educational needs considered and pt agreeable to plan of care and goals.    Anticipated barriers to occupational therapy: preexisting conditions     Goals:  Long term goals:   Pt will achieve shoulder AROM in flexion/scaption/abduction ~120 degrees   2.   Assess MMT when appropriate  3.   Pt will demo shoulder AROM WFL to perform  daily and community activities   4.   Pt will report improvement in FOTO score by measuring 20% points.         Short term goals:   Pt will be complaint with HEP and pain management   Pt will achieve shoulder PROM in flexion and scaption ~110 degrees   Pt will report improvement in FOTO by decreasing 10% limitation points   Pt will achieve shoulder AAROM WFL to aid in ADLs   Pt will be able to demo full composite fist with R hand.    PLAN     Continue skilled occupational therapy with individualized plan of care focusing on improving functional independence with use of RUE    Updates/Grading for next session: cont per large massive type III protocol. Do FOTO and take measurements next session    Shaunna Stevenson, OT

## 2023-06-12 ENCOUNTER — CLINICAL SUPPORT (OUTPATIENT)
Dept: REHABILITATION | Facility: HOSPITAL | Age: 83
End: 2023-06-12
Payer: MEDICARE

## 2023-06-12 DIAGNOSIS — R52 PAIN: ICD-10-CM

## 2023-06-12 DIAGNOSIS — M25.611 DECREASED RANGE OF MOTION OF RIGHT SHOULDER: Primary | ICD-10-CM

## 2023-06-12 DIAGNOSIS — R60.0 LOCALIZED EDEMA: ICD-10-CM

## 2023-06-12 PROCEDURE — 97110 THERAPEUTIC EXERCISES: CPT

## 2023-06-12 PROCEDURE — 97140 MANUAL THERAPY 1/> REGIONS: CPT

## 2023-06-12 NOTE — PROGRESS NOTES
"    [facility]  Occupational Therapy Treatment Note    Date: 6/12/2023  Name: Laverne Humphries  [facility] Number: [id number]    Therapy Diagnosis:   Encounter Diagnoses   Name Primary?    Decreased range of motion of right shoulder Yes    Localized edema     Pain              Physician: Luis Angel Wheeler MD    Physician Orders:Eval and treat;  s/p RCR/lymphedema treatments into hand  Medical Diagnosis: Z98.890 (ICD-10-CM) - S/P right rotator cuff repair  Surgical Procedure and Date: 3/21/2023,   1. Right shoulder Arthroscopic massive complex rotator cuff repair    2. Right shoulder Arthroscopic extensive debridement   3. Right shoulder Arthroscopic lysis of adhesions   4. Right shoulder Arthroscopic subacromial decompression and bursectomy   5. Right shoulder arthroscopic loose body removal    Evaluation Date: 5/3/2023  Insurance Authorization Period Expiration: 05/07/2023  Plan of Care Expiration: 12 weeks; 7/28/2023  Date of Return to MD: 7/3/2023  Visit # / Visits authorized: 12 / 20  FOTO: (date and score)     Precautions:  Standard     Time In:     08:00  AM   Time Out:  09:00 AM   Total Appointment Time (timed & untimed codes): 60  minutes     11 weeks 2 day s/p (6/6/2023)   SUBJECTIVE     Pt reports: "I am able to flush the toilet and wipe myself after bathroom"   She was compliant with home exercise program given last session.   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 Reaching easier and opening things easier    Pain: 0/10  Location: right hand     OBJECTIVE   Objective Measures updated at progress report unless specified.    Observation/Appearance: mod edema in hand      Edema. Measured in centimeters.    5/9/2023 5/9/2023     Left Right   2in. Above elbow       2in. Below elbow       Wrist Crease 14.0 cm  16.0 cm    Figure of 8       MCPs 18.4.0 cm  20.5 cm            Shoulder ROM. " Measured in degrees    5/3/2022 5/3/2023 5/11/2023 6/9/2023     Left Right PROM Right PROM Right AROM    Shoulder Flexion  WFL 85 degrees  90 90   Shoulder Abduction  WFL NT 80 90   Shoulder Extension    NT NT     Shoulder IR   NT NT L5   Shoulder ER  70 NT 20 65         Elbow and Wrist ROM. Measured in degrees.    5/9/2023 5/9/2023     Left Right   Elbow Ext/Flex WNL/WNL  12/135    Supination/Pronation WNL/WNL  66/75   Wrist Ext/Flex 55/70 39/6   Wrist RD/UD             Hand ROM. Measured in degrees.    5/9/2023 5/9/2023     Left Right           Index: MP    68              PIP       50              DIP   30              PITTS   148           Long:  MP   65              PIP   58              DIP   62              PITTS   185           Ring:   MP   55              PIP   60              DIP   32              PITTS   147                  Strength (Dynamometer) and Pinch Strength (Pinch Gauge)  Measured in pounds.    6/9/2023 6/9/2023     Left Right   Rung II  41 20   Key Pinch       3pt Pinch       2pt Pinch             Manual Muscle Test    5/9/2023 5/9/2023     Left Right   Wrist Extension        Wrist Flexion       Radial Deviation       Ulnar Deviation       Supination       Pronation       Elbow Extension       Elbow Flexion             Limitation/Restriction for FOTO initial eval; shoulder Survey     Therapist reviewed FOTO scores for Laverne Humphries on 5/3/2023.   FOTO documents entered into [employer] - see Media section.     Limitation Score: needs to be taken%             Treatment     Lezoilaa received the treatments listed below:     Supervised modalities: MHP for 10 minutes to promote increased blood flow       Manual therapy techniques: Manual Lymphatic Drainage were applied to the: RUE for 15 minutes, including:  - passive ROM to flexion/scaption/abduction/ER/IR  x 10 reps each   - gentle inferior/posterior mobs   - STM anterior shoulder NT     - compression sleeve and edema wrap to digits completed at end  of session.       Therapeutic exercises to develop ROM for 30 minutes, including:  - shoulder rolls forward/backward x 10 reps each (2 sets)   - pendulums (CW/CCW, forward/backward, side/side) x 10 reps each (2 sets) NT  - shoulder raises x 10 reps (2 sets)   - AAROM flexion/scaption with 1# dowel x 10 reps each   - table slides flexion on incline x 10 reps   - table slides abduction x 10 reps   - IR stretch using towel holding 10s x 4 reps    ER stretching side body in supine with dowel   - scap retraction/protraction x 10 reps (2 sets) with towel slide   - RTC isometrics against wall with towel x 10 reps each   - sidelying abduction and ER, 2 x 10 reps      Patient Education and Home Exercises      Education provided:   - added towel IR stretch   - Progress towards goals     Written Home Exercises Provided: Patient instructed to cont prior HEP.  Exercises were reviewed and Laverne was able to demonstrate them prior to the end of the session.  Laverne demonstrated good  understanding of the HEP provided. See EMR under Patient Instructions for exercises provided during therapy sessions.      ASSESSMENT     Pt tolerated tx fairly well today. Verbalized min pain with table slides in ABDuction. Good ROM with elevated table slides. Limited passive range of motion noted , Will cont as alex Galloway is progressing well towards her goals and there are no updates to goals at this time. Pt prognosis is fair.     Pt will continue to benefit from skilled outpatient occupational therapy to address the deficits listed in the problem list on initial evaluation, provide pt/family education and to maximize pt's level of independence in the home and community environment.     Pt's spiritual, cultural and educational needs considered and pt agreeable to plan of care and goals.    Anticipated barriers to occupational therapy: preexisting conditions     Goals:  Long term goals:   Pt will achieve shoulder AROM in  flexion/scaption/abduction ~120 degrees   2.   Assess MMT when appropriate  3.   Pt will demo shoulder AROM WFL to perform daily and community activities   4.   Pt will report improvement in FOTO score by measuring 20% points.         Short term goals:   Pt will be complaint with HEP and pain management   Pt will achieve shoulder PROM in flexion and scaption ~110 degrees   Pt will report improvement in FOTO by decreasing 10% limitation points   Pt will achieve shoulder AAROM WFL to aid in ADLs   Pt will be able to demo full composite fist with R hand.    PLAN     Continue skilled occupational therapy with individualized plan of care focusing on improving functional independence with use of RUE    Updates/Grading for next session: cont per large massive type III protocol.    Jayde Link OT                                      [facility]  Occupational Therapy Treatment Note    Date: 6/12/2023  Name: Laverne Humphries  [facility] Number: [id number]    Therapy Diagnosis:   Encounter Diagnoses   Name Primary?    Decreased range of motion of right shoulder Yes    Localized edema     Pain              Physician: Luis Angel Wheeler MD    Physician Orders:Eval and treat;  s/p RCR/lymphedema treatments into hand  Medical Diagnosis: Z98.890 (ICD-10-CM) - S/P right rotator cuff repair  Surgical Procedure and Date: 3/21/2023,   1. Right shoulder Arthroscopic massive complex rotator cuff repair    2. Right shoulder Arthroscopic extensive debridement   3. Right shoulder Arthroscopic lysis of adhesions   4. Right shoulder Arthroscopic subacromial decompression and bursectomy   5. Right shoulder arthroscopic loose body removal    Evaluation Date: 5/3/2023  Insurance Authorization Period Expiration: 05/07/2023  Plan of Care Expiration: 12 weeks; 7/28/2023  Date of Return to MD: 7/3/2023  Visit # / Visits authorized: 11 / 20  FOTO: (date and score)     Precautions:  Standard     Time In:     08:00  AM   Time  "Out:  09:00 AM   Total Appointment Time (timed & untimed codes): 60  minutes     11 weeks 2 day s/p (6/6/2023)   SUBJECTIVE     Pt reports: "I am able to flush the toilet and wipe myself after bathroom"   She was compliant with home exercise program given last session.   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 Reaching easier and opening things easier    Pain: 0/10  Location: right hand     OBJECTIVE   Objective Measures updated at progress report unless specified.    Observation/Appearance: mod edema in hand      Edema. Measured in centimeters.    5/9/2023 5/9/2023     Left Right   2in. Above elbow       2in. Below elbow       Wrist Crease 14.0 cm  16.0 cm    Figure of 8       MCPs 18.4.0 cm  20.5 cm            Shoulder ROM. Measured in degrees    5/3/2022 5/3/2023 5/11/2023 6/9/2023     Left Right PROM Right PROM Right AROM    Shoulder Flexion  WFL 85 degrees  90 90   Shoulder Abduction  WFL NT 80 90   Shoulder Extension    NT NT     Shoulder IR   NT NT L5   Shoulder ER  70 NT 20 65         Elbow and Wrist ROM. Measured in degrees.    5/9/2023 5/9/2023     Left Right   Elbow Ext/Flex WNL/WNL  12/135    Supination/Pronation WNL/WNL  66/75   Wrist Ext/Flex 55/70 39/6   Wrist RD/UD             Hand ROM. Measured in degrees.    5/9/2023 5/9/2023     Left Right           Index: MP    68              PIP       50              DIP   30              PITTS   148           Long:  MP   65              PIP   58              DIP   62              PITTS   185           Ring:   MP   55              PIP   60              DIP   32              PITTS   147                  Strength (Dynamometer) and Pinch Strength (Pinch Gauge)  Measured in pounds.    6/9/2023 6/9/2023     Left Right   Rung II  41 20   Key Pinch       3pt Pinch       2pt Pinch             Manual Muscle Test    5/9/2023 5/9/2023     Left Right   Wrist Extension      "   Wrist Flexion       Radial Deviation       Ulnar Deviation       Supination       Pronation       Elbow Extension       Elbow Flexion             Limitation/Restriction for FOTO initial eval; shoulder Survey     Therapist reviewed FOTO scores for Laverne Humphries on 5/3/2023.   FOTO documents entered into [employer] - see Media section.     Limitation Score: needs to be taken%             Treatment     Laverne received the treatments listed below:     Supervised modalities: MHP for 10 minutes to promote increased blood flow       Manual therapy techniques: Manual Lymphatic Drainage were applied to the: RUE for 15 minutes, including:  - passive ROM to flexion/scaption/abduction/ER/IR  x 10 reps each   - gentle inferior/posterior mobs   - STM anterior shoulder NT   - edema wrapping R hand (NT)      Therapeutic exercises to develop ROM for 30 minutes, including:  - shoulder rolls forward/backward x 10 reps each (2 sets)   - pendulums (CW/CCW, forward/backward, side/side) x 10 reps each (2 sets) NT  - shoulder raises x 10 reps (2 sets)   - AAROM flexion/scaption with 1# dowel x 10 reps each   - table slides flexion on incline x 10 reps   - table slides abduction x 10 reps   - IR stretch using towel holding 10s x 4 reps   - scap retraction/protraction x 10 reps (2 sets) with towel slide   - RTC isometrics against wall with towel x 10 reps each   - sidelying abduction and ER, 2 x 10 reps      Patient Education and Home Exercises      Education provided:   - added towel IR stretch   - Progress towards goals     Written Home Exercises Provided: Patient instructed to cont prior HEP.  Exercises were reviewed and Laverne was able to demonstrate them prior to the end of the session.  Laverne demonstrated good  understanding of the HEP provided. See EMR under Patient Instructions for exercises provided during therapy sessions.      ASSESSMENT     Pt tolerated tx fairly well today. Verbalized min pain with table slides in  ABDuction. Good ROM with elevated table slides. Tightness in capsule. Will cont as alex Galloway is progressing well towards her goals and there are no updates to goals at this time. Pt prognosis is fair.     Pt will continue to benefit from skilled outpatient occupational therapy to address the deficits listed in the problem list on initial evaluation, provide pt/family education and to maximize pt's level of independence in the home and community environment.     Pt's spiritual, cultural and educational needs considered and pt agreeable to plan of care and goals.    Anticipated barriers to occupational therapy: preexisting conditions     Goals:  Long term goals:   Pt will achieve shoulder AROM in flexion/scaption/abduction ~120 degrees   2.   Assess MMT when appropriate  3.   Pt will demo shoulder AROM WFL to perform daily and community activities   4.   Pt will report improvement in FOTO score by measuring 20% points.         Short term goals:   Pt will be complaint with HEP and pain management   Pt will achieve shoulder PROM in flexion and scaption ~110 degrees   Pt will report improvement in FOTO by decreasing 10% limitation points   Pt will achieve shoulder AAROM WFL to aid in ADLs   Pt will be able to demo full composite fist with R hand.    PLAN     Continue skilled occupational therapy with individualized plan of care focusing on improving functional independence with use of RUE    Updates/Grading for next session: cont per large massive type III protocol. Do FOTO and take measurements next session    Jayde Link OT

## 2023-06-16 ENCOUNTER — CLINICAL SUPPORT (OUTPATIENT)
Dept: REHABILITATION | Facility: HOSPITAL | Age: 83
End: 2023-06-16
Payer: MEDICARE

## 2023-06-16 DIAGNOSIS — R60.0 LOCALIZED EDEMA: ICD-10-CM

## 2023-06-16 DIAGNOSIS — M25.611 DECREASED RANGE OF MOTION OF RIGHT SHOULDER: Primary | ICD-10-CM

## 2023-06-16 DIAGNOSIS — R52 PAIN: ICD-10-CM

## 2023-06-16 PROCEDURE — 97110 THERAPEUTIC EXERCISES: CPT

## 2023-06-16 PROCEDURE — 97140 MANUAL THERAPY 1/> REGIONS: CPT

## 2023-06-16 NOTE — PROGRESS NOTES
"    [facility]  Occupational Therapy Treatment Note    Date: 6/16/2023  Name: Laverne Humphries  [facility] Number: [id number]    Therapy Diagnosis:   Encounter Diagnoses   Name Primary?    Decreased range of motion of right shoulder Yes    Localized edema     Pain                Physician: Luis Angel Wheeler MD    Physician Orders:Eval and treat;  s/p RCR/lymphedema treatments into hand  Medical Diagnosis: Z98.890 (ICD-10-CM) - S/P right rotator cuff repair  Surgical Procedure and Date: 3/21/2023,   1. Right shoulder Arthroscopic massive complex rotator cuff repair    2. Right shoulder Arthroscopic extensive debridement   3. Right shoulder Arthroscopic lysis of adhesions   4. Right shoulder Arthroscopic subacromial decompression and bursectomy   5. Right shoulder arthroscopic loose body removal    Evaluation Date: 5/3/2023  Insurance Authorization Period Expiration: 05/07/2023  Plan of Care Expiration: 12 weeks; 7/28/2023  Date of Return to MD: 7/3/2023  Visit # / Visits authorized: 12 / 20  FOTO: (date and score)     Precautions:  Standard     Time In:     08:00  AM   Time Out:  08:48   AM   Total Appointment Time (timed & untimed codes): 48  minutes     11 weeks 2 day s/p (6/6/2023)   SUBJECTIVE     Pt reports: "I didn't have to wear button down shirt"   She was compliant with home exercise program given last session.   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 Reaching easier and opening things easier    Pain: 0/10  Location: right hand     OBJECTIVE   Objective Measures updated at progress report unless specified.    Observation/Appearance: mod edema in hand      Edema. Measured in centimeters.    5/9/2023 5/9/2023     Left Right   2in. Above elbow       2in. Below elbow       Wrist Crease 14.0 cm  16.0 cm    Figure of 8       MCPs 18.4.0 cm  20.5 cm            Shoulder ROM. Measured in " degrees    5/3/2022 5/3/2023 5/11/2023 6/9/2023     Left Right PROM Right PROM Right AROM    Shoulder Flexion  WFL 85 degrees  90 90   Shoulder Abduction  WFL NT 80 90   Shoulder Extension    NT NT     Shoulder IR   NT NT L5   Shoulder ER  70 NT 20 65         Elbow and Wrist ROM. Measured in degrees.    5/9/2023 5/9/2023     Left Right   Elbow Ext/Flex WNL/WNL  12/135    Supination/Pronation WNL/WNL  66/75   Wrist Ext/Flex 55/70 39/6   Wrist RD/UD             Hand ROM. Measured in degrees.    5/9/2023 5/9/2023     Left Right           Index: MP    68              PIP       50              DIP   30              PITTS   148           Long:  MP   65              PIP   58              DIP   62              PITTS   185           Ring:   MP   55              PIP   60              DIP   32              PITTS   147                  Strength (Dynamometer) and Pinch Strength (Pinch Gauge)  Measured in pounds.    6/9/2023 6/9/2023     Left Right   Rung II  41 20   Key Pinch       3pt Pinch       2pt Pinch             Manual Muscle Test    5/9/2023 5/9/2023     Left Right   Wrist Extension        Wrist Flexion       Radial Deviation       Ulnar Deviation       Supination       Pronation       Elbow Extension       Elbow Flexion             Limitation/Restriction for FOTO initial eval; shoulder Survey     Therapist reviewed FOTO scores for Laverne Humphries on 5/3/2023.   FOTO documents entered into [email or web address] - see Media section.     Limitation Score: needs to be taken%             Treatment     Leovidia received the treatments listed below:     Supervised modalities: MHP for 10 minutes to promote increased blood flow       Manual therapy techniques: Manual Lymphatic Drainage were applied to the: RUE for 10 minutes, including:  - passive ROM to flexion/scaption/abduction/ER/IR  x 10 reps each   - gentle inferior/posterior mobs   - STM anterior shoulder NT     - compression sleeve and edema wrap to digits completed at end of session.  NT      Therapeutic exercises to develop ROM for 28 minutes, including:  - shoulder rolls forward/backward x 10 reps each (2 sets)   - pendulums (CW/CCW, forward/backward, side/side) x 10 reps each (2 sets) NT  - shoulder raises x 10 reps (2 sets)   - AAROM flexion/scaption with 1# dowel x 10 reps each   - table slides flexion on incline x 10 reps NT  - table slides abduction x 10 reps NT  - IR stretch using towel holding 10s x 4 reps   - ER stretching side body in supine with dowel   - serratus punches (no weight) x 10 reps   - shoulder arc motion with cones (IR/ER) 1 set   - yellow theraband rows x 10 reps   - scap retraction/protraction x 10 reps (2 sets) with towel slide   - RTC isometrics against wall with towel x 10 reps each NT  - sidelying abduction and ER, 2 x 10 reps      Patient Education and Home Exercises      Education provided:   - added towel IR stretch   - Progress towards goals     Written Home Exercises Provided: Patient instructed to cont prior HEP.  Exercises were reviewed and Laverne was able to demonstrate them prior to the end of the session.  Laverne demonstrated good  understanding of the HEP provided. See EMR under Patient Instructions for exercises provided during therapy sessions.      ASSESSMENT     Pt tolerated tx fairly well today. Cont with capsular tightness, especially with IR. Pt demonstrated increased shoulder ROM in both flexion and scaption during AAROM with 1# dowel. Pt given yellow putty today for  strengthening.       Prakashmable is progressing well towards her goals and there are no updates to goals at this time. Pt prognosis is fair.     Pt will continue to benefit from skilled outpatient occupational therapy to address the deficits listed in the problem list on initial evaluation, provide pt/family education and to maximize pt's level of independence in the home and community environment.     Pt's spiritual, cultural and educational needs considered and pt agreeable to  plan of care and goals.    Anticipated barriers to occupational therapy: preexisting conditions     Goals:  Long term goals:   Pt will achieve shoulder AROM in flexion/scaption/abduction ~120 degrees   2.   Assess MMT when appropriate  3.   Pt will demo shoulder AROM WFL to perform daily and community activities   4.   Pt will report improvement in FOTO score by measuring 20% points.         Short term goals:   Pt will be complaint with HEP and pain management   Pt will achieve shoulder PROM in flexion and scaption ~110 degrees   Pt will report improvement in FOTO by decreasing 10% limitation points   Pt will achieve shoulder AAROM WFL to aid in ADLs   Pt will be able to demo full composite fist with R hand.    PLAN     Continue skilled occupational therapy with individualized plan of care focusing on improving functional independence with use of RUE    Updates/Grading for next session: cont per large massive type III protocol.    Melissa Landeche, OT [facility]  Occupational Therapy Treatment Note    Date: 6/16/2023  Name: Laverne [facility] Number: [id number]    Therapy Diagnosis:   Encounter Diagnoses   Name Primary?    Decreased range of motion of right shoulder Yes    Localized edema     Pain                Physician: Luis Angel Wheeler MD    Physician Orders:Eval and treat;  s/p RCR/lymphedema treatments into hand  Medical Diagnosis: Z98.890 (ICD-10-CM) - S/P right rotator cuff repair  Surgical Procedure and Date: 3/21/2023,   1. Right shoulder Arthroscopic massive complex rotator cuff repair    2. Right shoulder Arthroscopic extensive debridement   3. Right shoulder Arthroscopic lysis of adhesions   4. Right shoulder Arthroscopic subacromial decompression and bursectomy   5. Right shoulder arthroscopic loose body removal    Evaluation Date: 5/3/2023  Insurance Authorization Period Expiration: 05/07/2023  Plan of Care Expiration: 12 weeks;  "7/28/2023  Date of Return to MD: 7/3/2023  Visit # / Visits authorized: 11 / 20  FOTO: (date and score)     Precautions:  Standard     Time In:     08:00  AM   Time Out:  08:48  AM   Total Appointment Time (timed & untimed codes): 48  minutes     11 weeks 2 day s/p (6/6/2023)   SUBJECTIVE     Pt reports: "First day wearing shirt without buttons"   She was compliant with home exercise program given last session.   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 Reaching easier and opening things easier    Pain: 0/10  Location: right hand     OBJECTIVE   Objective Measures updated at progress report unless specified.    Observation/Appearance: mod edema in hand      Edema. Measured in centimeters.    5/9/2023 5/9/2023     Left Right   2in. Above elbow       2in. Below elbow       Wrist Crease 14.0 cm  16.0 cm    Figure of 8       MCPs 18.4.0 cm  20.5 cm            Shoulder ROM. Measured in degrees    5/3/2022 5/3/2023 5/11/2023 6/9/2023     Left Right PROM Right PROM Right AROM    Shoulder Flexion  WFL 85 degrees  90 90   Shoulder Abduction  WFL NT 80 90   Shoulder Extension    NT NT     Shoulder IR   NT NT L5   Shoulder ER  70 NT 20 65         Elbow and Wrist ROM. Measured in degrees.    5/9/2023 5/9/2023     Left Right   Elbow Ext/Flex WNL/WNL  12/135    Supination/Pronation WNL/WNL  66/75   Wrist Ext/Flex 55/70 39/6   Wrist RD/UD             Hand ROM. Measured in degrees.    5/9/2023 5/9/2023     Left Right           Index: MP    68              PIP       50              DIP   30              PITTS   148           Long:  MP   65              PIP   58              DIP   62              PITTS   185           Ring:   MP   55              PIP   60              DIP   32              PITTS   147                  Strength (Dynamometer) and Pinch Strength (Pinch Gauge)  Measured in pounds.    6/9/2023 6/9/2023     Left Right   Rung II  " 41 20   Key Pinch       3pt Pinch       2pt Pinch             Manual Muscle Test    5/9/2023 5/9/2023     Left Right   Wrist Extension        Wrist Flexion       Radial Deviation       Ulnar Deviation       Supination       Pronation       Elbow Extension       Elbow Flexion             Limitation/Restriction for FOTO initial eval; shoulder Survey     Therapist reviewed FOTO scores for Laverne Humphries on 5/3/2023.   FOTO documents entered into [employer] - see Media section.     Limitation Score: needs to be taken%             Treatment     Laverne received the treatments listed below:     Supervised modalities: MHP for 10 minutes to promote increased blood flow       Manual therapy techniques: Manual Lymphatic Drainage were applied to the: RUE for 15 minutes, including:  - passive ROM to flexion/scaption/abduction/ER/IR  x 10 reps each   - gentle inferior/posterior mobs   - STM anterior shoulder NT   - edema wrapping R hand (NT)      Therapeutic exercises to develop ROM for 30 minutes, including:  - shoulder rolls forward/backward x 10 reps each (2 sets)   - pendulums (CW/CCW, forward/backward, side/side) x 10 reps each (2 sets) NT  - shoulder raises x 10 reps (2 sets)   - AAROM flexion/scaption with 1# dowel x 10 reps each   - table slides flexion on incline x 10 reps   - table slides abduction x 10 reps   - IR stretch using towel holding 10s x 4 reps   - scap retraction/protraction x 10 reps (2 sets) with towel slide   - RTC isometrics against wall with towel x 10 reps each   - sidelying abduction and ER, 2 x 10 reps      Patient Education and Home Exercises      Education provided:   - added towel IR stretch   - Progress towards goals     Written Home Exercises Provided: Patient instructed to cont prior HEP.  Exercises were reviewed and Laverne was able to demonstrate them prior to the end of the session.  Laverne demonstrated good  understanding of the HEP provided. See EMR under Patient Instructions for  exercises provided during therapy sessions.      ASSESSMENT     Pt tolerated tx fairly well today. Verbalized min pain with table slides in ABDuction. Good ROM with elevated table slides. Tightness in capsule. Will cont as alex Galloway is progressing well towards her goals and there are no updates to goals at this time. Pt prognosis is fair.     Pt will continue to benefit from skilled outpatient occupational therapy to address the deficits listed in the problem list on initial evaluation, provide pt/family education and to maximize pt's level of independence in the home and community environment.     Pt's spiritual, cultural and educational needs considered and pt agreeable to plan of care and goals.    Anticipated barriers to occupational therapy: preexisting conditions     Goals:  Long term goals:   Pt will achieve shoulder AROM in flexion/scaption/abduction ~120 degrees   2.   Assess MMT when appropriate  3.   Pt will demo shoulder AROM WFL to perform daily and community activities   4.   Pt will report improvement in FOTO score by measuring 20% points.         Short term goals:   Pt will be complaint with HEP and pain management   Pt will achieve shoulder PROM in flexion and scaption ~110 degrees   Pt will report improvement in FOTO by decreasing 10% limitation points   Pt will achieve shoulder AAROM WFL to aid in ADLs   Pt will be able to demo full composite fist with R hand.    PLAN     Continue skilled occupational therapy with individualized plan of care focusing on improving functional independence with use of RUE    Updates/Grading for next session: cont per large massive type III protocol. Do FOTO and take measurements next session    Shaunna Stevenson OT

## 2023-06-20 ENCOUNTER — CLINICAL SUPPORT (OUTPATIENT)
Dept: REHABILITATION | Facility: HOSPITAL | Age: 83
End: 2023-06-20
Payer: MEDICARE

## 2023-06-20 DIAGNOSIS — R60.0 LOCALIZED EDEMA: ICD-10-CM

## 2023-06-20 DIAGNOSIS — M25.611 DECREASED RANGE OF MOTION OF RIGHT SHOULDER: Primary | ICD-10-CM

## 2023-06-20 DIAGNOSIS — R52 PAIN: ICD-10-CM

## 2023-06-20 PROCEDURE — 97110 THERAPEUTIC EXERCISES: CPT

## 2023-06-20 PROCEDURE — 97140 MANUAL THERAPY 1/> REGIONS: CPT

## 2023-06-20 NOTE — PROGRESS NOTES
"      [facility]  Occupational Therapy Treatment Note    Date: 6/20/2023  Name: Laverne Humphries  [facility] Number: [id number]    Therapy Diagnosis:   Encounter Diagnoses   Name Primary?    Decreased range of motion of right shoulder Yes    Localized edema     Pain        Physician: Luis Angel Wheeler MD    Physician Orders:Eval and treat;  s/p RCR/lymphedema treatments into hand  Medical Diagnosis: Z98.890 (ICD-10-CM) - S/P right rotator cuff repair  Surgical Procedure and Date: 3/21/2023,   1. Right shoulder Arthroscopic massive complex rotator cuff repair    2. Right shoulder Arthroscopic extensive debridement   3. Right shoulder Arthroscopic lysis of adhesions   4. Right shoulder Arthroscopic subacromial decompression and bursectomy   5. Right shoulder arthroscopic loose body removal    Evaluation Date: 5/3/2023  Insurance Authorization Period Expiration: 05/07/2023  Plan of Care Expiration: 12 weeks; 7/28/2023  Date of Return to MD: 7/3/2023  Visit # / Visits authorized: 16 / 20  FOTO: (date and score)     Precautions:  Standard     Time In:     08:10  AM   Time Out:  08:55  AM   Total Appointment Time (timed & untimed codes): 55  minutes       11 weeks 2 day s/p (6/6/2023)     SUBJECTIVE     Pt reports:  "I keep doing more and more things at home"   She was compliant with home exercise program given last session.   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 Reaching easier and opening things easier    Pain: 0/10  Location: right hand     OBJECTIVE   Objective Measures updated at progress report unless specified.    Observation/Appearance: mod edema in hand      Edema. Measured in centimeters.    5/9/2023 5/9/2023     Left Right   2in. Above elbow       2in. Below elbow       Wrist Crease 14.0 cm  16.0 cm    Figure of 8       MCPs 18.4.0 cm  20.5 cm           Shoulder ROM. Measured in degrees   "  5/3/2022 5/3/2023 5/11/2023 6/9/2023     Left Right PROM Right PROM Right AROM    Shoulder Flexion  WFL 85 degrees  90 90   Shoulder Abduction  WFL NT 80 90   Shoulder Extension    NT NT     Shoulder IR   NT NT L5   Shoulder ER  70 NT 20 65         Elbow and Wrist ROM. Measured in degrees.    5/9/2023 5/9/2023     Left Right   Elbow Ext/Flex WNL/WNL  12/135    Supination/Pronation WNL/WNL  66/75   Wrist Ext/Flex 55/70 39/6   Wrist RD/UD             Hand ROM. Measured in degrees.    5/9/2023 5/9/2023 6/22/2023      Left Right Right             Index: MP    68 67              PIP       50 91              DIP   30 40              PITTS   148             Long:  MP   65 78              PIP   58 80              DIP   62 55              PITTS   185             Ring:   MP   55 80              PIP   60 92              DIP   32 45              PITTS   147                    Strength (Dynamometer) and Pinch Strength (Pinch Gauge)  Measured in pounds.    6/9/2023 6/9/2023     Left Right   Rung II  41 20   Key Pinch       3pt Pinch       2pt Pinch             Manual Muscle Test    5/9/2023 5/9/2023     Left Right   Wrist Extension        Wrist Flexion       Radial Deviation       Ulnar Deviation       Supination       Pronation       Elbow Extension       Elbow Flexion             Limitation/Restriction for FOTO initial eval; shoulder Survey     Therapist reviewed FOTO scores for Laverne Humphries on 5/3/2023.   FOTO documents entered into [employer] - see Media section.     Limitation Score: needs to be taken%             Treatment     Leovidia received the treatments listed below:     Supervised modalities: MHP for 10 minutes to promote increased blood flow       Manual therapy techniques: Manual Lymphatic Drainage were applied to the: RUE for 10 minutes, including:  - passive ROM to flexion/scaption/abduction/ER/IR  x 10 reps each   - gentle inferior/posterior mobs   - STM anterior shoulder NT         Therapeutic exercises to  develop ROM for 28 minutes, including:  - shoulder rolls forward/backward x 10 reps each (2 sets)   - pendulums (CW/CCW, forward/backward, side/side) x 10 reps each (2 sets) NT  - shoulder raises x 10 reps (2 sets)   - AAROM flexion/scaption with 1# dowel x 10 reps each   - table slides flexion on incline x 10 reps NT  - table slides abduction x 10 reps NT  - IR stretch using towel holding 10s x 4 reps   - ER stretching side body in supine with dowel   - serratus punches (no weight) x 10 reps   - shoulder arc motion with cones (IR/ER) 1 set   - yellow theraband rows x 10 reps   - scap retraction/protraction x 10 reps (2 sets) with towel slide   - RTC isometrics against wall with towel x 10 reps each NT  - sidelying abduction and ER, 2 x 10 reps      Patient Education and Home Exercises      Education provided:   - added towel IR stretch   - Progress towards goals     Written Home Exercises Provided: Patient instructed to cont prior HEP.  Exercises were reviewed and Laverne was able to demonstrate them prior to the end of the session.  Laverne demonstrated good  understanding of the HEP provided. See EMR under Patient Instructions for exercises provided during therapy sessions.      ASSESSMENT     Pt tolerated tx fairly well today. Cont with capsular tightness, especially with IR. Pt demonstrated increased shoulder ROM in both flexion and scaption during AAROM with 1# dowel. Pt given yellow putty today for  strengthening.       Prakashmable is progressing well towards her goals and there are no updates to goals at this time. Pt prognosis is fair.     Pt will continue to benefit from skilled outpatient occupational therapy to address the deficits listed in the problem list on initial evaluation, provide pt/family education and to maximize pt's level of independence in the home and community environment.     Pt's spiritual, cultural and educational needs considered and pt agreeable to plan of care and  goals.    Anticipated barriers to occupational therapy: preexisting conditions     Goals:  Long term goals:   Pt will achieve shoulder AROM in flexion/scaption/abduction ~120 degrees   2.   Assess MMT when appropriate  3.   Pt will demo shoulder AROM WFL to perform daily and community activities   4.   Pt will report improvement in FOTO score by measuring 20% points.         Short term goals:   Pt will be complaint with HEP and pain management   Pt will achieve shoulder PROM in flexion and scaption ~110 degrees   Pt will report improvement in FOTO by decreasing 10% limitation points   Pt will achieve shoulder AAROM WFL to aid in ADLs   Pt will be able to demo full composite fist with R hand.    PLAN     Continue skilled occupational therapy with individualized plan of care focusing on improving functional independence with use of RUE    Updates/Grading for next session: cont per large massive type III protocol.    Melissa Landeche, OT [facility]  Occupational Therapy Treatment Note    Date: 6/20/2023  Name: Laverne CELIS [facility] Number: [id number]    Therapy Diagnosis:   Encounter Diagnoses   Name Primary?    Decreased range of motion of right shoulder Yes    Localized edema     Pain                  Physician: Luis Angel Wheeler MD    Physician Orders:Eval and treat;  s/p RCR/lymphedema treatments into hand  Medical Diagnosis: Z98.890 (ICD-10-CM) - S/P right rotator cuff repair  Surgical Procedure and Date: 3/21/2023,   1. Right shoulder Arthroscopic massive complex rotator cuff repair    2. Right shoulder Arthroscopic extensive debridement   3. Right shoulder Arthroscopic lysis of adhesions   4. Right shoulder Arthroscopic subacromial decompression and bursectomy   5. Right shoulder arthroscopic loose body removal    Evaluation Date: 5/3/2023  Insurance Authorization Period Expiration: 05/07/2023  Plan of Care Expiration: 12 weeks;  "7/28/2023  Date of Return to MD: 7/3/2023  Visit # / Visits authorized: 11 / 20  FOTO: (date and score)     Precautions:  Standard     Time In:     08:00  AM   Time Out:  08:48  AM   Total Appointment Time (timed & untimed codes): 48  minutes     11 weeks 2 day s/p (6/6/2023)   SUBJECTIVE     Pt reports: "First day wearing shirt without buttons"   She was compliant with home exercise program given last session.   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 Reaching easier and opening things easier    Pain: 0/10  Location: right hand     OBJECTIVE   Objective Measures updated at progress report unless specified.    Observation/Appearance: mod edema in hand      Edema. Measured in centimeters.    5/9/2023 5/9/2023     Left Right   2in. Above elbow       2in. Below elbow       Wrist Crease 14.0 cm  16.0 cm    Figure of 8       MCPs 18.4.0 cm  20.5 cm            Shoulder ROM. Measured in degrees    5/3/2022 5/3/2023 5/11/2023 6/9/2023     Left Right PROM Right PROM Right AROM    Shoulder Flexion  WFL 85 degrees  90 90   Shoulder Abduction  WFL NT 80 90   Shoulder Extension    NT NT     Shoulder IR   NT NT L5   Shoulder ER  70 NT 20 65         Elbow and Wrist ROM. Measured in degrees.    5/9/2023 5/9/2023     Left Right   Elbow Ext/Flex WNL/WNL  12/135    Supination/Pronation WNL/WNL  66/75   Wrist Ext/Flex 55/70 39/6   Wrist RD/UD             Hand ROM. Measured in degrees.    5/9/2023 5/9/2023     Left Right           Index: MP    68              PIP       50              DIP   30              PITTS   148           Long:  MP   65              PIP   58              DIP   62              PITTS   185           Ring:   MP   55              PIP   60              DIP   32              PITTS   147                  Strength (Dynamometer) and Pinch Strength (Pinch Gauge)  Measured in pounds.    6/9/2023 6/9/2023     Left Right   Rung II  " 41 20   Key Pinch       3pt Pinch       2pt Pinch             Manual Muscle Test    5/9/2023 5/9/2023     Left Right   Wrist Extension        Wrist Flexion       Radial Deviation       Ulnar Deviation       Supination       Pronation       Elbow Extension       Elbow Flexion             Limitation/Restriction for FOTO initial eval; shoulder Survey     Therapist reviewed FOTO scores for Laverne Humphries on 5/3/2023.   FOTO documents entered into [employer] - see Media section.     Limitation Score: needs to be taken%             Treatment     Laverne received the treatments listed below:     Supervised modalities: MHP for 10 minutes to promote increased blood flow       Manual therapy techniques: Manual Lymphatic Drainage were applied to the: RUE for 15 minutes, including:  - passive ROM to flexion/scaption/abduction/ER/IR  x 10 reps each   - gentle inferior/posterior mobs   - STM anterior shoulder NT   - edema wrapping R hand (NT)      Therapeutic exercises to develop ROM for 30 minutes, including:  - shoulder rolls forward/backward x 10 reps each (2 sets)   - pendulums (CW/CCW, forward/backward, side/side) x 10 reps each (2 sets) NT  - shoulder raises x 10 reps (2 sets)   - AAROM flexion/scaption with 1# dowel x 10 reps each   - table slides flexion on incline x 10 reps   - table slides abduction x 10 reps   - IR stretch using towel holding 10s x 4 reps   - scap retraction/protraction x 10 reps (2 sets) with towel slide   - RTC isometrics against wall with towel x 10 reps each   - sidelying abduction and ER, 2 x 10 reps      Patient Education and Home Exercises      Education provided:   - added towel IR stretch   - Progress towards goals     Written Home Exercises Provided: Patient instructed to cont prior HEP.  Exercises were reviewed and Laverne was able to demonstrate them prior to the end of the session.  Laverne demonstrated good  understanding of the HEP provided. See EMR under Patient Instructions for  exercises provided during therapy sessions.      ASSESSMENT     Pt tolerated tx fairly well today. Verbalized min pain with table slides in ABDuction. Good ROM with elevated table slides. Tightness in capsule. Will cont as alex Galloway is progressing well towards her goals and there are no updates to goals at this time. Pt prognosis is fair.     Pt will continue to benefit from skilled outpatient occupational therapy to address the deficits listed in the problem list on initial evaluation, provide pt/family education and to maximize pt's level of independence in the home and community environment.     Pt's spiritual, cultural and educational needs considered and pt agreeable to plan of care and goals.    Anticipated barriers to occupational therapy: preexisting conditions     Goals:  Long term goals:   Pt will achieve shoulder AROM in flexion/scaption/abduction ~120 degrees   2.   Assess MMT when appropriate  3.   Pt will demo shoulder AROM WFL to perform daily and community activities   4.   Pt will report improvement in FOTO score by measuring 20% points.         Short term goals:   Pt will be complaint with HEP and pain management   Pt will achieve shoulder PROM in flexion and scaption ~110 degrees   Pt will report improvement in FOTO by decreasing 10% limitation points   Pt will achieve shoulder AAROM WFL to aid in ADLs   Pt will be able to demo full composite fist with R hand.    PLAN     Continue skilled occupational therapy with individualized plan of care focusing on improving functional independence with use of RUE    Updates/Grading for next session: cont per large massive type III protocol. Do FOTO and take measurements next session    Shaunna Stevenson OT

## 2023-06-22 ENCOUNTER — CLINICAL SUPPORT (OUTPATIENT)
Dept: REHABILITATION | Facility: HOSPITAL | Age: 83
End: 2023-06-22
Payer: MEDICARE

## 2023-06-22 DIAGNOSIS — M25.611 DECREASED RANGE OF MOTION OF RIGHT SHOULDER: Primary | ICD-10-CM

## 2023-06-22 DIAGNOSIS — R60.0 LOCALIZED EDEMA: ICD-10-CM

## 2023-06-22 DIAGNOSIS — R52 PAIN: ICD-10-CM

## 2023-06-22 PROCEDURE — 97140 MANUAL THERAPY 1/> REGIONS: CPT

## 2023-06-22 PROCEDURE — 97110 THERAPEUTIC EXERCISES: CPT

## 2023-06-22 NOTE — PROGRESS NOTES
"  [facility]  Occupational Therapy Treatment Note    Date: 6/22/2023  Name: Laverne Humphries  [facility] Number: [id number]    Therapy Diagnosis:   Encounter Diagnoses   Name Primary?    Decreased range of motion of right shoulder Yes    Localized edema     Pain          Physician: Luis Angel Wheeler MD    Physician Orders:Eval and treat;  s/p RCR/lymphedema treatments into hand  Medical Diagnosis: Z98.890 (ICD-10-CM) - S/P right rotator cuff repair  Surgical Procedure and Date: 3/21/2023,   1. Right shoulder Arthroscopic massive complex rotator cuff repair    2. Right shoulder Arthroscopic extensive debridement   3. Right shoulder Arthroscopic lysis of adhesions   4. Right shoulder Arthroscopic subacromial decompression and bursectomy   5. Right shoulder arthroscopic loose body removal    Evaluation Date: 5/3/2023  Insurance Authorization Period Expiration: 05/07/2023  Plan of Care Expiration: 12 weeks; 7/28/2023  Date of Return to MD: 7/3/2023  Visit # / Visits authorized: 17 / 20  FOTO: (date and score)     Precautions:  Standard     Time In:     08:00   AM   Time Out:  09:00    AM   Total Appointment Time (timed & untimed codes): 60   minutes       11 weeks 2 day s/p (6/6/2023)     SUBJECTIVE     Pt reports:  "I have been able to tie my shoes."   She was compliant with home exercise program given last session.   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 Reaching easier and opening things easier    Pain: 0/10  Location: right hand     OBJECTIVE   Objective Measures updated at progress report unless specified.    Observation/Appearance: mod edema in hand      Edema. Measured in centimeters.    5/9/2023 5/9/2023     Left Right   2in. Above elbow       2in. Below elbow       Wrist Crease 14.0 cm  16.0 cm    Figure of 8       MCPs 18.4.0 cm  20.5 cm            Shoulder ROM. Measured in degrees    " 5/3/2022 5/3/2023 5/11/2023 6/9/2023     Left Right PROM Right PROM Right AROM    Shoulder Flexion  WFL 85 degrees  90 90   Shoulder Abduction  WFL NT 80 90   Shoulder Extension    NT NT     Shoulder IR   NT NT L5   Shoulder ER  70 NT 20 65         Elbow and Wrist ROM. Measured in degrees.    5/9/2023 5/9/2023     Left Right   Elbow Ext/Flex WNL/WNL  12/135    Supination/Pronation WNL/WNL  66/75   Wrist Ext/Flex 55/70 39/6   Wrist RD/UD             Hand ROM. Measured in degrees.    5/9/2023 5/9/2023 6/22/2023      Left Right Right             Index: MP    68 67              PIP       50 91              DIP   30 40              PITTS   148             Long:  MP   65 78              PIP   58 80              DIP   62 55              PITTS   185             Ring:   MP   55 80              PIP   60 92              DIP   32 45              PITTS   147                    Strength (Dynamometer) and Pinch Strength (Pinch Gauge)  Measured in pounds.    6/9/2023 6/9/2023     Left Right   Rung II  41 20   Key Pinch       3pt Pinch       2pt Pinch             Manual Muscle Test    5/9/2023 5/9/2023     Left Right   Wrist Extension        Wrist Flexion       Radial Deviation       Ulnar Deviation       Supination       Pronation       Elbow Extension       Elbow Flexion             Limitation/Restriction for FOTO initial eval; shoulder Survey     Therapist reviewed FOTO scores for Laverne Humphries on 5/3/2023.   FOTO documents entered into [employer] - see Media section.     Limitation Score: needs to be taken%             Treatment     Leovidia received the treatments listed below:     Supervised modalities: MHP for 10 minutes to promote increased blood flow         Manual therapy techniques: Manual Lymphatic Drainage were applied to the: RUE for 10 minutes, including:  - passive ROM to flexion/scaption/abduction/ER/IR  x 10 reps each   - gentle inferior/posterior mobs   - STM anterior shoulder NT       Therapeutic exercises to  develop ROM for 28 minutes, including:  - shoulder rolls forward/backward x 10 reps each (2 sets)   - pendulums (CW/CCW, forward/backward, side/side) x 10 reps each (2 sets) NT  - shoulder raises x 10 reps (2 sets)   - AAROM flexion/scaption with 1# dowel x 10 reps each   - table slides flexion on incline x 10 reps NT  - table slides abduction x 10 reps NT  - IR stretch using towel holding 10s x 4 reps   - ER stretching side body in supine with dowel   - serratus punches (no weight) x 10 reps   - shoulder arc motion with cones (IR/ER) 1 set   - yellow theraband rows x 10 reps   - scap retraction/protraction x 10 reps (2 sets) with towel slide   - RTC isometrics against wall with towel x 10 reps each NT  - sidelying abduction and ER, 2 x 10 reps      Patient Education and Home Exercises      Education provided:   - added towel IR stretch   - Progress towards goals     Written Home Exercises Provided: Patient instructed to cont prior HEP.  Exercises were reviewed and Laverne was able to demonstrate them prior to the end of the session.  Laverne demonstrated good  understanding of the HEP provided. See EMR under Patient Instructions for exercises provided during therapy sessions.      ASSESSMENT     Pt tolerated tx fairly well today. Cont with capsular tightness, especially with IR. Pt demonstrated increased shoulder ROM in both flexion and scaption during AAROM with 1# dowel. Pt given yellow putty today for  strengthening.       Prakashmable is progressing well towards her goals and there are no updates to goals at this time. Pt prognosis is fair.     Pt will continue to benefit from skilled outpatient occupational therapy to address the deficits listed in the problem list on initial evaluation, provide pt/family education and to maximize pt's level of independence in the home and community environment.     Pt's spiritual, cultural and educational needs considered and pt agreeable to plan of care and  goals.    Anticipated barriers to occupational therapy: preexisting conditions     Goals:  Long term goals:   Pt will achieve shoulder AROM in flexion/scaption/abduction ~120 degrees   2.   Assess MMT when appropriate  3.   Pt will demo shoulder AROM WFL to perform daily and community activities   4.   Pt will report improvement in FOTO score by measuring 20% points.         Short term goals:   Pt will be complaint with HEP and pain management   Pt will achieve shoulder PROM in flexion and scaption ~110 degrees   Pt will report improvement in FOTO by decreasing 10% limitation points   Pt will achieve shoulder AAROM WFL to aid in ADLs   Pt will be able to demo full composite fist with R hand.    PLAN     Continue skilled occupational therapy with individualized plan of care focusing on improving functional independence with use of RUE    Updates/Grading for next session: cont per large massive type III protocol.    Melissa Landeche, OT [facility]  Occupational Therapy Treatment Note    Date: 6/22/2023  Name: Laverne CELIS [facility] Number: [id number]    Therapy Diagnosis:   Encounter Diagnoses   Name Primary?    Decreased range of motion of right shoulder Yes    Localized edema     Pain                    Physician: Luis Angel Wheeler MD    Physician Orders:Eval and treat;  s/p RCR/lymphedema treatments into hand  Medical Diagnosis: Z98.890 (ICD-10-CM) - S/P right rotator cuff repair  Surgical Procedure and Date: 3/21/2023,   1. Right shoulder Arthroscopic massive complex rotator cuff repair    2. Right shoulder Arthroscopic extensive debridement   3. Right shoulder Arthroscopic lysis of adhesions   4. Right shoulder Arthroscopic subacromial decompression and bursectomy   5. Right shoulder arthroscopic loose body removal    Evaluation Date: 5/3/2023  Insurance Authorization Period Expiration: 05/07/2023  Plan of Care Expiration: 12 weeks;  "7/28/2023  Date of Return to MD: 7/3/2023  Visit # / Visits authorized: 11 / 20  FOTO: (date and score)     Precautions:  Standard     Time In:     08:00  AM   Time Out:  08:48  AM   Total Appointment Time (timed & untimed codes): 48  minutes     11 weeks 2 day s/p (6/6/2023)   SUBJECTIVE     Pt reports: "First day wearing shirt without buttons"   She was compliant with home exercise program given last session.   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 Reaching easier and opening things easier    Pain: 0/10  Location: right hand     OBJECTIVE   Objective Measures updated at progress report unless specified.    Observation/Appearance: mod edema in hand      Edema. Measured in centimeters.    5/9/2023 5/9/2023     Left Right   2in. Above elbow       2in. Below elbow       Wrist Crease 14.0 cm  16.0 cm    Figure of 8       MCPs 18.4.0 cm  20.5 cm            Shoulder ROM. Measured in degrees    5/3/2022 5/3/2023 5/11/2023 6/9/2023     Left Right PROM Right PROM Right AROM    Shoulder Flexion  WFL 85 degrees  90 90   Shoulder Abduction  WFL NT 80 90   Shoulder Extension    NT NT     Shoulder IR   NT NT L5   Shoulder ER  70 NT 20 65         Elbow and Wrist ROM. Measured in degrees.    5/9/2023 5/9/2023     Left Right   Elbow Ext/Flex WNL/WNL  12/135    Supination/Pronation WNL/WNL  66/75   Wrist Ext/Flex 55/70 39/6   Wrist RD/UD             Hand ROM. Measured in degrees.    5/9/2023 5/9/2023     Left Right           Index: MP    68              PIP       50              DIP   30              PITTS   148           Long:  MP   65              PIP   58              DIP   62              PITTS   185           Ring:   MP   55              PIP   60              DIP   32              PITTS   147                  Strength (Dynamometer) and Pinch Strength (Pinch Gauge)  Measured in pounds.    6/9/2023 6/9/2023     Left Right   Rung II  " 41 20   Key Pinch       3pt Pinch       2pt Pinch             Manual Muscle Test    5/9/2023 5/9/2023     Left Right   Wrist Extension        Wrist Flexion       Radial Deviation       Ulnar Deviation       Supination       Pronation       Elbow Extension       Elbow Flexion             Limitation/Restriction for FOTO initial eval; shoulder Survey     Therapist reviewed FOTO scores for Laverne Humphries on 5/3/2023.   FOTO documents entered into [employer] - see Media section.     Limitation Score: needs to be taken%             Treatment     Laverne received the treatments listed below:     Supervised modalities: MHP for 10 minutes to promote increased blood flow       Manual therapy techniques: Manual Lymphatic Drainage were applied to the: RUE for 15 minutes, including:  - passive ROM to flexion/scaption/abduction/ER/IR  x 10 reps each   - gentle inferior/posterior mobs   - STM anterior shoulder NT   - edema wrapping R hand (NT)      Therapeutic exercises to develop ROM for 30 minutes, including:  - shoulder rolls forward/backward x 10 reps each (2 sets)   - pendulums (CW/CCW, forward/backward, side/side) x 10 reps each (2 sets) NT  - shoulder raises x 10 reps (2 sets)   - AAROM flexion/scaption with 1# dowel x 10 reps each   - table slides flexion on incline x 10 reps   - table slides abduction x 10 reps   - IR stretch using towel holding 10s x 4 reps   - scap retraction/protraction x 10 reps (2 sets) with towel slide   - RTC isometrics against wall with towel x 10 reps each   - sidelying abduction and ER, 2 x 10 reps      Patient Education and Home Exercises      Education provided:   - added towel IR stretch   - Progress towards goals     Written Home Exercises Provided: Patient instructed to cont prior HEP.  Exercises were reviewed and Laverne was able to demonstrate them prior to the end of the session.  Laverne demonstrated good  understanding of the HEP provided. See EMR under Patient Instructions for  exercises provided during therapy sessions.      ASSESSMENT     Pt tolerated tx fairly well today. Verbalized min pain with table slides in ABDuction. Good ROM with elevated table slides. Tightness in capsule. Will cont as alex Galloway is progressing well towards her goals and there are no updates to goals at this time. Pt prognosis is fair.     Pt will continue to benefit from skilled outpatient occupational therapy to address the deficits listed in the problem list on initial evaluation, provide pt/family education and to maximize pt's level of independence in the home and community environment.     Pt's spiritual, cultural and educational needs considered and pt agreeable to plan of care and goals.    Anticipated barriers to occupational therapy: preexisting conditions     Goals:  Long term goals:   Pt will achieve shoulder AROM in flexion/scaption/abduction ~120 degrees   2.   Assess MMT when appropriate  3.   Pt will demo shoulder AROM WFL to perform daily and community activities   4.   Pt will report improvement in FOTO score by measuring 20% points.         Short term goals:   Pt will be complaint with HEP and pain management   Pt will achieve shoulder PROM in flexion and scaption ~110 degrees   Pt will report improvement in FOTO by decreasing 10% limitation points   Pt will achieve shoulder AAROM WFL to aid in ADLs   Pt will be able to demo full composite fist with R hand.    PLAN     Continue skilled occupational therapy with individualized plan of care focusing on improving functional independence with use of RUE    Updates/Grading for next session: cont per large massive type III protocol. Do FOTO and take measurements next session    Shaunna Stevenson OT

## 2023-06-27 ENCOUNTER — CLINICAL SUPPORT (OUTPATIENT)
Dept: REHABILITATION | Facility: HOSPITAL | Age: 83
End: 2023-06-27
Payer: MEDICARE

## 2023-06-27 DIAGNOSIS — R52 PAIN: ICD-10-CM

## 2023-06-27 DIAGNOSIS — M25.611 DECREASED RANGE OF MOTION OF RIGHT SHOULDER: Primary | ICD-10-CM

## 2023-06-27 DIAGNOSIS — R60.0 LOCALIZED EDEMA: ICD-10-CM

## 2023-06-27 PROCEDURE — 97110 THERAPEUTIC EXERCISES: CPT

## 2023-06-27 PROCEDURE — 97140 MANUAL THERAPY 1/> REGIONS: CPT

## 2023-06-27 NOTE — PROGRESS NOTES
"    [facility]  Occupational Therapy Treatment Note    Date: 6/27/2023  Name: Laverne Humphries  [facility] Number: [id number]    Therapy Diagnosis:   Encounter Diagnoses   Name Primary?    Decreased range of motion of right shoulder Yes    Localized edema     Pain            Physician: Luis Angel Wheeler MD    Physician Orders:Eval and treat;  s/p RCR/lymphedema treatments into hand  Medical Diagnosis: Z98.890 (ICD-10-CM) - S/P right rotator cuff repair  Surgical Procedure and Date: 3/21/2023,   1. Right shoulder Arthroscopic massive complex rotator cuff repair    2. Right shoulder Arthroscopic extensive debridement   3. Right shoulder Arthroscopic lysis of adhesions   4. Right shoulder Arthroscopic subacromial decompression and bursectomy   5. Right shoulder arthroscopic loose body removal    Evaluation Date: 5/3/2023  Insurance Authorization Period Expiration: 05/07/2023  Plan of Care Expiration: 12 weeks; 7/28/2023  Date of Return to MD: 7/3/2023  Visit # / Visits authorized: 17 / 20  FOTO: (date and score)     Precautions:  Standard     Time In:     08:00   AM   Time Out:  09:00    AM   Total Appointment Time (timed & untimed codes): 60   minutes       11 weeks 2 day s/p (6/6/2023)     SUBJECTIVE     Pt reports:  "I have been able to tie my shoes."   She was compliant with home exercise program given last session.   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 Reaching easier and opening things easier    Pain: 0/10  Location: right hand     OBJECTIVE   Objective Measures updated at progress report unless specified.    Observation/Appearance: mod edema in hand      Edema. Measured in centimeters.    5/9/2023 5/9/2023     Left Right   2in. Above elbow       2in. Below elbow       Wrist Crease 14.0 cm  16.0 cm    Figure of 8       MCPs 18.4.0 cm  20.5 cm            Shoulder ROM. Measured in degrees   "  5/3/2022 5/3/2023 5/11/2023 6/9/2023     Left Right PROM Right PROM Right AROM    Shoulder Flexion  WFL 85 degrees  90 90   Shoulder Abduction  WFL NT 80 90   Shoulder Extension    NT NT     Shoulder IR   NT NT L5   Shoulder ER  70 NT 20 65         Elbow and Wrist ROM. Measured in degrees.    5/9/2023 5/9/2023     Left Right   Elbow Ext/Flex WNL/WNL  12/135    Supination/Pronation WNL/WNL  66/75   Wrist Ext/Flex 55/70 39/6   Wrist RD/UD             Hand ROM. Measured in degrees.    5/9/2023 5/9/2023 6/22/2023      Left Right Right             Index: MP    68 67              PIP       50 91              DIP   30 40              PITTS   148             Long:  MP   65 78              PIP   58 80              DIP   62 55              PITTS   185             Ring:   MP   55 80              PIP   60 92              DIP   32 45              PITTS   147                    Strength (Dynamometer) and Pinch Strength (Pinch Gauge)  Measured in pounds.    6/9/2023 6/9/2023     Left Right   Rung II  41 20   Key Pinch       3pt Pinch       2pt Pinch             Manual Muscle Test    5/9/2023 5/9/2023     Left Right   Wrist Extension        Wrist Flexion       Radial Deviation       Ulnar Deviation       Supination       Pronation       Elbow Extension       Elbow Flexion             Limitation/Restriction for FOTO initial eval; shoulder Survey     Therapist reviewed FOTO scores for Laverne Humphries on 5/3/2023.   FOTO documents entered into [employer] - see Media section.     Limitation Score: needs to be taken%             Treatment     Leovidia received the treatments listed below:     Supervised modalities: MHP for 10 minutes to promote increased blood flow         Manual therapy techniques: Manual Lymphatic Drainage were applied to the: RUE for 10 minutes, including:  - passive ROM to flexion/scaption/abduction/ER/IR  x 10 reps each   - gentle inferior/posterior mobs   - STM anterior shoulder NT       Therapeutic exercises to  develop ROM for 28 minutes, including:  - shoulder rolls forward/backward x 10 reps each (2 sets)   - pendulums (CW/CCW, forward/backward, side/side) x 10 reps each (2 sets) NT  - shoulder raises x 10 reps (2 sets)   - AAROM flexion/scaption with 1# dowel x 10 reps each   - table slides flexion on incline x 10 reps NT  - table slides abduction x 10 reps NT  - IR stretch using towel holding 10s x 4 reps   - ER stretching side body in supine with dowel   - serratus punches (no weight) x 10 reps   - shoulder arc motion with cones (IR/ER) 1 set   - yellow theraband rows x 10 reps   - scap retraction/protraction x 10 reps (2 sets) with towel slide   - RTC isometrics against wall with towel x 10 reps each NT  - sidelying abduction and ER, 2 x 10 reps      Patient Education and Home Exercises      Education provided:   - added towel IR stretch   - Progress towards goals     Written Home Exercises Provided: Patient instructed to cont prior HEP.  Exercises were reviewed and Laverne was able to demonstrate them prior to the end of the session.  Laverne demonstrated good  understanding of the HEP provided. See EMR under Patient Instructions for exercises provided during therapy sessions.      ASSESSMENT     Pt tolerated tx fairly well today. Cont with capsular tightness, especially with IR. Pt demonstrated increased shoulder ROM in both flexion and scaption during AAROM with 1# dowel. Pt given yellow putty today for  strengthening.       Prakashmable is progressing well towards her goals and there are no updates to goals at this time. Pt prognosis is fair.     Pt will continue to benefit from skilled outpatient occupational therapy to address the deficits listed in the problem list on initial evaluation, provide pt/family education and to maximize pt's level of independence in the home and community environment.     Pt's spiritual, cultural and educational needs considered and pt agreeable to plan of care and  goals.    Anticipated barriers to occupational therapy: preexisting conditions     Goals:  Long term goals:   Pt will achieve shoulder AROM in flexion/scaption/abduction ~120 degrees   2.   Assess MMT when appropriate  3.   Pt will demo shoulder AROM WFL to perform daily and community activities   4.   Pt will report improvement in FOTO score by measuring 20% points.         Short term goals:   Pt will be complaint with HEP and pain management   Pt will achieve shoulder PROM in flexion and scaption ~110 degrees   Pt will report improvement in FOTO by decreasing 10% limitation points   Pt will achieve shoulder AAROM WFL to aid in ADLs   Pt will be able to demo full composite fist with R hand.    PLAN     Continue skilled occupational therapy with individualized plan of care focusing on improving functional independence with use of RUE    Updates/Grading for next session: cont per large massive type III protocol.    Melissa Landeche, OT [facility]  Occupational Therapy Treatment Note    Date: 6/27/2023  Name: Laverne CELIS [facility] Number: [id number]    Therapy Diagnosis:   Encounter Diagnoses   Name Primary?    Decreased range of motion of right shoulder Yes    Localized edema     Pain                      Physician: Luis Angel Wheeler MD    Physician Orders:Eval and treat;  s/p RCR/lymphedema treatments into hand  Medical Diagnosis: Z98.890 (ICD-10-CM) - S/P right rotator cuff repair  Surgical Procedure and Date: 3/21/2023,   1. Right shoulder Arthroscopic massive complex rotator cuff repair    2. Right shoulder Arthroscopic extensive debridement   3. Right shoulder Arthroscopic lysis of adhesions   4. Right shoulder Arthroscopic subacromial decompression and bursectomy   5. Right shoulder arthroscopic loose body removal    Evaluation Date: 5/3/2023  Insurance Authorization Period Expiration: 05/07/2023  Plan of Care Expiration: 12 weeks;  "7/28/2023  Date of Return to MD: 7/3/2023  Visit # / Visits authorized: 11 / 20  FOTO: (date and score)     Precautions:  Standard     Time In:     08:00  AM   Time Out:  08:55  AM   Total Appointment Time (timed & untimed codes): 55  minutes     11 weeks 2 day s/p (6/6/2023)   SUBJECTIVE     Pt reports: "I pulled the hose with both arms"   She was compliant with home exercise program given last session.   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 Reaching easier and opening things easier    Pain: 0/10  Location: right hand     OBJECTIVE   Objective Measures updated at progress report unless specified.    Observation/Appearance: mod edema in hand      Edema. Measured in centimeters.    5/9/2023 5/9/2023     Left Right   2in. Above elbow       2in. Below elbow       Wrist Crease 14.0 cm  16.0 cm    Figure of 8       MCPs 18.4.0 cm  20.5 cm            Shoulder ROM. Measured in degrees    5/3/2022 5/3/2023 5/11/2023 6/9/2023     Left Right PROM Right PROM Right AROM    Shoulder Flexion  WFL 85 degrees  90 90   Shoulder Abduction  WFL NT 80 90   Shoulder Extension    NT NT     Shoulder IR   NT NT L5   Shoulder ER  70 NT 20 65         Elbow and Wrist ROM. Measured in degrees.    5/9/2023 5/9/2023     Left Right   Elbow Ext/Flex WNL/WNL  12/135    Supination/Pronation WNL/WNL  66/75   Wrist Ext/Flex 55/70 39/6   Wrist RD/UD             Hand ROM. Measured in degrees.    5/9/2023 5/9/2023     Left Right           Index: MP    68              PIP       50              DIP   30              PITTS   148           Long:  MP   65              PIP   58              DIP   62              PITTS   185           Ring:   MP   55              PIP   60              DIP   32              PITTS   147                  Strength (Dynamometer) and Pinch Strength (Pinch Gauge)  Measured in pounds.    6/9/2023 6/9/2023     Left Right   Rung II  41 20 "   Vazquez Pinch       3pt Pinch       2pt Pinch             Manual Muscle Test    5/9/2023 5/9/2023     Left Right   Wrist Extension        Wrist Flexion       Radial Deviation       Ulnar Deviation       Supination       Pronation       Elbow Extension       Elbow Flexion             Limitation/Restriction for FOTO initial eval; shoulder Survey     Therapist reviewed FOTO scores for Laverne Humphries on 5/3/2023.   FOTO documents entered into [employer] - see Media section.     Limitation Score: needs to be taken%             Treatment     Laverne received the treatments listed below:     Supervised modalities: MHP for 10 minutes to promote increased blood flow         Manual therapy techniques: Manual Lymphatic Drainage were applied to the: RUE for 15 minutes, including:  - passive ROM to flexion/scaption/abduction/ER/IR  x 10 reps each   - gentle inferior/posterior mobs         Therapeutic exercises to develop ROM for 30 minutes, including:  - AAROM flexion/scaption with 1# dowel x 10 reps each slight incline on mat   - yellow theraband rows x 10 reps (2 sets)   - yellow theraband shoulder extension x 10 reps (2 sets)   - yellow theraband shoulder ER/IR x 10 reps (2 sets) each   - sidelying abduction and ER, 2 x 15 reps      Patient Education and Home Exercises      Education provided:   - added towel IR stretch   - Progress towards goals     Written Home Exercises Provided: Patient instructed to cont prior HEP.  Exercises were reviewed and Laverne was able to demonstrate them prior to the end of the session.  Prakashmable demonstrated good  understanding of the HEP provided. See EMR under Patient Instructions for exercises provided during therapy sessions.      ASSESSMENT     Pt tolerated tx fairly well today. Most limited in abduction     Laverne is progressing well towards her goals and there are no updates to goals at this time. Pt prognosis is fair.     Pt will continue to benefit from skilled outpatient occupational  therapy to address the deficits listed in the problem list on initial evaluation, provide pt/family education and to maximize pt's level of independence in the home and community environment.     Pt's spiritual, cultural and educational needs considered and pt agreeable to plan of care and goals.    Anticipated barriers to occupational therapy: preexisting conditions     Goals:  Long term goals:   Pt will achieve shoulder AROM in flexion/scaption/abduction ~120 degrees   2.   Assess MMT when appropriate  3.   Pt will demo shoulder AROM WFL to perform daily and community activities   4.   Pt will report improvement in FOTO score by measuring 20% points.         Short term goals:   Pt will be complaint with HEP and pain management   Pt will achieve shoulder PROM in flexion and scaption ~110 degrees   Pt will report improvement in FOTO by decreasing 10% limitation points   Pt will achieve shoulder AAROM WFL to aid in ADLs   Pt will be able to demo full composite fist with R hand.    PLAN     Continue skilled occupational therapy with individualized plan of care focusing on improving functional independence with use of RUE    Updates/Grading for next session: cont per large massive type III protocol.    Shaunna Stevenson OT

## 2023-06-30 ENCOUNTER — CLINICAL SUPPORT (OUTPATIENT)
Dept: REHABILITATION | Facility: HOSPITAL | Age: 83
End: 2023-06-30
Payer: MEDICARE

## 2023-06-30 DIAGNOSIS — R52 PAIN: ICD-10-CM

## 2023-06-30 DIAGNOSIS — M25.611 DECREASED RANGE OF MOTION OF RIGHT SHOULDER: Primary | ICD-10-CM

## 2023-06-30 DIAGNOSIS — R60.0 LOCALIZED EDEMA: ICD-10-CM

## 2023-06-30 PROCEDURE — 97110 THERAPEUTIC EXERCISES: CPT

## 2023-06-30 PROCEDURE — 97140 MANUAL THERAPY 1/> REGIONS: CPT

## 2023-06-30 NOTE — PROGRESS NOTES
"    [facility]  Occupational Therapy Treatment Note    Date: 6/30/2023  Name: Laverne Humphries  [facility] Number: [id number]    Therapy Diagnosis:   Encounter Diagnoses   Name Primary?    Decreased range of motion of right shoulder Yes    Localized edema     Pain              Physician: Luis Angel Wheeler MD    Physician Orders:Eval and treat;  s/p RCR/lymphedema treatments into hand  Medical Diagnosis: Z98.890 (ICD-10-CM) - S/P right rotator cuff repair  Surgical Procedure and Date: 3/21/2023,   1. Right shoulder Arthroscopic massive complex rotator cuff repair    2. Right shoulder Arthroscopic extensive debridement   3. Right shoulder Arthroscopic lysis of adhesions   4. Right shoulder Arthroscopic subacromial decompression and bursectomy   5. Right shoulder arthroscopic loose body removal    Evaluation Date: 5/3/2023  Insurance Authorization Period Expiration: 05/07/2023  Plan of Care Expiration: 12 weeks; 7/28/2023  Date of Return to MD: 7/3/2023  Visit # / Visits authorized: 17 / 20  FOTO: (date and score)     Precautions:  Standard     Time In:     08:00   AM   Time Out:  09:00    AM   Total Appointment Time (timed & untimed codes): 60   minutes       11 weeks 2 day s/p (6/6/2023)     SUBJECTIVE     Pt reports:  "I used the broom yesterday for first time"  She was compliant with home exercise program given last session.   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 Reaching easier and opening things easier    Pain: 0/10  Location: right hand     OBJECTIVE   Objective Measures updated at progress report unless specified.    Observation/Appearance: mod edema in hand      Edema. Measured in centimeters.    5/9/2023 5/9/2023     Left Right   2in. Above elbow       2in. Below elbow       Wrist Crease 14.0 cm  16.0 cm    Figure of 8       MCPs 18.4.0 cm  20.5 cm            Shoulder ROM. Measured in " degrees    5/3/2022 5/3/2023 5/11/2023 6/9/2023 6/30/2023 6/30/2023     Left Right PROM Right PROM Right AROM  Right AROM Right PROM   Shoulder Flexion  WFL 85 degrees  90 90 115 125   Shoulder Abduction  WFL NT 80 90 90 95   Shoulder Extension    NT NT       Shoulder IR   NT NT L5     Shoulder ER  70 NT 20 65             Elbow and Wrist ROM. Measured in degrees.    5/9/2023 5/9/2023     Left Right   Elbow Ext/Flex WNL/WNL  12/135    Supination/Pronation WNL/WNL  66/75   Wrist Ext/Flex 55/70 39/6   Wrist RD/UD             Hand ROM. Measured in degrees.    5/9/2023 5/9/2023 6/22/2023      Left Right Right             Index: MP    68 67              PIP       50 91              DIP   30 40              PITTS   148             Long:  MP   65 78              PIP   58 80              DIP   62 55              PITTS   185             Ring:   MP   55 80              PIP   60 92              DIP   32 45              PITTS   147                    Strength (Dynamometer) and Pinch Strength (Pinch Gauge)  Measured in pounds.    6/9/2023 6/9/2023     Left Right   Rung II  41 20   Key Pinch       3pt Pinch       2pt Pinch             Manual Muscle Test    5/9/2023 5/9/2023     Left Right   Wrist Extension        Wrist Flexion       Radial Deviation       Ulnar Deviation       Supination       Pronation       Elbow Extension       Elbow Flexion             Limitation/Restriction for FOTO initial eval; shoulder Survey     Therapist reviewed FOTO scores for Laverne CELIS Holden on 5/3/2023.   FOTO documents entered into [employer] - see Media section.     Limitation Score: needs to be taken%             Treatment     Lezoilaa received the treatments listed below:     Supervised modalities: MHP for 10 minutes to promote increased blood flow         Manual therapy techniques: Manual Lymphatic Drainage were applied to the: RUE for 10 minutes, including:  - passive ROM to flexion/scaption/abduction/ER/IR  x 10 reps each   - gentle  inferior/posterior mobs   - STM anterior shoulder NT       Therapeutic exercises to develop ROM for 28 minutes, including:  - shoulder rolls forward/backward x 10 reps each (2 sets)   - pendulums (CW/CCW, forward/backward, side/side) x 10 reps each (2 sets) NT  - shoulder raises x 10 reps (2 sets)   - AAROM flexion/scaption with 2# dowel x 10 reps each   - table slides flexion on incline x 10 reps NT  - table slides abduction x 10 reps NT  - IR stretch using towel holding 10s x 4 reps   - ER stretching side body in supine with dowel   - serratus punches (no weight) x 10 reps   - shoulder arc motion with cones (IR/ER) 1 set   - RED theraband rows x 10 reps (2 sets)   - RED theraband isometrics ER x 10 reps (2 sets)   - RTC isometrics against wall with towel x 10 reps each NT  - sidelying abduction and ER, 2 x 10 reps      Patient Education and Home Exercises      Education provided:   - added towel IR stretch   - Progress towards goals     Written Home Exercises Provided: Patient instructed to cont prior HEP.  Exercises were reviewed and Laverne was able to demonstrate them prior to the end of the session.  Laverne demonstrated good  understanding of the HEP provided. See EMR under Patient Instructions for exercises provided during therapy sessions.      ASSESSMENT     Pt tolerated tx fairly well today. Cont with capsular tightness, especially with IR. Cont to demo shoulder hiking, with increased anterior capsule tightness       Laverne is progressing well towards her goals and there are no updates to goals at this time. Pt prognosis is fair.     Pt will continue to benefit from skilled outpatient occupational therapy to address the deficits listed in the problem list on initial evaluation, provide pt/family education and to maximize pt's level of independence in the home and community environment.     Pt's spiritual, cultural and educational needs considered and pt agreeable to plan of care and  goals.    Anticipated barriers to occupational therapy: preexisting conditions     Goals:  Long term goals:   Pt will achieve shoulder AROM in flexion/scaption/abduction ~120 degrees   2.   Assess MMT when appropriate  3.   Pt will demo shoulder AROM WFL to perform daily and community activities   4.   Pt will report improvement in FOTO score by measuring 20% points.         Short term goals:   Pt will be complaint with HEP and pain management   Pt will achieve shoulder PROM in flexion and scaption ~110 degrees   Pt will report improvement in FOTO by decreasing 10% limitation points   Pt will achieve shoulder AAROM WFL to aid in ADLs   Pt will be able to demo full composite fist with R hand.    PLAN     Continue skilled occupational therapy with individualized plan of care focusing on improving functional independence with use of RUE    Updates/Grading for next session: cont per large massive type III protocol.    Melissa Landeche, OT [facility]  Occupational Therapy Treatment Note    Date: 6/30/2023  Name: Laverne CELIS [facility] Number: [id number]    Therapy Diagnosis:   Encounter Diagnoses   Name Primary?    Decreased range of motion of right shoulder Yes    Localized edema     Pain                        Physician: Luis Angel Wheeler MD    Physician Orders:Eval and treat;  s/p RCR/lymphedema treatments into hand  Medical Diagnosis: Z98.890 (ICD-10-CM) - S/P right rotator cuff repair  Surgical Procedure and Date: 3/21/2023,   1. Right shoulder Arthroscopic massive complex rotator cuff repair    2. Right shoulder Arthroscopic extensive debridement   3. Right shoulder Arthroscopic lysis of adhesions   4. Right shoulder Arthroscopic subacromial decompression and bursectomy   5. Right shoulder arthroscopic loose body removal    Evaluation Date: 5/3/2023  Insurance Authorization Period Expiration: 05/07/2023  Plan of Care Expiration: 12 weeks;  "7/28/2023  Date of Return to MD: 7/3/2023  Visit # / Visits authorized: 11 / 20  FOTO: (date and score)     Precautions:  Standard     Time In:     08:00  AM   Time Out:  08:55  AM   Total Appointment Time (timed & untimed codes): 55  minutes     11 weeks 2 day s/p (6/6/2023)   SUBJECTIVE     Pt reports: "I pulled the hose with both arms"   She was compliant with home exercise program given last session.   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 Reaching easier and opening things easier    Pain: 0/10  Location: right hand     OBJECTIVE   Objective Measures updated at progress report unless specified.    Observation/Appearance: mod edema in hand      Edema. Measured in centimeters.    5/9/2023 5/9/2023     Left Right   2in. Above elbow       2in. Below elbow       Wrist Crease 14.0 cm  16.0 cm    Figure of 8       MCPs 18.4.0 cm  20.5 cm            Shoulder ROM. Measured in degrees    5/3/2022 5/3/2023 5/11/2023 6/9/2023     Left Right PROM Right PROM Right AROM    Shoulder Flexion  WFL 85 degrees  90 90   Shoulder Abduction  WFL NT 80 90   Shoulder Extension    NT NT     Shoulder IR   NT NT L5   Shoulder ER  70 NT 20 65         Elbow and Wrist ROM. Measured in degrees.    5/9/2023 5/9/2023     Left Right   Elbow Ext/Flex WNL/WNL  12/135    Supination/Pronation WNL/WNL  66/75   Wrist Ext/Flex 55/70 39/6   Wrist RD/UD             Hand ROM. Measured in degrees.    5/9/2023 5/9/2023     Left Right           Index: MP    68              PIP       50              DIP   30              PITTS   148           Long:  MP   65              PIP   58              DIP   62              PITTS   185           Ring:   MP   55              PIP   60              DIP   32              PITTS   147                  Strength (Dynamometer) and Pinch Strength (Pinch Gauge)  Measured in pounds.    6/9/2023 6/9/2023     Left Right   Rung II  41 20 "   Vazquez Pinch       3pt Pinch       2pt Pinch             Manual Muscle Test    5/9/2023 5/9/2023     Left Right   Wrist Extension        Wrist Flexion       Radial Deviation       Ulnar Deviation       Supination       Pronation       Elbow Extension       Elbow Flexion             Limitation/Restriction for FOTO initial eval; shoulder Survey     Therapist reviewed FOTO scores for Laverne Humphries on 5/3/2023.   FOTO documents entered into [employer] - see Media section.     Limitation Score: needs to be taken%             Treatment     Laverne received the treatments listed below:     Supervised modalities: MHP for 10 minutes to promote increased blood flow         Manual therapy techniques: Manual Lymphatic Drainage were applied to the: RUE for 15 minutes, including:  - passive ROM to flexion/scaption/abduction/ER/IR  x 10 reps each   - gentle inferior/posterior mobs         Therapeutic exercises to develop ROM for 30 minutes, including:  - AAROM flexion/scaption with 1# dowel x 10 reps each slight incline on mat   - yellow theraband rows x 10 reps (2 sets)   - yellow theraband shoulder extension x 10 reps (2 sets)   - yellow theraband shoulder ER/IR x 10 reps (2 sets) each   - sidelying abduction and ER, 2 x 15 reps      Patient Education and Home Exercises      Education provided:   - added towel IR stretch   - Progress towards goals     Written Home Exercises Provided: Patient instructed to cont prior HEP.  Exercises were reviewed and Laverne was able to demonstrate them prior to the end of the session.  Prakashmable demonstrated good  understanding of the HEP provided. See EMR under Patient Instructions for exercises provided during therapy sessions.      ASSESSMENT     Pt tolerated tx fairly well today. Most limited in abduction     Laverne is progressing well towards her goals and there are no updates to goals at this time. Pt prognosis is fair.     Pt will continue to benefit from skilled outpatient occupational  therapy to address the deficits listed in the problem list on initial evaluation, provide pt/family education and to maximize pt's level of independence in the home and community environment.     Pt's spiritual, cultural and educational needs considered and pt agreeable to plan of care and goals.    Anticipated barriers to occupational therapy: preexisting conditions     Goals:  Long term goals:   Pt will achieve shoulder AROM in flexion/scaption/abduction ~120 degrees   2.   Assess MMT when appropriate  3.   Pt will demo shoulder AROM WFL to perform daily and community activities   4.   Pt will report improvement in FOTO score by measuring 20% points.         Short term goals:   Pt will be complaint with HEP and pain management   Pt will achieve shoulder PROM in flexion and scaption ~110 degrees   Pt will report improvement in FOTO by decreasing 10% limitation points   Pt will achieve shoulder AAROM WFL to aid in ADLs   Pt will be able to demo full composite fist with R hand.    PLAN     Continue skilled occupational therapy with individualized plan of care focusing on improving functional independence with use of RUE    Updates/Grading for next session: cont per large massive type III protocol.    Shaunna Stevenson OT

## 2023-07-03 ENCOUNTER — CLINICAL SUPPORT (OUTPATIENT)
Dept: REHABILITATION | Facility: HOSPITAL | Age: 83
End: 2023-07-03
Payer: MEDICARE

## 2023-07-03 ENCOUNTER — TELEPHONE (OUTPATIENT)
Dept: NEUROLOGY | Facility: CLINIC | Age: 83
End: 2023-07-03
Payer: MEDICARE

## 2023-07-03 ENCOUNTER — OFFICE VISIT (OUTPATIENT)
Dept: SPORTS MEDICINE | Facility: CLINIC | Age: 83
End: 2023-07-03
Payer: MEDICARE

## 2023-07-03 VITALS
SYSTOLIC BLOOD PRESSURE: 112 MMHG | BODY MASS INDEX: 22.58 KG/M2 | HEART RATE: 79 BPM | DIASTOLIC BLOOD PRESSURE: 63 MMHG | WEIGHT: 115 LBS | HEIGHT: 60 IN

## 2023-07-03 DIAGNOSIS — R05.9 COUGH, UNSPECIFIED TYPE: ICD-10-CM

## 2023-07-03 DIAGNOSIS — M89.09 SHOULDER-HAND SYNDROME: ICD-10-CM

## 2023-07-03 DIAGNOSIS — R60.0 LOCALIZED EDEMA: ICD-10-CM

## 2023-07-03 DIAGNOSIS — Z98.890 S/P RIGHT ROTATOR CUFF REPAIR: Primary | ICD-10-CM

## 2023-07-03 DIAGNOSIS — R52 PAIN: ICD-10-CM

## 2023-07-03 DIAGNOSIS — M25.611 DECREASED RANGE OF MOTION OF RIGHT SHOULDER: Primary | ICD-10-CM

## 2023-07-03 DIAGNOSIS — S44.90XA: ICD-10-CM

## 2023-07-03 PROCEDURE — 1159F PR MEDICATION LIST DOCUMENTED IN MEDICAL RECORD: ICD-10-PCS | Mod: HCNC,CPTII,S$GLB, | Performed by: ORTHOPAEDIC SURGERY

## 2023-07-03 PROCEDURE — 1101F PT FALLS ASSESS-DOCD LE1/YR: CPT | Mod: HCNC,CPTII,S$GLB, | Performed by: ORTHOPAEDIC SURGERY

## 2023-07-03 PROCEDURE — 3078F PR MOST RECENT DIASTOLIC BLOOD PRESSURE < 80 MM HG: ICD-10-PCS | Mod: HCNC,CPTII,S$GLB, | Performed by: ORTHOPAEDIC SURGERY

## 2023-07-03 PROCEDURE — 1101F PR PT FALLS ASSESS DOC 0-1 FALLS W/OUT INJ PAST YR: ICD-10-PCS | Mod: HCNC,CPTII,S$GLB, | Performed by: ORTHOPAEDIC SURGERY

## 2023-07-03 PROCEDURE — 3074F SYST BP LT 130 MM HG: CPT | Mod: HCNC,CPTII,S$GLB, | Performed by: ORTHOPAEDIC SURGERY

## 2023-07-03 PROCEDURE — 1125F AMNT PAIN NOTED PAIN PRSNT: CPT | Mod: HCNC,CPTII,S$GLB, | Performed by: ORTHOPAEDIC SURGERY

## 2023-07-03 PROCEDURE — 97110 THERAPEUTIC EXERCISES: CPT

## 2023-07-03 PROCEDURE — 3078F DIAST BP <80 MM HG: CPT | Mod: HCNC,CPTII,S$GLB, | Performed by: ORTHOPAEDIC SURGERY

## 2023-07-03 PROCEDURE — 3074F PR MOST RECENT SYSTOLIC BLOOD PRESSURE < 130 MM HG: ICD-10-PCS | Mod: HCNC,CPTII,S$GLB, | Performed by: ORTHOPAEDIC SURGERY

## 2023-07-03 PROCEDURE — 3288F PR FALLS RISK ASSESSMENT DOCUMENTED: ICD-10-PCS | Mod: HCNC,CPTII,S$GLB, | Performed by: ORTHOPAEDIC SURGERY

## 2023-07-03 PROCEDURE — 97140 MANUAL THERAPY 1/> REGIONS: CPT

## 2023-07-03 PROCEDURE — 1159F MED LIST DOCD IN RCRD: CPT | Mod: HCNC,CPTII,S$GLB, | Performed by: ORTHOPAEDIC SURGERY

## 2023-07-03 PROCEDURE — 99214 OFFICE O/P EST MOD 30 MIN: CPT | Mod: HCNC,S$GLB,, | Performed by: ORTHOPAEDIC SURGERY

## 2023-07-03 PROCEDURE — 99999 PR PBB SHADOW E&M-EST. PATIENT-LVL IV: CPT | Mod: PBBFAC,HCNC,, | Performed by: ORTHOPAEDIC SURGERY

## 2023-07-03 PROCEDURE — 99999 PR PBB SHADOW E&M-EST. PATIENT-LVL IV: ICD-10-PCS | Mod: PBBFAC,HCNC,, | Performed by: ORTHOPAEDIC SURGERY

## 2023-07-03 PROCEDURE — 99214 PR OFFICE/OUTPT VISIT, EST, LEVL IV, 30-39 MIN: ICD-10-PCS | Mod: HCNC,S$GLB,, | Performed by: ORTHOPAEDIC SURGERY

## 2023-07-03 PROCEDURE — 3288F FALL RISK ASSESSMENT DOCD: CPT | Mod: HCNC,CPTII,S$GLB, | Performed by: ORTHOPAEDIC SURGERY

## 2023-07-03 PROCEDURE — 1125F PR PAIN SEVERITY QUANTIFIED, PAIN PRESENT: ICD-10-PCS | Mod: HCNC,CPTII,S$GLB, | Performed by: ORTHOPAEDIC SURGERY

## 2023-07-03 NOTE — PROGRESS NOTES
POST-OPERATIVE EXAMINATION    82 y.o. Female who returns for follow after surgery. She is 3.5 months s/p    Procedures Performed:  1. Right shoulder Arthroscopic massive complex rotator cuff repair CPT - 25064-86 modifier     2. Right shoulder Arthroscopic extensive debridement CPT - 65268     3. Right shoulder Arthroscopic lysis of adhesions CPT - 63754     4. Right shoulder Arthroscopic subacromial decompression and bursectomy CPT - 00249     5. Right shoulder arthroscopic loose body removal     She is doing well without any issues.       PHYSICAL EXAMINATION:  /63   Pulse 79   Ht 5' (1.524 m)   Wt 52.2 kg (115 lb)   LMP  (LMP Unknown)   BMI 22.46 kg/m²   General: Well-developed well-nourished 82 y.o. femalein no acute distress   Cardiovascular: Regular rhythm   Lungs: No labored breathing or wheezing appreciated   Neuro: Alert and oriented ×3   Psychiatric: well oriented to person, place and time, demonstrates normal mood and affect   Skin: No rashes, lesions or ulcers, normal temperature, turgor, and texture on involved extremity    ORTHOPEDIC EXAM:  Normal post-operative swelling around her shoulder.  She is still has a little bit of swelling diffusely throughout all the digits of her right hand.  No skin color changes.  It appears to be more postoperative edema.  Normal post-operative scarring  Strength: WNL  ROM: FE-120; Abd-90; ER-60   Tests: None today    ASSESSMENT:      ICD-10-CM ICD-9-CM   1. S/P right rotator cuff repair  Z98.890 V45.89   2. 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 Injury of unspecified nerve at shoulder and upper arm level, unspecified arm, initial encounter  S44.90XA 955.9       PLAN:       Continue physical therapy  EMG/NCS ordered today to evaluate her hand shoulder syndrome  RTC after EMG/NCS

## 2023-07-03 NOTE — TELEPHONE ENCOUNTER
Scheduled pt for 8/21 for 1pm for EMG    ----- Message from Jessie Macias sent at 7/3/2023  9:08 AM CDT -----  Good morning!    Dr. Wheeler has ordered an EMG/NCS for this patient and would like to schedule with Dr. Murphy. Please let me know if you need any additional information.    Thank you-    Jessie Macias, MS, OTC  Clinical/OR Assistant to Luis Angel Wheeler MD  [facility]

## 2023-07-03 NOTE — PROGRESS NOTES
[facility]  Occupational Therapy Treatment Note    Date: 7/3/2023  Name: Laverne Humphries  Clinic Number: [id number]    Therapy Diagnosis:   Encounter Diagnoses   Name Primary?    Decreased range of motion of right shoulder Yes    Localized edema     Pain        Physician: Luis Angel Wheeler MD    Physician Orders:Eval and treat;  s/p RCR/lymphedema treatments into hand  Medical Diagnosis: Z98.890 (ICD-10-CM) - S/P right rotator cuff repair  Surgical Procedure and Date: 3/21/2023,   1. Right shoulder Arthroscopic massive complex rotator cuff repair    2. Right shoulder Arthroscopic extensive debridement   3. Right shoulder Arthroscopic lysis of adhesions   4. Right shoulder Arthroscopic subacromial decompression and bursectomy   5. Right shoulder arthroscopic loose body removal    Evaluation Date: 5/3/2023  Insurance Authorization Period Expiration: 05/07/2023  Plan of Care Expiration: 12 weeks; 7/28/2023  Date of Return to MD: 7/3/2023  Visit # / Visits authorized: 12 / 20  FOTO: (date and score)     Precautions:  Standard     Time In:     09:30  AM   Time Out:  10:20 AM   Total Appointment Time (timed & untimed codes): 50  minutes     11 weeks 2 day s/p (6/6/2023)   SUBJECTIVE     Pt reports: her arm doesn't feel as tight. She stated that she is going to be getting an EMG in August   She was compliant with home exercise program given last session.   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 Reaching easier and opening things easier    Pain: 0/10  Location: right hand     OBJECTIVE   Objective Measures updated at progress report unless specified.    Observation/Appearance: mod edema in hand      Edema. Measured in centimeters.    5/9/2023 5/9/2023     Left Right   2in. Above elbow       2in. Below elbow       Wrist Crease 14.0 cm  16.0 cm    Figure of 8       MCPs 18.4.0 cm  20.5 cm             Shoulder ROM. Measured in degrees    5/3/2022 5/3/2023 5/11/2023 6/9/2023 6/30/2023 6/30/2023     Left Right PROM Right PROM Right AROM  Right AROM Right PROM   Shoulder Flexion  WFL 85 degrees  90 90 115 125   Shoulder Abduction  WFL NT 80 90 90 95   Shoulder Extension    NT NT       Shoulder IR   NT NT L5     Shoulder ER  70 NT 20 65             Elbow and Wrist ROM. Measured in degrees.    5/9/2023 5/9/2023     Left Right   Elbow Ext/Flex WNL/WNL  12/135    Supination/Pronation WNL/WNL  66/75   Wrist Ext/Flex 55/70 39/6   Wrist RD/UD             Hand ROM. Measured in degrees.    5/9/2023 5/9/2023 6/22/2023      Left Right Right             Index: MP    68 67              PIP       50 91              DIP   30 40              PITTS   148             Long:  MP   65 78              PIP   58 80              DIP   62 55              PITTS   185             Ring:   MP   55 80              PIP   60 92              DIP   32 45              PITTS   147                    Strength (Dynamometer) and Pinch Strength (Pinch Gauge)  Measured in pounds.    6/9/2023 6/9/2023     Left Right   Rung II  41 20   Key Pinch       3pt Pinch       2pt Pinch             Manual Muscle Test    5/9/2023 5/9/2023     Left Right   Wrist Extension        Wrist Flexion       Radial Deviation       Ulnar Deviation       Supination       Pronation       Elbow Extension       Elbow Flexion             Limitation/Restriction for FOTO initial eval; shoulder Survey     Therapist reviewed FOTO scores for Laverne M McKean on 5/3/2023.   FOTO documents entered into [employer] - see Media section.     Limitation Score: needs to be taken%                 Treatment     Lezoilaa received the treatments listed below:     Supervised modalities: MHP for 10 minutes to promote increased blood flow         Manual therapy techniques: Manual Lymphatic Drainage were applied to the: RUE for 15 minutes, including:  - passive ROM to flexion/scaption/abduction/ER/IR  x 10  reps each   - gentle inferior/posterior mobs         Therapeutic exercises to develop ROM for 25 minutes, including:  - AAROM flexion/scaption with 1# dowel x 10 reps each slight incline on mat (not on incline today)  - yellow theraband rows x 10 reps (3 sets)   - yellow theraband shoulder extension x 10 reps (3 sets)   - yellow theraband shoulder ER/IR x 10 reps (2 sets) each   - sidelying abduction and ER, 2 x 15 reps  -supine dynamic stabilization with 1#, alphabet x 1 set  -light elbow PRE, elbow flexion palm up and FA in neutral 2 x 10 reps each, 1#      Patient Education and Home Exercises      Education provided:   - added towel IR stretch   - Progress towards goals     Written Home Exercises Provided: Patient instructed to cont prior HEP.  Exercises were reviewed and Laverne was able to demonstrate them prior to the end of the session.  Laverne demonstrated good  understanding of the HEP provided. See EMR under Patient Instructions for exercises provided during therapy sessions.      ASSESSMENT     Pt tolerated tx fairly well today. Pt participated well and is motivated. She is compliant with HEP and reports the therabands are going well. She cont to have tightness at end range, but reports she feels less tightness overall today.     Laverne is progressing fairly well towards her goals and there are no updates to goals at this time. Pt prognosis is fair.     Pt will continue to benefit from skilled outpatient occupational therapy to address the deficits listed in the problem list on initial evaluation, provide pt/family education and to maximize pt's level of independence in the home and community environment.     Pt's spiritual, cultural and educational needs considered and pt agreeable to plan of care and goals.    Anticipated barriers to occupational therapy: preexisting conditions     Goals:  Long term goals:   Pt will achieve shoulder AROM in flexion/scaption/abduction ~120 degrees   2.   Assess MMT  when appropriate  3.   Pt will demo shoulder AROM WFL to perform daily and community activities   4.   Pt will report improvement in FOTO score by measuring 20% points.         Short term goals:   Pt will be complaint with HEP and pain management   Pt will achieve shoulder PROM in flexion and scaption ~110 degrees   Pt will report improvement in FOTO by decreasing 10% limitation points   Pt will achieve shoulder AAROM WFL to aid in ADLs   Pt will be able to demo full composite fist with R hand.    PLAN     Continue skilled occupational therapy with individualized plan of care focusing on improving functional independence with use of RUE    Updates/Grading for next session: cont per large massive type III protocol.     JERRY Berg, CHT

## 2023-07-05 RX ORDER — ALBUTEROL SULFATE 90 UG/1
2 AEROSOL, METERED RESPIRATORY (INHALATION) EVERY 6 HOURS PRN
Qty: 18 G | Refills: 3 | Status: SHIPPED | OUTPATIENT
Start: 2023-07-05 | End: 2023-12-30 | Stop reason: SDUPTHER

## 2023-07-07 ENCOUNTER — CLINICAL SUPPORT (OUTPATIENT)
Dept: REHABILITATION | Facility: HOSPITAL | Age: 83
End: 2023-07-07
Payer: MEDICARE

## 2023-07-07 DIAGNOSIS — R60.0 LOCALIZED EDEMA: ICD-10-CM

## 2023-07-07 DIAGNOSIS — R52 PAIN: ICD-10-CM

## 2023-07-07 DIAGNOSIS — M25.611 DECREASED RANGE OF MOTION OF RIGHT SHOULDER: Primary | ICD-10-CM

## 2023-07-07 PROCEDURE — 97110 THERAPEUTIC EXERCISES: CPT

## 2023-07-07 PROCEDURE — 97140 MANUAL THERAPY 1/> REGIONS: CPT

## 2023-07-07 PROCEDURE — 97112 NEUROMUSCULAR REEDUCATION: CPT

## 2023-07-07 NOTE — PROGRESS NOTES
"  [facility]  Occupational Therapy Treatment Note    Date: 7/7/2023  Name: Laverne Humphries  [facility] Number: [id number]    Therapy Diagnosis:   Encounter Diagnoses   Name Primary?    Decreased range of motion of right shoulder Yes    Localized edema     Pain          Physician: Luis Angel Wheeler MD    Physician Orders:Eval and treat;  s/p RCR/lymphedema treatments into hand  Medical Diagnosis: Z98.890 (ICD-10-CM) - S/P right rotator cuff repair  Surgical Procedure and Date: 3/21/2023,   1. Right shoulder Arthroscopic massive complex rotator cuff repair    2. Right shoulder Arthroscopic extensive debridement   3. Right shoulder Arthroscopic lysis of adhesions   4. Right shoulder Arthroscopic subacromial decompression and bursectomy   5. Right shoulder arthroscopic loose body removal    Evaluation Date: 5/3/2023  Insurance Authorization Period Expiration: 05/07/2023  Plan of Care Expiration: 12 weeks; 7/28/2023  Date of Return to MD: 7/3/2023  Visit # / Visits authorized: 20 / 20  FOTO: (date and score)     Precautions:  Standard     Time In:     08:00 AM   Time Out:  08: 53 AM   Total Appointment Time (timed & untimed codes): 53  minutes       SUBJECTIVE     Pt reports: "my shoulder is feeling better. I'm getting EMG end of August. I wear the glove, if it starts hurting when I take it off I put it back on"   She was compliant with home exercise program given last session.   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 Reaching easier and opening things easier    Pain: 0/10  Location: right hand     OBJECTIVE   Objective Measures updated at progress report unless specified.    Observation/Appearance: mod edema in hand      Edema. Measured in centimeters.    5/9/2023 5/9/2023     Left Right   2in. Above elbow       2in. Below elbow       Wrist Crease 14.0 cm  16.0 cm    Figure of 8       MCPs 18.4.0 " cm  20.5 cm            Shoulder ROM. Measured in degrees    5/3/2022 5/3/2023 5/11/2023 6/9/2023 6/30/2023 6/30/2023     Left Right PROM Right PROM Right AROM  Right AROM Right PROM   Shoulder Flexion  WFL 85 degrees  90 90 115 125   Shoulder Abduction  WFL NT 80 90 90 95   Shoulder Extension    NT NT       Shoulder IR   NT NT L5     Shoulder ER  70 NT 20 65           Elbow and Wrist ROM. Measured in degrees.    5/9/2023 5/9/2023     Left Right   Elbow Ext/Flex WNL/WNL  12/135    Supination/Pronation WNL/WNL  66/75   Wrist Ext/Flex 55/70 39/6   Wrist RD/UD             Hand ROM. Measured in degrees.    5/9/2023 5/9/2023 6/22/2023      Left Right Right             Index: MP    68 67              PIP       50 91              DIP   30 40              PITTS   148             Long:  MP   65 78              PIP   58 80              DIP   62 55              PITTS   185             Ring:   MP   55 80              PIP   60 92              DIP   32 45              PITTS   147                    Strength (Dynamometer) and Pinch Strength (Pinch Gauge)  Measured in pounds.    6/9/2023 6/9/2023 7/7/2023      Left Right Right   Rung II  41 20 25   Key Pinch        3pt Pinch        2pt Pinch              Manual Muscle Test    5/9/2023 5/9/2023     Left Right   Wrist Extension        Wrist Flexion       Radial Deviation       Ulnar Deviation       Supination       Pronation       Elbow Extension       Elbow Flexion             Limitation/Restriction for FOTO initial eval; shoulder Survey     Therapist reviewed FOTO scores for Laverne M Wayside on 5/3/2023.   FOTO documents entered into [employer] - see Media section.     Limitation Score: needs to be taken%                 Treatment     Leovidia received the treatments listed below:         Supervised modalities: MHP for 10 minutes to promote increased blood flow         Manual therapy techniques: Manual Lymphatic Drainage were applied to the: RUE for 10 minutes, including:  - passive ROM  to flexion/scaption/abduction/ER/IR  x 10 reps each         Therapeutic exercises to develop ROM for 23 minutes, including:  - AAROM flexion/scaption with 2# dowel x 10 reps each slight incline on mat (not on incline today)  - yellow theraband rows x 10 reps (3 sets)   - yellow theraband shoulder extension x 10 reps (3 sets)   - yellow theraband shoulder ER/IR x 10 reps (2 sets) each   - sidelying abduction and ER, 2 x 15 reps  - supine dynamic stabilization with 1#, alphabet x 1 set NT   - light elbow PRE, elbow flexion palm up and FA in neutral 2 x 10 reps each, 1# NT       Neuro re-ed: 10 min   - rice bin (6 min)   - in hand manipulation with marbles         Patient Education and Home Exercises      Education provided:   - added towel IR stretch   - Progress towards goals       Written Home Exercises Provided: Patient instructed to cont prior HEP.  Exercises were reviewed and Laverne was able to demonstrate them prior to the end of the session.  Laverne demonstrated good  understanding of the HEP provided. See EMR under Patient Instructions for exercises provided during therapy sessions.      ASSESSMENT     Pt tolerated tx fairly well today. Pt participated well and is motivated. She is compliant with HEP and reports the therabands are going well. She cont to have tightness at end range, but reports she feels less tightness overall today.     Laverne is progressing fairly well towards her goals and there are no updates to goals at this time. Pt prognosis is fair.     Pt will continue to benefit from skilled outpatient occupational therapy to address the deficits listed in the problem list on initial evaluation, provide pt/family education and to maximize pt's level of independence in the home and community environment.     Pt's spiritual, cultural and educational needs considered and pt agreeable to plan of care and goals.    Anticipated barriers to occupational therapy: preexisting conditions     Goals:  Long  term goals:   Pt will achieve shoulder AROM in flexion/scaption/abduction ~120 degrees   2.   Assess MMT when appropriate  3.   Pt will demo shoulder AROM WFL to perform daily and community activities   4.   Pt will report improvement in FOTO score by measuring 20% points.         Short term goals:   Pt will be complaint with HEP and pain management   Pt will achieve shoulder PROM in flexion and scaption ~110 degrees   Pt will report improvement in FOTO by decreasing 10% limitation points   Pt will achieve shoulder AAROM WFL to aid in ADLs   Pt will be able to demo full composite fist with R hand.    PLAN     Continue skilled occupational therapy with individualized plan of care focusing on improving functional independence with use of RUE    Updates/Grading for next session: cont per large massive type III protocol.     Shaunna Stevenson, OT

## 2023-07-11 ENCOUNTER — CLINICAL SUPPORT (OUTPATIENT)
Dept: REHABILITATION | Facility: HOSPITAL | Age: 83
End: 2023-07-11
Payer: MEDICARE

## 2023-07-11 DIAGNOSIS — R52 PAIN: ICD-10-CM

## 2023-07-11 DIAGNOSIS — R60.0 LOCALIZED EDEMA: ICD-10-CM

## 2023-07-11 DIAGNOSIS — M25.611 DECREASED RANGE OF MOTION OF RIGHT SHOULDER: Primary | ICD-10-CM

## 2023-07-11 PROCEDURE — 97140 MANUAL THERAPY 1/> REGIONS: CPT

## 2023-07-11 PROCEDURE — 97110 THERAPEUTIC EXERCISES: CPT

## 2023-07-11 NOTE — PROGRESS NOTES
"  [facility]  Occupational Therapy Treatment Note    Date: 7/11/2023  Name: Laverne CELIS Leon  [facility] Number: [id number]    Therapy Diagnosis:   Encounter Diagnoses   Name Primary?    Decreased range of motion of right shoulder Yes    Localized edema     Pain            Physician: Luis Angel Wheeler MD    Physician Orders:Eval and treat;  s/p RCR/lymphedema treatments into hand  Medical Diagnosis: Z98.890 (ICD-10-CM) - S/P right rotator cuff repair  Surgical Procedure and Date: 3/21/2023,   1. Right shoulder Arthroscopic massive complex rotator cuff repair    2. Right shoulder Arthroscopic extensive debridement   3. Right shoulder Arthroscopic lysis of adhesions   4. Right shoulder Arthroscopic subacromial decompression and bursectomy   5. Right shoulder arthroscopic loose body removal    Evaluation Date: 5/3/2023  Insurance Authorization Period Expiration: 05/07/2023  Plan of Care Expiration: 12 weeks; 7/28/2023  Date of Return to MD: 7/3/2023  Visit # / Visits authorized: 20 / 20  FOTO: (date and score)     Precautions:  Standard     Time In:     08:10 AM   Time Out:  09:06 AM   Total Appointment Time (timed & untimed codes): 54   minutes       SUBJECTIVE     Pt reports: "I wear the glove. Sometimes I have to take it off because it starts hurting my hand but then I put it back on"   She was compliant with home exercise program given last session.   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 Reaching easier and opening things easier    Pain: 0/10  Location: right hand     OBJECTIVE   Objective Measures updated at progress report unless specified.    Observation/Appearance: mod edema in hand      Edema. Measured in centimeters.    5/9/2023 5/9/2023     Left Right   2in. Above elbow       2in. Below elbow       Wrist Crease 14.0 cm  16.0 cm    Figure of 8       MCPs 18.4.0 cm  20.5 cm          "   Shoulder ROM. Measured in degrees    5/3/2022 5/3/2023 5/11/2023 6/9/2023 6/30/2023 6/30/2023     Left Right PROM Right PROM Right AROM  Right AROM Right PROM   Shoulder Flexion  WFL 85 degrees  90 90 115 125   Shoulder Abduction  WFL NT 80 90 90 95   Shoulder Extension    NT NT       Shoulder IR   NT NT L5     Shoulder ER  70 NT 20 65           Elbow and Wrist ROM. Measured in degrees.    5/9/2023 5/9/2023     Left Right   Elbow Ext/Flex WNL/WNL  12/135    Supination/Pronation WNL/WNL  66/75   Wrist Ext/Flex 55/70 39/6   Wrist RD/UD             Hand ROM. Measured in degrees.    5/9/2023 5/9/2023 6/22/2023      Left Right Right             Index: MP    68 67              PIP       50 91              DIP   30 40              PITTS   148             Long:  MP   65 78              PIP   58 80              DIP   62 55              PITTS   185             Ring:   MP   55 80              PIP   60 92              DIP   32 45              PITTS   147                    Strength (Dynamometer) and Pinch Strength (Pinch Gauge)  Measured in pounds.    6/9/2023 6/9/2023 7/7/2023      Left Right Right   Rung II  41 20 25   Key Pinch        3pt Pinch        2pt Pinch              Manual Muscle Test    5/9/2023 5/9/2023     Left Right   Wrist Extension        Wrist Flexion       Radial Deviation       Ulnar Deviation       Supination       Pronation       Elbow Extension       Elbow Flexion             Limitation/Restriction for FOTO initial eval; shoulder Survey     Therapist reviewed FOTO scores for Betodia M Cidra on 5/3/2023.   FOTO documents entered into [employer] - see Media section.     Limitation Score: needs to be taken%                 Treatment     Leovidia received the treatments listed below:         Supervised modalities: MHP for 10 minutes to promote increased blood flow         Manual therapy techniques: Manual Lymphatic Drainage were applied to the: RUE for 10 minutes, including:  - passive ROM to  flexion/scaption/abduction/ER/IR  x 10 reps each         Therapeutic exercises to develop ROM for 23 minutes, including:  - AAROM flexion/scaption with 2# dowel x 10 reps each slight incline on mat (not on incline today)  - yellow theraband rows x 10 reps (3 sets)   - yellow theraband shoulder extension x 10 reps (3 sets)   - yellow theraband shoulder ER/IR x 10 reps (2 sets) each   - sidelying abduction and ER, 2 x 15 reps  - supine dynamic stabilization with 1#, alphabet x 1 set NT   - light elbow PRE, elbow flexion palm up and FA in neutral 2 x 10 reps each, 1# NT       Neuro re-ed: 10 min   - rice bin (6 min)   - in hand manipulation with marbles         Patient Education and Home Exercises      Education provided:   - added towel IR stretch   - Progress towards goals       Written Home Exercises Provided: Patient instructed to cont prior HEP.  Exercises were reviewed and Laverne was able to demonstrate them prior to the end of the session.  Laverne demonstrated good  understanding of the HEP provided. See EMR under Patient Instructions for exercises provided during therapy sessions.      ASSESSMENT     Pt tolerated tx fairly well today. Pt participated well and is motivated. She is compliant with HEP and reports the therabands are going well. She cont to have tightness at end range, especially with IR  but reports she feels less tightness overall today.     Laverne is progressing fairly well towards her goals and there are no updates to goals at this time. Pt prognosis is fair.     Pt will continue to benefit from skilled outpatient occupational therapy to address the deficits listed in the problem list on initial evaluation, provide pt/family education and to maximize pt's level of independence in the home and community environment.     Pt's spiritual, cultural and educational needs considered and pt agreeable to plan of care and goals.    Anticipated barriers to occupational therapy: preexisting conditions      Goals:  Long term goals:   Pt will achieve shoulder AROM in flexion/scaption/abduction ~120 degrees   2.   Assess MMT when appropriate  3.   Pt will demo shoulder AROM WFL to perform daily and community activities   4.   Pt will report improvement in FOTO score by measuring 20% points.         Short term goals:   Pt will be complaint with HEP and pain management   Pt will achieve shoulder PROM in flexion and scaption ~110 degrees   Pt will report improvement in FOTO by decreasing 10% limitation points   Pt will achieve shoulder AAROM WFL to aid in ADLs   Pt will be able to demo full composite fist with R hand.    PLAN     Continue skilled occupational therapy with individualized plan of care focusing on improving functional independence with use of RUE    Updates/Grading for next session: cont per large massive type III protocol.     Shaunna Stevenson OT

## 2023-07-13 ENCOUNTER — OFFICE VISIT (OUTPATIENT)
Dept: URGENT CARE | Facility: CLINIC | Age: 83
End: 2023-07-13
Payer: MEDICARE

## 2023-07-13 VITALS
SYSTOLIC BLOOD PRESSURE: 138 MMHG | TEMPERATURE: 98 F | OXYGEN SATURATION: 96 % | HEART RATE: 81 BPM | RESPIRATION RATE: 20 BRPM | HEIGHT: 60 IN | DIASTOLIC BLOOD PRESSURE: 79 MMHG | BODY MASS INDEX: 22.58 KG/M2 | WEIGHT: 115 LBS

## 2023-07-13 DIAGNOSIS — R05.9 COUGH, UNSPECIFIED TYPE: Primary | ICD-10-CM

## 2023-07-13 LAB
CTP QC/QA: YES
SARS-COV-2 AG RESP QL IA.RAPID: NEGATIVE

## 2023-07-13 PROCEDURE — 87811 SARS-COV-2 COVID19 W/OPTIC: CPT | Mod: QW,S$GLB,,

## 2023-07-13 PROCEDURE — 99213 PR OFFICE/OUTPT VISIT, EST, LEVL III, 20-29 MIN: ICD-10-PCS | Mod: S$GLB,,,

## 2023-07-13 PROCEDURE — 99213 OFFICE O/P EST LOW 20 MIN: CPT | Mod: S$GLB,,,

## 2023-07-13 PROCEDURE — 87811 SARS CORONAVIRUS 2 ANTIGEN POCT, MANUAL READ: ICD-10-PCS | Mod: QW,S$GLB,,

## 2023-07-13 RX ORDER — BENZONATATE 200 MG/1
200 CAPSULE ORAL 3 TIMES DAILY PRN
Qty: 20 CAPSULE | Refills: 0 | Status: SHIPPED | OUTPATIENT
Start: 2023-07-13 | End: 2023-07-23

## 2023-07-13 NOTE — PATIENT INSTRUCTIONS
- Cough recommendations:      Dextromethorphan (DM) is a cough suppressant over the counter (ie. mucinex DM, delsym).

## 2023-07-13 NOTE — PROGRESS NOTES
Subjective:      Patient ID: Laverne Humphries is a 82 y.o. female.    Vitals:  height is 5' (1.524 m) and weight is 52.2 kg (115 lb). Her temperature is 97.9 °F (36.6 °C). Her blood pressure is 138/79 and her pulse is 81. Her respiration is 20 and oxygen saturation is 96%.     Chief Complaint: Cough    81 yo F c/o cough x 4 days. She denies any other symptoms. Denies sob, wheezing. She uses albuterol for allergies and has used this a couple times since Sunday. She has not taken anything otc. She takes omeprazole daily for reflux. Denies sick contacts.     Cough  This is a new problem. The current episode started in the past 7 days. The problem has been unchanged. The cough is Productive of sputum. Pertinent negatives include no ear pain, myalgias, shortness of breath or wheezing. The symptoms are aggravated by lying down. Treatments tried: albuterol.   HENT:  Negative for ear pain and sinus pressure.    Cardiovascular:  Negative for sob on exertion.   Respiratory:  Positive for cough. Negative for sputum production, shortness of breath and wheezing.    Gastrointestinal:  Negative for nausea, vomiting and diarrhea.   Musculoskeletal:  Negative for muscle ache.    Objective:     Physical Exam   Constitutional: She is oriented to person, place, and time. She does not appear ill. No distress. normal  HENT:   Head: Normocephalic and atraumatic.   Ears:   Right Ear: Tympanic membrane, external ear and ear canal normal.   Left Ear: Tympanic membrane, external ear and ear canal normal.   Nose: Nose normal. No rhinorrhea or congestion.   Mouth/Throat: No oropharyngeal exudate or posterior oropharyngeal erythema.   Eyes: Conjunctivae are normal. Pupils are equal, round, and reactive to light. Extraocular movement intact   Cardiovascular: Normal rate, regular rhythm and normal heart sounds.   Pulmonary/Chest: Effort normal and breath sounds normal. No respiratory distress. She has no wheezes.   Abdominal: Normal appearance.    Neurological: She is alert and oriented to person, place, and time.   Skin: Skin is warm and dry.     Assessment:     1. Cough, unspecified type        Plan:   Er precautions discussed. Pt encouraged to rtc if symptoms worsen.     Results for orders placed or performed in visit on 07/13/23   SARS Coronavirus 2 Antigen, POCT Manual Read   Result Value Ref Range    SARS Coronavirus 2 Antigen Negative Negative     Acceptable Yes        Cough, unspecified type  -     SARS Coronavirus 2 Antigen, POCT Manual Read  -     benzonatate (TESSALON) 200 MG capsule; Take 1 capsule (200 mg total) by mouth 3 (three) times daily as needed for Cough.  Dispense: 20 capsule; Refill: 0             Patient Instructions   - Cough recommendations:      Dextromethorphan (DM) is a cough suppressant over the counter (ie. mucinex DM, delsym).

## 2023-07-14 ENCOUNTER — PATIENT MESSAGE (OUTPATIENT)
Dept: PRIMARY CARE CLINIC | Facility: CLINIC | Age: 83
End: 2023-07-14
Payer: MEDICARE

## 2023-07-14 DIAGNOSIS — R05.9 COUGH, UNSPECIFIED TYPE: Primary | ICD-10-CM

## 2023-07-14 RX ORDER — PROMETHAZINE HYDROCHLORIDE AND DEXTROMETHORPHAN HYDROBROMIDE 6.25; 15 MG/5ML; MG/5ML
5 SYRUP ORAL EVERY 6 HOURS PRN
Qty: 180 ML | Refills: 0 | Status: SHIPPED | OUTPATIENT
Start: 2023-07-14 | End: 2023-07-24

## 2023-07-14 NOTE — TELEPHONE ENCOUNTER
Sent in promethazine-dextromethorphan liquid for cough. If symptoms worsen next week will need re-eval. Not surprised by cough for over a week b/c most times it does take several weeks to go away.

## 2023-07-18 ENCOUNTER — CLINICAL SUPPORT (OUTPATIENT)
Dept: REHABILITATION | Facility: HOSPITAL | Age: 83
End: 2023-07-18
Payer: MEDICARE

## 2023-07-18 DIAGNOSIS — R52 PAIN: ICD-10-CM

## 2023-07-18 DIAGNOSIS — M25.611 DECREASED RANGE OF MOTION OF RIGHT SHOULDER: Primary | ICD-10-CM

## 2023-07-18 DIAGNOSIS — R60.0 LOCALIZED EDEMA: ICD-10-CM

## 2023-07-18 PROCEDURE — 97110 THERAPEUTIC EXERCISES: CPT

## 2023-07-18 PROCEDURE — 97140 MANUAL THERAPY 1/> REGIONS: CPT

## 2023-07-18 NOTE — PROGRESS NOTES
"  [facility]  Occupational Therapy Treatment Note    Date: 7/18/2023  Name: Laverne CELIS Stone  [facility] Number: [id number]    Therapy Diagnosis:   Encounter Diagnoses   Name Primary?    Decreased range of motion of right shoulder Yes    Localized edema     Pain              Physician: Luis Angel Wheeler MD    Physician Orders:Eval and treat;  s/p RCR/lymphedema treatments into hand  Medical Diagnosis: Z98.890 (ICD-10-CM) - S/P right rotator cuff repair  Surgical Procedure and Date: 3/21/2023,   1. Right shoulder Arthroscopic massive complex rotator cuff repair    2. Right shoulder Arthroscopic extensive debridement   3. Right shoulder Arthroscopic lysis of adhesions   4. Right shoulder Arthroscopic subacromial decompression and bursectomy   5. Right shoulder arthroscopic loose body removal    Evaluation Date: 5/3/2023  Insurance Authorization Period Expiration: 05/07/2023  Plan of Care Expiration: 12 weeks; 7/28/2023  Date of Return to MD: 7/3/2023  Visit # / Visits authorized: 20 / 20  FOTO: (date and score)     Precautions:  Standard     Time In:     07:55 AM   Time Out:      08:55   AM     Total Appointment Time (timed & untimed codes): 60 minutes       SUBJECTIVE     Pt reports: "I had to go to urgent care because I kept coughing last week"   She was compliant with home exercise program given last session.   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 Reaching easier and opening things easier    Pain: 0/10  Location: right hand     OBJECTIVE   Objective Measures updated at progress report unless specified.    Observation/Appearance: mod edema in hand      Edema. Measured in centimeters.    5/9/2023 5/9/2023     Left Right   2in. Above elbow       2in. Below elbow       Wrist Crease 14.0 cm  16.0 cm    Figure of 8       MCPs 18.4.0 cm  20.5 cm            Shoulder ROM. Measured in degrees    " 5/3/2022 5/3/2023 5/11/2023 6/9/2023 6/30/2023 6/30/2023     Left Right PROM Right PROM Right AROM  Right AROM Right PROM   Shoulder Flexion  WFL 85 degrees  90 90 115 125   Shoulder Abduction  WFL NT 80 90 90 95   Shoulder Extension    NT NT       Shoulder IR   NT NT L5     Shoulder ER  70 NT 20 65           Elbow and Wrist ROM. Measured in degrees.    5/9/2023 5/9/2023     Left Right   Elbow Ext/Flex WNL/WNL  12/135    Supination/Pronation WNL/WNL  66/75   Wrist Ext/Flex 55/70 39/6   Wrist RD/UD             Hand ROM. Measured in degrees.    5/9/2023 5/9/2023 6/22/2023      Left Right Right             Index: MP    68 67              PIP       50 91              DIP   30 40              PITTS   148             Long:  MP   65 78              PIP   58 80              DIP   62 55              PITTS   185             Ring:   MP   55 80              PIP   60 92              DIP   32 45              PITTS   147                      Strength (Dynamometer) and Pinch Strength (Pinch Gauge)  Measured in pounds.    6/9/2023 6/9/2023 7/7/2023 7/18/2023      Left Right Right Right   Rung II  41 20 25 21   Key Pinch         3pt Pinch         2pt Pinch               Manual Muscle Test    5/9/2023 5/9/2023     Left Right   Wrist Extension        Wrist Flexion       Radial Deviation       Ulnar Deviation       Supination       Pronation       Elbow Extension       Elbow Flexion             Limitation/Restriction for FOTO initial eval; shoulder Survey     Therapist reviewed FOTO scores for Betodia M Letcher on 5/3/2023.   FOTO documents entered into [employer] - see Media section.     Limitation Score: needs to be taken%        Treatment     Leovidia received the treatments listed below:         Supervised modalities: MHP for 10 minutes to promote increased blood flow       Manual therapy techniques: Manual Lymphatic Drainage were applied to the: RUE for 10 minutes, including:  - passive ROM to flexion/scaption/abduction/ER/IR  x 10 reps  each   - passive ROM LF         Therapeutic exercises to develop ROM for 35  minutes, including:  - AAROM flexion/scaption/abduction with 2# dowel x 10 reps each  incline on mat (2 sets)  - sidelying abduction and ER, 2 x 15 reps  - supine dynamic stabilization with 1#, alphabet x 1 set NT   - AAROM serratus punch with 2# dowel x 10 reps   - light elbow PRE, elbow flexion palm up and FA in neutral 2 x 10 reps each, 1# NT   - yellow theraband rows x 10 reps (3 sets) NT  - yellow theraband shoulder extension x 10 reps (3 sets) NT  - yellow theraband shoulder ER/IR x 10 reps (2 sets) each NT      Neuro re-ed: 10 min NT   - rice bin (6 min)   - in hand manipulation with marbles         Patient Education and Home Exercises      Education provided:   - added towel IR stretch   - Progress towards goals       Written Home Exercises Provided: Patient instructed to cont prior HEP.  Exercises were reviewed and Laverne was able to demonstrate them prior to the end of the session.  Laverne demonstrated good  understanding of the HEP provided. See EMR under Patient Instructions for exercises provided during therapy sessions.      ASSESSMENT     Pt tolerated tx fairly well today. Pt participated well and is motivated. She is compliant with HEP and reports the therabands are going well. She cont to have tightness at end range, especially with IR  but reports she feels less tightness overall today.     Laverne is progressing fairly well towards her goals and there are no updates to goals at this time. Pt prognosis is fair.     Pt will continue to benefit from skilled outpatient occupational therapy to address the deficits listed in the problem list on initial evaluation, provide pt/family education and to maximize pt's level of independence in the home and community environment.     Pt's spiritual, cultural and educational needs considered and pt agreeable to plan of care and goals.    Anticipated barriers to occupational  therapy: preexisting conditions       Goals:  Long term goals:   Pt will achieve shoulder AROM in flexion/scaption/abduction ~120 degrees   2.   Assess MMT when appropriate  3.   Pt will demo shoulder AROM WFL to perform daily and community activities   4.   Pt will report improvement in FOTO score by measuring 20% points.         Short term goals:   Pt will be complaint with HEP and pain management   Pt will achieve shoulder PROM in flexion and scaption ~110 degrees   Pt will report improvement in FOTO by decreasing 10% limitation points   Pt will achieve shoulder AAROM WFL to aid in ADLs   Pt will be able to demo full composite fist with R hand.    PLAN     Continue skilled occupational therapy with individualized plan of care focusing on improving functional independence with use of RUE    Updates/Grading for next session: cont per large massive type III protocol.     Shaunna Stevenson, OT

## 2023-07-20 ENCOUNTER — OFFICE VISIT (OUTPATIENT)
Dept: ALLERGY | Facility: CLINIC | Age: 83
End: 2023-07-20
Payer: MEDICARE

## 2023-07-20 VITALS
BODY MASS INDEX: 22.73 KG/M2 | DIASTOLIC BLOOD PRESSURE: 69 MMHG | HEART RATE: 80 BPM | SYSTOLIC BLOOD PRESSURE: 124 MMHG | WEIGHT: 116.38 LBS

## 2023-07-20 DIAGNOSIS — J30.9 ALLERGIC RHINITIS, UNSPECIFIED SEASONALITY, UNSPECIFIED TRIGGER: ICD-10-CM

## 2023-07-20 DIAGNOSIS — J01.90 ACUTE NON-RECURRENT SINUSITIS, UNSPECIFIED LOCATION: ICD-10-CM

## 2023-07-20 PROCEDURE — 1126F PR PAIN SEVERITY QUANTIFIED, NO PAIN PRESENT: ICD-10-PCS | Mod: CPTII,S$GLB,, | Performed by: ALLERGY & IMMUNOLOGY

## 2023-07-20 PROCEDURE — 3074F SYST BP LT 130 MM HG: CPT | Mod: CPTII,S$GLB,, | Performed by: ALLERGY & IMMUNOLOGY

## 2023-07-20 PROCEDURE — 1159F MED LIST DOCD IN RCRD: CPT | Mod: CPTII,S$GLB,, | Performed by: ALLERGY & IMMUNOLOGY

## 2023-07-20 PROCEDURE — 1126F AMNT PAIN NOTED NONE PRSNT: CPT | Mod: CPTII,S$GLB,, | Performed by: ALLERGY & IMMUNOLOGY

## 2023-07-20 PROCEDURE — 99214 OFFICE O/P EST MOD 30 MIN: CPT | Mod: S$GLB,,, | Performed by: ALLERGY & IMMUNOLOGY

## 2023-07-20 PROCEDURE — 99214 PR OFFICE/OUTPT VISIT, EST, LEVL IV, 30-39 MIN: ICD-10-PCS | Mod: S$GLB,,, | Performed by: ALLERGY & IMMUNOLOGY

## 2023-07-20 PROCEDURE — 99999 PR PBB SHADOW E&M-EST. PATIENT-LVL III: CPT | Mod: PBBFAC,,, | Performed by: ALLERGY & IMMUNOLOGY

## 2023-07-20 PROCEDURE — 1159F PR MEDICATION LIST DOCUMENTED IN MEDICAL RECORD: ICD-10-PCS | Mod: CPTII,S$GLB,, | Performed by: ALLERGY & IMMUNOLOGY

## 2023-07-20 PROCEDURE — 3078F PR MOST RECENT DIASTOLIC BLOOD PRESSURE < 80 MM HG: ICD-10-PCS | Mod: CPTII,S$GLB,, | Performed by: ALLERGY & IMMUNOLOGY

## 2023-07-20 PROCEDURE — 3074F PR MOST RECENT SYSTOLIC BLOOD PRESSURE < 130 MM HG: ICD-10-PCS | Mod: CPTII,S$GLB,, | Performed by: ALLERGY & IMMUNOLOGY

## 2023-07-20 PROCEDURE — 99999 PR PBB SHADOW E&M-EST. PATIENT-LVL III: ICD-10-PCS | Mod: PBBFAC,,, | Performed by: ALLERGY & IMMUNOLOGY

## 2023-07-20 PROCEDURE — 3078F DIAST BP <80 MM HG: CPT | Mod: CPTII,S$GLB,, | Performed by: ALLERGY & IMMUNOLOGY

## 2023-07-20 RX ORDER — AZELASTINE 1 MG/ML
2 SPRAY, METERED NASAL 2 TIMES DAILY
Qty: 30 ML | Refills: 11 | Status: SHIPPED | OUTPATIENT
Start: 2023-07-20 | End: 2024-07-19

## 2023-07-20 RX ORDER — AMOXICILLIN AND CLAVULANATE POTASSIUM 875; 125 MG/1; MG/1
1 TABLET, FILM COATED ORAL 2 TIMES DAILY
Qty: 20 TABLET | Refills: 0 | Status: SHIPPED | OUTPATIENT
Start: 2023-07-20 | End: 2023-07-30

## 2023-07-20 RX ORDER — TRIAMCINOLONE ACETONIDE 55 UG/1
2 SPRAY, METERED NASAL 2 TIMES DAILY
Qty: 16.9 ML | Refills: 11 | Status: SHIPPED | OUTPATIENT
Start: 2023-07-20 | End: 2024-03-06

## 2023-07-20 NOTE — PROGRESS NOTES
Subjective:       Patient ID: Laverne Humphries is a 82 y.o. female.    Chief Complaint:    Fu allergic rhinitis    LV 11/10/22    HPI:     Pt presents w concern of increased cough, rhinorrhea x 3 weeks. Also w sensation of thick post nasal drip.  No fever. No known sick contact.  No improvement w albuterol. No improvement w tessalon perles or dextromethorophan.  Hasn't been using astelin or nasacort  Is on pepcid for gerd. Stopped PPI per GI    Hx from 11/10/22  80 yo female with a history of allergic rhinitis, GERD, and chronic/recurrent cough.  Skin test in March 2013 w multiple positives, as below.  Cough and rhinitis had been well controlled since LV until about the last 2 weeks, during which cough, post-nasal drip, and reflux sx's have increased. Notes eating sweets and supine position aggravate cough. It has been years since she was on PPI.   Has also noted epistaxis w flonase rescently.        Environmental History:   Pets in the home: none.    Moon: tile or linoleum floors   Climate Control: central or room air conditioning   Dust Mite Controls: Dust mite controls are already in place.   Tobacco Smoke in Home: no     Review of Systems   Constitutional: Negative for fever, chills, appetite change, fatigue and unexpected weight change.   HENT: rhinorrhea  Eyes: Negative for pain, discharge, redness and itching.   Respiratory: Positive for cough. Negative for chest tightness, shortness of breath and wheezing.    Cardiovascular: Negative for chest pain, palpitations and leg swelling.   Gastrointestinal: Negative for nausea, vomiting, abdominal pain, diarrhea, constipation, blood in stool and abdominal distention.   Genitourinary: Negative for dysuria, urgency, frequency and difficulty urinating.   Musculoskeletal: back pain w cough  Skin: Negative for color change, pallor, rash and wound.   Neurological: Negative for dizziness, seizures, light-headedness, numbness and headaches.   Hematological: Negative  for adenopathy. Does not bruise/bleed easily.   Psychiatric/Behavioral: Negative for sleep disturbance and dysphoric mood. The patient is not nervous/anxious.         Objective:        Physical Exam  Constitutional:       Appearance: She is well-developed.   HENT:      Head: Normocephalic and atraumatic.      Right Ear: External ear normal.      Left Ear: External ear normal.   Eyes:      General: No scleral icterus.        Right eye: No discharge.         Left eye: No discharge.   Neck:      Thyroid: No thyromegaly.   Cardiovascular:      Rate and Rhythm: Regular rhythm.      Heart sounds: Normal heart sounds.   Pulmonary:      Effort: Pulmonary effort is normal. No respiratory distress.      Breath sounds: Normal breath sounds. No stridor. No wheezing.   Abdominal:      General: Bowel sounds are normal. There is no distension.      Palpations: Abdomen is soft.      Tenderness: There is no abdominal tenderness.   Musculoskeletal:         General: Normal range of motion.      Cervical back: Neck supple.   Lymphadenopathy:      Cervical: No cervical adenopathy.   Skin:     General: Skin is warm.      Findings: No erythema or rash. Rash is not urticarial.   Neurological:      Mental Status: She is alert and oriented to person, place, and time.   Psychiatric:         Behavior: Behavior normal.         Laboratory:     3/13/13   Inhalant Skin Testing   4+ Dust mites (D.p. And D.f.)   3+ White francesca, Mixed birch, Box elder, Bald cypress   2+ Cat, Dog, American elm, Hackberry, Red maple, Red mulberry, Mixed oak, Black willow, Lambs quarter, Marsh elder, Bahia grass, Bermuda grass, Jared grass, Kaleb grass   1+ Cockroach, Red cedar, Eastern cottonwood, Mugwort, Rough pigweed, English plantain, Mixed ragweed, Russian thistle, Acremonium, Alternaria, Epicoccum, Fusarium, Pullularia, Rhizopus, Rhodotorula  With adequate histamine and saline controls             Assessment:     Allergic rhinitis, chronic, poorly  controlled  2.  Sinusitis. Increased  cough and rhinitis sx's x 3 weeks    Plan:     1.  Nasacort 2 sen twice daily  2. Astelin 2 sen twice daily  3. augmentin

## 2023-07-21 ENCOUNTER — CLINICAL SUPPORT (OUTPATIENT)
Dept: REHABILITATION | Facility: HOSPITAL | Age: 83
End: 2023-07-21
Payer: MEDICARE

## 2023-07-21 DIAGNOSIS — R52 PAIN: ICD-10-CM

## 2023-07-21 DIAGNOSIS — M25.611 DECREASED RANGE OF MOTION OF RIGHT SHOULDER: Primary | ICD-10-CM

## 2023-07-21 DIAGNOSIS — R60.0 LOCALIZED EDEMA: ICD-10-CM

## 2023-07-21 PROCEDURE — 97110 THERAPEUTIC EXERCISES: CPT

## 2023-07-21 PROCEDURE — 97140 MANUAL THERAPY 1/> REGIONS: CPT

## 2023-07-21 NOTE — PROGRESS NOTES
[facility]  Occupational Therapy Treatment Note    Date: 7/21/2023  Name: Laverne Humphries  Clinic Number: [id number]    Therapy Diagnosis:   Encounter Diagnoses   Name Primary?    Decreased range of motion of right shoulder Yes    Localized edema     Pain          Physician: Luis Angel Wheeler MD    Physician Orders:Eval and treat;  s/p RCR/lymphedema treatments into hand  Medical Diagnosis: Z98.890 (ICD-10-CM) - S/P right rotator cuff repair  Surgical Procedure and Date: 3/21/2023,   1. Right shoulder Arthroscopic massive complex rotator cuff repair    2. Right shoulder Arthroscopic extensive debridement   3. Right shoulder Arthroscopic lysis of adhesions   4. Right shoulder Arthroscopic subacromial decompression and bursectomy   5. Right shoulder arthroscopic loose body removal    Evaluation Date: 5/3/2023  Insurance Authorization Period Expiration: 05/07/2023  Plan of Care Expiration: 12 weeks; 7/28/2023  Date of Return to MD: 7/3/2023  Visit # / Visits authorized: 23 / 20  FOTO: (date and score)     Precautions:  Standard     Time In:         08:52 AM   Time Out:                 AM     Total Appointment Time (timed & untimed codes): 60 minutes     SUBJECTIVE     Pt reports:   She was compliant with home exercise program given last session.   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 Reaching easier and opening things easier    Pain: 0/10  Location: right hand     OBJECTIVE   Objective Measures updated at progress report unless specified.    Observation/Appearance: mod edema in hand      Edema. Measured in centimeters.    5/9/2023 5/9/2023     Left Right   2in. Above elbow       2in. Below elbow       Wrist Crease 14.0 cm  16.0 cm    Figure of 8       MCPs 18.4.0 cm  20.5 cm        Shoulder ROM. Measured in degrees    5/3/2022 5/3/2023 5/11/2023 6/9/2023 6/30/2023 6/30/2023     Left Right  PROM Right PROM Right AROM  Right AROM Right PROM   Shoulder Flexion  WFL 85 degrees  90 90 115 125   Shoulder Abduction  WFL NT 80 90 90 95   Shoulder Extension    NT NT       Shoulder IR   NT NT L5     Shoulder ER  70 NT 20 65           Elbow and Wrist ROM. Measured in degrees.    5/9/2023 5/9/2023     Left Right   Elbow Ext/Flex WNL/WNL  12/135    Supination/Pronation WNL/WNL  66/75   Wrist Ext/Flex 55/70 39/6   Wrist RD/UD             Hand ROM. Measured in degrees.    5/9/2023 5/9/2023 6/22/2023      Left Right Right             Index: MP    68 67              PIP       50 91              DIP   30 40              PITTS   148             Long:  MP   65 78              PIP   58 80              DIP   62 55              PITTS   185             Ring:   MP   55 80              PIP   60 92              DIP   32 45              PITTS   147                      Strength (Dynamometer) and Pinch Strength (Pinch Gauge)  Measured in pounds.    6/9/2023 6/9/2023 7/7/2023 7/18/2023      Left Right Right Right   Rung II  41 20 25 21   Key Pinch         3pt Pinch         2pt Pinch               Manual Muscle Test    5/9/2023 5/9/2023     Left Right   Wrist Extension        Wrist Flexion       Radial Deviation       Ulnar Deviation       Supination       Pronation       Elbow Extension       Elbow Flexion             Limitation/Restriction for FOTO initial eval; shoulder Survey     Therapist reviewed FOTO scores for Laverne Humphries on 5/3/2023.   FOTO documents entered into [employer] - see Media section.     Limitation Score: needs to be taken%        Treatment     Leovidia received the treatments listed below:         Supervised modalities: MHP for 10 minutes to promote increased blood flow         Manual therapy techniques: Manual Lymphatic Drainage were applied to the: RUE for 10 minutes, including:  - passive ROM to flexion/scaption/abduction/ER/IR  x 10 reps each   - passive ROM LF         Therapeutic exercises to develop ROM  for 35  minutes, including:  - AAROM flexion/scaption/abduction with 2# dowel x 10 reps each  incline on mat (2 sets)  - sidelying abduction and ER, 2 x 15 reps  - supine dynamic stabilization with 1#, alphabet x 1 set NT   - AAROM serratus punch with 2# dowel x 10 reps   - light elbow PRE, elbow flexion palm up and FA in neutral 2 x 10 reps each, 1# NT   - yellow theraband rows x 10 reps (3 sets) NT  - yellow theraband shoulder extension x 10 reps (3 sets) NT  - yellow theraband shoulder ER/IR x 10 reps (2 sets) each NT      Neuro re-ed: 10 min NT   - rice bin (6 min)   - in hand manipulation with marbles         Patient Education and Home Exercises      Education provided:   - added towel IR stretch   - Progress towards goals       Written Home Exercises Provided: Patient instructed to cont prior HEP.  Exercises were reviewed and Laverne was able to demonstrate them prior to the end of the session.  Laverne demonstrated good  understanding of the HEP provided. See EMR under Patient Instructions for exercises provided during therapy sessions.      ASSESSMENT     Pt tolerated tx fairly well today. Pt participated well and is motivated. She is compliant with HEP and reports the therabands are going well. She cont to have tightness at end range, especially with IR  but reports she feels less tightness overall today.     Laverne is progressing fairly well towards her goals and there are no updates to goals at this time. Pt prognosis is fair.     Pt will continue to benefit from skilled outpatient occupational therapy to address the deficits listed in the problem list on initial evaluation, provide pt/family education and to maximize pt's level of independence in the home and community environment.     Pt's spiritual, cultural and educational needs considered and pt agreeable to plan of care and goals.    Anticipated barriers to occupational therapy: preexisting conditions       Goals:  Long term goals:   Pt will  achieve shoulder AROM in flexion/scaption/abduction ~120 degrees   2.   Assess MMT when appropriate  3.   Pt will demo shoulder AROM WFL to perform daily and community activities   4.   Pt will report improvement in FOTO score by measuring 20% points.         Short term goals:   Pt will be complaint with HEP and pain management   Pt will achieve shoulder PROM in flexion and scaption ~110 degrees   Pt will report improvement in FOTO by decreasing 10% limitation points   Pt will achieve shoulder AAROM WFL to aid in ADLs   Pt will be able to demo full composite fist with R hand.    PLAN     Continue skilled occupational therapy with individualized plan of care focusing on improving functional independence with use of RUE    Updates/Grading for next session: cont per large massive type III protocol.     Melissa Landeche, OT [facility]  Occupational Therapy Treatment Note    Date: 7/21/2023  Name: Laverne Humphries  [facility] Number: [id number]    Therapy Diagnosis:   Encounter Diagnoses   Name Primary?    Decreased range of motion of right shoulder Yes    Localized edema     Pain                Physician: Luis Angel Wheeler MD    Physician Orders:Eval and treat;  s/p RCR/lymphedema treatments into hand  Medical Diagnosis: Z98.890 (ICD-10-CM) - S/P right rotator cuff repair  Surgical Procedure and Date: 3/21/2023,   1. Right shoulder Arthroscopic massive complex rotator cuff repair    2. Right shoulder Arthroscopic extensive debridement   3. Right shoulder Arthroscopic lysis of adhesions   4. Right shoulder Arthroscopic subacromial decompression and bursectomy   5. Right shoulder arthroscopic loose body removal    Evaluation Date: 5/3/2023  Insurance Authorization Period Expiration: 05/07/2023  Plan of Care Expiration: 12 weeks; 7/28/2023  Date of Return to MD: 7/3/2023  Visit # / Visits authorized: 20 / 20  FOTO: (date and score)    "  Precautions:  Standard     Time In:     07:55 AM   Time Out:      08:55   AM     Total Appointment Time (timed & untimed codes): 60 minutes       SUBJECTIVE     Pt reports: "I had to go to urgent care because I kept coughing last week"   She was compliant with home exercise program given last session.   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 Reaching easier and opening things easier    Pain: 0/10  Location: right hand     OBJECTIVE   Objective Measures updated at progress report unless specified.    Observation/Appearance: mod edema in hand      Edema. Measured in centimeters.    5/9/2023 5/9/2023     Left Right   2in. Above elbow       2in. Below elbow       Wrist Crease 14.0 cm  16.0 cm    Figure of 8       MCPs 18.4.0 cm  20.5 cm            Shoulder ROM. Measured in degrees    5/3/2022 5/3/2023 5/11/2023 6/9/2023 6/30/2023 6/30/2023     Left Right PROM Right PROM Right AROM  Right AROM Right PROM   Shoulder Flexion  WFL 85 degrees  90 90 115 125   Shoulder Abduction  WFL NT 80 90 90 95   Shoulder Extension    NT NT       Shoulder IR   NT NT L5     Shoulder ER  70 NT 20 65           Elbow and Wrist ROM. Measured in degrees.    5/9/2023 5/9/2023     Left Right   Elbow Ext/Flex WNL/WNL  12/135    Supination/Pronation WNL/WNL  66/75   Wrist Ext/Flex 55/70 39/6   Wrist RD/UD             Hand ROM. Measured in degrees.    5/9/2023 5/9/2023 6/22/2023      Left Right Right             Index: MP    68 67              PIP       50 91              DIP   30 40              PITTS   148             Long:  MP   65 78              PIP   58 80              DIP   62 55              PITTS   185             Ring:   MP   55 80              PIP   60 92              DIP   32 45              PITTS   147                      Strength (Dynamometer) and Pinch Strength (Pinch Gauge)  Measured in pounds.    6/9/2023 6/9/2023 7/7/2023 7/18/2023  "     Left Right Right Right   Rung II  41 20 25 21   Key Pinch         3pt Pinch         2pt Pinch               Manual Muscle Test    5/9/2023 5/9/2023     Left Right   Wrist Extension        Wrist Flexion       Radial Deviation       Ulnar Deviation       Supination       Pronation       Elbow Extension       Elbow Flexion             Limitation/Restriction for FOTO initial eval; shoulder Survey     Therapist reviewed FOTO scores for Laverne Humphries on 5/3/2023.   FOTO documents entered into [employer] - see Media section.     Limitation Score: needs to be taken%        Treatment     Laverne received the treatments listed below:         Supervised modalities: MHP for 10 minutes to promote increased blood flow       Manual therapy techniques: Manual Lymphatic Drainage were applied to the: RUE for 10 minutes, including:  - passive ROM to flexion/scaption/abduction/ER/IR  x 10 reps each   - passive ROM LF         Therapeutic exercises to develop ROM for 35  minutes, including:  - AAROM flexion/scaption/abduction with 2# dowel x 10 reps each  incline on mat (2 sets)  - sidelying abduction and ER, 2 x 15 reps  - supine dynamic stabilization with 1#, alphabet x 1 set NT   - AAROM serratus punch with 2# dowel x 10 reps   - light elbow PRE, elbow flexion palm up and FA in neutral 2 x 10 reps each, 1# NT   - yellow theraband rows x 10 reps (3 sets) NT  - yellow theraband shoulder extension x 10 reps (3 sets) NT  - yellow theraband shoulder ER/IR x 10 reps (2 sets) each NT      Neuro re-ed: 10 min NT   - rice bin (6 min)   - in hand manipulation with marbles         Patient Education and Home Exercises      Education provided:   - added towel IR stretch   - Progress towards goals       Written Home Exercises Provided: Patient instructed to cont prior HEP.  Exercises were reviewed and Laverne was able to demonstrate them prior to the end of the session.  Laverne demonstrated good  understanding of the HEP provided. See EMR  under Patient Instructions for exercises provided during therapy sessions.      ASSESSMENT     Pt tolerated tx fairly well today. Pt participated well and is motivated. She is compliant with HEP and reports the therabands are going well. She cont to have tightness at end range, especially with IR  but reports she feels less tightness overall today.     Leovidia is progressing fairly well towards her goals and there are no updates to goals at this time. Pt prognosis is fair.     Pt will continue to benefit from skilled outpatient occupational therapy to address the deficits listed in the problem list on initial evaluation, provide pt/family education and to maximize pt's level of independence in the home and community environment.     Pt's spiritual, cultural and educational needs considered and pt agreeable to plan of care and goals.    Anticipated barriers to occupational therapy: preexisting conditions       Goals:  Long term goals:   Pt will achieve shoulder AROM in flexion/scaption/abduction ~120 degrees   2.   Assess MMT when appropriate  3.   Pt will demo shoulder AROM WFL to perform daily and community activities   4.   Pt will report improvement in FOTO score by measuring 20% points.         Short term goals:   Pt will be complaint with HEP and pain management   Pt will achieve shoulder PROM in flexion and scaption ~110 degrees   Pt will report improvement in FOTO by decreasing 10% limitation points   Pt will achieve shoulder AAROM WFL to aid in ADLs   Pt will be able to demo full composite fist with R hand.    PLAN     Continue skilled occupational therapy with individualized plan of care focusing on improving functional independence with use of RUE    Updates/Grading for next session: cont per large massive type III protocol.     Melissa Landeche, OT [facility]  Occupational Therapy Treatment Note    Date: 7/21/2023  Name:  "Laverne [facility] Number: [id number]    Therapy Diagnosis:   Encounter Diagnoses   Name Primary?    Decreased range of motion of right shoulder Yes    Localized edema     Pain                Physician: Luis Angel Wheeler MD    Physician Orders:Eval and treat;  s/p RCR/lymphedema treatments into hand  Medical Diagnosis: Z98.890 (ICD-10-CM) - S/P right rotator cuff repair  Surgical Procedure and Date: 3/21/2023,   1. Right shoulder Arthroscopic massive complex rotator cuff repair    2. Right shoulder Arthroscopic extensive debridement   3. Right shoulder Arthroscopic lysis of adhesions   4. Right shoulder Arthroscopic subacromial decompression and bursectomy   5. Right shoulder arthroscopic loose body removal    Evaluation Date: 5/3/2023  Insurance Authorization Period Expiration: 05/07/2023  Plan of Care Expiration: 12 weeks; 7/28/2023  Date of Return to MD: 7/3/2023  Visit # / Visits authorized: 20 / 20  FOTO: (date and score)     Precautions:  Standard     Time In:     07:55 AM   Time Out:      08:55   AM     Total Appointment Time (timed & untimed codes): 60 minutes       SUBJECTIVE     Pt reports: "I had to go to urgent care because I kept coughing last week"   She was compliant with home exercise program given last session.   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 Reaching easier and opening things easier    Pain: 0/10  Location: right hand     OBJECTIVE   Objective Measures updated at progress report unless specified.    Observation/Appearance: mod edema in hand      Edema. Measured in centimeters.    5/9/2023 5/9/2023     Left Right   2in. Above elbow       2in. Below elbow       Wrist Crease 14.0 cm  16.0 cm    Figure of 8       MCPs 18.4.0 cm  20.5 cm            Shoulder ROM. Measured in degrees    5/3/2022 5/3/2023 5/11/2023 6/9/2023 6/30/2023 6/30/2023     Left Right PROM Right PROM Right AROM  Right " AROM Right PROM   Shoulder Flexion  WFL 85 degrees  90 90 115 125   Shoulder Abduction  WFL NT 80 90 90 95   Shoulder Extension    NT NT       Shoulder IR   NT NT L5     Shoulder ER  70 NT 20 65           Elbow and Wrist ROM. Measured in degrees.    5/9/2023 5/9/2023     Left Right   Elbow Ext/Flex WNL/WNL  12/135    Supination/Pronation WNL/WNL  66/75   Wrist Ext/Flex 55/70 39/6   Wrist RD/UD             Hand ROM. Measured in degrees.    5/9/2023 5/9/2023 6/22/2023      Left Right Right             Index: MP    68 67              PIP       50 91              DIP   30 40              PITTS   148             Long:  MP   65 78              PIP   58 80              DIP   62 55              PITTS   185             Ring:   MP   55 80              PIP   60 92              DIP   32 45              PITTS   147                      Strength (Dynamometer) and Pinch Strength (Pinch Gauge)  Measured in pounds.    6/9/2023 6/9/2023 7/7/2023 7/18/2023      Left Right Right Right   Rung II  41 20 25 21   Key Pinch         3pt Pinch         2pt Pinch               Manual Muscle Test    5/9/2023 5/9/2023     Left Right   Wrist Extension        Wrist Flexion       Radial Deviation       Ulnar Deviation       Supination       Pronation       Elbow Extension       Elbow Flexion             Limitation/Restriction for FOTO initial eval; shoulder Survey     Therapist reviewed FOTO scores for Laverne Humphries on 5/3/2023.   FOTO documents entered into [employer] - see Media section.     Limitation Score: needs to be taken%        Treatment     Leovidia received the treatments listed below:         Supervised modalities: MHP for 10 minutes to promote increased blood flow       Manual therapy techniques: Manual Lymphatic Drainage were applied to the: RUE for 10 minutes, including:  - passive ROM to flexion/scaption/abduction/ER/IR  x 10 reps each   - passive ROM LF         Therapeutic exercises to develop ROM for 35  minutes, including:  - AAROM  flexion/scaption/abduction with 2# dowel x 10 reps each  incline on mat (2 sets)  - sidelying abduction and ER, 2 x 15 reps  - supine dynamic stabilization with 1#, alphabet x 1 set NT   - AAROM serratus punch with 2# dowel x 10 reps   - light elbow PRE, elbow flexion palm up and FA in neutral 2 x 10 reps each, 1# NT   - yellow theraband rows x 10 reps (3 sets) NT  - yellow theraband shoulder extension x 10 reps (3 sets) NT  - yellow theraband shoulder ER/IR x 10 reps (2 sets) each NT      Neuro re-ed: 10 min NT   - rice bin (6 min)   - in hand manipulation with marbles         Patient Education and Home Exercises      Education provided:   - added towel IR stretch   - Progress towards goals       Written Home Exercises Provided: Patient instructed to cont prior HEP.  Exercises were reviewed and Laverne was able to demonstrate them prior to the end of the session.  Laverne demonstrated good  understanding of the HEP provided. See EMR under Patient Instructions for exercises provided during therapy sessions.      ASSESSMENT     Pt tolerated tx fairly well today. Pt participated well and is motivated. She is compliant with HEP and reports the therabands are going well. She cont to have tightness at end range, especially with IR  but reports she feels less tightness overall today.     Laverne is progressing fairly well towards her goals and there are no updates to goals at this time. Pt prognosis is fair.     Pt will continue to benefit from skilled outpatient occupational therapy to address the deficits listed in the problem list on initial evaluation, provide pt/family education and to maximize pt's level of independence in the home and community environment.     Pt's spiritual, cultural and educational needs considered and pt agreeable to plan of care and goals.    Anticipated barriers to occupational therapy: preexisting conditions       Goals:  Long term goals:   Pt will achieve shoulder AROM in  flexion/scaption/abduction ~120 degrees   2.   Assess MMT when appropriate  3.   Pt will demo shoulder AROM WFL to perform daily and community activities   4.   Pt will report improvement in FOTO score by measuring 20% points.         Short term goals:   Pt will be complaint with HEP and pain management   Pt will achieve shoulder PROM in flexion and scaption ~110 degrees   Pt will report improvement in FOTO by decreasing 10% limitation points   Pt will achieve shoulder AAROM WFL to aid in ADLs   Pt will be able to demo full composite fist with R hand.    PLAN     Continue skilled occupational therapy with individualized plan of care focusing on improving functional independence with use of RUE    Updates/Grading for next session: cont per large massive type III protocol.     Shaunna Stevenson, OT

## 2023-07-25 ENCOUNTER — CLINICAL SUPPORT (OUTPATIENT)
Dept: REHABILITATION | Facility: HOSPITAL | Age: 83
End: 2023-07-25
Payer: MEDICARE

## 2023-07-25 DIAGNOSIS — R52 PAIN: ICD-10-CM

## 2023-07-25 DIAGNOSIS — R60.0 LOCALIZED EDEMA: ICD-10-CM

## 2023-07-25 DIAGNOSIS — M25.611 DECREASED RANGE OF MOTION OF RIGHT SHOULDER: Primary | ICD-10-CM

## 2023-07-25 PROCEDURE — 97110 THERAPEUTIC EXERCISES: CPT

## 2023-07-25 PROCEDURE — 97140 MANUAL THERAPY 1/> REGIONS: CPT

## 2023-07-25 NOTE — PROGRESS NOTES
"    [facility]  Occupational Therapy Treatment Note    Date: 7/25/2023  Name: Laverne CELIS Kristel  [facility] Number: [id number]    Therapy Diagnosis:   Encounter Diagnoses   Name Primary?    Decreased range of motion of right shoulder Yes    Localized edema     Pain            Physician: Luis Angel Wheeler MD    Physician Orders:Eval and treat;  s/p RCR/lymphedema treatments into hand  Medical Diagnosis: Z98.890 (ICD-10-CM) - S/P right rotator cuff repair  Surgical Procedure and Date: 3/21/2023,   1. Right shoulder Arthroscopic massive complex rotator cuff repair    2. Right shoulder Arthroscopic extensive debridement   3. Right shoulder Arthroscopic lysis of adhesions   4. Right shoulder Arthroscopic subacromial decompression and bursectomy   5. Right shoulder arthroscopic loose body removal    Evaluation Date: 5/3/2023  Insurance Authorization Period Expiration: 12/31/23  Plan of Care Expiration: 12 weeks; 7/28/2023  Date of Return to MD: 7/3/2023  Visit # / Visits authorized: 24 / 40  FOTO: (date and score)     Precautions:  Standard     Time In:     8:15 AM   Time Out:    9:10 AM     Total Appointment Time (timed & untimed codes): 60 minutes       SUBJECTIVE     Pt reports: "I'm doing ok"   She was compliant with home exercise program given .   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 Reaching easier and opening things easier    Pain: 0/10  Location: right hand     OBJECTIVE   Objective Measures updated at progress report unless specified.    Observation/Appearance: mod edema in hand      Edema. Measured in centimeters.    5/9/2023 5/9/2023     Left Right   2in. Above elbow       2in. Below elbow       Wrist Crease 14.0 cm  16.0 cm    Figure of 8       MCPs 18.4.0 cm  20.5 cm            Shoulder ROM. Measured in degrees    5/3/2022 5/3/2023 5/11/2023 6/9/2023 6/30/2023 6/30/2023 7/25/23     Left " Right PROM Right PROM Right AROM  Right AROM Right PROM R AROM   Shoulder Flexion  WFL 85 degrees  90 90 115 125 110   Shoulder Abduction  WFL NT 80 90 90 95 100   Shoulder Extension    NT NT     50   Shoulder IR   NT NT L5   37, L5   Shoulder ER  70 NT 20 65   60   Shoulder ER @0       48      Elbow and Wrist ROM. Measured in degrees.    5/9/2023 5/9/2023     Left Right   Elbow Ext/Flex WNL/WNL  12/135    Supination/Pronation WNL/WNL  66/75   Wrist Ext/Flex 55/70 39/6   Wrist RD/UD             Hand ROM. Measured in degrees.    5/9/2023 5/9/2023 6/22/2023      Left Right Right             Index: MP    68 67              PIP       50 91              DIP   30 40              PITTS   148             Long:  MP   65 78              PIP   58 80              DIP   62 55              PITTS   185             Ring:   MP   55 80              PIP   60 92              DIP   32 45              PITTS   147                      Strength (Dynamometer) and Pinch Strength (Pinch Gauge)  Measured in pounds.    6/9/2023 6/9/2023 7/7/2023 7/18/2023      Left Right Right Right   Rung II  41 20 25 21   Key Pinch         3pt Pinch         2pt Pinch               Manual Muscle Test    5/9/2023 5/9/2023     Left Right   Wrist Extension        Wrist Flexion       Radial Deviation       Ulnar Deviation       Supination       Pronation       Elbow Extension       Elbow Flexion             Limitation/Restriction for FOTO initial eval; shoulder Survey     Therapist reviewed FOTO scores for Laverne CELIS Throckmorton on 5/3/2023.   FOTO documents entered into [employer] [facility] - see Media section.     Limitation Score: needs to be taken%        Treatment     Leovidia received the treatments listed below:         Supervised modalities: MHP for 10 minutes to promote increased blood flow       Manual therapy techniques: Manual Lymphatic Drainage were applied to the: RUE for 10 minutes, including:  - passive ROM to flexion/scaption/abduction/ER/IR  x 10 reps each   -  passive ROM LF         Therapeutic exercises to develop ROM for 35  minutes, including:  - AAROM flexion/scaption/abduction with 2# dowel x 10 reps each  incline on mat (2 sets)  - sidelying abduction and ER, 2 x 15 reps  - supine dynamic stabilization with 1#, alphabet x 1 set   - AAROM serratus punch with 2# dowel 2 x 10 reps   - light elbow PRE, elbow flexion palm up and FA in neutral 2 x 10 reps each, 1#  - yellow theraband rows x 10 reps (3 sets)   - yellow theraband shoulder extension x 10 reps (3 sets)   - yellow theraband shoulder ER/IR x 15 reps (2 sets) each       Neuro re-ed: 10 min NT   - rice bin (6 min)   - in hand manipulation with marbles         Patient Education and Home Exercises      Education provided:   - cont HEP  - Progress towards goals       Written Home Exercises Provided: Patient instructed to cont prior HEP.  Exercises were reviewed and Laverne was able to demonstrate them prior to the end of the session.  Laverne demonstrated good  understanding of the HEP provided. See EMR under Patient Instructions for exercises provided during therapy sessions.      ASSESSMENT     Pt tolerated tx fairly well today. Pt participated well and is motivated. She demonstrates some improvement in shoulder abd, and assessed other shoulder measurements today. She is compliant with HEP and reports the therabands are going well. She cont to have tightness at end range, especially with IR  but reports she feels it is improving.     Betodimable is progressing fairly well towards her goals and there are no updates to goals at this time. Pt prognosis is fair.     Pt will continue to benefit from skilled outpatient occupational therapy to address the deficits listed in the problem list on initial evaluation, provide pt/family education and to maximize pt's level of independence in the home and community environment.     Pt's spiritual, cultural and educational needs considered and pt agreeable to plan of care and  goals.    Anticipated barriers to occupational therapy: preexisting conditions       Goals:  Long term goals:   Pt will achieve shoulder AROM in flexion/scaption/abduction ~120 degrees ---progressing  2.   Assess MMT when appropriate------progressing  3.   Pt will demo shoulder AROM WFL to perform daily and community activities ---progressing  4.   Pt will report improvement in FOTO score by measuring 20% points. ---progressing        Short term goals:   Pt will be complaint with HEP and pain management ---progressing  Pt will achieve shoulder PROM in flexion and scaption ~110 degrees ---progressing  Pt will report improvement in FOTO by decreasing 10% limitation points ---progressing  Pt will achieve shoulder AAROM WFL to aid in ADLs ---progressing  Pt will be able to demo full composite fist with R hand.---progressing    PLAN     Continue skilled occupational therapy with individualized plan of care focusing on improving functional independence with use of RUE    Updates/Grading for next session: cont per large massive type III protocol. Needs reassessment next session and updated POC    Kristin Labranche, LOTR, CHT [facility]  Occupational Therapy Treatment Note    Date: 7/25/2023  Name: Laverne CELIS [facility] Number: [id number]    Therapy Diagnosis:   Encounter Diagnoses   Name Primary?    Decreased range of motion of right shoulder Yes    Localized edema     Pain                Physician: Luis Angel Wheeler MD    Physician Orders:Eval and treat;  s/p RCR/lymphedema treatments into hand  Medical Diagnosis: Z98.890 (ICD-10-CM) - S/P right rotator cuff repair  Surgical Procedure and Date: 3/21/2023,   1. Right shoulder Arthroscopic massive complex rotator cuff repair    2. Right shoulder Arthroscopic extensive debridement   3. Right shoulder Arthroscopic lysis of adhesions   4. Right shoulder Arthroscopic subacromial decompression and  "bursectomy   5. Right shoulder arthroscopic loose body removal    Evaluation Date: 5/3/2023  Insurance Authorization Period Expiration: 05/07/2023  Plan of Care Expiration: 12 weeks; 7/28/2023  Date of Return to MD: 7/3/2023  Visit # / Visits authorized: 24 / 40  FOTO: (date and score)     Precautions:  Standard     Time In:     07:55 AM   Time Out:      08:55   AM     Total Appointment Time (timed & untimed codes): 60 minutes       SUBJECTIVE     Pt reports: "I do everything." Pt reports she did not wear the glove on her hand over the weekend, and still has some swelling in her hand.   She was compliant with home exercise program given last session.   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 Reaching easier and opening things easier    Pain: 0/10  Location: right hand     OBJECTIVE   Objective Measures updated at progress report unless specified.    Observation/Appearance: mod edema in hand      Edema. Measured in centimeters.    5/9/2023 5/9/2023     Left Right   2in. Above elbow       2in. Below elbow       Wrist Crease 14.0 cm  16.0 cm    Figure of 8       MCPs 18.4.0 cm  20.5 cm               Elbow and Wrist ROM. Measured in degrees.    5/9/2023 5/9/2023     Left Right   Elbow Ext/Flex WNL/WNL  12/135    Supination/Pronation WNL/WNL  66/75   Wrist Ext/Flex 55/70 39/6   Wrist RD/UD             Hand ROM. Measured in degrees.    5/9/2023 5/9/2023 6/22/2023      Left Right Right             Index: MP    68 67              PIP       50 91              DIP   30 40              PITTS   148             Long:  MP   65 78              PIP   58 80              DIP   62 55              PITTS   185             Ring:   MP   55 80              PIP   60 92              DIP   32 45              PITTS   147                      Strength (Dynamometer) and Pinch Strength (Pinch Gauge)  Measured in pounds.    6/9/2023 6/9/2023 7/7/2023  " 7/18/2023      Left Right Right Right   Rung II  41 20 25 21   Key Pinch         3pt Pinch         2pt Pinch               Manual Muscle Test    5/9/2023 5/9/2023     Left Right   Wrist Extension        Wrist Flexion       Radial Deviation       Ulnar Deviation       Supination       Pronation       Elbow Extension       Elbow Flexion             Limitation/Restriction for FOTO initial eval; shoulder Survey     Therapist reviewed FOTO scores for Laverne Humphries on 5/3/2023.   FOTO documents entered into [employer] - see Media section.     Limitation Score: needs to be taken%        Treatment     Laverne received the treatments listed below:         Supervised modalities: MHP for 10 minutes to promote increased blood flow       Manual therapy techniques: Manual Lymphatic Drainage were applied to the: RUE for 10 minutes, including:  - passive ROM to flexion/scaption/abduction/ER/IR  x 10 reps each   - passive ROM LF         Therapeutic exercises to develop ROM for 35  minutes, including:  - AAROM flexion/scaption/abduction with 2# dowel x 10 reps each  incline on mat (2 sets)  - sidelying abduction and ER, 2 x 15 reps  - supine dynamic stabilization with 1#, alphabet x 1 set  - AAROM serratus punch with 2# dowel x 10 reps   - light elbow PRE, elbow flexion palm up and FA in neutral 2 x 10 reps each, 1# NT   - yellow theraband rows x 10 reps (3 sets) NT  - yellow theraband shoulder extension x 10 reps (3 sets) NT  - yellow theraband shoulder ER/IR x 10 reps (2 sets) each NT      Neuro re-ed: 10 min NT   - rice bin (6 min)   - in hand manipulation with marbles         Patient Education and Home Exercises      Education provided:   - added towel IR stretch   - Progress towards goals       Written Home Exercises Provided: Patient instructed to cont prior HEP.  Exercises were reviewed and Laverne was able to demonstrate them prior to the end of the session.  Laverne demonstrated good  understanding of the HEP provided.  See EMR under Patient Instructions for exercises provided during therapy sessions.      ASSESSMENT     Pt tolerated tx fairly well today. Pt participated well and is motivated. She is compliant with HEP and reports the therabands are going well. She cont to have tightness at end range, especially with IR  but reports she feels less tightness overall today.     Leovidia is progressing fairly well towards her goals and there are no updates to goals at this time. Pt prognosis is fair.     Pt will continue to benefit from skilled outpatient occupational therapy to address the deficits listed in the problem list on initial evaluation, provide pt/family education and to maximize pt's level of independence in the home and community environment.     Pt's spiritual, cultural and educational needs considered and pt agreeable to plan of care and goals.    Anticipated barriers to occupational therapy: preexisting conditions       Goals:  Long term goals:   Pt will achieve shoulder AROM in flexion/scaption/abduction ~120 degrees   2.   Assess MMT when appropriate  3.   Pt will demo shoulder AROM WFL to perform daily and community activities   4.   Pt will report improvement in FOTO score by measuring 20% points.         Short term goals:   Pt will be complaint with HEP and pain management   Pt will achieve shoulder PROM in flexion and scaption ~110 degrees   Pt will report improvement in FOTO by decreasing 10% limitation points   Pt will achieve shoulder AAROM WFL to aid in ADLs   Pt will be able to demo full composite fist with R hand.    PLAN     Continue skilled occupational therapy with individualized plan of care focusing on improving functional independence with use of RUE    Updates/Grading for next session: cont per large massive type III protocol.     JERRY Berg, YULIETT

## 2023-07-28 ENCOUNTER — CLINICAL SUPPORT (OUTPATIENT)
Dept: REHABILITATION | Facility: HOSPITAL | Age: 83
End: 2023-07-28
Payer: MEDICARE

## 2023-07-28 DIAGNOSIS — R60.0 LOCALIZED EDEMA: ICD-10-CM

## 2023-07-28 DIAGNOSIS — M25.611 DECREASED RANGE OF MOTION OF RIGHT SHOULDER: Primary | ICD-10-CM

## 2023-07-28 DIAGNOSIS — R52 PAIN: ICD-10-CM

## 2023-07-28 PROCEDURE — 97140 MANUAL THERAPY 1/> REGIONS: CPT

## 2023-07-28 PROCEDURE — 97110 THERAPEUTIC EXERCISES: CPT

## 2023-07-28 NOTE — PROGRESS NOTES
"      [facility]  Occupational Therapy Treatment Note    Date: 7/28/2023  Name: Laverne CELIS Kristel  [facility] Number: [id number]    Therapy Diagnosis:   Encounter Diagnoses   Name Primary?    Decreased range of motion of right shoulder Yes    Localized edema     Pain        Physician: Luis Angel Wheeler MD    Physician Orders:Eval and treat;  s/p RCR/lymphedema treatments into hand  Medical Diagnosis: Z98.890 (ICD-10-CM) - S/P right rotator cuff repair  Surgical Procedure and Date: 3/21/2023,   1. Right shoulder Arthroscopic massive complex rotator cuff repair    2. Right shoulder Arthroscopic extensive debridement   3. Right shoulder Arthroscopic lysis of adhesions   4. Right shoulder Arthroscopic subacromial decompression and bursectomy   5. Right shoulder arthroscopic loose body removal    Evaluation Date: 5/3/2023  Insurance Authorization Period Expiration: 12/31/23  Plan of Care Expiration: 12 weeks; 7/28/2023  Date of Return to MD: 7/3/2023  Visit # / Visits authorized: 24 / 40  FOTO: (date and score)     Precautions:  Standard     Time In:     09:00 AM   Time Out:    09:45 AM     Total Appointment Time (timed & untimed codes): 45 minutes       SUBJECTIVE     Pt reports: "I'm doing ok"   She was compliant with home exercise program given .   Response to previous treatment: increased composite fist, decreased edema   Functional change:  able to sultana/doff bra and fasten behind back     Pain: 0/10  Location: right hand     OBJECTIVE   Objective Measures updated at progress report unless specified.    Observation/Appearance: mod edema in hand      Edema. Measured in centimeters.    5/9/2023 5/9/2023     Left Right   2in. Above elbow       2in. Below elbow       Wrist Crease 14.0 cm  16.0 cm    Figure of 8       MCPs 18.4.0 cm  20.5 cm            Shoulder ROM. Measured in degrees    5/3/2022 5/3/2023 5/11/2023 6/9/2023 6/30/2023 6/30/2023 7/25/23     Left Right PROM Right PROM Right AROM  " Right AROM Right PROM R AROM   Shoulder Flexion  WFL 85 degrees  90 90 115 125 110   Shoulder Abduction  WFL NT 80 90 90 95 100   Shoulder Extension    NT NT     50   Shoulder IR   NT NT L5   37, L5   Shoulder ER  70 NT 20 65   60   Shoulder ER @0       48      Elbow and Wrist ROM. Measured in degrees.    5/9/2023 5/9/2023     Left Right   Elbow Ext/Flex WNL/WNL  12/135    Supination/Pronation WNL/WNL  66/75   Wrist Ext/Flex 55/70 39/6   Wrist RD/UD             Hand ROM. Measured in degrees.    5/9/2023 5/9/2023 6/22/2023      Left Right Right             Index: MP    68 67              PIP       50 91              DIP   30 40              PITTS   148             Long:  MP   65 78              PIP   58 80              DIP   62 55              PITTS   185             Ring:   MP   55 80              PIP   60 92              DIP   32 45              PITTS   147                      Strength (Dynamometer) and Pinch Strength (Pinch Gauge)  Measured in pounds.    6/9/2023 6/9/2023 7/7/2023 7/18/2023      Left Right Right Right   Rung II  41 20 25 21   Key Pinch         3pt Pinch         2pt Pinch               Manual Muscle Test    5/9/2023 5/9/2023     Left Right   Wrist Extension        Wrist Flexion       Radial Deviation       Ulnar Deviation       Supination       Pronation       Elbow Extension       Elbow Flexion             Limitation/Restriction for FOTO initial eval; shoulder Survey     Therapist reviewed FOTO scores for Laverne CELIS Kristel on 5/3/2023.   FOTO documents entered into [employer] - see Media section.     Limitation Score: needs to be taken%        Treatment     Leovidia received the treatments listed below:         Supervised modalities: MHP for 10 minutes to promote increased blood flow NT         Manual therapy techniques: Manual Lymphatic Drainage were applied to the: RUE for 15 minutes, including:  - passive ROM to flexion/scaption/abduction/ER/IR  x 10 reps each   - passive ROM LF   - horizontal  abduction with ER and IR holding for 10s x 10 reps each       Therapeutic exercises to develop ROM for 30  minutes, including:  - AAROM flexion/scaption with 4# dowel x 10 reps supine on mat (2 sets)  - sidelying abduction and ER, 2 x 15 reps  - isometric teres major/subscapularis x 10 reps each   - supine dynamic stabilization with 1#, alphabet x 1 set   - AAROM serratus punch with 2# dowel 2 x 10 reps   - light elbow PRE, elbow flexion palm up and FA in neutral 2 x 10 reps each, 1# NT   - RED theraband rows x 10 reps (3 sets)   - 4# dowel shoulder extension x 10 reps (2 sets)  - yellow theraband shoulder extension x 10 reps (3 sets)   - yellow theraband shoulder ER/IR x 15 reps (2 sets) each         Neuro re-ed: 10 min NT   - rice bin (6 min)   - in hand manipulation with marbles         Patient Education and Home Exercises      Education provided:   - cont HEP  - Progress towards goals       Written Home Exercises Provided: Patient instructed to cont prior HEP.  Exercises were reviewed and Laverne was able to demonstrate them prior to the end of the session.  Laverne demonstrated good  understanding of the HEP provided. See EMR under Patient Instructions for exercises provided during therapy sessions.      ASSESSMENT     Pt tolerated tx fairly well today. Pt participated well and is motivated. She demonstrates some improvement in shoulder abd, and assessed other shoulder measurements today. She is compliant with HEP and reports the therabands are going well. She cont to have tightness at end range, especially with IR  but reports she feels it is improving.     Laverne is progressing fairly well towards her goals and there are no updates to goals at this time. Pt prognosis is fair.     Pt will continue to benefit from skilled outpatient occupational therapy to address the deficits listed in the problem list on initial evaluation, provide pt/family education and to maximize pt's level of independence in the home  and community environment.     Pt's spiritual, cultural and educational needs considered and pt agreeable to plan of care and goals.    Anticipated barriers to occupational therapy: preexisting conditions       Goals:  Long term goals:   Pt will achieve shoulder AROM in flexion/scaption/abduction ~120 degrees ---progressing  2.   Assess MMT when appropriate------progressing  3.   Pt will demo shoulder AROM WFL to perform daily and community activities ---progressing  4.   Pt will report improvement in FOTO score by measuring 20% points. ---progressing        Short term goals:   Pt will be complaint with HEP and pain management ---progressing  Pt will achieve shoulder PROM in flexion and scaption ~110 degrees ---progressing  Pt will report improvement in FOTO by decreasing 10% limitation points ---progressing  Pt will achieve shoulder AAROM WFL to aid in ADLs ---progressing  Pt will be able to demo full composite fist with R hand.---progressing    PLAN     Continue skilled occupational therapy with individualized plan of care focusing on improving functional independence with use of RUE    Updates/Grading for next session: cont per large massive type III protocol. Needs reassessment next session and updated POC    Melissa Landeche, OT [facility]  Occupational Therapy Treatment Note    Date: 7/28/2023  Name: Laverne CELIS [facility] Number: [id number]    Therapy Diagnosis:   Encounter Diagnoses   Name Primary?    Decreased range of motion of right shoulder Yes    Localized edema     Pain                  Physician: Luis Angel Wheeler MD    Physician Orders:Eval and treat;  s/p RCR/lymphedema treatments into hand  Medical Diagnosis: Z98.890 (ICD-10-CM) - S/P right rotator cuff repair  Surgical Procedure and Date: 3/21/2023,   1. Right shoulder Arthroscopic massive complex rotator cuff repair    2. Right shoulder Arthroscopic extensive  "debridement   3. Right shoulder Arthroscopic lysis of adhesions   4. Right shoulder Arthroscopic subacromial decompression and bursectomy   5. Right shoulder arthroscopic loose body removal    Evaluation Date: 5/3/2023  Insurance Authorization Period Expiration: 05/07/2023  Plan of Care Expiration: 12 weeks; 7/28/2023  Date of Return to MD: 7/3/2023  Visit # / Visits authorized: 24 / 40  FOTO: (date and score)     Precautions:  Standard     Time In:     07:55 AM   Time Out:      08:55   AM     Total Appointment Time (timed & untimed codes): 60 minutes       SUBJECTIVE     Pt reports: "I do everything." Pt reports she did not wear the glove on her hand over the weekend, and still has some swelling in her hand.   She was compliant with home exercise program given last session.   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 Reaching easier and opening things easier    Pain: 0/10  Location: right hand     OBJECTIVE   Objective Measures updated at progress report unless specified.    Observation/Appearance: mod edema in hand      Edema. Measured in centimeters.    5/9/2023 5/9/2023     Left Right   2in. Above elbow       2in. Below elbow       Wrist Crease 14.0 cm  16.0 cm    Figure of 8       MCPs 18.4.0 cm  20.5 cm               Elbow and Wrist ROM. Measured in degrees.    5/9/2023 5/9/2023     Left Right   Elbow Ext/Flex WNL/WNL  12/135    Supination/Pronation WNL/WNL  66/75   Wrist Ext/Flex 55/70 39/6   Wrist RD/UD             Hand ROM. Measured in degrees.    5/9/2023 5/9/2023 6/22/2023      Left Right Right             Index: MP    68 67              PIP       50 91              DIP   30 40              PITTS   148             Long:  MP   65 78              PIP   58 80              DIP   62 55              PITTS   185             Ring:   MP   55 80              PIP   60 92              DIP   32 45              PITTS   147          "             Strength (Dynamometer) and Pinch Strength (Pinch Gauge)  Measured in pounds.    6/9/2023 6/9/2023 7/7/2023 7/18/2023      Left Right Right Right   Rung II  41 20 25 21   Key Pinch         3pt Pinch         2pt Pinch               Manual Muscle Test    5/9/2023 5/9/2023     Left Right   Wrist Extension        Wrist Flexion       Radial Deviation       Ulnar Deviation       Supination       Pronation       Elbow Extension       Elbow Flexion             Limitation/Restriction for FOTO initial eval; shoulder Survey     Therapist reviewed FOTO scores for Laverne Humphries on 5/3/2023.   FOTO documents entered into [employer] - see Media section.     Limitation Score: needs to be taken%        Treatment     Leovidia received the treatments listed below:         Supervised modalities: MHP for 10 minutes to promote increased blood flow       Manual therapy techniques: Manual Lymphatic Drainage were applied to the: RUE for 10 minutes, including:  - passive ROM to flexion/scaption/abduction/ER/IR  x 10 reps each   - passive ROM LF         Therapeutic exercises to develop ROM for 35  minutes, including:  - AAROM flexion/scaption/abduction with 2# dowel x 10 reps each  incline on mat (2 sets)  - sidelying abduction and ER, 2 x 15 reps  - supine dynamic stabilization with 1#, alphabet x 1 set  - AAROM serratus punch with 2# dowel x 10 reps   - light elbow PRE, elbow flexion palm up and FA in neutral 2 x 10 reps each, 1# NT   - yellow theraband rows x 10 reps (3 sets) NT  - yellow theraband shoulder extension x 10 reps (3 sets) NT  - yellow theraband shoulder ER/IR x 10 reps (2 sets) each NT      Neuro re-ed: 10 min NT   - rice bin (6 min)   - in hand manipulation with marbles         Patient Education and Home Exercises      Education provided:   - added towel IR stretch   - Progress towards goals       Written Home Exercises Provided: Patient instructed to cont prior HEP.  Exercises were reviewed and Leovidia was  able to demonstrate them prior to the end of the session.  Laverne demonstrated good  understanding of the HEP provided. See EMR under Patient Instructions for exercises provided during therapy sessions.      ASSESSMENT     Pt tolerated tx fairly well today. Pt participated well and is motivated. She is compliant with HEP and reports the therabands are going well. She cont to have tightness at end range, especially with IR  but reports she feels less tightness overall today.     Laverne is progressing fairly well towards her goals and there are no updates to goals at this time. Pt prognosis is fair.     Pt will continue to benefit from skilled outpatient occupational therapy to address the deficits listed in the problem list on initial evaluation, provide pt/family education and to maximize pt's level of independence in the home and community environment.     Pt's spiritual, cultural and educational needs considered and pt agreeable to plan of care and goals.    Anticipated barriers to occupational therapy: preexisting conditions       Goals:  Long term goals:   Pt will achieve shoulder AROM in flexion/scaption/abduction ~120 degrees   2.   Assess MMT when appropriate  3.   Pt will demo shoulder AROM WFL to perform daily and community activities   4.   Pt will report improvement in FOTO score by measuring 20% points.         Short term goals:   Pt will be complaint with HEP and pain management   Pt will achieve shoulder PROM in flexion and scaption ~110 degrees   Pt will report improvement in FOTO by decreasing 10% limitation points   Pt will achieve shoulder AAROM WFL to aid in ADLs   Pt will be able to demo full composite fist with R hand.    PLAN     Continue skilled occupational therapy with individualized plan of care focusing on improving functional independence with use of RUE    Updates/Grading for next session: cont per large massive type III protocol.     Shaunna Stevenson OT

## 2023-08-01 ENCOUNTER — CLINICAL SUPPORT (OUTPATIENT)
Dept: REHABILITATION | Facility: HOSPITAL | Age: 83
End: 2023-08-01
Payer: MEDICARE

## 2023-08-01 DIAGNOSIS — R52 PAIN: ICD-10-CM

## 2023-08-01 DIAGNOSIS — M25.611 DECREASED RANGE OF MOTION OF RIGHT SHOULDER: Primary | ICD-10-CM

## 2023-08-01 DIAGNOSIS — R60.0 LOCALIZED EDEMA: ICD-10-CM

## 2023-08-01 PROCEDURE — 97140 MANUAL THERAPY 1/> REGIONS: CPT

## 2023-08-01 PROCEDURE — 97110 THERAPEUTIC EXERCISES: CPT

## 2023-08-01 NOTE — PROGRESS NOTES
"      [facility]  Occupational Therapy Treatment Note    Date: 8/1/2023  Name: Laverne CELIS Taylor  [facility] Number: [id number]    Therapy Diagnosis:   Encounter Diagnoses   Name Primary?    Decreased range of motion of right shoulder Yes    Localized edema     Pain          Physician: Luis Angel Wheeler MD    Physician Orders:Eval and treat;  s/p RCR/lymphedema treatments into hand  Medical Diagnosis: Z98.890 (ICD-10-CM) - S/P right rotator cuff repair  Surgical Procedure and Date: 3/21/2023,   1. Right shoulder Arthroscopic massive complex rotator cuff repair    2. Right shoulder Arthroscopic extensive debridement   3. Right shoulder Arthroscopic lysis of adhesions   4. Right shoulder Arthroscopic subacromial decompression and bursectomy   5. Right shoulder arthroscopic loose body removal    Evaluation Date: 5/3/2023  Insurance Authorization Period Expiration: 12/31/23  Plan of Care Expiration: 12 weeks; 7/28/2023  Date of Return to MD: 7/3/2023  Visit # / Visits authorized: 24 / 40  FOTO: (date and score)     Precautions:  Standard     Time In:     09:00 AM   Time Out:    09:45 AM     Total Appointment Time (timed & untimed codes): 45 minutes       SUBJECTIVE     Pt reports: "I'm doing ok"   She was compliant with home exercise program given .   Response to previous treatment: increased composite fist, decreased edema   Functional change:  able to sultana/doff bra and fasten behind back     Pain: 0/10  Location: right hand     OBJECTIVE   Objective Measures updated at progress report unless specified.    Observation/Appearance: mod edema in hand      Edema. Measured in centimeters.    5/9/2023 5/9/2023     Left Right   2in. Above elbow       2in. Below elbow       Wrist Crease 14.0 cm  16.0 cm    Figure of 8       MCPs 18.4.0 cm  20.5 cm            Shoulder ROM. Measured in degrees    5/3/2022 5/3/2023 5/11/2023 6/9/2023 6/30/2023 6/30/2023 7/25/23     Left Right PROM Right PROM Right AROM  " Right AROM Right PROM R AROM   Shoulder Flexion  WFL 85 degrees  90 90 115 125 110   Shoulder Abduction  WFL NT 80 90 90 95 100   Shoulder Extension    NT NT     50   Shoulder IR   NT NT L5   37, L5   Shoulder ER  70 NT 20 65   60   Shoulder ER @0       48      Elbow and Wrist ROM. Measured in degrees.    5/9/2023 5/9/2023     Left Right   Elbow Ext/Flex WNL/WNL  12/135    Supination/Pronation WNL/WNL  66/75   Wrist Ext/Flex 55/70 39/6   Wrist RD/UD             Hand ROM. Measured in degrees.    5/9/2023 5/9/2023 6/22/2023      Left Right Right             Index: MP    68 67              PIP       50 91              DIP   30 40              PITTS   148 198            Long:  MP   65 78              PIP   58 80              DIP   62 55              PITTS   185 213            Ring:   MP   55 80              PIP   60 92              DIP   32 45              PITTS   147 217                     Strength (Dynamometer) and Pinch Strength (Pinch Gauge)  Measured in pounds.    6/9/2023 6/9/2023 7/7/2023 7/18/2023      Left Right Right Right   Rung II  41 20 25 21   Key Pinch         3pt Pinch         2pt Pinch               Manual Muscle Test    5/9/2023 5/9/2023     Left Right   Wrist Extension        Wrist Flexion       Radial Deviation       Ulnar Deviation       Supination       Pronation       Elbow Extension       Elbow Flexion             Limitation/Restriction for FOTO initial eval; shoulder Survey     Therapist reviewed FOTO scores for Laverne CELIS Austin on 5/3/2023.   FOTO documents entered into [employer] - see Media section.     Limitation Score: needs to be taken%        Treatment     Leovidia received the treatments listed below:         Supervised modalities: MHP for 10 minutes to promote increased blood flow NT         Manual therapy techniques: Manual Lymphatic Drainage were applied to the: RUE for 15 minutes, including:  - passive ROM to flexion/scaption/abduction/ER/IR  x 10 reps each   - passive ROM LF   -  horizontal abduction with ER and IR holding for 10s x 10 reps each         Therapeutic exercises to develop ROM for 30  minutes, including:  - AAROM flexion/scaption with 4# dowel x 10 reps supine on mat (2 sets)  - sidelying abduction and ER, 2 x 15 reps  - isometric teres major/subscapularis x 10 reps each (2 sets)  - supine dynamic stabilization with 1#, alphabet x 1 set   - AAROM serratus punch with 2# dowel 2 x 10 reps   - light elbow PRE, elbow flexion palm up and FA in neutral 2 x 10 reps each, 1# NT   - RED theraband rows x 10 reps (3 sets)   - 4# dowel shoulder extension x 10 reps (2 sets)  - yellow theraband shoulder extension x 10 reps (3 sets)   - yellow theraband shoulder ER/IR x 15 reps (2 sets) each                 Patient Education and Home Exercises      Education provided:   - cont HEP  - Progress towards goals       Written Home Exercises Provided: Patient instructed to cont prior HEP.  Exercises were reviewed and Laverne was able to demonstrate them prior to the end of the session.  Laverne demonstrated good  understanding of the HEP provided. See EMR under Patient Instructions for exercises provided during therapy sessions.      ASSESSMENT     Pt tolerated tx fairly well today. Pt participated well and is motivated. She demonstrates some improvement in shoulder abd, and assessed other shoulder measurements today. She is compliant with HEP and reports the therabands are going well. She cont to have tightness at end range, especially with IR  but reports she feels it is improving.       Laverne is progressing fairly well towards her goals and there are no updates to goals at this time. Pt prognosis is fair.     Pt will continue to benefit from skilled outpatient occupational therapy to address the deficits listed in the problem list on initial evaluation, provide pt/family education and to maximize pt's level of independence in the home and community environment.     Pt's spiritual, cultural  and educational needs considered and pt agreeable to plan of care and goals.    Anticipated barriers to occupational therapy: preexisting conditions       Goals:  Long term goals:   Pt will achieve shoulder AROM in flexion/scaption/abduction ~120 degrees ---progressing  2.   Assess MMT when appropriate------progressing  3.   Pt will demo shoulder AROM WFL to perform daily and community activities ---progressing  4.   Pt will report improvement in FOTO score by measuring 20% points. ---progressing        Short term goals:   Pt will be complaint with HEP and pain management ---progressing  Pt will achieve shoulder PROM in flexion and scaption ~110 degrees ---progressing  Pt will report improvement in FOTO by decreasing 10% limitation points ---progressing  Pt will achieve shoulder AAROM WFL to aid in ADLs ---progressing  Pt will be able to demo full composite fist with R hand.---progressing    PLAN     Continue skilled occupational therapy with individualized plan of care focusing on improving functional independence with use of RUE    Updates/Grading for next session: cont per large massive type III protocol. Needs reassessment next session and updated POC    Melissa Landeche, OT [facility]  Occupational Therapy Treatment Note    Date: 8/1/2023  Name: Laverne CELIS [facility] Number: [id number]    Therapy Diagnosis:   Encounter Diagnoses   Name Primary?    Decreased range of motion of right shoulder Yes    Localized edema     Pain                    Physician: Luis Angel Wheeler MD    Physician Orders:Eval and treat;  s/p RCR/lymphedema treatments into hand  Medical Diagnosis: Z98.890 (ICD-10-CM) - S/P right rotator cuff repair  Surgical Procedure and Date: 3/21/2023,   1. Right shoulder Arthroscopic massive complex rotator cuff repair    2. Right shoulder Arthroscopic extensive debridement   3. Right shoulder Arthroscopic lysis of  "adhesions   4. Right shoulder Arthroscopic subacromial decompression and bursectomy   5. Right shoulder arthroscopic loose body removal    Evaluation Date: 5/3/2023  Insurance Authorization Period Expiration: 05/07/2023  Plan of Care Expiration: 12 weeks; 7/28/2023  Date of Return to MD: 7/3/2023  Visit # / Visits authorized: 24 / 40  FOTO: (date and score)     Precautions:  Standard     Time In:     07:55 AM   Time Out:      08:55   AM     Total Appointment Time (timed & untimed codes): 60 minutes       SUBJECTIVE     Pt reports: "I do everything." Pt reports she did not wear the glove on her hand over the weekend, and still has some swelling in her hand.   She was compliant with home exercise program given last session.   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 Reaching easier and opening things easier    Pain: 0/10  Location: right hand     OBJECTIVE   Objective Measures updated at progress report unless specified.    Observation/Appearance: mod edema in hand      Edema. Measured in centimeters.    5/9/2023 5/9/2023     Left Right   2in. Above elbow       2in. Below elbow       Wrist Crease 14.0 cm  16.0 cm    Figure of 8       MCPs 18.4.0 cm  20.5 cm               Elbow and Wrist ROM. Measured in degrees.    5/9/2023 5/9/2023     Left Right   Elbow Ext/Flex WNL/WNL  12/135    Supination/Pronation WNL/WNL  66/75   Wrist Ext/Flex 55/70 39/6   Wrist RD/UD             Hand ROM. Measured in degrees.    5/9/2023 5/9/2023 6/22/2023      Left Right Right             Index: MP    68 67              PIP       50 91              DIP   30 40              PITTS   148             Long:  MP   65 78              PIP   58 80              DIP   62 55              PITTS   185             Ring:   MP   55 80              PIP   60 92              DIP   32 45              PITTS   147                      Strength (Dynamometer) and Pinch " Strength (Pinch Gauge)  Measured in pounds.    6/9/2023 6/9/2023 7/7/2023 7/18/2023      Left Right Right Right   Rung II  41 20 25 21   Key Pinch         3pt Pinch         2pt Pinch               Manual Muscle Test    5/9/2023 5/9/2023     Left Right   Wrist Extension        Wrist Flexion       Radial Deviation       Ulnar Deviation       Supination       Pronation       Elbow Extension       Elbow Flexion             Limitation/Restriction for FOTO initial eval; shoulder Survey     Therapist reviewed FOTO scores for Laverne Humphries on 5/3/2023.   FOTO documents entered into [employer] - see Media section.     Limitation Score: needs to be taken%        Treatment     Laverne received the treatments listed below:         Supervised modalities: MHP for 10 minutes to promote increased blood flow       Manual therapy techniques: Manual Lymphatic Drainage were applied to the: RUE for 10 minutes, including:  - passive ROM to flexion/scaption/abduction/ER/IR  x 10 reps each   - passive ROM LF         Therapeutic exercises to develop ROM for 35  minutes, including:  - AAROM flexion/scaption/abduction with 2# dowel x 10 reps each  incline on mat (2 sets)  - sidelying abduction and ER, 2 x 15 reps  - supine dynamic stabilization with 1#, alphabet x 1 set  - AAROM serratus punch with 2# dowel x 10 reps   - light elbow PRE, elbow flexion palm up and FA in neutral 2 x 10 reps each, 1# NT   - yellow theraband rows x 10 reps (3 sets) NT  - yellow theraband shoulder extension x 10 reps (3 sets) NT  - yellow theraband shoulder ER/IR x 10 reps (2 sets) each NT      Neuro re-ed: 10 min NT   - rice bin (6 min)   - in hand manipulation with marbles         Patient Education and Home Exercises      Education provided:   - added towel IR stretch   - Progress towards goals       Written Home Exercises Provided: Patient instructed to cont prior HEP.  Exercises were reviewed and Laverne was able to demonstrate them prior to the end of the  session.  Laverne demonstrated good  understanding of the HEP provided. See EMR under Patient Instructions for exercises provided during therapy sessions.      ASSESSMENT     Pt tolerated tx fairly well today. Pt participated well and is motivated. She is compliant with HEP and reports the therabands are going well. She cont to have tightness at end range, especially with IR  but reports she feels less tightness overall today.     Laverne is progressing fairly well towards her goals and there are no updates to goals at this time. Pt prognosis is fair.     Pt will continue to benefit from skilled outpatient occupational therapy to address the deficits listed in the problem list on initial evaluation, provide pt/family education and to maximize pt's level of independence in the home and community environment.     Pt's spiritual, cultural and educational needs considered and pt agreeable to plan of care and goals.    Anticipated barriers to occupational therapy: preexisting conditions       Goals:  Long term goals:   Pt will achieve shoulder AROM in flexion/scaption/abduction ~120 degrees   2.   Assess MMT when appropriate  3.   Pt will demo shoulder AROM WFL to perform daily and community activities   4.   Pt will report improvement in FOTO score by measuring 20% points.         Short term goals:   Pt will be complaint with HEP and pain management   Pt will achieve shoulder PROM in flexion and scaption ~110 degrees   Pt will report improvement in FOTO by decreasing 10% limitation points   Pt will achieve shoulder AAROM WFL to aid in ADLs   Pt will be able to demo full composite fist with R hand.    PLAN     Continue skilled occupational therapy with individualized plan of care focusing on improving functional independence with use of RUE    Updates/Grading for next session: cont per large massive type III protocol.     Shaunna Stevenson OT

## 2023-08-03 ENCOUNTER — CLINICAL SUPPORT (OUTPATIENT)
Dept: REHABILITATION | Facility: HOSPITAL | Age: 83
End: 2023-08-03
Payer: MEDICARE

## 2023-08-03 DIAGNOSIS — R52 PAIN: ICD-10-CM

## 2023-08-03 DIAGNOSIS — R60.0 LOCALIZED EDEMA: ICD-10-CM

## 2023-08-03 DIAGNOSIS — M25.611 DECREASED RANGE OF MOTION OF RIGHT SHOULDER: Primary | ICD-10-CM

## 2023-08-03 PROCEDURE — 97140 MANUAL THERAPY 1/> REGIONS: CPT

## 2023-08-03 PROCEDURE — 97110 THERAPEUTIC EXERCISES: CPT

## 2023-08-03 NOTE — PROGRESS NOTES
[facility]  Occupational Therapy Treatment Note    Date: 8/3/2023  Name: Laverne Humphries  Clinic Number: [id number]    Therapy Diagnosis:   Encounter Diagnoses   Name Primary?    Decreased range of motion of right shoulder Yes    Localized edema     Pain          Physician: Luis Angel Wheeler MD    Physician Orders:Eval and treat;  s/p RCR/lymphedema treatments into hand  Medical Diagnosis: Z98.890 (ICD-10-CM) - S/P right rotator cuff repair  Surgical Procedure and Date: 3/21/2023,   1. Right shoulder Arthroscopic massive complex rotator cuff repair    2. Right shoulder Arthroscopic extensive debridement   3. Right shoulder Arthroscopic lysis of adhesions   4. Right shoulder Arthroscopic subacromial decompression and bursectomy   5. Right shoulder arthroscopic loose body removal    Evaluation Date: 5/3/2023  Insurance Authorization Period Expiration: 12/31/23  Plan of Care Expiration: 12 weeks; 7/28/2023  Date of Return to MD: 7/3/2023  Visit # / Visits authorized: 28 / 40  FOTO: (date and score)     Precautions:  Standard       Time In:     09:00 AM   Time Out:    09:45 AM     Total Appointment Time (timed & untimed codes): 45 minutes       SUBJECTIVE     Pt reports: she is having more difficulty with her hand compared to her shoulder   She was compliant with home exercise program given .   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    Observation/Appearance: mod edema in hand        Edema. Measured in centimeters.    5/9/2023 5/9/2023 8/3/2023 8/3/2023     Left Right Left  Right                            Wrist Crease 14.0 cm  16.0 cm               MCPs 18.4.0 cm  20.5 cm 18.0 cm  19.0 cm                  8/7/2023 IF LF RF SF    Right        P1 6.5 6.1 5.7 5.0    PIP 5.3 5.2 4.6 4.2     P2 4.6 4.3 3.8 3.5    Left        P1 5.3 5.0 4.7 4.4    PIP 4.6 4.6      P2             Shoulder ROM. Measured in degrees    5/3/2022 5/3/2023 5/11/2023 6/9/2023 6/30/2023 6/30/2023 7/25/23     Left Right PROM Right PROM Right AROM  Right AROM Right PROM R AROM   Shoulder Flexion  WFL 85 degrees  90 90 115 125 110   Shoulder Abduction  WFL NT 80 90 90 95 100   Shoulder Extension    NT NT     50   Shoulder IR   NT NT L5   37, L5   Shoulder ER  70 NT 20 65   60   Shoulder ER @0       48        Elbow and Wrist ROM. Measured in degrees.    5/9/2023 5/9/2023     Left Right   Elbow Ext/Flex WNL/WNL  12/135    Supination/Pronation WNL/WNL  66/75   Wrist Ext/Flex 55/70 39/6   Wrist RD/UD             Hand ROM. Measured in degrees.    5/9/2023 5/9/2023 6/22/2023      Left Right Right             Index: MP    68 67              PIP       50 91              DIP   30 40              PITTS   148             Long:  MP   65 78              PIP   58 80              DIP   62 55              PITTS   185             Ring:   MP   55 80              PIP   60 92              DIP   32 45              PITTS   147                      Strength (Dynamometer) and Pinch Strength (Pinch Gauge)  Measured in pounds.    6/9/2023 6/9/2023 7/7/2023 7/18/2023      Left Right Right Right   Rung II  41 20 25 21   Key Pinch         3pt Pinch         2pt Pinch               Manual Muscle Test    5/9/2023 5/9/2023     Left Right   Wrist Extension        Wrist Flexion       Radial Deviation       Ulnar Deviation       Supination       Pronation       Elbow Extension       Elbow Flexion             Limitation/Restriction for FOTO initial eval; shoulder Survey     Therapist reviewed FOTO scores for Laverne CELIS Harris on 5/3/2023.   FOTO documents entered into [employer] - see Media section.     Limitation Score: needs to be taken%        Treatment     Leboodia received the treatments listed below:         Supervised modalities: MHP R shoulder  for 10 minutes to  promote increased blood flow          Manual therapy techniques: Manual Lymphatic Drainage were applied to the: RUE for 25 minutes, including:  - lymphatouch ~ 15 min 175 mmHg negative pressure (pulsation or continuous) Vibration 40 Hz (over volar MCPs/A1 Pulley) and then continuous 175 mm Hg over digit Hz 90  mm Hg  - passive ROM to flexion/scaption/abduction/ER/IR  x 10 reps each   - passive ROM LF (hook grasp) x 10 reps   - passive D1 x 10 reps       Therapeutic exercises to develop ROM for 15 minutes, including:   - isometric teres major/subscapularis with RED theraband x 10 reps each (2 sets)   - hook grasp MP blocking x 10 reps (2 sets)               Not performed today:   - AAROM flexion/scaption with 4# dowel x 10 reps supine on mat (2 sets)  - sidelying abduction and ER, 2 x 15 reps  - supine dynamic stabilization with 1#, alphabet x 1 set   - AAROM serratus punch with 2# dowel 2 x 10 reps   - light elbow PRE, elbow flexion palm up and FA in neutral 2 x 10 reps each, 1# NT   - RED theraband rows x 10 reps (3 sets)   - 4# dowel shoulder extension x 10 reps (2 sets)  - yellow theraband shoulder extension x 10 reps (3 sets)   - yellow theraband shoulder ER/IR x 15 reps (2 sets) each         Neuro re-ed: 10 min NT   - rice bin (6 min)   - in hand manipulation with marbles         Patient Education and Home Exercises      Education provided:   - cont HEP  - Progress towards goals       Written Home Exercises Provided: Patient instructed to cont prior HEP.  Exercises were reviewed and Laverne was able to demonstrate them prior to the end of the session.  Laverne demonstrated good  understanding of the HEP provided. See EMR under Patient Instructions for exercises provided during therapy sessions.      ASSESSMENT     Tx more focused on her hand today.  Demonstrated decrease in digit edema by 1/2 cm following manual today. Verbalized decreased pain when making a composite fist.    Pt participated well and  is motivated.     Laverne is progressing fairly well towards her goals and there are no updates to goals at this time. Pt prognosis is fair.     Pt will continue to benefit from skilled outpatient occupational therapy to address the deficits listed in the problem list on initial evaluation, provide pt/family education and to maximize pt's level of independence in the home and community environment.     Pt's spiritual, cultural and educational needs considered and pt agreeable to plan of care and goals.    Anticipated barriers to occupational therapy: preexisting conditions       Goals:  Long term goals:   Pt will achieve shoulder AROM in flexion/scaption/abduction ~120 degrees ---progressing  2.   Assess MMT when appropriate------progressing  3.   Pt will demo shoulder AROM WFL to perform daily and community activities ---progressing  4.   Pt will report improvement in FOTO score by measuring 20% points. ---progressing        Short term goals:   Pt will be complaint with HEP and pain management ---progressing  Pt will achieve shoulder PROM in flexion and scaption ~110 degrees ---progressing  Pt will report improvement in FOTO by decreasing 10% limitation points ---progressing  Pt will achieve shoulder AAROM WFL to aid in ADLs ---progressing  Pt will be able to demo full composite fist with R hand.---progressing    PLAN     Continue skilled occupational therapy with individualized plan of care focusing on improving functional independence with use of RUE    Updates/Grading for next session: cont per large massive type III protocol. Needs reassessment next session and updated POC    Melissa Landeche, OT [facility]  Occupational Therapy Treatment Note    Date: 8/3/2023  Name: Laverne CELIS [facility] Number: [id number]    Therapy Diagnosis:   Encounter Diagnoses   Name Primary?    Decreased range of motion of right shoulder Yes     "Localized edema     Pain                    Physician: Luis Angel Wheeler MD    Physician Orders:Eval and treat;  s/p RCR/lymphedema treatments into hand  Medical Diagnosis: Z98.890 (ICD-10-CM) - S/P right rotator cuff repair  Surgical Procedure and Date: 3/21/2023,   1. Right shoulder Arthroscopic massive complex rotator cuff repair    2. Right shoulder Arthroscopic extensive debridement   3. Right shoulder Arthroscopic lysis of adhesions   4. Right shoulder Arthroscopic subacromial decompression and bursectomy   5. Right shoulder arthroscopic loose body removal    Evaluation Date: 5/3/2023  Insurance Authorization Period Expiration: 05/07/2023  Plan of Care Expiration: 12 weeks; 7/28/2023  Date of Return to MD: 7/3/2023  Visit # / Visits authorized: 24 / 40  FOTO: (date and score)     Precautions:  Standard     Time In:     07:55 AM   Time Out:      08:55   AM     Total Appointment Time (timed & untimed codes): 60 minutes       SUBJECTIVE     Pt reports: "I do everything." Pt reports she did not wear the glove on her hand over the weekend, and still has some swelling in her hand.   She was compliant with home exercise program given last session.   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 Reaching easier and opening things easier    Pain: 0/10  Location: right hand     OBJECTIVE   Objective Measures updated at progress report unless specified.    Observation/Appearance: mod edema in hand      Edema. Measured in centimeters.    5/9/2023 5/9/2023     Left Right   2in. Above elbow       2in. Below elbow       Wrist Crease 14.0 cm  16.0 cm    Figure of 8       MCPs 18.4.0 cm  20.5 cm               Elbow and Wrist ROM. Measured in degrees.    5/9/2023 5/9/2023     Left Right   Elbow Ext/Flex WNL/WNL  12/135    Supination/Pronation WNL/WNL  66/75   Wrist Ext/Flex 55/70 39/6   Wrist RD/UD             Hand ROM. Measured in " degrees.    5/9/2023 5/9/2023 6/22/2023      Left Right Right             Index: MP    68 67              PIP       50 91              DIP   30 40              PITTS   148             Long:  MP   65 78              PIP   58 80              DIP   62 55              PITTS   185             Ring:   MP   55 80              PIP   60 92              DIP   32 45              PITTS   147                      Strength (Dynamometer) and Pinch Strength (Pinch Gauge)  Measured in pounds.    6/9/2023 6/9/2023 7/7/2023 7/18/2023      Left Right Right Right   Rung II  41 20 25 21   Key Pinch         3pt Pinch         2pt Pinch               Manual Muscle Test    5/9/2023 5/9/2023     Left Right   Wrist Extension        Wrist Flexion       Radial Deviation       Ulnar Deviation       Supination       Pronation       Elbow Extension       Elbow Flexion             Limitation/Restriction for FOTO initial eval; shoulder Survey     Therapist reviewed FOTO scores for Laverne Humphries on 5/3/2023.   FOTO documents entered into [employer] - see Media section.     Limitation Score: needs to be taken%        Treatment     Laverne received the treatments listed below:         Supervised modalities: MHP for 10 minutes to promote increased blood flow       Manual therapy techniques: Manual Lymphatic Drainage were applied to the: RUE for 10 minutes, including:  - passive ROM to flexion/scaption/abduction/ER/IR  x 10 reps each   - passive ROM LF         Therapeutic exercises to develop ROM for 35  minutes, including:  - AAROM flexion/scaption/abduction with 2# dowel x 10 reps each  incline on mat (2 sets)  - sidelying abduction and ER, 2 x 15 reps  - supine dynamic stabilization with 1#, alphabet x 1 set  - AAROM serratus punch with 2# dowel x 10 reps   - light elbow PRE, elbow flexion palm up and FA in neutral 2 x 10 reps each, 1# NT   - yellow theraband rows x 10 reps (3 sets) NT  - yellow theraband shoulder extension x 10 reps (3 sets) NT  -  yellow theraband shoulder ER/IR x 10 reps (2 sets) each NT      Neuro re-ed: 10 min NT   - rice bin (6 min)   - in hand manipulation with marbles         Patient Education and Home Exercises      Education provided:   - added towel IR stretch   - Progress towards goals       Written Home Exercises Provided: Patient instructed to cont prior HEP.  Exercises were reviewed and Betoj[employer] was able to demonstrate them prior to the end of the session.  Betojaylinmable demonstrated good  understanding of the HEP provided. See EMR under Patient Instructions for exercises provided during therapy sessions.      ASSESSMENT     Pt tolerated tx fairly well today. Pt participated well and is motivated. She is compliant with HEP and reports the therabands are going well. She cont to have tightness at end range, especially with IR  but reports she feels less tightness overall today.     Laverne is progressing fairly well towards her goals and there are no updates to goals at this time. Pt prognosis is fair.     Pt will continue to benefit from skilled outpatient occupational therapy to address the deficits listed in the problem list on initial evaluation, provide pt/family education and to maximize pt's level of independence in the home and community environment.     Pt's spiritual, cultural and educational needs considered and pt agreeable to plan of care and goals.    Anticipated barriers to occupational therapy: preexisting conditions       Goals:  Long term goals:   Pt will achieve shoulder AROM in flexion/scaption/abduction ~120 degrees   2.   Assess MMT when appropriate  3.   Pt will demo shoulder AROM WFL to perform daily and community activities   4.   Pt will report improvement in FOTO score by measuring 20% points.         Short term goals:   Pt will be complaint with HEP and pain management   Pt will achieve shoulder PROM in flexion and scaption ~110 degrees   Pt will report improvement in FOTO by decreasing 10% limitation points    Pt will achieve shoulder AAROM WFL to aid in ADLs   Pt will be able to demo full composite fist with R hand.    PLAN     Continue skilled occupational therapy with individualized plan of care focusing on improving functional independence with use of RUE    Updates/Grading for next session: cont per large massive type III protocol.     Shaunna Stevenson, OT

## 2023-08-07 ENCOUNTER — CLINICAL SUPPORT (OUTPATIENT)
Dept: REHABILITATION | Facility: HOSPITAL | Age: 83
End: 2023-08-07
Payer: MEDICARE

## 2023-08-07 DIAGNOSIS — R52 PAIN: ICD-10-CM

## 2023-08-07 DIAGNOSIS — R60.0 LOCALIZED EDEMA: ICD-10-CM

## 2023-08-07 DIAGNOSIS — M79.641 HAND PAIN, RIGHT: ICD-10-CM

## 2023-08-07 DIAGNOSIS — M25.611 DECREASED RANGE OF MOTION OF RIGHT SHOULDER: Primary | ICD-10-CM

## 2023-08-07 PROCEDURE — 97110 THERAPEUTIC EXERCISES: CPT

## 2023-08-07 PROCEDURE — 97140 MANUAL THERAPY 1/> REGIONS: CPT

## 2023-08-07 NOTE — PROGRESS NOTES
[facility]  Occupational Therapy Treatment Note    Date: 8/7/2023  Name: Laverne Humphries  Clinic Number: [id number]    Therapy Diagnosis:   Encounter Diagnoses   Name Primary?    Decreased range of motion of right shoulder Yes    Localized edema     Pain     Hand pain, right            Physician: Luis Angel Wheeler MD    Physician Orders:Eval and treat;  s/p RCR/lymphedema treatments into hand  Medical Diagnosis: Z98.890 (ICD-10-CM) - S/P right rotator cuff repair  Surgical Procedure and Date: 3/21/2023,   1. Right shoulder Arthroscopic massive complex rotator cuff repair    2. Right shoulder Arthroscopic extensive debridement   3. Right shoulder Arthroscopic lysis of adhesions   4. Right shoulder Arthroscopic subacromial decompression and bursectomy   5. Right shoulder arthroscopic loose body removal    Evaluation Date: 5/3/2023  Insurance Authorization Period Expiration: 12/31/23  Plan of Care Expiration: 12 weeks; 7/28/2023  Date of Return to MD: 7/3/2023  Visit # / Visits authorized: 29 / 40  FOTO: (date and score)     Precautions:  Standard       Time In:  11:00  Time Out: 12:00     Total Appointment Time (timed & untimed codes): 60 minutes       SUBJECTIVE     Pt reports: swelling improved after last session with lymphatouch but refilled the next day.  She was compliant with home exercise program given .   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    Observation/Appearance: mod edema in digits, digit 3 greatest.        Edema. Measured in centimeters.    5/9/2023 5/9/2023 8/3/2023 8/3/2023     Left Right Left  Right                            Wrist Crease 14.0 cm  16.0 cm               MCPs 18.4.0 cm  20.5 cm 18.0 cm  19.0 cm                  8/7/2023 IF LF RF SF    Right        P1  6.5 6.1 5.7 5.0    PIP 5.3 5.2 4.6 4.2    P2 4.6 4.3 3.8 3.5    Left        P1 5.3 5.0 4.7 4.4    PIP 4.6 4.6      P2             Shoulder ROM. Measured in degrees    5/3/2022 5/3/2023 5/11/2023 6/9/2023 6/30/2023 6/30/2023 7/25/23     Left Right PROM Right PROM Right AROM  Right AROM Right PROM R AROM   Shoulder Flexion  WFL 85 degrees  90 90 115 125 110   Shoulder Abduction  WFL NT 80 90 90 95 100   Shoulder Extension    NT NT     50   Shoulder IR   NT NT L5   37, L5   Shoulder ER  70 NT 20 65   60   Shoulder ER @0       48        Elbow and Wrist ROM. Measured in degrees.    5/9/2023 5/9/2023     Left Right   Elbow Ext/Flex WNL/WNL  12/135    Supination/Pronation WNL/WNL  66/75   Wrist Ext/Flex 55/70 39/6   Wrist RD/UD             Hand ROM. Measured in degrees.    5/9/2023 5/9/2023 6/22/2023      Left Right Right             Index: MP    68 67              PIP       50 91              DIP   30 40              PITTS   148             Long:  MP   65 78              PIP   58 80              DIP   62 55              PITTS   185             Ring:   MP   55 80              PIP   60 92              DIP   32 45              PITTS   147                      Strength (Dynamometer) and Pinch Strength (Pinch Gauge)  Measured in pounds.    6/9/2023 6/9/2023 7/7/2023 7/18/2023      Left Right Right Right   Rung II  41 20 25 21   Key Pinch         3pt Pinch         2pt Pinch               Manual Muscle Test    5/9/2023 5/9/2023     Left Right   Wrist Extension        Wrist Flexion       Radial Deviation       Ulnar Deviation       Supination       Pronation       Elbow Extension       Elbow Flexion             Limitation/Restriction for FOTO initial eval; shoulder Survey     Therapist reviewed FOTO scores for Laverne Humphries on 5/3/2023.   FOTO documents entered into [employer] - see Media section.     Limitation Score: needs to be taken%        Treatment     Laverne received the treatments listed below:       Manual therapy  techniques: Manual Lymphatic Drainage were applied to the: RUE for 50 minutes, including:    Manual lymphatic drainage along cubital nodes, forearm, volar/dorsum of hand, return proximally.   - lymphatouch ~ 40 min 175 mmHg negative pressure (pulsation or continuous) Vibration 40 Hz (over volar MCPs/A1 Pulley) and then continuous 175 mm Hg over digit Hz 90  mm Hg. Targeted 3rd proximal phalange fluid collection area.       Therapeutic exercises to develop ROM for 10 minutes, including:   - hook grasp MP blocking 2 x 10  - hand pumps 3 x 10      Not performed today:   - AAROM flexion/scaption with 4# dowel x 10 reps supine on mat (2 sets)  - sidelying abduction and ER, 2 x 15 reps  - supine dynamic stabilization with 1#, alphabet x 1 set   - AAROM serratus punch with 2# dowel 2 x 10 reps   - light elbow PRE, elbow flexion palm up and FA in neutral 2 x 10 reps each, 1# NT   - RED theraband rows x 10 reps (3 sets)   - 4# dowel shoulder extension x 10 reps (2 sets)  - yellow theraband shoulder extension x 10 reps (3 sets)   - yellow theraband shoulder ER/IR x 15 reps (2 sets) each         Patient Education and Home Exercises      Education provided:   - cont HEP  - Progress towards goals       Written Home Exercises Provided: Patient instructed to cont prior HEP.  Exercises were reviewed and Laverne was able to demonstrate them prior to the end of the session.  Laverne demonstrated good  understanding of the HEP provided. See EMR under Patient Instructions for exercises provided during therapy sessions.      ASSESSMENT     Session focused on edema management. Targeted fluid collections at volar MCPs/A1 pulley and 3rd proximal phalange. Pt reported decreased digit extension when mobilizing edema from proximal phalange to MCPs but increased digit extension when mobilizing edema over MCP/A1 pulley region. Plan to target proximal fluid collection next session and assess tolerance/symptom relief.     Laverne is  progressing fairly well towards her goals and there are no updates to goals at this time. Pt prognosis is fair.     Pt will continue to benefit from skilled outpatient occupational therapy to address the deficits listed in the problem list on initial evaluation, provide pt/family education and to maximize pt's level of independence in the home and community environment.     Pt's spiritual, cultural and educational needs considered and pt agreeable to plan of care and goals.    Anticipated barriers to occupational therapy: preexisting conditions       Goals:  Long term goals:   Pt will achieve shoulder AROM in flexion/scaption/abduction ~120 degrees ---progressing  2.   Assess MMT when appropriate------progressing  3.   Pt will demo shoulder AROM WFL to perform daily and community activities ---progressing  4.   Pt will report improvement in FOTO score by measuring 20% points. ---progressing        Short term goals:   Pt will be complaint with HEP and pain management ---progressing  Pt will achieve shoulder PROM in flexion and scaption ~110 degrees ---progressing  Pt will report improvement in FOTO by decreasing 10% limitation points ---progressing  Pt will achieve shoulder AAROM WFL to aid in ADLs ---progressing  Pt will be able to demo full composite fist with R hand.---progressing    PLAN     Continue skilled occupational therapy with individualized plan of care focusing on improving functional independence with use of RUE    Updates/Grading for next session: cont per large massive type III protocol. Needs reassessment next session and updated POC    Hortensia Abarca, OT, CLWT

## 2023-08-08 ENCOUNTER — CLINICAL SUPPORT (OUTPATIENT)
Dept: REHABILITATION | Facility: HOSPITAL | Age: 83
End: 2023-08-08
Payer: MEDICARE

## 2023-08-08 DIAGNOSIS — M65.141 SUPPURATIVE TENOSYNOVITIS OF FLEXOR TENDON OF RIGHT HAND: Primary | ICD-10-CM

## 2023-08-08 DIAGNOSIS — R52 PAIN: ICD-10-CM

## 2023-08-08 DIAGNOSIS — M79.641 HAND PAIN, RIGHT: ICD-10-CM

## 2023-08-08 DIAGNOSIS — R60.0 LOCALIZED EDEMA: ICD-10-CM

## 2023-08-08 DIAGNOSIS — M89.09 SHOULDER-HAND SYNDROME: ICD-10-CM

## 2023-08-08 DIAGNOSIS — M25.611 DECREASED RANGE OF MOTION OF RIGHT SHOULDER: Primary | ICD-10-CM

## 2023-08-08 PROCEDURE — 97140 MANUAL THERAPY 1/> REGIONS: CPT

## 2023-08-08 PROCEDURE — 97110 THERAPEUTIC EXERCISES: CPT

## 2023-08-08 PROCEDURE — 97022 WHIRLPOOL THERAPY: CPT

## 2023-08-08 PROCEDURE — L3913 HFO W/O JOINTS CF: HCPCS

## 2023-08-08 NOTE — PROGRESS NOTES
"    [facility]  Occupational Therapy Treatment Note    Date: 8/8/2023  Name: Laverne CELIS [place]  [facility] Number: [id number]    Therapy Diagnosis:   Encounter Diagnoses   Name Primary?    Decreased range of motion of right shoulder Yes    Localized edema     Pain     Hand pain, right                      Physician: Luis Angel Wheeler MD    Physician Orders:Eval and treat;  s/p RCR/lymphedema treatments into hand  Medical Diagnosis: Z98.890 (ICD-10-CM) - S/P right rotator cuff repair  Surgical Procedure and Date: 3/21/2023,   1. Right shoulder Arthroscopic massive complex rotator cuff repair    2. Right shoulder Arthroscopic extensive debridement   3. Right shoulder Arthroscopic lysis of adhesions   4. Right shoulder Arthroscopic subacromial decompression and bursectomy   5. Right shoulder arthroscopic loose body removal    Evaluation Date: 5/3/2023  Insurance Authorization Period Expiration: 05/07/2023  Plan of Care Expiration: 12 weeks; 7/28/2023  Date of Return to MD: 7/3/2023  Visit # / Visits authorized: 24 / 40  FOTO: (date and score)     Precautions:  Standard     Time In:     08:05 AM   Time Out:      09:05   AM     Total Appointment Time (timed & untimed codes): 60 minutes       SUBJECTIVE     Pt reports: "Yesterday when I left here my swelling went down but last night my hand was more swollen and my shoulder was sore"   She was compliant with home exercise program given last session.   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 Reaching easier and opening things easier    Pain: 0/10  Location: right hand     OBJECTIVE   Objective Measures updated at progress report unless specified.    Observation/Appearance: mod edema in hand      Edema. Measured in centimeters.    5/9/2023 5/9/2023     Left Right   2in. Above elbow       2in. Below elbow       Wrist Crease 14.0 cm  16.0 cm    Figure of 8   "     MCPs 18.4.0 cm  20.5 cm               Elbow and Wrist ROM. Measured in degrees.    5/9/2023 5/9/2023     Left Right   Elbow Ext/Flex WNL/WNL  12/135    Supination/Pronation WNL/WNL  66/75   Wrist Ext/Flex 55/70 39/6   Wrist RD/UD             Hand ROM. Measured in degrees.    5/9/2023 5/9/2023 6/22/2023      Left Right Right             Index: MP    68 67              PIP       50 91              DIP   30 40              PITTS   148             Long:  MP   65 78              PIP   58 80              DIP   62 55              PITTS   185             Ring:   MP   55 80              PIP   60 92              DIP   32 45              PITTS   147                      Strength (Dynamometer) and Pinch Strength (Pinch Gauge)  Measured in pounds.    6/9/2023 6/9/2023 7/7/2023 7/18/2023      Left Right Right Right   Rung II  41 20 25 21   Key Pinch         3pt Pinch         2pt Pinch               Manual Muscle Test    5/9/2023 5/9/2023     Left Right   Wrist Extension        Wrist Flexion       Radial Deviation       Ulnar Deviation       Supination       Pronation       Elbow Extension       Elbow Flexion             Limitation/Restriction for FOTO initial eval; shoulder Survey     Therapist reviewed FOTO scores for Laverne Humphries on 5/3/2023.   FOTO documents entered into [employer] - see Media section.     Limitation Score: needs to be taken%        Treatment     Laverne received the treatments listed below:           Fluidotherapy: To R hand for 10 min, continuous air, 115 deg, air speed 50 to decrease pain, edema & scar tissue, sensory re- education, and increased tissue extensibility prior to therex  Supervised modalities: MHP for 10 minutes to promote increased blood flow           Manual therapy techniques: Manual Lymphatic Drainage were applied to the: RUE for 15 minutes, including:  - passive ROM LF (hook grasp)  - passive ROM LF extension with MP hyperextended for increased stretch   - STM using star tool palmar  fascia, focus near A1 and A3 pulley   - applied web KT tape to reduce edema in hand NT   - passive horizontal abduction with ER/IR x 10 reps each   - passive D1 pattern x 10 reps         Therapeutic exercises to develop ROM for 25 minutes, including:  - horizontal abduction, concentric  ER and IR with RED theraband x 10 reps each   - supine dynamic stabilization with 1#, alphabet x 1 set  - serratus punches with 2# x 10 reps (2 sets)   -  horizontal abduction, isometric ER and IR with Red theraband         Not performed today:   - AAROM flexion/scaption/abduction with 2# dowel x 10 reps each  incline on mat (2 sets)  - sidelying abduction and ER, 2 x 15 reps  - AAROM serratus punch with 2# dowel x 10 reps   - light elbow PRE, elbow flexion palm up and FA in neutral 2 x 10 reps each, 1# NT   - yellow theraband rows x 10 reps (3 sets) NT  - yellow theraband shoulder extension x 10 reps (3 sets) NT  - yellow theraband shoulder ER/IR x 10 reps (2 sets) each NT            Patient Education and Home Exercises      Education provided:   -   - Progress towards goals       Written Home Exercises Provided: Patient instructed to cont prior HEP.  Exercises were reviewed and Laverne was able to demonstrate them prior to the end of the session.  Laverne demonstrated good  understanding of the HEP provided. See EMR under Patient Instructions for exercises provided during therapy sessions.      ASSESSMENT     Cont to have good progress with shoulder. Slight decrease in digit edema compared to next session. Based on observation increased DIP flexion with composite fist following tx.       Betojaylinmable is progressing fairly well towards her goals and there are no updates to goals at this time. Pt prognosis is fair.     Pt will continue to benefit from skilled outpatient occupational therapy to address the deficits listed in the problem list on initial evaluation, provide pt/family education and to maximize pt's level of independence in  the home and community environment.     Pt's spiritual, cultural and educational needs considered and pt agreeable to plan of care and goals.    Anticipated barriers to occupational therapy: preexisting conditions       Goals:  Long term goals:           Previous Short Term Goals Status:   Pt will be complaint with HEP and pain management   Pt will achieve shoulder PROM in flexion and scaption ~110 degrees  MET   Pt will report improvement in FOTO by decreasing 10% limitation points   Pt will achieve shoulder AAROM WFL to aid in ADLs MET   Pt will be able to demo full composite fist with R hand.  Long Term Goals Status:   MODIFIED     Reasons for Recertification of Therapy:   continue to be limited in full independence of ADLs due to hand and shoulder       GOALS:      Pt will achieve shoulder AROM in flexion/scaption/abduction ~120 degrees   2.   Pt will report pain no greater than 1/10 in her R hand during functional use.   3.   Pt will demo shoulder AROM WFL to perform daily and community activities MET   4.   Pt will report improvement in FOTO score by measuring 20% points.   5.  Pt will report improved PITTS in her LF on R hand by 10-15 degrees.   6. Pt will report R hand  strength 30# or higher to be able to open jars, hold pots/pan when cooking    PLAN     Updated Certification Period: 7/28/2023 to 9/22/2023   Recommended Treatment Plan: 2-3 times per week for 8 weeks:  Electrical Stimulation  , Fluidotherapy, Manual Therapy, Moist Heat/ Ice, Neuromuscular Re-ed, Orthotic Management and Training, Paraffin, Patient Education, Self Care, Therapeutic Activities, and Therapeutic Exercise  Other Recommendations: Lymphedema tx with lymphatouch     Shaunna Stevenson OT

## 2023-08-09 ENCOUNTER — TELEPHONE (OUTPATIENT)
Dept: SPORTS MEDICINE | Facility: CLINIC | Age: 83
End: 2023-08-09
Payer: MEDICARE

## 2023-08-09 ENCOUNTER — CLINICAL SUPPORT (OUTPATIENT)
Dept: REHABILITATION | Facility: HOSPITAL | Age: 83
End: 2023-08-09
Payer: MEDICARE

## 2023-08-09 DIAGNOSIS — R60.0 LOCALIZED EDEMA: ICD-10-CM

## 2023-08-09 DIAGNOSIS — R52 PAIN: ICD-10-CM

## 2023-08-09 DIAGNOSIS — M89.09 SHOULDER-HAND SYNDROME: Primary | ICD-10-CM

## 2023-08-09 DIAGNOSIS — M79.641 HAND PAIN, RIGHT: ICD-10-CM

## 2023-08-09 DIAGNOSIS — M65.141 SUPPURATIVE TENOSYNOVITIS OF FLEXOR TENDON OF RIGHT HAND: Primary | ICD-10-CM

## 2023-08-09 DIAGNOSIS — M25.611 DECREASED RANGE OF MOTION OF RIGHT SHOULDER: ICD-10-CM

## 2023-08-09 PROCEDURE — 97140 MANUAL THERAPY 1/> REGIONS: CPT

## 2023-08-09 NOTE — TELEPHONE ENCOUNTER
----- Message from Luis Angel Wheeler MD sent at 8/8/2023  2:27 PM CDT -----  Regarding: RE: hand  Be happy to.    Can you add this?  ----- Message -----  From: Shaunna Stevenson, WILFREDO  Sent: 8/8/2023   9:19 AM CDT  To: Luis Angel Wheeler MD  Subject: hand                                             Hi Dr. Wheeler,     I have been treating Ms. Humphries for her R shoulder. Her symptoms in her hand are similar to symptoms of flexor tenosynovitis, especially her LF surrounding A1 and A3. I saw shoulder/hand syndrome was mentioned in her last visit. If you think it is appropriate can you add her hand to order so insurance will approve treatment for both areas? Her shoulder continues to do well. She reported her hand gives her more trouble than shoulder. Let me know what you think!     Thanks so much!   Best,   Shaunna Stevenson, OTR/L

## 2023-08-09 NOTE — PROGRESS NOTES
"  [facility]  Occupational Therapy Treatment Note    Date: 8/9/2023  Name: Laverne Humphries  [facility] Number: [id number]    Therapy Diagnosis:   Encounter Diagnoses   Name Primary?    Shoulder-hand syndrome Yes    Decreased range of motion of right shoulder     Localized edema     Pain     Hand pain, right        Physician: Luis Angel Wheeler MD    Physician Orders:Eval and treat;  s/p RCR/lymphedema treatments into hand  Medical Diagnosis: Z98.890 (ICD-10-CM) - S/P right rotator cuff repair  Surgical Procedure and Date: 3/21/2023,   1. Right shoulder Arthroscopic massive complex rotator cuff repair    2. Right shoulder Arthroscopic extensive debridement   3. Right shoulder Arthroscopic lysis of adhesions   4. Right shoulder Arthroscopic subacromial decompression and bursectomy   5. Right shoulder arthroscopic loose body removal    Evaluation Date: 5/3/2023  Insurance Authorization Period Expiration: 05/07/2023  Plan of Care Expiration: 12 weeks; 9/22/2023  Date of Return to MD: 7/3/2023  Visit # / Visits authorized: 30 / 40  FOTO: (date and score)     Precautions:  Standard     Time In: 10:45  Time Out: 11:40    Total Appointment Time (timed & untimed codes): 55 minutes       SUBJECTIVE     Pt reports: "my hand looks like the other hand today."  She was compliant with home exercise program given last session.   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 Reaching easier and opening things easier    Pain: 0/10  Location: right hand     OBJECTIVE   Objective Measures updated at progress report unless specified.    Observation/Appearance: mod edema in hand      Edema. Measured in centimeters.    5/9/2023 5/9/2023     Left Right   2in. Above elbow       2in. Below elbow       Wrist Crease 14.0 cm  16.0 cm    Figure of 8       MCPs 18.4.0 cm  20.5 cm               Elbow and Wrist ROM. Measured in " degrees.    5/9/2023 5/9/2023     Left Right   Elbow Ext/Flex WNL/WNL  12/135    Supination/Pronation WNL/WNL  66/75   Wrist Ext/Flex 55/70 39/6   Wrist RD/UD             Hand ROM. Measured in degrees.    5/9/2023 5/9/2023 6/22/2023      Left Right Right             Index: MP    68 67              PIP       50 91              DIP   30 40              PITTS   148             Long:  MP   65 78              PIP   58 80              DIP   62 55              PITTS   185             Ring:   MP   55 80              PIP   60 92              DIP   32 45              PITTS   147                      Strength (Dynamometer) and Pinch Strength (Pinch Gauge)  Measured in pounds.    6/9/2023 6/9/2023 7/7/2023 7/18/2023      Left Right Right Right   Rung II  41 20 25 21   Key Pinch         3pt Pinch         2pt Pinch               Manual Muscle Test    5/9/2023 5/9/2023     Left Right   Wrist Extension        Wrist Flexion       Radial Deviation       Ulnar Deviation       Supination       Pronation       Elbow Extension       Elbow Flexion             Limitation/Restriction for FOTO initial eval; shoulder Survey     Therapist reviewed FOTO scores for Laverne Humphries on 5/3/2023.   FOTO documents entered into [employer] - see Media section.     Limitation Score: needs to be taken%        Treatment     Leovidia received the treatments listed below:       Manual therapy techniques were applied to the: RUE for 55 minutes, including:  Manual lymphatic drainage along axillary notes, upper arm, cubital nodes, forearm, volar/dorsum of hand, digits, return proximally.   - lymphatouch ~ 20 min 175 mmHg negative pressure (pulsation or continuous) Vibration 40 Hz (over volar MCPs/A1 Pulley) and then continuous 175 mm Hg over digit Hz 90  mm Hg. Targeted 3rd proximal phalange fluid collection area    - hand pumps to facilitate lymphatic drainage with pink therasponge, 3 x 10      Not performed today:   - AAROM flexion/scaption/abduction  with 2# dowel x 10 reps each  incline on mat (2 sets)  - sidelying abduction and ER, 2 x 15 reps  - AAROM serratus punch with 2# dowel x 10 reps   - light elbow PRE, elbow flexion palm up and FA in neutral 2 x 10 reps each, 1# NT   - yellow theraband rows x 10 reps (3 sets) NT  - yellow theraband shoulder extension x 10 reps (3 sets) NT  - yellow theraband shoulder ER/IR x 10 reps (2 sets) each NT            Patient Education and Home Exercises      Education provided:   - continue pumping of R hand/digits  - treating edema symptom vs underlying cause of edema  - Progress towards goals       Written Home Exercises Provided: Patient instructed to cont prior HEP.  Exercises were reviewed and Laverne was able to demonstrate them prior to the end of the session.  Laverne demonstrated good  understanding of the HEP provided. See EMR under Patient Instructions for exercises provided during therapy sessions.      ASSESSMENT     Proximal R 3rd phalanage 6.75 cm pre --> 6.5 cm post treatment. Increased wrinkles noted. Continue POC.      Laverne is progressing fairly well towards her goals and there are no updates to goals at this time. Pt prognosis is fair.     Pt will continue to benefit from skilled outpatient occupational therapy to address the deficits listed in the problem list on initial evaluation, provide pt/family education and to maximize pt's level of independence in the home and community environment.     Pt's spiritual, cultural and educational needs considered and pt agreeable to plan of care and goals.    Anticipated barriers to occupational therapy: preexisting conditions       GOALS:      Pt will achieve shoulder AROM in flexion/scaption/abduction ~120 degrees   2.   Pt will report pain no greater than 1/10 in her R hand during functional use.   3.   Pt will demo shoulder AROM WFL to perform daily and community activities MET   4.   Pt will report improvement in FOTO score by measuring 20% points.   5.   Pt will report improved PITTS in her LF on R hand by 10-15 degrees.   6. Pt will report R hand  strength 30# or higher to be able to open jars, hold pots/pan when cooking    PLAN     Updated Certification Period: 7/28/2023 to 9/22/2023   Recommended Treatment Plan: 2-3 times per week for 8 weeks:  Electrical Stimulation  , Fluidotherapy, Manual Therapy, Moist Heat/ Ice, Neuromuscular Re-ed, Orthotic Management and Training, Paraffin, Patient Education, Self Care, Therapeutic Activities, and Therapeutic Exercise  Other Recommendations: Lymphedema tx with lymphatouch     Hortensia Abarca, OT, CLWT

## 2023-08-10 ENCOUNTER — CLINICAL SUPPORT (OUTPATIENT)
Dept: REHABILITATION | Facility: HOSPITAL | Age: 83
End: 2023-08-10
Payer: MEDICARE

## 2023-08-10 DIAGNOSIS — M65.141 SUPPURATIVE TENOSYNOVITIS OF FLEXOR TENDON OF RIGHT HAND: ICD-10-CM

## 2023-08-10 PROCEDURE — 97110 THERAPEUTIC EXERCISES: CPT

## 2023-08-10 PROCEDURE — 97140 MANUAL THERAPY 1/> REGIONS: CPT

## 2023-08-10 NOTE — PROGRESS NOTES
"  [facility]  Occupational Therapy Treatment Note     Date: 8/10/2023  Name: Laverne CELIS Sierra  [facility] Number: [id number]    Therapy Diagnosis:   Encounter Diagnosis   Name Primary?    Suppurative tenosynovitis of flexor tendon of right hand        Physician: Luis Angel Wheeler MD    Physician Orders:Eval and treat;  s/p RCR/lymphedema treatments into hand  Medical Diagnosis: Z98.890 (ICD-10-CM) - S/P right rotator cuff repair  Surgical Procedure and Date: 3/21/2023,   1. Right shoulder Arthroscopic massive complex rotator cuff repair    2. Right shoulder Arthroscopic extensive debridement   3. Right shoulder Arthroscopic lysis of adhesions   4. Right shoulder Arthroscopic subacromial decompression and bursectomy   5. Right shoulder arthroscopic loose body removal    Evaluation Date: 5/3/2023  Insurance Authorization Period Expiration: 12/31/23  Plan of Care Expiration: 12 weeks; 7/28/2023  Date of Return to MD: 7/3/2023  Visit # / Visits authorized: 29 / 40  FOTO: (date and score)     Precautions:  Standard       Time In:     07:55 AM   Time Out:   08:55      AM     Total Appointment Time (timed & untimed codes):    60      minutes       SUBJECTIVE     Pt reports:"felt good yesterday when I left, this morning little more swollen"   She was compliant with home exercise program given .   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    Observation/Appearance: mod edema in hand        Edema. Measured in centimeters.    5/9/2023 5/9/2023 8/3/2023 8/3/2023     Left Right Left  Right                            Wrist Crease 14.0 cm  16.0 cm               MCPs 18.4.0 cm  20.5 cm 18.0 cm  19.0 cm                  8/7/2023 IF LF RF SF    Right        P1 6.5 6.1 5.7 5.0    PIP 5.3 5.2 4.6 4.2    P2 4.6 4.3 " 3.8 3.5    Left        P1 5.3 5.0 4.7 4.4    PIP 4.6 4.6      P2             Shoulder ROM. Measured in degrees    5/3/2022 5/3/2023 5/11/2023 6/9/2023 6/30/2023 6/30/2023 7/25/23     Left Right PROM Right PROM Right AROM  Right AROM Right PROM R AROM   Shoulder Flexion  WFL 85 degrees  90 90 115 125 110   Shoulder Abduction  WFL NT 80 90 90 95 100   Shoulder Extension    NT NT     50   Shoulder IR   NT NT L5   37, L5   Shoulder ER  70 NT 20 65   60   Shoulder ER @0       48        Elbow and Wrist ROM. Measured in degrees.    5/9/2023 5/9/2023     Left Right   Elbow Ext/Flex WNL/WNL  12/135    Supination/Pronation WNL/WNL  66/75   Wrist Ext/Flex 55/70 39/6   Wrist RD/UD             Hand ROM. Measured in degrees.    5/9/2023 5/9/2023 6/22/2023      Left Right Right             Index: MP    68 67              PIP       50 91              DIP   30 40              PITTS   148 198            Long:  MP   65 78              PIP   58 80              DIP   62 55              PITTS   185 213            Ring:   MP   55 80              PIP   60 92              DIP   32 45              PITTS   147 217                     Strength (Dynamometer) and Pinch Strength (Pinch Gauge)  Measured in pounds.    6/9/2023 6/9/2023 7/7/2023 7/18/2023      Left Right Right Right   Rung II  41 20 25 21   Key Pinch         3pt Pinch         2pt Pinch               Manual Muscle Test    5/9/2023 5/9/2023     Left Right   Wrist Extension        Wrist Flexion       Radial Deviation       Ulnar Deviation       Supination       Pronation       Elbow Extension       Elbow Flexion             Limitation/Restriction for FOTO initial eval; shoulder Survey     Therapist reviewed FOTO scores for Laverne M Keith on 5/3/2023.   FOTO documents entered into [employer] - see Media section.     Limitation Score: needs to be taken%        Treatment     Lezoilaa received the treatments listed below:         Fluidotherapy: To R hand  for 10 min, continuous air, 115 deg,  air speed 50 to decrease pain, edema & scar tissue, sensory re- education, and increased tissue extensibility prior to therex  Supervised modalities: MHP R shoulder  for 10 minutes to promote increased blood flow            Manual therapy techniques: Manual Lymphatic Drainage were applied to the: RUE for 15 minutes, including:  - lymphatouch ~ 15 min 175 mmHg negative pressure (pulsation or continuous) Vibration 40 Hz (over volar MCPs/A1 Pulley) and then continuous 175 mm Hg over digit Hz 90  mm Hg (NT)  - passive shoulder in D1 pattern x 10 reps   - passive ROM LF (hook grasp) x 10 reps   - passive LF hyperextension holding 10s x 5 reps              Therapeutic exercises to develop ROM for 25 minutes, including:   - concentric teres major/subscapularis with RED theraband x 10 reps each (2 sets)   - B ext rotation with RED theraband x 10 reps (1 set)   - hook grasp MP blocking x 10 reps (2 sets)   - RED theraband rows  x 10 reps (2 sets)   - B RED therband shoulder extension x 10 (2 sets)           Not performed today:   - AAROM flexion/scaption with 4# dowel x 10 reps supine on mat (2 sets)  - sidelying abduction and ER, 2 x 15 reps  - supine dynamic stabilization with 1#, alphabet x 1 set   - AAROM serratus punch with 2# dowel 2 x 10 reps   - light elbow PRE, elbow flexion palm up and FA in neutral 2 x 10 reps each, 1# NT   - RED theraband rows x 10 reps (3 sets)   - 4# dowel shoulder extension x 10 reps (2 sets)  - yellow theraband shoulder extension x 10 reps (3 sets)   - yellow theraband shoulder ER/IR x 15 reps (2 sets) each         Neuro re-ed: 10 min NT   - rice bin (6 min)   - in hand manipulation with marbles         Patient Education and Home Exercises      Education provided:   - cont HEP  - Progress towards goals       Written Home Exercises Provided: Patient instructed to cont prior HEP.  Exercises were reviewed and Laverne was able to demonstrate them prior to the end of the session.  Laverne  demonstrated good  understanding of the HEP provided. See EMR under Patient Instructions for exercises provided during therapy sessions.      ASSESSMENT     Improved capsular tightness however cont to benefit from increased prolonged anterior inferior stretch. Cont to have fibrous tightening within A1 pulley. Wear RMO during the day to offload A1. Able to alex stretch and passive pain free.             Pt participated well and is motivated.     Laverne is progressing fairly well towards her goals and there are no updates to goals at this time. Pt prognosis is fair.     Pt will continue to benefit from skilled outpatient occupational therapy to address the deficits listed in the problem list on initial evaluation, provide pt/family education and to maximize pt's level of independence in the home and community environment.     Pt's spiritual, cultural and educational needs considered and pt agreeable to plan of care and goals.    Anticipated barriers to occupational therapy: preexisting conditions       Goals:  Long term goals:   Pt will achieve shoulder AROM in flexion/scaption/abduction ~120 degrees ---progressing  2.   Assess MMT when appropriate------progressing  3.   Pt will demo shoulder AROM WFL to perform daily and community activities ---progressing  4.   Pt will report improvement in FOTO score by measuring 20% points. ---progressing        Short term goals:   Pt will be complaint with HEP and pain management ---progressing  Pt will achieve shoulder PROM in flexion and scaption ~110 degrees ---progressing  Pt will report improvement in FOTO by decreasing 10% limitation points ---progressing  Pt will achieve shoulder AAROM WFL to aid in ADLs ---progressing  Pt will be able to demo full composite fist with R hand.---progressing    PLAN     Continue skilled occupational therapy with individualized plan of care focusing on improving functional independence with use of RUE    Updates/Grading for next session:  "cont per large massive type III protocol. Needs reassessment next session and updated POC    Melissa Landeche, OT [facility]  Occupational Therapy Treatment Note    Date: 8/10/2023  Name: Laverne Humphries  [facility] Number: [id number]    Therapy Diagnosis:   Encounter Diagnosis   Name Primary?    Suppurative tenosynovitis of flexor tendon of right hand                      Physician: Luis Angel Wheeler MD    Physician Orders:Eval and treat;  s/p RCR/lymphedema treatments into hand  Medical Diagnosis: Z98.890 (ICD-10-CM) - S/P right rotator cuff repair  Surgical Procedure and Date: 3/21/2023,   1. Right shoulder Arthroscopic massive complex rotator cuff repair    2. Right shoulder Arthroscopic extensive debridement   3. Right shoulder Arthroscopic lysis of adhesions   4. Right shoulder Arthroscopic subacromial decompression and bursectomy   5. Right shoulder arthroscopic loose body removal    Evaluation Date: 5/3/2023  Insurance Authorization Period Expiration: 05/07/2023  Plan of Care Expiration: 12 weeks; 7/28/2023  Date of Return to MD: 7/3/2023  Visit # / Visits authorized: 24 / 40  FOTO: (date and score)     Precautions:  Standard     Time In:     08:05 AM   Time Out:      09:05   AM     Total Appointment Time (timed & untimed codes): 60 minutes       SUBJECTIVE     Pt reports: "Yesterday when I left here my swelling went down but last night my hand was more swollen and my shoulder was sore"   She was compliant with home exercise program given last session.   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 Reaching easier and opening things easier    Pain: 0/10  Location: right hand     OBJECTIVE   Objective Measures updated at progress report unless specified.    Observation/Appearance: mod edema in hand      Edema. Measured in centimeters.    " 5/9/2023 5/9/2023     Left Right   2in. Above elbow       2in. Below elbow       Wrist Crease 14.0 cm  16.0 cm    Figure of 8       MCPs 18.4.0 cm  20.5 cm               Elbow and Wrist ROM. Measured in degrees.    5/9/2023 5/9/2023     Left Right   Elbow Ext/Flex WNL/WNL  12/135    Supination/Pronation WNL/WNL  66/75   Wrist Ext/Flex 55/70 39/6   Wrist RD/UD             Hand ROM. Measured in degrees.    5/9/2023 5/9/2023 6/22/2023      Left Right Right             Index: MP    68 67              PIP       50 91              DIP   30 40              PITTS   148             Long:  MP   65 78              PIP   58 80              DIP   62 55              PITTS   185             Ring:   MP   55 80              PIP   60 92              DIP   32 45              PITTS   147                      Strength (Dynamometer) and Pinch Strength (Pinch Gauge)  Measured in pounds.    6/9/2023 6/9/2023 7/7/2023 7/18/2023      Left Right Right Right   Rung II  41 20 25 21   Key Pinch         3pt Pinch         2pt Pinch               Manual Muscle Test    5/9/2023 5/9/2023     Left Right   Wrist Extension        Wrist Flexion       Radial Deviation       Ulnar Deviation       Supination       Pronation       Elbow Extension       Elbow Flexion             Limitation/Restriction for FOTO initial eval; shoulder Survey     Therapist reviewed FOTO scores for Laverne Humphries on 5/3/2023.   FOTO documents entered into [employer] - see Media section.     Limitation Score: needs to be taken%        Treatment     Leovidia received the treatments listed below:       Fluidotherapy: To R hand for 10 min, continuous air, 115 deg, air speed 50 to decrease pain, edema & scar tissue, sensory re- education, and increased tissue extensibility prior to therex  Supervised modalities: MHP for 10 minutes to promote increased blood flow         Manual therapy techniques: Manual Lymphatic Drainage were applied to the: RUE for 35 minutes, including:  - passive  ROM to flexion/scaption/abduction/ER/IR  x 10 reps each   - passive ROM LF (hook grasp)  - passive ROM LF extension with MP hyperextended for increased stretch   - STM using star tool palmar fascia, focus near A1 and A3 pulley   - applied web KT tape to reduce edema in hand   - passive horizontal abduction with ER/IR x 10 reps each   - passive D1 pattern x 10 reps         Fabricated RMO:    - placing MP hyperextended relative to adjacent fingering, improves excursion in flexor pulley mechanics   - pt was unable to make full composite fist without pain   - while wearing RMO she reports no pain and is able to close hand with increased PIP and DIP flexion               Therapeutic exercises to develop ROM for 15  minutes, including:  - horizontal abduction, concentric  ER and IR with RED theraband x 10 reps each   - supine dynamic stabilization with 1#, alphabet x 1 set        Not performed today:   - AAROM flexion/scaption/abduction with 2# dowel x 10 reps each  incline on mat (2 sets)  - sidelying abduction and ER, 2 x 15 reps  - AAROM serratus punch with 2# dowel x 10 reps   - light elbow PRE, elbow flexion palm up and FA in neutral 2 x 10 reps each, 1# NT   - yellow theraband rows x 10 reps (3 sets) NT  - yellow theraband shoulder extension x 10 reps (3 sets) NT  - yellow theraband shoulder ER/IR x 10 reps (2 sets) each NT            Patient Education and Home Exercises      Education provided:   - added towel IR stretch   - Progress towards goals       Written Home Exercises Provided: Patient instructed to cont prior HEP.  Exercises were reviewed and Laverne was able to demonstrate them prior to the end of the session.  Laverne demonstrated good  understanding of the HEP provided. See EMR under Patient Instructions for exercises provided during therapy sessions.      ASSESSMENT     Good fit achieved with RMO for R hand. Cont to maintain good ROM in shoulder.     Laverne is progressing fairly well towards  her goals and there are no updates to goals at this time. Pt prognosis is fair.     Pt will continue to benefit from skilled outpatient occupational therapy to address the deficits listed in the problem list on initial evaluation, provide pt/family education and to maximize pt's level of independence in the home and community environment.     Pt's spiritual, cultural and educational needs considered and pt agreeable to plan of care and goals.    Anticipated barriers to occupational therapy: preexisting conditions       Goals:  Long term goals:           Previous Short Term Goals Status:   Pt will be complaint with HEP and pain management   Pt will achieve shoulder PROM in flexion and scaption ~110 degrees  MET   Pt will report improvement in FOTO by decreasing 10% limitation points   Pt will achieve shoulder AAROM WFL to aid in ADLs MET   Pt will be able to demo full composite fist with R hand.  Long Term Goals Status:   MODIFIED     Reasons for Recertification of Therapy:   continue to be limited in full independence of ADLs due to hand and shoulder       GOALS:      Pt will achieve shoulder AROM in flexion/scaption/abduction ~120 degrees   2.   Pt will report pain no greater than 1/10 in her R hand during functional use.   3.   Pt will demo shoulder AROM WFL to perform daily and community activities MET   4.   Pt will report improvement in FOTO score by measuring 20% points.   5.  Pt will report improved PITTS in her LF on R hand by 10-15 degrees.   6. Pt will report R hand  strength 30# or higher to be able to open jars, hold pots/pan when cooking    PLAN     Updated Certification Period: 7/28/2023 to 9/22/2023   Recommended Treatment Plan: 2-3 times per week for 8 weeks:  Electrical Stimulation  , Fluidotherapy, Manual Therapy, Moist Heat/ Ice, Neuromuscular Re-ed, Orthotic Management and Training, Paraffin, Patient Education, Self Care, Therapeutic Activities, and Therapeutic Exercise  Other Recommendations:  Lymphedema tx with lymphatouch     Shaunna Stevenson, OT

## 2023-08-14 ENCOUNTER — CLINICAL SUPPORT (OUTPATIENT)
Dept: REHABILITATION | Facility: HOSPITAL | Age: 83
End: 2023-08-14
Payer: MEDICARE

## 2023-08-14 DIAGNOSIS — R60.0 LOCALIZED EDEMA: ICD-10-CM

## 2023-08-14 DIAGNOSIS — M79.641 HAND PAIN, RIGHT: ICD-10-CM

## 2023-08-14 DIAGNOSIS — M25.611 DECREASED RANGE OF MOTION OF RIGHT SHOULDER: Primary | ICD-10-CM

## 2023-08-14 DIAGNOSIS — R52 PAIN: ICD-10-CM

## 2023-08-14 PROCEDURE — 97140 MANUAL THERAPY 1/> REGIONS: CPT

## 2023-08-14 NOTE — PROGRESS NOTES
"  [facility]  Occupational Therapy Treatment Note    Date: 8/14/2023  Name: Laverne CELIS Carolina  [facility] Number: [id number]    Therapy Diagnosis:   Encounter Diagnoses   Name Primary?    Decreased range of motion of right shoulder Yes    Localized edema     Pain     Hand pain, right        Physician: Luis Angel Wheeler MD    Physician Orders:Eval and treat;  s/p RCR/lymphedema treatments into hand  Medical Diagnosis: Z98.890 (ICD-10-CM) - S/P right rotator cuff repair  Surgical Procedure and Date: 3/21/2023,   1. Right shoulder Arthroscopic massive complex rotator cuff repair    2. Right shoulder Arthroscopic extensive debridement   3. Right shoulder Arthroscopic lysis of adhesions   4. Right shoulder Arthroscopic subacromial decompression and bursectomy   5. Right shoulder arthroscopic loose body removal    Evaluation Date: 5/3/2023  Insurance Authorization Period Expiration: 05/07/2023  Plan of Care Expiration: 12 weeks; 9/22/2023  Date of Return to MD: 7/3/2023  Visit # / Visits authorized: 32 / 40  FOTO: (date and score)     Precautions:  Standard     Time In: 9:00  Time Out: 9:57    Total Appointment Time (timed & untimed codes): 57 minutes       SUBJECTIVE     Pt reports: "it was hard to use a fork this weekend."   She was compliant with home exercise program given last session.   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 Reaching easier and opening things easier    Pain: 0/10  Location: right hand     OBJECTIVE   Objective Measures updated at progress report unless specified.    Observation/Appearance: mod edema in digits 2-3     Edema. Measured in centimeters.    5/9/2023 5/9/2023     Left Right   2in. Above elbow       2in. Below elbow       Wrist Crease 14.0 cm  16.0 cm    Figure of 8       MCPs 18.4.0 cm  20.5 cm               Elbow and Wrist ROM. Measured in degrees.    5/9/2023 5/9/2023 "     Left Right   Elbow Ext/Flex WNL/WNL  12/135    Supination/Pronation WNL/WNL  66/75   Wrist Ext/Flex 55/70 39/6   Wrist RD/UD             Hand ROM. Measured in degrees.    5/9/2023 5/9/2023 6/22/2023      Left Right Right             Index: MP    68 67              PIP       50 91              DIP   30 40              PITTS   148             Long:  MP   65 78              PIP   58 80              DIP   62 55              PITTS   185             Ring:   MP   55 80              PIP   60 92              DIP   32 45              PITTS   147                      Strength (Dynamometer) and Pinch Strength (Pinch Gauge)  Measured in pounds.    6/9/2023 6/9/2023 7/7/2023 7/18/2023      Left Right Right Right   Rung II  41 20 25 21   Key Pinch         3pt Pinch         2pt Pinch               Manual Muscle Test    5/9/2023 5/9/2023     Left Right   Wrist Extension        Wrist Flexion       Radial Deviation       Ulnar Deviation       Supination       Pronation       Elbow Extension       Elbow Flexion             Limitation/Restriction for FOTO initial eval; shoulder Survey     Therapist reviewed FOTO scores for Laverne Humphries on 5/3/2023.   FOTO documents entered into [employer] - see Media section.     Limitation Score: needs to be taken%        Treatment     Laverne received the treatments listed below:       Manual therapy techniques were applied to the: RUE for 57 minutes, including:  Manual lymphatic drainage along axillary notes, upper arm, cubital nodes, forearm, volar/dorsum of hand, digits, return proximally.   - lymphatouch ~ 20 min 175 mmHg negative pressure (pulsation or continuous) Vibration 40 Hz (over volar MCPs/A1 Pulley) and then continuous 175 mm Hg over digit Hz 90  mm Hg. Targeted 3rd proximal phalange fluid collection area    - wrist flexor stretch, 15' hold x 4      Not performed today:   - AAROM flexion/scaption/abduction with 2# dowel x 10 reps each  incline on mat (2 sets)  - sidelying  abduction and ER, 2 x 15 reps  - AAROM serratus punch with 2# dowel x 10 reps   - light elbow PRE, elbow flexion palm up and FA in neutral 2 x 10 reps each, 1# NT   - yellow theraband rows x 10 reps (3 sets) NT  - yellow theraband shoulder extension x 10 reps (3 sets) NT  - yellow theraband shoulder ER/IR x 10 reps (2 sets) each NT            Patient Education and Home Exercises      Education provided:   - continue pumping of R hand/digits  - treating edema symptom vs underlying cause of edema  - Progress towards goals       Written Home Exercises Provided: Patient instructed to cont prior HEP.  Exercises were reviewed and Laverne was able to demonstrate them prior to the end of the session.  Laverne demonstrated good  understanding of the HEP provided. See EMR under Patient Instructions for exercises provided during therapy sessions.      ASSESSMENT     Edema appears stable since last session. Deferred hand pumps given flexor tenosynovitis. Education provided re: will continue to tx edema for symptom management. Must address underlying cause of edema to resolve.     Laverne is progressing fairly well towards her goals and there are no updates to goals at this time. Pt prognosis is fair.     Pt will continue to benefit from skilled outpatient occupational therapy to address the deficits listed in the problem list on initial evaluation, provide pt/family education and to maximize pt's level of independence in the home and community environment.     Pt's spiritual, cultural and educational needs considered and pt agreeable to plan of care and goals.    Anticipated barriers to occupational therapy: preexisting conditions       GOALS:      Pt will achieve shoulder AROM in flexion/scaption/abduction ~120 degrees   2.   Pt will report pain no greater than 1/10 in her R hand during functional use.   3.   Pt will demo shoulder AROM WFL to perform daily and community activities MET   4.   Pt will report improvement in  FOTO score by measuring 20% points.   5.  Pt will report improved PITTS in her LF on R hand by 10-15 degrees.   6. Pt will report R hand  strength 30# or higher to be able to open jars, hold pots/pan when cooking    PLAN     Updated Certification Period: 7/28/2023 to 9/22/2023   Recommended Treatment Plan: 2-3 times per week for 8 weeks:  Electrical Stimulation  , Fluidotherapy, Manual Therapy, Moist Heat/ Ice, Neuromuscular Re-ed, Orthotic Management and Training, Paraffin, Patient Education, Self Care, Therapeutic Activities, and Therapeutic Exercise  Other Recommendations: Lymphedema tx with lymphatouch     Hortensia Abarca, OT, CLWT

## 2023-08-16 ENCOUNTER — CLINICAL SUPPORT (OUTPATIENT)
Dept: REHABILITATION | Facility: HOSPITAL | Age: 83
End: 2023-08-16
Payer: MEDICARE

## 2023-08-16 DIAGNOSIS — R52 PAIN: ICD-10-CM

## 2023-08-16 DIAGNOSIS — M79.641 HAND PAIN, RIGHT: ICD-10-CM

## 2023-08-16 DIAGNOSIS — M25.611 DECREASED RANGE OF MOTION OF RIGHT SHOULDER: Primary | ICD-10-CM

## 2023-08-16 DIAGNOSIS — R60.0 LOCALIZED EDEMA: ICD-10-CM

## 2023-08-16 PROCEDURE — 97140 MANUAL THERAPY 1/> REGIONS: CPT

## 2023-08-16 NOTE — PROGRESS NOTES
"  [facility]  Occupational Therapy Treatment Note    Date: 8/16/2023  Name: Laverne CELIS [place]  [facility] Number: [id number]    Therapy Diagnosis:   Encounter Diagnoses   Name Primary?    Decreased range of motion of right shoulder Yes    Localized edema     Pain     Hand pain, right        Physician: Luis Angel Wheeler MD    Physician Orders:Eval and treat;  s/p RCR/lymphedema treatments into hand  Medical Diagnosis: Z98.890 (ICD-10-CM) - S/P right rotator cuff repair  Surgical Procedure and Date: 3/21/2023,   1. Right shoulder Arthroscopic massive complex rotator cuff repair    2. Right shoulder Arthroscopic extensive debridement   3. Right shoulder Arthroscopic lysis of adhesions   4. Right shoulder Arthroscopic subacromial decompression and bursectomy   5. Right shoulder arthroscopic loose body removal    Evaluation Date: 5/3/2023  Insurance Authorization Period Expiration: 05/07/2023  Plan of Care Expiration: 12 weeks; 9/22/2023  Date of Return to MD: 7/3/2023  Visit # / Visits authorized: 33 / 40  FOTO: (date and score)     Precautions:  Standard     Time In: 11:40  Time Out: 11:48    Total Appointment Time (timed & untimed codes): 48 minutes       SUBJECTIVE     Pt reports: "I have been doing the stretches!"  She was compliant with home exercise program given last session.   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 Reaching easier and opening things easier    Pain: 0/10  Location: right hand     OBJECTIVE   Objective Measures updated at progress report unless specified.    Observation/Appearance: mod edema in digits 2-3     Edema. Measured in centimeters.    ZENYE  8/16/23 RUE  8/16/23   MCP - -   PP 1 6.0 cm 6.5 cm   PP 2 6.0 cm 6.5 cm   PP 3 6.0 cm 6.5 cm   PP 4 5.5 cm 6.0 cm   PP 5 5.0 cm 5.75 cm      *PP = proximal phalanx     Elbow and Wrist ROM. Measured in degrees.    5/9/2023 5/9/2023 "     Left Right   Elbow Ext/Flex WNL/WNL  12/135    Supination/Pronation WNL/WNL  66/75   Wrist Ext/Flex 55/70 39/6   Wrist RD/UD             Hand ROM. Measured in degrees.    5/9/2023 5/9/2023 6/22/2023      Left Right Right             Index: MP    68 67              PIP       50 91              DIP   30 40              PITTS   148             Long:  MP   65 78              PIP   58 80              DIP   62 55              PITTS   185             Ring:   MP   55 80              PIP   60 92              DIP   32 45              PITTS   147                      Strength (Dynamometer) and Pinch Strength (Pinch Gauge)  Measured in pounds.    6/9/2023 6/9/2023 7/7/2023 7/18/2023      Left Right Right Right   Rung II  41 20 25 21   Key Pinch         3pt Pinch         2pt Pinch               Manual Muscle Test    5/9/2023 5/9/2023     Left Right   Wrist Extension        Wrist Flexion       Radial Deviation       Ulnar Deviation       Supination       Pronation       Elbow Extension       Elbow Flexion             Limitation/Restriction for FOTO initial eval; shoulder Survey     Therapist reviewed FOTO scores for Laverne Humphries on 5/3/2023.   FOTO documents entered into [employer] - see Media section.     Limitation Score: needs to be taken%        Treatment     Laverne received the treatments listed below:       Manual therapy techniques were applied to the: RUE for 48 minutes, including:  Manual lymphatic drainage along axillary notes, upper arm, cubital nodes, forearm, volar/dorsum of hand, digits, return proximally.   - lymphatouch ~ 20 min 175 mmHg negative pressure (pulsation or continuous) Vibration 40 Hz (over volar MCPs/A1 Pulley) and then continuous 175 mm Hg over digit Hz 90  mm Hg. Targeted 3rd proximal phalange fluid collection area    - wrist flexor stretch, 15' hold x 5      Not performed today:   - AAROM flexion/scaption/abduction with 2# dowel x 10 reps each  incline on mat (2 sets)  - sidelying  abduction and ER, 2 x 15 reps  - AAROM serratus punch with 2# dowel x 10 reps   - light elbow PRE, elbow flexion palm up and FA in neutral 2 x 10 reps each, 1# NT   - yellow theraband rows x 10 reps (3 sets) NT  - yellow theraband shoulder extension x 10 reps (3 sets) NT  - yellow theraband shoulder ER/IR x 10 reps (2 sets) each NT            Patient Education and Home Exercises      Education provided:   - continue pumping of R hand/digits  - treating edema symptom vs underlying cause of edema  - Progress towards goals       Written Home Exercises Provided: Patient instructed to cont prior HEP.  Exercises were reviewed and Laverne was able to demonstrate them prior to the end of the session.  Laverne demonstrated good  understanding of the HEP provided. See EMR under Patient Instructions for exercises provided during therapy sessions.      ASSESSMENT     Edema appears unchanged. Education provided re: if improvement do not occur, no further therapeutic benefit of lymphedema therapy until underlying issue is addressed. Pt is in agreement with plan.     Laverne is progressing fairly well towards her goals and there are no updates to goals at this time. Pt prognosis is fair.     Pt will continue to benefit from skilled outpatient occupational therapy to address the deficits listed in the problem list on initial evaluation, provide pt/family education and to maximize pt's level of independence in the home and community environment.     Pt's spiritual, cultural and educational needs considered and pt agreeable to plan of care and goals.    Anticipated barriers to occupational therapy: preexisting conditions       GOALS:      Pt will achieve shoulder AROM in flexion/scaption/abduction ~120 degrees   2.   Pt will report pain no greater than 1/10 in her R hand during functional use.   3.   Pt will demo shoulder AROM WFL to perform daily and community activities MET   4.   Pt will report improvement in FOTO score by  measuring 20% points.   5.  Pt will report improved PITTS in her LF on R hand by 10-15 degrees.   6. Pt will report R hand  strength 30# or higher to be able to open jars, hold pots/pan when cooking    PLAN     Updated Certification Period: 7/28/2023 to 9/22/2023   Recommended Treatment Plan: 2-3 times per week for 8 weeks:  Electrical Stimulation  , Fluidotherapy, Manual Therapy, Moist Heat/ Ice, Neuromuscular Re-ed, Orthotic Management and Training, Paraffin, Patient Education, Self Care, Therapeutic Activities, and Therapeutic Exercise  Other Recommendations: Lymphedema tx with lymphatouch     Hortensia Abarca, OT, CLWT

## 2023-08-18 ENCOUNTER — CLINICAL SUPPORT (OUTPATIENT)
Dept: REHABILITATION | Facility: HOSPITAL | Age: 83
End: 2023-08-18
Payer: MEDICARE

## 2023-08-18 DIAGNOSIS — M25.611 DECREASED RANGE OF MOTION OF RIGHT SHOULDER: Primary | ICD-10-CM

## 2023-08-18 DIAGNOSIS — R52 PAIN: ICD-10-CM

## 2023-08-18 DIAGNOSIS — R60.0 LOCALIZED EDEMA: ICD-10-CM

## 2023-08-18 DIAGNOSIS — M79.641 HAND PAIN, RIGHT: ICD-10-CM

## 2023-08-18 PROCEDURE — 97110 THERAPEUTIC EXERCISES: CPT

## 2023-08-18 PROCEDURE — 97140 MANUAL THERAPY 1/> REGIONS: CPT

## 2023-08-18 NOTE — PROGRESS NOTES
"    [facility]  Occupational Therapy Treatment Note     Date: 8/18/2023  Name: Laverne CELIS [place]  [facility] Number: [id number]    Therapy Diagnosis:   Encounter Diagnoses   Name Primary?    Decreased range of motion of right shoulder Yes    Localized edema     Pain     Hand pain, right          Physician: Luis Angel Wheeler MD    Physician Orders:Eval and treat;  s/p RCR/lymphedema treatments into hand  Medical Diagnosis: Z98.890 (ICD-10-CM) - S/P right rotator cuff repair  Surgical Procedure and Date: 3/21/2023,   1. Right shoulder Arthroscopic massive complex rotator cuff repair    2. Right shoulder Arthroscopic extensive debridement   3. Right shoulder Arthroscopic lysis of adhesions   4. Right shoulder Arthroscopic subacromial decompression and bursectomy   5. Right shoulder arthroscopic loose body removal    Evaluation Date: 5/3/2023  Insurance Authorization Period Expiration: 12/31/23  Plan of Care Expiration: 12 weeks; 7/28/2023  Date of Return to MD: 7/3/2023  Visit # / Visits authorized: 24 / 40  FOTO: (date and score)     Precautions:  Standard       Time In:     07:55 AM   Time Out:   08:55 AM  Total Appointment Time (timed & untimed codes):    60      minutes       SUBJECTIVE       Pt reports: "I think still swollen but better   She was compliant with home exercise program given .   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    Observation/Appearance: mod edema in hand        Edema. Measured in centimeters.    5/9/2023 5/9/2023 8/3/2023 8/3/2023     Left Right Left  Right                            Wrist Crease 14.0 cm  16.0 cm               MCPs 18.4.0 cm  20.5 cm 18.0 cm  19.0 cm                  8/7/2023 IF LF RF SF    Right        P1 6.5 6.1 5.7 5.0    PIP 5.3 5.2 4.6 4.2    P2 " 4.6 4.3 3.8 3.5    Left        P1 5.3 5.0 4.7 4.4    PIP 4.6 4.6      P2             Shoulder ROM. Measured in degrees    5/3/2022 5/3/2023 5/11/2023 6/9/2023 6/30/2023 6/30/2023 7/25/23     Left Right PROM Right PROM Right AROM  Right AROM Right PROM R AROM   Shoulder Flexion  WFL 85 degrees  90 90 115 125 110   Shoulder Abduction  WFL NT 80 90 90 95 100   Shoulder Extension    NT NT     50   Shoulder IR   NT NT L5   37, L5   Shoulder ER  70 NT 20 65   60   Shoulder ER @0       48        Elbow and Wrist ROM. Measured in degrees.    5/9/2023 5/9/2023     Left Right   Elbow Ext/Flex WNL/WNL  12/135    Supination/Pronation WNL/WNL  66/75   Wrist Ext/Flex 55/70 39/6   Wrist RD/UD             Hand ROM. Measured in degrees.    5/9/2023 5/9/2023 6/22/2023 8/18/2023     Left Right Right  Right          After tx   Index: MP    68 67 66              PIP       50 91 78              DIP   30 40 30              PITTS   148 198              Long:  MP   65 78 80              PIP   58 80 85              DIP   62 55 55              PITTS   185 213              Ring:   MP   55 80 80              PIP   60 92 95              DIP   32 45 45              PITTS   147 217                       Strength (Dynamometer) and Pinch Strength (Pinch Gauge)  Measured in pounds.    6/9/2023 6/9/2023 7/7/2023 7/18/2023      Left Right Right Right   Rung II  41 20 25 21   Key Pinch         3pt Pinch         2pt Pinch               Manual Muscle Test    5/9/2023 5/9/2023     Left Right   Wrist Extension        Wrist Flexion       Radial Deviation       Ulnar Deviation       Supination       Pronation       Elbow Extension       Elbow Flexion             Limitation/Restriction for FOTO initial eval; shoulder Survey     Therapist reviewed FOTO scores for Laverne LALITA [place] on 5/3/2023.   FOTO documents entered into [employer] - see Media section.     Limitation Score: needs to be taken%        Treatment     Leboodia received the treatments listed below:          Fluidotherapy: To R hand  for 10 min, continuous air, 115 deg, air speed 50 to decrease pain, edema & scar tissue, sensory re- education, and increased tissue extensibility prior to therex  Supervised modalities: MHP R shoulder  for 10 minutes to promote increased blood flow            Manual therapy techniques: Manual Lymphatic Drainage were applied to the: RUE for 15 minutes, including:  - STM with star tool palmar aponeurosis   - passive shoulder in D1 pattern x 10 reps   - passive ROM LF (hook grasp) x 10 reps   - passive LF hyperextension holding 10s x 10 reps              Therapeutic exercises to develop ROM for 25 minutes, including:   - concentric teres major/subscapularis with RED theraband x 10 reps each (2 sets)   - B ext rotation with RED theraband x 10 reps (1 set)   - hook grasp MP blocking x 10 reps (2 sets)   - RED theraband rows  x 10 reps (2 sets)   - B RED therband shoulder extension x 10 (2 sets)           Not performed today:   - AAROM flexion/scaption with 4# dowel x 10 reps supine on mat (2 sets)  - sidelying abduction and ER, 2 x 15 reps  - supine dynamic stabilization with 1#, alphabet x 1 set   - AAROM serratus punch with 2# dowel 2 x 10 reps   - light elbow PRE, elbow flexion palm up and FA in neutral 2 x 10 reps each, 1# NT   - RED theraband rows x 10 reps (3 sets)   - 4# dowel shoulder extension x 10 reps (2 sets)  - yellow theraband shoulder extension x 10 reps (3 sets)   - yellow theraband shoulder ER/IR x 15 reps (2 sets) each         Neuro re-ed: 10 min NT   - rice bin (6 min)   - in hand manipulation with marbles         Patient Education and Home Exercises      Education provided:   - cont HEP  - Progress towards goals       Written Home Exercises Provided: Patient instructed to cont prior HEP.  Exercises were reviewed and Laverne was able to demonstrate them prior to the end of the session.  Laverne demonstrated good  understanding of the HEP provided. See EMR under  Patient Instructions for exercises provided during therapy sessions.      ASSESSMENT     Improved capsular tightness however cont to benefit from increased prolonged anterior inferior stretch. Cont to have fibrous tightening within A1 pulley. Wear RMO during the day to offload A1. Able to alex stretch and passive pain free.             Pt participated well and is motivated.     Laverne is progressing fairly well towards her goals and there are no updates to goals at this time. Pt prognosis is fair.     Pt will continue to benefit from skilled outpatient occupational therapy to address the deficits listed in the problem list on initial evaluation, provide pt/family education and to maximize pt's level of independence in the home and community environment.     Pt's spiritual, cultural and educational needs considered and pt agreeable to plan of care and goals.    Anticipated barriers to occupational therapy: preexisting conditions       Goals:  Long term goals:   Pt will achieve shoulder AROM in flexion/scaption/abduction ~120 degrees ---progressing  2.   Assess MMT when appropriate------progressing  3.   Pt will demo shoulder AROM WFL to perform daily and community activities ---progressing  4.   Pt will report improvement in FOTO score by measuring 20% points. ---progressing        Short term goals:   Pt will be complaint with HEP and pain management ---progressing  Pt will achieve shoulder PROM in flexion and scaption ~110 degrees ---progressing  Pt will report improvement in FOTO by decreasing 10% limitation points ---progressing  Pt will achieve shoulder AAROM WFL to aid in ADLs ---progressing  Pt will be able to demo full composite fist with R hand.---progressing    PLAN     Continue skilled occupational therapy with individualized plan of care focusing on improving functional independence with use of RUE    Updates/Grading for next session: cont per large massive type III protocol. Needs reassessment next  "session and updated POC    Melissa Landeche, OT [facility]  Occupational Therapy Treatment Note    Date: 8/18/2023  Name: Laverne Humphries  Clinic Number: [id number]    Therapy Diagnosis:   Encounter Diagnoses   Name Primary?    Decreased range of motion of right shoulder Yes    Localized edema     Pain     Hand pain, right                        Physician: Luis Angel Wheeler MD    Physician Orders:Eval and treat;  s/p RCR/lymphedema treatments into hand  Medical Diagnosis: Z98.890 (ICD-10-CM) - S/P right rotator cuff repair  Surgical Procedure and Date: 3/21/2023,   1. Right shoulder Arthroscopic massive complex rotator cuff repair    2. Right shoulder Arthroscopic extensive debridement   3. Right shoulder Arthroscopic lysis of adhesions   4. Right shoulder Arthroscopic subacromial decompression and bursectomy   5. Right shoulder arthroscopic loose body removal    Evaluation Date: 5/3/2023  Insurance Authorization Period Expiration: 05/07/2023  Plan of Care Expiration: 12 weeks; 7/28/2023  Date of Return to MD: 7/3/2023  Visit # / Visits authorized: 24 / 40  FOTO: (date and score)     Precautions:  Standard     Time In:     08:05 AM   Time Out:      09:05   AM     Total Appointment Time (timed & untimed codes): 60 minutes       SUBJECTIVE     Pt reports: "Yesterday when I left here my swelling went down but last night my hand was more swollen and my shoulder was sore"   She was compliant with home exercise program given last session.   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 Reaching easier and opening things easier    Pain: 0/10  Location: right hand     OBJECTIVE   Objective Measures updated at progress report unless specified.    Observation/Appearance: mod edema in hand      Edema. Measured in centimeters.    5/9/2023 5/9/2023     Left " Right   2in. Above elbow       2in. Below elbow       Wrist Crease 14.0 cm  16.0 cm    Figure of 8       MCPs 18.4.0 cm  20.5 cm               Elbow and Wrist ROM. Measured in degrees.    5/9/2023 5/9/2023     Left Right   Elbow Ext/Flex WNL/WNL  12/135    Supination/Pronation WNL/WNL  66/75   Wrist Ext/Flex 55/70 39/6   Wrist RD/UD             Hand ROM. Measured in degrees.    5/9/2023 5/9/2023 6/22/2023 8/18/2023      Left Right Right  Right             Index: MP    68 67               PIP       50 91               DIP   30 40               PITTS   148               Long:  MP   65 78               PIP   58 80               DIP   62 55               PITTS   185               Ring:   MP   55 80               PIP   60 92               DIP   32 45               PITTS   147                        Strength (Dynamometer) and Pinch Strength (Pinch Gauge)  Measured in pounds.    6/9/2023 6/9/2023 7/7/2023 7/18/2023 8/18/2023     Left Right Right Right Right   Rung II  41 20 25 21    Key Pinch          3pt Pinch          2pt Pinch                Manual Muscle Test    5/9/2023 5/9/2023     Left Right   Wrist Extension        Wrist Flexion       Radial Deviation       Ulnar Deviation       Supination       Pronation       Elbow Extension       Elbow Flexion             Limitation/Restriction for FOTO initial eval; shoulder Survey     Therapist reviewed FOTO scores for Laverne Humphries on 5/3/2023.   FOTO documents entered into [employer] - see Media section.     Limitation Score: needs to be taken%        Treatment     Leovidia received the treatments listed below:       Fluidotherapy: To R hand for 10 min, continuous air, 115 deg, air speed 50 to decrease pain, edema & scar tissue, sensory re- education, and increased tissue extensibility prior to therex  Supervised modalities: MHP for 10 minutes to promote increased blood flow         Manual therapy techniques: Manual Lymphatic Drainage were applied to the: RUE for 35  minutes, including:  - passive ROM to flexion/scaption/abduction/ER/IR  x 10 reps each   - passive ROM LF (hook grasp)  - passive ROM LF extension with MP hyperextended for increased stretch   - STM using star tool palmar fascia, focus near A1 and A3 pulley   - applied web KT tape to reduce edema in hand   - passive horizontal abduction with ER/IR x 10 reps each   - passive D1 pattern x 10 reps         Fabricated RMO:    - placing MP hyperextended relative to adjacent fingering, improves excursion in flexor pulley mechanics   - pt was unable to make full composite fist without pain   - while wearing RMO she reports no pain and is able to close hand with increased PIP and DIP flexion               Therapeutic exercises to develop ROM for 15  minutes, including:  - horizontal abduction, concentric  ER and IR with RED theraband x 10 reps each   - supine dynamic stabilization with 1#, alphabet x 1 set        Not performed today:   - AAROM flexion/scaption/abduction with 2# dowel x 10 reps each  incline on mat (2 sets)  - sidelying abduction and ER, 2 x 15 reps  - AAROM serratus punch with 2# dowel x 10 reps   - light elbow PRE, elbow flexion palm up and FA in neutral 2 x 10 reps each, 1# NT   - yellow theraband rows x 10 reps (3 sets) NT  - yellow theraband shoulder extension x 10 reps (3 sets) NT  - yellow theraband shoulder ER/IR x 10 reps (2 sets) each NT            Patient Education and Home Exercises      Education provided:   - added towel IR stretch   - Progress towards goals       Written Home Exercises Provided: Patient instructed to cont prior HEP.  Exercises were reviewed and Laverne was able to demonstrate them prior to the end of the session.  Laverne demonstrated good  understanding of the HEP provided. See EMR under Patient Instructions for exercises provided during therapy sessions.      ASSESSMENT     Good fit achieved with RMO for R hand. Cont to maintain good ROM in shoulder.     Laverne is  progressing fairly well towards her goals and there are no updates to goals at this time. Pt prognosis is fair.     Pt will continue to benefit from skilled outpatient occupational therapy to address the deficits listed in the problem list on initial evaluation, provide pt/family education and to maximize pt's level of independence in the home and community environment.     Pt's spiritual, cultural and educational needs considered and pt agreeable to plan of care and goals.    Anticipated barriers to occupational therapy: preexisting conditions       Goals:  Long term goals:           Previous Short Term Goals Status:   Pt will be complaint with HEP and pain management   Pt will achieve shoulder PROM in flexion and scaption ~110 degrees  MET   Pt will report improvement in FOTO by decreasing 10% limitation points   Pt will achieve shoulder AAROM WFL to aid in ADLs MET   Pt will be able to demo full composite fist with R hand.  Long Term Goals Status:   MODIFIED     Reasons for Recertification of Therapy:   continue to be limited in full independence of ADLs due to hand and shoulder       GOALS:      Pt will achieve shoulder AROM in flexion/scaption/abduction ~120 degrees   2.   Pt will report pain no greater than 1/10 in her R hand during functional use.   3.   Pt will demo shoulder AROM WFL to perform daily and community activities MET   4.   Pt will report improvement in FOTO score by measuring 20% points.   5.  Pt will report improved PITTS in her LF on R hand by 10-15 degrees.   6. Pt will report R hand  strength 30# or higher to be able to open jars, hold pots/pan when cooking    PLAN     Updated Certification Period: 7/28/2023 to 9/22/2023   Recommended Treatment Plan: 2-3 times per week for 8 weeks:  Electrical Stimulation  , Fluidotherapy, Manual Therapy, Moist Heat/ Ice, Neuromuscular Re-ed, Orthotic Management and Training, Paraffin, Patient Education, Self Care, Therapeutic Activities, and Therapeutic  Exercise  Other Recommendations: Lymphedema tx with lymphatouch     Shaunna Stevenson, OT

## 2023-08-19 ENCOUNTER — OFFICE VISIT (OUTPATIENT)
Dept: URGENT CARE | Facility: CLINIC | Age: 83
End: 2023-08-19
Payer: MEDICARE

## 2023-08-19 VITALS
HEIGHT: 60 IN | HEART RATE: 100 BPM | RESPIRATION RATE: 20 BRPM | WEIGHT: 116 LBS | SYSTOLIC BLOOD PRESSURE: 135 MMHG | DIASTOLIC BLOOD PRESSURE: 68 MMHG | BODY MASS INDEX: 22.78 KG/M2 | OXYGEN SATURATION: 96 % | TEMPERATURE: 96 F

## 2023-08-19 DIAGNOSIS — M25.532 LEFT WRIST PAIN: Primary | ICD-10-CM

## 2023-08-19 PROCEDURE — 99213 PR OFFICE/OUTPT VISIT, EST, LEVL III, 20-29 MIN: ICD-10-PCS | Mod: S$GLB,,,

## 2023-08-19 PROCEDURE — 73110 X-RAY EXAM OF WRIST: CPT | Mod: FY,LT,S$GLB, | Performed by: RADIOLOGY

## 2023-08-19 PROCEDURE — 73110 XR WRIST COMPLETE 3 VIEWS LEFT: ICD-10-PCS | Mod: FY,LT,S$GLB, | Performed by: RADIOLOGY

## 2023-08-19 PROCEDURE — 73130 XR HAND COMPLETE 3 VIEW LEFT: ICD-10-PCS | Mod: FY,LT,S$GLB, | Performed by: RADIOLOGY

## 2023-08-19 PROCEDURE — 73130 X-RAY EXAM OF HAND: CPT | Mod: FY,LT,S$GLB, | Performed by: RADIOLOGY

## 2023-08-19 PROCEDURE — 99213 OFFICE O/P EST LOW 20 MIN: CPT | Mod: S$GLB,,,

## 2023-08-19 RX ORDER — DICLOFENAC SODIUM 10 MG/G
2 GEL TOPICAL DAILY
Qty: 2 G | Refills: 0 | Status: SHIPPED | OUTPATIENT
Start: 2023-08-19 | End: 2023-09-13

## 2023-08-19 NOTE — PATIENT INSTRUCTIONS
R.I.C.E:    Rest, apply ice intermittently, compress with wrist brace, and elevate above heart level as much as possible.  Do not apply ice directly to skin. Wrap ice in cloth before applying.    OTC Tylenol (acetaminophen) is safe for short periods and can be used for pain, and fever. However in high doses and prolonged use it can cause liver irritation.    OTC Voltaren topical cream may be applied for relief, as long as you do not have any allergy to the ingredients.    You will need to follow-up with primary care provider if your symptoms persist, as we discussed.

## 2023-08-19 NOTE — PROGRESS NOTES
Subjective:      Patient ID: Laverne Humphries is a 82 y.o. female.    Vitals:  height is 5' (1.524 m) and weight is 52.6 kg (116 lb). Her oral temperature is 96 °F (35.6 °C). Her blood pressure is 135/68 and her pulse is 100. Her respiration is 20 and oxygen saturation is 96%.     Chief Complaint: Arm Pain    83 yo female Pt present to clinic with pain in her left wrist and hand. stated she had a pain on her shoulder yesterday but has since resolved. At rest, no pain. With certain movements pain increases to a 9/10. Has tried 1 dose of tylenol in total for relief without significant improvement. State she is having trouble in holding something with her left hand due to the pain. Denies any known trauma or fall.  Denies previous surgeries in left upper extremity. Denies numbness/tingling in fingers. States she does sleep on left side ever since getting R rotator cuff surgery. Denies history or carpal tunnel. Allergic to codeine, sulfa abx.     Arm Pain   The incident occurred 12 to 24 hours ago. The incident occurred at home. The pain is present in the left hand, left shoulder, left wrist and left fingers. The quality of the pain is described as burning. The pain radiates to the left hand and left arm. The pain is at a severity of 9/10. The pain is severe. The pain has been Constant since the incident. Associated symptoms include muscle weakness. Pertinent negatives include no chest pain, numbness or tingling. The symptoms are aggravated by lifting. She has tried nothing for the symptoms.       Constitution: Negative for chills and fever.   Cardiovascular:  Negative for chest pain.   Musculoskeletal:  Positive for pain and joint pain. Negative for trauma and joint swelling. Limited range of motion: due to pain.  Skin:  Negative for rash, wound, abrasion, laceration, erythema and bruising.   Neurological:  Negative for numbness and tingling.      Objective:     Vitals:    08/19/23 1518   BP: 135/68   Pulse: 100    Resp: 20   Temp: 96 °F (35.6 °C)       Physical Exam   Constitutional: She is oriented to person, place, and time. She appears well-developed.  Non-toxic appearance. She does not appear ill. No distress.   HENT:   Head: Normocephalic and atraumatic. Head is without abrasion, without contusion and without laceration.   Ears:   Right Ear: External ear normal.   Left Ear: External ear normal.   Nose: Nose normal.   Mouth/Throat: Oropharynx is clear and moist and mucous membranes are normal.   Eyes: Conjunctivae, EOM and lids are normal. Pupils are equal, round, and reactive to light.   Neck: Trachea normal and phonation normal. Neck supple.   Cardiovascular: Normal rate, regular rhythm and normal heart sounds.   Pulmonary/Chest: Effort normal and breath sounds normal. No stridor. No respiratory distress.   Musculoskeletal: Normal range of motion.         General: Tenderness present. No swelling or signs of injury. Normal range of motion.      Right shoulder: Normal.      Left shoulder: Normal. She exhibits normal range of motion, no tenderness and no swelling.      Right elbow: Normal.     Left elbow: Normal. She exhibits normal range of motion, no swelling and no effusion. No tenderness found.      Right wrist: Normal.      Left wrist: She exhibits tenderness and bony tenderness. She exhibits normal range of motion and no swelling.      Right upper arm: Normal.      Left upper arm: Normal.      Right forearm: Normal.      Left forearm: Normal. She exhibits no tenderness, no bony tenderness and no swelling.      Right hand: Normal. She exhibits normal capillary refill. Decreased sensation is not present in the ulnar distribution, is not present in the medial distribution and is not present in the radial distribution. Motor /Testing: The patient has normal right wrist strength.      Left hand: Normal. She exhibits normal range of motion, no bony tenderness and normal capillary refill. Normal sensation noted. Decreased  sensation is not present in the ulnar distribution, is not present in the medial redistribution and is not present in the radial distribution. Normal strength noted. Motor /Testing: Left wrist extension strength: painful. Index Finger: 5/5. Middle Finger: 5/5. Ring Finger: 5/5. Little Finger: 5/5. Testing: no Tinel's sign at left wrist. Finkelstein's test: positive.      Comments: Pain localized from wrist to hand on Left side. Patient R hand dominant. Pain with certain movements including L wrist extension and supination/pronation. Pain does not radiate to elbow. No visible external abnormalities including erythema or bruising. Capillary refill normal. RP2+.    Neurological: She is alert and oriented to person, place, and time. She displays no weakness. Gait normal.   Skin: Skin is warm, dry, intact, not diaphoretic and no rash. Capillary refill takes less than 2 seconds. No abrasion, No burn, No bruising, No erythema and No ecchymosis   Psychiatric: Her speech is normal and behavior is normal. Judgment and thought content normal.   Nursing note and vitals reviewed.      Assessment:     1. Left wrist pain      X-Ray Hand 3 view Left    Result Date: 8/19/2023  EXAMINATION: XR WRIST COMPLETE 3 VIEWS LEFT; XR HAND COMPLETE 3 VIEW LEFT CLINICAL HISTORY: Pain in left wrist TECHNIQUE: PA, lateral, and oblique views of the left wrist were performed.  Three views left hand. COMPARISON: None FINDINGS: Three views left wrist, three views left hand. There is osteopenia.  There is irregularity along the articular surface of the distal radius, corticated nature suggests remote injury although correlation with any focal tenderness is recommended.  There are degenerative changes of the 1st carpal metacarpal joint.  There are degenerative changes of the PIP and D IP joints.  No dislocation.  There is mild edema about the dorsal aspect of the wrist.     This report was flagged in Epic as abnormal. 1. Cortical irregularity involving  the distal aspect of the radius at the articular surface.  Corticated nature suggests sequela of prior injury however correlation with any focal tenderness or history of recent trauma recommended, subacute injury remains a consideration. 2. No acute displaced fracture or dislocation of the hand. Electronically signed by: Abhay Charles MD Date:    08/19/2023 Time:    16:17    XR WRIST COMPLETE 3 VIEWS LEFT    Result Date: 8/19/2023  EXAMINATION: XR WRIST COMPLETE 3 VIEWS LEFT; XR HAND COMPLETE 3 VIEW LEFT CLINICAL HISTORY: Pain in left wrist TECHNIQUE: PA, lateral, and oblique views of the left wrist were performed.  Three views left hand. COMPARISON: None FINDINGS: Three views left wrist, three views left hand. There is osteopenia.  There is irregularity along the articular surface of the distal radius, corticated nature suggests remote injury although correlation with any focal tenderness is recommended.  There are degenerative changes of the 1st carpal metacarpal joint.  There are degenerative changes of the PIP and D IP joints.  No dislocation.  There is mild edema about the dorsal aspect of the wrist.     This report was flagged in Epic as abnormal. 1. Cortical irregularity involving the distal aspect of the radius at the articular surface.  Corticated nature suggests sequela of prior injury however correlation with any focal tenderness or history of recent trauma recommended, subacute injury remains a consideration. 2. No acute displaced fracture or dislocation of the hand. Electronically signed by: Abhay Charles MD Date:    08/19/2023 Time:    16:17     Plan:       Left wrist pain  -     XR WRIST COMPLETE 3 VIEWS LEFT; Future; Expected date: 08/19/2023  -     X-Ray Hand 3 view Left  -     WRIST BRACE FOR HOME USE  -     diclofenac sodium (VOLTAREN) 1 % Gel; Apply 2 g topically once daily. for 14 days  Dispense: 2 g; Refill: 0        Patient Instructions   R.I.C.E:    Rest, apply ice intermittently,  compress with wrist brace, and elevate above heart level as much as possible.  Do not apply ice directly to skin. Wrap ice in cloth before applying.    OTC Tylenol (acetaminophen) is safe for short periods and can be used for pain, and fever. However in high doses and prolonged use it can cause liver irritation.    OTC Voltaren topical cream may be applied for relief, as long as you do not have any allergy to the ingredients.    You will need to follow-up with orthopedics if your symptoms persist, as we discussed.      Medical Decision Making:   History:   Old Medical Records: I decided to obtain old medical records.  Independently Interpreted Test(s):   I have ordered and independently interpreted X-rays - see summary below.       <> Summary of X-Ray Reading(s): No acute fracture or dislocation seen. Normal DJD as expected. Will confirm with radiologist.   Clinical Tests:   Radiological Study: Ordered and Reviewed

## 2023-08-20 ENCOUNTER — PATIENT MESSAGE (OUTPATIENT)
Dept: NEUROLOGY | Facility: CLINIC | Age: 83
End: 2023-08-20
Payer: MEDICARE

## 2023-08-21 ENCOUNTER — PROCEDURE VISIT (OUTPATIENT)
Dept: NEUROLOGY | Facility: CLINIC | Age: 83
End: 2023-08-21
Payer: MEDICARE

## 2023-08-21 DIAGNOSIS — S44.90XA: ICD-10-CM

## 2023-08-21 DIAGNOSIS — M89.09 SHOULDER-HAND SYNDROME: ICD-10-CM

## 2023-08-21 DIAGNOSIS — Z98.890 S/P RIGHT ROTATOR CUFF REPAIR: ICD-10-CM

## 2023-08-21 PROCEDURE — 95885 PR MUSC TST DONE W/NERV TST LIM: ICD-10-PCS | Mod: 59,HCNC,S$GLB, | Performed by: PHYSICAL MEDICINE & REHABILITATION

## 2023-08-21 PROCEDURE — 95886 MUSC TEST DONE W/N TEST COMP: CPT | Mod: HCNC,S$GLB,, | Performed by: PHYSICAL MEDICINE & REHABILITATION

## 2023-08-21 PROCEDURE — 95885 MUSC TST DONE W/NERV TST LIM: CPT | Mod: 59,HCNC,S$GLB, | Performed by: PHYSICAL MEDICINE & REHABILITATION

## 2023-08-21 PROCEDURE — 95886 PR EMG COMPLETE, W/ NERVE CONDUCTION STUDIES, 5+ MUSCLES: ICD-10-PCS | Mod: HCNC,S$GLB,, | Performed by: PHYSICAL MEDICINE & REHABILITATION

## 2023-08-21 PROCEDURE — 95911 PR NERVE CONDUCTION STUDY; 9-10 STUDIES: ICD-10-PCS | Mod: HCNC,S$GLB,, | Performed by: PHYSICAL MEDICINE & REHABILITATION

## 2023-08-21 PROCEDURE — 95911 NRV CNDJ TEST 9-10 STUDIES: CPT | Mod: HCNC,S$GLB,, | Performed by: PHYSICAL MEDICINE & REHABILITATION

## 2023-08-21 NOTE — PROCEDURES
Test Date:  2023    Patient:  adam christian : 1940 Physician: Adiel Murphy D.O.   ID#: [id number] SEX: Female Ref. Phys: Luis Angel Wheeler MD     HPI: Adam Christian is a 82 y.o.female who presents for NCS/EMG to evaluate for complex regional pain syndrome of the right upper extremity.  Symptoms mostly in the hand.  Of note, she began to have symptoms on the left over the weekend as well.      NCV & EMG Findings:  Evaluation of the right median motor nerve showed prolonged distal onset latency.  The left median sensory and the right median sensory nerves showed prolonged distal peak latency and decreased conduction velocity.  All remaining nerves (as indicated in the following tables) were within normal limits.  Needle evaluation of the right Abductor Pollicis Brevis muscle showed increased motor unit amplitude and increased motor unit duration.  All remaining muscles (as indicated in the following table) showed no evidence of electrical instability.    Impression:  There is electrophysiologic evidence of a bilateral (sensory on the left, sensorimotor on the right) median mononeuropathy across the wrist (I.e. Carpal tunnel syndrome).  There is no motor axonal loss.  There is no active denervation.  This appears chronic on the right.  This is graded as Moderate in severity on the right, and Mild on the left.      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  This is not a pathologic finding, rather a normal variant.        ___________________________  Adiel Murphy D.O.        NCS+  Motor Nerve Results      Latency Amplitude F-Lat Segment Distance CV Comment   Site (ms) Norm (mV) Norm (ms)  (cm) (m/s) Norm    Left Median (APB)   Wrist 4.1  < 4.4 4.5  > 3.8  Wrist-Palm - - -    Elbow 7.9 - 4.2 -  Elbow-Wrist 21 55  > 51    Right Median (APB)   Wrist *5.1  < 4.4 5.3  > 3.8  Wrist-Palm - - -    Elbow 7.6 - 5.1 -   Elbow-Wrist 21 84  > 51    Left Ulnar (ADM)   Wrist 2.9  < 3.7 6.7  > 3.0         Bel Elbow 5.7 - 5.0 -  Bel Elbow-Wrist 20 71  > 52    Abv Elbow 6.9 - 5.0 -  Abv Elbow-Bel Elbow 8 67  > 43    Right Ulnar (ADM)   Wrist 2.8  < 3.7 9.8  > 3.0         Bel Elbow 6.5 - 7.8 -  Bel Elbow-Wrist 22 59  > 52    Abv Elbow 7.4 - 8.4 -  Abv Elbow-Bel Elbow 6 67  > 43      Sensory Nerve Results      Latency (Peak) Amplitude (P-P) Segment Distance CV Comment   Site (ms) Norm (µV) Norm  (cm) (m/s) Norm    Left Median   Wrist-Dig II *4.3  < 4.0 28  > 15 Wrist-Dig II 14 *33  > 39    Right Median   Wrist-Dig II *4.4  < 4.0 44  > 15 Wrist-Dig II 14 *32  > 39    Left Ulnar   Wrist-Dig V 3.2  < 4.0 58  > 13 Wrist-Dig V 14 44  > 38    Right Ulnar   Wrist-Dig V 3.4  < 4.0 49  > 13 Wrist-Dig V 14 41  > 38    Left Radial   Forearm-Wrist 1.68  < 2.8 36  > 11 Forearm-Wrist 10 60 -    Right Radial   Forearm-Wrist 2.2  < 2.8 37  > 11 Forearm-Wrist 10 45 -      EMG+     Side Muscle Nerve Root Ins Act Fibs Psw Amp Dur Poly Recrt Int Pat Comment   Right Deltoid Axillary C5-C6 Nml Nml Nml Nml Nml 0 Nml Nml    Right Biceps Musculocut C5-C6 Nml Nml Nml Nml Nml 0 Nml Nml    Right Triceps Radial C6-C8 Nml Nml Nml Nml Nml 0 Nml Nml    Right Pronator Teres Median C6-C7 Nml Nml Nml Nml Nml 0 Nml Nml    Right APB Median C8-T1 Nml Nml Nml *Incr *>12ms 0 Nml Nml    Left APB Median C8-T1 Nml Nml Nml Nml Nml 0 Nml Nml            Waveforms:    Motor              Sensory

## 2023-08-22 ENCOUNTER — OFFICE VISIT (OUTPATIENT)
Dept: OBSTETRICS AND GYNECOLOGY | Facility: CLINIC | Age: 83
End: 2023-08-22
Payer: MEDICARE

## 2023-08-22 VITALS
SYSTOLIC BLOOD PRESSURE: 134 MMHG | WEIGHT: 116.88 LBS | DIASTOLIC BLOOD PRESSURE: 74 MMHG | BODY MASS INDEX: 22.82 KG/M2

## 2023-08-22 DIAGNOSIS — N95.2 POSTMENOPAUSAL ATROPHIC VAGINITIS: ICD-10-CM

## 2023-08-22 DIAGNOSIS — L29.2 VULVAR ITCHING: Primary | ICD-10-CM

## 2023-08-22 PROCEDURE — 3288F PR FALLS RISK ASSESSMENT DOCUMENTED: ICD-10-PCS | Mod: HCNC,CPTII,S$GLB, | Performed by: NURSE PRACTITIONER

## 2023-08-22 PROCEDURE — 87086 URINE CULTURE/COLONY COUNT: CPT | Mod: HCNC | Performed by: NURSE PRACTITIONER

## 2023-08-22 PROCEDURE — 87077 CULTURE AEROBIC IDENTIFY: CPT | Mod: HCNC | Performed by: NURSE PRACTITIONER

## 2023-08-22 PROCEDURE — 3075F PR MOST RECENT SYSTOLIC BLOOD PRESS GE 130-139MM HG: ICD-10-PCS | Mod: HCNC,CPTII,S$GLB, | Performed by: NURSE PRACTITIONER

## 2023-08-22 PROCEDURE — 99214 PR OFFICE/OUTPT VISIT, EST, LEVL IV, 30-39 MIN: ICD-10-PCS | Mod: HCNC,S$GLB,, | Performed by: NURSE PRACTITIONER

## 2023-08-22 PROCEDURE — 3078F DIAST BP <80 MM HG: CPT | Mod: HCNC,CPTII,S$GLB, | Performed by: NURSE PRACTITIONER

## 2023-08-22 PROCEDURE — 87088 URINE BACTERIA CULTURE: CPT | Mod: HCNC | Performed by: NURSE PRACTITIONER

## 2023-08-22 PROCEDURE — 1159F MED LIST DOCD IN RCRD: CPT | Mod: HCNC,CPTII,S$GLB, | Performed by: NURSE PRACTITIONER

## 2023-08-22 PROCEDURE — 99214 OFFICE O/P EST MOD 30 MIN: CPT | Mod: HCNC,S$GLB,, | Performed by: NURSE PRACTITIONER

## 2023-08-22 PROCEDURE — 1126F AMNT PAIN NOTED NONE PRSNT: CPT | Mod: HCNC,CPTII,S$GLB, | Performed by: NURSE PRACTITIONER

## 2023-08-22 PROCEDURE — 1159F PR MEDICATION LIST DOCUMENTED IN MEDICAL RECORD: ICD-10-PCS | Mod: HCNC,CPTII,S$GLB, | Performed by: NURSE PRACTITIONER

## 2023-08-22 PROCEDURE — 1101F PT FALLS ASSESS-DOCD LE1/YR: CPT | Mod: HCNC,CPTII,S$GLB, | Performed by: NURSE PRACTITIONER

## 2023-08-22 PROCEDURE — 87186 SC STD MICRODIL/AGAR DIL: CPT | Mod: HCNC | Performed by: NURSE PRACTITIONER

## 2023-08-22 PROCEDURE — 3078F PR MOST RECENT DIASTOLIC BLOOD PRESSURE < 80 MM HG: ICD-10-PCS | Mod: HCNC,CPTII,S$GLB, | Performed by: NURSE PRACTITIONER

## 2023-08-22 PROCEDURE — 3075F SYST BP GE 130 - 139MM HG: CPT | Mod: HCNC,CPTII,S$GLB, | Performed by: NURSE PRACTITIONER

## 2023-08-22 PROCEDURE — 1101F PR PT FALLS ASSESS DOC 0-1 FALLS W/OUT INJ PAST YR: ICD-10-PCS | Mod: HCNC,CPTII,S$GLB, | Performed by: NURSE PRACTITIONER

## 2023-08-22 PROCEDURE — 99999 PR PBB SHADOW E&M-EST. PATIENT-LVL III: ICD-10-PCS | Mod: PBBFAC,HCNC,, | Performed by: NURSE PRACTITIONER

## 2023-08-22 PROCEDURE — 99999 PR PBB SHADOW E&M-EST. PATIENT-LVL III: CPT | Mod: PBBFAC,HCNC,, | Performed by: NURSE PRACTITIONER

## 2023-08-22 PROCEDURE — 1126F PR PAIN SEVERITY QUANTIFIED, NO PAIN PRESENT: ICD-10-PCS | Mod: HCNC,CPTII,S$GLB, | Performed by: NURSE PRACTITIONER

## 2023-08-22 PROCEDURE — 3288F FALL RISK ASSESSMENT DOCD: CPT | Mod: HCNC,CPTII,S$GLB, | Performed by: NURSE PRACTITIONER

## 2023-08-22 RX ORDER — CLOBETASOL PROPIONATE 0.5 MG/G
OINTMENT TOPICAL 2 TIMES DAILY
Qty: 45 G | Refills: 0 | Status: SHIPPED | OUTPATIENT
Start: 2023-08-22 | End: 2023-11-13

## 2023-08-22 NOTE — PROGRESS NOTES
Laverne Humphries is a 82 y.o. female  presents with complaint of vulvar itching. New to me- here with her daughter. Started a few months ago- saw pcp who did a speculum exam, said there was a vaginal lesion that may need to be biopsied. Told to use hydrocortisone cream on labia- no improvement. Itching comes and goes, mainly on bilateral labia, L>R. Started a few months ago. Denies abnormal vaginal discharge, odor, no itching inside vagina. Does not clean inside of vagina, does use soap externally. History of hysterectomy. Has been prescribed estrace cream in the past but was not consistent and stopped.     Past Medical History:   Diagnosis Date    Allergy     Anxiety     Arthritis     Asthma     mild    Cataract     CKD (chronic kidney disease) stage 3, GFR 30-59 ml/min     Hepatitis     IBS (irritable bowel syndrome)     Moderate aortic valve stenosis     Osteoporosis     Paroxysmal atrial fibrillation     Reflux esophagitis     Torus palatinus      Past Surgical History:   Procedure Laterality Date    ARTHROSCOPIC DEBRIDEMENT OF SHOULDER Right 3/21/2023    Procedure: DEBRIDEMENT, SHOULDER, ARTHROSCOPIC;  Surgeon: Luis Angel Wheeler MD;  Location: [facility] OR;  Service: Orthopedics;  Laterality: Right;    ARTHROSCOPIC REPAIR OF ROTATOR CUFF OF SHOULDER Right 3/21/2023    Procedure: REPAIR, ROTATOR CUFF, ARTHROSCOPIC;  Surgeon: Luis Angel Wheeler MD;  Location: [facility] OR;  Service: Orthopedics;  Laterality: Right;    ARTHROSCOPY OF SHOULDER WITH DECOMPRESSION OF SUBACROMIAL SPACE Right 3/21/2023    Procedure: ARTHROSCOPY, SHOULDER, WITH SUBACROMIAL SPACE DECOMPRESSION;  Surgeon: Luis Angel Wheeler MD;  Location: [facility] OR;  Service: Orthopedics;  Laterality: Right;    BELPHAROPTOSIS REPAIR Bilateral     CATARACT EXTRACTION W/  INTRAOCULAR LENS IMPLANT Bilateral     CATARACT EXTRACTION, BILATERAL      CHOLECYSTECTOMY      COLONOSCOPY N/A 2022    Procedure: COLONOSCOPY;  Surgeon: Emmanuel Sandoval MD;  Location:  [facility] ENDO (4TH FLR);  Service: Endoscopy;  Laterality: N/A;    ESOPHAGOGASTRODUODENOSCOPY N/A 2022    Procedure: EGD (ESOPHAGOGASTRODUODENOSCOPY) any GI doc, please perform colonoscopy and EGD at same time;  Surgeon: Emmanuel Sandoval MD;  Location: [facility] ENDO (4TH FLR);  Service: Endoscopy;  Laterality: N/A;  EGD & Colonoscopy orders combined-MS  fully vaccinated   covid test  @ [facility]    EYE SURGERY      FLEXIBLE SIGMOIDOSCOPY  2022    Procedure: SIGMOIDOSCOPY, FLEXIBLE;  Surgeon: Alison Mcclain MD;  Location: [facility];  Service: Colon and Rectal;;    HYSTERECTOMY      LYSIS, ADHESIONS, SHOULDER, ARTHROSCOPIC  3/21/2023    Procedure: LYSIS,ADHESIONS,SHOULDER,ARTHROSCOPIC;  Surgeon: Luis Angel Wheeler MD;  Location: [facility];  Service: Orthopedics;;    OOPHORECTOMY      ROBOT-ASSISTED LAPAROSCOPIC COLECTOMY USING DA DIANA XI N/A 2022    Procedure: XI ROBOTIC COLECTOMY with flexible sigmoidoscopy;  Surgeon: Alison Mcclain MD;  Location: [facility];  Service: Colon and Rectal;  Laterality: N/A;     Social History     Tobacco Use    Smoking status: Never     Passive exposure: Never    Smokeless tobacco: Never   Substance Use Topics    Alcohol use: No    Drug use: No     Family History   Problem Relation Age of Onset    Cancer Mother         pancreatic    Cataracts Father     Diabetes Father     Asthma Sister     Cataracts Sister     Cancer Brother         liver and esophageal    No Known Problems Daughter     No Known Problems Son     Amblyopia Neg Hx     Blindness Neg Hx     Glaucoma Neg Hx     Macular degeneration Neg Hx     Retinal detachment Neg Hx     Strabismus Neg Hx     Breast cancer Neg Hx     Colon cancer Neg Hx     Ovarian cancer Neg Hx      OB History    Para Term  AB Living   2 2 2     2   SAB IAB Ectopic Multiple Live Births           2      # Outcome Date GA Lbr Norm/2nd Weight Sex Delivery Anes PTL Lv   2 Term      Vag-Spont   AMANDEEP   1 Term      Vag-Spont   AMANDEEP        ROS:  GENERAL: No fever, chills, fatigability or weight loss.  VULVAR: No pain, no lesions and + itching.  VAGINAL: No relaxation, no itching, no discharge, no abnormal bleeding and no lesions.  ABDOMEN: No abdominal pain. Denies nausea. Denies vomiting. No diarrhea. No constipation  BREAST: Denies pain. No lumps. No discharge.  URINARY: No incontinence, no nocturia, no frequency and no dysuria.  CARDIOVASCULAR: No chest pain. No shortness of breath. No leg cramps.  NEUROLOGICAL: No headaches. No vision changes.    PHYSICAL EXAM:  VULVA: normal appearing vulva with no masses, tenderness or lesions   VAGINA:  atrophic normal appearing vagina with normal color. No abnormal discharge, no lesions   CERVIX: surgically absent  UTERUS: surgically absent    ASSESSMENT and PLAN:    ICD-10-CM ICD-9-CM    1. Vulvar itching  L29.2 698.1 Ambulatory referral/consult to Gynecology      POCT URINE DIPSTICK WITHOUT MICROSCOPE      Urine culture      clobetasol 0.05% (TEMOVATE) 0.05 % Oint      2. Postmenopausal atrophic vaginitis  N95.2 627.3 POCT URINE DIPSTICK WITHOUT MICROSCOPE      Urine culture        Trial clobetasol x 1 week on vulva  Encouraged to avoid soaps and other irritants  If itching continues will fu with Dr. Youssef for possible biopsy.   Discussed estrace for atrophy- declines for now  Requesting urine to be sent to r/o UTI- denies any symptoms    Patient was counseled today on vaginitis prevention including :  a. avoiding feminine products such as deoderant soaps, body wash, bubble bath, douches, scented toilet paper, deoderant tampons or pads, feminine wipes, chronic pad use, etc.  b. avoiding other vulvovaginal irritants such as long hot baths, humidity, tight, synthetic clothing, chlorine and sitting around in wet bathing suits  c. wearing cotton underwear, avoiding thong underwear and no underwear to bed  d. taking showers instead of baths and use a hair dryer on cool setting afterwards to dry  e. wearing  cotton to exercise and shower immediately after exercise and change clothes  f. using polyurethane condoms without spermicide if sexually active and symptoms are triggered by intercourse    FOLLOW UP: PRN lack of improvement.    As of April 1, 2021, the Cures Act has been passed nationally. This new law requires that all doctors progress notes, lab results, pathology reports and radiology reports be released IMMEDIATELY to the patient in the patient portal. That means that the results are released to you at the EXACT same time they are released to me. Therefore, with all of the patients that I have I am not able to reply to each patient exactly when the results come in. So there will be a delay from when you see the results to when I see them and have time to come up with a response to send you. Also I only see these results when I am on the computer at work. So if the results come in over the weekend or after 5 pm of a work day, I will not see them until the next business day. As you can tell, this is a challenge as a provider to give every patient the quick response they hope for and deserve. So please be patient!   Thanks for your understanding and patience.

## 2023-08-24 LAB — BACTERIA UR CULT: ABNORMAL

## 2023-08-25 ENCOUNTER — CLINICAL SUPPORT (OUTPATIENT)
Dept: REHABILITATION | Facility: HOSPITAL | Age: 83
End: 2023-08-25
Payer: MEDICARE

## 2023-08-25 DIAGNOSIS — R60.0 LOCALIZED EDEMA: ICD-10-CM

## 2023-08-25 DIAGNOSIS — R52 PAIN: ICD-10-CM

## 2023-08-25 DIAGNOSIS — M25.611 DECREASED RANGE OF MOTION OF RIGHT SHOULDER: Primary | ICD-10-CM

## 2023-08-25 DIAGNOSIS — M79.641 HAND PAIN, RIGHT: ICD-10-CM

## 2023-08-25 DIAGNOSIS — N30.00 ACUTE CYSTITIS WITHOUT HEMATURIA: Primary | ICD-10-CM

## 2023-08-25 PROCEDURE — 97110 THERAPEUTIC EXERCISES: CPT

## 2023-08-25 PROCEDURE — 97140 MANUAL THERAPY 1/> REGIONS: CPT

## 2023-08-25 RX ORDER — NITROFURANTOIN 25; 75 MG/1; MG/1
100 CAPSULE ORAL 2 TIMES DAILY
Qty: 14 CAPSULE | Refills: 0 | Status: SHIPPED | OUTPATIENT
Start: 2023-08-25 | End: 2023-10-02

## 2023-08-25 RX ORDER — FLUCONAZOLE 200 MG/1
200 TABLET ORAL ONCE
Qty: 1 TABLET | Refills: 0 | Status: SHIPPED | OUTPATIENT
Start: 2023-08-25 | End: 2023-09-13

## 2023-08-25 NOTE — PROGRESS NOTES
"    [facility]  Occupational Therapy Treatment Note     Date: 8/25/2023  Name: Laverne CELIS [place]  [facility] Number: [id number]    Therapy Diagnosis:   Encounter Diagnoses   Name Primary?    Decreased range of motion of right shoulder Yes    Localized edema     Pain     Hand pain, right            Physician: Luis Angel Wheeler MD    Physician Orders:Eval and treat;  s/p RCR/lymphedema treatments into hand  Medical Diagnosis: Z98.890 (ICD-10-CM) - S/P right rotator cuff repair  Surgical Procedure and Date: 3/21/2023,   1. Right shoulder Arthroscopic massive complex rotator cuff repair    2. Right shoulder Arthroscopic extensive debridement   3. Right shoulder Arthroscopic lysis of adhesions   4. Right shoulder Arthroscopic subacromial decompression and bursectomy   5. Right shoulder arthroscopic loose body removal    Evaluation Date: 5/3/2023  Insurance Authorization Period Expiration: 12/31/23  Plan of Care Expiration: 12 weeks; 7/28/2023  Date of Return to MD: 7/3/2023  Visit # / Visits authorized: 24 / 40  FOTO: (date and score)     Precautions:  Standard       Time In:     07:55 AM   Time Out:   08:55 AM  Total Appointment Time (timed & untimed codes):    60      minutes       SUBJECTIVE       Pt reports: "I think still swollen but better   She was compliant with home exercise program given .   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    Observation/Appearance: mod edema in hand        Edema. Measured in centimeters.    5/9/2023 5/9/2023 8/3/2023 8/3/2023     Left Right Left  Right                            Wrist Crease 14.0 cm  16.0 cm               MCPs 18.4.0 cm  20.5 cm 18.0 cm  19.0 cm                  8/7/2023 IF LF RF SF    Right        P1 6.5 6.1 5.7 5.0    PIP 5.3 5.2 4.6 4.2    P2 " 4.6 4.3 3.8 3.5    Left        P1 5.3 5.0 4.7 4.4    PIP 4.6 4.6      P2             Shoulder ROM. Measured in degrees    5/3/2022 5/3/2023 5/11/2023 6/9/2023 6/30/2023 6/30/2023 7/25/23     Left Right PROM Right PROM Right AROM  Right AROM Right PROM R AROM   Shoulder Flexion  WFL 85 degrees  90 90 115 125 110   Shoulder Abduction  WFL NT 80 90 90 95 100   Shoulder Extension    NT NT     50   Shoulder IR   NT NT L5   37, L5   Shoulder ER  70 NT 20 65   60   Shoulder ER @0       48        Elbow and Wrist ROM. Measured in degrees.    5/9/2023 5/9/2023     Left Right   Elbow Ext/Flex WNL/WNL  12/135    Supination/Pronation WNL/WNL  66/75   Wrist Ext/Flex 55/70 39/6   Wrist RD/UD             Hand ROM. Measured in degrees.    5/9/2023 5/9/2023 6/22/2023 8/18/2023     Left Right Right  Right          After tx   Index: MP    68 67 66              PIP       50 91 78              DIP   30 40 30              PITTS   148 198              Long:  MP   65 78 80              PIP   58 80 85              DIP   62 55 55              PITTS   185 213              Ring:   MP   55 80 80              PIP   60 92 95              DIP   32 45 45              PITTS   147 217                       Strength (Dynamometer) and Pinch Strength (Pinch Gauge)  Measured in pounds.    6/9/2023 6/9/2023 7/7/2023 7/18/2023      Left Right Right Right   Rung II  41 20 25 21   Key Pinch         3pt Pinch         2pt Pinch               Manual Muscle Test    5/9/2023 5/9/2023     Left Right   Wrist Extension        Wrist Flexion       Radial Deviation       Ulnar Deviation       Supination       Pronation       Elbow Extension       Elbow Flexion             Limitation/Restriction for FOTO initial eval; shoulder Survey     Therapist reviewed FOTO scores for Laverne LALITA [place] on 5/3/2023.   FOTO documents entered into [employer] - see Media section.     Limitation Score: needs to be taken%        Treatment     Leboodia received the treatments listed below:          Fluidotherapy: To R hand  for 10 min, continuous air, 115 deg, air speed 50 to decrease pain, edema & scar tissue, sensory re- education, and increased tissue extensibility prior to therex  Supervised modalities: MHP R shoulder  for 10 minutes to promote increased blood flow            Manual therapy techniques: Manual Lymphatic Drainage were applied to the: RUE for 15 minutes, including:  - STM with star tool palmar aponeurosis   - passive shoulder in D1 pattern x 10 reps   - passive ROM LF (hook grasp) x 10 reps   - passive LF hyperextension holding 10s x 10 reps              Therapeutic exercises to develop ROM for 25 minutes, including:   - concentric teres major/subscapularis with RED theraband x 10 reps each (2 sets)   - B ext rotation with RED theraband x 10 reps (1 set)   - hook grasp MP blocking x 10 reps (2 sets)   - RED theraband rows  x 10 reps (2 sets)   - B RED therband shoulder extension x 10 (2 sets)           Not performed today:   - AAROM flexion/scaption with 4# dowel x 10 reps supine on mat (2 sets)  - sidelying abduction and ER, 2 x 15 reps  - supine dynamic stabilization with 1#, alphabet x 1 set   - AAROM serratus punch with 2# dowel 2 x 10 reps   - light elbow PRE, elbow flexion palm up and FA in neutral 2 x 10 reps each, 1# NT   - RED theraband rows x 10 reps (3 sets)   - 4# dowel shoulder extension x 10 reps (2 sets)  - yellow theraband shoulder extension x 10 reps (3 sets)   - yellow theraband shoulder ER/IR x 15 reps (2 sets) each         Neuro re-ed: 10 min NT   - rice bin (6 min)   - in hand manipulation with marbles         Patient Education and Home Exercises      Education provided:   - cont HEP  - Progress towards goals       Written Home Exercises Provided: Patient instructed to cont prior HEP.  Exercises were reviewed and Laverne was able to demonstrate them prior to the end of the session.  Laverne demonstrated good  understanding of the HEP provided. See EMR under  Patient Instructions for exercises provided during therapy sessions.      ASSESSMENT     Improved capsular tightness however cont to benefit from increased prolonged anterior inferior stretch. Cont to have fibrous tightening within A1 pulley. Wear RMO during the day to offload A1. Able to alex stretch and passive pain free.             Pt participated well and is motivated.     Laverne is progressing fairly well towards her goals and there are no updates to goals at this time. Pt prognosis is fair.     Pt will continue to benefit from skilled outpatient occupational therapy to address the deficits listed in the problem list on initial evaluation, provide pt/family education and to maximize pt's level of independence in the home and community environment.     Pt's spiritual, cultural and educational needs considered and pt agreeable to plan of care and goals.    Anticipated barriers to occupational therapy: preexisting conditions       Goals:  Long term goals:   Pt will achieve shoulder AROM in flexion/scaption/abduction ~120 degrees ---progressing  2.   Assess MMT when appropriate------progressing  3.   Pt will demo shoulder AROM WFL to perform daily and community activities ---progressing  4.   Pt will report improvement in FOTO score by measuring 20% points. ---progressing        Short term goals:   Pt will be complaint with HEP and pain management ---progressing  Pt will achieve shoulder PROM in flexion and scaption ~110 degrees ---progressing  Pt will report improvement in FOTO by decreasing 10% limitation points ---progressing  Pt will achieve shoulder AAROM WFL to aid in ADLs ---progressing  Pt will be able to demo full composite fist with R hand.---progressing    PLAN     Continue skilled occupational therapy with individualized plan of care focusing on improving functional independence with use of RUE    Updates/Grading for next session: cont per large massive type III protocol. Needs reassessment next  "session and updated POC    Melissa Landeche, OT [facility]  Occupational Therapy Treatment Note    Date: 8/25/2023  Name: Laverne Humphries  Clinic Number: [id number]    Therapy Diagnosis:   Encounter Diagnoses   Name Primary?    Decreased range of motion of right shoulder Yes    Localized edema     Pain     Hand pain, right                          Physician: Luis Angel Wheeler MD    Physician Orders:Eval and treat;  s/p RCR/lymphedema treatments into hand  Medical Diagnosis: Z98.890 (ICD-10-CM) - S/P right rotator cuff repair  Surgical Procedure and Date: 3/21/2023,   1. Right shoulder Arthroscopic massive complex rotator cuff repair    2. Right shoulder Arthroscopic extensive debridement   3. Right shoulder Arthroscopic lysis of adhesions   4. Right shoulder Arthroscopic subacromial decompression and bursectomy   5. Right shoulder arthroscopic loose body removal    Evaluation Date: 5/3/2023  Insurance Authorization Period Expiration: 05/07/2023  Plan of Care Expiration: 12 weeks; 7/28/2023  Date of Return to MD: 7/3/2023  Visit # / Visits authorized: 24 / 40  FOTO: (date and score)     Precautions:  Standard     Time In:     08:05 AM   Time Out:      09:05   AM     Total Appointment Time (timed & untimed codes): 60 minutes       SUBJECTIVE     Pt reports: "Yesterday when I left here my swelling went down but last night my hand was more swollen and my shoulder was sore"   She was compliant with home exercise program given last session.   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 Reaching easier and opening things easier    Pain: 0/10  Location: right hand     OBJECTIVE   Objective Measures updated at progress report unless specified.    Observation/Appearance: mod edema in hand      Edema. Measured in centimeters.    5/9/2023 5/9/2023     Left " Right   2in. Above elbow       2in. Below elbow       Wrist Crease 14.0 cm  16.0 cm    Figure of 8       MCPs 18.4.0 cm  20.5 cm               Elbow and Wrist ROM. Measured in degrees.    5/9/2023 5/9/2023     Left Right   Elbow Ext/Flex WNL/WNL  12/135    Supination/Pronation WNL/WNL  66/75   Wrist Ext/Flex 55/70 39/6   Wrist RD/UD             Hand ROM. Measured in degrees.    5/9/2023 5/9/2023 6/22/2023 8/18/2023      Left Right Right  Right             Index: MP    68 67               PIP       50 91               DIP   30 40               PITTS   148               Long:  MP   65 78               PIP   58 80               DIP   62 55               PITTS   185               Ring:   MP   55 80               PIP   60 92               DIP   32 45               PITTS   147                        Strength (Dynamometer) and Pinch Strength (Pinch Gauge)  Measured in pounds.    6/9/2023 6/9/2023 7/7/2023 7/18/2023 8/18/2023     Left Right Right Right Right   Rung II  41 20 25 21    Key Pinch          3pt Pinch          2pt Pinch                Manual Muscle Test    5/9/2023 5/9/2023     Left Right   Wrist Extension        Wrist Flexion       Radial Deviation       Ulnar Deviation       Supination       Pronation       Elbow Extension       Elbow Flexion             Limitation/Restriction for FOTO initial eval; shoulder Survey     Therapist reviewed FOTO scores for Laverne Humphries on 5/3/2023.   FOTO documents entered into [employer] - see Media section.     Limitation Score: needs to be taken%        Treatment     Leovidia received the treatments listed below:       Fluidotherapy: To R hand for 10 min, continuous air, 115 deg, air speed 50 to decrease pain, edema & scar tissue, sensory re- education, and increased tissue extensibility prior to therex  Supervised modalities: MHP for 10 minutes to promote increased blood flow         Manual therapy techniques: Manual Lymphatic Drainage were applied to the: RUE for 35  minutes, including:  - passive ROM to flexion/scaption/abduction/ER/IR  x 10 reps each   - passive ROM LF (hook grasp)  - passive ROM LF extension with MP hyperextended for increased stretch   - STM using star tool palmar fascia, focus near A1 and A3 pulley   - passive horizontal abduction with ER/IR x 10 reps each   - passive D1 pattern x 10 reps           Therapeutic exercises to develop ROM for 15  minutes, including:  - horizontal abduction, concentric  ER and IR with RED theraband x 10 reps each   - supine dynamic stabilization with 1#, alphabet x 1 set        Not performed today:   - AAROM flexion/scaption/abduction with 2# dowel x 10 reps each  incline on mat (2 sets)  - sidelying abduction and ER, 2 x 15 reps  - AAROM serratus punch with 2# dowel x 10 reps   - light elbow PRE, elbow flexion palm up and FA in neutral 2 x 10 reps each, 1# NT   - yellow theraband rows x 10 reps (3 sets) NT  - yellow theraband shoulder extension x 10 reps (3 sets) NT  - yellow theraband shoulder ER/IR x 10 reps (2 sets) each NT            Patient Education and Home Exercises      Education provided:   - added towel IR stretch   - Progress towards goals       Written Home Exercises Provided: Patient instructed to cont prior HEP.  Exercises were reviewed and Laverne was able to demonstrate them prior to the end of the session.  Laverne demonstrated good  understanding of the HEP provided. See EMR under Patient Instructions for exercises provided during therapy sessions.      ASSESSMENT     Discussed with patient to return to OT 2x/wk due to increased shoulder tightness.  Cont to have intrinsic tightness and extrinsic tightness in hand.     Laverne is progressing fairly well towards her goals and there are no updates to goals at this time. Pt prognosis is fair.     Pt will continue to benefit from skilled outpatient occupational therapy to address the deficits listed in the problem list on initial evaluation, provide pt/family  education and to maximize pt's level of independence in the home and community environment.     Pt's spiritual, cultural and educational needs considered and pt agreeable to plan of care and goals.    Anticipated barriers to occupational therapy: preexisting conditions       Goals:  Long term goals:           Previous Short Term Goals Status:   Pt will be complaint with HEP and pain management   Pt will achieve shoulder PROM in flexion and scaption ~110 degrees  MET   Pt will report improvement in FOTO by decreasing 10% limitation points   Pt will achieve shoulder AAROM WFL to aid in ADLs MET   Pt will be able to demo full composite fist with R hand.  Long Term Goals Status:   MODIFIED     Reasons for Recertification of Therapy:   continue to be limited in full independence of ADLs due to hand and shoulder       GOALS:      Pt will achieve shoulder AROM in flexion/scaption/abduction ~120 degrees   2.   Pt will report pain no greater than 1/10 in her R hand during functional use.   3.   Pt will demo shoulder AROM WFL to perform daily and community activities MET   4.   Pt will report improvement in FOTO score by measuring 20% points.   5.  Pt will report improved PITTS in her LF on R hand by 10-15 degrees.   6. Pt will report R hand  strength 30# or higher to be able to open jars, hold pots/pan when cooking    PLAN     Updated Certification Period: 7/28/2023 to 9/22/2023   Recommended Treatment Plan: 2-3 times per week for 8 weeks:  Electrical Stimulation  , Fluidotherapy, Manual Therapy, Moist Heat/ Ice, Neuromuscular Re-ed, Orthotic Management and Training, Paraffin, Patient Education, Self Care, Therapeutic Activities, and Therapeutic Exercise  Other Recommendations: Lymphedema tx with lymphatouch     Shaunna Stevenson OT

## 2023-09-01 ENCOUNTER — CLINICAL SUPPORT (OUTPATIENT)
Dept: REHABILITATION | Facility: HOSPITAL | Age: 83
End: 2023-09-01
Payer: MEDICARE

## 2023-09-01 DIAGNOSIS — M79.641 HAND PAIN, RIGHT: ICD-10-CM

## 2023-09-01 DIAGNOSIS — M25.611 DECREASED RANGE OF MOTION OF RIGHT SHOULDER: Primary | ICD-10-CM

## 2023-09-01 DIAGNOSIS — R60.0 LOCALIZED EDEMA: ICD-10-CM

## 2023-09-01 DIAGNOSIS — R52 PAIN: ICD-10-CM

## 2023-09-01 PROCEDURE — 97110 THERAPEUTIC EXERCISES: CPT

## 2023-09-01 PROCEDURE — 97140 MANUAL THERAPY 1/> REGIONS: CPT

## 2023-09-01 NOTE — PROGRESS NOTES
"    [facility]  Occupational Therapy Treatment Note     Date: 9/1/2023  Name: Laverne CELIS Kristel  [facility] Number: [id number]    Therapy Diagnosis:   Encounter Diagnoses   Name Primary?    Decreased range of motion of right shoulder Yes    Localized edema     Pain     Hand pain, right              Physician: Luis Angel Wheeler MD    Physician Orders:Eval and treat;  s/p RCR/lymphedema treatments into hand  Medical Diagnosis: Z98.890 (ICD-10-CM) - S/P right rotator cuff repair  Surgical Procedure and Date: 3/21/2023,   1. Right shoulder Arthroscopic massive complex rotator cuff repair    2. Right shoulder Arthroscopic extensive debridement   3. Right shoulder Arthroscopic lysis of adhesions   4. Right shoulder Arthroscopic subacromial decompression and bursectomy   5. Right shoulder arthroscopic loose body removal    Evaluation Date: 5/3/2023  Insurance Authorization Period Expiration: 12/31/23  Plan of Care Expiration:  9/22/2023   Date of Return to MD: 7/3/2023  Visit # / Visits authorized: 36 / 40  FOTO: (date and score)     Precautions:  Standard       Time In:     07:55 AM   Time Out:   08:55 AM  Total Appointment Time (timed & untimed codes):    60      minutes       SUBJECTIVE         Pt reports: "I started using the Voltaren cream that the doctor gave me when I went to urgent for my left wrist and I think it's helping"   She was compliant with home exercise program given .   Response to previous treatment: increased composite fist, decreased edema   Functional change:  able to sultana/doff bra     Pain: 0/10  Location: right hand     OBJECTIVE   Objective Measures updated at progress report unless specified.    Observation/Appearance: mod edema in hand        Edema. Measured in centimeters.    5/9/2023 5/9/2023 8/3/2023 8/3/2023     Left Right Left  Right                            Wrist Crease 14.0 cm  16.0 cm               MCPs 18.4.0 cm  20.5 cm 18.0 cm  19.0 cm                "   8/7/2023 IF LF RF SF    Right        P1 6.5 6.1 5.7 5.0    PIP 5.3 5.2 4.6 4.2    P2 4.6 4.3 3.8 3.5    Left        P1 5.3 5.0 4.7 4.4    PIP 4.6 4.6      P2             Shoulder ROM. Measured in degrees    5/3/2022 5/3/2023 5/11/2023 6/9/2023 6/30/2023 6/30/2023 7/25/23     Left Right PROM Right PROM Right AROM  Right AROM Right PROM R AROM   Shoulder Flexion  WFL 85 degrees  90 90 115 125 110   Shoulder Abduction  WFL NT 80 90 90 95 100   Shoulder Extension    NT NT     50   Shoulder IR   NT NT L5   37, L5   Shoulder ER  70 NT 20 65   60   Shoulder ER @0       48        Elbow and Wrist ROM. Measured in degrees.    5/9/2023 5/9/2023     Left Right   Elbow Ext/Flex WNL/WNL  12/135    Supination/Pronation WNL/WNL  66/75   Wrist Ext/Flex 55/70 39/6   Wrist RD/UD             Hand ROM. Measured in degrees.    5/9/2023 5/9/2023 6/22/2023 8/18/2023     Left Right Right  Right          After tx   Index: MP    68 67 66              PIP       50 91 78              DIP   30 40 30              PITTS   148 198              Long:  MP   65 78 80              PIP   58 80 85              DIP   62 55 55              PITTS   185 213              Ring:   MP   55 80 80              PIP   60 92 95              DIP   32 45 45              PITTS   147 217                       Strength (Dynamometer) and Pinch Strength (Pinch Gauge)  Measured in pounds.    6/9/2023 6/9/2023 7/7/2023 7/18/2023      Left Right Right Right   Rung II  41 20 25 21   Key Pinch         3pt Pinch         2pt Pinch               Manual Muscle Test    5/9/2023 5/9/2023     Left Right   Wrist Extension        Wrist Flexion       Radial Deviation       Ulnar Deviation       Supination       Pronation       Elbow Extension       Elbow Flexion             Limitation/Restriction for FOTO initial eval; shoulder Survey     Therapist reviewed FOTO scores for Laverne CELIS Kristel on 5/3/2023.   FOTO documents entered into [employer] - see Media section.     Limitation Score:  needs to be taken%        Treatment     Leovidia received the treatments listed below:         Fluidotherapy: To R hand  for 10 min, continuous air, 115 deg, air speed 50 to decrease pain, edema & scar tissue, sensory re- education, and increased tissue extensibility prior to therex (NT)         Supervised modalities: MHP R shoulder  for 10 minutes to promote increased blood flow          Manual therapy techniques: Manual Lymphatic Drainage were applied to the: RUE for 15 minutes, including:  - STM with hawk's  flexors   - passive shoulder in D1 pattern x 5  reps   - passive ROM LF (hook grasp) x 10 reps   - passive LF hyperextension holding 10s x 10 reps              Therapeutic exercises to develop ROM for 25 minutes, including:   - concentric teres major/subscapularis with RED theraband x 10 reps each (2 sets)   - B ext rotation with RED theraband x 10 reps (1 set)   - hook grasp MP blocking x 10 reps (2 sets)   - inclined on mat, scaption/flexion/ER/horizontal abduction with 3# dowel x 10 reps (3 sets) each             Not performed today:   - AAROM flexion/scaption with 4# dowel x 10 reps supine on mat (2 sets)  - sidelying abduction and ER, 2 x 15 reps  - supine dynamic stabilization with 1#, alphabet x 1 set   - AAROM serratus punch with 2# dowel 2 x 10 reps   - light elbow PRE, elbow flexion palm up and FA in neutral 2 x 10 reps each, 1# NT   - RED theraband rows x 10 reps (3 sets)   - 4# dowel shoulder extension x 10 reps (2 sets)  - yellow theraband shoulder extension x 10 reps (3 sets)   - yellow theraband shoulder ER/IR x 15 reps (2 sets) each         Neuro re-ed: 10 min NT   - rice bin (6 min)   - in hand manipulation with marbles         Patient Education and Home Exercises      Education provided:   - cont HEP  - Progress towards goals       Written Home Exercises Provided: Patient instructed to cont prior HEP.  Exercises were reviewed and Laverne was able to demonstrate them prior to the end of  the session.  Laverne demonstrated good  understanding of the HEP provided. See EMR under Patient Instructions for exercises provided during therapy sessions.      ASSESSMENT         Cont to demo improved shoulder mechanics and reports cont improvement in ability to complete daily and community tasks. Noticed increased tightness within palmar aporioneurosis, with puling by LF and RF. Will continue to monitor         Pt participated well and is motivated.     Laverne is progressing fairly well towards her goals and there are no updates to goals at this time. Pt prognosis is fair.     Pt will continue to benefit from skilled outpatient occupational therapy to address the deficits listed in the problem list on initial evaluation, provide pt/family education and to maximize pt's level of independence in the home and community environment.     Pt's spiritual, cultural and educational needs considered and pt agreeable to plan of care and goals.    Anticipated barriers to occupational therapy: preexisting conditions       Goals:  Long term goals:   Pt will achieve shoulder AROM in flexion/scaption/abduction ~120 degrees ---progressing  2.   Assess MMT when appropriate------progressing  3.   Pt will demo shoulder AROM WFL to perform daily and community activities ---progressing  4.   Pt will report improvement in FOTO score by measuring 20% points. ---progressing        Short term goals:   Pt will be complaint with HEP and pain management ---progressing  Pt will achieve shoulder PROM in flexion and scaption ~110 degrees ---progressing  Pt will report improvement in FOTO by decreasing 10% limitation points ---progressing  Pt will achieve shoulder AAROM WFL to aid in ADLs ---progressing  Pt will be able to demo full composite fist with R hand.---progressing    PLAN     Continue skilled occupational therapy with individualized plan of care focusing on improving functional independence with use of RUE    Updates/Grading for  "next session: cont per large massive type III protocol. Needs reassessment next session and updated POC    Shaunna Stevenson OT                                                    [facility]  Occupational Therapy Treatment Note    Date: 9/1/2023  Name: Laverne Humphries  [facility] Number: [id number]    Therapy Diagnosis:   Encounter Diagnoses   Name Primary?    Decreased range of motion of right shoulder Yes    Localized edema     Pain     Hand pain, right                            Physician: Luis Angel Wheeler MD    Physician Orders:Eval and treat;  s/p RCR/lymphedema treatments into hand  Medical Diagnosis: Z98.890 (ICD-10-CM) - S/P right rotator cuff repair  Surgical Procedure and Date: 3/21/2023,   1. Right shoulder Arthroscopic massive complex rotator cuff repair    2. Right shoulder Arthroscopic extensive debridement   3. Right shoulder Arthroscopic lysis of adhesions   4. Right shoulder Arthroscopic subacromial decompression and bursectomy   5. Right shoulder arthroscopic loose body removal    Evaluation Date: 5/3/2023  Insurance Authorization Period Expiration: 05/07/2023  Plan of Care Expiration: 12 weeks; 7/28/2023  Date of Return to MD: 7/3/2023  Visit # / Visits authorized: 24 / 40  FOTO: (date and score)     Precautions:  Standard     Time In:     08:05 AM   Time Out:      09:05   AM     Total Appointment Time (timed & untimed codes): 60 minutes       SUBJECTIVE     Pt reports: "Yesterday when I left here my swelling went down but last night my hand was more swollen and my shoulder was sore"   She was compliant with home exercise program given last session.   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 Reaching easier and opening things easier    Pain: 0/10  Location: right hand     OBJECTIVE   Objective Measures updated at progress report unless specified.    Observation/Appearance: mod " edema in hand      Edema. Measured in centimeters.    5/9/2023 5/9/2023     Left Right   2in. Above elbow       2in. Below elbow       Wrist Crease 14.0 cm  16.0 cm    Figure of 8       MCPs 18.4.0 cm  20.5 cm               Elbow and Wrist ROM. Measured in degrees.    5/9/2023 5/9/2023     Left Right   Elbow Ext/Flex WNL/WNL  12/135    Supination/Pronation WNL/WNL  66/75   Wrist Ext/Flex 55/70 39/6   Wrist RD/UD             Hand ROM. Measured in degrees.    5/9/2023 5/9/2023 6/22/2023 8/18/2023      Left Right Right  Right             Index: MP    68 67               PIP       50 91               DIP   30 40               PITTS   148               Long:  MP   65 78               PIP   58 80               DIP   62 55               PITTS   185               Ring:   MP   55 80               PIP   60 92               DIP   32 45               PITTS   147                        Strength (Dynamometer) and Pinch Strength (Pinch Gauge)  Measured in pounds.    6/9/2023 6/9/2023 7/7/2023 7/18/2023 8/18/2023     Left Right Right Right Right   Rung II  41 20 25 21    Key Pinch          3pt Pinch          2pt Pinch                Manual Muscle Test    5/9/2023 5/9/2023     Left Right   Wrist Extension        Wrist Flexion       Radial Deviation       Ulnar Deviation       Supination       Pronation       Elbow Extension       Elbow Flexion             Limitation/Restriction for FOTO initial eval; shoulder Survey     Therapist reviewed FOTO scores for Laverne Humphries on 5/3/2023.   FOTO documents entered into [employer] - see Media section.     Limitation Score: needs to be taken%        Treatment     Laverne received the treatments listed below:       Fluidotherapy: To R hand for 10 min, continuous air, 115 deg, air speed 50 to decrease pain, edema & scar tissue, sensory re- education, and increased tissue extensibility prior to therex  Supervised modalities: MHP for 10 minutes to promote increased blood flow         Manual  therapy techniques: Manual Lymphatic Drainage were applied to the: RUE for 35 minutes, including:  - passive ROM to flexion/scaption/abduction/ER/IR  x 10 reps each   - passive ROM LF (hook grasp)  - passive ROM LF extension with MP hyperextended for increased stretch   - STM using star tool palmar fascia, focus near A1 and A3 pulley   - passive horizontal abduction with ER/IR x 10 reps each   - passive D1 pattern x 10 reps           Therapeutic exercises to develop ROM for 15  minutes, including:  - horizontal abduction, concentric  ER and IR with RED theraband x 10 reps each   - supine dynamic stabilization with 1#, alphabet x 1 set        Not performed today:   - AAROM flexion/scaption/abduction with 2# dowel x 10 reps each  incline on mat (2 sets)  - sidelying abduction and ER, 2 x 15 reps  - AAROM serratus punch with 2# dowel x 10 reps   - light elbow PRE, elbow flexion palm up and FA in neutral 2 x 10 reps each, 1# NT   - yellow theraband rows x 10 reps (3 sets) NT  - yellow theraband shoulder extension x 10 reps (3 sets) NT  - yellow theraband shoulder ER/IR x 10 reps (2 sets) each NT            Patient Education and Home Exercises      Education provided:   - added towel IR stretch   - Progress towards goals       Written Home Exercises Provided: Patient instructed to cont prior HEP.  Exercises were reviewed and Laverne was able to demonstrate them prior to the end of the session.  Laverne demonstrated good  understanding of the HEP provided. See EMR under Patient Instructions for exercises provided during therapy sessions.      ASSESSMENT     Discussed with patient to return to OT 2x/wk due to increased shoulder tightness.  Cont to have intrinsic tightness and extrinsic tightness in hand.     Laverne is progressing fairly well towards her goals and there are no updates to goals at this time. Pt prognosis is fair.     Pt will continue to benefit from skilled outpatient occupational therapy to address the  deficits listed in the problem list on initial evaluation, provide pt/family education and to maximize pt's level of independence in the home and community environment.     Pt's spiritual, cultural and educational needs considered and pt agreeable to plan of care and goals.    Anticipated barriers to occupational therapy: preexisting conditions       Goals:  Long term goals:           Previous Short Term Goals Status:   Pt will be complaint with HEP and pain management   Pt will achieve shoulder PROM in flexion and scaption ~110 degrees  MET   Pt will report improvement in FOTO by decreasing 10% limitation points   Pt will achieve shoulder AAROM WFL to aid in ADLs MET   Pt will be able to demo full composite fist with R hand.  Long Term Goals Status:   MODIFIED     Reasons for Recertification of Therapy:   continue to be limited in full independence of ADLs due to hand and shoulder       GOALS:      Pt will achieve shoulder AROM in flexion/scaption/abduction ~120 degrees   2.   Pt will report pain no greater than 1/10 in her R hand during functional use.   3.   Pt will demo shoulder AROM WFL to perform daily and community activities MET   4.   Pt will report improvement in FOTO score by measuring 20% points.   5.  Pt will report improved PITTS in her LF on R hand by 10-15 degrees.   6. Pt will report R hand  strength 30# or higher to be able to open jars, hold pots/pan when cooking    PLAN     Updated Certification Period: 7/28/2023 to 9/22/2023   Recommended Treatment Plan: 2-3 times per week for 8 weeks:  Electrical Stimulation  , Fluidotherapy, Manual Therapy, Moist Heat/ Ice, Neuromuscular Re-ed, Orthotic Management and Training, Paraffin, Patient Education, Self Care, Therapeutic Activities, and Therapeutic Exercise  Other Recommendations: Lymphedema tx with lymphatouch     Shaunna Stevenson OT

## 2023-09-02 ENCOUNTER — PATIENT MESSAGE (OUTPATIENT)
Dept: CARDIOLOGY | Facility: CLINIC | Age: 83
End: 2023-09-02
Payer: MEDICARE

## 2023-09-05 ENCOUNTER — CLINICAL SUPPORT (OUTPATIENT)
Dept: REHABILITATION | Facility: HOSPITAL | Age: 83
End: 2023-09-05
Payer: MEDICARE

## 2023-09-05 DIAGNOSIS — M79.641 HAND PAIN, RIGHT: ICD-10-CM

## 2023-09-05 DIAGNOSIS — R60.0 LOCALIZED EDEMA: ICD-10-CM

## 2023-09-05 DIAGNOSIS — R52 PAIN: ICD-10-CM

## 2023-09-05 DIAGNOSIS — M25.611 DECREASED RANGE OF MOTION OF RIGHT SHOULDER: Primary | ICD-10-CM

## 2023-09-05 PROCEDURE — 97140 MANUAL THERAPY 1/> REGIONS: CPT

## 2023-09-05 PROCEDURE — 97110 THERAPEUTIC EXERCISES: CPT

## 2023-09-05 PROCEDURE — 97530 THERAPEUTIC ACTIVITIES: CPT

## 2023-09-05 NOTE — PROGRESS NOTES
"    [facility]  Occupational Therapy Treatment Note     Date: 9/5/2023  Name: Laverne CELIS [facility] Number: [id number]    Therapy Diagnosis:   Encounter Diagnoses   Name Primary?    Decreased range of motion of right shoulder Yes    Localized edema     Pain     Hand pain, right          Physician: Luis Angel Wheeler MD    Physician Orders:Eval and treat;  s/p RCR/lymphedema treatments into hand  Medical Diagnosis: Z98.890 (ICD-10-CM) - S/P right rotator cuff repair  Surgical Procedure and Date: 3/21/2023,   1. Right shoulder Arthroscopic massive complex rotator cuff repair    2. Right shoulder Arthroscopic extensive debridement   3. Right shoulder Arthroscopic lysis of adhesions   4. Right shoulder Arthroscopic subacromial decompression and bursectomy   5. Right shoulder arthroscopic loose body removal    Evaluation Date: 5/3/2023  Insurance Authorization Period Expiration: 12/31/23  Plan of Care Expiration:  9/22/2023   Date of Return to MD: 7/3/2023  Visit # / Visits authorized: 37 / 40  FOTO: (date and score)     Precautions:  Standard       Time In:     07:05 AM   Time Out:   07:58  AM  Total Appointment Time (timed & untimed codes):    53      minutes       SUBJECTIVE         Pt reports: "Yesterday my shoulder was hurting me right here (posterior)"   She was compliant with home exercise program given .   Response to previous treatment: increased composite fist, decreased edema   Functional change:  able to sultana/doff bra     Pain: 0/10  Location: right hand     OBJECTIVE   Objective Measures updated at progress report unless specified.    Observation/Appearance: mod edema in hand        Edema. Measured in centimeters.    5/9/2023 5/9/2023 8/3/2023 8/3/2023     Left Right Left  Right                            Wrist Crease 14.0 cm  16.0 cm               MCPs 18.4.0 cm  20.5 cm 18.0 cm  19.0 cm                  8/7/2023 IF LF RF SF    Right        P1 6.5 6.1 5.7 5.0    PIP 5.3 5.2 " 4.6 4.2    P2 4.6 4.3 3.8 3.5    Left        P1 5.3 5.0 4.7 4.4    PIP 4.6 4.6      P2               Shoulder ROM. Measured in degrees    5/3/2022 5/3/2023 5/11/2023 6/9/2023 6/30/2023 6/30/2023 7/25/23     Left Right PROM Right PROM Right AROM  Right AROM Right PROM R AROM   Shoulder Flexion  WFL 85 degrees  90 90 115 125 110   Shoulder Abduction  WFL NT 80 90 90 95 100   Shoulder Extension    NT NT     50   Shoulder IR   NT NT L5   37, L5   Shoulder ER  70 NT 20 65   60   Shoulder ER @0       48          Elbow and Wrist ROM. Measured in degrees.    5/9/2023 5/9/2023     Left Right   Elbow Ext/Flex WNL/WNL  12/135    Supination/Pronation WNL/WNL  66/75   Wrist Ext/Flex 55/70 39/6   Wrist RD/UD             Hand ROM. Measured in degrees.    5/9/2023 5/9/2023 6/22/2023 8/18/2023     Left Right Right  Right          After tx   Index: MP    68 67 66              PIP       50 91 78              DIP   30 40 30              PITTS   148 198              Long:  MP   65 78 80              PIP   58 80 85              DIP   62 55 55              PITTS   185 213              Ring:   MP   55 80 80              PIP   60 92 95              DIP   32 45 45              PITTS   147 217                       Strength (Dynamometer) and Pinch Strength (Pinch Gauge)  Measured in pounds.    6/9/2023 6/9/2023 7/7/2023 7/18/2023      Left Right Right Right   Rung II  41 20 25 21   Key Pinch         3pt Pinch         2pt Pinch               Manual Muscle Test    5/9/2023 5/9/2023     Left Right   Wrist Extension        Wrist Flexion       Radial Deviation       Ulnar Deviation       Supination       Pronation       Elbow Extension       Elbow Flexion             Limitation/Restriction for FOTO initial eval; shoulder Survey     Therapist reviewed FOTO scores for Laverne Humphries on 5/3/2023.   FOTO documents entered into [employer] - see Media section.     Limitation Score: needs to be taken%        Treatment     Laverne received the treatments  listed below:         Fluidotherapy: To R hand  for 10 min, continuous air, 115 deg, air speed 50 to decrease pain, edema & scar tissue, sensory re- education, and increased tissue extensibility prior to therex (NT)         Supervised modalities: MHP R shoulder  for 10 minutes to promote increased blood flow  + paraffin with MHP to R hand      Manual therapy techniques: Manual Lymphatic Drainage were applied to the: RUE for 15 minutes, including:  - STM with hawk's  flexors   - passive shoulder in D1 pattern x 10  reps   - passive ROM LF (hook grasp) x 10 reps   - passive LF hyperextension holding 10s x 10 reps          Therapeutic exercises to develop ROM for 25 minutes, including:    - updated objective measurements please see above   - prayer stretch holding for 30s x 3 reps   - digit extension holding each for 5s (2 min)   - push (flat palm) power web holding for 10s x 10 reps (2 sets)   - concentric teres major/subscapularis with RED theraband x 10 reps each (2 sets) NT  - B ext rotation with RED theraband x 10 reps (1 set)   - inclined on mat, scaption/flexion/ER/horizontal abduction with 3# dowel x 10 reps (3 sets) each             Not performed today:   - AAROM flexion/scaption with 4# dowel x 10 reps supine on mat (2 sets)  - sidelying abduction and ER, 2 x 15 reps  - supine dynamic stabilization with 1#, alphabet x 1 set   - AAROM serratus punch with 2# dowel 2 x 10 reps   - light elbow PRE, elbow flexion palm up and FA in neutral 2 x 10 reps each, 1# NT   - RED theraband rows x 10 reps (3 sets)   - 4# dowel shoulder extension x 10 reps (2 sets)  - yellow theraband shoulder extension x 10 reps (3 sets)   - yellow theraband shoulder ER/IR x 15 reps (2 sets) each         Neuro re-ed: 10 min NT   - rice bin (6 min)   - in hand manipulation with marbles         Patient Education and Home Exercises      Education provided:   - cont HEP  - Progress towards goals       Written Home Exercises Provided: Patient  instructed to cont prior HEP.  Exercises were reviewed and Laverne was able to demonstrate them prior to the end of the session.  Laverne demonstrated good  understanding of the HEP provided. See EMR under Patient Instructions for exercises provided during therapy sessions.      ASSESSMENT       Continued noticeable increase in tightness within palmar aporioneurosis, with tendonous pulling by LF and RF. Will continue to monitor     Pt participated well and is motivated.     Laverne is progressing fairly well towards her goals and there are no updates to goals at this time. Pt prognosis is fair.     Pt will continue to benefit from skilled outpatient occupational therapy to address the deficits listed in the problem list on initial evaluation, provide pt/family education and to maximize pt's level of independence in the home and community environment.     Pt's spiritual, cultural and educational needs considered and pt agreeable to plan of care and goals.    Anticipated barriers to occupational therapy: preexisting conditions         Goals:  Long term goals:   Pt will achieve shoulder AROM in flexion/scaption/abduction ~120 degrees ---progressing  2.   Assess MMT when appropriate------progressing  3.   Pt will demo shoulder AROM WFL to perform daily and community activities ---progressing  4.   Pt will report improvement in FOTO score by measuring 20% points. ---progressing        Short term goals:   Pt will be complaint with HEP and pain management ---progressing  Pt will achieve shoulder PROM in flexion and scaption ~110 degrees ---progressing  Pt will report improvement in FOTO by decreasing 10% limitation points ---progressing  Pt will achieve shoulder AAROM WFL to aid in ADLs ---progressing  Pt will be able to demo full composite fist with R hand.---progressing    PLAN     Continue skilled occupational therapy with individualized plan of care focusing on improving functional independence with use of  "RUE    Updates/Grading for next session: cont per large massive type III protocol. Needs reassessment next session and updated POC    Shaunna Stevenson OT                                                    [facility]  Occupational Therapy Treatment Note    Date: 9/5/2023  Name: Laverne Humphries  [facility] Number: [id number]    Therapy Diagnosis:   Encounter Diagnoses   Name Primary?    Decreased range of motion of right shoulder Yes    Localized edema     Pain     Hand pain, right                              Physician: Luis Angel Wheeler MD    Physician Orders:Eval and treat;  s/p RCR/lymphedema treatments into hand  Medical Diagnosis: Z98.890 (ICD-10-CM) - S/P right rotator cuff repair  Surgical Procedure and Date: 3/21/2023,   1. Right shoulder Arthroscopic massive complex rotator cuff repair    2. Right shoulder Arthroscopic extensive debridement   3. Right shoulder Arthroscopic lysis of adhesions   4. Right shoulder Arthroscopic subacromial decompression and bursectomy   5. Right shoulder arthroscopic loose body removal    Evaluation Date: 5/3/2023  Insurance Authorization Period Expiration: 05/07/2023  Plan of Care Expiration: 12 weeks; 7/28/2023  Date of Return to MD: 7/3/2023  Visit # / Visits authorized: 24 / 40  FOTO: (date and score)     Precautions:  Standard     Time In:     08:05 AM   Time Out:      09:05   AM     Total Appointment Time (timed & untimed codes): 60 minutes       SUBJECTIVE     Pt reports: "Yesterday when I left here my swelling went down but last night my hand was more swollen and my shoulder was sore"   She was compliant with home exercise program given last session.   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 Reaching easier and opening things easier    Pain: 0/10  Location: right hand     OBJECTIVE   Objective Measures updated at progress report unless " specified.    Observation/Appearance: mod edema in hand      Edema. Measured in centimeters.    5/9/2023 5/9/2023     Left Right   2in. Above elbow       2in. Below elbow       Wrist Crease 14.0 cm  16.0 cm    Figure of 8       MCPs 18.4.0 cm  20.5 cm               Elbow and Wrist ROM. Measured in degrees.    5/9/2023 5/9/2023     Left Right   Elbow Ext/Flex WNL/WNL  12/135    Supination/Pronation WNL/WNL  66/75   Wrist Ext/Flex 55/70 39/6   Wrist RD/UD             Hand ROM. Measured in degrees.    5/9/2023 5/9/2023 6/22/2023 8/18/2023      Left Right Right  Right             Index: MP    68 67               PIP       50 91               DIP   30 40               PITTS   148               Long:  MP   65 78               PIP   58 80               DIP   62 55               PITTS   185               Ring:   MP   55 80               PIP   60 92               DIP   32 45               PITTS   147                        Strength (Dynamometer) and Pinch Strength (Pinch Gauge)  Measured in pounds.    6/9/2023 6/9/2023 7/7/2023 7/18/2023 8/18/2023     Left Right Right Right Right   Rung II  41 20 25 21    Key Pinch          3pt Pinch          2pt Pinch                Manual Muscle Test    5/9/2023 5/9/2023     Left Right   Wrist Extension        Wrist Flexion       Radial Deviation       Ulnar Deviation       Supination       Pronation       Elbow Extension       Elbow Flexion             Limitation/Restriction for FOTO initial eval; shoulder Survey     Therapist reviewed FOTO scores for Laverne Humphries on 5/3/2023.   FOTO documents entered into [employer] - see Media section.     Limitation Score: needs to be taken%        Treatment     Leovidia received the treatments listed below:       Fluidotherapy: To R hand for 10 min, continuous air, 115 deg, air speed 50 to decrease pain, edema & scar tissue, sensory re- education, and increased tissue extensibility prior to therex  Supervised modalities: MHP for 10 minutes to  promote increased blood flow         Manual therapy techniques: Manual Lymphatic Drainage were applied to the: RUE for 35 minutes, including:  - passive ROM to flexion/scaption/abduction/ER/IR  x 10 reps each   - passive ROM LF (hook grasp)  - passive ROM LF extension with MP hyperextended for increased stretch   - STM using star tool palmar fascia, focus near A1 and A3 pulley   - passive horizontal abduction with ER/IR x 10 reps each   - passive D1 pattern x 10 reps           Therapeutic exercises to develop ROM for 15  minutes, including:  - horizontal abduction, concentric  ER and IR with RED theraband x 10 reps each   - supine dynamic stabilization with 1#, alphabet x 1 set        Not performed today:   - AAROM flexion/scaption/abduction with 2# dowel x 10 reps each  incline on mat (2 sets)  - sidelying abduction and ER, 2 x 15 reps  - AAROM serratus punch with 2# dowel x 10 reps   - light elbow PRE, elbow flexion palm up and FA in neutral 2 x 10 reps each, 1# NT   - yellow theraband rows x 10 reps (3 sets) NT  - yellow theraband shoulder extension x 10 reps (3 sets) NT  - yellow theraband shoulder ER/IR x 10 reps (2 sets) each NT            Patient Education and Home Exercises      Education provided:   - added towel IR stretch   - Progress towards goals       Written Home Exercises Provided: Patient instructed to cont prior HEP.  Exercises were reviewed and Laverne was able to demonstrate them prior to the end of the session.  Laverne demonstrated good  understanding of the HEP provided. See EMR under Patient Instructions for exercises provided during therapy sessions.      ASSESSMENT     Discussed with patient to return to OT 2x/wk due to increased shoulder tightness.  Cont to have intrinsic tightness and extrinsic tightness in hand.     Laverne is progressing fairly well towards her goals and there are no updates to goals at this time. Pt prognosis is fair.     Pt will continue to benefit from skilled  outpatient occupational therapy to address the deficits listed in the problem list on initial evaluation, provide pt/family education and to maximize pt's level of independence in the home and community environment.     Pt's spiritual, cultural and educational needs considered and pt agreeable to plan of care and goals.    Anticipated barriers to occupational therapy: preexisting conditions       Goals:  Long term goals:           Previous Short Term Goals Status:   Pt will be complaint with HEP and pain management   Pt will achieve shoulder PROM in flexion and scaption ~110 degrees  MET   Pt will report improvement in FOTO by decreasing 10% limitation points   Pt will achieve shoulder AAROM WFL to aid in ADLs MET   Pt will be able to demo full composite fist with R hand.  Long Term Goals Status:   MODIFIED     Reasons for Recertification of Therapy:   continue to be limited in full independence of ADLs due to hand and shoulder       GOALS:      Pt will achieve shoulder AROM in flexion/scaption/abduction ~120 degrees   2.   Pt will report pain no greater than 1/10 in her R hand during functional use.   3.   Pt will demo shoulder AROM WFL to perform daily and community activities MET   4.   Pt will report improvement in FOTO score by measuring 20% points.   5.  Pt will report improved PITTS in her LF on R hand by 10-15 degrees.   6. Pt will report R hand  strength 30# or higher to be able to open jars, hold pots/pan when cooking    PLAN     Updated Certification Period: 7/28/2023 to 9/22/2023   Recommended Treatment Plan: 2-3 times per week for 8 weeks:  Electrical Stimulation  , Fluidotherapy, Manual Therapy, Moist Heat/ Ice, Neuromuscular Re-ed, Orthotic Management and Training, Paraffin, Patient Education, Self Care, Therapeutic Activities, and Therapeutic Exercise  Other Recommendations: Lymphedema tx with lymphatouch     Shaunna Stevenson, OT

## 2023-09-07 ENCOUNTER — CLINICAL SUPPORT (OUTPATIENT)
Dept: REHABILITATION | Facility: HOSPITAL | Age: 83
End: 2023-09-07
Payer: MEDICARE

## 2023-09-07 DIAGNOSIS — M25.611 DECREASED RANGE OF MOTION OF RIGHT SHOULDER: Primary | ICD-10-CM

## 2023-09-07 DIAGNOSIS — M79.641 HAND PAIN, RIGHT: ICD-10-CM

## 2023-09-07 DIAGNOSIS — R60.0 LOCALIZED EDEMA: ICD-10-CM

## 2023-09-07 DIAGNOSIS — R52 PAIN: ICD-10-CM

## 2023-09-07 PROCEDURE — 97035 APP MDLTY 1+ULTRASOUND EA 15: CPT

## 2023-09-07 PROCEDURE — 97140 MANUAL THERAPY 1/> REGIONS: CPT

## 2023-09-07 PROCEDURE — 97110 THERAPEUTIC EXERCISES: CPT

## 2023-09-07 NOTE — PROGRESS NOTES
"    [facility]  Occupational Therapy Treatment Note     Date: 9/7/2023  Name: Laevrne CELIS Kristel  [facility] Number: [id number]    Therapy Diagnosis:   Encounter Diagnoses   Name Primary?    Decreased range of motion of right shoulder Yes    Localized edema     Pain     Hand pain, right            Physician: Luis Angel Wheeler MD    Physician Orders:Eval and treat;  s/p RCR/lymphedema treatments into hand  Medical Diagnosis: Z98.890 (ICD-10-CM) - S/P right rotator cuff repair  Surgical Procedure and Date: 3/21/2023,   1. Right shoulder Arthroscopic massive complex rotator cuff repair    2. Right shoulder Arthroscopic extensive debridement   3. Right shoulder Arthroscopic lysis of adhesions   4. Right shoulder Arthroscopic subacromial decompression and bursectomy   5. Right shoulder arthroscopic loose body removal    Evaluation Date: 5/3/2023  Insurance Authorization Period Expiration: 12/31/23  Plan of Care Expiration:  9/22/2023   Date of Return to MD: 7/3/2023  Visit # / Visits authorized: 38 / 40  FOTO: (date and score)     Precautions:  Standard       Time In:   07:05 AM   Time Out:  08:10 AM  Total Appointment Time (timed & untimed codes):    65      minutes       SUBJECTIVE         Pt reports: "I feel my hand more than my shoulder." I told my daughter about hand doctor   She was compliant with home exercise program given .   Response to previous treatment: increased composite fist, decreased edema   Functional change:  able to sultana/doff bra     Pain: 0/10  Location: right hand     OBJECTIVE   Objective Measures updated at progress report unless specified.    Observation/Appearance: mod edema in hand        Edema. Measured in centimeters.    5/9/2023 5/9/2023 8/3/2023 8/3/2023     Left Right Left  Right                            Wrist Crease 14.0 cm  16.0 cm               MCPs 18.4.0 cm  20.5 cm 18.0 cm  19.0 cm                  8/7/2023 IF LF RF SF    Right        P1 6.5 6.1 5.7 5.0  "   PIP 5.3 5.2 4.6 4.2    P2 4.6 4.3 3.8 3.5    Left        P1 5.3 5.0 4.7 4.4    PIP 4.6 4.6      P2               Shoulder ROM. Measured in degrees    5/3/2022 5/3/2023 5/11/2023 6/9/2023 6/30/2023 6/30/2023 7/25/23     Left Right PROM Right PROM Right AROM  Right AROM Right PROM R AROM   Shoulder Flexion  WFL 85 degrees  90 90 115 125 110   Shoulder Abduction  WFL NT 80 90 90 95 100   Shoulder Extension    NT NT     50   Shoulder IR   NT NT L5   37, L5   Shoulder ER  70 NT 20 65   60   Shoulder ER @0       48          Elbow and Wrist ROM. Measured in degrees.    5/9/2023 5/9/2023     Left Right   Elbow Ext/Flex WNL/WNL  12/135    Supination/Pronation WNL/WNL  66/75   Wrist Ext/Flex 55/70 39/6   Wrist RD/UD             Hand ROM. Measured in degrees.    5/9/2023 5/9/2023 6/22/2023 8/18/2023     Left Right Right  Right          After tx   Index: MP    68 67 66              PIP       50 91 78              DIP   30 40 30              PITTS   148 198 174             Long:  MP   65 78 80              PIP   58 80 85              DIP   62 55 55              PITTS   185 213 220             Ring:   MP   55 80 80              PIP   60 92 95              DIP   32 45 45              PITTS   147 217 220                      Strength (Dynamometer) and Pinch Strength (Pinch Gauge)  Measured in pounds.    6/9/2023 6/9/2023 7/7/2023 7/18/2023      Left Right Right Right   Rung II  41 20 25 21   Key Pinch         3pt Pinch         2pt Pinch               Manual Muscle Test    5/9/2023 5/9/2023     Left Right   Wrist Extension        Wrist Flexion       Radial Deviation       Ulnar Deviation       Supination       Pronation       Elbow Extension       Elbow Flexion             Limitation/Restriction for FOTO initial eval; shoulder Survey     Therapist reviewed FOTO scores for Leovidia M [place] on 5/3/2023.   FOTO documents entered into [employer] - see Media section.     Limitation Score: needs to be taken%        Treatment     Leovidia  received the treatments listed below:         Fluidotherapy: To R hand  for 10 min, continuous air, 115 deg, air speed 50 to decrease pain, edema & scar tissue, sensory re- education, and increased tissue extensibility prior to therex (NT)       Patient received ultrasound to V[place] R hand area to increase blood flow, circulation, tissue elasticity, and for pain management for 8 minutes @ 3 Mhz, Intensity 1.0 w/cm2 at 100% duty cycle.     Supervised modalities: MHP R shoulder  for 10 minutes to promote increased blood flow  + paraffin with MHP to R hand      Manual therapy techniques: Manual Lymphatic Drainage were applied to the: RUE for 15 minutes, including:  - STM with hawk's  flexors   - passive shoulder in D1 pattern x 10  reps   - passive ROM LF (hook grasp) x 10 reps   - passive LF hyperextension holding 10s x 10 reps          Therapeutic exercises to develop ROM for 25 minutes, including:    - updated objective measurements please see above   - prayer stretch holding for 30s x 3 reps   - digit extension holding each for 5s (2 min)   - push (flat palm) power web holding for 10s x 10 reps (2 sets)   - concentric teres major/subscapularis with RED theraband x 10 reps each (2 sets) NT  - B ext rotation with RED theraband x 10 reps (1 set)   - inclined on mat, scaption/flexion/ER/horizontal abduction with 3# dowel x 10 reps (3 sets) each             Not performed today:   - AAROM flexion/scaption with 4# dowel x 10 reps supine on mat (2 sets)  - sidelying abduction and ER, 2 x 15 reps  - supine dynamic stabilization with 1#, alphabet x 1 set   - AAROM serratus punch with 2# dowel 2 x 10 reps   - light elbow PRE, elbow flexion palm up and FA in neutral 2 x 10 reps each, 1# NT   - RED theraband rows x 10 reps (3 sets)   - 4# dowel shoulder extension x 10 reps (2 sets)  - yellow theraband shoulder extension x 10 reps (3 sets)   - yellow theraband shoulder ER/IR x 15 reps (2 sets) each         Neuro re-ed: 10 min  NT   - rice bin (6 min)   - in hand manipulation with marbles         Patient Education and Home Exercises      Education provided:   - cont HEP  - Progress towards goals       Written Home Exercises Provided: Patient instructed to cont prior HEP.  Exercises were reviewed and Laverne was able to demonstrate them prior to the end of the session.  Laverne demonstrated good  understanding of the HEP provided. See EMR under Patient Instructions for exercises provided during therapy sessions.      ASSESSMENT       Continued noticeable increase in tightness within palmar aporioneurosis, with tendonous pulling by LF and RF. Good response to US today. Will continue to monitor     Pt participated well and is motivated.     Laverne is progressing fairly well towards her goals and there are no updates to goals at this time. Pt prognosis is fair.     Pt will continue to benefit from skilled outpatient occupational therapy to address the deficits listed in the problem list on initial evaluation, provide pt/family education and to maximize pt's level of independence in the home and community environment.     Pt's spiritual, cultural and educational needs considered and pt agreeable to plan of care and goals.    Anticipated barriers to occupational therapy: preexisting conditions         Goals:  Long term goals:   Pt will achieve shoulder AROM in flexion/scaption/abduction ~120 degrees ---progressing  2.   Assess MMT when appropriate------progressing  3.   Pt will demo shoulder AROM WFL to perform daily and community activities ---progressing  4.   Pt will report improvement in FOTO score by measuring 20% points. ---progressing        Short term goals:   Pt will be complaint with HEP and pain management ---progressing  Pt will achieve shoulder PROM in flexion and scaption ~110 degrees ---progressing  Pt will report improvement in FOTO by decreasing 10% limitation points ---progressing  Pt will achieve shoulder AAROM WFL to aid  "in ADLs ---progressing  Pt will be able to demo full composite fist with R hand.---progressing    PLAN     Continue skilled occupational therapy with individualized plan of care focusing on improving functional independence with use of RUE    Updates/Grading for next session: cont per large massive type III protocol. Needs reassessment next session and updated POC    Melissa Landeche, OT [facility]  Occupational Therapy Treatment Note    Date: 9/7/2023  Name: Laverne Humphries  [facility] Number: [id number]    Therapy Diagnosis:   Encounter Diagnoses   Name Primary?    Decreased range of motion of right shoulder Yes    Localized edema     Pain     Hand pain, right                                Physician: Luis Angel Wheeler MD    Physician Orders:Eval and treat;  s/p RCR/lymphedema treatments into hand  Medical Diagnosis: Z98.890 (ICD-10-CM) - S/P right rotator cuff repair  Surgical Procedure and Date: 3/21/2023,   1. Right shoulder Arthroscopic massive complex rotator cuff repair    2. Right shoulder Arthroscopic extensive debridement   3. Right shoulder Arthroscopic lysis of adhesions   4. Right shoulder Arthroscopic subacromial decompression and bursectomy   5. Right shoulder arthroscopic loose body removal    Evaluation Date: 5/3/2023  Insurance Authorization Period Expiration: 05/07/2023  Plan of Care Expiration: 12 weeks; 7/28/2023  Date of Return to MD: 7/3/2023  Visit # / Visits authorized: 24 / 40  FOTO: (date and score)     Precautions:  Standard     Time In:     08:05 AM   Time Out:      09:05   AM     Total Appointment Time (timed & untimed codes): 60 minutes       SUBJECTIVE     Pt reports: "Yesterday when I left here my swelling went down but last night my hand was more swollen and my shoulder was sore"   She was compliant with home exercise program given last session.   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 Reaching easier and opening things easier    Pain: 0/10  Location: right hand     OBJECTIVE   Objective Measures updated at progress report unless specified.    Observation/Appearance: mod edema in hand      Edema. Measured in centimeters.    5/9/2023 5/9/2023     Left Right   2in. Above elbow       2in. Below elbow       Wrist Crease 14.0 cm  16.0 cm    Figure of 8       MCPs 18.4.0 cm  20.5 cm               Elbow and Wrist ROM. Measured in degrees.    5/9/2023 5/9/2023     Left Right   Elbow Ext/Flex WNL/WNL  12/135    Supination/Pronation WNL/WNL  66/75   Wrist Ext/Flex 55/70 39/6   Wrist RD/UD             Hand ROM. Measured in degrees.    5/9/2023 5/9/2023 6/22/2023 8/18/2023      Left Right Right  Right             Index: MP    68 67 75              PIP       50 91 8/80              DIP   30 40 40              PITTS   148               Long:  MP   65 78 80              PIP   58 80 5/85              DIP   62 55 50              PITTS   185               Ring:   MP   55 80 80              PIP   60 92 10/95              DIP   32 45 46              PITTS   147                        Strength (Dynamometer) and Pinch Strength (Pinch Gauge)  Measured in pounds.    6/9/2023 6/9/2023 7/7/2023 7/18/2023 8/18/2023     Left Right Right Right Right   Rung II  41 20 25 21    Key Pinch          3pt Pinch          2pt Pinch                Manual Muscle Test    5/9/2023 5/9/2023     Left Right   Wrist Extension        Wrist Flexion       Radial Deviation       Ulnar Deviation       Supination       Pronation       Elbow Extension       Elbow Flexion             Limitation/Restriction for FOTO initial eval; shoulder Survey     Therapist reviewed FOTO scores for Laverne Humphries on 5/3/2023.   FOTO documents entered into [employer] - see Media section.     Limitation Score: needs to be taken%        Treatment     Laverne received the treatments listed  below:       Fluidotherapy: To R hand for 10 min, continuous air, 115 deg, air speed 50 to decrease pain, edema & scar tissue, sensory re- education, and increased tissue extensibility prior to therex  Supervised modalities: MHP for 10 minutes to promote increased blood flow         Manual therapy techniques: Manual Lymphatic Drainage were applied to the: RUE for 35 minutes, including:  - passive ROM to flexion/scaption/abduction/ER/IR  x 10 reps each   - passive ROM LF (hook grasp)  - passive ROM LF extension with MP hyperextended for increased stretch   - STM using star tool palmar fascia, focus near A1 and A3 pulley   - passive horizontal abduction with ER/IR x 10 reps each   - passive D1 pattern x 10 reps           Therapeutic exercises to develop ROM for 15  minutes, including:  - horizontal abduction, concentric  ER and IR with RED theraband x 10 reps each   - supine dynamic stabilization with 1#, alphabet x 1 set        Not performed today:   - AAROM flexion/scaption/abduction with 2# dowel x 10 reps each  incline on mat (2 sets)  - sidelying abduction and ER, 2 x 15 reps  - AAROM serratus punch with 2# dowel x 10 reps   - light elbow PRE, elbow flexion palm up and FA in neutral 2 x 10 reps each, 1# NT   - yellow theraband rows x 10 reps (3 sets) NT  - yellow theraband shoulder extension x 10 reps (3 sets) NT  - yellow theraband shoulder ER/IR x 10 reps (2 sets) each NT            Patient Education and Home Exercises      Education provided:   - added towel IR stretch   - Progress towards goals       Written Home Exercises Provided: Patient instructed to cont prior HEP.  Exercises were reviewed and Laverne was able to demonstrate them prior to the end of the session.  Laverne demonstrated good  understanding of the HEP provided. See EMR under Patient Instructions for exercises provided during therapy sessions.      ASSESSMENT     Discussed with patient to return to OT 2x/wk due to increased shoulder  tightness.  Cont to have intrinsic tightness and extrinsic tightness in hand.     Laverne is progressing fairly well towards her goals and there are no updates to goals at this time. Pt prognosis is fair.     Pt will continue to benefit from skilled outpatient occupational therapy to address the deficits listed in the problem list on initial evaluation, provide pt/family education and to maximize pt's level of independence in the home and community environment.     Pt's spiritual, cultural and educational needs considered and pt agreeable to plan of care and goals.    Anticipated barriers to occupational therapy: preexisting conditions       Goals:  Long term goals:           Previous Short Term Goals Status:   Pt will be complaint with HEP and pain management   Pt will achieve shoulder PROM in flexion and scaption ~110 degrees  MET   Pt will report improvement in FOTO by decreasing 10% limitation points   Pt will achieve shoulder AAROM WFL to aid in ADLs MET   Pt will be able to demo full composite fist with R hand.  Long Term Goals Status:   MODIFIED     Reasons for Recertification of Therapy:   continue to be limited in full independence of ADLs due to hand and shoulder       GOALS:      Pt will achieve shoulder AROM in flexion/scaption/abduction ~120 degrees   2.   Pt will report pain no greater than 1/10 in her R hand during functional use.   3.   Pt will demo shoulder AROM WFL to perform daily and community activities MET   4.   Pt will report improvement in FOTO score by measuring 20% points.   5.  Pt will report improved PITTS in her LF on R hand by 10-15 degrees.   6. Pt will report R hand  strength 30# or higher to be able to open jars, hold pots/pan when cooking    PLAN     Updated Certification Period: 7/28/2023 to 9/22/2023   Recommended Treatment Plan: 2-3 times per week for 8 weeks:  Electrical Stimulation  , Fluidotherapy, Manual Therapy, Moist Heat/ Ice, Neuromuscular Re-ed, Orthotic Management  and Training, Paraffin, Patient Education, Self Care, Therapeutic Activities, and Therapeutic Exercise  Other Recommendations: Lymphedema tx with lymphatouch     Shaunna Stevenson, OT

## 2023-09-12 ENCOUNTER — CLINICAL SUPPORT (OUTPATIENT)
Dept: REHABILITATION | Facility: HOSPITAL | Age: 83
End: 2023-09-12
Payer: MEDICARE

## 2023-09-12 DIAGNOSIS — M79.641 HAND PAIN, RIGHT: ICD-10-CM

## 2023-09-12 DIAGNOSIS — R52 PAIN: ICD-10-CM

## 2023-09-12 DIAGNOSIS — R60.0 LOCALIZED EDEMA: ICD-10-CM

## 2023-09-12 DIAGNOSIS — M25.611 DECREASED RANGE OF MOTION OF RIGHT SHOULDER: Primary | ICD-10-CM

## 2023-09-12 PROCEDURE — 97035 APP MDLTY 1+ULTRASOUND EA 15: CPT

## 2023-09-12 PROCEDURE — 97140 MANUAL THERAPY 1/> REGIONS: CPT

## 2023-09-12 PROCEDURE — 97110 THERAPEUTIC EXERCISES: CPT

## 2023-09-12 NOTE — PROGRESS NOTES
"      [facility]  Occupational Therapy Treatment Note     Date: 9/12/2023  Name: Laverne CELIS Kristel  [facility] Number: [id number]    Therapy Diagnosis:   Encounter Diagnoses   Name Primary?    Decreased range of motion of right shoulder Yes    Localized edema     Pain     Hand pain, right              Physician: Luis Angel Wheeler MD    Physician Orders:Eval and treat;  s/p RCR/lymphedema treatments into hand  Medical Diagnosis: Z98.890 (ICD-10-CM) - S/P right rotator cuff repair  Surgical Procedure and Date: 3/21/2023,   1. Right shoulder Arthroscopic massive complex rotator cuff repair    2. Right shoulder Arthroscopic extensive debridement   3. Right shoulder Arthroscopic lysis of adhesions   4. Right shoulder Arthroscopic subacromial decompression and bursectomy   5. Right shoulder arthroscopic loose body removal    Evaluation Date: 5/3/2023  Insurance Authorization Period Expiration: 12/31/23  Plan of Care Expiration:  9/22/2023   Date of Return to MD: 7/3/2023  Visit # / Visits authorized: 40 / 40  FOTO: (date and score)     Precautions:  Standard       Time In:     08:34 AM   Time Out:     09:15       AM  Total Appointment Time (timed & untimed codes):    40      minutes       SUBJECTIVE         Pt reports:  "my hand feels real good"  She was compliant with home exercise program given .   Response to previous treatment: increased composite fist, decreased edema   Functional change:  able to sultana/doff bra     Pain: 0/10  Location: right hand     OBJECTIVE   Objective Measures updated at progress report unless specified.    Observation/Appearance: mod edema in hand        Edema. Measured in centimeters.    5/9/2023 5/9/2023 8/3/2023 8/3/2023     Left Right Left  Right                            Wrist Crease 14.0 cm  16.0 cm               MCPs 18.4.0 cm  20.5 cm 18.0 cm  19.0 cm                  8/7/2023 IF LF RF SF    Right        P1 6.5 6.1 5.7 5.0    PIP 5.3 5.2 4.6 4.2    P2 4.6 4.3 " 3.8 3.5    Left        P1 5.3 5.0 4.7 4.4    PIP 4.6 4.6      P2               Shoulder ROM. Measured in degrees    5/3/2022 5/3/2023 5/11/2023 6/9/2023 6/30/2023 6/30/2023 7/25/23     Left Right PROM Right PROM Right AROM  Right AROM Right PROM R AROM   Shoulder Flexion  WFL 85 degrees  90 90 115 125 110   Shoulder Abduction  WFL NT 80 90 90 95 100   Shoulder Extension    NT NT     50   Shoulder IR   NT NT L5   37, L5   Shoulder ER  70 NT 20 65   60   Shoulder ER @0       48          Elbow and Wrist ROM. Measured in degrees.    5/9/2023 5/9/2023     Left Right   Elbow Ext/Flex WNL/WNL  12/135    Supination/Pronation WNL/WNL  66/75   Wrist Ext/Flex 55/70 39/6   Wrist RD/UD             Hand ROM. Measured in degrees.    5/9/2023 5/9/2023 6/22/2023 8/18/2023     Left Right Right  Right          After tx   Index: MP    68 67 66              PIP       50 91 78              DIP   30 40 30              PITTS   148 198 174             Long:  MP   65 78 80              PIP   58 80 85              DIP   62 55 55              PITTS   185 213 220             Ring:   MP   55 80 80              PIP   60 92 95              DIP   32 45 45              PITTS   147 217 220                      Strength (Dynamometer) and Pinch Strength (Pinch Gauge)  Measured in pounds.    6/9/2023 6/9/2023 7/7/2023 7/18/2023      Left Right Right Right   Rung II  41 20 25 21   Key Pinch         3pt Pinch         2pt Pinch               Manual Muscle Test    5/9/2023 5/9/2023     Left Right   Wrist Extension        Wrist Flexion       Radial Deviation       Ulnar Deviation       Supination       Pronation       Elbow Extension       Elbow Flexion             Limitation/Restriction for FOTO initial eval; shoulder Survey     Therapist reviewed FOTO scores for Betodia M Kristel on 5/3/2023.   FOTO documents entered into [employer] - see Media section.     Limitation Score: needs to be taken%        Treatment     Leovidia received the treatments listed below:          Fluidotherapy: To R hand  for 10 min, continuous air, 115 deg, air speed 50 to decrease pain, edema & scar tissue, sensory re- education, and increased tissue extensibility prior to therex (NT)         Supervised modalities: MHP R shoulder  for 10 minutes to promote increased blood flow  + paraffin with MHP to R hand      Manual therapy techniques: Manual Lymphatic Drainage were applied to the: RUE for 15 minutes, including:  - STM with hawk's  flexors   - passive shoulder in D1 pattern x 10  reps   - passive ROM LF (hook grasp) x 10 reps   - passive LF hyperextension holding 10s x 10 reps          Therapeutic exercises to develop ROM for 25 minutes, including:      - prayer stretch holding for 30s x 3 reps   - digit extension holding each for 5s (2 min)   - push (flat palm) power web holding for 10s x 10 reps (2 sets)   - concentric teres major/subscapularis with RED theraband x 10 reps each (2 sets) NT  - B ext rotation with RED theraband x 10 reps (1 set)   - inclined on mat, scaption/flexion/ER/horizontal abduction with 3# dowel x 10 reps (3 sets) each     Direct contact modality:   Patient received ultrasound to R palmar area to increase blood flow, circulation, tissue elasticity, and for pain management for 8 minutes @ 3 Mhz, Intensity 1.0 w/cm2 at 100% duty cycle.         Not performed today:   - AAROM flexion/scaption with 4# dowel x 10 reps supine on mat (2 sets)  - sidelying abduction and ER, 2 x 15 reps  - supine dynamic stabilization with 1#, alphabet x 1 set   - AAROM serratus punch with 2# dowel 2 x 10 reps   - light elbow PRE, elbow flexion palm up and FA in neutral 2 x 10 reps each, 1# NT   - RED theraband rows x 10 reps (3 sets)   - 4# dowel shoulder extension x 10 reps (2 sets)  - yellow theraband shoulder extension x 10 reps (3 sets)   - yellow theraband shoulder ER/IR x 15 reps (2 sets) each         Neuro re-ed: 10 min NT   - rice bin (6 min)   - in hand manipulation with marbles          Patient Education and Home Exercises      Education provided:   - cont HEP  - Progress towards goals       Written Home Exercises Provided: Patient instructed to cont prior HEP.  Exercises were reviewed and Laverne was able to demonstrate them prior to the end of the session.  Laverne demonstrated good  understanding of the HEP provided. See EMR under Patient Instructions for exercises provided during therapy sessions.      ASSESSMENT       Good response to US.    Pt participated well and is motivated.     Laverne is progressing fairly well towards her goals and there are no updates to goals at this time. Pt prognosis is fair.     Pt will continue to benefit from skilled outpatient occupational therapy to address the deficits listed in the problem list on initial evaluation, provide pt/family education and to maximize pt's level of independence in the home and community environment.     Pt's spiritual, cultural and educational needs considered and pt agreeable to plan of care and goals.    Anticipated barriers to occupational therapy: preexisting conditions         Goals:  Long term goals:   Pt will achieve shoulder AROM in flexion/scaption/abduction ~120 degrees ---progressing  2.   Assess MMT when appropriate------progressing  3.   Pt will demo shoulder AROM WFL to perform daily and community activities ---progressing  4.   Pt will report improvement in FOTO score by measuring 20% points. ---progressing        Short term goals:   Pt will be complaint with HEP and pain management ---progressing  Pt will achieve shoulder PROM in flexion and scaption ~110 degrees ---progressing  Pt will report improvement in FOTO by decreasing 10% limitation points ---progressing  Pt will achieve shoulder AAROM WFL to aid in ADLs ---progressing  Pt will be able to demo full composite fist with R hand.---progressing    PLAN     Continue skilled occupational therapy with individualized plan of care focusing on improving  "functional independence with use of RUE    Updates/Grading for next session: cont per large massive type III protocol. Needs reassessment next session and updated POC    Melissa Landeche, OT [facility]  Occupational Therapy Treatment Note    Date: 9/12/2023  Name: Laverne Hupmhries  Clinic Number: [id number]    Therapy Diagnosis:   Encounter Diagnoses   Name Primary?    Decreased range of motion of right shoulder Yes    Localized edema     Pain     Hand pain, right                                  Physician: Luis Angel Wheeler MD    Physician Orders:Eval and treat;  s/p RCR/lymphedema treatments into hand  Medical Diagnosis: Z98.890 (ICD-10-CM) - S/P right rotator cuff repair  Surgical Procedure and Date: 3/21/2023,   1. Right shoulder Arthroscopic massive complex rotator cuff repair    2. Right shoulder Arthroscopic extensive debridement   3. Right shoulder Arthroscopic lysis of adhesions   4. Right shoulder Arthroscopic subacromial decompression and bursectomy   5. Right shoulder arthroscopic loose body removal    Evaluation Date: 5/3/2023  Insurance Authorization Period Expiration: 05/07/2023  Plan of Care Expiration: 12 weeks; 7/28/2023  Date of Return to MD: 7/3/2023  Visit # / Visits authorized: 24 / 40  FOTO: (date and score)     Precautions:  Standard     Time In:     08:05 AM   Time Out:      09:05   AM     Total Appointment Time (timed & untimed codes): 60 minutes       SUBJECTIVE     Pt reports: "Yesterday when I left here my swelling went down but last night my hand was more swollen and my shoulder was sore"   She was compliant with home exercise program given last session.   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 Reaching easier and opening things easier    Pain: 0/10  Location: right hand     OBJECTIVE   Objective Measures " updated at progress report unless specified.    Observation/Appearance: mod edema in hand      Edema. Measured in centimeters.    5/9/2023 5/9/2023     Left Right   2in. Above elbow       2in. Below elbow       Wrist Crease 14.0 cm  16.0 cm    Figure of 8       MCPs 18.4.0 cm  20.5 cm               Elbow and Wrist ROM. Measured in degrees.    5/9/2023 5/9/2023     Left Right   Elbow Ext/Flex WNL/WNL  12/135    Supination/Pronation WNL/WNL  66/75   Wrist Ext/Flex 55/70 39/6   Wrist RD/UD             Hand ROM. Measured in degrees.    5/9/2023 5/9/2023 6/22/2023 8/18/2023      Left Right Right  Right             Index: MP    68 67 75              PIP       50 91 8/80              DIP   30 40 40              PITTS   148               Long:  MP   65 78 80              PIP   58 80 5/85              DIP   62 55 50              PITTS   185               Ring:   MP   55 80 80              PIP   60 92 10/95              DIP   32 45 46              PITTS   147                        Strength (Dynamometer) and Pinch Strength (Pinch Gauge)  Measured in pounds.    6/9/2023 6/9/2023 7/7/2023 7/18/2023 8/18/2023     Left Right Right Right Right   Rung II  41 20 25 21    Key Pinch          3pt Pinch          2pt Pinch                Manual Muscle Test    5/9/2023 5/9/2023     Left Right   Wrist Extension        Wrist Flexion       Radial Deviation       Ulnar Deviation       Supination       Pronation       Elbow Extension       Elbow Flexion             Limitation/Restriction for FOTO initial eval; shoulder Survey     Therapist reviewed FOTO scores for Laverne Humphries on 5/3/2023.   FOTO documents entered into [employer] - see Media section.     Limitation Score: needs to be taken%        Treatment     Leovidia received the treatments listed below:       Fluidotherapy: To R hand for 10 min, continuous air, 115 deg, air speed 50 to decrease pain, edema & scar tissue, sensory re- education, and increased tissue extensibility prior to  therex  Supervised modalities: MHP for 10 minutes to promote increased blood flow         Manual therapy techniques: Manual Lymphatic Drainage were applied to the: RUE for 35 minutes, including:  - passive ROM to flexion/scaption/abduction/ER/IR  x 10 reps each   - passive ROM LF (hook grasp)  - passive ROM LF extension with MP hyperextended for increased stretch   - STM using star tool palmar fascia, focus near A1 and A3 pulley   - passive horizontal abduction with ER/IR x 10 reps each   - passive D1 pattern x 10 reps           Therapeutic exercises to develop ROM for 15  minutes, including:  - horizontal abduction, concentric  ER and IR with RED theraband x 10 reps each   - supine dynamic stabilization with 1#, alphabet x 1 set        Not performed today:   - AAROM flexion/scaption/abduction with 2# dowel x 10 reps each  incline on mat (2 sets)  - sidelying abduction and ER, 2 x 15 reps  - AAROM serratus punch with 2# dowel x 10 reps   - light elbow PRE, elbow flexion palm up and FA in neutral 2 x 10 reps each, 1# NT   - yellow theraband rows x 10 reps (3 sets) NT  - yellow theraband shoulder extension x 10 reps (3 sets) NT  - yellow theraband shoulder ER/IR x 10 reps (2 sets) each NT            Patient Education and Home Exercises      Education provided:   - added towel IR stretch   - Progress towards goals       Written Home Exercises Provided: Patient instructed to cont prior HEP.  Exercises were reviewed and Laverne was able to demonstrate them prior to the end of the session.  Laverne demonstrated good  understanding of the HEP provided. See EMR under Patient Instructions for exercises provided during therapy sessions.      ASSESSMENT     Discussed with patient to return to OT 2x/wk due to increased shoulder tightness.  Cont to have intrinsic tightness and extrinsic tightness in hand.     Laverne is progressing fairly well towards her goals and there are no updates to goals at this time. Pt prognosis is  fair.     Pt will continue to benefit from skilled outpatient occupational therapy to address the deficits listed in the problem list on initial evaluation, provide pt/family education and to maximize pt's level of independence in the home and community environment.     Pt's spiritual, cultural and educational needs considered and pt agreeable to plan of care and goals.    Anticipated barriers to occupational therapy: preexisting conditions       Goals:  Long term goals:           Previous Short Term Goals Status:   Pt will be complaint with HEP and pain management   Pt will achieve shoulder PROM in flexion and scaption ~110 degrees  MET   Pt will report improvement in FOTO by decreasing 10% limitation points   Pt will achieve shoulder AAROM WFL to aid in ADLs MET   Pt will be able to demo full composite fist with R hand.  Long Term Goals Status:   MODIFIED     Reasons for Recertification of Therapy:   continue to be limited in full independence of ADLs due to hand and shoulder       GOALS:      Pt will achieve shoulder AROM in flexion/scaption/abduction ~120 degrees   2.   Pt will report pain no greater than 1/10 in her R hand during functional use.   3.   Pt will demo shoulder AROM WFL to perform daily and community activities MET   4.   Pt will report improvement in FOTO score by measuring 20% points.   5.  Pt will report improved PITTS in her LF on R hand by 10-15 degrees.   6. Pt will report R hand  strength 30# or higher to be able to open jars, hold pots/pan when cooking    PLAN     Updated Certification Period: 7/28/2023 to 9/22/2023   Recommended Treatment Plan: 2-3 times per week for 8 weeks:  Electrical Stimulation  , Fluidotherapy, Manual Therapy, Moist Heat/ Ice, Neuromuscular Re-ed, Orthotic Management and Training, Paraffin, Patient Education, Self Care, Therapeutic Activities, and Therapeutic Exercise  Other Recommendations: Lymphedema tx with lymphatouch     Shaunna Stevenson  OT

## 2023-09-13 ENCOUNTER — OFFICE VISIT (OUTPATIENT)
Dept: CARDIOLOGY | Facility: CLINIC | Age: 83
End: 2023-09-13
Attending: INTERNAL MEDICINE
Payer: MEDICARE

## 2023-09-13 VITALS
HEART RATE: 84 BPM | HEIGHT: 60 IN | OXYGEN SATURATION: 97 % | BODY MASS INDEX: 23.16 KG/M2 | DIASTOLIC BLOOD PRESSURE: 83 MMHG | SYSTOLIC BLOOD PRESSURE: 135 MMHG | WEIGHT: 117.94 LBS

## 2023-09-13 DIAGNOSIS — Z79.01 CHRONIC ANTICOAGULATION: ICD-10-CM

## 2023-09-13 DIAGNOSIS — I48.0 PAROXYSMAL ATRIAL FIBRILLATION: ICD-10-CM

## 2023-09-13 DIAGNOSIS — I70.0 ATHEROSCLEROSIS OF AORTA: ICD-10-CM

## 2023-09-13 PROCEDURE — 99214 OFFICE O/P EST MOD 30 MIN: CPT | Mod: HCNC,S$GLB,, | Performed by: INTERNAL MEDICINE

## 2023-09-13 PROCEDURE — 99214 PR OFFICE/OUTPT VISIT, EST, LEVL IV, 30-39 MIN: ICD-10-PCS | Mod: HCNC,S$GLB,, | Performed by: INTERNAL MEDICINE

## 2023-09-13 PROCEDURE — 1126F PR PAIN SEVERITY QUANTIFIED, NO PAIN PRESENT: ICD-10-PCS | Mod: HCNC,CPTII,S$GLB, | Performed by: INTERNAL MEDICINE

## 2023-09-13 PROCEDURE — 1160F RVW MEDS BY RX/DR IN RCRD: CPT | Mod: HCNC,CPTII,S$GLB, | Performed by: INTERNAL MEDICINE

## 2023-09-13 PROCEDURE — 1126F AMNT PAIN NOTED NONE PRSNT: CPT | Mod: HCNC,CPTII,S$GLB, | Performed by: INTERNAL MEDICINE

## 2023-09-13 PROCEDURE — 1159F MED LIST DOCD IN RCRD: CPT | Mod: HCNC,CPTII,S$GLB, | Performed by: INTERNAL MEDICINE

## 2023-09-13 PROCEDURE — 3079F DIAST BP 80-89 MM HG: CPT | Mod: HCNC,CPTII,S$GLB, | Performed by: INTERNAL MEDICINE

## 2023-09-13 PROCEDURE — 3075F SYST BP GE 130 - 139MM HG: CPT | Mod: HCNC,CPTII,S$GLB, | Performed by: INTERNAL MEDICINE

## 2023-09-13 PROCEDURE — 99999 PR PBB SHADOW E&M-EST. PATIENT-LVL III: CPT | Mod: PBBFAC,HCNC,, | Performed by: INTERNAL MEDICINE

## 2023-09-13 PROCEDURE — 3079F PR MOST RECENT DIASTOLIC BLOOD PRESSURE 80-89 MM HG: ICD-10-PCS | Mod: HCNC,CPTII,S$GLB, | Performed by: INTERNAL MEDICINE

## 2023-09-13 PROCEDURE — 3288F FALL RISK ASSESSMENT DOCD: CPT | Mod: HCNC,CPTII,S$GLB, | Performed by: INTERNAL MEDICINE

## 2023-09-13 PROCEDURE — 99999 PR PBB SHADOW E&M-EST. PATIENT-LVL III: ICD-10-PCS | Mod: PBBFAC,HCNC,, | Performed by: INTERNAL MEDICINE

## 2023-09-13 PROCEDURE — 3288F PR FALLS RISK ASSESSMENT DOCUMENTED: ICD-10-PCS | Mod: HCNC,CPTII,S$GLB, | Performed by: INTERNAL MEDICINE

## 2023-09-13 PROCEDURE — 3075F PR MOST RECENT SYSTOLIC BLOOD PRESS GE 130-139MM HG: ICD-10-PCS | Mod: HCNC,CPTII,S$GLB, | Performed by: INTERNAL MEDICINE

## 2023-09-13 PROCEDURE — 1160F PR REVIEW ALL MEDS BY PRESCRIBER/CLIN PHARMACIST DOCUMENTED: ICD-10-PCS | Mod: HCNC,CPTII,S$GLB, | Performed by: INTERNAL MEDICINE

## 2023-09-13 PROCEDURE — 1159F PR MEDICATION LIST DOCUMENTED IN MEDICAL RECORD: ICD-10-PCS | Mod: HCNC,CPTII,S$GLB, | Performed by: INTERNAL MEDICINE

## 2023-09-13 PROCEDURE — 1101F PT FALLS ASSESS-DOCD LE1/YR: CPT | Mod: HCNC,CPTII,S$GLB, | Performed by: INTERNAL MEDICINE

## 2023-09-13 PROCEDURE — 1101F PR PT FALLS ASSESS DOC 0-1 FALLS W/OUT INJ PAST YR: ICD-10-PCS | Mod: HCNC,CPTII,S$GLB, | Performed by: INTERNAL MEDICINE

## 2023-09-13 NOTE — PROGRESS NOTES
Subjective:     Laverne Humphries is a 82 y.o. female with no history of any cardiac issues. She has had diverticulitis and developed a colovaginal fistula. She came in with plans for elective surgery on 4/1/2022. She was noted to be in atrial fibrillation with a ventricular response rate of about 120-140 bpm. She denied any palpitations, chest pain or shortness of breath. She said she had fair exercise tolerance. It was unclear for how long she had been in atrial fibrillation. An electrocardiogram from 2019 revealed she was in sinus rhythm at that time. She was prescribed metoprolol 25 mg Q12. The surgery got rescheduled for 4/26/2022.  She had a CT scan of the abdomen in 2021 that revealed mild atherosclerosis. No exertional shortness of breath. No palpitations or weak spells. No bleeding. No issues with any of her prescribed medications. Feeling well overall.      Atrial Fibrillation  Presents for follow-up visit. Symptoms are negative for bradycardia, chest pain, dizziness, hemodynamic instability, hypertension, hypotension, palpitations, shortness of breath, syncope, tachycardia and weakness. The symptoms have been stable.       Review of Systems   Constitutional: Negative for chills, fever and malaise/fatigue.   HENT:  Negative for nosebleeds and tinnitus.    Eyes:  Negative for double vision, vision loss in left eye and vision loss in right eye.   Cardiovascular:  Negative for chest pain, claudication, dyspnea on exertion, irregular heartbeat, leg swelling, near-syncope, orthopnea, palpitations, paroxysmal nocturnal dyspnea and syncope.   Respiratory:  Negative for cough, hemoptysis, shortness of breath and wheezing.    Endocrine: Negative for cold intolerance and heat intolerance.   Hematologic/Lymphatic: Negative for bleeding problem. Does not bruise/bleed easily.   Skin:  Negative for color change and rash.   Musculoskeletal:  Negative for back pain, falls, muscle weakness and myalgias.    Gastrointestinal:  Negative for abdominal pain, diarrhea, dysphagia, heartburn, hematemesis, hematochezia, hemorrhoids, jaundice, melena, nausea and vomiting.   Genitourinary:  Negative for dysuria and hematuria.   Neurological:  Negative for dizziness, focal weakness, headaches, light-headedness, loss of balance, numbness, tremors, vertigo and weakness.   Psychiatric/Behavioral:  Negative for altered mental status, depression and memory loss. The patient is not nervous/anxious.    Allergic/Immunologic: Negative for hives and persistent infections.       Current Outpatient Medications on File Prior to Visit   Medication Sig Dispense Refill    albuterol (PROVENTIL/VENTOLIN HFA) 90 mcg/actuation inhaler Inhale 2 puffs into the lungs every 6 (six) hours as needed for Wheezing or Shortness of Breath (generic preferred). Rescue 18 g 3    apixaban (ELIQUIS) 2.5 mg Tab Take 1 tablet (2.5 mg total) by mouth 2 (two) times daily. 180 tablet 3    azelastine (ASTELIN) 137 mcg (0.1 %) nasal spray 2 sprays (274 mcg total) by Nasal route 2 (two) times daily. 30 mL 11    calcium-vitamin D3 500 mg(1,250mg) -200 unit per tablet Take 1 tablet by mouth Daily.       carboxymethylcellulose (REFRESH PLUS) 0.5 % Dpet 1 drop 3 (three) times daily as needed.      clobetasol 0.05% (TEMOVATE) 0.05 % Oint Apply topically 2 (two) times daily. for 7 days 45 g 0    diclofenac sodium (VOLTAREN) 1 % Gel Apply 2 g topically once daily. 150 g 2    diclofenac sodium (VOLTAREN) 1 % Gel Apply 2 g topically once daily. for 14 days 2 g 0    fluconazole (DIFLUCAN) 200 MG Tab Take 1 tablet (200 mg total) by mouth once. Take after completing the antibiotics to prevent a yeast infection for 1 dose 1 tablet 0    gabapentin (NEURONTIN) 300 MG capsule Take 2 capsules (600 mg total) by mouth every evening. (Patient taking differently: Take 600 mg by mouth every evening. PRN) 180 capsule 3    hydrocortisone 2.5 % cream Apply topically 2 (two) times daily. 30 g 0     LORazepam (ATIVAN) 0.5 MG tablet Take 1 tablet (0.5 mg total) by mouth nightly as needed for Anxiety. 30 tablet 5    metoprolol succinate (TOPROL-XL) 25 MG 24 hr tablet Take 1 tablet (25 mg total) by mouth once daily. 90 tablet 3    nitrofurantoin, macrocrystal-monohydrate, (MACROBID) 100 MG capsule Take 1 capsule (100 mg total) by mouth 2 (two) times daily. for 7 days 14 capsule 0    omeprazole (PRILOSEC) 20 MG capsule Take 1 capsule (20 mg total) by mouth once daily. Take 30 min before breakfast 30 capsule 2    ondansetron (ZOFRAN) 4 MG tablet Take 1 tablet (4 mg total) by mouth every 8 (eight) hours as needed for Nausea. 10 tablet 0    rosuvastatin (CRESTOR) 5 MG tablet Take 1 tablet (5 mg total) by mouth every evening. 90 tablet 3    sodium chloride (SALINE NASAL) 0.65 % nasal spray 2 sprays by Nasal route as needed for Congestion. 44 mL 11    traMADoL (ULTRAM) 50 mg tablet Take 1 tablet (50 mg total) by mouth every 6 (six) hours as needed for Pain. 10 tablet 0    triamcinolone (NASACORT) 55 mcg nasal inhaler 2 sprays by Nasal route 2 (two) times a day. 16.9 mL 11     Current Facility-Administered Medications on File Prior to Visit   Medication Dose Route Frequency Provider Last Rate Last Admin    fentaNYL 50 mcg/mL injection  mcg   mcg Intravenous PRN Jasbir Benitez MD   100 mcg at 03/21/23 0920    LIDOcaine (PF) 10 mg/ml (1%) injection 10 mg  1 mL Intradermal Once Audrey Caicedo NP        midazolam (VERSED) 1 mg/mL injection 0.5-4 mg  0.5-4 mg Intravenous PRN Jasbir Benitez MD   2 mg at 03/21/23 0920    mupirocin 2 % ointment   Nasal On Call Procedure Audrey Caicedo NP   Given at 04/26/22 0726    ropivacaine 0.2% Nimbus PainPRO Pump infusion 500 ML   Perineural Continuous Jasbir Benitez MD   New Bag at 03/21/23 1354       /83 (BP Location: Right arm, Patient Position: Sitting, BP Method: Medium (Manual))   Pulse 84   Ht 5' (1.524 m)   Wt 53.5 kg (117 lb 15.1 oz)   LMP  (LMP  Unknown)   SpO2 97%   BMI 23.03 kg/m²       Objective:     Physical Exam  Constitutional:       General: She is not in acute distress.     Appearance: Normal appearance. She is well-developed. She is not toxic-appearing or diaphoretic.   HENT:      Head: Normocephalic and atraumatic.      Nose: Nose normal.   Eyes:      General:         Right eye: No discharge.         Left eye: No discharge.      Conjunctiva/sclera:      Right eye: Right conjunctiva is not injected.      Left eye: Left conjunctiva is not injected.      Pupils: Pupils are equal.      Right eye: Pupil is round.      Left eye: Pupil is round.   Neck:      Thyroid: No thyromegaly.      Vascular: No carotid bruit or JVD.   Cardiovascular:      Rate and Rhythm: Normal rate and regular rhythm. No extrasystoles are present.     Chest Wall: PMI is not displaced.      Pulses:           Radial pulses are 2+ on the right side and 2+ on the left side.        Femoral pulses are 2+ on the right side and 2+ on the left side.       Dorsalis pedis pulses are 2+ on the right side and 2+ on the left side.        Posterior tibial pulses are 2+ on the right side and 2+ on the left side.      Heart sounds: S1 normal and S2 normal. Murmur heard.      High-pitched blowing holosystolic murmur is present with a grade of 2/6 at the apex.      No gallop.   Pulmonary:      Effort: Pulmonary effort is normal.      Breath sounds: Normal breath sounds.   Abdominal:      Palpations: Abdomen is soft.      Tenderness: There is no abdominal tenderness.   Musculoskeletal:      Cervical back: Neck supple.      Right lower leg: Normal. No swelling. No edema.      Left lower leg: Normal. No swelling. No edema.   Lymphadenopathy:      Head:      Right side of head: No submandibular adenopathy.      Left side of head: No submandibular adenopathy.      Cervical: No cervical adenopathy.   Skin:     General: Skin is warm and dry.      Findings: No rash.      Nails: There is no clubbing.    Neurological:      General: No focal deficit present.      Mental Status: She is alert and oriented to person, place, and time. She is not disoriented.      Cranial Nerves: No cranial nerve deficit.   Psychiatric:         Attention and Perception: Attention and perception normal.         Mood and Affect: Mood and affect normal.         Speech: Speech normal.         Behavior: Behavior normal.         Thought Content: Thought content normal.         Cognition and Memory: Cognition and memory normal.         Judgment: Judgment normal.         Assessment:     1. Paroxysmal atrial fibrillation    2. Chronic anticoagulation    3. Atherosclerosis of aorta        Plan:     1. Atrial Fibrillation               4/1/2022: Diagnosed with paroxysmal atrial fibrillation.               Fast VRR. Asymptomatic.              CHA2DS2VASc=3 (A2Sc).   4/1/2022: Echo: Normal left ventricular size and systolic function. Mildly sigmoid septum. EF 65%. AF. Moderate aortic valve sclerosis. Mild AR. Moderate MR.   4/5/2022: Metoprolol 25 mg Q24 was changed to metoprolol 50 mg Q24. The dose was later reduced to metoprolol 25 mg Q24.   5/12/2022: Apixiban 2.5 mg Q12 was begun.   On apixiban 2.5 mg Q12.   On metoprolol 25 mg Q24.  No clinical recurrence but never had any symptoms.     2. Chronic Anticoagulation              4/1/2022: Diagnosed with paroxysmal atrial fibrillation.               CHA2DS2VASc=3 (A2Sc).   5/12/2022: Apixiban 2.5 mg Q12 was begun.   On apixiban 2.5 mg Q12.   No aspirin or NSAID.   No bleeding.     3. Atherosclerosis of Aorta    11/10/2021: CT abdomen: Mild scattered calcific atherosclerosis.    On rosuvastatin 5 mg Q24.   No aspirin as anticoagulated.               4. Colovaginal Fistula              4/1/2022: Plan was surgery.   4/26/2022: Underwent surgery.              Dr. Alison Mcclain.    5. Primary Care              Dr. Carolyne James.     F/u 6 months.    Lisa Yeh M.D.

## 2023-09-14 ENCOUNTER — CLINICAL SUPPORT (OUTPATIENT)
Dept: REHABILITATION | Facility: HOSPITAL | Age: 83
End: 2023-09-14
Payer: MEDICARE

## 2023-09-14 DIAGNOSIS — M25.611 DECREASED RANGE OF MOTION OF RIGHT SHOULDER: Primary | ICD-10-CM

## 2023-09-14 DIAGNOSIS — M79.641 HAND PAIN, RIGHT: ICD-10-CM

## 2023-09-14 DIAGNOSIS — R52 PAIN: ICD-10-CM

## 2023-09-14 DIAGNOSIS — R60.0 LOCALIZED EDEMA: ICD-10-CM

## 2023-09-14 PROCEDURE — 97140 MANUAL THERAPY 1/> REGIONS: CPT

## 2023-09-14 PROCEDURE — 97035 APP MDLTY 1+ULTRASOUND EA 15: CPT

## 2023-09-14 PROCEDURE — 97110 THERAPEUTIC EXERCISES: CPT

## 2023-09-19 ENCOUNTER — CLINICAL SUPPORT (OUTPATIENT)
Dept: REHABILITATION | Facility: HOSPITAL | Age: 83
End: 2023-09-19
Payer: MEDICARE

## 2023-09-19 DIAGNOSIS — M79.641 HAND PAIN, RIGHT: ICD-10-CM

## 2023-09-19 DIAGNOSIS — R52 PAIN: ICD-10-CM

## 2023-09-19 DIAGNOSIS — M25.611 DECREASED RANGE OF MOTION OF RIGHT SHOULDER: Primary | ICD-10-CM

## 2023-09-19 DIAGNOSIS — R60.0 LOCALIZED EDEMA: ICD-10-CM

## 2023-09-19 PROCEDURE — 97140 MANUAL THERAPY 1/> REGIONS: CPT

## 2023-09-19 PROCEDURE — 97110 THERAPEUTIC EXERCISES: CPT

## 2023-09-19 NOTE — PROGRESS NOTES
"      [facility]  Occupational Therapy Treatment Note     Date: 9/19/2023  Name: Laverne CELIS Kristel  [facility] Number: [id number]    Therapy Diagnosis:   Encounter Diagnoses   Name Primary?    Decreased range of motion of right shoulder Yes    Localized edema     Pain     Hand pain, right                Physician: Luis Angel Wheeler MD    Physician Orders:Eval and treat;  s/p RCR/lymphedema treatments into hand  Medical Diagnosis: Z98.890 (ICD-10-CM) - S/P right rotator cuff repair  Surgical Procedure and Date: 3/21/2023,   1. Right shoulder Arthroscopic massive complex rotator cuff repair    2. Right shoulder Arthroscopic extensive debridement   3. Right shoulder Arthroscopic lysis of adhesions   4. Right shoulder Arthroscopic subacromial decompression and bursectomy   5. Right shoulder arthroscopic loose body removal    Evaluation Date: 5/3/2023  Insurance Authorization Period Expiration: 12/31/23  Plan of Care Expiration:  9/22/2023   Date of Return to MD: 7/3/2023  Visit # / Visits authorized: 40 / 40  FOTO: (date and score)     Precautions:  Standard       Time In:     08:34 AM   Time Out:     09:15       AM  Total Appointment Time (timed & untimed codes):    41     minutes       SUBJECTIVE         Pt reports:  "It still hurts little bit, but much beter"  She was compliant with home exercise program given .   Response to previous treatment: increased composite fist, decreased edema   Functional change:  able to sultana/doff bra     Pain: 0/10  Location: right hand     OBJECTIVE   Objective Measures updated at progress report unless specified.    Observation/Appearance: mod edema in hand        Edema. Measured in centimeters.    5/9/2023 5/9/2023 8/3/2023 8/3/2023     Left Right Left  Right                            Wrist Crease 14.0 cm  16.0 cm               MCPs 18.4.0 cm  20.5 cm 18.0 cm  19.0 cm                  8/7/2023 IF LF RF SF    Right        P1 6.5 6.1 5.7 5.0    PIP 5.3 5.2 4.6 " 4.2    P2 4.6 4.3 3.8 3.5    Left        P1 5.3 5.0 4.7 4.4    PIP 4.6 4.6      P2               Shoulder ROM. Measured in degrees    5/3/2022 5/3/2023 5/11/2023 6/9/2023 6/30/2023 6/30/2023 7/25/23     Left Right PROM Right PROM Right AROM  Right AROM Right PROM R AROM   Shoulder Flexion  WFL 85 degrees  90 90 115 125 110   Shoulder Abduction  WFL NT 80 90 90 95 100   Shoulder Extension    NT NT     50   Shoulder IR   NT NT L5   37, L5   Shoulder ER  70 NT 20 65   60   Shoulder ER @0       48          Elbow and Wrist ROM. Measured in degrees.    5/9/2023 5/9/2023     Left Right   Elbow Ext/Flex WNL/WNL  12/135    Supination/Pronation WNL/WNL  66/75   Wrist Ext/Flex 55/70 39/6   Wrist RD/UD             Hand ROM. Measured in degrees.    5/9/2023 5/9/2023 6/22/2023 8/18/2023 9/19/2023     Left Right Right  Right Right          After tx Before tx    Index: MP    68 67 66 70              PIP       50 91 78 85              DIP   30 40 30 53              PITTS   148 198 174 208              Long:  MP   65 78 80 81              PIP   58 80 85 91              DIP   62 55 55 61              PITTS   185 213 220 233              Ring:   MP   55 80 80 80              PIP   60 92 95 104              DIP   32 45 45 45              PITTS   147 217 220 229                       Strength (Dynamometer) and Pinch Strength (Pinch Gauge)  Measured in pounds.    6/9/2023 6/9/2023 7/7/2023 7/18/2023 9/19/2023     Left Right Right Right Right   Rung II  41 20 25 21 25   Key Pinch  9     7   3pt Pinch       6.5   2pt Pinch  6     6             Limitation/Restriction for FOTO initial eval; shoulder Survey     Therapist reviewed FOTO scores for Michellezoilamable LALITA Kristel on 5/3/2023.   FOTO documents entered into [employer] - see Media section.     Limitation Score: needs to be taken%        Treatment     Leovidia received the treatments listed below:           Supervised modalities: MHP R shoulder  for 10 minutes to promote increased blood flow  +  paraffin with MHP to R hand (20 min)          Manual therapy techniques: Manual Lymphatic Drainage were applied to the: RUE for 10 minutes, including:  - STM with hawk's anterior lexors   - passive shoulder in D1 pattern x 10  reps   - passive ROM LF (hook grasp) x 10 reps   - passive LF hyperextension holding 10s x 10 reps   - passive short arc composite flexion       Therapeutic exercises to develop ROM for 26 minutes, including:    - prayer stretch holding for 30s x 3 reps   - digit extension holding each for 5s (2 min)   - push (flat palm) power web holding for 10s x 10 reps (2 sets) NT   - concentric teres major/subscapularis with RED theraband x 10 reps each (2 sets)  - B ext rotation with RED theraband x 10 reps (1 set)   - lower trap wall slides x 10 reps (2 sets)   - inclined on mat, scaption/flexion/ER/horizontal abduction with 3# dowel x 10 reps (3 sets) each NT       Direct contact modality: NT   Patient received ultrasound to R palmar area to increase blood flow, circulation, tissue elasticity, and for pain management for 8 minutes @ 3 Mhz, Intensity 1.0 w/cm2 at 100% duty cycle.           Not performed today:   - AAROM flexion/scaption with 4# dowel x 10 reps supine on mat (2 sets)  - sidelying abduction and ER, 2 x 15 reps  - supine dynamic stabilization with 1#, alphabet x 1 set   - AAROM serratus punch with 2# dowel 2 x 10 reps   - light elbow PRE, elbow flexion palm up and FA in neutral 2 x 10 reps each, 1# NT   - RED theraband rows x 10 reps (3 sets)   - 4# dowel shoulder extension x 10 reps (2 sets)  - yellow theraband shoulder extension x 10 reps (3 sets)   - yellow theraband shoulder ER/IR x 15 reps (2 sets) each         Neuro re-ed: 10 min NT   - rice bin (6 min)   - in hand manipulation with marbles         Patient Education and Home Exercises      Education provided:   - cont HEP  - hand specialist   - Progress towards goals       Written Home Exercises Provided: Patient instructed to cont  prior HEP.  Exercises were reviewed and Laverne was able to demonstrate them prior to the end of the session.  Laverne demonstrated good  understanding of the HEP provided. See EMR under Patient Instructions for exercises provided during therapy sessions.      ASSESSMENT       Per updated measurements, improvement in digit PITTS. Would benefit from hand strengthening     Pt participated well and is motivated.     Laverne is progressing fairly well towards her goals and there are no updates to goals at this time. Pt prognosis is fair.     Pt will continue to benefit from skilled outpatient occupational therapy to address the deficits listed in the problem list on initial evaluation, provide pt/family education and to maximize pt's level of independence in the home and community environment.     Pt's spiritual, cultural and educational needs considered and pt agreeable to plan of care and goals.    Anticipated barriers to occupational therapy: preexisting conditions         Goals:  Long term goals:   Pt will achieve shoulder AROM in flexion/scaption/abduction ~120 degrees ---progressing  2.   Assess MMT when appropriate------progressing  3.   Pt will demo shoulder AROM WFL to perform daily and community activities ---progressing  4.   Pt will report improvement in FOTO score by measuring 20% points. ---progressing        Short term goals:   Pt will be complaint with HEP and pain management ---progressing  Pt will achieve shoulder PROM in flexion and scaption ~110 degrees ---progressing  Pt will report improvement in FOTO by decreasing 10% limitation points ---progressing  Pt will achieve shoulder AAROM WFL to aid in ADLs ---progressing  Pt will be able to demo full composite fist with R hand.---progressing    PLAN     Continue skilled occupational therapy with individualized plan of care focusing on improving functional independence with use of RUE    Updates/Grading for next session: cont per large massive type III  "protocol. Needs reassessment next session and updated POC    Shaunna Stevenson OT                                                    [facility]  Occupational Therapy Treatment Note    Date: 9/19/2023  Name: Laevrne Humphries  Clinic Number: [id number]    Therapy Diagnosis:   Encounter Diagnoses   Name Primary?    Decreased range of motion of right shoulder Yes    Localized edema     Pain     Hand pain, right                                    Physician: Luis Angel Wheeler MD    Physician Orders:Eval and treat;  s/p RCR/lymphedema treatments into hand  Medical Diagnosis: Z98.890 (ICD-10-CM) - S/P right rotator cuff repair  Surgical Procedure and Date: 3/21/2023,   1. Right shoulder Arthroscopic massive complex rotator cuff repair    2. Right shoulder Arthroscopic extensive debridement   3. Right shoulder Arthroscopic lysis of adhesions   4. Right shoulder Arthroscopic subacromial decompression and bursectomy   5. Right shoulder arthroscopic loose body removal    Evaluation Date: 5/3/2023  Insurance Authorization Period Expiration: 05/07/2023  Plan of Care Expiration: 12 weeks; 7/28/2023  Date of Return to MD: 7/3/2023  Visit # / Visits authorized: 24 / 40  FOTO: (date and score)     Precautions:  Standard     Time In:     08:05 AM   Time Out:      09:05   AM     Total Appointment Time (timed & untimed codes): 60 minutes       SUBJECTIVE     Pt reports: "Yesterday when I left here my swelling went down but last night my hand was more swollen and my shoulder was sore"   She was compliant with home exercise program given last session.   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 Reaching easier and opening things easier    Pain: 0/10  Location: right hand     OBJECTIVE   Objective Measures updated at progress report unless specified.    Observation/Appearance: mod edema in hand      Edema. Measured " in centimeters.    5/9/2023 5/9/2023     Left Right   2in. Above elbow       2in. Below elbow       Wrist Crease 14.0 cm  16.0 cm    Figure of 8       MCPs 18.4.0 cm  20.5 cm               Elbow and Wrist ROM. Measured in degrees.    5/9/2023 5/9/2023     Left Right   Elbow Ext/Flex WNL/WNL  12/135    Supination/Pronation WNL/WNL  66/75   Wrist Ext/Flex 55/70 39/6   Wrist RD/UD             Hand ROM. Measured in degrees.    5/9/2023 5/9/2023 6/22/2023 8/18/2023      Left Right Right  Right             Index: MP    68 67 75              PIP       50 91 8/80              DIP   30 40 40              PITTS   148               Long:  MP   65 78 80              PIP   58 80 5/85              DIP   62 55 50              PITTS   185               Ring:   MP   55 80 80              PIP   60 92 10/95              DIP   32 45 46              PITTS   147                        Strength (Dynamometer) and Pinch Strength (Pinch Gauge)  Measured in pounds.    6/9/2023 6/9/2023 7/7/2023 7/18/2023 8/18/2023     Left Right Right Right Right   Rung II  41 20 25 21    Key Pinch          3pt Pinch          2pt Pinch                Manual Muscle Test    5/9/2023 5/9/2023     Left Right   Wrist Extension        Wrist Flexion       Radial Deviation       Ulnar Deviation       Supination       Pronation       Elbow Extension       Elbow Flexion             Limitation/Restriction for FOTO initial eval; shoulder Survey     Therapist reviewed FOTO scores for Laverne Humphries on 5/3/2023.   FOTO documents entered into [employer] - see Media section.     Limitation Score: needs to be taken%        Treatment     Leovidia received the treatments listed below:       Fluidotherapy: To R hand for 10 min, continuous air, 115 deg, air speed 50 to decrease pain, edema & scar tissue, sensory re- education, and increased tissue extensibility prior to therex  Supervised modalities: MHP for 10 minutes to promote increased blood flow         Manual therapy  techniques: Manual Lymphatic Drainage were applied to the: RUE for 35 minutes, including:  - passive ROM to flexion/scaption/abduction/ER/IR  x 10 reps each   - passive ROM LF (hook grasp)  - passive ROM LF extension with MP hyperextended for increased stretch   - STM using star tool palmar fascia, focus near A1 and A3 pulley   - passive horizontal abduction with ER/IR x 10 reps each   - passive D1 pattern x 10 reps           Therapeutic exercises to develop ROM for 15  minutes, including:  - horizontal abduction, concentric  ER and IR with RED theraband x 10 reps each   - supine dynamic stabilization with 1#, alphabet x 1 set        Not performed today:   - AAROM flexion/scaption/abduction with 2# dowel x 10 reps each  incline on mat (2 sets)  - sidelying abduction and ER, 2 x 15 reps  - AAROM serratus punch with 2# dowel x 10 reps   - light elbow PRE, elbow flexion palm up and FA in neutral 2 x 10 reps each, 1# NT   - yellow theraband rows x 10 reps (3 sets) NT  - yellow theraband shoulder extension x 10 reps (3 sets) NT  - yellow theraband shoulder ER/IR x 10 reps (2 sets) each NT            Patient Education and Home Exercises      Education provided:   - added towel IR stretch   - Progress towards goals       Written Home Exercises Provided: Patient instructed to cont prior HEP.  Exercises were reviewed and Laverne was able to demonstrate them prior to the end of the session.  Laverne demonstrated good  understanding of the HEP provided. See EMR under Patient Instructions for exercises provided during therapy sessions.      ASSESSMENT     Discussed with patient to return to OT 2x/wk due to increased shoulder tightness.  Cont to have intrinsic tightness and extrinsic tightness in hand.     Laverne is progressing fairly well towards her goals and there are no updates to goals at this time. Pt prognosis is fair.     Pt will continue to benefit from skilled outpatient occupational therapy to address the deficits  listed in the problem list on initial evaluation, provide pt/family education and to maximize pt's level of independence in the home and community environment.     Pt's spiritual, cultural and educational needs considered and pt agreeable to plan of care and goals.    Anticipated barriers to occupational therapy: preexisting conditions       Goals:  Long term goals:           Previous Short Term Goals Status:   Pt will be complaint with HEP and pain management   Pt will achieve shoulder PROM in flexion and scaption ~110 degrees  MET   Pt will report improvement in FOTO by decreasing 10% limitation points   Pt will achieve shoulder AAROM WFL to aid in ADLs MET   Pt will be able to demo full composite fist with R hand.  Long Term Goals Status:   MODIFIED     Reasons for Recertification of Therapy:   continue to be limited in full independence of ADLs due to hand and shoulder       GOALS:      Pt will achieve shoulder AROM in flexion/scaption/abduction ~120 degrees   2.   Pt will report pain no greater than 1/10 in her R hand during functional use.   3.   Pt will demo shoulder AROM WFL to perform daily and community activities MET   4.   Pt will report improvement in FOTO score by measuring 20% points.   5.  Pt will report improved PITTS in her LF on R hand by 10-15 degrees.   6. Pt will report R hand  strength 30# or higher to be able to open jars, hold pots/pan when cooking    PLAN     Updated Certification Period: 7/28/2023 to 9/22/2023   Recommended Treatment Plan: 2-3 times per week for 8 weeks:  Electrical Stimulation  , Fluidotherapy, Manual Therapy, Moist Heat/ Ice, Neuromuscular Re-ed, Orthotic Management and Training, Paraffin, Patient Education, Self Care, Therapeutic Activities, and Therapeutic Exercise  Other Recommendations: Lymphedema tx with lymphatouch     Shaunna Stevenson OT

## 2023-09-21 ENCOUNTER — CLINICAL SUPPORT (OUTPATIENT)
Dept: REHABILITATION | Facility: HOSPITAL | Age: 83
End: 2023-09-21
Payer: MEDICARE

## 2023-09-21 DIAGNOSIS — M25.611 DECREASED RANGE OF MOTION OF RIGHT SHOULDER: Primary | ICD-10-CM

## 2023-09-21 DIAGNOSIS — M79.641 HAND PAIN, RIGHT: ICD-10-CM

## 2023-09-21 DIAGNOSIS — R52 PAIN: ICD-10-CM

## 2023-09-21 DIAGNOSIS — R60.0 LOCALIZED EDEMA: ICD-10-CM

## 2023-09-21 PROCEDURE — 97110 THERAPEUTIC EXERCISES: CPT

## 2023-09-21 PROCEDURE — 97035 APP MDLTY 1+ULTRASOUND EA 15: CPT

## 2023-09-21 PROCEDURE — 97140 MANUAL THERAPY 1/> REGIONS: CPT

## 2023-09-21 NOTE — PROGRESS NOTES
"      [facility]  Occupational Therapy Treatment Note     Date: 9/14/2023  Name: Laverne CELIS Kristel  [facility] Number: [id number]    Therapy Diagnosis:   Encounter Diagnoses   Name Primary?    Decreased range of motion of right shoulder Yes    Localized edema     Pain     Hand pain, right                Physician: Luis Angel Wheeler MD    Physician Orders:Eval and treat;  s/p RCR/lymphedema treatments into hand  Medical Diagnosis: Z98.890 (ICD-10-CM) - S/P right rotator cuff repair  Surgical Procedure and Date: 3/21/2023,   1. Right shoulder Arthroscopic massive complex rotator cuff repair    2. Right shoulder Arthroscopic extensive debridement   3. Right shoulder Arthroscopic lysis of adhesions   4. Right shoulder Arthroscopic subacromial decompression and bursectomy   5. Right shoulder arthroscopic loose body removal    Evaluation Date: 5/3/2023  Insurance Authorization Period Expiration: 12/31/23  Plan of Care Expiration:  9/22/2023   Date of Return to MD: 7/3/2023  Visit # / Visits authorized: 40 / 40  FOTO: (date and score)     Precautions:  Standard       Time In:     08:34 AM   Time Out:     09:15       AM  Total Appointment Time (timed & untimed codes):    40      minutes       SUBJECTIVE         Pt reports:  "my hand feels real good"  She was compliant with home exercise program given .   Response to previous treatment: increased composite fist, decreased edema   Functional change:  able to sultana/doff bra     Pain: 0/10  Location: right hand     OBJECTIVE   Objective Measures updated at progress report unless specified.    Observation/Appearance: mod edema in hand        Edema. Measured in centimeters.    5/9/2023 5/9/2023 8/3/2023 8/3/2023     Left Right Left  Right                            Wrist Crease 14.0 cm  16.0 cm               MCPs 18.4.0 cm  20.5 cm 18.0 cm  19.0 cm                  8/7/2023 IF LF RF SF    Right        P1 6.5 6.1 5.7 5.0    PIP 5.3 5.2 4.6 4.2    P2 4.6 4.3 " 3.8 3.5    Left        P1 5.3 5.0 4.7 4.4    PIP 4.6 4.6      P2               Shoulder ROM. Measured in degrees    5/3/2022 5/3/2023 5/11/2023 6/9/2023 6/30/2023 6/30/2023 7/25/23     Left Right PROM Right PROM Right AROM  Right AROM Right PROM R AROM   Shoulder Flexion  WFL 85 degrees  90 90 115 125 110   Shoulder Abduction  WFL NT 80 90 90 95 100   Shoulder Extension    NT NT     50   Shoulder IR   NT NT L5   37, L5   Shoulder ER  70 NT 20 65   60   Shoulder ER @0       48          Elbow and Wrist ROM. Measured in degrees.    5/9/2023 5/9/2023     Left Right   Elbow Ext/Flex WNL/WNL  12/135    Supination/Pronation WNL/WNL  66/75   Wrist Ext/Flex 55/70 39/6   Wrist RD/UD             Hand ROM. Measured in degrees.    5/9/2023 5/9/2023 6/22/2023 8/18/2023     Left Right Right  Right          After tx   Index: MP    68 67 66              PIP       50 91 78              DIP   30 40 30              PITTS   148 198 174             Long:  MP   65 78 80              PIP   58 80 85              DIP   62 55 55              PITTS   185 213 220             Ring:   MP   55 80 80              PIP   60 92 95              DIP   32 45 45              PITTS   147 217 220                      Strength (Dynamometer) and Pinch Strength (Pinch Gauge)  Measured in pounds.    6/9/2023 6/9/2023 7/7/2023 7/18/2023      Left Right Right Right   Rung II  41 20 25 21   Key Pinch         3pt Pinch         2pt Pinch               Manual Muscle Test    5/9/2023 5/9/2023     Left Right   Wrist Extension        Wrist Flexion       Radial Deviation       Ulnar Deviation       Supination       Pronation       Elbow Extension       Elbow Flexion             Limitation/Restriction for FOTO initial eval; shoulder Survey     Therapist reviewed FOTO scores for Betodia M Kristel on 5/3/2023.   FOTO documents entered into [employer] - see Media section.     Limitation Score: needs to be taken%        Treatment     Leovidia received the treatments listed below:          Fluidotherapy: To R hand  for 10 min, continuous air, 115 deg, air speed 50 to decrease pain, edema & scar tissue, sensory re- education, and increased tissue extensibility prior to therex (NT)         Supervised modalities: MHP R shoulder  for 10 minutes to promote increased blood flow  + paraffin with MHP to R hand      Manual therapy techniques: Manual Lymphatic Drainage were applied to the: RUE for 15 minutes, including:  - STM with hawk's  flexors   - passive shoulder in D1 pattern x 10  reps   - passive ROM LF (hook grasp) x 10 reps   - passive LF hyperextension holding 10s x 10 reps          Therapeutic exercises to develop ROM for 25 minutes, including:      - prayer stretch holding for 30s x 3 reps   - digit extension holding each for 5s (2 min)   - push (flat palm) power web holding for 10s x 10 reps (2 sets)   - concentric teres major/subscapularis with RED theraband x 10 reps each (2 sets) NT  - B ext rotation with RED theraband x 10 reps (1 set)   - inclined on mat, scaption/flexion/ER/horizontal abduction with 3# dowel x 10 reps (3 sets) each     Direct contact modality:   Patient received ultrasound to R palmar area to increase blood flow, circulation, tissue elasticity, and for pain management for 8 minutes @ 3 Mhz, Intensity 1.0 w/cm2 at 100% duty cycle.         Not performed today:   - AAROM flexion/scaption with 4# dowel x 10 reps supine on mat (2 sets)  - sidelying abduction and ER, 2 x 15 reps  - supine dynamic stabilization with 1#, alphabet x 1 set   - AAROM serratus punch with 2# dowel 2 x 10 reps   - light elbow PRE, elbow flexion palm up and FA in neutral 2 x 10 reps each, 1# NT   - RED theraband rows x 10 reps (3 sets)   - 4# dowel shoulder extension x 10 reps (2 sets)  - yellow theraband shoulder extension x 10 reps (3 sets)   - yellow theraband shoulder ER/IR x 15 reps (2 sets) each         Neuro re-ed: 10 min NT   - rice bin (6 min)   - in hand manipulation with marbles          Patient Education and Home Exercises      Education provided:   - cont HEP  - Progress towards goals       Written Home Exercises Provided: Patient instructed to cont prior HEP.  Exercises were reviewed and Laverne was able to demonstrate them prior to the end of the session.  Laverne demonstrated good  understanding of the HEP provided. See EMR under Patient Instructions for exercises provided during therapy sessions.      ASSESSMENT       Good response to US.    Pt participated well and is motivated.     Laverne is progressing fairly well towards her goals and there are no updates to goals at this time. Pt prognosis is fair.     Pt will continue to benefit from skilled outpatient occupational therapy to address the deficits listed in the problem list on initial evaluation, provide pt/family education and to maximize pt's level of independence in the home and community environment.     Pt's spiritual, cultural and educational needs considered and pt agreeable to plan of care and goals.    Anticipated barriers to occupational therapy: preexisting conditions         Goals:  Long term goals:   Pt will achieve shoulder AROM in flexion/scaption/abduction ~120 degrees ---progressing  2.   Assess MMT when appropriate------progressing  3.   Pt will demo shoulder AROM WFL to perform daily and community activities ---progressing  4.   Pt will report improvement in FOTO score by measuring 20% points. ---progressing        Short term goals:   Pt will be complaint with HEP and pain management ---progressing  Pt will achieve shoulder PROM in flexion and scaption ~110 degrees ---progressing  Pt will report improvement in FOTO by decreasing 10% limitation points ---progressing  Pt will achieve shoulder AAROM WFL to aid in ADLs ---progressing  Pt will be able to demo full composite fist with R hand.---progressing    PLAN     Continue skilled occupational therapy with individualized plan of care focusing on improving  "functional independence with use of RUE    Updates/Grading for next session: cont per large massive type III protocol. Needs reassessment next session and updated POC    Melissa Landeche, OT [facility]  Occupational Therapy Treatment Note    Date: 9/14/2023  Name: Laverne Humphries  Clinic Number: [id number]    Therapy Diagnosis:   Encounter Diagnoses   Name Primary?    Decreased range of motion of right shoulder Yes    Localized edema     Pain     Hand pain, right                                    Physician: Luis Angel Wheeler MD    Physician Orders:Eval and treat;  s/p RCR/lymphedema treatments into hand  Medical Diagnosis: Z98.890 (ICD-10-CM) - S/P right rotator cuff repair  Surgical Procedure and Date: 3/21/2023,   1. Right shoulder Arthroscopic massive complex rotator cuff repair    2. Right shoulder Arthroscopic extensive debridement   3. Right shoulder Arthroscopic lysis of adhesions   4. Right shoulder Arthroscopic subacromial decompression and bursectomy   5. Right shoulder arthroscopic loose body removal    Evaluation Date: 5/3/2023  Insurance Authorization Period Expiration: 05/07/2023  Plan of Care Expiration: 12 weeks; 7/28/2023  Date of Return to MD: 7/3/2023  Visit # / Visits authorized: 24 / 40  FOTO: (date and score)     Precautions:  Standard     Time In:     08:05 AM   Time Out:      09:05   AM     Total Appointment Time (timed & untimed codes): 60 minutes       SUBJECTIVE     Pt reports: "Yesterday when I left here my swelling went down but last night my hand was more swollen and my shoulder was sore"   She was compliant with home exercise program given last session.   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 Reaching easier and opening things easier    Pain: 0/10  Location: right hand     OBJECTIVE   Objective Measures " updated at progress report unless specified.    Observation/Appearance: mod edema in hand      Edema. Measured in centimeters.    5/9/2023 5/9/2023     Left Right   2in. Above elbow       2in. Below elbow       Wrist Crease 14.0 cm  16.0 cm    Figure of 8       MCPs 18.4.0 cm  20.5 cm               Elbow and Wrist ROM. Measured in degrees.    5/9/2023 5/9/2023     Left Right   Elbow Ext/Flex WNL/WNL  12/135    Supination/Pronation WNL/WNL  66/75   Wrist Ext/Flex 55/70 39/6   Wrist RD/UD             Hand ROM. Measured in degrees.    5/9/2023 5/9/2023 6/22/2023 8/18/2023      Left Right Right  Right             Index: MP    68 67 75              PIP       50 91 8/80              DIP   30 40 40              PITTS   148               Long:  MP   65 78 80              PIP   58 80 5/85              DIP   62 55 50              PITTS   185               Ring:   MP   55 80 80              PIP   60 92 10/95              DIP   32 45 46              PITTS   147                        Strength (Dynamometer) and Pinch Strength (Pinch Gauge)  Measured in pounds.    6/9/2023 6/9/2023 7/7/2023 7/18/2023 8/18/2023     Left Right Right Right Right   Rung II  41 20 25 21    Key Pinch          3pt Pinch          2pt Pinch                Manual Muscle Test    5/9/2023 5/9/2023     Left Right   Wrist Extension        Wrist Flexion       Radial Deviation       Ulnar Deviation       Supination       Pronation       Elbow Extension       Elbow Flexion             Limitation/Restriction for FOTO initial eval; shoulder Survey     Therapist reviewed FOTO scores for Laverne Humphries on 5/3/2023.   FOTO documents entered into [employer] - see Media section.     Limitation Score: needs to be taken%        Treatment     Leovidia received the treatments listed below:       Fluidotherapy: To R hand for 10 min, continuous air, 115 deg, air speed 50 to decrease pain, edema & scar tissue, sensory re- education, and increased tissue extensibility prior to  therex  Supervised modalities: MHP for 10 minutes to promote increased blood flow         Manual therapy techniques: Manual Lymphatic Drainage were applied to the: RUE for 35 minutes, including:  - passive ROM to flexion/scaption/abduction/ER/IR  x 10 reps each   - passive ROM LF (hook grasp)  - passive ROM LF extension with MP hyperextended for increased stretch   - STM using star tool palmar fascia, focus near A1 and A3 pulley   - passive horizontal abduction with ER/IR x 10 reps each   - passive D1 pattern x 10 reps           Therapeutic exercises to develop ROM for 15  minutes, including:  - horizontal abduction, concentric  ER and IR with RED theraband x 10 reps each   - supine dynamic stabilization with 1#, alphabet x 1 set        Not performed today:   - AAROM flexion/scaption/abduction with 2# dowel x 10 reps each  incline on mat (2 sets)  - sidelying abduction and ER, 2 x 15 reps  - AAROM serratus punch with 2# dowel x 10 reps   - light elbow PRE, elbow flexion palm up and FA in neutral 2 x 10 reps each, 1# NT   - yellow theraband rows x 10 reps (3 sets) NT  - yellow theraband shoulder extension x 10 reps (3 sets) NT  - yellow theraband shoulder ER/IR x 10 reps (2 sets) each NT            Patient Education and Home Exercises      Education provided:   - added towel IR stretch   - Progress towards goals       Written Home Exercises Provided: Patient instructed to cont prior HEP.  Exercises were reviewed and Laverne was able to demonstrate them prior to the end of the session.  Laverne demonstrated good  understanding of the HEP provided. See EMR under Patient Instructions for exercises provided during therapy sessions.      ASSESSMENT     Discussed with patient to return to OT 2x/wk due to increased shoulder tightness.  Cont to have intrinsic tightness and extrinsic tightness in hand.     Laverne is progressing fairly well towards her goals and there are no updates to goals at this time. Pt prognosis is  fair.     Pt will continue to benefit from skilled outpatient occupational therapy to address the deficits listed in the problem list on initial evaluation, provide pt/family education and to maximize pt's level of independence in the home and community environment.     Pt's spiritual, cultural and educational needs considered and pt agreeable to plan of care and goals.    Anticipated barriers to occupational therapy: preexisting conditions       Goals:  Long term goals:           Previous Short Term Goals Status:   Pt will be complaint with HEP and pain management   Pt will achieve shoulder PROM in flexion and scaption ~110 degrees  MET   Pt will report improvement in FOTO by decreasing 10% limitation points   Pt will achieve shoulder AAROM WFL to aid in ADLs MET   Pt will be able to demo full composite fist with R hand.  Long Term Goals Status:   MODIFIED     Reasons for Recertification of Therapy:   continue to be limited in full independence of ADLs due to hand and shoulder       GOALS:      Pt will achieve shoulder AROM in flexion/scaption/abduction ~120 degrees   2.   Pt will report pain no greater than 1/10 in her R hand during functional use.   3.   Pt will demo shoulder AROM WFL to perform daily and community activities MET   4.   Pt will report improvement in FOTO score by measuring 20% points.   5.  Pt will report improved PITTS in her LF on R hand by 10-15 degrees.   6. Pt will report R hand  strength 30# or higher to be able to open jars, hold pots/pan when cooking    PLAN     Updated Certification Period: 7/28/2023 to 9/22/2023   Recommended Treatment Plan: 2-3 times per week for 8 weeks:  Electrical Stimulation  , Fluidotherapy, Manual Therapy, Moist Heat/ Ice, Neuromuscular Re-ed, Orthotic Management and Training, Paraffin, Patient Education, Self Care, Therapeutic Activities, and Therapeutic Exercise  Other Recommendations: Lymphedema tx with lymphatouch     Shaunna Stevenson  OT

## 2023-09-25 ENCOUNTER — PATIENT MESSAGE (OUTPATIENT)
Dept: SPORTS MEDICINE | Facility: CLINIC | Age: 83
End: 2023-09-25
Payer: MEDICARE

## 2023-09-26 ENCOUNTER — CLINICAL SUPPORT (OUTPATIENT)
Dept: REHABILITATION | Facility: HOSPITAL | Age: 83
End: 2023-09-26
Payer: MEDICARE

## 2023-09-26 DIAGNOSIS — M79.641 HAND PAIN, RIGHT: ICD-10-CM

## 2023-09-26 DIAGNOSIS — M25.611 DECREASED RANGE OF MOTION OF RIGHT SHOULDER: Primary | ICD-10-CM

## 2023-09-26 DIAGNOSIS — R60.0 LOCALIZED EDEMA: ICD-10-CM

## 2023-09-26 DIAGNOSIS — R52 PAIN: ICD-10-CM

## 2023-09-26 PROCEDURE — 97110 THERAPEUTIC EXERCISES: CPT

## 2023-09-26 PROCEDURE — 97140 MANUAL THERAPY 1/> REGIONS: CPT

## 2023-09-26 NOTE — PROGRESS NOTES
"      [facility]  Occupational Therapy Treatment Note     Date: 9/26/2023  Name: Laverne CELIS Kristel  [facility] Number: [id number]    Therapy Diagnosis:   Encounter Diagnoses   Name Primary?    Decreased range of motion of right shoulder Yes    Localized edema     Pain     Hand pain, right                Physician: Luis Angel Wheeler MD    Physician Orders:Eval and treat;  s/p RCR/lymphedema treatments into hand  Medical Diagnosis: Z98.890 (ICD-10-CM) - S/P right rotator cuff repair  Surgical Procedure and Date: 3/21/2023,   1. Right shoulder Arthroscopic massive complex rotator cuff repair    2. Right shoulder Arthroscopic extensive debridement   3. Right shoulder Arthroscopic lysis of adhesions   4. Right shoulder Arthroscopic subacromial decompression and bursectomy   5. Right shoulder arthroscopic loose body removal    Evaluation Date: 5/3/2023  Insurance Authorization Period Expiration: 12/31/23  Plan of Care Expiration:  9/22/2023   Date of Return to MD: 7/3/2023  Visit # / Visits authorized: 40 / 40  FOTO: (date and score)     Precautions:  Standard       Time In:     08:34 AM   Time Out:     09:15       AM  Total Appointment Time (timed & untimed codes):    40      minutes       SUBJECTIVE         Pt reports:  "my hand feels real good"  She was compliant with home exercise program given .   Response to previous treatment: increased composite fist, decreased edema   Functional change:  able to sultana/doff bra     Pain: 0/10  Location: right hand     OBJECTIVE   Objective Measures updated at progress report unless specified.    Observation/Appearance: mod edema in hand        Edema. Measured in centimeters.    5/9/2023 5/9/2023 8/3/2023 8/3/2023     Left Right Left  Right                            Wrist Crease 14.0 cm  16.0 cm               MCPs 18.4.0 cm  20.5 cm 18.0 cm  19.0 cm                  8/7/2023 IF LF RF SF    Right        P1 6.5 6.1 5.7 5.0    PIP 5.3 5.2 4.6 4.2    P2 4.6 4.3 " 3.8 3.5    Left        P1 5.3 5.0 4.7 4.4    PIP 4.6 4.6      P2               Shoulder ROM. Measured in degrees    5/3/2022 5/3/2023 5/11/2023 6/9/2023 6/30/2023 6/30/2023 7/25/23     Left Right PROM Right PROM Right AROM  Right AROM Right PROM R AROM   Shoulder Flexion  WFL 85 degrees  90 90 115 125 110   Shoulder Abduction  WFL NT 80 90 90 95 100   Shoulder Extension    NT NT     50   Shoulder IR   NT NT L5   37, L5   Shoulder ER  70 NT 20 65   60   Shoulder ER @0       48          Elbow and Wrist ROM. Measured in degrees.    5/9/2023 5/9/2023 9/21/2023     Left Right Right   Elbow Ext/Flex WNL/WNL  12/135     Supination/Pronation WNL/WNL  66/75    Wrist Ext/Flex 55/70 39/6 55/46   Wrist RD/UD              Hand ROM. Measured in degrees.    5/9/2023 5/9/2023 6/22/2023 8/18/2023 9/19/2023     Left Right Right  Right Right          After tx Before tx    Index: MP    68 67 66 70              PIP       50 91 78 85              DIP   30 40 30 53              PITTS   148 198 174 208              Long:  MP   65 78 80 81              PIP   58 80 85 91              DIP   62 55 55 61              PITTS   185 213 220 233              Ring:   MP   55 80 80 80              PIP   60 92 95 104              DIP   32 45 45 45              PITTS   147 217 220 229                       Strength (Dynamometer) and Pinch Strength (Pinch Gauge)  Measured in pounds.    6/9/2023 6/9/2023 7/7/2023 7/18/2023 9/19/2023     Left Right Right Right Right   Rung II  41 20 25 21 25   Key Pinch  9     7   3pt Pinch       6.5   2pt Pinch  6     6           Limitation/Restriction for FOTO initial eval; shoulder Survey     Therapist reviewed FOTO scores for Laverne LALITA Kristel on 5/3/2023.   FOTO documents entered into [employer] - see Media section.     Limitation Score: needs to be taken%        Treatment     Leovidia received the treatments listed below:           Supervised modalities: MHP R shoulder  for 10 minutes to promote increased blood flow  +  paraffin with MHP to R hand          Manual therapy techniques: Manual Lymphatic Drainage were applied to the: RUE for 15 minutes, including:  - STM with hawk's  flexors   - passive shoulder in D1 pattern x 10  reps   - passive ROM LF (hook grasp) x 10 reps   - passive LF hyperextension holding 10s x 10 reps          Therapeutic exercises to develop ROM for 25 minutes, including:    - prayer stretch holding for 30s x 3 reps   - digit extension holding each for 5s (2 min)   - push (flat palm) power web holding for 10s x 10 reps (2 sets)   - concentric teres major/subscapularis with RED theraband x 10 reps each (2 sets) NT  - B ext rotation with RED theraband x 10 reps (1 set)   - inclined on mat, scaption/flexion/ER/horizontal abduction with 3# dowel x 10 reps (3 sets) each       Direct contact modality:   Patient received ultrasound to R palmar area to increase blood flow, circulation, tissue elasticity, and for pain management for 8 minutes @ 3 Mhz, Intensity 1.0 w/cm2 at 100% duty cycle.           Not performed today:   - AAROM flexion/scaption with 4# dowel x 10 reps supine on mat (2 sets)  - sidelying abduction and ER, 2 x 15 reps  - supine dynamic stabilization with 1#, alphabet x 1 set   - AAROM serratus punch with 2# dowel 2 x 10 reps   - light elbow PRE, elbow flexion palm up and FA in neutral 2 x 10 reps each, 1# NT   - RED theraband rows x 10 reps (3 sets)   - 4# dowel shoulder extension x 10 reps (2 sets)  - yellow theraband shoulder extension x 10 reps (3 sets)   - yellow theraband shoulder ER/IR x 15 reps (2 sets) each         Neuro re-ed: 10 min NT   - rice bin (6 min)   - in hand manipulation with marbles         Patient Education and Home Exercises      Education provided:   - cont HEP  - Progress towards goals       Written Home Exercises Provided: Patient instructed to cont prior HEP.  Exercises were reviewed and Laverne was able to demonstrate them prior to the end of the session.  Laverne  demonstrated good  understanding of the HEP provided. See EMR under Patient Instructions for exercises provided during therapy sessions.      ASSESSMENT       Good response to US.    Pt participated well and is motivated.     Laverne is progressing fairly well towards her goals and there are no updates to goals at this time. Pt prognosis is fair.     Pt will continue to benefit from skilled outpatient occupational therapy to address the deficits listed in the problem list on initial evaluation, provide pt/family education and to maximize pt's level of independence in the home and community environment.     Pt's spiritual, cultural and educational needs considered and pt agreeable to plan of care and goals.    Anticipated barriers to occupational therapy: preexisting conditions         Goals:  Long term goals:   Pt will achieve shoulder AROM in flexion/scaption/abduction ~120 degrees ---progressing  2.   Assess MMT when appropriate------progressing  3.   Pt will demo shoulder AROM WFL to perform daily and community activities ---progressing  4.   Pt will report improvement in FOTO score by measuring 20% points. ---progressing        Short term goals:   Pt will be complaint with HEP and pain management ---progressing  Pt will achieve shoulder PROM in flexion and scaption ~110 degrees ---progressing  Pt will report improvement in FOTO by decreasing 10% limitation points ---progressing  Pt will achieve shoulder AAROM WFL to aid in ADLs ---progressing  Pt will be able to demo full composite fist with R hand.---progressing    PLAN     Continue skilled occupational therapy with individualized plan of care focusing on improving functional independence with use of RUE    Updates/Grading for next session: cont per large massive type III protocol. Needs reassessment next session and updated POC    Melissa Landeche, OT [facility]  Occupational  "Therapy Treatment Note    Date: 9/26/2023  Name: Laverne Humphries  [facility] Number: [id number]    Therapy Diagnosis:   Encounter Diagnoses   Name Primary?    Decreased range of motion of right shoulder Yes    Localized edema     Pain     Hand pain, right                                    Physician: Luis Angel Wheeler MD    Physician Orders:Eval and treat;  s/p RCR/lymphedema treatments into hand  Medical Diagnosis: Z98.890 (ICD-10-CM) - S/P right rotator cuff repair  Surgical Procedure and Date: 3/21/2023,   1. Right shoulder Arthroscopic massive complex rotator cuff repair    2. Right shoulder Arthroscopic extensive debridement   3. Right shoulder Arthroscopic lysis of adhesions   4. Right shoulder Arthroscopic subacromial decompression and bursectomy   5. Right shoulder arthroscopic loose body removal    Evaluation Date: 5/3/2023  Insurance Authorization Period Expiration: 05/07/2023  Plan of Care Expiration: 12 weeks; 7/28/2023  Date of Return to MD: 7/3/2023  Visit # / Visits authorized: 24 / 40  FOTO: (date and score)     Precautions:  Standard     Time In:     08:05 AM   Time Out:      09:05   AM     Total Appointment Time (timed & untimed codes): 60 minutes       SUBJECTIVE     Pt reports: "Yesterday when I left here my swelling went down but last night my hand was more swollen and my shoulder was sore"   She was compliant with home exercise program given last session.   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 Reaching easier and opening things easier    Pain: 0/10  Location: right hand     OBJECTIVE   Objective Measures updated at progress report unless specified.    Observation/Appearance: mod edema in hand      Edema. Measured in centimeters.    5/9/2023 5/9/2023     Left Right   2in. Above elbow       2in. Below elbow       Wrist Crease 14.0 cm  16.0 cm    Figure of 8       MCPs 18.4.0 cm  20.5 cm             "   Elbow and Wrist ROM. Measured in degrees.    5/9/2023 5/9/2023     Left Right   Elbow Ext/Flex WNL/WNL  12/135    Supination/Pronation WNL/WNL  66/75   Wrist Ext/Flex 55/70 39/6   Wrist RD/UD             Hand ROM. Measured in degrees.    5/9/2023 5/9/2023 6/22/2023 8/18/2023      Left Right Right  Right             Index: MP    68 67 75              PIP       50 91 8/80              DIP   30 40 40              PITTS   148               Long:  MP   65 78 80              PIP   58 80 5/85              DIP   62 55 50              PITTS   185               Ring:   MP   55 80 80              PIP   60 92 10/95              DIP   32 45 46              PITTS   147                        Strength (Dynamometer) and Pinch Strength (Pinch Gauge)  Measured in pounds.    6/9/2023 6/9/2023 7/7/2023 7/18/2023 8/18/2023     Left Right Right Right Right   Rung II  41 20 25 21    Key Pinch          3pt Pinch          2pt Pinch                Manual Muscle Test    5/9/2023 5/9/2023     Left Right   Wrist Extension        Wrist Flexion       Radial Deviation       Ulnar Deviation       Supination       Pronation       Elbow Extension       Elbow Flexion             Limitation/Restriction for FOTO initial eval; shoulder Survey     Therapist reviewed FOTO scores for Leovidia M [place] on 5/3/2023.   FOTO documents entered into [employer] - see Media section.     Limitation Score: needs to be taken%        Treatment     Leovidia received the treatments listed below:       Fluidotherapy: To R hand for 10 min, continuous air, 115 deg, air speed 50 to decrease pain, edema & scar tissue, sensory re- education, and increased tissue extensibility prior to therex  Supervised modalities: MHP for 10 minutes to promote increased blood flow         Manual therapy techniques: Manual Lymphatic Drainage were applied to the: RUE for 35 minutes, including:  - passive ROM to flexion/scaption/abduction/ER/IR  x 10 reps each   - passive ROM LF (hook grasp)  -  passive ROM LF extension with MP hyperextended for increased stretch   - STM using star tool palmar fascia, focus near A1 and A3 pulley   - passive horizontal abduction with ER/IR x 10 reps each   - passive D1 pattern x 10 reps           Therapeutic exercises to develop ROM for 15  minutes, including:  - horizontal abduction, concentric  ER and IR with RED theraband x 10 reps each   - supine dynamic stabilization with 1#, alphabet x 1 set        Not performed today:   - AAROM flexion/scaption/abduction with 2# dowel x 10 reps each  incline on mat (2 sets)  - sidelying abduction and ER, 2 x 15 reps  - AAROM serratus punch with 2# dowel x 10 reps   - light elbow PRE, elbow flexion palm up and FA in neutral 2 x 10 reps each, 1# NT   - yellow theraband rows x 10 reps (3 sets) NT  - yellow theraband shoulder extension x 10 reps (3 sets) NT  - yellow theraband shoulder ER/IR x 10 reps (2 sets) each NT            Patient Education and Home Exercises      Education provided:   - added towel IR stretch   - Progress towards goals       Written Home Exercises Provided: Patient instructed to cont prior HEP.  Exercises were reviewed and Laverne was able to demonstrate them prior to the end of the session.  Laverne demonstrated good  understanding of the HEP provided. See EMR under Patient Instructions for exercises provided during therapy sessions.      ASSESSMENT     Discussed with patient to return to OT 2x/wk due to increased shoulder tightness.  Cont to have intrinsic tightness and extrinsic tightness in hand.     Laverne is progressing fairly well towards her goals and there are no updates to goals at this time. Pt prognosis is fair.     Pt will continue to benefit from skilled outpatient occupational therapy to address the deficits listed in the problem list on initial evaluation, provide pt/family education and to maximize pt's level of independence in the home and community environment.     Pt's spiritual, cultural  and educational needs considered and pt agreeable to plan of care and goals.    Anticipated barriers to occupational therapy: preexisting conditions       Goals:  Long term goals:           Previous Short Term Goals Status:   Pt will be complaint with HEP and pain management   Pt will achieve shoulder PROM in flexion and scaption ~110 degrees  MET   Pt will report improvement in FOTO by decreasing 10% limitation points   Pt will achieve shoulder AAROM WFL to aid in ADLs MET   Pt will be able to demo full composite fist with R hand.  Long Term Goals Status:   MODIFIED     Reasons for Recertification of Therapy:   continue to be limited in full independence of ADLs due to hand and shoulder       GOALS:      Pt will achieve shoulder AROM in flexion/scaption/abduction ~120 degrees   2.   Pt will report pain no greater than 1/10 in her R hand during functional use.   3.   Pt will demo shoulder AROM WFL to perform daily and community activities MET   4.   Pt will report improvement in FOTO score by measuring 20% points.   5.  Pt will report improved PITTS in her LF on R hand by 10-15 degrees.   6. Pt will report R hand  strength 30# or higher to be able to open jars, hold pots/pan when cooking    PLAN     Updated Certification Period: 7/28/2023 to 9/22/2023   Recommended Treatment Plan: 2-3 times per week for 8 weeks:  Electrical Stimulation  , Fluidotherapy, Manual Therapy, Moist Heat/ Ice, Neuromuscular Re-ed, Orthotic Management and Training, Paraffin, Patient Education, Self Care, Therapeutic Activities, and Therapeutic Exercise  Other Recommendations: Lymphedema tx with lymphatouch     Shaunna Stevenson OT

## 2023-09-28 ENCOUNTER — CLINICAL SUPPORT (OUTPATIENT)
Dept: REHABILITATION | Facility: HOSPITAL | Age: 83
End: 2023-09-28
Payer: MEDICARE

## 2023-09-28 DIAGNOSIS — M25.611 DECREASED RANGE OF MOTION OF RIGHT SHOULDER: Primary | ICD-10-CM

## 2023-09-28 DIAGNOSIS — M79.641 HAND PAIN, RIGHT: ICD-10-CM

## 2023-09-28 DIAGNOSIS — M79.641 RIGHT HAND PAIN: Primary | ICD-10-CM

## 2023-09-28 DIAGNOSIS — R60.0 LOCALIZED EDEMA: ICD-10-CM

## 2023-09-28 DIAGNOSIS — R52 PAIN: ICD-10-CM

## 2023-09-28 PROCEDURE — 97140 MANUAL THERAPY 1/> REGIONS: CPT

## 2023-09-28 PROCEDURE — 97110 THERAPEUTIC EXERCISES: CPT

## 2023-09-28 PROCEDURE — 97035 APP MDLTY 1+ULTRASOUND EA 15: CPT

## 2023-09-28 NOTE — PROGRESS NOTES
"    [facility]  Occupational Therapy Treatment Note     Date: 9/28/2023  Name: Laverne CELIS [place]  [facility] Number: [id number]    Therapy Diagnosis:   Encounter Diagnoses   Name Primary?    Decreased range of motion of right shoulder Yes    Localized edema     Pain     Hand pain, right        Physician: Luis Angel Wheeler MD    Physician Orders:Eval and treat;  s/p RCR/lymphedema treatments into hand  Medical Diagnosis: Z98.890 (ICD-10-CM) - S/P right rotator cuff repair  Surgical Procedure and Date: 3/21/2023,   1. Right shoulder Arthroscopic massive complex rotator cuff repair    2. Right shoulder Arthroscopic extensive debridement   3. Right shoulder Arthroscopic lysis of adhesions   4. Right shoulder Arthroscopic subacromial decompression and bursectomy   5. Right shoulder arthroscopic loose body removal    Evaluation Date: 5/3/2023  Insurance Authorization Period Expiration: 12/31/23  Plan of Care Expiration:  9/22/2023   Date of Return to MD: 7/3/2023  Visit # / Visits authorized: 40 / 40  FOTO: (date and score)     Precautions:  Standard       Time In:     08:34 AM   Time Out:     09:15       AM  Total Appointment Time (timed & untimed codes):    40      minutes       SUBJECTIVE         Pt reports:  "my hand feels real good"  She was compliant with home exercise program given .   Response to previous treatment: increased composite fist, decreased edema   Functional change:  able to sultana/doff bra     Pain: 0/10  Location: right hand     OBJECTIVE   Objective Measures updated at progress report unless specified.    Observation/Appearance: mod edema in hand        Edema. Measured in centimeters.    5/9/2023 5/9/2023 8/3/2023 8/3/2023     Left Right Left  Right                            Wrist Crease 14.0 cm  16.0 cm               MCPs 18.4.0 cm  20.5 cm 18.0 cm  19.0 cm                  8/7/2023 IF LF RF SF    Right        P1 6.5 6.1 5.7 5.0    PIP 5.3 5.2 4.6 4.2    P2 4.6 4.3 3.8 3.5  "   Left        P1 5.3 5.0 4.7 4.4    PIP 4.6 4.6      P2               Shoulder ROM. Measured in degrees    5/3/2022 5/3/2023 5/11/2023 6/9/2023 6/30/2023 6/30/2023 7/25/23     Left Right PROM Right PROM Right AROM  Right AROM Right PROM R AROM   Shoulder Flexion  WFL 85 degrees  90 90 115 125 110   Shoulder Abduction  WFL NT 80 90 90 95 100   Shoulder Extension    NT NT     50   Shoulder IR   NT NT L5   37, L5   Shoulder ER  70 NT 20 65   60   Shoulder ER @0       48          Elbow and Wrist ROM. Measured in degrees.    5/9/2023 5/9/2023 9/21/2023     Left Right Right   Elbow Ext/Flex WNL/WNL  12/135     Supination/Pronation WNL/WNL  66/75    Wrist Ext/Flex 55/70 39/6 55/46   Wrist RD/UD              Hand ROM. Measured in degrees.    5/9/2023 5/9/2023 6/22/2023 8/18/2023 9/19/2023     Left Right Right  Right Right          After tx Before tx    Index: MP    68 67 66 70              PIP       50 91 78 85              DIP   30 40 30 53              PITTS   148 198 174 208              Long:  MP   65 78 80 81              PIP   58 80 85 91              DIP   62 55 55 61              PITTS   185 213 220 233              Ring:   MP   55 80 80 80              PIP   60 92 95 104              DIP   32 45 45 45              PITTS   147 217 220 229                       Strength (Dynamometer) and Pinch Strength (Pinch Gauge)  Measured in pounds.    6/9/2023 6/9/2023 7/7/2023 7/18/2023 9/19/2023     Left Right Right Right Right   Rung II  41 20 25 21 25   Key Pinch  9     7   3pt Pinch       6.5   2pt Pinch  6     6           Limitation/Restriction for FOTO initial eval; shoulder Survey     Therapist reviewed FOTO scores for Laverne CELIS [place] on 5/3/2023.   FOTO documents entered into [employer] - see Media section.     Limitation Score: needs to be taken%        Treatment     Leovidia received the treatments listed below:           Supervised modalities: MHP R shoulder  for 10 minutes to promote increased blood flow  + paraffin  with MHP to R hand          Manual therapy techniques: Manual Lymphatic Drainage were applied to the: RUE for 15 minutes, including:  - STM with hawk's  flexors   - passive shoulder in D1 pattern x 10  reps   - passive ROM LF (hook grasp) x 10 reps   - passive LF hyperextension holding 10s x 10 reps          Therapeutic exercises to develop ROM for 25 minutes, including:    - prayer stretch holding for 30s x 3 reps   - digit extension holding each for 5s (2 min)   - push (flat palm) power web holding for 10s x 10 reps (2 sets)   - concentric teres major/subscapularis with RED theraband x 10 reps each (2 sets) NT  - B ext rotation with RED theraband x 10 reps (1 set)   - inclined on mat, scaption/flexion/ER/horizontal abduction with 3# dowel x 10 reps (3 sets) each       Direct contact modality:   Patient received ultrasound to R palmar area to increase blood flow, circulation, tissue elasticity, and for pain management for 8 minutes @ 3 Mhz, Intensity 1.0 w/cm2 at 100% duty cycle.           Not performed today:   - AAROM flexion/scaption with 4# dowel x 10 reps supine on mat (2 sets)  - sidelying abduction and ER, 2 x 15 reps  - supine dynamic stabilization with 1#, alphabet x 1 set   - AAROM serratus punch with 2# dowel 2 x 10 reps   - light elbow PRE, elbow flexion palm up and FA in neutral 2 x 10 reps each, 1# NT   - RED theraband rows x 10 reps (3 sets)   - 4# dowel shoulder extension x 10 reps (2 sets)  - yellow theraband shoulder extension x 10 reps (3 sets)   - yellow theraband shoulder ER/IR x 15 reps (2 sets) each         Neuro re-ed: 10 min NT   - rice bin (6 min)   - in hand manipulation with marbles         Patient Education and Home Exercises      Education provided:   - cont HEP  - Progress towards goals       Written Home Exercises Provided: Patient instructed to cont prior HEP.  Exercises were reviewed and Laverne was able to demonstrate them prior to the end of the session.  Laverne  demonstrated good  understanding of the HEP provided. See EMR under Patient Instructions for exercises provided during therapy sessions.      ASSESSMENT     Ready for discharge from occupational therapy services. Well Educated on HEP to cont with RTC palmar aponeurosis.     Laverne is progressing fairly well towards her goals and there are no updates to goals at this time. Pt prognosis is fair.     Pt's spiritual, cultural and educational needs considered and pt agreeable to plan of care and goals.    Anticipated barriers to occupational therapy: preexisting conditions         Goals: ALL GOALS HAVE BEEN MET   Long term goals:   Pt will achieve shoulder AROM in flexion/scaption/abduction ~120 degrees ---progressing  2.   Assess MMT when appropriate------progressing  3.   Pt will demo shoulder AROM WFL to perform daily and community activities ---progressing  4.   Pt will report improvement in FOTO score by measuring 20% points. ---progressing        Short term goals:   Pt will be complaint with HEP and pain management ---progressing  Pt will achieve shoulder PROM in flexion and scaption ~110 degrees ---progressing  Pt will report improvement in FOTO by decreasing 10% limitation points ---progressing  Pt will achieve shoulder AAROM WFL to aid in ADLs ---progressing  Pt will be able to demo full composite fist with R hand.---progressing    PLAN         Melissa Landeche, OT [facility]  Occupational Therapy Treatment Note    Date: 9/28/2023  Name: Laverne Humphries  Clinic Number: [id number]    Therapy Diagnosis:   Encounter Diagnoses   Name Primary?    Decreased range of motion of right shoulder Yes    Localized edema     Pain     Hand pain, right                                    Physician: Luis Angel Wheeler MD    Physician Orders:Eval and treat;  s/p RCR/lymphedema treatments into hand  Medical Diagnosis: Z98.890 (ICD-10-CM) - S/P right  "rotator cuff repair  Surgical Procedure and Date: 3/21/2023,   1. Right shoulder Arthroscopic massive complex rotator cuff repair    2. Right shoulder Arthroscopic extensive debridement   3. Right shoulder Arthroscopic lysis of adhesions   4. Right shoulder Arthroscopic subacromial decompression and bursectomy   5. Right shoulder arthroscopic loose body removal    Evaluation Date: 5/3/2023  Insurance Authorization Period Expiration: 05/07/2023  Plan of Care Expiration: 12 weeks; 7/28/2023  Date of Return to MD: 7/3/2023  Visit # / Visits authorized: 24 / 40  FOTO: (date and score)     Precautions:  Standard     Time In:     08:05 AM   Time Out:      09:05   AM     Total Appointment Time (timed & untimed codes): 60 minutes       SUBJECTIVE     Pt reports: "Yesterday when I left here my swelling went down but last night my hand was more swollen and my shoulder was sore"   She was compliant with home exercise program given last session.   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 Reaching easier and opening things easier    Pain: 0/10  Location: right hand     OBJECTIVE   Objective Measures updated at progress report unless specified.    Observation/Appearance: mod edema in hand      Edema. Measured in centimeters.    5/9/2023 5/9/2023     Left Right   2in. Above elbow       2in. Below elbow       Wrist Crease 14.0 cm  16.0 cm    Figure of 8       MCPs 18.4.0 cm  20.5 cm               Elbow and Wrist ROM. Measured in degrees.    5/9/2023 5/9/2023     Left Right   Elbow Ext/Flex WNL/WNL  12/135    Supination/Pronation WNL/WNL  66/75   Wrist Ext/Flex 55/70 39/6   Wrist RD/UD             Hand ROM. Measured in degrees.    5/9/2023 5/9/2023 6/22/2023 8/18/2023      Left Right Right  Right             Index: MP    68 67 75              PIP       50 91 8/80              DIP   30 40 40              PITTS   148               Long:  MP  "  65 78 80              PIP   58 80 5/85              DIP   62 55 50              PITTS   185               Ring:   MP   55 80 80              PIP   60 92 10/95              DIP   32 45 46              PITTS   147                        Strength (Dynamometer) and Pinch Strength (Pinch Gauge)  Measured in pounds.    6/9/2023 6/9/2023 7/7/2023 7/18/2023 8/18/2023     Left Right Right Right Right   Rung II  41 20 25 21    Key Pinch          3pt Pinch          2pt Pinch                Manual Muscle Test    5/9/2023 5/9/2023     Left Right   Wrist Extension        Wrist Flexion       Radial Deviation       Ulnar Deviation       Supination       Pronation       Elbow Extension       Elbow Flexion             Limitation/Restriction for FOTO initial eval; shoulder Survey     Therapist reviewed FOTO scores for Laverne Humphries on 5/3/2023.   FOTO documents entered into [employer] - see Media section.     Limitation Score: needs to be taken%        Treatment     Laverne received the treatments listed below:       Fluidotherapy: To R hand for 10 min, continuous air, 115 deg, air speed 50 to decrease pain, edema & scar tissue, sensory re- education, and increased tissue extensibility prior to therex  Supervised modalities: MHP for 10 minutes to promote increased blood flow         Manual therapy techniques: Manual Lymphatic Drainage were applied to the: RUE for 35 minutes, including:  - passive ROM to flexion/scaption/abduction/ER/IR  x 10 reps each   - passive ROM LF (hook grasp)  - passive ROM LF extension with MP hyperextended for increased stretch   - STM using star tool palmar fascia, focus near A1 and A3 pulley   - passive horizontal abduction with ER/IR x 10 reps each   - passive D1 pattern x 10 reps           Therapeutic exercises to develop ROM for 15  minutes, including:  - horizontal abduction, concentric  ER and IR with RED theraband x 10 reps each   - supine dynamic stabilization with 1#, alphabet x 1 set        Not  performed today:   - AAROM flexion/scaption/abduction with 2# dowel x 10 reps each  incline on mat (2 sets)  - sidelying abduction and ER, 2 x 15 reps  - AAROM serratus punch with 2# dowel x 10 reps   - light elbow PRE, elbow flexion palm up and FA in neutral 2 x 10 reps each, 1# NT   - yellow theraband rows x 10 reps (3 sets) NT  - yellow theraband shoulder extension x 10 reps (3 sets) NT  - yellow theraband shoulder ER/IR x 10 reps (2 sets) each NT            Patient Education and Home Exercises      Education provided:   - added towel IR stretch   - Progress towards goals       Written Home Exercises Provided: Patient instructed to cont prior HEP.  Exercises were reviewed and Laverne was able to demonstrate them prior to the end of the session.  Prakashmable demonstrated good  understanding of the HEP provided. See EMR under Patient Instructions for exercises provided during therapy sessions.      ASSESSMENT     Discussed with patient to return to OT 2x/wk due to increased shoulder tightness.  Cont to have intrinsic tightness and extrinsic tightness in hand.     Laverne is progressing fairly well towards her goals and there are no updates to goals at this time. Pt prognosis is fair.     Pt will continue to benefit from skilled outpatient occupational therapy to address the deficits listed in the problem list on initial evaluation, provide pt/family education and to maximize pt's level of independence in the home and community environment.     Pt's spiritual, cultural and educational needs considered and pt agreeable to plan of care and goals.    Anticipated barriers to occupational therapy: preexisting conditions       Goals:  Long term goals:           Previous Short Term Goals Status:   Pt will be complaint with HEP and pain management   Pt will achieve shoulder PROM in flexion and scaption ~110 degrees  MET   Pt will report improvement in FOTO by decreasing 10% limitation points   Pt will achieve shoulder AAROM  WFL to aid in ADLs MET   Pt will be able to demo full composite fist with R hand.  Long Term Goals Status:   MODIFIED     Reasons for Recertification of Therapy:   continue to be limited in full independence of ADLs due to hand and shoulder       GOALS:      Pt will achieve shoulder AROM in flexion/scaption/abduction ~120 degrees   2.   Pt will report pain no greater than 1/10 in her R hand during functional use.   3.   Pt will demo shoulder AROM WFL to perform daily and community activities MET   4.   Pt will report improvement in FOTO score by measuring 20% points.   5.  Pt will report improved PITTS in her LF on R hand by 10-15 degrees.   6. Pt will report R hand  strength 30# or higher to be able to open jars, hold pots/pan when cooking    PLAN     Updated Certification Period: 7/28/2023 to 9/22/2023   Recommended Treatment Plan: 2-3 times per week for 8 weeks:  Electrical Stimulation  , Fluidotherapy, Manual Therapy, Moist Heat/ Ice, Neuromuscular Re-ed, Orthotic Management and Training, Paraffin, Patient Education, Self Care, Therapeutic Activities, and Therapeutic Exercise  Other Recommendations: Lymphedema tx with lymphatouch     Shaunna Stevenson OT

## 2023-10-02 ENCOUNTER — OFFICE VISIT (OUTPATIENT)
Dept: PRIMARY CARE CLINIC | Facility: CLINIC | Age: 83
End: 2023-10-02
Payer: MEDICARE

## 2023-10-02 ENCOUNTER — LAB VISIT (OUTPATIENT)
Dept: LAB | Facility: HOSPITAL | Age: 83
End: 2023-10-02
Attending: INTERNAL MEDICINE
Payer: MEDICARE

## 2023-10-02 VITALS
WEIGHT: 116.63 LBS | BODY MASS INDEX: 22.9 KG/M2 | SYSTOLIC BLOOD PRESSURE: 130 MMHG | OXYGEN SATURATION: 97 % | TEMPERATURE: 98 F | HEIGHT: 60 IN | DIASTOLIC BLOOD PRESSURE: 82 MMHG | HEART RATE: 83 BPM

## 2023-10-02 DIAGNOSIS — F41.1 GAD (GENERALIZED ANXIETY DISORDER): ICD-10-CM

## 2023-10-02 DIAGNOSIS — G56.03 BILATERAL CARPAL TUNNEL SYNDROME: ICD-10-CM

## 2023-10-02 DIAGNOSIS — K21.9 GASTROESOPHAGEAL REFLUX DISEASE, UNSPECIFIED WHETHER ESOPHAGITIS PRESENT: Primary | ICD-10-CM

## 2023-10-02 DIAGNOSIS — Z79.01 CHRONIC ANTICOAGULATION: ICD-10-CM

## 2023-10-02 DIAGNOSIS — K21.9 GASTROESOPHAGEAL REFLUX DISEASE, UNSPECIFIED WHETHER ESOPHAGITIS PRESENT: ICD-10-CM

## 2023-10-02 DIAGNOSIS — I48.0 PAROXYSMAL ATRIAL FIBRILLATION: ICD-10-CM

## 2023-10-02 DIAGNOSIS — G47.00 INSOMNIA, UNSPECIFIED TYPE: ICD-10-CM

## 2023-10-02 LAB
ALBUMIN SERPL BCP-MCNC: 4 G/DL (ref 3.5–5.2)
ALP SERPL-CCNC: 67 U/L (ref 55–135)
ALT SERPL W/O P-5'-P-CCNC: 15 U/L (ref 10–44)
ANION GAP SERPL CALC-SCNC: 12 MMOL/L (ref 8–16)
AST SERPL-CCNC: 25 U/L (ref 10–40)
BASOPHILS # BLD AUTO: 0.09 K/UL (ref 0–0.2)
BASOPHILS NFR BLD: 1.2 % (ref 0–1.9)
BILIRUB SERPL-MCNC: 1.5 MG/DL (ref 0.1–1)
BUN SERPL-MCNC: 16 MG/DL (ref 8–23)
CALCIUM SERPL-MCNC: 10.3 MG/DL (ref 8.7–10.5)
CHLORIDE SERPL-SCNC: 102 MMOL/L (ref 95–110)
CO2 SERPL-SCNC: 26 MMOL/L (ref 23–29)
CREAT SERPL-MCNC: 1 MG/DL (ref 0.5–1.4)
DIFFERENTIAL METHOD: ABNORMAL
EOSINOPHIL # BLD AUTO: 0.2 K/UL (ref 0–0.5)
EOSINOPHIL NFR BLD: 2.9 % (ref 0–8)
ERYTHROCYTE [DISTWIDTH] IN BLOOD BY AUTOMATED COUNT: 13.3 % (ref 11.5–14.5)
EST. GFR  (NO RACE VARIABLE): 55.6 ML/MIN/1.73 M^2
GLUCOSE SERPL-MCNC: 83 MG/DL (ref 70–110)
HCT VFR BLD AUTO: 37.9 % (ref 37–48.5)
HGB BLD-MCNC: 12.1 G/DL (ref 12–16)
IMM GRANULOCYTES # BLD AUTO: 0.02 K/UL (ref 0–0.04)
IMM GRANULOCYTES NFR BLD AUTO: 0.3 % (ref 0–0.5)
LIPASE SERPL-CCNC: 24 U/L (ref 4–60)
LYMPHOCYTES # BLD AUTO: 2.7 K/UL (ref 1–4.8)
LYMPHOCYTES NFR BLD: 37.5 % (ref 18–48)
MCH RBC QN AUTO: 28.5 PG (ref 27–31)
MCHC RBC AUTO-ENTMCNC: 31.9 G/DL (ref 32–36)
MCV RBC AUTO: 89 FL (ref 82–98)
MONOCYTES # BLD AUTO: 0.7 K/UL (ref 0.3–1)
MONOCYTES NFR BLD: 9.7 % (ref 4–15)
NEUTROPHILS # BLD AUTO: 3.5 K/UL (ref 1.8–7.7)
NEUTROPHILS NFR BLD: 48.4 % (ref 38–73)
NRBC BLD-RTO: 0 /100 WBC
PLATELET # BLD AUTO: 264 K/UL (ref 150–450)
PMV BLD AUTO: 11.3 FL (ref 9.2–12.9)
POTASSIUM SERPL-SCNC: 4.7 MMOL/L (ref 3.5–5.1)
PROT SERPL-MCNC: 7.7 G/DL (ref 6–8.4)
RBC # BLD AUTO: 4.24 M/UL (ref 4–5.4)
SODIUM SERPL-SCNC: 140 MMOL/L (ref 136–145)
WBC # BLD AUTO: 7.31 K/UL (ref 3.9–12.7)

## 2023-10-02 PROCEDURE — 3288F PR FALLS RISK ASSESSMENT DOCUMENTED: ICD-10-PCS | Mod: HCNC,CPTII,S$GLB, | Performed by: INTERNAL MEDICINE

## 2023-10-02 PROCEDURE — 1158F ADVNC CARE PLAN TLK DOCD: CPT | Mod: HCNC,CPTII,S$GLB, | Performed by: INTERNAL MEDICINE

## 2023-10-02 PROCEDURE — 99999 PR PBB SHADOW E&M-EST. PATIENT-LVL V: CPT | Mod: PBBFAC,HCNC,, | Performed by: INTERNAL MEDICINE

## 2023-10-02 PROCEDURE — 1126F AMNT PAIN NOTED NONE PRSNT: CPT | Mod: HCNC,CPTII,S$GLB, | Performed by: INTERNAL MEDICINE

## 2023-10-02 PROCEDURE — 1160F PR REVIEW ALL MEDS BY PRESCRIBER/CLIN PHARMACIST DOCUMENTED: ICD-10-PCS | Mod: HCNC,CPTII,S$GLB, | Performed by: INTERNAL MEDICINE

## 2023-10-02 PROCEDURE — 3075F SYST BP GE 130 - 139MM HG: CPT | Mod: HCNC,CPTII,S$GLB, | Performed by: INTERNAL MEDICINE

## 2023-10-02 PROCEDURE — 1101F PR PT FALLS ASSESS DOC 0-1 FALLS W/OUT INJ PAST YR: ICD-10-PCS | Mod: HCNC,CPTII,S$GLB, | Performed by: INTERNAL MEDICINE

## 2023-10-02 PROCEDURE — 1159F MED LIST DOCD IN RCRD: CPT | Mod: HCNC,CPTII,S$GLB, | Performed by: INTERNAL MEDICINE

## 2023-10-02 PROCEDURE — 1160F RVW MEDS BY RX/DR IN RCRD: CPT | Mod: HCNC,CPTII,S$GLB, | Performed by: INTERNAL MEDICINE

## 2023-10-02 PROCEDURE — 1158F PR ADVANCE CARE PLANNING DISCUSS DOCUMENTED IN MEDICAL RECORD: ICD-10-PCS | Mod: HCNC,CPTII,S$GLB, | Performed by: INTERNAL MEDICINE

## 2023-10-02 PROCEDURE — 36415 COLL VENOUS BLD VENIPUNCTURE: CPT | Mod: HCNC,PN | Performed by: INTERNAL MEDICINE

## 2023-10-02 PROCEDURE — 99999 PR PBB SHADOW E&M-EST. PATIENT-LVL V: ICD-10-PCS | Mod: PBBFAC,HCNC,, | Performed by: INTERNAL MEDICINE

## 2023-10-02 PROCEDURE — 83690 ASSAY OF LIPASE: CPT | Mod: HCNC | Performed by: INTERNAL MEDICINE

## 2023-10-02 PROCEDURE — 3079F DIAST BP 80-89 MM HG: CPT | Mod: HCNC,CPTII,S$GLB, | Performed by: INTERNAL MEDICINE

## 2023-10-02 PROCEDURE — 86677 HELICOBACTER PYLORI ANTIBODY: CPT | Mod: HCNC | Performed by: INTERNAL MEDICINE

## 2023-10-02 PROCEDURE — 1101F PT FALLS ASSESS-DOCD LE1/YR: CPT | Mod: HCNC,CPTII,S$GLB, | Performed by: INTERNAL MEDICINE

## 2023-10-02 PROCEDURE — 85025 COMPLETE CBC W/AUTO DIFF WBC: CPT | Mod: HCNC | Performed by: INTERNAL MEDICINE

## 2023-10-02 PROCEDURE — 80053 COMPREHEN METABOLIC PANEL: CPT | Mod: HCNC | Performed by: INTERNAL MEDICINE

## 2023-10-02 PROCEDURE — 99214 OFFICE O/P EST MOD 30 MIN: CPT | Mod: HCNC,S$GLB,, | Performed by: INTERNAL MEDICINE

## 2023-10-02 PROCEDURE — 3288F FALL RISK ASSESSMENT DOCD: CPT | Mod: HCNC,CPTII,S$GLB, | Performed by: INTERNAL MEDICINE

## 2023-10-02 PROCEDURE — 1126F PR PAIN SEVERITY QUANTIFIED, NO PAIN PRESENT: ICD-10-PCS | Mod: HCNC,CPTII,S$GLB, | Performed by: INTERNAL MEDICINE

## 2023-10-02 PROCEDURE — 1159F PR MEDICATION LIST DOCUMENTED IN MEDICAL RECORD: ICD-10-PCS | Mod: HCNC,CPTII,S$GLB, | Performed by: INTERNAL MEDICINE

## 2023-10-02 PROCEDURE — 3075F PR MOST RECENT SYSTOLIC BLOOD PRESS GE 130-139MM HG: ICD-10-PCS | Mod: HCNC,CPTII,S$GLB, | Performed by: INTERNAL MEDICINE

## 2023-10-02 PROCEDURE — 99214 PR OFFICE/OUTPT VISIT, EST, LEVL IV, 30-39 MIN: ICD-10-PCS | Mod: HCNC,S$GLB,, | Performed by: INTERNAL MEDICINE

## 2023-10-02 PROCEDURE — 3079F PR MOST RECENT DIASTOLIC BLOOD PRESSURE 80-89 MM HG: ICD-10-PCS | Mod: HCNC,CPTII,S$GLB, | Performed by: INTERNAL MEDICINE

## 2023-10-02 RX ORDER — LORAZEPAM 0.5 MG/1
0.5 TABLET ORAL NIGHTLY PRN
Qty: 30 TABLET | Refills: 5 | Status: SHIPPED | OUTPATIENT
Start: 2023-10-20

## 2023-10-02 RX ORDER — OMEPRAZOLE 40 MG/1
CAPSULE, DELAYED RELEASE ORAL
Qty: 45 CAPSULE | Refills: 1 | Status: SHIPPED | OUTPATIENT
Start: 2023-10-02 | End: 2023-11-09

## 2023-10-02 NOTE — PROGRESS NOTES
zSubjective:       Patient ID: Laverne Humphries is a 82 y.o. female.    Chief Complaint: Gastroesophageal Reflux    HPI  Rotator cuff is better s/p PT.  EMG 8/21/23 - moderate R CTS and mild L CTS. Seeing Dr. Lowe tomorrow. Still w/ some swelling in the R hand.   pAFib - LOV Dr. Yeh 9/13/23 for pAFib - eliquis 2.5mg BID, toprol xl 25mg qd.    Insomnia/anxiety - has been on lorazepam 0.5mg nightly for many years (predates est care w/ me). Discussed risks of continuing this meds in elderly population. Pt understands but does not want to change to another medicine. No falls. Lives alone and no assistance w/ ADLs.     For >1 mo, GERD is back. Was taking pepcid OTC. In the evening would take yogurt. This regimen is not helping. Does not drink juice or carbonated juice. Does drink a cup of coffee. Tries to avoid spices. Does cook w/ tomato gravy but will take chicken out of it.   When she wakes up in the morning, may have discomfort in the esophagus. It also makes her cough also. Does not drink alcohol.   EGD - last yr 1/2022 - gastric polyps.   No diarrhea/constipation/stomach pains. No vomiting.     Review of Systems  Comprehensive review of systems otherwise negative. See history/subjective section for more details.    Objective:      Physical Exam    /82 (BP Location: Right arm, Patient Position: Sitting, BP Method: Large (Manual))   Pulse 83   Temp 98.4 °F (36.9 °C) (Oral)   Ht 5' (1.524 m)   Wt 52.9 kg (116 lb 10 oz)   LMP  (LMP Unknown)   SpO2 97%   BMI 22.78 kg/m²     GEN - A+OX4, NAD   HEENT - PERRL, EOMI, OP clear. MMM. TM normal.   Neck - No thyromegaly or cervical LAD. No thyroid masses felt.  CV - RRR, no m/r   Chest - CTAB, no wheezing or rhonchi  Abd - S/NT/ND/+BS.   Ext - 2+BDP and radial pulses. No LE edema. R hand swelling but no pain-better.  Skin - no rash.     Previous labs reviewed.    Assessment/Plan     Laverne was seen today for gastroesophageal reflux.    Diagnoses  and all orders for this visit:    Gastroesophageal reflux disease, unspecified whether esophagitis present  Do not lay down for at least 2 hours after eating. Eat smaller, more frequent meals. Avoid trigger foods such as red sauce, caffeine, fatty foods, alcohol, spicy foods, etc.  -     LIPASE; Future  -     H. PYLORI ANTIBODY, IGG; Future  -     omeprazole (PRILOSEC) 40 MG capsule; Take 1 capsule twice daily on empty stomach x 14 days. Then 1 capsule every morning 60 min before eating/drinking anything.  -     CBC Auto Differential; Future  -     Comprehensive Metabolic Panel; Future    Paroxysmal atrial fibrillation  -     CBC Auto Differential; Future    Chronic anticoagulation  -     CBC Auto Differential; Future    Bilateral carpal tunnel syndrome  -     Comprehensive Metabolic Panel; Future    BELLO (generalized anxiety disorder)  -     LORazepam (ATIVAN) 0.5 MG tablet; Take 1 tablet (0.5 mg total) by mouth nightly as needed for Anxiety.    Insomnia, unspecified type  -     LORazepam (ATIVAN) 0.5 MG tablet; Take 1 tablet (0.5 mg total) by mouth nightly as needed for Anxiety.    Advance Care Planning     Date: 10/02/2023    Power of   I initiated the process of voluntary advance care planning today and explained the importance of this process to the patient.  I introduced the concept of advance directives to the patient, as well. Then the patient received detailed information about the importance of designating a Health Care Power of  (HCPOA). She was also instructed to communicate with this person about their wishes for future healthcare, should she become sick and lose decision-making capacity. The patient has previously appointed a HCPOA. After our discussion, the patient has decided to complete a HCPOA and has appointed her daughter, health care agent:  Cally Clements  & health care agent number:  [phone]  I spent a total time of 3 minutes discussing this issue with the patient.        Follow up in about 5 weeks (around 11/6/2023).      Carolyne James MD  Department of Internal Medicine - [facility]  7:38 AM

## 2023-10-03 ENCOUNTER — OFFICE VISIT (OUTPATIENT)
Dept: ORTHOPEDICS | Facility: CLINIC | Age: 83
End: 2023-10-03
Payer: MEDICARE

## 2023-10-03 ENCOUNTER — TELEPHONE (OUTPATIENT)
Dept: PRIMARY CARE CLINIC | Facility: CLINIC | Age: 83
End: 2023-10-03
Payer: MEDICARE

## 2023-10-03 ENCOUNTER — HOSPITAL ENCOUNTER (OUTPATIENT)
Dept: RADIOLOGY | Facility: OTHER | Age: 83
Discharge: HOME | End: 2023-10-03
Attending: ORTHOPAEDIC SURGERY
Payer: MEDICARE

## 2023-10-03 VITALS — BODY MASS INDEX: 22.78 KG/M2 | HEIGHT: 60 IN | WEIGHT: 116 LBS

## 2023-10-03 DIAGNOSIS — M65.30 TRIGGER FINGER, UNSPECIFIED FINGER, UNSPECIFIED LATERALITY: Primary | ICD-10-CM

## 2023-10-03 DIAGNOSIS — M79.641 RIGHT HAND PAIN: ICD-10-CM

## 2023-10-03 DIAGNOSIS — R17 SERUM TOTAL BILIRUBIN ELEVATED: Primary | ICD-10-CM

## 2023-10-03 LAB — H PYLORI IGG SERPL QL IA: NEGATIVE

## 2023-10-03 PROCEDURE — 99204 PR OFFICE/OUTPT VISIT, NEW, LEVL IV, 45-59 MIN: ICD-10-PCS | Mod: HCNC,S$GLB,, | Performed by: ORTHOPAEDIC SURGERY

## 2023-10-03 PROCEDURE — 73130 X-RAY EXAM OF HAND: CPT | Mod: TC,HCNC,FY,RT

## 2023-10-03 PROCEDURE — 1157F PR ADVANCE CARE PLAN OR EQUIV PRESENT IN MEDICAL RECORD: ICD-10-PCS | Mod: HCNC,CPTII,S$GLB, | Performed by: ORTHOPAEDIC SURGERY

## 2023-10-03 PROCEDURE — 99204 OFFICE O/P NEW MOD 45 MIN: CPT | Mod: HCNC,S$GLB,, | Performed by: ORTHOPAEDIC SURGERY

## 2023-10-03 PROCEDURE — 1159F PR MEDICATION LIST DOCUMENTED IN MEDICAL RECORD: ICD-10-PCS | Mod: HCNC,CPTII,S$GLB, | Performed by: ORTHOPAEDIC SURGERY

## 2023-10-03 PROCEDURE — 73130 XR HAND COMPLETE 3 VIEW RIGHT: ICD-10-PCS | Mod: 26,HCNC,RT, | Performed by: RADIOLOGY

## 2023-10-03 PROCEDURE — 1159F MED LIST DOCD IN RCRD: CPT | Mod: HCNC,CPTII,S$GLB, | Performed by: ORTHOPAEDIC SURGERY

## 2023-10-03 PROCEDURE — 73130 X-RAY EXAM OF HAND: CPT | Mod: 26,HCNC,RT, | Performed by: RADIOLOGY

## 2023-10-03 PROCEDURE — 1157F ADVNC CARE PLAN IN RCRD: CPT | Mod: HCNC,CPTII,S$GLB, | Performed by: ORTHOPAEDIC SURGERY

## 2023-10-03 PROCEDURE — 99999 PR PBB SHADOW E&M-EST. PATIENT-LVL III: ICD-10-PCS | Mod: PBBFAC,HCNC,, | Performed by: ORTHOPAEDIC SURGERY

## 2023-10-03 PROCEDURE — 99999 PR PBB SHADOW E&M-EST. PATIENT-LVL III: CPT | Mod: PBBFAC,HCNC,, | Performed by: ORTHOPAEDIC SURGERY

## 2023-10-03 RX ADMIN — DEXAMETHASONE SODIUM PHOSPHATE 4 MG: 4 INJECTION, SOLUTION INTRA-ARTICULAR; INTRALESIONAL; INTRAMUSCULAR; INTRAVENOUS; SOFT TISSUE at 11:10

## 2023-10-03 NOTE — TELEPHONE ENCOUNTER
Please call and notify pt:  Total bilirubin is mildly high. I know she doesn't have a gallbladder and she's had a CT scan last year w/ slightly bigger biliary duct, which can be seen in patients who have had their gallbladder taken out. I'd like to just get an abdominal US just to make sure since her bilirubin is slightly higher. Pancreas enzyme and H pylori are normal.

## 2023-10-03 NOTE — PROGRESS NOTES
[facility]  History & Physical  Orthopedics    SUBJECTIVE:      Chief Complaint: right hand pain    Referring Provider: Luis Angel Wheeler MD     History of Present Illness:  Patient is a 82 y.o. right hand dominant female who presents today with complaints of right hand pain. Onset after right rotator cuff surgery last year. Worse with flexion of her hands. Notes that it is worst over palm. She had EMG performed prior to presentation which notes bilateral carpal tunnel syndrome with moderate severity on R, mild on L. Denies numbness/tingling.    Attempted treatment(s) and/or interventions include activity modifications, rest, rest, activity modification, anti-inflammatory medications, physical and/or occupational therapy, and splinting/casting.     The patient denies any fevers, chills, N/V, D/C and presents for evaluation.       Past Medical History:   Diagnosis Date    Allergy     Anxiety     Arthritis     Asthma     mild    Cataract     CKD (chronic kidney disease) stage 3, GFR 30-59 ml/min     Hepatitis     IBS (irritable bowel syndrome)     Moderate aortic valve stenosis     Osteoporosis     Paroxysmal atrial fibrillation     Reflux esophagitis     Torus palatinus      Past Surgical History:   Procedure Laterality Date    ARTHROSCOPIC DEBRIDEMENT OF SHOULDER Right 3/21/2023    Procedure: DEBRIDEMENT, SHOULDER, ARTHROSCOPIC;  Surgeon: Luis Angel Wheeler MD;  Location: [facility] OR;  Service: Orthopedics;  Laterality: Right;    ARTHROSCOPIC REPAIR OF ROTATOR CUFF OF SHOULDER Right 3/21/2023    Procedure: REPAIR, ROTATOR CUFF, ARTHROSCOPIC;  Surgeon: Luis Angel Wheeler MD;  Location: [facility] OR;  Service: Orthopedics;  Laterality: Right;    ARTHROSCOPY OF SHOULDER WITH DECOMPRESSION OF SUBACROMIAL SPACE Right 3/21/2023    Procedure: ARTHROSCOPY, SHOULDER, WITH SUBACROMIAL SPACE DECOMPRESSION;  Surgeon: Luis Angel Wheeler MD;  Location: [facility] OR;  Service: Orthopedics;  Laterality: Right;    BELPHAROPTOSIS  REPAIR Bilateral     CATARACT EXTRACTION W/  INTRAOCULAR LENS IMPLANT Bilateral     CATARACT EXTRACTION, BILATERAL      CHOLECYSTECTOMY      COLONOSCOPY N/A 01/20/2022    Procedure: COLONOSCOPY;  Surgeon: Emmanuel Sandoval MD;  [facility]  Service: Endoscopy;  Laterality: N/A;    ESOPHAGOGASTRODUODENOSCOPY N/A 01/20/2022    Procedure: EGD (ESOPHAGOGASTRODUODENOSCOPY) any GI doc, please perform colonoscopy and EGD at same time;  Surgeon: Emmanuel Sandoval MD;  Location: [facility] (4TH FLR);  Service: Endoscopy;  Laterality: N/A;  EGD & Colonoscopy orders combined-MS  fully vaccinated  1/13 covid test 1/17 @ [facility]    EYE SURGERY      FLEXIBLE SIGMOIDOSCOPY  4/26/2022    Procedure: SIGMOIDOSCOPY, FLEXIBLE;  Surgeon: Alison Mcclain MD;  Location: [facility];  Service: Colon and Rectal;;    HYSTERECTOMY      LYSIS, ADHESIONS, SHOULDER, ARTHROSCOPIC  3/21/2023    Procedure: LYSIS,ADHESIONS,SHOULDER,ARTHROSCOPIC;  Surgeon: Luis Angel Wheeler MD;  Location: [facility];  Service: Orthopedics;;    OOPHORECTOMY      ROBOT-ASSISTED LAPAROSCOPIC COLECTOMY USING DA DIANA XI N/A 4/26/2022    Procedure: XI ROBOTIC COLECTOMY with flexible sigmoidoscopy;  Surgeon: Alison Mcclain MD;  Location: [facility];  Service: Colon and Rectal;  Laterality: N/A;     Review of patient's allergies indicates:   Allergen Reactions    Codeine      Other reaction(s): Syncope, can take tylenol    Sulfa (sulfonamide antibiotics)      Other reaction(s): Stomach upset     Social History     Social History Narrative    Not on file     Family History   Problem Relation Age of Onset    Cancer Mother         pancreatic    Cataracts Father     Diabetes Father     Asthma Sister     Cataracts Sister     Cancer Brother         liver and esophageal    No Known Problems Daughter     No Known Problems Son     Amblyopia Neg Hx     Blindness Neg Hx     Glaucoma Neg Hx     Macular degeneration Neg Hx     Retinal detachment Neg Hx     Strabismus Neg Hx      Breast cancer Neg Hx     Colon cancer Neg Hx     Ovarian cancer Neg Hx          Current Outpatient Medications:     albuterol (PROVENTIL/VENTOLIN HFA) 90 mcg/actuation inhaler, Inhale 2 puffs into the lungs every 6 (six) hours as needed for Wheezing or Shortness of Breath (generic preferred). Rescue, Disp: 18 g, Rfl: 3    apixaban (ELIQUIS) 2.5 mg Tab, Take 1 tablet (2.5 mg total) by mouth 2 (two) times daily., Disp: 180 tablet, Rfl: 3    azelastine (ASTELIN) 137 mcg (0.1 %) nasal spray, 2 sprays (274 mcg total) by Nasal route 2 (two) times daily., Disp: 30 mL, Rfl: 11    calcium-vitamin D3 500 mg(1,250mg) -200 unit per tablet, Take 1 tablet by mouth Daily. , Disp: , Rfl:     carboxymethylcellulose (REFRESH PLUS) 0.5 % Dpet, 1 drop 3 (three) times daily as needed., Disp: , Rfl:     clobetasol 0.05% (TEMOVATE) 0.05 % Oint, Apply topically 2 (two) times daily. for 7 days, Disp: 45 g, Rfl: 0    diclofenac sodium (VOLTAREN) 1 % Gel, Apply 2 g topically once daily., Disp: 150 g, Rfl: 2    gabapentin (NEURONTIN) 300 MG capsule, Take 2 capsules (600 mg total) by mouth every evening. (Patient taking differently: Take 600 mg by mouth every evening. PRN), Disp: 180 capsule, Rfl: 3    hydrocortisone 2.5 % cream, Apply topically 2 (two) times daily., Disp: 30 g, Rfl: 0    [START ON 10/20/2023] LORazepam (ATIVAN) 0.5 MG tablet, Take 1 tablet (0.5 mg total) by mouth nightly as needed for Anxiety., Disp: 30 tablet, Rfl: 5    metoprolol succinate (TOPROL-XL) 25 MG 24 hr tablet, Take 1 tablet (25 mg total) by mouth once daily., Disp: 90 tablet, Rfl: 3    omeprazole (PRILOSEC) 40 MG capsule, Take 1 capsule twice daily on empty stomach x 14 days. Then 1 capsule every morning 60 min before eating/drinking anything., Disp: 45 capsule, Rfl: 1    ondansetron (ZOFRAN) 4 MG tablet, Take 1 tablet (4 mg total) by mouth every 8 (eight) hours as needed for Nausea., Disp: 10 tablet, Rfl: 0    rosuvastatin (CRESTOR) 5 MG tablet, Take 1 tablet  (5 mg total) by mouth every evening., Disp: 90 tablet, Rfl: 3    sodium chloride (SALINE NASAL) 0.65 % nasal spray, 2 sprays by Nasal route as needed for Congestion., Disp: 44 mL, Rfl: 11    traMADoL (ULTRAM) 50 mg tablet, Take 1 tablet (50 mg total) by mouth every 6 (six) hours as needed for Pain., Disp: 10 tablet, Rfl: 0    triamcinolone (NASACORT) 55 mcg nasal inhaler, 2 sprays by Nasal route 2 (two) times a day., Disp: 16.9 mL, Rfl: 11  No current facility-administered medications for this visit.    Facility-Administered Medications Ordered in Other Visits:     fentaNYL 50 mcg/mL injection  mcg,  mcg, Intravenous, PRN, Jasbir Benitez MD, 100 mcg at 03/21/23 0920    LIDOcaine (PF) 10 mg/ml (1%) injection 10 mg, 1 mL, Intradermal, Once, Audrey Caicedo NP    midazolam (VERSED) 1 mg/mL injection 0.5-4 mg, 0.5-4 mg, Intravenous, PRN, Jasbir Benitez MD, 2 mg at 03/21/23 0920    mupirocin 2 % ointment, , Nasal, On Call Procedure, Audrey Caicedo NP, Given at 04/26/22 0726    ropivacaine 0.2% Nimbus PainPRO Pump infusion 500 ML, , Perineural, Continuous, Jasbir Benitez MD, New Bag at 03/21/23 1354      Review of Systems:  As per HPI otherwise noncontributory    OBJECTIVE:      Vital Signs (Most Recent):  Vitals:    10/03/23 1036   Weight: 52.6 kg (116 lb)   Height: 5' (1.524 m)     Body mass index is 22.65 kg/m².      Physical Exam:  Constitutional: The patient appears well-developed and well-nourished. No distress.   Skin: No lesions appreciated  Head: Normocephalic and atraumatic.   Nose: Nose normal.   Ears: No deformities seen  Eyes: Conjunctivae and EOM are normal.   Neck: No tracheal deviation present.   Cardiovascular: Normal rate and intact distal pulses.    Pulmonary/Chest: Effort normal. No respiratory distress.   Abdominal: There is no guarding.   Neurological: The patient is alert.   Psychiatric: The patient has a normal mood and affect.     Right Hand/Wrist  Examination:    Observation/Inspection:  Swelling  none    Deformity  none  Discoloration  none     Scars   none    Atrophy  None  Palpable triggering over thumb and long finger A1 pulley    HAND/WRIST EXAMINATION:  Finkelstein's Test   Neg  WHAT Test    Neg  Snuff box tenderness   Neg  Rogers's Test    Neg  Hook of Hamate Tenderness  Neg  CMC grind    Neg  Circumduction test   Neg    Neurovascular Exam:  Digits WWP, brisk CR < 3s throughout  NVI motor/LTS to M/R/U nerves, radial pulse 2+  Tinel's Test - Carpal Tunnel  Neg  Tinel's Test - Cubital Tunnel  Neg  Phalen's Test    Neg  Median Nerve Compression Test Neg    ROM hand full, painless    ROM wrist full, painless    ROM elbow full, painless    Abdomen not guarded  Respirations nonlabored  Perfusion intact    Diagnostic Results:     Imaging - I independently viewed the patient's imaging as well as the radiology report.  Xrays of the patient's right hand demonstrate no evidence of any acute fractures or dislocations or significant degenerative changes.    EMG - moderate CTS R hand, mild on left    ASSESSMENT/PLAN:      82 y.o. yo female with right long finger and thumb trigger finger and right carpal tunnel sydrome.    Plan: The patient and I had a thorough discussion today.  We discussed the working diagnosis as well as several other potential alternative diagnoses.  Treatment options were discussed, both conservative and surgical.  Conservative treatment options would include things such as activity modifications, workplace modifications, a period of rest, oral vs topical OTC and prescription anti-inflammatory medications, occupational therapy, splinting/bracing, immobilization, corticosteroid injections, and others.  Surgical options were discussed as well.     At this time, the patient indicates that her trigger finger symptoms are most bothersome. She has no numbness/tingling or provocative carpal tunnel tests on exam. She would like to proceed with  corticosteroid injection of thumb and long finger flexor tendon sheath.    Should the patient's symptoms worsen, persist, or fail to improve they should return for reevaluation and I would be happy to see them back anytime.

## 2023-10-09 ENCOUNTER — HOSPITAL ENCOUNTER (OUTPATIENT)
Dept: RADIOLOGY | Facility: HOSPITAL | Age: 83
Discharge: HOME | End: 2023-10-09
Attending: INTERNAL MEDICINE
Payer: MEDICARE

## 2023-10-09 DIAGNOSIS — R17 SERUM TOTAL BILIRUBIN ELEVATED: ICD-10-CM

## 2023-10-09 PROCEDURE — 76700 US EXAM ABDOM COMPLETE: CPT | Mod: TC

## 2023-10-09 PROCEDURE — 76700 US ABDOMEN COMPLETE: ICD-10-PCS | Mod: 26,,, | Performed by: STUDENT IN AN ORGANIZED HEALTH CARE EDUCATION/TRAINING PROGRAM

## 2023-10-09 PROCEDURE — 76700 US EXAM ABDOM COMPLETE: CPT | Mod: 26,,, | Performed by: STUDENT IN AN ORGANIZED HEALTH CARE EDUCATION/TRAINING PROGRAM

## 2023-10-31 RX ORDER — DEXAMETHASONE SODIUM PHOSPHATE 4 MG/ML
4 INJECTION, SOLUTION INTRA-ARTICULAR; INTRALESIONAL; INTRAMUSCULAR; INTRAVENOUS; SOFT TISSUE
Status: DISCONTINUED | OUTPATIENT
Start: 2023-10-03 | End: 2023-10-31 | Stop reason: HOSPADM

## 2023-10-31 NOTE — PROGRESS NOTES
I have personally taken the history and examined this patient. I agree with the resident's note as stated above.        82 y.o. yo female with right long finger and thumb trigger finger and right carpal tunnel sydrome.     Plan: The patient and I had a thorough discussion today.  We discussed the working diagnosis as well as several other potential alternative diagnoses.  Treatment options were discussed, both conservative and surgical.  Conservative treatment options would include things such as activity modifications, workplace modifications, a period of rest, oral vs topical OTC and prescription anti-inflammatory medications, occupational therapy, splinting/bracing, immobilization, corticosteroid injections, and others.  Surgical options were discussed as well.      At this time, the patient indicates that her trigger finger symptoms are most bothersome. She has no numbness/tingling or provocative carpal tunnel tests on exam. She would like to proceed with corticosteroid injection of thumb and long finger flexor tendon sheath.

## 2023-10-31 NOTE — PROCEDURES
Tendon Sheath    Date/Time: 10/3/2023 11:00 AM    Performed by: Katia Lowe MD  Authorized by: Katia Lowe MD    Consent Done?:  Yes (Verbal)  Indications:  Pain  Timeout: prior to procedure the correct patient, procedure, and site was verified    Prep: patient was prepped and draped in usual sterile fashion      Local anesthesia used?: Yes    Anesthesia:  Local infiltration  Local anesthetic:  Lidocaine 1% without epinephrine  Location:  Long finger  Site:  L long MCP  Needle size:  25 G  Approach:  Volar  Medications:  4 mg dexAMETHasone 4 mg/mL

## 2023-10-31 NOTE — PROCEDURES
Tendon Sheath    Date/Time: 10/3/2023 11:00 AM    Performed by: Katia Lowe MD  Authorized by: Katia Lowe MD    Consent Done?:  Yes (Verbal)  Indications:  Pain  Timeout: prior to procedure the correct patient, procedure, and site was verified    Prep: patient was prepped and draped in usual sterile fashion      Local anesthesia used?: Yes    Anesthesia:  Local infiltration  Local anesthetic:  Lidocaine 1% without epinephrine  Location:  Thumb  Site:  L thumb MCP  Needle size:  25 G  Approach:  Volar  Medications:  4 mg dexAMETHasone 4 mg/mL

## 2023-11-09 ENCOUNTER — OFFICE VISIT (OUTPATIENT)
Dept: PRIMARY CARE CLINIC | Facility: CLINIC | Age: 83
End: 2023-11-09
Payer: MEDICARE

## 2023-11-09 VITALS
OXYGEN SATURATION: 100 % | TEMPERATURE: 98 F | WEIGHT: 120.13 LBS | DIASTOLIC BLOOD PRESSURE: 82 MMHG | SYSTOLIC BLOOD PRESSURE: 134 MMHG | HEIGHT: 60 IN | HEART RATE: 96 BPM | BODY MASS INDEX: 23.58 KG/M2

## 2023-11-09 DIAGNOSIS — Z23 NEED FOR TDAP VACCINATION: Primary | ICD-10-CM

## 2023-11-09 DIAGNOSIS — K21.9 GASTROESOPHAGEAL REFLUX DISEASE, UNSPECIFIED WHETHER ESOPHAGITIS PRESENT: ICD-10-CM

## 2023-11-09 DIAGNOSIS — E80.6 HYPERBILIRUBINEMIA: ICD-10-CM

## 2023-11-09 PROCEDURE — G0008 FLU VACCINE - QUADRIVALENT - ADJUVANTED: ICD-10-PCS | Mod: HCNC,S$GLB,, | Performed by: HOSPITALIST

## 2023-11-09 PROCEDURE — 90694 FLU VACCINE - QUADRIVALENT - ADJUVANTED: ICD-10-PCS | Mod: HCNC,S$GLB,, | Performed by: HOSPITALIST

## 2023-11-09 PROCEDURE — 1126F PR PAIN SEVERITY QUANTIFIED, NO PAIN PRESENT: ICD-10-PCS | Mod: HCNC,CPTII,S$GLB, | Performed by: HOSPITALIST

## 2023-11-09 PROCEDURE — 3075F SYST BP GE 130 - 139MM HG: CPT | Mod: HCNC,CPTII,S$GLB, | Performed by: HOSPITALIST

## 2023-11-09 PROCEDURE — 3075F PR MOST RECENT SYSTOLIC BLOOD PRESS GE 130-139MM HG: ICD-10-PCS | Mod: HCNC,CPTII,S$GLB, | Performed by: HOSPITALIST

## 2023-11-09 PROCEDURE — 3079F PR MOST RECENT DIASTOLIC BLOOD PRESSURE 80-89 MM HG: ICD-10-PCS | Mod: HCNC,CPTII,S$GLB, | Performed by: HOSPITALIST

## 2023-11-09 PROCEDURE — 1101F PT FALLS ASSESS-DOCD LE1/YR: CPT | Mod: HCNC,CPTII,S$GLB, | Performed by: HOSPITALIST

## 2023-11-09 PROCEDURE — 1157F PR ADVANCE CARE PLAN OR EQUIV PRESENT IN MEDICAL RECORD: ICD-10-PCS | Mod: HCNC,CPTII,S$GLB, | Performed by: HOSPITALIST

## 2023-11-09 PROCEDURE — 1159F PR MEDICATION LIST DOCUMENTED IN MEDICAL RECORD: ICD-10-PCS | Mod: HCNC,CPTII,S$GLB, | Performed by: HOSPITALIST

## 2023-11-09 PROCEDURE — 1157F ADVNC CARE PLAN IN RCRD: CPT | Mod: HCNC,CPTII,S$GLB, | Performed by: HOSPITALIST

## 2023-11-09 PROCEDURE — 1101F PR PT FALLS ASSESS DOC 0-1 FALLS W/OUT INJ PAST YR: ICD-10-PCS | Mod: HCNC,CPTII,S$GLB, | Performed by: HOSPITALIST

## 2023-11-09 PROCEDURE — G0008 ADMIN INFLUENZA VIRUS VAC: HCPCS | Mod: HCNC,S$GLB,, | Performed by: HOSPITALIST

## 2023-11-09 PROCEDURE — 3079F DIAST BP 80-89 MM HG: CPT | Mod: HCNC,CPTII,S$GLB, | Performed by: HOSPITALIST

## 2023-11-09 PROCEDURE — 99999 PR PBB SHADOW E&M-EST. PATIENT-LVL III: CPT | Mod: PBBFAC,HCNC,, | Performed by: HOSPITALIST

## 2023-11-09 PROCEDURE — 99211 OFF/OP EST MAY X REQ PHY/QHP: CPT | Mod: HCNC,25,S$GLB, | Performed by: HOSPITALIST

## 2023-11-09 PROCEDURE — 90694 VACC AIIV4 NO PRSRV 0.5ML IM: CPT | Mod: HCNC,S$GLB,, | Performed by: HOSPITALIST

## 2023-11-09 PROCEDURE — 1159F MED LIST DOCD IN RCRD: CPT | Mod: HCNC,CPTII,S$GLB, | Performed by: HOSPITALIST

## 2023-11-09 PROCEDURE — 1126F AMNT PAIN NOTED NONE PRSNT: CPT | Mod: HCNC,CPTII,S$GLB, | Performed by: HOSPITALIST

## 2023-11-09 PROCEDURE — 99211 PR OFFICE/OUTPT VISIT, EST, LEVL I: ICD-10-PCS | Mod: HCNC,25,S$GLB, | Performed by: HOSPITALIST

## 2023-11-09 PROCEDURE — 99999 PR PBB SHADOW E&M-EST. PATIENT-LVL III: ICD-10-PCS | Mod: PBBFAC,HCNC,, | Performed by: HOSPITALIST

## 2023-11-09 PROCEDURE — 3288F PR FALLS RISK ASSESSMENT DOCUMENTED: ICD-10-PCS | Mod: HCNC,CPTII,S$GLB, | Performed by: HOSPITALIST

## 2023-11-09 PROCEDURE — 3288F FALL RISK ASSESSMENT DOCD: CPT | Mod: HCNC,CPTII,S$GLB, | Performed by: HOSPITALIST

## 2023-11-09 RX ORDER — OMEPRAZOLE 40 MG/1
40 CAPSULE, DELAYED RELEASE ORAL DAILY PRN
Qty: 45 CAPSULE | Refills: 1 | Status: SHIPPED | OUTPATIENT
Start: 2023-11-09 | End: 2024-03-06

## 2023-11-09 NOTE — ASSESSMENT & PLAN NOTE
Sx improved w/ PPI and lifestyle interventions.  She is rightfully concerned about being on the medication too long due to h/o gastric polyps the last time she took it; at this point it would be reasonable to only use PRN going forward.

## 2023-11-09 NOTE — ASSESSMENT & PLAN NOTE
Started after initiation of rosuvastatin.  US unremarkable.  Likely 2/2 competitive inhibition of hepatic bilirubin conjugation by statin resulting in cholestasis.    Results for orders placed or performed in visit on 02/03/23   Lipid Panel   Result Value Ref Range    Cholesterol 179 120 - 199 mg/dL    Triglycerides 146 30 - 150 mg/dL    HDL 58 40 - 75 mg/dL    LDL Cholesterol 91.8 63.0 - 159.0 mg/dL    HDL/Cholesterol Ratio 32.4 20.0 - 50.0 %    Total Cholesterol/HDL Ratio 3.1 2.0 - 5.0    Non-HDL Cholesterol 121 mg/dL     Can possibly discuss Repatha injections for LDL lowering at next f/u along with repeating CMP

## 2023-11-09 NOTE — PROGRESS NOTES
Primary Care Provider Appointment - 63 [facility]  Joe Farley MD      Subjective:      Patient ID: Laverne Humphries is a 82 y.o. female with   Past Medical History:   Diagnosis Date    Allergy     Anxiety     Arthritis     Asthma     mild    Cataract     CKD (chronic kidney disease) stage 3, GFR 30-59 ml/min     Hepatitis     IBS (irritable bowel syndrome)     Moderate aortic valve stenosis     Osteoporosis     Paroxysmal atrial fibrillation     Reflux esophagitis     Torus palatinus            Chief Complaint: f/u GERD  Prior to this visit, patient's last encounter with PCP was 10/2/2023.    Pt reports doing better since last visit.  Not eating too late, avoiding triggers.  She is concerned about taking the PPI too long b/c she got polyps in her esophagus last time she took the medicine.  Has been taking daily as prescribed.        4Ms for Medical Decision-Making in Older Adults    Last Completed EAWV: 3/9/2021    MOBILITY:  Get Up and Go:      3/9/2021     8:11 AM 2/6/2020     8:35 AM 4/12/2019     9:05 AM 7/9/2018     8:55 AM 10/10/2017     9:00 AM   Get Up and Go   Trial 1 10 seconds 11 seconds 14 seconds 10 seconds 10 seconds     Activities of Daily Living:      3/9/2021     8:15 AM   Activities of Daily Living   Ambulation Independent   Dressing Independent   Transfers Independent   Toileting Continent of bladder;Continent of bowel   Feeding Independent   Cleaning home/Chores Independent   Telephone use Independent   Shopping Independent   Paying bills Independent   Taking meds Independent     Whisper Test:      3/9/2021     8:11 AM   Whisper Test   Whisper Test Abnormal     Disability Status:      4/22/2022     2:15 PM   Disability Status   Are you deaf or do you have serious difficulty hearing? N   Are you blind or do you have serious difficulty seeing, even when wearing glasses? N   Because of a physical, mental, or emotional condition, do you have serious difficulty concentrating, remembering, or  making decisions? N   Do you have serious difficulty walking or climbing stairs? N   Do you have difficulty dressing or bathing? N   Because of a physical, mental, or emotional condition, do you have difficulty doing errands alone such as visiting a doctor's office or shopping? N     Nutrition Screening:      3/9/2021     8:14 AM   Nutrition Screening   Has food intake declined over the past three months due to loss of appetite, digestive problems, chewing or swallowing difficulties? No decrease in food intake   Involuntary weight loss during the last 3 months? No weight loss   Mobility? Goes out   Has the patient suffered psychological stress or acute disease in the past three months? No   Neuropsychological problems? No psychological problems   Body Mass Index (BMI)?  BMI 23 or greater   Screening Score 14    Screening Score: 0-7 Malnourished, 8-11 At Risk, 12-14 Normal    MENTATION:   Depression Patient Health Questionnaire:      8/22/2023     8:08 AM   Depression Patient Health Questionnaire   Over the last two weeks how often have you been bothered by little interest or pleasure in doing things Not at all   Over the last two weeks how often have you been bothered by feeling down, depressed or hopeless Not at all   PHQ-2 Total Score 0     Has Dementia Dx: No    Cognitive Function Screening:      3/9/2021     8:13 AM   Cognitive Function Screening   Clock Drawing Test 1   Mini-Cog 3 Minute Recall 3   Cognitive Function Screening 4     Cognitive Function Screening Total - Less than 4 = Abnormal,  Greater than or equal to 4 = Normal    MEDICATIONS:  High Risk Medications:  Total Active Medications: 2  LORazepam - 0.5 MG  traMADoL - 50 mg    WHAT MATTERS MOST:  Advance Care Planning   ACP Status:   Patient has had an ACP conversation  Living Will: Yes  Power of : Yes  LaPOST: No    Pt brought updated ACP docs to scan in.    Social History     Socioeconomic History    Marital status:    Tobacco Use     Smoking status: Never     Passive exposure: Never    Smokeless tobacco: Never   Substance and Sexual Activity    Alcohol use: No    Drug use: No    Sexual activity: Not Currently     Partners: Male     Birth control/protection: Abstinence, None   Other Topics Concern    Are you pregnant or think you may be? No    Breast-feeding No       Review of Systems   Constitutional:  Negative for activity change, appetite change, chills and fever.   HENT:  Negative for nasal congestion and sore throat.    Eyes:  Negative for photophobia and visual disturbance.   Respiratory:  Negative for cough and shortness of breath.    Cardiovascular:  Negative for chest pain and leg swelling.   Gastrointestinal:  Negative for abdominal pain, constipation, diarrhea, nausea and vomiting.   Genitourinary:  Negative for dysuria and hematuria.   Musculoskeletal:  Negative for arthralgias and myalgias.   Integumentary:  Negative for rash and wound.   Neurological:  Negative for dizziness and weakness.        Objective:   LMP  (LMP Unknown)     Physical Exam  Vitals reviewed.   Constitutional:       General: She is not in acute distress.     Appearance: Normal appearance.   HENT:      Head: Normocephalic and atraumatic.      Right Ear: Ear canal and external ear normal.      Left Ear: Ear canal and external ear normal.      Nose: Nose normal.      Mouth/Throat:      Mouth: Mucous membranes are moist.      Pharynx: Oropharynx is clear.   Eyes:      General: No scleral icterus.     Conjunctiva/sclera: Conjunctivae normal.   Cardiovascular:      Rate and Rhythm: Normal rate and regular rhythm.      Heart sounds: Normal heart sounds.   Pulmonary:      Effort: Pulmonary effort is normal. No respiratory distress.   Abdominal:      General: There is no distension.      Palpations: Abdomen is soft.      Tenderness: There is no abdominal tenderness. There is no guarding.   Musculoskeletal:         General: Normal range of motion.      Right lower leg: No  edema.      Left lower leg: No edema.   Skin:     General: Skin is warm and dry.      Findings: No rash.   Neurological:      General: No focal deficit present.      Mental Status: She is alert and oriented to person, place, and time.              Lab Results   Component Value Date    WBC 7.31 10/02/2023    HGB 12.1 10/02/2023    HCT 37.9 10/02/2023     10/02/2023    CHOL 179 02/03/2023    TRIG 146 02/03/2023    HDL 58 02/03/2023    ALT 15 10/02/2023    AST 25 10/02/2023     10/02/2023    K 4.7 10/02/2023     10/02/2023    CREATININE 1.0 10/02/2023    BUN 16 10/02/2023    CO2 26 10/02/2023    TSH 1.345 02/03/2023       Current Outpatient Medications on File Prior to Visit   Medication Sig Dispense Refill    albuterol (PROVENTIL/VENTOLIN HFA) 90 mcg/actuation inhaler Inhale 2 puffs into the lungs every 6 (six) hours as needed for Wheezing or Shortness of Breath (generic preferred). Rescue 18 g 3    apixaban (ELIQUIS) 2.5 mg Tab Take 1 tablet (2.5 mg total) by mouth 2 (two) times daily. 180 tablet 3    azelastine (ASTELIN) 137 mcg (0.1 %) nasal spray 2 sprays (274 mcg total) by Nasal route 2 (two) times daily. 30 mL 11    calcium-vitamin D3 500 mg(1,250mg) -200 unit per tablet Take 1 tablet by mouth Daily.       carboxymethylcellulose (REFRESH PLUS) 0.5 % Dpet 1 drop 3 (three) times daily as needed.      clobetasol 0.05% (TEMOVATE) 0.05 % Oint Apply topically 2 (two) times daily. for 7 days 45 g 0    diclofenac sodium (VOLTAREN) 1 % Gel Apply 2 g topically once daily. 150 g 2    gabapentin (NEURONTIN) 300 MG capsule Take 2 capsules (600 mg total) by mouth every evening. (Patient taking differently: Take 600 mg by mouth every evening. PRN) 180 capsule 3    hydrocortisone 2.5 % cream Apply topically 2 (two) times daily. 30 g 0    LORazepam (ATIVAN) 0.5 MG tablet Take 1 tablet (0.5 mg total) by mouth nightly as needed for Anxiety. 30 tablet 5    metoprolol succinate (TOPROL-XL) 25 MG 24 hr tablet Take  1 tablet (25 mg total) by mouth once daily. 90 tablet 3    ondansetron (ZOFRAN) 4 MG tablet Take 1 tablet (4 mg total) by mouth every 8 (eight) hours as needed for Nausea. 10 tablet 0    sodium chloride (SALINE NASAL) 0.65 % nasal spray 2 sprays by Nasal route as needed for Congestion. 44 mL 11    traMADoL (ULTRAM) 50 mg tablet Take 1 tablet (50 mg total) by mouth every 6 (six) hours as needed for Pain. 10 tablet 0    triamcinolone (NASACORT) 55 mcg nasal inhaler 2 sprays by Nasal route 2 (two) times a day. 16.9 mL 11    [DISCONTINUED] omeprazole (PRILOSEC) 40 MG capsule Take 1 capsule twice daily on empty stomach x 14 days. Then 1 capsule every morning 60 min before eating/drinking anything. 45 capsule 1    [DISCONTINUED] rosuvastatin (CRESTOR) 5 MG tablet Take 1 tablet (5 mg total) by mouth every evening. (Patient not taking: Reported on 11/9/2023) 90 tablet 3     Current Facility-Administered Medications on File Prior to Visit   Medication Dose Route Frequency Provider Last Rate Last Admin    [DISCONTINUED] fentaNYL 50 mcg/mL injection  mcg   mcg Intravenous PRN Jasbir Benitez MD   100 mcg at 03/21/23 0920    [DISCONTINUED] LIDOcaine (PF) 10 mg/ml (1%) injection 10 mg  1 mL Intradermal Once Audrey Caicedo NP        [DISCONTINUED] midazolam (VERSED) 1 mg/mL injection 0.5-4 mg  0.5-4 mg Intravenous PRN Jasbir Benitez MD   2 mg at 03/21/23 0920    [DISCONTINUED] mupirocin 2 % ointment   Nasal On Call Procedure Audrey Caicedo NP   Given at 04/26/22 0726    [DISCONTINUED] ropivacaine 0.2% Nimbus PainPRO Pump infusion 500 ML   Perineural Continuous Jasbir Benitez MD   New Bag at 03/21/23 1609         Assessment:   82 y.o. female with multiple co-morbid illnesses here to follow-up with PCP and continue work-up of chronic issues    Plan:     Problem List Items Addressed This Visit       Gastroesophageal reflux disease     Sx improved w/ PPI and lifestyle interventions.  She is rightfully concerned  about being on the medication too long due to h/o gastric polyps the last time she took it; at this point it would be reasonable to only use PRN going forward.         Relevant Medications    omeprazole (PRILOSEC) 40 MG capsule    Hyperbilirubinemia     Started after initiation of rosuvastatin.  US unremarkable.  Likely 2/2 competitive inhibition of hepatic bilirubin conjugation by statin resulting in cholestasis.    Results for orders placed or performed in visit on 02/03/23   Lipid Panel   Result Value Ref Range    Cholesterol 179 120 - 199 mg/dL    Triglycerides 146 30 - 150 mg/dL    HDL 58 40 - 75 mg/dL    LDL Cholesterol 91.8 63.0 - 159.0 mg/dL    HDL/Cholesterol Ratio 32.4 20.0 - 50.0 %    Total Cholesterol/HDL Ratio 3.1 2.0 - 5.0    Non-HDL Cholesterol 121 mg/dL   Can possibly discuss Repatha injections for LDL lowering at next f/u along with repeating CMP          Other Visit Diagnoses       Need for Tdap vaccination    -  Primary    Relevant Medications    DIPH,PERTUSS,ACEL,,TET VAC,PF, ADULT (ADACEL) 2 Lf-(2.5-5-3-5 mcg)-5Lf/0.5 mL Syrg            Health Maintenance         Date Due Completion Date    RSV Vaccine (Age 60+) (1 - 1-dose 60+ series) Never done ---    TETANUS VACCINE 02/09/2022 2/9/2012    COVID-19 Vaccine (4 - 2023-24 season) 09/01/2023 1/5/2022    DEXA Scan 01/20/2025 1/20/2023    Lipid Panel 02/03/2028 2/3/2023        Order for TdaP sent to pt's pharmacy; flu shot given today.  Recommended COVID booster and RSV shots at next convenience.    Future Appointments   Date Time Provider Department Center   12/5/2023  8:30 AM Katia Lowe MD [facility]   3/6/2024  1:15 PM Lisa Yeh MD [id number]         Follow up in about 6 months (around 5/9/2024). Total clinical care time was 20 min.    Joe Farley MD  [facility]

## 2023-11-13 ENCOUNTER — OFFICE VISIT (OUTPATIENT)
Dept: INTERNAL MEDICINE | Facility: CLINIC | Age: 83
End: 2023-11-13
Payer: MEDICARE

## 2023-11-13 VITALS
OXYGEN SATURATION: 97 % | HEART RATE: 78 BPM | WEIGHT: 119.81 LBS | SYSTOLIC BLOOD PRESSURE: 132 MMHG | DIASTOLIC BLOOD PRESSURE: 76 MMHG | BODY MASS INDEX: 23.52 KG/M2 | HEIGHT: 60 IN | RESPIRATION RATE: 14 BRPM

## 2023-11-13 DIAGNOSIS — H61.23 BILATERAL IMPACTED CERUMEN: ICD-10-CM

## 2023-11-13 DIAGNOSIS — M81.0 AGE-RELATED OSTEOPOROSIS WITHOUT CURRENT PATHOLOGICAL FRACTURE: Chronic | ICD-10-CM

## 2023-11-13 DIAGNOSIS — Z79.01 CHRONIC ANTICOAGULATION: ICD-10-CM

## 2023-11-13 DIAGNOSIS — K58.9 IRRITABLE BOWEL SYNDROME, UNSPECIFIED TYPE: ICD-10-CM

## 2023-11-13 DIAGNOSIS — Z00.00 ENCOUNTER FOR PREVENTIVE HEALTH EXAMINATION: Primary | ICD-10-CM

## 2023-11-13 DIAGNOSIS — R26.9 ABNORMALITY OF GAIT AND MOBILITY: ICD-10-CM

## 2023-11-13 DIAGNOSIS — Z00.00 ENCOUNTER FOR MEDICARE ANNUAL WELLNESS EXAM: ICD-10-CM

## 2023-11-13 DIAGNOSIS — F41.9 ANXIETY: Chronic | ICD-10-CM

## 2023-11-13 DIAGNOSIS — E78.5 HYPERLIPIDEMIA, UNSPECIFIED HYPERLIPIDEMIA TYPE: Chronic | ICD-10-CM

## 2023-11-13 DIAGNOSIS — I48.91 ATRIAL FIBRILLATION, UNSPECIFIED TYPE: ICD-10-CM

## 2023-11-13 DIAGNOSIS — N18.30 STAGE 3 CHRONIC KIDNEY DISEASE, UNSPECIFIED WHETHER STAGE 3A OR 3B CKD: ICD-10-CM

## 2023-11-13 DIAGNOSIS — I70.0 ATHEROSCLEROSIS OF AORTA: ICD-10-CM

## 2023-11-13 DIAGNOSIS — J84.10 CALCIFIED GRANULOMA OF LUNG: Chronic | ICD-10-CM

## 2023-11-13 DIAGNOSIS — K21.9 GASTROESOPHAGEAL REFLUX DISEASE, UNSPECIFIED WHETHER ESOPHAGITIS PRESENT: ICD-10-CM

## 2023-11-13 PROBLEM — H81.11 BPPV (BENIGN PAROXYSMAL POSITIONAL VERTIGO), RIGHT: Status: RESOLVED | Noted: 2021-10-07 | Resolved: 2023-11-13

## 2023-11-13 PROBLEM — N82.3 RECTOVAGINAL FISTULA: Status: RESOLVED | Noted: 2022-02-28 | Resolved: 2023-11-13

## 2023-11-13 PROBLEM — M25.611 DECREASED RANGE OF MOTION OF RIGHT SHOULDER: Status: RESOLVED | Noted: 2023-05-09 | Resolved: 2023-11-13

## 2023-11-13 PROBLEM — R52 PAIN: Status: RESOLVED | Noted: 2023-05-09 | Resolved: 2023-11-13

## 2023-11-13 PROCEDURE — 1170F FXNL STATUS ASSESSED: CPT | Mod: HCNC,CPTII,S$GLB, | Performed by: NURSE PRACTITIONER

## 2023-11-13 PROCEDURE — 3078F PR MOST RECENT DIASTOLIC BLOOD PRESSURE < 80 MM HG: ICD-10-PCS | Mod: HCNC,CPTII,S$GLB, | Performed by: NURSE PRACTITIONER

## 2023-11-13 PROCEDURE — 3288F FALL RISK ASSESSMENT DOCD: CPT | Mod: HCNC,CPTII,S$GLB, | Performed by: NURSE PRACTITIONER

## 2023-11-13 PROCEDURE — 1157F PR ADVANCE CARE PLAN OR EQUIV PRESENT IN MEDICAL RECORD: ICD-10-PCS | Mod: HCNC,CPTII,S$GLB, | Performed by: NURSE PRACTITIONER

## 2023-11-13 PROCEDURE — 99999 PR PBB SHADOW E&M-EST. PATIENT-LVL V: ICD-10-PCS | Mod: PBBFAC,HCNC,, | Performed by: NURSE PRACTITIONER

## 2023-11-13 PROCEDURE — 1159F MED LIST DOCD IN RCRD: CPT | Mod: HCNC,CPTII,S$GLB, | Performed by: NURSE PRACTITIONER

## 2023-11-13 PROCEDURE — G0439 PR MEDICARE ANNUAL WELLNESS SUBSEQUENT VISIT: ICD-10-PCS | Mod: HCNC,S$GLB,, | Performed by: NURSE PRACTITIONER

## 2023-11-13 PROCEDURE — 3075F PR MOST RECENT SYSTOLIC BLOOD PRESS GE 130-139MM HG: ICD-10-PCS | Mod: HCNC,CPTII,S$GLB, | Performed by: NURSE PRACTITIONER

## 2023-11-13 PROCEDURE — 1126F PR PAIN SEVERITY QUANTIFIED, NO PAIN PRESENT: ICD-10-PCS | Mod: HCNC,CPTII,S$GLB, | Performed by: NURSE PRACTITIONER

## 2023-11-13 PROCEDURE — 1101F PR PT FALLS ASSESS DOC 0-1 FALLS W/OUT INJ PAST YR: ICD-10-PCS | Mod: HCNC,CPTII,S$GLB, | Performed by: NURSE PRACTITIONER

## 2023-11-13 PROCEDURE — 1160F PR REVIEW ALL MEDS BY PRESCRIBER/CLIN PHARMACIST DOCUMENTED: ICD-10-PCS | Mod: HCNC,CPTII,S$GLB, | Performed by: NURSE PRACTITIONER

## 2023-11-13 PROCEDURE — 1101F PT FALLS ASSESS-DOCD LE1/YR: CPT | Mod: HCNC,CPTII,S$GLB, | Performed by: NURSE PRACTITIONER

## 2023-11-13 PROCEDURE — 1126F AMNT PAIN NOTED NONE PRSNT: CPT | Mod: HCNC,CPTII,S$GLB, | Performed by: NURSE PRACTITIONER

## 2023-11-13 PROCEDURE — 1170F PR FUNCTIONAL STATUS ASSESSED: ICD-10-PCS | Mod: HCNC,CPTII,S$GLB, | Performed by: NURSE PRACTITIONER

## 2023-11-13 PROCEDURE — 3075F SYST BP GE 130 - 139MM HG: CPT | Mod: HCNC,CPTII,S$GLB, | Performed by: NURSE PRACTITIONER

## 2023-11-13 PROCEDURE — 99999 PR PBB SHADOW E&M-EST. PATIENT-LVL V: CPT | Mod: PBBFAC,HCNC,, | Performed by: NURSE PRACTITIONER

## 2023-11-13 PROCEDURE — 3288F PR FALLS RISK ASSESSMENT DOCUMENTED: ICD-10-PCS | Mod: HCNC,CPTII,S$GLB, | Performed by: NURSE PRACTITIONER

## 2023-11-13 PROCEDURE — 3078F DIAST BP <80 MM HG: CPT | Mod: HCNC,CPTII,S$GLB, | Performed by: NURSE PRACTITIONER

## 2023-11-13 PROCEDURE — G0439 PPPS, SUBSEQ VISIT: HCPCS | Mod: HCNC,S$GLB,, | Performed by: NURSE PRACTITIONER

## 2023-11-13 PROCEDURE — 1157F ADVNC CARE PLAN IN RCRD: CPT | Mod: HCNC,CPTII,S$GLB, | Performed by: NURSE PRACTITIONER

## 2023-11-13 PROCEDURE — 1159F PR MEDICATION LIST DOCUMENTED IN MEDICAL RECORD: ICD-10-PCS | Mod: HCNC,CPTII,S$GLB, | Performed by: NURSE PRACTITIONER

## 2023-11-13 PROCEDURE — 1160F RVW MEDS BY RX/DR IN RCRD: CPT | Mod: HCNC,CPTII,S$GLB, | Performed by: NURSE PRACTITIONER

## 2023-11-13 NOTE — PATIENT INSTRUCTIONS
Counseling and Referral of Other Preventative  (Italic type indicates deductible and co-insurance are waived)    Patient Name: Laverne Dewy Rose  Today's Date: 11/13/2023    Health Maintenance         Date Due Completion Date    RSV Vaccine (Age 60+) (1 - 1-dose 60+ series) Considering at a later date ---    TETANUS VACCINE States received at [facility]   2/9/2012    COVID-19 Vaccine (4 - 2023-24 season) Declined   1/5/2022    DEXA Scan 01/20/2025 1/20/2023    Lipid Panel    Abnormal whisper test- has ear wax- suggest removal of ear wax then audiology exam if no improvement in hearing after wax removed 02/03/2024 2/3/2023          Orders Placed This Encounter   Procedures    Ambulatory referral/consult to ENT     The following information is provided to all patients.  This information is to help you find resources for any of the problems found today that may be affecting your health:                Living healthy guide: www.[facility].[place].[facility]      Understanding Diabetes: www.diabetes.org      Eating healthy: www.cdc.gov/healthyweight      CDC home safety checklist: www.cdc.gov/steadi/patient.html      Agency on Aging: www.goea.[place].[facility]      Alcoholics anonymous (AA): www.aa.org      Physical Activity: www.connie.nih.gov/ci3emvf      Tobacco use: www.quitwithusla.org

## 2023-11-13 NOTE — PROGRESS NOTES
"Laverne Kristel presented for a  Medicare AWV and comprehensive Health Risk Assessment today. The following components were reviewed and updated:    Medical history  Family History  Social history  Allergies and Current Medications  Health Risk Assessment  Health Maintenance  Care Team     ** See Completed Assessments for Annual Wellness Visit within the encounter summary.**       The following assessments were completed:  Living Situation  CAGE  Depression Screening  Timed Get Up and Go  Whisper Test-abnormal  Cognitive Function Screening  Nutrition Screening  ADL Screening  PAQ Screening    Vitals:    11/13/23 1020   BP: 132/76   BP Location: Left arm   Patient Position: Sitting   Pulse: 78   Resp: 14   SpO2: 97%   Weight: 54.3 kg (119 lb 13.1 oz)   Height: 4' 11.75" (1.518 m)     Body mass index is 23.6 kg/m².  Physical Exam  Constitutional:       Comments: Younger in appearance than age   HENT:      Right Ear: There is no impacted cerumen.      Left Ear: There is no impacted cerumen.   Eyes:      General: No scleral icterus.  Cardiovascular:      Rate and Rhythm: Normal rate and regular rhythm.   Pulmonary:      Effort: Pulmonary effort is normal.      Breath sounds: Normal breath sounds.   Abdominal:      Palpations: Abdomen is soft.   Musculoskeletal:         General: No swelling. Normal range of motion.   Skin:     General: Skin is warm and dry.   Neurological:      Mental Status: She is alert and oriented to person, place, and time.   Psychiatric:         Mood and Affect: Mood normal.         Behavior: Behavior normal.         Thought Content: Thought content normal.         Judgment: Judgment normal.            Medication review & Opioid screening perform during rooming section. No prescribed opioid medications noted.  Review for substance use disorder screening performed during rooming section. No substance abuse noted on screening.      Diagnoses and health risks identified today and associated " recommendations/orders:    1. Irritable bowel syndrome, unspecified type  Stable followed by PCP    2. Calcified granuloma of lung  Stable followed by PCP    3. Age-related osteoporosis without current pathological fracture  Stable followed by PCP    4. Anxiety  Stable followed by PCP    5. Hyperlipidemia, unspecified hyperlipidemia type  Stable followed by PCP    6. Chronic anticoagulation  Stable followed by PCP    7. Atrial fibrillation, unspecified type  Stable followed by cardiology    8. Atherosclerosis of aorta  Stable followed by cardiology    9. Stage 3 chronic kidney disease, unspecified whether stage 3a or 3b CKD  Stable followed by PCP    10. Encounter for Medicare annual wellness exam  Here for Health Risk Assessment/Annual Wellness Visit.  Health maintenance reviewed and updated. Follow up in one year.   - Ambulatory Referral/Consult to Enhanced Annual Wellness Visit (eAWV)    11. Gastroesophageal reflux disease, unspecified whether esophagitis present  Stable followed by PCP    12. Bilateral impacted cerumen  - Ambulatory referral/consult to ENT; Future    13. Abnormality of gait and mobility      14. Encounter for preventive health examination  Here for Health Risk Assessment/Annual Wellness Visit.  Health maintenance reviewed and updated. Follow up in one year.         Provided Laverne with a 5-10 year written screening schedule and personal prevention plan. Recommendations were developed using the USPSTF age appropriate recommendations. Education, counseling, and referrals were provided as needed. After Visit Summary printed and given to patient which includes a list of additional screenings\tests needed. Follow up in about 1 year (around 11/13/2024).Denies bleeding.    Joie Kaplan NP I offered to discuss advanced care planning, including how to pick a person who would make decisions for you if you were unable to make them for yourself, called a health care power of , and what kind  of decisions you might make such as use of life sustaining treatments such as ventilators and tube feeding when faced with a life limiting illness recorded on a living will that they will need to know. (How you want to be cared for as you near the end of your natural life)     X  Patient has advanced directives on file, which we reviewed, and they do not wish to make changes.

## 2023-11-29 ENCOUNTER — TELEPHONE (OUTPATIENT)
Dept: ORTHOPEDICS | Facility: CLINIC | Age: 83
End: 2023-11-29
Payer: MEDICARE

## 2023-12-05 ENCOUNTER — OFFICE VISIT (OUTPATIENT)
Dept: ORTHOPEDICS | Facility: CLINIC | Age: 83
End: 2023-12-05
Payer: MEDICARE

## 2023-12-05 VITALS — SYSTOLIC BLOOD PRESSURE: 129 MMHG | HEART RATE: 72 BPM | DIASTOLIC BLOOD PRESSURE: 70 MMHG

## 2023-12-05 DIAGNOSIS — M65.30 TRIGGER FINGER, UNSPECIFIED FINGER, UNSPECIFIED LATERALITY: Primary | ICD-10-CM

## 2023-12-05 PROCEDURE — 99214 PR OFFICE/OUTPT VISIT, EST, LEVL IV, 30-39 MIN: ICD-10-PCS | Mod: HCNC,S$GLB,, | Performed by: ORTHOPAEDIC SURGERY

## 2023-12-05 PROCEDURE — 1125F PR PAIN SEVERITY QUANTIFIED, PAIN PRESENT: ICD-10-PCS | Mod: HCNC,CPTII,S$GLB, | Performed by: ORTHOPAEDIC SURGERY

## 2023-12-05 PROCEDURE — 3288F PR FALLS RISK ASSESSMENT DOCUMENTED: ICD-10-PCS | Mod: HCNC,CPTII,S$GLB, | Performed by: ORTHOPAEDIC SURGERY

## 2023-12-05 PROCEDURE — 3288F FALL RISK ASSESSMENT DOCD: CPT | Mod: HCNC,CPTII,S$GLB, | Performed by: ORTHOPAEDIC SURGERY

## 2023-12-05 PROCEDURE — 1125F AMNT PAIN NOTED PAIN PRSNT: CPT | Mod: HCNC,CPTII,S$GLB, | Performed by: ORTHOPAEDIC SURGERY

## 2023-12-05 PROCEDURE — 1101F PT FALLS ASSESS-DOCD LE1/YR: CPT | Mod: HCNC,CPTII,S$GLB, | Performed by: ORTHOPAEDIC SURGERY

## 2023-12-05 PROCEDURE — 3078F PR MOST RECENT DIASTOLIC BLOOD PRESSURE < 80 MM HG: ICD-10-PCS | Mod: HCNC,CPTII,S$GLB, | Performed by: ORTHOPAEDIC SURGERY

## 2023-12-05 PROCEDURE — 1159F MED LIST DOCD IN RCRD: CPT | Mod: HCNC,CPTII,S$GLB, | Performed by: ORTHOPAEDIC SURGERY

## 2023-12-05 PROCEDURE — 1157F PR ADVANCE CARE PLAN OR EQUIV PRESENT IN MEDICAL RECORD: ICD-10-PCS | Mod: HCNC,CPTII,S$GLB, | Performed by: ORTHOPAEDIC SURGERY

## 2023-12-05 PROCEDURE — 99214 OFFICE O/P EST MOD 30 MIN: CPT | Mod: HCNC,S$GLB,, | Performed by: ORTHOPAEDIC SURGERY

## 2023-12-05 PROCEDURE — 3078F DIAST BP <80 MM HG: CPT | Mod: HCNC,CPTII,S$GLB, | Performed by: ORTHOPAEDIC SURGERY

## 2023-12-05 PROCEDURE — 1101F PR PT FALLS ASSESS DOC 0-1 FALLS W/OUT INJ PAST YR: ICD-10-PCS | Mod: HCNC,CPTII,S$GLB, | Performed by: ORTHOPAEDIC SURGERY

## 2023-12-05 PROCEDURE — 99999 PR PBB SHADOW E&M-EST. PATIENT-LVL III: CPT | Mod: PBBFAC,HCNC,, | Performed by: ORTHOPAEDIC SURGERY

## 2023-12-05 PROCEDURE — 99999 PR PBB SHADOW E&M-EST. PATIENT-LVL III: ICD-10-PCS | Mod: PBBFAC,HCNC,, | Performed by: ORTHOPAEDIC SURGERY

## 2023-12-05 PROCEDURE — 1157F ADVNC CARE PLAN IN RCRD: CPT | Mod: HCNC,CPTII,S$GLB, | Performed by: ORTHOPAEDIC SURGERY

## 2023-12-05 PROCEDURE — 3074F PR MOST RECENT SYSTOLIC BLOOD PRESSURE < 130 MM HG: ICD-10-PCS | Mod: HCNC,CPTII,S$GLB, | Performed by: ORTHOPAEDIC SURGERY

## 2023-12-05 PROCEDURE — 3074F SYST BP LT 130 MM HG: CPT | Mod: HCNC,CPTII,S$GLB, | Performed by: ORTHOPAEDIC SURGERY

## 2023-12-05 PROCEDURE — 1159F PR MEDICATION LIST DOCUMENTED IN MEDICAL RECORD: ICD-10-PCS | Mod: HCNC,CPTII,S$GLB, | Performed by: ORTHOPAEDIC SURGERY

## 2023-12-05 NOTE — PROGRESS NOTES
Plan for left thumb and long finger trigger release,  I have explained the risks, benefits, and alternatives of the procedure to the patient in great detail. The patient voices understanding and all questions have been answered. The patient agrees with to proceed as planned. Consents were performed in clinic.     She is unable to make a fist currently  Failed injections

## 2023-12-05 NOTE — PROGRESS NOTES
[facility]  History & Physical  Orthopedics    SUBJECTIVE:      Chief Complaint: right hand pain    Referring Provider: No ref. provider found     History of Present Illness:  Patient is a 83 y.o. right hand dominant female who presents today with complaints of right hand pain. Onset after right rotator cuff surgery last year. Worse with flexion of her hands. Notes that it is worst over palm. She had EMG performed prior to presentation which notes bilateral carpal tunnel syndrome with moderate severity on R, mild on L. Denies numbness/tingling.    Attempted treatment(s) and/or interventions include activity modifications, rest, rest, activity modification, anti-inflammatory medications, physical and/or occupational therapy, and splinting/casting.     The patient denies any fevers, chills, N/V, D/C and presents for evaluation.    Interval history: 12/5/23  Returns today for continued evaluation of her right thumb and long trigger finger.  She endorses no improvement from injections administered on last visit.  She is continued to have worsening frequency of the triggering and is now having issues with completely forming a fist as well as pain when trying to cut vegetables.  She denies any paresthesias in the median nerve distribution we again discussed her EMG results from previous visit which showed moderate carpal tunnel on the right.       Past Medical History:   Diagnosis Date    Allergy     Anxiety     Arthritis     Asthma     mild    Cataract     CKD (chronic kidney disease) stage 3, GFR 30-59 ml/min     Hepatitis     IBS (irritable bowel syndrome)     Moderate aortic valve stenosis     Osteoporosis     Paroxysmal atrial fibrillation     Reflux esophagitis     Torus palatinus      Past Surgical History:   Procedure Laterality Date    ARTHROSCOPIC DEBRIDEMENT OF SHOULDER Right 3/21/2023    Procedure: DEBRIDEMENT, SHOULDER, ARTHROSCOPIC;  Surgeon: Luis Angel Wheeler MD;  Location: [facility] OR;   Service: Orthopedics;  Laterality: Right;    ARTHROSCOPIC REPAIR OF ROTATOR CUFF OF SHOULDER Right 3/21/2023    Procedure: REPAIR, ROTATOR CUFF, ARTHROSCOPIC;  Surgeon: Luis Angel Wheeler MD;  Location: [facility] OR;  Service: Orthopedics;  Laterality: Right;    ARTHROSCOPY OF SHOULDER WITH DECOMPRESSION OF SUBACROMIAL SPACE Right 3/21/2023    Procedure: ARTHROSCOPY, SHOULDER, WITH SUBACROMIAL SPACE DECOMPRESSION;  Surgeon: Luis Angel Wheeler MD;  Location: [facility] OR;  Service: Orthopedics;  Laterality: Right;    BELPHAROPTOSIS REPAIR Bilateral     CATARACT EXTRACTION W/  INTRAOCULAR LENS IMPLANT Bilateral     CATARACT EXTRACTION, BILATERAL      CHOLECYSTECTOMY      COLONOSCOPY N/A 01/20/2022    Procedure: COLONOSCOPY;  Surgeon: Emmanuel Sandoval MD;  [facility]  Service: Endoscopy;  Laterality: N/A;    ESOPHAGOGASTRODUODENOSCOPY N/A 01/20/2022    Procedure: EGD (ESOPHAGOGASTRODUODENOSCOPY) any GI doc, please perform colonoscopy and EGD at same time;  Surgeon: Emmanuel Sandoval MD;  Location: [facility] ([address] [place] [address]);  Service: Endoscopy;  Laterality: N/A;  EGD & Colonoscopy orders combined-MS  fully vaccinated  1/13 covid test 1/17 @ [facility]    EYE SURGERY      FLEXIBLE SIGMOIDOSCOPY  4/26/2022    Procedure: SIGMOIDOSCOPY, FLEXIBLE;  Surgeon: Alison Mcclain MD;  Location: [facility];  Service: Colon and Rectal;;    HYSTERECTOMY      LYSIS, ADHESIONS, SHOULDER, ARTHROSCOPIC  3/21/2023    Procedure: LYSIS,ADHESIONS,SHOULDER,ARTHROSCOPIC;  Surgeon: Luis Angel Wheeler MD;  Location: [facility] OR;  Service: Orthopedics;;    OOPHORECTOMY      ROBOT-ASSISTED LAPAROSCOPIC COLECTOMY USING DA DIANA XI N/A 4/26/2022    Procedure: XI ROBOTIC COLECTOMY with flexible sigmoidoscopy;  Surgeon: Alison Mcclain MD;  Location: [facility];  Service: Colon and Rectal;  Laterality: N/A;     Review of patient's allergies indicates:   Allergen Reactions    Codeine      Other reaction(s): Syncope, can take tylenol    Sulfa (sulfonamide  antibiotics)      Other reaction(s): Stomach upset    Crestor [rosuvastatin] Other (See Comments)     Elevated bilirubin when taking     Social History     Social History Narrative    Not on file     Family History   Problem Relation Age of Onset    Cancer Mother         pancreatic    Cataracts Father     Diabetes Father     Asthma Sister     Cataracts Sister     Cancer Brother         liver and esophageal    No Known Problems Daughter     No Known Problems Son     Amblyopia Neg Hx     Blindness Neg Hx     Glaucoma Neg Hx     Macular degeneration Neg Hx     Retinal detachment Neg Hx     Strabismus Neg Hx     Breast cancer Neg Hx     Colon cancer Neg Hx     Ovarian cancer Neg Hx          Current Outpatient Medications:     albuterol (PROVENTIL/VENTOLIN HFA) 90 mcg/actuation inhaler, Inhale 2 puffs into the lungs every 6 (six) hours as needed for Wheezing or Shortness of Breath (generic preferred). Rescue, Disp: 18 g, Rfl: 3    apixaban (ELIQUIS) 2.5 mg Tab, Take 1 tablet (2.5 mg total) by mouth 2 (two) times daily., Disp: 180 tablet, Rfl: 3    azelastine (ASTELIN) 137 mcg (0.1 %) nasal spray, 2 sprays (274 mcg total) by Nasal route 2 (two) times daily., Disp: 30 mL, Rfl: 11    calcium-vitamin D3 500 mg(1,250mg) -200 unit per tablet, Take 1 tablet by mouth Daily. , Disp: , Rfl:     carboxymethylcellulose (REFRESH PLUS) 0.5 % Dpet, 1 drop 3 (three) times daily as needed., Disp: , Rfl:     diclofenac sodium (VOLTAREN) 1 % Gel, Apply 2 g topically once daily., Disp: 150 g, Rfl: 2    hydrocortisone 2.5 % cream, Apply topically 2 (two) times daily., Disp: 30 g, Rfl: 0    LORazepam (ATIVAN) 0.5 MG tablet, Take 1 tablet (0.5 mg total) by mouth nightly as needed for Anxiety., Disp: 30 tablet, Rfl: 5    metoprolol succinate (TOPROL-XL) 25 MG 24 hr tablet, Take 1 tablet (25 mg total) by mouth once daily., Disp: 90 tablet, Rfl: 3    omeprazole (PRILOSEC) 40 MG capsule, Take 1 capsule (40 mg total) by mouth daily as needed (GERD  symptoms). Take 1 capsule daily as needed for reflux symptoms., Disp: 45 capsule, Rfl: 1    ondansetron (ZOFRAN) 4 MG tablet, Take 1 tablet (4 mg total) by mouth every 8 (eight) hours as needed for Nausea., Disp: 10 tablet, Rfl: 0    sodium chloride (SALINE NASAL) 0.65 % nasal spray, 2 sprays by Nasal route as needed for Congestion., Disp: 44 mL, Rfl: 11    traMADoL (ULTRAM) 50 mg tablet, Take 1 tablet (50 mg total) by mouth every 6 (six) hours as needed for Pain., Disp: 10 tablet, Rfl: 0    triamcinolone (NASACORT) 55 mcg nasal inhaler, 2 sprays by Nasal route 2 (two) times a day., Disp: 16.9 mL, Rfl: 11    clobetasol 0.05% (TEMOVATE) 0.05 % Oint, Apply topically 2 (two) times daily. for 7 days, Disp: 45 g, Rfl: 0    gabapentin (NEURONTIN) 300 MG capsule, Take 2 capsules (600 mg total) by mouth every evening. (Patient not taking: Reported on 11/13/2023), Disp: 180 capsule, Rfl: 3      Review of Systems:  As per HPI otherwise noncontributory    OBJECTIVE:      Vital Signs (Most Recent):  Vitals:    12/05/23 0815   BP: 129/70   Pulse: 72     There is no height or weight on file to calculate BMI.      Physical Exam:  Constitutional: The patient appears well-developed and well-nourished. No distress.   Skin: No lesions appreciated  Head: Normocephalic and atraumatic.   Nose: Nose normal.   Ears: No deformities seen  Eyes: Conjunctivae and EOM are normal.   Neck: No tracheal deviation present.   Cardiovascular: Normal rate and intact distal pulses.    Pulmonary/Chest: Effort normal. No respiratory distress.   Abdominal: There is no guarding.   Neurological: The patient is alert.   Psychiatric: The patient has a normal mood and affect.     Right Hand/Wrist Examination:    Observation/Inspection:  Swelling  none    Deformity  none  Discoloration  none     Scars   none    Atrophy  None  Palpable triggering over thumb and long finger A1 pulley    HAND/WRIST EXAMINATION:  Finkelstein's Test   Neg  WHAT Test    Neg  Snuff box  tenderness   Neg  Rogers's Test    Neg  Hook of [place] Tenderness  Neg  CMC grind    Neg  Circumduction test   Neg    Neurovascular Exam:  Digits WWP, brisk CR < 3s throughout  NVI motor/LTS to M/R/U nerves, radial pulse 2+  Tinel's Test - Carpal Tunnel  Neg  Tinel's Test - Cubital Tunnel  Neg  Phalen's Test    Neg  Median Nerve Compression Test Neg    ROM hand full, painless    ROM wrist full, painless    ROM elbow full, painless    Abdomen not guarded  Respirations nonlabored  Perfusion intact    Diagnostic Results:     Imaging - I independently viewed the patient's imaging as well as the radiology report.  Xrays of the patient's right hand demonstrate no evidence of any acute fractures or dislocations or significant degenerative changes.    EMG - moderate CTS R hand, mild on left    ASSESSMENT/PLAN:      83 y.o. yo female with right long finger and thumb trigger finger and right carpal tunnel sydrome.    Plan: The patient and I had a thorough discussion today.  We discussed the working diagnosis as well as several other potential alternative diagnoses.  Treatment options were discussed, both conservative and surgical.  Conservative treatment options would include things such as activity modifications, workplace modifications, a period of rest, oral vs topical OTC and prescription anti-inflammatory medications, occupational therapy, splinting/bracing, immobilization, corticosteroid injections, and others.  Surgical options were discussed as well.     At this time patient wishes to proceed with trigger finger release of the right thumb and long finger.  She is not interested in carpal tunnel release at this time.  Consents were signed in clinic and all questions were answered.

## 2023-12-07 DIAGNOSIS — M65.311 TRIGGER FINGER OF RIGHT THUMB: ICD-10-CM

## 2023-12-07 DIAGNOSIS — M65.30 TRIGGER FINGER, UNSPECIFIED FINGER, UNSPECIFIED LATERALITY: Primary | ICD-10-CM

## 2023-12-07 DIAGNOSIS — M65.331 TRIGGER MIDDLE FINGER OF RIGHT HAND: ICD-10-CM

## 2023-12-07 DIAGNOSIS — M79.641 RIGHT HAND PAIN: ICD-10-CM

## 2023-12-28 ENCOUNTER — OFFICE VISIT (OUTPATIENT)
Dept: PRIMARY CARE CLINIC | Facility: CLINIC | Age: 83
End: 2023-12-28
Payer: MEDICARE

## 2023-12-28 ENCOUNTER — TELEPHONE (OUTPATIENT)
Dept: PRIMARY CARE CLINIC | Facility: CLINIC | Age: 83
End: 2023-12-28
Payer: MEDICARE

## 2023-12-28 ENCOUNTER — NURSE TRIAGE (OUTPATIENT)
Dept: ADMINISTRATIVE | Facility: CLINIC | Age: 83
End: 2023-12-28
Payer: MEDICARE

## 2023-12-28 VITALS
OXYGEN SATURATION: 99 % | SYSTOLIC BLOOD PRESSURE: 143 MMHG | HEIGHT: 60 IN | WEIGHT: 117.94 LBS | BODY MASS INDEX: 23.16 KG/M2 | HEART RATE: 78 BPM | TEMPERATURE: 98 F | RESPIRATION RATE: 20 BRPM | DIASTOLIC BLOOD PRESSURE: 62 MMHG

## 2023-12-28 DIAGNOSIS — J09.X1 INFLUENZA A WITH PNEUMONIA: Primary | ICD-10-CM

## 2023-12-28 DIAGNOSIS — Z98.890 POST-OPERATIVE STATE: Primary | ICD-10-CM

## 2023-12-28 PROCEDURE — 94640 AIRWAY INHALATION TREATMENT: CPT | Mod: S$GLB,,, | Performed by: HOSPITALIST

## 2023-12-28 PROCEDURE — 99499 UNLISTED E&M SERVICE: CPT | Mod: S$GLB,,, | Performed by: HOSPITALIST

## 2023-12-28 PROCEDURE — 99999 PR PBB SHADOW E&M-EST. PATIENT-LVL IV: CPT | Mod: PBBFAC,,, | Performed by: HOSPITALIST

## 2023-12-28 PROCEDURE — 96372 PR INJECTION,THERAP/PROPH/DIAG2ST, IM OR SUBCUT: ICD-10-PCS | Mod: S$GLB,,, | Performed by: HOSPITALIST

## 2023-12-28 PROCEDURE — 99999 PR PBB SHADOW E&M-EST. PATIENT-LVL IV: ICD-10-PCS | Mod: PBBFAC,,, | Performed by: HOSPITALIST

## 2023-12-28 PROCEDURE — 96372 THER/PROPH/DIAG INJ SC/IM: CPT | Mod: S$GLB,,, | Performed by: HOSPITALIST

## 2023-12-28 PROCEDURE — 99499 NO LOS: ICD-10-PCS | Mod: S$GLB,,, | Performed by: HOSPITALIST

## 2023-12-28 PROCEDURE — 94640 PR INHAL RX, AIRWAY OBST/DX SPUTUM INDUCT: ICD-10-PCS | Mod: S$GLB,,, | Performed by: HOSPITALIST

## 2023-12-28 RX ORDER — TRIAMCINOLONE ACETONIDE 40 MG/ML
40 INJECTION, SUSPENSION INTRA-ARTICULAR; INTRAMUSCULAR
Status: COMPLETED | OUTPATIENT
Start: 2023-12-28 | End: 2023-12-28

## 2023-12-28 RX ORDER — TRAMADOL HYDROCHLORIDE 50 MG/1
50 TABLET ORAL EVERY 6 HOURS PRN
Qty: 10 TABLET | Refills: 0 | Status: SHIPPED | OUTPATIENT
Start: 2024-01-05 | End: 2024-03-06

## 2023-12-28 RX ORDER — IPRATROPIUM BROMIDE AND ALBUTEROL SULFATE 2.5; .5 MG/3ML; MG/3ML
3 SOLUTION RESPIRATORY (INHALATION)
Status: COMPLETED | OUTPATIENT
Start: 2023-12-28 | End: 2023-12-28

## 2023-12-28 RX ADMIN — TRIAMCINOLONE ACETONIDE 40 MG: 40 INJECTION, SUSPENSION INTRA-ARTICULAR; INTRAMUSCULAR at 11:12

## 2023-12-28 RX ADMIN — IPRATROPIUM BROMIDE AND ALBUTEROL SULFATE 3 ML: 2.5; .5 SOLUTION RESPIRATORY (INHALATION) at 11:12

## 2023-12-28 NOTE — PATIENT INSTRUCTIONS
If you are not completely better by January 3rd it would be prudent to postpone your upcoming surgery.  I will let Dr. Lowe know about your illness and treatment so that she can change plans if needed.  Stay well hydrated while you are recovering.

## 2023-12-28 NOTE — ASSESSMENT & PLAN NOTE
Exam and presentation c/w PNA; pt tested positive for influenza A.  She appears moderately ill with some increased respiratory effort.  Duo-neb tx x1 done today in clinic.  Counseled pt that she is too far out from onset of sx to benefit from Tamiflu, but we could try giving her a Kenalog injection which should reduce the inflammation in her lungs contributing to the respiratory sx and may help her feel better faster.  She was counseled that her pneumonia is viral in etiology and antibiotics would not be of much benefit, but in the event her clinical course worsens I could Rx abx to treat presumed bacterial superinfection.  I will contact her in 2 days to make sure she's doing better.  Additionally, she has hand surgery scheduled for 1/5 for trigger finger release; she was counseled that if she's not 100% better by 1/3 then she should postpone the surgery. I also informed her that I would let her surgeon know in case she would rather reschedule regardless.

## 2023-12-28 NOTE — Clinical Note
Saw pt today for pneumonia from flu A.  She got a kenalog 40mg injection today along with a duo-neb tx.  I advised her that if she's not 100% better by 1/3 that they should reschedule.  I figured I'd give you a heads-up in case the Kenalog poses any problem for the procedure, but also that I'm estimating odds at 70/30 that she likely won't be fully recovered by then based on how she looked today.  I'm going to check in on her Saturday and should have better info then to revise those odds.

## 2023-12-28 NOTE — TELEPHONE ENCOUNTER
Appointment Request   Laverne Humphries   Sent:   8:16 PM   To: [facility] Clinical Support Staff      Betotimmy CLARA Humphries   MRN: [id number] : 1940   Pt Home: [phone]     Entered: [phone]        Message    Appointment Request From: Laverne Humphries      With Provider: Carolyne James MD [[facility] [place]]      Preferred Date Range: 2023 - 2023      Preferred Times: Thursday Morning      Reason for visit: Chest tightness, cold      Comments:   Chest tightness

## 2023-12-28 NOTE — TELEPHONE ENCOUNTER
Daughter and patient on conference call with triage RN. Pt c/o of cold symptoms starting with cough on 12/23. Pt denies fever. Reports a congested cough without any productive sputum and nausea. Also c/o of chest pain/tightness that occurs only with coughing. Pt reports taking OTC cough medication and tylenol with some relief.    Care advice is for home care. VV offered and declined. Daughter and patient would prefer to be seen today and in person. No appts available. Offered UC as another source of care. Daughter states they would go to UC for patient to be evaluated.       Reason for Disposition   Cough   Cough with cold symptoms (e.g., runny nose, postnasal drip, throat clearing)   ALSO, mild central chest pain occurs only when coughing    Additional Information   Negative: SEVERE difficulty breathing (e.g., struggling for each breath, speaks in single words)   Negative: Bluish (or gray) lips or face now   Negative: [1] Rapid onset of cough AND [2] has hives   Negative: Coughing started suddenly after medicine, an allergic food or bee sting   Negative: [1] Difficulty breathing AND [2] exposure to flames, smoke, or fumes   Negative: [1] Stridor AND [2] difficulty breathing   Negative: Sounds like a life-threatening emergency to the triager   Negative: [1] MODERATE difficulty breathing (e.g., speaks in phrases, SOB even at rest, pulse 100-120) AND [2] still present when not coughing   Negative: Chest pain  (Exception: MILD central chest pain, present only when coughing.)   Negative: Patient sounds very sick or weak to the triager   Negative: [1] MILD difficulty breathing (e.g., minimal/no SOB at rest, SOB with walking, pulse <100) AND [2] still present when not coughing   Negative: [1] Coughed up blood AND [2] > 1 tablespoon (15 ml)   (Exception: Blood-tinged sputum.)   Negative: Fever > 103 F (39.4 C)   Negative: [1] Fever > 101 F (38.3 C) AND [2] age > 60 years   Negative: [1] Fever > 100.0 F (37.8 C) AND [2]  bedridden (e.g., CVA, chronic illness, recovering from surgery)   Negative: [1] Fever > 100.0 F (37.8 C) AND [2] diabetes mellitus or weak immune system (e.g., HIV positive, cancer chemo, splenectomy, organ transplant, chronic steroids)   Negative: Wheezing is present   Negative: [1] Ankle swelling AND [2] swelling is increasing   Negative: SEVERE coughing spells (e.g., whooping sound after coughing, vomiting after coughing)   Negative: [1] Continuous (nonstop) coughing interferes with work or school AND [2] no improvement using cough treatment per Care Advice   Negative: Fever present > 3 days (72 hours)   Negative: [1] Fever returns after gone for over 24 hours AND [2] symptoms worse or not improved   Negative: [1] Using nasal washes and pain medicine > 24 hours AND [2] sinus pain (around cheekbone or eye) persists   Negative: Earache is present   Negative: Cough has been present for > 3 weeks   Negative: [1] Nasal discharge AND [2] present > 10 days   Negative: [1] Coughed up blood-tinged sputum AND [2] more than once   Negative: [1] Patient also has allergy symptoms (e.g., itchy eyes, clear nasal discharge, postnasal drip) AND [2] they are acting up   Negative: Taking an ACE Inhibitor medicine (e.g., benazepril / LOTENSIN, captopril / CAPOTEN, enalapril / VASOTEC, lisinopril / ZESTRIL)   Negative: Exposure to TB (Tuberculosis)    Protocols used: Cough - Acute Non-Productive-A-AH

## 2023-12-28 NOTE — PROGRESS NOTES
"Primary Care Provider Appointment - 65 PLUS  Joe Farley MD      Subjective:      Patient ID: Laverne Humphries is a 83 y.o. female with   Past Medical History:   Diagnosis Date    Allergy     Anxiety     Arthritis     Asthma     mild    Cataract     CKD (chronic kidney disease) stage 3, GFR 30-59 ml/min     Hepatitis     IBS (irritable bowel syndrome)     Moderate aortic valve stenosis     Osteoporosis     Paroxysmal atrial fibrillation     Reflux esophagitis     Torus palatinus            Chief Complaint: f/u GERD  Prior to this visit, patient's last encounter with PCP was 2023.    Her daughter accompanies her today.  Pt endorses 1 wk of NP cough, nausea, SOB.  Has some pleuritic chest pain, and feels like her breathing is "tight."  Has an albuterol inhaler but doesn't seem to be helping.  Denies f/c, PND, sinus congestion.  Feels weak.        4Ms for Medical Decision-Making in Older Adults    Last Completed EAWV: 2023    MOBILITY:  Get Up and Go:      2023    10:28 AM 3/9/2021     8:11 AM 2020     8:35 AM 2019     9:05 AM 2018     8:55 AM 10/10/2017     9:00 AM   Get Up and Go   Trial 1 9 seconds 10 seconds 11 seconds 14 seconds 10 seconds 10 seconds     Activities of Daily Livin/13/2023    10:34 AM   Activities of Daily Living   Ambulation Independent   Dressing Independent   Transfers Independent   Toileting Continent of bladder;Continent of bowel   Feeding Independent   Cleaning home/Chores Independent   Telephone use Independent   Shopping Independent   Paying bills Independent   Taking meds Independent     Whisper Test:      2023    10:31 AM   Whisper Test   Whisper Test Abnormal     Disability Status:      2023    10:37 AM   Disability Status   Are you deaf or do you have serious difficulty hearing? Y   Are you blind or do you have serious difficulty seeing, even when wearing glasses? N   Because of a physical, mental, or emotional condition, do " you have serious difficulty concentrating, remembering, or making decisions? N   Do you have serious difficulty walking or climbing stairs? Y   Do you have difficulty dressing or bathing? N   Because of a physical, mental, or emotional condition, do you have difficulty doing errands alone such as visiting a doctor's office or shopping? N     Nutrition Screenin/13/2023    10:33 AM   Nutrition Screening   Has food intake declined over the past three months due to loss of appetite, digestive problems, chewing or swallowing difficulties? No decrease in food intake   Involuntary weight loss during the last 3 months? No weight loss   Mobility? Goes out   Has the patient suffered psychological stress or acute disease in the past three months? No   Neuropsychological problems? No psychological problems   Body Mass Index (BMI)?  BMI 23 or greater   Screening Score 14   Interpretation Normal nutritional status    Screening Score: 0-7 Malnourished, 8-11 At Risk, 12-14 Normal    MENTATION:   Depression Patient Health Questionnaire:      2023     8:14 AM   Depression Patient Health Questionnaire   Over the last two weeks how often have you been bothered by little interest or pleasure in doing things Not at all   Over the last two weeks how often have you been bothered by feeling down, depressed or hopeless Not at all   PHQ-2 Total Score 0     Has Dementia Dx: No    Cognitive Function Screenin/13/2023    10:32 AM   Cognitive Function Screening   Clock Drawing Test 1   Mini-Cog 3 Minute Recall 3   Cognitive Function Screening 4     Cognitive Function Screening Total - Less than 4 = Abnormal,  Greater than or equal to 4 = Normal    MEDICATIONS:  High Risk Medications:  Total Active Medications: 2  LORazepam - 0.5 MG  traMADoL - 50 mg    WHAT MATTERS MOST:  Advance Care Planning   ACP Status:   Patient has had an ACP conversation  Living Will: Yes  Power of : Yes  [place]POST: No    Pt brought updated ACP  docs to scan in last visit.    Social History     Socioeconomic History    Marital status:    Tobacco Use    Smoking status: Never     Passive exposure: Never    Smokeless tobacco: Never   Substance and Sexual Activity    Alcohol use: No    Drug use: No    Sexual activity: Not Currently     Partners: Male     Birth control/protection: Abstinence, None   Other Topics Concern    Are you pregnant or think you may be? No    Breast-feeding No     Social Determinants of Health     Financial Resource Strain: Low Risk  (11/13/2023)    Overall Financial Resource Strain (CARDIA)     Difficulty of Paying Living Expenses: Not very hard   Food Insecurity: No Food Insecurity (11/13/2023)    Hunger Vital Sign     Worried About Running Out of Food in the Last Year: Never true     Ran Out of Food in the Last Year: Never true   Transportation Needs: No Transportation Needs (11/13/2023)    PRAPARE - Transportation     Lack of Transportation (Medical): No     Lack of Transportation (Non-Medical): No   Stress: No Stress Concern Present (11/13/2023)    [employer] [place] [place] [employer] - Occupational Stress Questionnaire     Feeling of Stress : Only a little   Social Connections: Unknown (11/13/2023)    Social Connection and Isolation Panel [NHANES]     Marital Status:    Housing Stability: Unknown (11/13/2023)    Housing Stability Vital Sign     Unable to Pay for Housing in the Last Year: No     Unstable Housing in the Last Year: No       Review of Systems   Constitutional:  Positive for chills, fatigue and fever. Negative for activity change and appetite change.   HENT:  Negative for nasal congestion, postnasal drip, sinus pressure/congestion and sore throat.    Eyes:  Negative for photophobia and visual disturbance.   Respiratory:  Positive for cough, chest tightness and shortness of breath. Negative for wheezing.    Cardiovascular:  Positive for chest pain. Negative for leg swelling.   Gastrointestinal:  Positive  "for nausea. Negative for abdominal pain, constipation, diarrhea and vomiting.   Genitourinary:  Negative for dysuria and hematuria.   Musculoskeletal:  Negative for arthralgias and myalgias.   Integumentary:  Negative for rash and wound.   Neurological:  Positive for weakness. Negative for dizziness.   Hematological:  Negative for adenopathy.        Objective:   BP (!) 143/62 (BP Location: Left arm, Patient Position: Sitting, BP Method: Small (Manual))   Pulse 78   Temp 98 °F (36.7 °C) (Oral)   Resp 20   Ht 4' 11.75" (1.518 m)   Wt 53.5 kg (117 lb 15.1 oz)   LMP  (LMP Unknown)   SpO2 99%   BMI 23.23 kg/m²     Physical Exam  Vitals reviewed.   Constitutional:       Appearance: Normal appearance. She is ill-appearing.   HENT:      Head: Normocephalic and atraumatic.      Right Ear: Ear canal and external ear normal.      Left Ear: Ear canal and external ear normal.      Nose: Rhinorrhea present.      Mouth/Throat:      Mouth: Mucous membranes are moist.      Pharynx: Oropharynx is clear.   Eyes:      General: No scleral icterus.     Conjunctiva/sclera: Conjunctivae normal.   Cardiovascular:      Rate and Rhythm: Normal rate and regular rhythm.      Heart sounds: Normal heart sounds.   Pulmonary:      Effort: Tachypnea and accessory muscle usage present.      Breath sounds: No decreased air movement. Examination of the right-lower field reveals rhonchi. Rhonchi present.      Comments: Egophony and tactile fremitus asymmetric b/t BLLF  Abdominal:      General: There is no distension.      Palpations: Abdomen is soft.      Tenderness: There is no abdominal tenderness. There is no guarding.   Musculoskeletal:         General: Normal range of motion.      Cervical back: Normal range of motion and neck supple. No rigidity.      Right lower leg: No edema.      Left lower leg: No edema.   Lymphadenopathy:      Cervical: No cervical adenopathy.   Skin:     General: Skin is warm and dry.      Findings: No rash. "   Neurological:      General: No focal deficit present.      Mental Status: She is alert and oriented to person, place, and time.              Lab Results   Component Value Date    WBC 7.31 10/02/2023    HGB 12.1 10/02/2023    HCT 37.9 10/02/2023     10/02/2023    CHOL 179 02/03/2023    TRIG 146 02/03/2023    HDL 58 02/03/2023    ALT 15 10/02/2023    AST 25 10/02/2023     10/02/2023    K 4.7 10/02/2023     10/02/2023    CREATININE 1.0 10/02/2023    BUN 16 10/02/2023    CO2 26 10/02/2023    TSH 1.345 02/03/2023       Current Outpatient Medications on File Prior to Visit   Medication Sig Dispense Refill    apixaban (ELIQUIS) 2.5 mg Tab Take 1 tablet (2.5 mg total) by mouth 2 (two) times daily. 180 tablet 3    azelastine (ASTELIN) 137 mcg (0.1 %) nasal spray 2 sprays (274 mcg total) by Nasal route 2 (two) times daily. 30 mL 11    calcium-vitamin D3 500 mg(1,250mg) -200 unit per tablet Take 1 tablet by mouth Daily.       carboxymethylcellulose (REFRESH PLUS) 0.5 % Dpet 1 drop 3 (three) times daily as needed.      diclofenac sodium (VOLTAREN) 1 % Gel Apply 2 g topically once daily. 150 g 2    hydrocortisone 2.5 % cream Apply topically 2 (two) times daily. 30 g 0    LORazepam (ATIVAN) 0.5 MG tablet Take 1 tablet (0.5 mg total) by mouth nightly as needed for Anxiety. 30 tablet 5    metoprolol succinate (TOPROL-XL) 25 MG 24 hr tablet Take 1 tablet (25 mg total) by mouth once daily. 90 tablet 3    omeprazole (PRILOSEC) 40 MG capsule Take 1 capsule (40 mg total) by mouth daily as needed (GERD symptoms). Take 1 capsule daily as needed for reflux symptoms. 45 capsule 1    ondansetron (ZOFRAN) 4 MG tablet Take 1 tablet (4 mg total) by mouth every 8 (eight) hours as needed for Nausea. 10 tablet 0    sodium chloride (SALINE NASAL) 0.65 % nasal spray 2 sprays by Nasal route as needed for Congestion. 44 mL 11    triamcinolone (NASACORT) 55 mcg nasal inhaler 2 sprays by Nasal route 2 (two) times a day. 16.9 mL 11     albuterol (PROVENTIL/VENTOLIN HFA) 90 mcg/actuation inhaler Inhale 2 puffs into the lungs every 6 (six) hours as needed for Wheezing or Shortness of Breath (generic preferred). Rescue 18 g 3    clobetasol 0.05% (TEMOVATE) 0.05 % Oint Apply topically 2 (two) times daily. for 7 days 45 g 0    [DISCONTINUED] gabapentin (NEURONTIN) 300 MG capsule Take 2 capsules (600 mg total) by mouth every evening. (Patient not taking: Reported on 11/13/2023) 180 capsule 3    [DISCONTINUED] traMADoL (ULTRAM) 50 mg tablet Take 1 tablet (50 mg total) by mouth every 6 (six) hours as needed for Pain. (Patient not taking: Reported on 12/28/2023) 10 tablet 0     No current facility-administered medications on file prior to visit.         Assessment:   83 y.o. female with multiple co-morbid illnesses here to follow-up with PCP and continue work-up of chronic issues    Plan:     Problem List Items Addressed This Visit       Influenza A with pneumonia - Primary     Exam and presentation c/w PNA; pt tested positive for influenza A.  She appears moderately ill with some increased respiratory effort.  Duo-neb tx x1 done today in clinic.  Counseled pt that she is too far out from onset of sx to benefit from Tamiflu, but we could try giving her a Kenalog injection which should reduce the inflammation in her lungs contributing to the respiratory sx and may help her feel better faster.  She was counseled that her pneumonia is viral in etiology and antibiotics would not be of much benefit, but in the event her clinical course worsens I could Rx abx to treat presumed bacterial superinfection.  I will contact her in 2 days to make sure she's doing better.  Additionally, she has hand surgery scheduled for 1/5 for trigger finger release; she was counseled that if she's not 100% better by 1/3 then she should postpone the surgery. I also informed her that I would let her surgeon know in case she would rather reschedule regardless.         Relevant  Medications    triamcinolone acetonide injection 40 mg (Completed)    albuterol-ipratropium 2.5 mg-0.5 mg/3 mL nebulizer solution 3 mL (Completed)         Health Maintenance         Date Due Completion Date    RSV Vaccine (Age 60+ and Pregnant patients) (1 - 1-dose 60+ series) Never done ---    COVID-19 Vaccine (4 - 2023-24 season) 09/01/2023 1/5/2022    DEXA Scan 01/20/2025 1/20/2023    Lipid Panel 02/03/2028 2/3/2023    TETANUS VACCINE 11/12/2033 11/12/2023            Future Appointments   Date Time Provider Department Center   1/19/2024  8:00 AM Jose Medina PA-C [facility]   1/19/2024  2:15 PM Declan Rios MD [facility]   2/12/2024 11:00 AM Kaleb Sanchez MD [facility]   3/6/2024  1:15 PM Lisa Yeh MD [id number]         Follow up in about 2 months (around 2/28/2024). Total clinical care time was 40 min.    Joe Farley MD  [facility]

## 2023-12-30 ENCOUNTER — PATIENT MESSAGE (OUTPATIENT)
Dept: ORTHOPEDICS | Facility: CLINIC | Age: 83
End: 2023-12-30
Payer: MEDICARE

## 2023-12-30 ENCOUNTER — PATIENT MESSAGE (OUTPATIENT)
Dept: PRIMARY CARE CLINIC | Facility: CLINIC | Age: 83
End: 2023-12-30
Payer: MEDICARE

## 2023-12-30 DIAGNOSIS — R05.9 COUGH, UNSPECIFIED TYPE: ICD-10-CM

## 2023-12-30 DIAGNOSIS — J18.9 COMMUNITY ACQUIRED PNEUMONIA OF RIGHT LOWER LOBE OF LUNG: Primary | ICD-10-CM

## 2023-12-30 RX ORDER — ALBUTEROL SULFATE 90 UG/1
2 AEROSOL, METERED RESPIRATORY (INHALATION) EVERY 6 HOURS PRN
Qty: 18 G | Refills: 3 | Status: SHIPPED | OUTPATIENT
Start: 2023-12-30

## 2023-12-30 RX ORDER — LEVOFLOXACIN 500 MG/1
500 TABLET, FILM COATED ORAL DAILY
Qty: 5 TABLET | Refills: 0 | Status: SHIPPED | OUTPATIENT
Start: 2023-12-30 | End: 2024-01-04

## 2023-12-30 NOTE — TELEPHONE ENCOUNTER
D/w pt's daughter Tori regarding her condition, which does not seem to be any better.  Will send in Rx for Levaquin 500mg daily x 5d, and refill her albuterol inhaler as requested as well.  She also expresses concern about her mother's hydration status and is inquiring about ability to get IVF administered.  She was advised that all our outpatient services will be closed through the New Year holiday until Tuesday the 2nd, and I offered to have her come in for recheck and IVF can be administered if warranted.  I provided her my cell phone number so that she could call me in event of any change in her clinical status, and advised aggressive oral hydration for the time being.

## 2024-01-02 ENCOUNTER — TELEPHONE (OUTPATIENT)
Dept: PRIMARY CARE CLINIC | Facility: CLINIC | Age: 84
End: 2024-01-02
Payer: MEDICARE

## 2024-01-02 ENCOUNTER — PATIENT MESSAGE (OUTPATIENT)
Dept: PRIMARY CARE CLINIC | Facility: CLINIC | Age: 84
End: 2024-01-02
Payer: MEDICARE

## 2024-01-02 ENCOUNTER — OFFICE VISIT (OUTPATIENT)
Dept: INTERNAL MEDICINE | Facility: CLINIC | Age: 84
End: 2024-01-02
Payer: MEDICARE

## 2024-01-02 ENCOUNTER — OFFICE VISIT (OUTPATIENT)
Dept: URGENT CARE | Facility: CLINIC | Age: 84
End: 2024-01-02
Payer: MEDICARE

## 2024-01-02 VITALS
HEART RATE: 78 BPM | BODY MASS INDEX: 23.02 KG/M2 | TEMPERATURE: 97 F | OXYGEN SATURATION: 98 % | DIASTOLIC BLOOD PRESSURE: 66 MMHG | WEIGHT: 114.19 LBS | HEIGHT: 59 IN | RESPIRATION RATE: 18 BRPM | SYSTOLIC BLOOD PRESSURE: 144 MMHG

## 2024-01-02 DIAGNOSIS — J09.X1 INFLUENZA A WITH PNEUMONIA: Primary | ICD-10-CM

## 2024-01-02 PROCEDURE — 1160F RVW MEDS BY RX/DR IN RCRD: CPT | Mod: HCNC,CPTII,S$GLB, | Performed by: FAMILY MEDICINE

## 2024-01-02 PROCEDURE — 99999 PR PBB SHADOW E&M-EST. PATIENT-LVL V: CPT | Mod: PBBFAC,HCNC,, | Performed by: FAMILY MEDICINE

## 2024-01-02 PROCEDURE — 1157F ADVNC CARE PLAN IN RCRD: CPT | Mod: HCNC,CPTII,S$GLB, | Performed by: FAMILY MEDICINE

## 2024-01-02 PROCEDURE — 3078F DIAST BP <80 MM HG: CPT | Mod: HCNC,CPTII,S$GLB, | Performed by: FAMILY MEDICINE

## 2024-01-02 PROCEDURE — 3288F FALL RISK ASSESSMENT DOCD: CPT | Mod: HCNC,CPTII,S$GLB, | Performed by: FAMILY MEDICINE

## 2024-01-02 PROCEDURE — 99214 OFFICE O/P EST MOD 30 MIN: CPT | Mod: HCNC,S$GLB,, | Performed by: FAMILY MEDICINE

## 2024-01-02 PROCEDURE — 3077F SYST BP >= 140 MM HG: CPT | Mod: HCNC,CPTII,S$GLB, | Performed by: FAMILY MEDICINE

## 2024-01-02 PROCEDURE — 1126F AMNT PAIN NOTED NONE PRSNT: CPT | Mod: HCNC,CPTII,S$GLB, | Performed by: FAMILY MEDICINE

## 2024-01-02 PROCEDURE — 1101F PT FALLS ASSESS-DOCD LE1/YR: CPT | Mod: HCNC,CPTII,S$GLB, | Performed by: FAMILY MEDICINE

## 2024-01-02 PROCEDURE — 1159F MED LIST DOCD IN RCRD: CPT | Mod: HCNC,CPTII,S$GLB, | Performed by: FAMILY MEDICINE

## 2024-01-02 NOTE — TELEPHONE ENCOUNTER
----- Message from Joe Farley MD sent at 12/30/2023  5:46 PM CST -----  I messaged w/ her daughter Cally today who expresses concern about her lack of improvement as well as hydration status.  I advised her on aggressive oral hydration and sent in some prescriptions as well.  I also told her that we won't be open again until Tuesday the 2nd, but we could fit her in first thing in the morning to reassess her and give fluids if needed.

## 2024-01-02 NOTE — PROGRESS NOTES
Subjective     Patient ID: Laverne Humphries is a 83 y.o. female.    Chief Complaint: Pneumonia (/), flu positive (Tested + last week ), Chills (/), and Extremity Weakness  83-year-old white female patient of Dr. James but new patient to me presents to clinic today secondary to symptoms of continued weakness and fatigue secondary to influenza with pneumonia.  The patient has had symptoms for over a week and was seen in clinic by Dr. Farley on December 28th.  The patient was outside the window for treatment with Tamiflu; therefore, the patient was given a steroid injection and albuterol for further treatment.  The patient was later started on Levaquin secondary to a secondary bacterial infection and has noted since starting the Levaquin she has noticed improvement in her breathing and coughing.  She continues to note mild weakness but has been attempting to stay hydrated with water and Pedialyte.  Pneumonia  There is no cough or shortness of breath. Pertinent negatives include no appetite change, chest pain, ear pain, fever, headaches, myalgias, postnasal drip, rhinorrhea or sore throat.   Extremity Weakness   Pertinent negatives include no fever.     Review of Systems   Constitutional:  Negative for appetite change, chills, fatigue and fever.   HENT:  Negative for nasal congestion, ear pain, hearing loss, postnasal drip, rhinorrhea, sinus pressure/congestion, sore throat and tinnitus.    Eyes:  Negative for redness, itching and visual disturbance.   Respiratory:  Negative for cough, chest tightness and shortness of breath.    Cardiovascular:  Negative for chest pain and palpitations.   Gastrointestinal:  Negative for abdominal pain, constipation, diarrhea, nausea and vomiting.   Genitourinary:  Negative for decreased urine volume, difficulty urinating, dysuria, frequency, hematuria and urgency.   Musculoskeletal:  Positive for extremity weakness. Negative for back pain, myalgias, neck pain and neck stiffness.    Integumentary:  Negative for rash.   Neurological:  Positive for weakness. Negative for dizziness, light-headedness and headaches.   Psychiatric/Behavioral: Negative.            Objective     Physical Exam  Vitals and nursing note reviewed.   Constitutional:       General: She is not in acute distress.     Appearance: She is well-developed. She is not diaphoretic.   HENT:      Head: Normocephalic and atraumatic.      Right Ear: External ear normal.      Left Ear: External ear normal.      Nose: Nose normal.      Mouth/Throat:      Mouth: Mucous membranes are dry.      Pharynx: No oropharyngeal exudate.   Eyes:      General: No scleral icterus.        Right eye: No discharge.         Left eye: No discharge.      Conjunctiva/sclera: Conjunctivae normal.      Pupils: Pupils are equal, round, and reactive to light.   Neck:      Thyroid: No thyromegaly.      Vascular: No JVD.      Trachea: No tracheal deviation.   Cardiovascular:      Rate and Rhythm: Normal rate and regular rhythm.      Heart sounds: Normal heart sounds. No murmur heard.     No friction rub. No gallop.   Pulmonary:      Effort: Pulmonary effort is normal. No respiratory distress.      Breath sounds: No stridor. Examination of the right-lower field reveals rales. Examination of the left-lower field reveals rales. Rales present. No wheezing.   Abdominal:      General: Bowel sounds are normal. There is no distension.      Palpations: Abdomen is soft. There is no mass.      Tenderness: There is no abdominal tenderness. There is no guarding or rebound.   Musculoskeletal:         General: No tenderness. Normal range of motion.      Cervical back: Normal range of motion and neck supple.   Lymphadenopathy:      Cervical: No cervical adenopathy.   Skin:     General: Skin is warm and dry.      Coloration: Skin is not pale.      Findings: No erythema or rash.   Neurological:      Mental Status: She is alert and oriented to person, place, and time.   Psychiatric:          Behavior: Behavior normal.         Thought Content: Thought content normal.         Judgment: Judgment normal.            Assessment and Plan     1. Influenza A with pneumonia        1. I recommend continued treatment with Levaquin, albuterol, Zofran, and cough medication as needed.    2. Encourage aggressive oral hydration with water, soups, Pedialyte, Gatorade, and Powerade.    3. Return to clinic or proceed to the emergency room if symptoms continue to persist or worsen.               Follow up if symptoms worsen or fail to improve.

## 2024-01-02 NOTE — TELEPHONE ENCOUNTER
Spoke to daughter and to patient on conference call pt is feeling better after starting abx, still having some weakness but overall has been feeling better than before. Pt did not want to be seen today, she was ok with waiting until Monday (they thought they had an appointment already but apt was for sister) booked pt for phone call tomorrow and in person eval on Friday.

## 2024-01-03 ENCOUNTER — OFFICE VISIT (OUTPATIENT)
Dept: PRIMARY CARE CLINIC | Facility: CLINIC | Age: 84
End: 2024-01-03
Payer: MEDICARE

## 2024-01-03 DIAGNOSIS — R53.1 GENERALIZED WEAKNESS: Primary | ICD-10-CM

## 2024-01-03 PROCEDURE — 99499 UNLISTED E&M SERVICE: CPT | Mod: HCNC,95,, | Performed by: HOSPITALIST

## 2024-01-03 NOTE — TELEPHONE ENCOUNTER
Joe Farley MD   to Me  Nurse Erika   1/3/24 10:59 AM   I discussed w/ her last night that based on how she was looking at that appointment yesterday she doesn't need IV fluids at this time and recommended aggressively pushing oral hydration.  I will reinforce this with patient on call today.  Thanks everyone.

## 2024-01-03 NOTE — TELEPHONE ENCOUNTER
Cally Reina (proxy for Laverne Humphries)   to JARETT Barfield Staff (supporting Carolyne James MD)         1/2/24  5:42 PM  Davion,  I understand that both Yareli and Dr Farley are out sick. I brought my mom to see another md at the [place] [facility] today because she is still feeling weak and has no appetite.  He said that although her vitals and air flow are okay, she was dehydrated and will likely need fluids. We went to urgent care bc they told me that they would give fluids upon check in and after being assessed, but then we waited and they said that they dont provide fluids to those who are 80 bc of the risk of CHF due to a lack of a meter.      Dr Farley: You can try calling Davion in the morning to get her an urgent care appointment with somebody who could see her and give fluids it seems like thats what shes going to need

## 2024-01-03 NOTE — PROGRESS NOTES
Established Patient - Audio Only Telehealth Visit     The patient location is: home  The chief complaint leading to consultation is: persistent sx  Visit type: Virtual visit with audio only (telephone)  Total time spent with patient: 10 min       The reason for the audio only service rather than synchronous audio and video virtual visit was related to technical difficulties or patient preference/necessity.     Each patient to whom I provide medical services by telemedicine is:  (1) informed of the relationship between the physician and patient and the respective role of any other health care provider with respect to management of the patient; and (2) notified that they may decline to receive medical services by telemedicine and may withdraw from such care at any time. Patient verbally consented to receive this service via voice-only telephone call.       HPI: Pt seen yesterday at  visit at [facility]; told that her lungs sound better and she is improving w/ resolution of respiratory sx.  Aggressive oral hydration recommended, which she has been doing since then and feels a little better today but still somewhat weak.  She is also taking chicken broth but appetite remains poor.  Reassured that loss of appetite is normal w/ an illness like this and that she should make an effort to eat a little something throughout the day to sustain herself while still trying to imbibe as much liquids as possible.  She promises to do just that.  She has in in-person appt w/ me in 2 days and will reassess her progress then.                             This service was not originating from a related E/M service provided within the previous 7 days nor will  to an E/M service or procedure within the next 24 hours or my soonest available appointment.  Prevailing standard of care was able to be met in this audio-only visit.

## 2024-01-03 NOTE — TELEPHONE ENCOUNTER
Joi Hernandes [facility] Staff  Caller: Cally/Daughter [phone] (Today, 10:12 AM)  Pt daughter was told to call and see where the pt can go or who she can see for IV fluids.    Please call

## 2024-01-03 NOTE — TELEPHONE ENCOUNTER
Aida Trejo Carolyne Staff  Caller: [phone] (Yesterday,  4:06 PM)  Patient was seen at [facility] clinic and suggested that patient get IV fluids at ED or Urgent care patient would prefer to come to Dr. BOO for fluids. Please call daughter @ [phone] and advise.

## 2024-01-05 ENCOUNTER — PATIENT MESSAGE (OUTPATIENT)
Dept: PRIMARY CARE CLINIC | Facility: CLINIC | Age: 84
End: 2024-01-05
Payer: MEDICARE

## 2024-01-19 ENCOUNTER — OFFICE VISIT (OUTPATIENT)
Dept: OTOLARYNGOLOGY | Facility: CLINIC | Age: 84
End: 2024-01-19
Payer: MEDICARE

## 2024-01-19 DIAGNOSIS — H61.23 IMPACTED CERUMEN, BILATERAL: Primary | ICD-10-CM

## 2024-01-19 PROCEDURE — 99499 UNLISTED E&M SERVICE: CPT | Mod: HCNC,S$GLB,, | Performed by: OTOLARYNGOLOGY

## 2024-01-19 PROCEDURE — 69210 REMOVE IMPACTED EAR WAX UNI: CPT | Mod: HCNC,S$GLB,, | Performed by: OTOLARYNGOLOGY

## 2024-01-19 PROCEDURE — 99999 PR PBB SHADOW E&M-EST. PATIENT-LVL III: CPT | Mod: PBBFAC,HCNC,, | Performed by: OTOLARYNGOLOGY

## 2024-01-19 NOTE — PROCEDURES
Ear Cerumen Removal    Date/Time: 1/19/2024 2:15 PM    Performed by: Declan Rios MD  Authorized by: Declan Rios MD    Consent Done?:  Yes (Verbal)  Location details:  Both ears  Procedure type: curette    Cerumen  Removal Results:  Cerumen completely removed  Patient tolerance:  Patient tolerated the procedure well with no immediate complications

## 2024-01-23 ENCOUNTER — TELEPHONE (OUTPATIENT)
Dept: PRIMARY CARE CLINIC | Facility: CLINIC | Age: 84
End: 2024-01-23
Payer: MEDICARE

## 2024-01-23 NOTE — TELEPHONE ENCOUNTER
----- Message from Davion Garza MA sent at 2023 10:03 AM CST -----  Regardin month follow up  Pt needs apt March-April

## 2024-02-12 PROBLEM — Z00.00 ENCOUNTER FOR PREVENTIVE HEALTH EXAMINATION: Chronic | Status: RESOLVED | Noted: 2023-11-13 | Resolved: 2024-02-12

## 2024-02-19 ENCOUNTER — OFFICE VISIT (OUTPATIENT)
Dept: ALLERGY | Facility: CLINIC | Age: 84
End: 2024-02-19
Payer: MEDICARE

## 2024-02-19 VITALS — BODY MASS INDEX: 23.92 KG/M2 | WEIGHT: 118.63 LBS | HEIGHT: 59 IN

## 2024-02-19 DIAGNOSIS — R05.9 COUGH, UNSPECIFIED TYPE: Primary | ICD-10-CM

## 2024-02-19 DIAGNOSIS — R05.9 COUGH, UNSPECIFIED TYPE: ICD-10-CM

## 2024-02-19 DIAGNOSIS — J30.9 ALLERGIC RHINITIS, UNSPECIFIED SEASONALITY, UNSPECIFIED TRIGGER: ICD-10-CM

## 2024-02-19 PROCEDURE — 1157F ADVNC CARE PLAN IN RCRD: CPT | Mod: HCNC,CPTII,S$GLB, | Performed by: ALLERGY & IMMUNOLOGY

## 2024-02-19 PROCEDURE — 1159F MED LIST DOCD IN RCRD: CPT | Mod: HCNC,CPTII,S$GLB, | Performed by: ALLERGY & IMMUNOLOGY

## 2024-02-19 PROCEDURE — 99214 OFFICE O/P EST MOD 30 MIN: CPT | Mod: HCNC,S$GLB,, | Performed by: ALLERGY & IMMUNOLOGY

## 2024-02-19 PROCEDURE — 1126F AMNT PAIN NOTED NONE PRSNT: CPT | Mod: HCNC,CPTII,S$GLB, | Performed by: ALLERGY & IMMUNOLOGY

## 2024-02-19 PROCEDURE — 99999 PR PBB SHADOW E&M-EST. PATIENT-LVL III: CPT | Mod: PBBFAC,HCNC,, | Performed by: ALLERGY & IMMUNOLOGY

## 2024-02-19 RX ORDER — FLUTICASONE FUROATE 100 UG/1
1 POWDER RESPIRATORY (INHALATION) DAILY
Qty: 1 EACH | Refills: 6 | Status: SHIPPED | OUTPATIENT
Start: 2024-02-19 | End: 2024-04-18

## 2024-02-19 RX ORDER — FLUTICASONE PROPIONATE 220 UG/1
1 AEROSOL, METERED RESPIRATORY (INHALATION) 2 TIMES DAILY
Qty: 12 G | Refills: 1 | Status: SHIPPED | OUTPATIENT
Start: 2024-02-19 | End: 2024-02-19

## 2024-02-19 RX ORDER — BENZONATATE 200 MG/1
200 CAPSULE ORAL 3 TIMES DAILY PRN
Qty: 60 CAPSULE | Refills: 3 | Status: SHIPPED | OUTPATIENT
Start: 2024-02-19 | End: 2024-03-06

## 2024-02-19 NOTE — PROGRESS NOTES
Subjective:       Patient ID: Laverne Humphries is a 83 y.o. female.    Chief Complaint:    cough    LV 7/20/23    HPI:     Pt w x allergic rhinitis, GERD, recurrent cough presents w concern of cough that has continued since she recovered from an influenza infection and pneumonia in December. S/p levaquin. Albuterol not always helpful. Continues PPI and flonase and astelin. No fever.    Hx from 7/20/23:  Pt presents w concern of increased cough, rhinorrhea x 3 weeks. Also w sensation of thick post nasal drip.  No fever. No known sick contact.  No improvement w albuterol. No improvement w tessalon perles or dextromethorophan.  Hasn't been using astelin or nasacort  Is on pepcid for gerd. Stopped PPI per GI    Hx from 11/10/22  80 yo female with a history of allergic rhinitis, GERD, and chronic/recurrent cough.  Skin test in March 2013 w multiple positives, as below.  Cough and rhinitis had been well controlled since LV until about the last 2 weeks, during which cough, post-nasal drip, and reflux sx's have increased. Notes eating sweets and supine position aggravate cough. It has been years since she was on PPI.   Has also noted epistaxis w flonase rescently.        Environmental History:   Pets in the home: none.    Moon: tile or linoleum floors   Climate Control: central or room air conditioning   Dust Mite Controls: Dust mite controls are already in place.   Tobacco Smoke in Home: no     Review of Systems   Constitutional: Negative for fever, chills, appetite change, fatigue and unexpected weight change.   HENT: rhinorrhea  Eyes: Negative for pain, discharge, redness and itching.   Respiratory: Positive for cough. Negative for chest tightness, shortness of breath and wheezing.    Cardiovascular: Negative for chest pain, palpitations and leg swelling.   Gastrointestinal: Negative for nausea, vomiting, abdominal pain, diarrhea, constipation, blood in stool and abdominal distention.   Genitourinary: Negative for  dysuria, urgency, frequency and difficulty urinating.   Musculoskeletal: back pain w cough  Skin: Negative for color change, pallor, rash and wound.   Neurological: Negative for dizziness, seizures, light-headedness, numbness and headaches.   Hematological: Negative for adenopathy. Does not bruise/bleed easily.   Psychiatric/Behavioral: Negative for sleep disturbance and dysphoric mood. The patient is not nervous/anxious.         Objective:        Physical Exam  Constitutional:       Appearance: She is well-developed.   HENT:      Head: Normocephalic and atraumatic.      Right Ear: External ear normal.      Left Ear: External ear normal.   Eyes:      General: No scleral icterus.        Right eye: No discharge.         Left eye: No discharge.   Neck:      Thyroid: No thyromegaly.   Cardiovascular:      Rate and Rhythm: Regular rhythm.      Heart sounds: Normal heart sounds.   Pulmonary:      Effort: Pulmonary effort is normal. No respiratory distress.      Breath sounds: Normal breath sounds. No stridor. No wheezing.   Abdominal:      General: Bowel sounds are normal. There is no distension.      Palpations: Abdomen is soft.      Tenderness: There is no abdominal tenderness.   Musculoskeletal:         General: Normal range of motion.      Cervical back: Neck supple.   Lymphadenopathy:      Cervical: No cervical adenopathy.   Skin:     General: Skin is warm.      Findings: No erythema or rash. Rash is not urticarial.   Neurological:      Mental Status: She is alert and oriented to person, place, and time.   Psychiatric:         Behavior: Behavior normal.         Laboratory:     3/13/13   Inhalant Skin Testing   4+ Dust mites (D.p. And D.f.)   3+ White francesca, Mixed birch, Box elder, Bald cypress   2+ Cat, Dog, American elm, Hackberry, Red maple, Red mulberry, Mixed oak, Black willow, Lambs quarter, Marsh elder, Bahia grass, Bermuda grass, Jared grass, Kaleb grass   1+ Cockroach, Red cedar, Eastern cottonwood,  Mugwort, Rough pigweed, English plantain, Mixed ragweed, Russian thistle, Acremonium, Alternaria, Epicoccum, Fusarium, Pullularia, Rhizopus, Rhodotorula  With adequate histamine and saline controls             Assessment:     Allergic rhinitis, chronic  2.  Cough, consider post infectious    Plan:     1.  Nasacort 2 sen twice daily  2. Astelin 2 sen twice daily  3. Albuterol prn  4. Flovent 220, 2 puffs twice daily. Routinely x 1 mo  5. Tessalon perles prn  Fu if no improvement

## 2024-02-21 DIAGNOSIS — I48.0 PAROXYSMAL ATRIAL FIBRILLATION: ICD-10-CM

## 2024-02-21 RX ORDER — METOPROLOL SUCCINATE 25 MG/1
25 TABLET, EXTENDED RELEASE ORAL
Qty: 90 TABLET | Refills: 3 | Status: SHIPPED | OUTPATIENT
Start: 2024-02-21 | End: 2024-03-06 | Stop reason: SDUPTHER

## 2024-02-28 DIAGNOSIS — Z79.01 CHRONIC ANTICOAGULATION: ICD-10-CM

## 2024-02-28 DIAGNOSIS — I48.0 PAROXYSMAL ATRIAL FIBRILLATION: ICD-10-CM

## 2024-02-28 RX ORDER — APIXABAN 2.5 MG/1
2.5 TABLET, FILM COATED ORAL 2 TIMES DAILY
Qty: 180 TABLET | Refills: 3 | Status: SHIPPED | OUTPATIENT
Start: 2024-02-28 | End: 2024-03-06 | Stop reason: SDUPTHER

## 2024-03-06 ENCOUNTER — OFFICE VISIT (OUTPATIENT)
Dept: CARDIOLOGY | Facility: CLINIC | Age: 84
End: 2024-03-06
Attending: INTERNAL MEDICINE
Payer: MEDICARE

## 2024-03-06 VITALS
HEART RATE: 76 BPM | WEIGHT: 120.13 LBS | HEIGHT: 59 IN | DIASTOLIC BLOOD PRESSURE: 68 MMHG | OXYGEN SATURATION: 99 % | BODY MASS INDEX: 24.22 KG/M2 | SYSTOLIC BLOOD PRESSURE: 138 MMHG

## 2024-03-06 DIAGNOSIS — Z79.01 CHRONIC ANTICOAGULATION: ICD-10-CM

## 2024-03-06 DIAGNOSIS — I48.0 PAROXYSMAL ATRIAL FIBRILLATION: ICD-10-CM

## 2024-03-06 DIAGNOSIS — I70.0 ATHEROSCLEROSIS OF AORTA: ICD-10-CM

## 2024-03-06 PROCEDURE — 1160F RVW MEDS BY RX/DR IN RCRD: CPT | Mod: HCNC,CPTII,S$GLB, | Performed by: INTERNAL MEDICINE

## 2024-03-06 PROCEDURE — 1126F AMNT PAIN NOTED NONE PRSNT: CPT | Mod: HCNC,CPTII,S$GLB, | Performed by: INTERNAL MEDICINE

## 2024-03-06 PROCEDURE — 1159F MED LIST DOCD IN RCRD: CPT | Mod: HCNC,CPTII,S$GLB, | Performed by: INTERNAL MEDICINE

## 2024-03-06 PROCEDURE — 3288F FALL RISK ASSESSMENT DOCD: CPT | Mod: HCNC,CPTII,S$GLB, | Performed by: INTERNAL MEDICINE

## 2024-03-06 PROCEDURE — 99214 OFFICE O/P EST MOD 30 MIN: CPT | Mod: HCNC,25,S$GLB, | Performed by: INTERNAL MEDICINE

## 2024-03-06 PROCEDURE — 3078F DIAST BP <80 MM HG: CPT | Mod: HCNC,CPTII,S$GLB, | Performed by: INTERNAL MEDICINE

## 2024-03-06 PROCEDURE — 99999 PR PBB SHADOW E&M-EST. PATIENT-LVL III: CPT | Mod: PBBFAC,HCNC,, | Performed by: INTERNAL MEDICINE

## 2024-03-06 PROCEDURE — 93000 ELECTROCARDIOGRAM COMPLETE: CPT | Mod: HCNC,S$GLB,, | Performed by: INTERNAL MEDICINE

## 2024-03-06 PROCEDURE — 1157F ADVNC CARE PLAN IN RCRD: CPT | Mod: HCNC,CPTII,S$GLB, | Performed by: INTERNAL MEDICINE

## 2024-03-06 PROCEDURE — 3075F SYST BP GE 130 - 139MM HG: CPT | Mod: HCNC,CPTII,S$GLB, | Performed by: INTERNAL MEDICINE

## 2024-03-06 PROCEDURE — 93005 ELECTROCARDIOGRAM TRACING: CPT | Mod: HCNC

## 2024-03-06 PROCEDURE — 1101F PT FALLS ASSESS-DOCD LE1/YR: CPT | Mod: HCNC,CPTII,S$GLB, | Performed by: INTERNAL MEDICINE

## 2024-03-06 RX ORDER — METOPROLOL SUCCINATE 25 MG/1
25 TABLET, EXTENDED RELEASE ORAL DAILY
Qty: 90 TABLET | Refills: 3 | Status: SHIPPED | OUTPATIENT
Start: 2024-03-06

## 2024-03-06 NOTE — PROGRESS NOTES
Subjective:     Laverne Humphries is a 83 y.o. female with no history of any cardiac issues. She has had diverticulitis and developed a colovaginal fistula. She came in with plans for elective surgery on 4/1/2022. She was noted to be in atrial fibrillation with a ventricular response rate of about 120-140 bpm. She denied any palpitations, chest pain or shortness of breath. She said she had fair exercise tolerance. It was unclear for how long she had been in atrial fibrillation. An electrocardiogram from 2019 revealed she was in sinus rhythm at that time. She was prescribed metoprolol 25 mg Q12. The surgery got rescheduled for 4/26/2022.  She had a computed tomography scan of her abdomen in 2021 that revealed mild atherosclerosis. No exertional shortness of breath. No palpitations or weak spells. No bleeding. No issues with any of her prescribed medications. Feeling well overall.      Atrial Fibrillation  Presents for follow-up visit. Symptoms are negative for bradycardia, chest pain, dizziness, hemodynamic instability, hypertension, hypotension, palpitations, shortness of breath, syncope, tachycardia and weakness. The symptoms have been stable.       Review of Systems   Constitutional: Negative for chills, fever and malaise/fatigue.   HENT:  Negative for nosebleeds and tinnitus.    Eyes:  Negative for double vision, vision loss in left eye and vision loss in right eye.   Cardiovascular:  Negative for chest pain, claudication, dyspnea on exertion, irregular heartbeat, leg swelling, near-syncope, orthopnea, palpitations, paroxysmal nocturnal dyspnea and syncope.   Respiratory:  Negative for cough, hemoptysis, shortness of breath and wheezing.    Endocrine: Negative for cold intolerance and heat intolerance.   Hematologic/Lymphatic: Negative for bleeding problem. Does not bruise/bleed easily.   Skin:  Negative for color change and rash.   Musculoskeletal:  Negative for back pain, falls, muscle weakness and myalgias.    Gastrointestinal:  Negative for abdominal pain, diarrhea, dysphagia, heartburn, hematemesis, hematochezia, hemorrhoids, jaundice, melena, nausea and vomiting.   Genitourinary:  Negative for dysuria and hematuria.   Neurological:  Negative for dizziness, focal weakness, headaches, light-headedness, loss of balance, numbness, tremors, vertigo and weakness.   Psychiatric/Behavioral:  Negative for altered mental status, depression and memory loss. The patient is not nervous/anxious.    Allergic/Immunologic: Negative for hives and persistent infections.       Current Outpatient Medications on File Prior to Visit   Medication Sig Dispense Refill    albuterol (PROVENTIL/VENTOLIN HFA) 90 mcg/actuation inhaler Inhale 2 puffs into the lungs every 6 (six) hours as needed for Wheezing or Shortness of Breath (generic preferred). Rescue 18 g 3    azelastine (ASTELIN) 137 mcg (0.1 %) nasal spray 2 sprays (274 mcg total) by Nasal route 2 (two) times daily. 30 mL 11    ELIQUIS 2.5 mg Tab TAKE 1 TABLET TWICE DAILY 180 tablet 3    fluticasone furoate (ARNUITY ELLIPTA) 100 mcg/actuation inhaler Inhale 1 puff into the lungs once daily. 1 each 6    LORazepam (ATIVAN) 0.5 MG tablet Take 1 tablet (0.5 mg total) by mouth nightly as needed for Anxiety. 30 tablet 5    metoprolol succinate (TOPROL-XL) 25 MG 24 hr tablet TAKE 1 TABLET BY MOUTH EVERY DAY 90 tablet 3    benzonatate (TESSALON) 200 MG capsule Take 1 capsule (200 mg total) by mouth 3 (three) times daily as needed for Cough. (Patient not taking: Reported on 3/6/2024) 60 capsule 3    calcium-vitamin D3 500 mg(1,250mg) -200 unit per tablet Take 1 tablet by mouth Daily.       carboxymethylcellulose (REFRESH PLUS) 0.5 % Dpet 1 drop 3 (three) times daily as needed.      clobetasol 0.05% (TEMOVATE) 0.05 % Oint Apply topically 2 (two) times daily. for 7 days 45 g 0    diclofenac sodium (VOLTAREN) 1 % Gel Apply 2 g topically once daily. (Patient not taking: Reported on 2/19/2024) 150 g 2  "   hydrocortisone 2.5 % cream Apply topically 2 (two) times daily. (Patient not taking: Reported on 2/19/2024) 30 g 0    omeprazole (PRILOSEC) 40 MG capsule Take 1 capsule (40 mg total) by mouth daily as needed (GERD symptoms). Take 1 capsule daily as needed for reflux symptoms. (Patient not taking: Reported on 2/19/2024) 45 capsule 1    ondansetron (ZOFRAN) 4 MG tablet Take 1 tablet (4 mg total) by mouth every 8 (eight) hours as needed for Nausea. (Patient not taking: Reported on 2/19/2024) 10 tablet 0    sodium chloride (SALINE NASAL) 0.65 % nasal spray 2 sprays by Nasal route as needed for Congestion. (Patient not taking: Reported on 2/19/2024) 44 mL 11    traMADoL (ULTRAM) 50 mg tablet Take 1 tablet (50 mg total) by mouth every 6 (six) hours as needed for Pain. (Patient not taking: Reported on 2/19/2024) 10 tablet 0    triamcinolone (NASACORT) 55 mcg nasal inhaler 2 sprays by Nasal route 2 (two) times a day. (Patient not taking: Reported on 2/19/2024) 16.9 mL 11     No current facility-administered medications on file prior to visit.       /68   Pulse 76   Ht 4' 11" (1.499 m)   Wt 54.5 kg (120 lb 2.4 oz)   LMP  (LMP Unknown)   SpO2 99%   BMI 24.27 kg/m²     Objective:     Physical Exam  Constitutional:       General: She is not in acute distress.     Appearance: Normal appearance. She is well-developed. She is not toxic-appearing or diaphoretic.   HENT:      Head: Normocephalic and atraumatic.      Nose: Nose normal.   Eyes:      General:         Right eye: No discharge.         Left eye: No discharge.      Conjunctiva/sclera:      Right eye: Right conjunctiva is not injected.      Left eye: Left conjunctiva is not injected.      Pupils: Pupils are equal.      Right eye: Pupil is round.      Left eye: Pupil is round.   Neck:      Thyroid: No thyromegaly.      Vascular: No carotid bruit or JVD.   Cardiovascular:      Rate and Rhythm: Normal rate and regular rhythm. No extrasystoles are present.     " Chest Wall: PMI is not displaced.      Pulses:           Radial pulses are 2+ on the right side and 2+ on the left side.        Femoral pulses are 2+ on the right side and 2+ on the left side.       Dorsalis pedis pulses are 2+ on the right side and 2+ on the left side.        Posterior tibial pulses are 2+ on the right side and 2+ on the left side.      Heart sounds: S1 normal and S2 normal. Murmur heard.      High-pitched blowing holosystolic murmur is present with a grade of 2/6 at the apex.      No gallop.   Pulmonary:      Effort: Pulmonary effort is normal.      Breath sounds: Normal breath sounds.   Abdominal:      Palpations: Abdomen is soft.      Tenderness: There is no abdominal tenderness.   Musculoskeletal:      Cervical back: Neck supple.      Right lower leg: Normal. No swelling. No edema.      Left lower leg: Normal. No swelling. No edema.   Lymphadenopathy:      Head:      Right side of head: No submandibular adenopathy.      Left side of head: No submandibular adenopathy.      Cervical: No cervical adenopathy.   Skin:     General: Skin is warm and dry.      Findings: No rash.      Nails: There is no clubbing.   Neurological:      General: No focal deficit present.      Mental Status: She is alert and oriented to person, place, and time. She is not disoriented.      Cranial Nerves: No cranial nerve deficit.   Psychiatric:         Attention and Perception: Attention and perception normal.         Mood and Affect: Mood and affect normal.         Speech: Speech normal.         Behavior: Behavior normal.         Thought Content: Thought content normal.         Cognition and Memory: Cognition and memory normal.         Judgment: Judgment normal.         Assessment:     1. Paroxysmal atrial fibrillation    2. Chronic anticoagulation    3. Atherosclerosis of aorta        Plan:     1. Atrial Fibrillation               4/1/2022: Diagnosed with paroxysmal atrial fibrillation.               Fast VRR.  Asymptomatic.              [id number]=2 (A2VSc).   4/1/2022: Echo: Normal left ventricular size and systolic function. Mildly sigmoid septum. EF 65%. AF. Moderate aortic valve sclerosis. Mild AR. Moderate MR.   4/5/2022: Metoprolol 25 mg Q24 was changed to metoprolol 50 mg Q24. The dose was later reduced to metoprolol 25 mg Q24.   5/12/2022: Apixiban 2.5 mg Q12 was begun.   On apixiban 2.5 mg Q12.   On metoprolol 25 mg Q24.  No clinical recurrence but never had any symptoms.     2. Chronic Anticoagulation              4/1/2022: Diagnosed with paroxysmal atrial fibrillation.               [id number] (A2VSc).   5/12/2022: Apixiban 2.5 mg Q12 was begun.   On apixiban 2.5 mg Q12.   No aspirin or NSAID.   No bleeding.     3. Atherosclerosis of Aorta    11/10/2021: CT abdomen: Mild scattered calcific atherosclerosis.    On rosuvastatin 5 mg Q24.   No aspirin as anticoagulated.               4. Colovaginal Fistula              4/1/2022: Plan was surgery.   4/26/2022: Underwent surgery.              Dr. Alison Mcclain.    5. Primary Care              Dr. Carolyne James.     F/u 6 months.    Lisa Yeh M.D.

## 2024-03-07 LAB
OHS QRS DURATION: 70 MS
OHS QTC CALCULATION: 428 MS

## 2024-04-01 PROBLEM — J09.X1 INFLUENZA A WITH PNEUMONIA: Status: RESOLVED | Noted: 2023-12-28 | Resolved: 2024-04-01

## 2024-04-11 DIAGNOSIS — F41.1 GAD (GENERALIZED ANXIETY DISORDER): ICD-10-CM

## 2024-04-11 DIAGNOSIS — G47.00 INSOMNIA, UNSPECIFIED TYPE: ICD-10-CM

## 2024-04-11 RX ORDER — LORAZEPAM 0.5 MG/1
0.5 TABLET ORAL NIGHTLY PRN
Qty: 30 TABLET | Refills: 0 | Status: SHIPPED | OUTPATIENT
Start: 2024-04-11 | End: 2024-04-18 | Stop reason: SDUPTHER

## 2024-04-18 ENCOUNTER — OFFICE VISIT (OUTPATIENT)
Dept: PRIMARY CARE CLINIC | Facility: CLINIC | Age: 84
End: 2024-04-18
Payer: MEDICARE

## 2024-04-18 ENCOUNTER — LAB VISIT (OUTPATIENT)
Dept: LAB | Facility: HOSPITAL | Age: 84
End: 2024-04-18
Attending: INTERNAL MEDICINE
Payer: MEDICARE

## 2024-04-18 VITALS
HEIGHT: 60 IN | BODY MASS INDEX: 22.68 KG/M2 | SYSTOLIC BLOOD PRESSURE: 126 MMHG | HEART RATE: 79 BPM | WEIGHT: 115.5 LBS | OXYGEN SATURATION: 96 % | DIASTOLIC BLOOD PRESSURE: 76 MMHG | TEMPERATURE: 99 F

## 2024-04-18 DIAGNOSIS — F41.1 GAD (GENERALIZED ANXIETY DISORDER): ICD-10-CM

## 2024-04-18 DIAGNOSIS — N18.31 STAGE 3A CHRONIC KIDNEY DISEASE: ICD-10-CM

## 2024-04-18 DIAGNOSIS — J43.8 PARASEPTAL EMPHYSEMA: ICD-10-CM

## 2024-04-18 DIAGNOSIS — I48.0 PAROXYSMAL ATRIAL FIBRILLATION: ICD-10-CM

## 2024-04-18 DIAGNOSIS — Z79.01 CHRONIC ANTICOAGULATION: ICD-10-CM

## 2024-04-18 DIAGNOSIS — J30.9 ALLERGIC RHINITIS, UNSPECIFIED SEASONALITY, UNSPECIFIED TRIGGER: ICD-10-CM

## 2024-04-18 DIAGNOSIS — G47.00 INSOMNIA, UNSPECIFIED TYPE: Primary | ICD-10-CM

## 2024-04-18 DIAGNOSIS — I70.0 AORTIC ATHEROSCLEROSIS: ICD-10-CM

## 2024-04-18 DIAGNOSIS — Z12.31 ENCOUNTER FOR SCREENING MAMMOGRAM FOR BREAST CANCER: ICD-10-CM

## 2024-04-18 LAB
ALBUMIN SERPL BCP-MCNC: 3.9 G/DL (ref 3.5–5.2)
ALP SERPL-CCNC: 67 U/L (ref 55–135)
ALT SERPL W/O P-5'-P-CCNC: 14 U/L (ref 10–44)
ANION GAP SERPL CALC-SCNC: 6 MMOL/L (ref 8–16)
AST SERPL-CCNC: 24 U/L (ref 10–40)
BASOPHILS # BLD AUTO: 0.07 K/UL (ref 0–0.2)
BASOPHILS NFR BLD: 0.9 % (ref 0–1.9)
BILIRUB SERPL-MCNC: 1.3 MG/DL (ref 0.1–1)
BUN SERPL-MCNC: 18 MG/DL (ref 8–23)
CALCIUM SERPL-MCNC: 10.4 MG/DL (ref 8.7–10.5)
CHLORIDE SERPL-SCNC: 107 MMOL/L (ref 95–110)
CHOLEST SERPL-MCNC: 196 MG/DL (ref 120–199)
CHOLEST/HDLC SERPL: 3.2 {RATIO} (ref 2–5)
CO2 SERPL-SCNC: 28 MMOL/L (ref 23–29)
CREAT SERPL-MCNC: 0.9 MG/DL (ref 0.5–1.4)
DIFFERENTIAL METHOD BLD: ABNORMAL
EOSINOPHIL # BLD AUTO: 0.3 K/UL (ref 0–0.5)
EOSINOPHIL NFR BLD: 3.2 % (ref 0–8)
ERYTHROCYTE [DISTWIDTH] IN BLOOD BY AUTOMATED COUNT: 14.2 % (ref 11.5–14.5)
EST. GFR  (NO RACE VARIABLE): >60 ML/MIN/1.73 M^2
GLUCOSE SERPL-MCNC: 92 MG/DL (ref 70–110)
HCT VFR BLD AUTO: 38.8 % (ref 37–48.5)
HDLC SERPL-MCNC: 61 MG/DL (ref 40–75)
HDLC SERPL: 31.1 % (ref 20–50)
HGB BLD-MCNC: 12.2 G/DL (ref 12–16)
IMM GRANULOCYTES # BLD AUTO: 0.05 K/UL (ref 0–0.04)
IMM GRANULOCYTES NFR BLD AUTO: 0.6 % (ref 0–0.5)
LDLC SERPL CALC-MCNC: 115 MG/DL (ref 63–159)
LYMPHOCYTES # BLD AUTO: 3 K/UL (ref 1–4.8)
LYMPHOCYTES NFR BLD: 38.5 % (ref 18–48)
MCH RBC QN AUTO: 28.6 PG (ref 27–31)
MCHC RBC AUTO-ENTMCNC: 31.4 G/DL (ref 32–36)
MCV RBC AUTO: 91 FL (ref 82–98)
MONOCYTES # BLD AUTO: 0.7 K/UL (ref 0.3–1)
MONOCYTES NFR BLD: 8.6 % (ref 4–15)
NEUTROPHILS # BLD AUTO: 3.7 K/UL (ref 1.8–7.7)
NEUTROPHILS NFR BLD: 48.2 % (ref 38–73)
NONHDLC SERPL-MCNC: 135 MG/DL
NRBC BLD-RTO: 0 /100 WBC
PLATELET # BLD AUTO: 252 K/UL (ref 150–450)
PMV BLD AUTO: 11.6 FL (ref 9.2–12.9)
POTASSIUM SERPL-SCNC: 5.3 MMOL/L (ref 3.5–5.1)
PROT SERPL-MCNC: 7.7 G/DL (ref 6–8.4)
PTH-INTACT SERPL-MCNC: 52.6 PG/ML (ref 9–77)
RBC # BLD AUTO: 4.26 M/UL (ref 4–5.4)
SODIUM SERPL-SCNC: 141 MMOL/L (ref 136–145)
TRIGL SERPL-MCNC: 100 MG/DL (ref 30–150)
WBC # BLD AUTO: 7.76 K/UL (ref 3.9–12.7)

## 2024-04-18 PROCEDURE — 36415 COLL VENOUS BLD VENIPUNCTURE: CPT | Mod: HCNC,PN | Performed by: INTERNAL MEDICINE

## 2024-04-18 PROCEDURE — 83970 ASSAY OF PARATHORMONE: CPT | Mod: HCNC | Performed by: INTERNAL MEDICINE

## 2024-04-18 PROCEDURE — 80053 COMPREHEN METABOLIC PANEL: CPT | Mod: HCNC | Performed by: INTERNAL MEDICINE

## 2024-04-18 PROCEDURE — 83695 ASSAY OF LIPOPROTEIN(A): CPT | Mod: HCNC | Performed by: INTERNAL MEDICINE

## 2024-04-18 PROCEDURE — 3078F DIAST BP <80 MM HG: CPT | Mod: HCNC,CPTII,S$GLB, | Performed by: INTERNAL MEDICINE

## 2024-04-18 PROCEDURE — 1123F ACP DISCUSS/DSCN MKR DOCD: CPT | Mod: HCNC,CPTII,S$GLB, | Performed by: INTERNAL MEDICINE

## 2024-04-18 PROCEDURE — 85025 COMPLETE CBC W/AUTO DIFF WBC: CPT | Mod: HCNC | Performed by: INTERNAL MEDICINE

## 2024-04-18 PROCEDURE — 99214 OFFICE O/P EST MOD 30 MIN: CPT | Mod: HCNC,S$GLB,, | Performed by: INTERNAL MEDICINE

## 2024-04-18 PROCEDURE — 1101F PT FALLS ASSESS-DOCD LE1/YR: CPT | Mod: HCNC,CPTII,S$GLB, | Performed by: INTERNAL MEDICINE

## 2024-04-18 PROCEDURE — 99999 PR PBB SHADOW E&M-EST. PATIENT-LVL IV: CPT | Mod: PBBFAC,HCNC,, | Performed by: INTERNAL MEDICINE

## 2024-04-18 PROCEDURE — 1126F AMNT PAIN NOTED NONE PRSNT: CPT | Mod: HCNC,CPTII,S$GLB, | Performed by: INTERNAL MEDICINE

## 2024-04-18 PROCEDURE — 3288F FALL RISK ASSESSMENT DOCD: CPT | Mod: HCNC,CPTII,S$GLB, | Performed by: INTERNAL MEDICINE

## 2024-04-18 PROCEDURE — 1159F MED LIST DOCD IN RCRD: CPT | Mod: HCNC,CPTII,S$GLB, | Performed by: INTERNAL MEDICINE

## 2024-04-18 PROCEDURE — 3074F SYST BP LT 130 MM HG: CPT | Mod: HCNC,CPTII,S$GLB, | Performed by: INTERNAL MEDICINE

## 2024-04-18 PROCEDURE — 1160F RVW MEDS BY RX/DR IN RCRD: CPT | Mod: HCNC,CPTII,S$GLB, | Performed by: INTERNAL MEDICINE

## 2024-04-18 PROCEDURE — 80061 LIPID PANEL: CPT | Mod: HCNC | Performed by: INTERNAL MEDICINE

## 2024-04-18 RX ORDER — LORAZEPAM 0.5 MG/1
0.5 TABLET ORAL NIGHTLY PRN
Qty: 30 TABLET | Refills: 2 | Status: SHIPPED | OUTPATIENT
Start: 2024-04-18

## 2024-04-18 NOTE — PROGRESS NOTES
Subjective:       Patient ID: Laverne Humphries is a 83 y.o. female.    Chief Complaint: Annual Exam    HPI  EGD 2022 - gastric polyps.  Cscope -  Int hemorrhoids. Diverticulosis.   MMG 2/17/23 NEG  No assistance w/ ADLs. Still driving. No falls. Cally, daughter, recently had L rotator cuff surgery so pt has been also helping her w/ ADLs. Cooks also for sister.     Insomnia - on ativan 0.5mg nightly for many years. Discussed that this is a high risk medicine especially in the geriatric population. Goes to bed at 10pm and wakes up at 5am. Sometimes may wake up once to urinate but not always. Goes back to sleep quickly. Feels well rested. No falls. Not drowsy during the daytime. No behavioral changes.     pAFib - toprol xl 50mg daily and eliquis 2.5mg BID.   Moderate AV sclerosis.   Last saw Dr. Yeh 3/6/24  TTE 2022 -   The left ventricle is normal in size with mild eccentric hypertrophy - mildly sigmoid septum - and normal systolic function.  The estimated ejection fraction is 65%.  A diastolic pattern consistent with atrial fibrillation observed.  Normal right ventricular size with normal right ventricular systolic function.  Mild aortic regurgitation.  Moderate aortic valve sclerosis.  Moderate mitral regurgitation.  Moderate tricuspid regurgitation.  The estimated PA systolic pressure is 36 mmHg.  Normal central venous pressure (3 mmHg).    Allergic rhinitis  Flu and pneumonia at end of dec 2023. Chronic cough (possibly postinfectious) Saw Dr. Lopez 2/19/24. Rx nasacort 2 SEN BID, asteline 2 SEN BID, arnuity ellipta 2 puffs BID (x 1 mo to see if it helps) and albuterol prn, tessalon perles prn  Cough went away finally. After 2 weeks of inhaler, hasn't had to take it anymore.   No SOB/wheezing.     B CTS (mod on the R and mild on the L) 8/2023 EMG. Median to ulnar anastomosis in R forearm (normal variant)    Last Cr 1 10/2023, eGFR 55.6.  No trouble urinating.     Crestor thought to elevate her  bilirubin when she's taking it so she's no longer on it?  Aortic atherosclerosis.    CT A/P 10/2021 - Lungs: Mild paraseptal emphysema.   CT A/P 3/2022 - Lung bases: Mild interstitial thickening/fibrotic changes. No lung consolidation.   Nonsmoker.    Review of Systems  Comprehensive review of systems otherwise negative. See history/subjective section for more details.    Objective:      Physical Exam    /76 (BP Location: Right arm, Patient Position: Sitting, BP Method: Large (Manual))   Pulse 79   Temp 98.5 °F (36.9 °C) (Oral)   Ht 5' (1.524 m)   Wt 52.4 kg (115 lb 8.3 oz)   LMP  (LMP Unknown)   SpO2 96%   BMI 22.56 kg/m²     GEN - A+OX4, NAD   HEENT - PERRL, EOMI, OP clear. MMM.   Neck - No thyromegaly or cervical LAD. No thyroid masses felt.  CV - RRR, no m/r   Chest - CTAB, no wheezing or rhonchi  Abd - S/NT/ND/+BS.   Ext - 2+BDP and radial pulses. No C/C/E.  Neuro - 5/5 BUE and BLE strength. 2+ dtrs. Normal gait.   MSK - no spinal tenderness to palpation. Normal gait.  Skin - No rash.    Labs reviewed.     Assessment/Plan     Laverne was seen today for annual exam.    Diagnoses and all orders for this visit:    Insomnia, unspecified type - risks discussed. Pt would like to remain on med as it's working for her. If falls or confusion every develops, would have to wean off.   -     LORazepam (ATIVAN) 0.5 MG tablet; Take 1 tablet (0.5 mg total) by mouth nightly as needed for Anxiety.    BELLO (generalized anxiety disorder)  -     LORazepam (ATIVAN) 0.5 MG tablet; Take 1 tablet (0.5 mg total) by mouth nightly as needed for Anxiety.    Paroxysmal atrial fibrillation  -     CBC Auto Differential; Future    Chronic anticoagulation  -     CBC Auto Differential; Future    Allergic rhinitis, unspecified seasonality, unspecified trigger - cont nasal sprays.     Stage 3a chronic kidney disease  -     Comprehensive Metabolic Panel; Future  -     Lipid Panel; Future  -     PTH, intact; Future    Paraseptal  emphysema - post infectious cough resolved. No SOB/JONES.     Aortic atherosclerosis - pt thinks crestor elevated her bilirubin so has stopped it. Consider trial of atorvastatin. Discussed CV risks assoc w/ aortic atherosclerosis.  -     Comprehensive Metabolic Panel; Future  -     Lipid Panel; Future  -     LIPOPROTEIN A (LPA); Future    Encounter for screening mammogram for breast cancer  -     Mammo Digital Screening Bilat w/ Eliazar; Future    Advance Care Planning     Date: 04/18/2024    Living Will  During this visit, I engaged the patient  in the voluntary advance care planning process.  The patient and I reviewed the role for advance directives and their purpose in directing future healthcare if the patient's unable to speak for him/herself.  At this point in time, the patient does have full decision-making capacity.  We discussed different extreme health states that she could experience, and reviewed what kind of medical care she would want in those situations.  The patient communicated that if she were comatose and had little chance of a meaningful recovery, she would not want machines/life-sustaining treatments used. In addition to the above preference, other important end-of-life issues for the patient include  SEE PAPERWORK . The patient has completed a living will to reflect these preferences and The patient has already designated a healthcare power of  to make decisions on her behalf.  I spent a total of 1 minutes engaging the patient in this advance care planning discussion.          Power of   I initiated the process of voluntary advance care planning today and explained the importance of this process to the patient.  I introduced the concept of advance directives to the patient, as well. Then the patient received detailed information about the importance of designating a Health Care Power of  (HCPOA). She was also instructed to communicate with this person about their wishes for  future healthcare, should she become sick and lose decision-making capacity. The patient has previously appointed a HCPOA. After our discussion, the patient has decided to complete a HCPOA and has appointed her daughter, health care agent:  Cally Reina  & health care agent number:  [phone]  I spent a total time of 2 minutes discussing this issue with the patient.        Follow up in about 3 months (around 7/18/2024).      Carolyne James MD  Department of Internal Medicine - [facility]  7:53 AM

## 2024-04-23 ENCOUNTER — TELEPHONE (OUTPATIENT)
Dept: PRIMARY CARE CLINIC | Facility: CLINIC | Age: 84
End: 2024-04-23
Payer: MEDICARE

## 2024-04-23 LAB — LPA SERPL-MCNC: 10 MG/DL (ref 0–30)

## 2024-04-23 NOTE — TELEPHONE ENCOUNTER
Please call and notify pt:  Cholesterol is not terribly high but goal LDL higher than desired. She said crestor elevated her total bilirubin. It's still mildly elevated despite being off crestor. Is she ok w/ restarting her crestor 5mg?

## 2024-04-25 ENCOUNTER — HOSPITAL ENCOUNTER (OUTPATIENT)
Dept: RADIOLOGY | Facility: HOSPITAL | Age: 84
Discharge: HOME | End: 2024-04-25
Attending: INTERNAL MEDICINE
Payer: MEDICARE

## 2024-04-25 VITALS — HEIGHT: 60 IN | WEIGHT: 115 LBS | BODY MASS INDEX: 22.58 KG/M2

## 2024-04-25 DIAGNOSIS — Z12.31 ENCOUNTER FOR SCREENING MAMMOGRAM FOR BREAST CANCER: ICD-10-CM

## 2024-04-25 PROCEDURE — 77063 BREAST TOMOSYNTHESIS BI: CPT | Mod: 26,,, | Performed by: RADIOLOGY

## 2024-04-25 PROCEDURE — 77067 SCR MAMMO BI INCL CAD: CPT | Mod: 26,,, | Performed by: RADIOLOGY

## 2024-04-25 PROCEDURE — 77067 SCR MAMMO BI INCL CAD: CPT | Mod: TC

## 2024-05-06 ENCOUNTER — TELEPHONE (OUTPATIENT)
Dept: GASTROENTEROLOGY | Facility: CLINIC | Age: 84
End: 2024-05-06
Payer: MEDICARE

## 2024-05-06 NOTE — TELEPHONE ENCOUNTER
Spoke with pt and scheduled clinic visit. V/U    ----- Message from Sarah Coyne sent at 5/6/2024 12:00 PM CDT -----  Type:  Sooner Appointment Request    Caller is requesting a sooner appointment.  Caller declined first available appointment listed below.  Caller will not accept being placed on the waitlist and is requesting a message be sent to doctor.  Name of Caller: Pt   When is the first available appointment? Panels were closed   Symptoms: Coughing, possible acid reflux   Would the patient rather a call back or a response via [facility]? Call back   Best Call Back Number: [phone]  Additional Information: Please be advised, pt states she wants to be seen only by

## 2024-05-28 ENCOUNTER — PATIENT MESSAGE (OUTPATIENT)
Dept: CARDIOLOGY | Facility: CLINIC | Age: 84
End: 2024-05-28
Payer: MEDICARE

## 2024-05-28 DIAGNOSIS — I48.0 PAROXYSMAL ATRIAL FIBRILLATION: ICD-10-CM

## 2024-05-28 DIAGNOSIS — Z79.01 CHRONIC ANTICOAGULATION: ICD-10-CM

## 2024-05-28 RX ORDER — FLUTICASONE FUROATE 100 UG/1
1 POWDER RESPIRATORY (INHALATION) DAILY
Qty: 1 EACH | Refills: 6 | Status: SHIPPED | OUTPATIENT
Start: 2024-05-28

## 2024-06-06 ENCOUNTER — OFFICE VISIT (OUTPATIENT)
Dept: GASTROENTEROLOGY | Facility: CLINIC | Age: 84
End: 2024-06-06
Payer: MEDICARE

## 2024-06-06 VITALS — BODY MASS INDEX: 22.95 KG/M2 | WEIGHT: 116.88 LBS | HEIGHT: 60 IN

## 2024-06-06 DIAGNOSIS — K21.9 GASTROESOPHAGEAL REFLUX DISEASE WITHOUT ESOPHAGITIS: Primary | ICD-10-CM

## 2024-06-06 DIAGNOSIS — R05.3 CHRONIC COUGH: ICD-10-CM

## 2024-06-06 PROCEDURE — 1159F MED LIST DOCD IN RCRD: CPT | Mod: HCNC,CPTII,S$GLB, | Performed by: INTERNAL MEDICINE

## 2024-06-06 PROCEDURE — 1126F AMNT PAIN NOTED NONE PRSNT: CPT | Mod: HCNC,CPTII,S$GLB, | Performed by: INTERNAL MEDICINE

## 2024-06-06 PROCEDURE — 99204 OFFICE O/P NEW MOD 45 MIN: CPT | Mod: HCNC,S$GLB,, | Performed by: INTERNAL MEDICINE

## 2024-06-06 PROCEDURE — 1101F PT FALLS ASSESS-DOCD LE1/YR: CPT | Mod: HCNC,CPTII,S$GLB, | Performed by: INTERNAL MEDICINE

## 2024-06-06 PROCEDURE — 99999 PR PBB SHADOW E&M-EST. PATIENT-LVL III: CPT | Mod: PBBFAC,HCNC,, | Performed by: INTERNAL MEDICINE

## 2024-06-06 PROCEDURE — 1157F ADVNC CARE PLAN IN RCRD: CPT | Mod: HCNC,CPTII,S$GLB, | Performed by: INTERNAL MEDICINE

## 2024-06-06 PROCEDURE — 3288F FALL RISK ASSESSMENT DOCD: CPT | Mod: HCNC,CPTII,S$GLB, | Performed by: INTERNAL MEDICINE

## 2024-06-06 RX ORDER — SUCRALFATE 1 G/1
1 TABLET ORAL 2 TIMES DAILY PRN
Qty: 90 TABLET | Refills: 3 | Status: SHIPPED | OUTPATIENT
Start: 2024-06-06

## 2024-06-06 RX ORDER — PANTOPRAZOLE SODIUM 40 MG/1
40 TABLET, DELAYED RELEASE ORAL DAILY
Qty: 90 TABLET | Refills: 3 | Status: SHIPPED | OUTPATIENT
Start: 2024-06-06 | End: 2025-06-06

## 2024-06-06 NOTE — PROGRESS NOTES
GASTROENTEROLOGY CLINIC NOTE    Reason for visit: The primary encounter diagnosis was Gastroesophageal reflux disease without esophagitis. A diagnosis of Chronic cough was also pertinent to this visit.  Referring provider/PCP: Carolyne James MD    HPI:  Laverne Humphries is a 83 y.o. female here today for evaluation of reflux and cough     Ongoing symptoms intermittently for years, previously treated with Nexium and seemed to lyle the symptoms.  Now she had an upper endoscopy that was reviewed by myself about 2 years ago, the upper endoscopy did show some tiny gastric polyps that were confirm fundic gland polyps.  It seems like she was told not to take the omeprazole at that time, and thus she has now been on Pepcid once or twice a day and is a having good relief of her dyspeptic type symptoms.  She has a cough that seems to be intermittent throughout the day as well.  There is no vomiting there is no radiating symptoms.    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    3/2022 ct  Impression:   Colovaginal fistula, as above.  Mild wall thickening/inflammatory changes of the colon and adjacent bladder noted.  No fluid collections, free air, or evidence of bowel obstruction.    4/2022 dr dumont  Procedure:  Robotic-assisted sigmoid colectomy   Mobilization of splenic flexure      Findings:  Dense intra-abdominal adhesions   Negative leak test     Prior Endoscopy:  EGD:  2022 justo  Normal , gastric polyps  Path - benign    Colon:  1/2022 justo  Findings:       The perianal and digital rectal examinations were normal.        Unable to retroflex due to small rectum size. Carefully forward        viewing examination did not reveal any other abnormalities of the        distal rectum.        Internal hemorrhoids were found during endoscopy. The hemorrhoids        were medium-sized.        Multiple small-mouthed diverticula were found in the  sigmoid colon.        The colonoscope was only able to be passed to 25cm proximal to the        anus. Due to dense pelvic adhesions and restricted mobility of the        colon the scope was unable to be advanced beyond this location in        spite of using pressure and placing the patient on her back.     (Portions of this note were dictated using voice recognition software and may contain dictation related errors in spelling/grammar/syntax not found on text review)    Review of Systems   Constitutional:  Negative for fever and malaise/fatigue.   Respiratory:  Positive for cough. Negative for shortness of breath.    Cardiovascular:  Negative for chest pain and palpitations.   Gastrointestinal:  Positive for heartburn. Negative for abdominal pain, blood in stool, nausea and vomiting.   Neurological:  Negative for dizziness and headaches.       Past Medical History: has a past medical history of Allergy, Anxiety, Arthritis, Asthma, Cataract, CKD (chronic kidney disease) stage 3, GFR 30-59 ml/min, Hepatitis, IBS (irritable bowel syndrome), Moderate aortic valve stenosis, Osteoporosis, Paroxysmal atrial fibrillation, Reflux esophagitis, and Torus palatinus.    Past Surgical History: has a past surgical history that includes Cataract extraction, bilateral; Cataract extraction w/  intraocular lens implant (Bilateral); Blepharoptosis repair (Bilateral); Cholecystectomy; Hysterectomy; Eye surgery; Oophorectomy; Esophagogastroduodenoscopy (N/A, 01/20/2022); Colonoscopy (N/A, 01/20/2022); Robot-assisted laparoscopic colectomy using da Gera Xi (N/A, 4/26/2022); Flexible sigmoidoscopy (4/26/2022); Arthroscopic debridement of shoulder (Right, 3/21/2023); Arthroscopy of shoulder with decompression of subacromial space (Right, 3/21/2023); Arthroscopic repair of rotator cuff of shoulder (Right, 3/21/2023); and lysis, adhesions, shoulder, arthroscopic (3/21/2023).    Family History:family history includes Asthma in her sister;  Cancer in her brother and mother; Cataracts in her father and sister; Diabetes in her father; No Known Problems in her daughter and son.    Allergies:   Review of patient's allergies indicates:   Allergen Reactions    Codeine      Other reaction(s): Syncope, can take tylenol    Sulfa (sulfonamide antibiotics)      Other reaction(s): Stomach upset    Crestor [rosuvastatin] Other (See Comments)     Elevated bilirubin when taking       Social History: reports that she has never smoked. She has never been exposed to tobacco smoke. She has never used smokeless tobacco. She reports that she does not drink alcohol and does not use drugs.    Home medications:   Current Outpatient Medications on File Prior to Visit   Medication Sig Dispense Refill    apixaban (ELIQUIS) 2.5 mg Tab Take 1 tablet (2.5 mg total) by mouth 2 (two) times daily. 180 tablet 3    azelastine (ASTELIN) 137 mcg (0.1 %) nasal spray 2 sprays (274 mcg total) by Nasal route 2 (two) times daily. 30 mL 11    calcium-vitamin D3 500 mg(1,250mg) -200 unit per tablet Take 1 tablet by mouth Daily.       carboxymethylcellulose (REFRESH PLUS) 0.5 % Dpet 1 drop 3 (three) times daily as needed.      fluticasone furoate (ARNUITY ELLIPTA) 100 mcg/actuation inhaler Inhale 1 puff into the lungs once daily. 1 each 6    LORazepam (ATIVAN) 0.5 MG tablet Take 1 tablet (0.5 mg total) by mouth nightly as needed for Anxiety. 30 tablet 2    metoprolol succinate (TOPROL-XL) 25 MG 24 hr tablet Take 1 tablet (25 mg total) by mouth once daily. 90 tablet 3    clobetasol 0.05% (TEMOVATE) 0.05 % Oint Apply topically 2 (two) times daily. for 7 days 45 g 0     No current facility-administered medications on file prior to visit.       Vital signs:  Ht 5' (1.524 m)   Wt 53 kg (116 lb 13.5 oz)   LMP  (LMP Unknown)   BMI 22.82 kg/m²     Physical Exam  Vitals reviewed.   Constitutional:       General: She is not in acute distress.  HENT:      Head: Normocephalic and atraumatic.   Eyes:       General: No scleral icterus.     Conjunctiva/sclera: Conjunctivae normal.   Skin:     General: Skin is warm.      Coloration: Skin is not pale.      Findings: No rash.   Neurological:      Mental Status: She is oriented to person, place, and time.      Gait: Gait normal.   Psychiatric:         Mood and Affect: Mood normal.         Behavior: Behavior normal.         I have reviewed prior labs, imaging, notes from last month    Assessment:  1. Gastroesophageal reflux disease without esophagitis    2. Chronic cough      Suspected acid indigestion type symptoms.  Discussed risk / benefit / alternative to PPI therapy    Plan:  Orders Placed This Encounter    pantoprazole (PROTONIX) 40 MG tablet    sucralfate (CARAFATE) 1 gram tablet     Start protonix 40     Add carafate PRN      RTC prn    Jeison Rosales MD  [facility]

## 2024-07-18 ENCOUNTER — OFFICE VISIT (OUTPATIENT)
Dept: PRIMARY CARE CLINIC | Facility: CLINIC | Age: 84
End: 2024-07-18
Payer: MEDICARE

## 2024-07-18 VITALS
WEIGHT: 118.06 LBS | DIASTOLIC BLOOD PRESSURE: 72 MMHG | TEMPERATURE: 98 F | BODY MASS INDEX: 23.06 KG/M2 | HEART RATE: 72 BPM | SYSTOLIC BLOOD PRESSURE: 135 MMHG | OXYGEN SATURATION: 99 %

## 2024-07-18 DIAGNOSIS — I48.0 PAROXYSMAL ATRIAL FIBRILLATION: ICD-10-CM

## 2024-07-18 DIAGNOSIS — G47.00 INSOMNIA, UNSPECIFIED TYPE: Primary | ICD-10-CM

## 2024-07-18 DIAGNOSIS — R05.3 CHRONIC COUGH: ICD-10-CM

## 2024-07-18 DIAGNOSIS — E83.52 HYPERCALCEMIA: ICD-10-CM

## 2024-07-18 DIAGNOSIS — Z79.899 ENCOUNTER FOR MONITORING LONG-TERM PROTON PUMP INHIBITOR THERAPY: ICD-10-CM

## 2024-07-18 DIAGNOSIS — Z51.81 ENCOUNTER FOR MONITORING LONG-TERM PROTON PUMP INHIBITOR THERAPY: ICD-10-CM

## 2024-07-18 DIAGNOSIS — K21.9 GASTROESOPHAGEAL REFLUX DISEASE, UNSPECIFIED WHETHER ESOPHAGITIS PRESENT: ICD-10-CM

## 2024-07-18 PROCEDURE — 99214 OFFICE O/P EST MOD 30 MIN: CPT | Mod: HCNC,S$GLB,, | Performed by: INTERNAL MEDICINE

## 2024-07-18 PROCEDURE — 3075F SYST BP GE 130 - 139MM HG: CPT | Mod: HCNC,CPTII,S$GLB, | Performed by: INTERNAL MEDICINE

## 2024-07-18 PROCEDURE — 1159F MED LIST DOCD IN RCRD: CPT | Mod: HCNC,CPTII,S$GLB, | Performed by: INTERNAL MEDICINE

## 2024-07-18 PROCEDURE — 3288F FALL RISK ASSESSMENT DOCD: CPT | Mod: HCNC,CPTII,S$GLB, | Performed by: INTERNAL MEDICINE

## 2024-07-18 PROCEDURE — 1126F AMNT PAIN NOTED NONE PRSNT: CPT | Mod: HCNC,CPTII,S$GLB, | Performed by: INTERNAL MEDICINE

## 2024-07-18 PROCEDURE — 3078F DIAST BP <80 MM HG: CPT | Mod: HCNC,CPTII,S$GLB, | Performed by: INTERNAL MEDICINE

## 2024-07-18 PROCEDURE — 1101F PT FALLS ASSESS-DOCD LE1/YR: CPT | Mod: HCNC,CPTII,S$GLB, | Performed by: INTERNAL MEDICINE

## 2024-07-18 PROCEDURE — 1157F ADVNC CARE PLAN IN RCRD: CPT | Mod: HCNC,CPTII,S$GLB, | Performed by: INTERNAL MEDICINE

## 2024-07-18 PROCEDURE — 99999 PR PBB SHADOW E&M-EST. PATIENT-LVL IV: CPT | Mod: PBBFAC,HCNC,, | Performed by: INTERNAL MEDICINE

## 2024-07-18 PROCEDURE — 1160F RVW MEDS BY RX/DR IN RCRD: CPT | Mod: HCNC,CPTII,S$GLB, | Performed by: INTERNAL MEDICINE

## 2024-07-18 RX ORDER — NYSTATIN 100000 U/G
CREAM TOPICAL 2 TIMES DAILY
Qty: 30 G | Refills: 3 | Status: SHIPPED | OUTPATIENT
Start: 2024-07-18

## 2024-07-18 NOTE — PROGRESS NOTES
Subjective:       Patient ID: Laverne Humphries is a 83 y.o. female.    Chief Complaint: insomnia follow up    HPI  Insomnia - on ativan 0.5mg nightly for many years. Discussed that this is a high risk medicine especially in the geriatric population. Goes to bed at 10pm and wakes up at 5am. Sometimes may wake up once to urinate but not always. Goes back to sleep quickly. Feels well rested. No falls. Not drowsy during the daytime. No behavioral changes.    reviewed and appropriate - #30 for 30 days. Last refill 7/7/24     pAFib - toprol xl 50mg daily and eliquis 2.5mg BID. No palpitations. No blood in the stool. Able to get eliquis for free.   Moderate AV sclerosis.   Last saw Dr. Yeh 3/6/24  TTE 2022 -   The left ventricle is normal in size with mild eccentric hypertrophy - mildly sigmoid septum - and normal systolic function.  The estimated ejection fraction is 65%.  A diastolic pattern consistent with atrial fibrillation observed.  Normal right ventricular size with normal right ventricular systolic function.  Mild aortic regurgitation.  Moderate aortic valve sclerosis.  Moderate mitral regurgitation.  Moderate tricuspid regurgitation.  The estimated PA systolic pressure is 36 mmHg.  Normal central venous pressure (3 mmHg).    Chronic cough - this has improved w/ the below regimen.  GERD - started on protonix 40MG QAM and has carafate BID prn indigestion. Saw Dr. Rosales 6/6/24   Allergic rhinitis - on arnuity ellipta - started from Dr. Lopez due to chronic cough.     Corrected calcium is on the higher side of normal or high. Normal PTH and not low. Stopped citracal. Does get calcium from diet.   DEXA - 1/20/23 - Osteoporosi.   Has a stationary bike at home and does resistance bands at home. She'll try park far away and walk.     Weight has been stable.     Review of Systems  Comprehensive review of systems otherwise negative. See history/subjective section for more details.    Objective:       Physical Exam    /72   Pulse 72   Temp 97.9 °F (36.6 °C) (Oral)   Wt 53.5 kg (118 lb 0.9 oz)   LMP  (LMP Unknown)   SpO2 99%   BMI 23.06 kg/m²     GEN - A+OX4, NAD   HEENT - PERRL, EOMI, OP clear. MMM. TM normal.   Neck - No thyromegaly or cervical LAD. No thyroid masses felt.  CV - RRR, no m/r   Chest - CTAB, no wheezing or rhonchi  Abd - S/NT/ND/+BS.   Ext - 2+BDP and radial pulses. No C/C/E.  Neuro - PERRL, EOMI, no nystagmus, eyebrow raise, facial sensation, hearing, m of mastication, smile, palatal raise, shoulder shrug, tongue protrusion symmetric and intact. 5/5 BUE and BLE strength.  MSK - no spinal tenderness to palpation. Normal gait.   Skin - No rash.    Previous labs reviewed    Assessment/Plan     Laverne was seen today for annual exam.    Diagnoses and all orders for this visit:    Insomnia, unspecified type - Discussed risks and benefits of controlled substance, including potential for tolerance and dependence, w/ pt. Reports current regimen and dosage controls the symptoms. Defers decreasing dosage. Agrees that benefits outweigh risks at this time. Will reassess at next visit.     Paroxysmal atrial fibrillation  -     CBC Auto Differential; Future    Chronic cough - resolved w/ PPI and also arnuity ellipta.     Gastroesophageal reflux disease, unspecified whether esophagitis present  -     CBC Auto Differential; Future    Hypercalcemia - likely some hyperparathyroidism as PTH wnl despite higher Ca. Ca from diet and stopped on citracal. Osteoporosis on DEXA.   -     Comprehensive Metabolic Panel; Future  -     PTH, Intact; Future  -     Calcium, Timed Urine [facility]; 24 Hours; Future    Encounter for monitoring long-term proton pump inhibitor therapy  -     CBC Auto Differential; Future  -     Comprehensive Metabolic Panel; Future  -     VITAMIN B12; Future  -     Magnesium; Future    Other orders  -     nystatin (MYCOSTATIN) cream; Apply topically 2 (two) times daily.        Follow up  in about 4 months (around 11/18/2024).      Carolyne James MD  Department of Internal Medicine - [facility]  9:18 AM

## 2024-07-19 ENCOUNTER — LAB VISIT (OUTPATIENT)
Dept: LAB | Facility: HOSPITAL | Age: 84
End: 2024-07-19
Attending: INTERNAL MEDICINE
Payer: MEDICARE

## 2024-07-19 DIAGNOSIS — Z51.81 ENCOUNTER FOR MONITORING LONG-TERM PROTON PUMP INHIBITOR THERAPY: ICD-10-CM

## 2024-07-19 DIAGNOSIS — K21.9 GASTROESOPHAGEAL REFLUX DISEASE, UNSPECIFIED WHETHER ESOPHAGITIS PRESENT: ICD-10-CM

## 2024-07-19 DIAGNOSIS — E83.52 HYPERCALCEMIA: ICD-10-CM

## 2024-07-19 DIAGNOSIS — I48.0 PAROXYSMAL ATRIAL FIBRILLATION: ICD-10-CM

## 2024-07-19 DIAGNOSIS — Z79.899 ENCOUNTER FOR MONITORING LONG-TERM PROTON PUMP INHIBITOR THERAPY: ICD-10-CM

## 2024-07-19 LAB
ALBUMIN SERPL BCP-MCNC: 3.9 G/DL (ref 3.5–5.2)
ALP SERPL-CCNC: 67 U/L (ref 55–135)
ALT SERPL W/O P-5'-P-CCNC: 16 U/L (ref 10–44)
ANION GAP SERPL CALC-SCNC: 15 MMOL/L (ref 8–16)
AST SERPL-CCNC: 26 U/L (ref 10–40)
BASOPHILS # BLD AUTO: 0.09 K/UL (ref 0–0.2)
BASOPHILS NFR BLD: 0.9 % (ref 0–1.9)
BILIRUB SERPL-MCNC: 0.9 MG/DL (ref 0.1–1)
BUN SERPL-MCNC: 19 MG/DL (ref 8–23)
CALCIUM SERPL-MCNC: 10.3 MG/DL (ref 8.7–10.5)
CHLORIDE SERPL-SCNC: 103 MMOL/L (ref 95–110)
CO2 SERPL-SCNC: 21 MMOL/L (ref 23–29)
CREAT SERPL-MCNC: 1 MG/DL (ref 0.5–1.4)
DIFFERENTIAL METHOD BLD: ABNORMAL
EOSINOPHIL # BLD AUTO: 0.4 K/UL (ref 0–0.5)
EOSINOPHIL NFR BLD: 4.1 % (ref 0–8)
ERYTHROCYTE [DISTWIDTH] IN BLOOD BY AUTOMATED COUNT: 13.4 % (ref 11.5–14.5)
EST. GFR  (NO RACE VARIABLE): 55.2 ML/MIN/1.73 M^2
GLUCOSE SERPL-MCNC: 89 MG/DL (ref 70–110)
HCT VFR BLD AUTO: 35.8 % (ref 37–48.5)
HGB BLD-MCNC: 11.4 G/DL (ref 12–16)
IMM GRANULOCYTES # BLD AUTO: 0.05 K/UL (ref 0–0.04)
IMM GRANULOCYTES NFR BLD AUTO: 0.5 % (ref 0–0.5)
LYMPHOCYTES # BLD AUTO: 4.2 K/UL (ref 1–4.8)
LYMPHOCYTES NFR BLD: 44.6 % (ref 18–48)
MAGNESIUM SERPL-MCNC: 2.2 MG/DL (ref 1.6–2.6)
MCH RBC QN AUTO: 28.8 PG (ref 27–31)
MCHC RBC AUTO-ENTMCNC: 31.8 G/DL (ref 32–36)
MCV RBC AUTO: 90 FL (ref 82–98)
MONOCYTES # BLD AUTO: 0.8 K/UL (ref 0.3–1)
MONOCYTES NFR BLD: 8.5 % (ref 4–15)
NEUTROPHILS # BLD AUTO: 3.9 K/UL (ref 1.8–7.7)
NEUTROPHILS NFR BLD: 41.4 % (ref 38–73)
NRBC BLD-RTO: 0 /100 WBC
PLATELET # BLD AUTO: 273 K/UL (ref 150–450)
PMV BLD AUTO: 10.9 FL (ref 9.2–12.9)
POTASSIUM SERPL-SCNC: 4.5 MMOL/L (ref 3.5–5.1)
PROT SERPL-MCNC: 7.6 G/DL (ref 6–8.4)
PTH-INTACT SERPL-MCNC: 45.1 PG/ML (ref 9–77)
RBC # BLD AUTO: 3.96 M/UL (ref 4–5.4)
SODIUM SERPL-SCNC: 139 MMOL/L (ref 136–145)
VIT B12 SERPL-MCNC: >2000 PG/ML (ref 210–950)
WBC # BLD AUTO: 9.49 K/UL (ref 3.9–12.7)

## 2024-07-19 PROCEDURE — 85025 COMPLETE CBC W/AUTO DIFF WBC: CPT | Mod: HCNC | Performed by: INTERNAL MEDICINE

## 2024-07-19 PROCEDURE — 36415 COLL VENOUS BLD VENIPUNCTURE: CPT | Mod: HCNC | Performed by: INTERNAL MEDICINE

## 2024-07-19 PROCEDURE — 82607 VITAMIN B-12: CPT | Mod: HCNC | Performed by: INTERNAL MEDICINE

## 2024-07-19 PROCEDURE — 83970 ASSAY OF PARATHORMONE: CPT | Mod: HCNC | Performed by: INTERNAL MEDICINE

## 2024-07-19 PROCEDURE — 83735 ASSAY OF MAGNESIUM: CPT | Mod: HCNC | Performed by: INTERNAL MEDICINE

## 2024-07-19 PROCEDURE — 80053 COMPREHEN METABOLIC PANEL: CPT | Mod: HCNC | Performed by: INTERNAL MEDICINE

## 2024-07-22 PROBLEM — E83.52 FAMILIAL HYPOCALCIURIC HYPERCALCEMIA: Status: ACTIVE | Noted: 2024-07-22

## 2024-08-07 DIAGNOSIS — F41.1 GAD (GENERALIZED ANXIETY DISORDER): ICD-10-CM

## 2024-08-07 DIAGNOSIS — G47.00 INSOMNIA, UNSPECIFIED TYPE: ICD-10-CM

## 2024-08-07 RX ORDER — LORAZEPAM 0.5 MG/1
0.5 TABLET ORAL NIGHTLY PRN
Qty: 30 TABLET | Refills: 2 | Status: SHIPPED | OUTPATIENT
Start: 2024-08-07

## 2024-10-02 ENCOUNTER — OFFICE VISIT (OUTPATIENT)
Dept: OPTOMETRY | Facility: CLINIC | Age: 84
End: 2024-10-02
Payer: MEDICARE

## 2024-10-02 DIAGNOSIS — H04.123 DRY EYES, BILATERAL: Primary | ICD-10-CM

## 2024-10-02 DIAGNOSIS — H52.4 PRESBYOPIA: ICD-10-CM

## 2024-10-02 DIAGNOSIS — Z13.5 GLAUCOMA SCREENING: ICD-10-CM

## 2024-10-02 PROCEDURE — 1159F MED LIST DOCD IN RCRD: CPT | Mod: HCNC,CPTII,S$GLB, | Performed by: OPTOMETRIST

## 2024-10-02 PROCEDURE — 1101F PT FALLS ASSESS-DOCD LE1/YR: CPT | Mod: HCNC,CPTII,S$GLB, | Performed by: OPTOMETRIST

## 2024-10-02 PROCEDURE — 92014 COMPRE OPH EXAM EST PT 1/>: CPT | Mod: HCNC,S$GLB,, | Performed by: OPTOMETRIST

## 2024-10-02 PROCEDURE — 92015 DETERMINE REFRACTIVE STATE: CPT | Mod: HCNC,S$GLB,, | Performed by: OPTOMETRIST

## 2024-10-02 PROCEDURE — 3288F FALL RISK ASSESSMENT DOCD: CPT | Mod: HCNC,CPTII,S$GLB, | Performed by: OPTOMETRIST

## 2024-10-02 PROCEDURE — 99999 PR PBB SHADOW E&M-EST. PATIENT-LVL III: CPT | Mod: PBBFAC,HCNC,, | Performed by: OPTOMETRIST

## 2024-10-02 PROCEDURE — 1160F RVW MEDS BY RX/DR IN RCRD: CPT | Mod: HCNC,CPTII,S$GLB, | Performed by: OPTOMETRIST

## 2024-10-02 PROCEDURE — 1157F ADVNC CARE PLAN IN RCRD: CPT | Mod: HCNC,CPTII,S$GLB, | Performed by: OPTOMETRIST

## 2024-10-02 NOTE — PROGRESS NOTES
HPI    82 Y/o female is here for routine eye exam with C/o pt say's she had a   bump on OS upper eyelid, the bump has since went down but pt say's she   some times feels a little pain.   Pt denies pain and discomfort   No f/f    Eye med: Refresh OU PRN   Last edited by Ember Payton MA on 10/2/2024  7:49 AM.            Assessment /Plan     For exam results, see Encounter Report.    Dry eyes, bilateral    Glaucoma screening    Presbyopia      1. Mild pco sp pciol OU--pt happy new Rx  2. Dry eye sx, w mild allergic conj sx--pt to cont w REFRESH ATs , but OK to add otc ZADITOR/ALAWAY/or PATADAY qAM  3. Small miguel OS X 2 weeks--resolving per pt.  Advised heat mask 5 min QID      PLAN:    Rtc 1 yr, or will call sooner if miguel persists w tx

## 2024-10-09 RX ORDER — AZELASTINE 1 MG/ML
SPRAY, METERED NASAL
Qty: 30 ML | Refills: 11 | Status: SHIPPED | OUTPATIENT
Start: 2024-10-09

## 2024-11-08 ENCOUNTER — OFFICE VISIT (OUTPATIENT)
Dept: URGENT CARE | Facility: CLINIC | Age: 84
End: 2024-11-08
Payer: MEDICARE

## 2024-11-08 VITALS
DIASTOLIC BLOOD PRESSURE: 72 MMHG | WEIGHT: 118 LBS | HEIGHT: 60 IN | OXYGEN SATURATION: 96 % | HEART RATE: 84 BPM | BODY MASS INDEX: 23.16 KG/M2 | TEMPERATURE: 98 F | SYSTOLIC BLOOD PRESSURE: 130 MMHG | RESPIRATION RATE: 20 BRPM

## 2024-11-08 DIAGNOSIS — M62.838 MUSCLE SPASM: ICD-10-CM

## 2024-11-08 DIAGNOSIS — M54.2 NECK PAIN: Primary | ICD-10-CM

## 2024-11-08 PROCEDURE — 72040 X-RAY EXAM NECK SPINE 2-3 VW: CPT | Mod: FY,S$GLB,, | Performed by: RADIOLOGY

## 2024-11-08 RX ORDER — DEXAMETHASONE SODIUM PHOSPHATE 10 MG/ML
10 INJECTION INTRAMUSCULAR; INTRAVENOUS ONCE
Status: COMPLETED | OUTPATIENT
Start: 2024-11-08 | End: 2024-11-08

## 2024-11-08 RX ORDER — PREDNISONE 10 MG/1
10 TABLET ORAL DAILY
Qty: 5 TABLET | Refills: 0 | Status: SHIPPED | OUTPATIENT
Start: 2024-11-08 | End: 2024-11-13

## 2024-11-08 RX ORDER — METHOCARBAMOL 500 MG/1
500 TABLET, FILM COATED ORAL 4 TIMES DAILY
Qty: 40 TABLET | Refills: 0 | Status: SHIPPED | OUTPATIENT
Start: 2024-11-08 | End: 2024-11-18

## 2024-11-08 RX ADMIN — DEXAMETHASONE SODIUM PHOSPHATE 10 MG: 10 INJECTION INTRAMUSCULAR; INTRAVENOUS at 08:11

## 2024-11-08 NOTE — PROGRESS NOTES
Subjective:      Patient ID: Laverne Humphries is a 83 y.o. female.    Vitals:  height is 5' (1.524 m) and weight is 53.5 kg (118 lb). Her temperature is 97.9 °F (36.6 °C). Her blood pressure is 130/72 and her pulse is 84. Her respiration is 20 and oxygen saturation is 96%.     Chief Complaint: Neck Pain    This is a 83 y.o. female   who presents today with a chief complaint of neck pain that began three days ago. She's been taking tylenol to help relieve her symptoms.     Neck Pain   This is a new problem. The current episode started in the past 7 days. The problem occurs constantly. The problem has been gradually worsening. The pain is associated with nothing. The pain is present in the right side and occipital region. The pain is at a severity of 7/10. The pain is severe. The symptoms are aggravated by position. The pain is Same all the time. Stiffness is present All day. Pertinent negatives include no chest pain, fever, headaches, leg pain, numbness, pain with swallowing, paresis, photophobia, syncope, tingling, trouble swallowing, visual change, weakness or weight loss. She has tried acetaminophen for the symptoms. The treatment provided mild relief.     Constitution: Negative for fever.   HENT:  Negative for trouble swallowing.    Neck: Positive for neck pain.   Cardiovascular:  Negative for chest pain and passing out.   Eyes:  Negative for photophobia.   Musculoskeletal:  Positive for pain. Negative for trauma.   Neurological:  Negative for headaches and numbness.      Objective:     Physical Exam   Constitutional: She is oriented to person, place, and time. She appears well-developed. She is cooperative.  Non-toxic appearance. She does not appear ill. No distress.   HENT:   Head: Normocephalic and atraumatic.   Ears:   Right Ear: Hearing, tympanic membrane, external ear and ear canal normal.   Left Ear: Hearing, tympanic membrane, external ear and ear canal normal.   Nose: Nose normal. No mucosal edema,  rhinorrhea or nasal deformity. No epistaxis. Right sinus exhibits no maxillary sinus tenderness and no frontal sinus tenderness. Left sinus exhibits no maxillary sinus tenderness and no frontal sinus tenderness.   Mouth/Throat: Uvula is midline, oropharynx is clear and moist and mucous membranes are normal. No trismus in the jaw. Normal dentition. No uvula swelling. No oropharyngeal exudate, posterior oropharyngeal edema or posterior oropharyngeal erythema.   Eyes: Conjunctivae and lids are normal. No scleral icterus.   Neck: Trachea normal and phonation normal. Neck supple.      Comments: Decrease ROM on with flexion, extension and turing left to right. Tightness felt with muscle spasm to right cervical and clavicle area No edema present. No erythema present. No neck rigidity present.   Cardiovascular: Normal rate, regular rhythm, normal heart sounds and normal pulses.   Pulmonary/Chest: Effort normal and breath sounds normal. No respiratory distress. She has no decreased breath sounds. She has no rhonchi.   Abdominal: Normal appearance.   Musculoskeletal:         General: No deformity.      Right shoulder: She exhibits decreased range of motion.      Cervical back: She exhibits tenderness.   Neurological: She is alert and oriented to person, place, and time. She exhibits normal muscle tone. Coordination normal.   Skin: Skin is warm, dry, intact, not diaphoretic and not pale.   Psychiatric: Her speech is normal and behavior is normal. Judgment and thought content normal.   Nursing note and vitals reviewed.      Assessment:     1. Neck pain    2. Muscle spasm      EXAMINATION:  XR CERVICAL SPINE 2 OR 3 VIEWS     CLINICAL HISTORY:  Cervicalgia     TECHNIQUE:  AP, lateral and open mouth views of the cervical spine were performed.     COMPARISON:  None.     FINDINGS:  Four views cervical spine.     Lateral imaging demonstrates grade 1 anterolisthesis of C3 on C4.  There is no significant vertebral body height loss.   Disc space height loss and endplate degenerative change most significantly involves C6-C7.  The facet joints are aligned.  There is lower cervical anterior marginal osteophytosis.  The odontoid is intact.  The lateral masses of C1 are in anatomic relationship with C2.  AP spinal alignment is grossly unremarkable.  The visualized upper lung zones are clear.  The paraspinous and hypopharyngeal soft tissues are unremarkable.  The airway is patent.     Impression:     1. No acute displaced fracture or dislocation of the cervical spine.     Plan:       Neck pain  -     dexAMETHasone injection 10 mg  -     methocarbamoL (ROBAXIN) 500 MG Tab; Take 1 tablet (500 mg total) by mouth 4 (four) times daily. for 10 days  Dispense: 40 tablet; Refill: 0  -     predniSONE (DELTASONE) 10 MG tablet; Take 1 tablet (10 mg total) by mouth once daily. for 5 days  Dispense: 5 tablet; Refill: 0  -     X-Ray Cervical Spine 2 or 3 Views; Future; Expected date: 11/08/2024    Muscle spasm  -     dexAMETHasone injection 10 mg  -     methocarbamoL (ROBAXIN) 500 MG Tab; Take 1 tablet (500 mg total) by mouth 4 (four) times daily. for 10 days  Dispense: 40 tablet; Refill: 0  -     predniSONE (DELTASONE) 10 MG tablet; Take 1 tablet (10 mg total) by mouth once daily. for 5 days  Dispense: 5 tablet; Refill: 0  -     X-Ray Cervical Spine 2 or 3 Views; Future; Expected date: 11/08/2024      Rest Ice Heat every 20 mins  Try sleeping on a very thin or no pillow for 3 night.  Tylenol/Ibuprofen as needed for pain  ED for any further evaluation or concerns

## 2024-11-09 DIAGNOSIS — G47.00 INSOMNIA, UNSPECIFIED TYPE: ICD-10-CM

## 2024-11-09 DIAGNOSIS — F41.1 GAD (GENERALIZED ANXIETY DISORDER): ICD-10-CM

## 2024-11-11 RX ORDER — LORAZEPAM 0.5 MG/1
0.5 TABLET ORAL NIGHTLY PRN
Qty: 30 TABLET | Refills: 2 | Status: SHIPPED | OUTPATIENT
Start: 2024-11-11

## 2024-11-18 ENCOUNTER — OFFICE VISIT (OUTPATIENT)
Dept: PRIMARY CARE CLINIC | Facility: CLINIC | Age: 84
End: 2024-11-18
Payer: MEDICARE

## 2024-11-18 VITALS
HEIGHT: 60 IN | DIASTOLIC BLOOD PRESSURE: 64 MMHG | HEART RATE: 70 BPM | OXYGEN SATURATION: 96 % | WEIGHT: 117.06 LBS | SYSTOLIC BLOOD PRESSURE: 112 MMHG | BODY MASS INDEX: 22.98 KG/M2 | TEMPERATURE: 98 F

## 2024-11-18 DIAGNOSIS — Z23 FLU VACCINE NEED: ICD-10-CM

## 2024-11-18 DIAGNOSIS — D64.9 NORMOCYTIC ANEMIA: ICD-10-CM

## 2024-11-18 DIAGNOSIS — M54.2 NECK PAIN ON RIGHT SIDE: Primary | ICD-10-CM

## 2024-11-18 DIAGNOSIS — N18.31 STAGE 3A CHRONIC KIDNEY DISEASE: ICD-10-CM

## 2024-11-18 PROCEDURE — 1157F ADVNC CARE PLAN IN RCRD: CPT | Mod: HCNC,CPTII,S$GLB, | Performed by: INTERNAL MEDICINE

## 2024-11-18 PROCEDURE — 85025 COMPLETE CBC W/AUTO DIFF WBC: CPT | Mod: HCNC | Performed by: INTERNAL MEDICINE

## 2024-11-18 PROCEDURE — 82728 ASSAY OF FERRITIN: CPT | Mod: HCNC | Performed by: INTERNAL MEDICINE

## 2024-11-18 PROCEDURE — 1159F MED LIST DOCD IN RCRD: CPT | Mod: HCNC,CPTII,S$GLB, | Performed by: INTERNAL MEDICINE

## 2024-11-18 PROCEDURE — 3078F DIAST BP <80 MM HG: CPT | Mod: HCNC,CPTII,S$GLB, | Performed by: INTERNAL MEDICINE

## 2024-11-18 PROCEDURE — 80053 COMPREHEN METABOLIC PANEL: CPT | Mod: HCNC | Performed by: INTERNAL MEDICINE

## 2024-11-18 PROCEDURE — 90653 IIV ADJUVANT VACCINE IM: CPT | Mod: HCNC,S$GLB,, | Performed by: INTERNAL MEDICINE

## 2024-11-18 PROCEDURE — 1125F AMNT PAIN NOTED PAIN PRSNT: CPT | Mod: HCNC,CPTII,S$GLB, | Performed by: INTERNAL MEDICINE

## 2024-11-18 PROCEDURE — 99214 OFFICE O/P EST MOD 30 MIN: CPT | Mod: HCNC,25,S$GLB, | Performed by: INTERNAL MEDICINE

## 2024-11-18 PROCEDURE — 1101F PT FALLS ASSESS-DOCD LE1/YR: CPT | Mod: HCNC,CPTII,S$GLB, | Performed by: INTERNAL MEDICINE

## 2024-11-18 PROCEDURE — G0008 ADMIN INFLUENZA VIRUS VAC: HCPCS | Mod: HCNC,S$GLB,, | Performed by: INTERNAL MEDICINE

## 2024-11-18 PROCEDURE — 83540 ASSAY OF IRON: CPT | Mod: HCNC | Performed by: INTERNAL MEDICINE

## 2024-11-18 PROCEDURE — 1160F RVW MEDS BY RX/DR IN RCRD: CPT | Mod: HCNC,CPTII,S$GLB, | Performed by: INTERNAL MEDICINE

## 2024-11-18 PROCEDURE — 99999 PR PBB SHADOW E&M-EST. PATIENT-LVL V: CPT | Mod: PBBFAC,HCNC,, | Performed by: INTERNAL MEDICINE

## 2024-11-18 PROCEDURE — 3288F FALL RISK ASSESSMENT DOCD: CPT | Mod: HCNC,CPTII,S$GLB, | Performed by: INTERNAL MEDICINE

## 2024-11-18 PROCEDURE — 3074F SYST BP LT 130 MM HG: CPT | Mod: HCNC,CPTII,S$GLB, | Performed by: INTERNAL MEDICINE

## 2024-11-18 RX ORDER — TIZANIDINE 4 MG/1
4 TABLET ORAL NIGHTLY PRN
Qty: 28 TABLET | Refills: 0 | Status: SHIPPED | OUTPATIENT
Start: 2024-11-18 | End: 2024-11-20

## 2024-11-18 NOTE — PROGRESS NOTES
Subjective:       Patient ID: Laverne Humphries is a 83 y.o. female.    Chief Complaint: Follow-up    HPI  Pt is known to me.     Woke up about 10 days ago w/ neck pain. Went to . Given shot and also robaxin. Worsened her insomnia. Pain is better but stillt here. Limited range of motion - turning the head to the R and also lateral flexion. Reports when she tries to stretch her neck may be better. No issues w/ the arms or shoulders. No radiation. No numbness/tingling. No change in pillows prior to this. Slept w/o pillow now. No weakness.   Finished prednisone. Stopped robaxin. Takes tylenol as needed.   C spine xr 11/8/24 -   Lateral imaging demonstrates grade 1 anterolisthesis of C3 on C4. There is no significant vertebral body height loss. Disc space height loss and endplate degenerative change most significantly involves C6-C7. The facet joints are aligned. There is lower cervical anterior marginal osteophytosis. The odontoid is intact. The lateral masses of C1 are in anatomic relationship with C2. AP spinal alignment is grossly unremarkable. The visualized upper lung zones are clear. The paraspinous and hypopharyngeal soft tissues are unremarkable. The airway is patent.     Last saw pt 7/18/24.   Insomnia - ativan 0.5mg qhs for many years. Reports when she was taking the prednisone, she couldn't sleep. She tried stopping ativan cold turkey when she was taking robaxin as she was afraid it would cause her to be too sleepy. However she had terrible insomnia. Stopped robaxin. Restarted lorazepam and she was fine.  Pt understands that this is a high risk medicine for the geriatric population.   Wants to remain on this medicine.   No falls/dizziness.     Review of Systems  Comprehensive review of systems otherwise negative. See history/subjective section for more details.      Objective:      Physical Exam    /64   Pulse 70   Temp 97.6 °F (36.4 °C) (Oral)   Ht 5' (1.524 m)   Wt 53.1 kg (117 lb 1 oz)    LMP  (LMP Unknown)   SpO2 96%   BMI 22.86 kg/m²     GEN - A+OX4, NAD   HEENT - PERRL, EOMI, OP clear. MMM. TM normal.   Neck - No thyromegaly or cervical LAD. No thyroid masses felt.  CV - RRR, no m/r   Chest - CTAB, no wheezing or rhonchi  Abd - S/NT/ND/+BS.   Ext - 2+BDP and radial pulses. No C/C/E.  Neuro - PERRL, EOMI, no nystagmus, eyebrow raise, facial sensation, hearing, m of mastication, smile, palatal raise, shoulder shrug, tongue protrusion symmetric and intact. 5/5 BUE and BLE strength.  MSK - no spinal tenderness to palpation. Normal gait. Pain on palpation at the insertion of the R SCM (knot felt at the muscle). Some limited ROM on R lateral rotation and R lateral flexion.   Skin - No rash.    Assessment/Plan     Laverne was seen today for follow-up.    Diagnoses and all orders for this visit:    Neck pain on right side  -     Ambulatory referral/consult to Physical/Occupational Therapy; Future  -     Cancel: CBC Auto Differential; Future  -     Cancel: Comprehensive Metabolic Panel; Future    Flu vaccine need  -     influenza (adjuvanted) (Fluad) 45 mcg/0.5 mL IM vaccine (> or = 64 yo) 0.5 mL    Normocytic anemia  -     Cancel: CBC Auto Differential; Future  -     Cancel: Ferritin; Future  -     Cancel: Iron and TIBC; Future  -     CBC Auto Differential; Future  -     Ferritin; Future  -     Iron and TIBC; Future    Stage 3a chronic kidney disease  -     Cancel: CBC Auto Differential; Future  -     Cancel: Comprehensive Metabolic Panel; Future  -     Cancel: Ferritin; Future  -     Cancel: Iron and TIBC; Future  -     PTH, intact; Future  -     CBC Auto Differential; Future  -     Comprehensive Metabolic Panel; Future  -     Ferritin; Future  -     Iron and TIBC; Future    Other orders  -     tiZANidine (ZANAFLEX) 4 MG tablet; Take 1 tablet (4 mg total) by mouth nightly as needed (muscle spasms).        Follow up in about 6 weeks (around 12/30/2024).      Carolyne James MD  Department of Internal Medicine  - [facility]  9:01 AM

## 2024-11-19 ENCOUNTER — PATIENT MESSAGE (OUTPATIENT)
Dept: PRIMARY CARE CLINIC | Facility: CLINIC | Age: 84
End: 2024-11-19
Payer: MEDICARE

## 2024-11-19 LAB
ALBUMIN SERPL BCP-MCNC: 3.8 G/DL (ref 3.5–5.2)
ALP SERPL-CCNC: 68 U/L (ref 40–150)
ALT SERPL W/O P-5'-P-CCNC: 10 U/L (ref 10–44)
ANION GAP SERPL CALC-SCNC: 10 MMOL/L (ref 8–16)
AST SERPL-CCNC: 23 U/L (ref 10–40)
BASOPHILS # BLD AUTO: 0.11 K/UL (ref 0–0.2)
BASOPHILS NFR BLD: 1.2 % (ref 0–1.9)
BILIRUB SERPL-MCNC: 1.1 MG/DL (ref 0.1–1)
BUN SERPL-MCNC: 21 MG/DL (ref 8–23)
CALCIUM SERPL-MCNC: 10.5 MG/DL (ref 8.7–10.5)
CHLORIDE SERPL-SCNC: 103 MMOL/L (ref 95–110)
CO2 SERPL-SCNC: 23 MMOL/L (ref 23–29)
CREAT SERPL-MCNC: 1 MG/DL (ref 0.5–1.4)
DIFFERENTIAL METHOD BLD: ABNORMAL
EOSINOPHIL # BLD AUTO: 0.4 K/UL (ref 0–0.5)
EOSINOPHIL NFR BLD: 4.4 % (ref 0–8)
ERYTHROCYTE [DISTWIDTH] IN BLOOD BY AUTOMATED COUNT: 14.3 % (ref 11.5–14.5)
EST. GFR  (NO RACE VARIABLE): 55.2 ML/MIN/1.73 M^2
FERRITIN SERPL-MCNC: 148 NG/ML (ref 20–300)
GLUCOSE SERPL-MCNC: 99 MG/DL (ref 70–110)
HCT VFR BLD AUTO: 37.8 % (ref 37–48.5)
HGB BLD-MCNC: 12.3 G/DL (ref 12–16)
IMM GRANULOCYTES # BLD AUTO: 0.07 K/UL (ref 0–0.04)
IMM GRANULOCYTES NFR BLD AUTO: 0.8 % (ref 0–0.5)
IRON SERPL-MCNC: 85 UG/DL (ref 30–160)
LYMPHOCYTES # BLD AUTO: 3.7 K/UL (ref 1–4.8)
LYMPHOCYTES NFR BLD: 40.9 % (ref 18–48)
MCH RBC QN AUTO: 28.8 PG (ref 27–31)
MCHC RBC AUTO-ENTMCNC: 32.5 G/DL (ref 32–36)
MCV RBC AUTO: 89 FL (ref 82–98)
MONOCYTES # BLD AUTO: 0.8 K/UL (ref 0.3–1)
MONOCYTES NFR BLD: 8.8 % (ref 4–15)
NEUTROPHILS # BLD AUTO: 3.9 K/UL (ref 1.8–7.7)
NEUTROPHILS NFR BLD: 43.9 % (ref 38–73)
NRBC BLD-RTO: 0 /100 WBC
PLATELET # BLD AUTO: 259 K/UL (ref 150–450)
PMV BLD AUTO: 11.5 FL (ref 9.2–12.9)
POTASSIUM SERPL-SCNC: 4.4 MMOL/L (ref 3.5–5.1)
PROT SERPL-MCNC: 7.3 G/DL (ref 6–8.4)
RBC # BLD AUTO: 4.27 M/UL (ref 4–5.4)
SATURATED IRON: 24 % (ref 20–50)
SODIUM SERPL-SCNC: 136 MMOL/L (ref 136–145)
TOTAL IRON BINDING CAPACITY: 358 UG/DL (ref 250–450)
TRANSFERRIN SERPL-MCNC: 242 MG/DL (ref 200–375)
WBC # BLD AUTO: 8.94 K/UL (ref 3.9–12.7)

## 2024-11-20 ENCOUNTER — TELEPHONE (OUTPATIENT)
Dept: PRIMARY CARE CLINIC | Facility: CLINIC | Age: 84
End: 2024-11-20
Payer: MEDICARE

## 2024-11-20 NOTE — TELEPHONE ENCOUNTER
----- Message from Shelton sent at 11/20/2024  3:05 PM CST -----  Contact: [phone]  Patient is returning a phone call.    Who left a message for the patient: Yareli Glaser RN    Does patient know what this is regarding:  medication she has taking    Would you like a call back, or a response through your [facility] portal?:   call back    Comments:

## 2024-11-20 NOTE — TELEPHONE ENCOUNTER
"Cally said she felt like she "was going to die" from taking tizanidine yesterday. Can you check on pt?  "

## 2024-11-21 NOTE — TELEPHONE ENCOUNTER
Patient's daughter sent via portal on separate encounter.     She is doing better today and has not taken the medication again.  Thank you for checking.    Cally

## 2024-12-04 ENCOUNTER — CLINICAL SUPPORT (OUTPATIENT)
Dept: REHABILITATION | Facility: HOSPITAL | Age: 84
End: 2024-12-04
Attending: INTERNAL MEDICINE
Payer: MEDICARE

## 2024-12-04 DIAGNOSIS — M53.82 IMPAIRED RANGE OF MOTION OF CERVICAL SPINE: Primary | ICD-10-CM

## 2024-12-04 DIAGNOSIS — M54.2 NECK PAIN ON RIGHT SIDE: ICD-10-CM

## 2024-12-04 PROCEDURE — 97110 THERAPEUTIC EXERCISES: CPT | Mod: HCNC,PO

## 2024-12-04 PROCEDURE — 97161 PT EVAL LOW COMPLEX 20 MIN: CPT | Mod: HCNC,PO

## 2024-12-04 NOTE — PLAN OF CARE
[facility]   Physical Therapy Initial Evaluation        Date: 12/4/2024   Name: Laverne CELIS [facility] Number: [id number]    Therapy Diagnosis:   Encounter Diagnoses   Name Primary?    Neck pain on right side     Impaired range of motion of cervical spine Yes     Physician: Carolyne James MD    Physician Orders: PT Eval and Treat   Medical Diagnosis from Referral: Neck pain on right side   Evaluation Date: 12/4/2024  Authorization Period Expiration: 12/31/24  Plan of Care Expiration: 12/26/24  Progress Note Due: 1/4/25  Visit # / Visits authorized: 1/ 1     Foto  Date  Score    #1/3 12/4/24 79   #2/3     #3/3         Precautions: CKD     Time In: 705 AM  Time Out: 747 AM  Total Appointment Time (timed & untimed codes): 42 minutes      SUBJECTIVE     Date of onset: 4 weeks    History of current condition - Laverne reports: her neck pain started 4 weeks ago. She went to urgent care and was given a shot. The xray showed arthritis. She was also given medication for 2 weeks. She then went to see Dr. James and was given more medication but she did not like this medication. She discontinued taking it after one day.    Current Activity Level: independent, active     Falls: none    Imaging, see chart for x-ray    Prior Therapy: yes for shoulder  Social History: lives alone, no stairs in home  Occupation: not working  Prior Level of Function: independent   Current Level of Function: independent, but neck pain overall    Pain:  Current: 3 / 10, Worst: 5 / 10, Best: 1 / 10   Location: neck   Description:tightness   Aggravating Factors: moving the neck  Easing Factors: sitting straight    Patients goals: to feel better      Medical History:   Past Medical History:   Diagnosis Date    Allergy     Anxiety     Arthritis     Asthma     mild    Cataract     CKD (chronic kidney disease) stage 3, GFR 30-59 ml/min     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    Hepatitis     IBS (irritable bowel syndrome)      Moderate aortic valve stenosis     Osteoporosis     Paroxysmal atrial fibrillation     Reflux esophagitis     Torus palatinus        Surgical History:   Laverne Humphries  has a past surgical history that includes Cataract extraction, bilateral; Cataract extraction w/  intraocular lens implant (Bilateral); Blepharoptosis repair (Bilateral); Cholecystectomy; Hysterectomy; Eye surgery; Oophorectomy; Esophagogastroduodenoscopy (N/A, 01/20/2022); Colonoscopy (N/A, 01/20/2022); Robot-assisted laparoscopic colectomy using da Gera Xi (N/A, 4/26/2022); Flexible sigmoidoscopy (4/26/2022); Arthroscopic debridement of shoulder (Right, 3/21/2023); Arthroscopy of shoulder with decompression of subacromial space (Right, 3/21/2023); Arthroscopic repair of rotator cuff of shoulder (Right, 3/21/2023); and lysis, adhesions, shoulder, arthroscopic (3/21/2023).    Medications:   Laverne has a current medication list which includes the following prescription(s): apixaban, azelastine, calcium-vitamin d3, carboxymethylcellulose, clobetasol 0.05%, arnuity ellipta, lorazepam, metoprolol succinate, nystatin, pantoprazole, and sucralfate.    Allergies:   Review of patient's allergies indicates:   Allergen Reactions    Codeine      Other reaction(s): Syncope, can take tylenol    Sulfa (sulfonamide antibiotics)      Other reaction(s): Stomach upset    Tizanidine      Nausea/dizziness    Crestor [rosuvastatin] Other (See Comments)     Elevated bilirubin when taking          OBJECTIVE     Observations: pt enters clinic independently without assistive device,  Posture: forward head posture with rounded shoulders       Gait: WNL       Cervical Range of Motion:     Degrees Pain   Flexion 30 yes   Extension 30 yes   Right Rotation 40  yes      Left Rotation 45    yes   Right Side Bending 15 yes   Left Side Bending 20 yes      Shoulder Range of Motion:  normal          Upper Extremity Strength (measured from 0 to 5)  (R) UE   (L) UE    "  Shoulder flexion: 4 Shoulder flexion: 4+   Shoulder Abduction: 4 Shoulder abduction: 4+   Shoulder ER 4+ Shoulder ER 4+   Shoulder IR 4+ Shoulder IR 4+   Elbow flexion: 5 Elbow flexion: 5   Elbow extension: 5 Elbow extension: 5           Joint Mobility: not tested      Palpation: tender to palpation right suboccipitals and UT      Sensation: intact                 Special Tests:    30 Second Sit to Stand = 14 reps              Intake Outcome Measure for FOTO  Survey    Therapist reviewed FOTO scores for Laverne Humphries on 12/4/2024.   FOTO documents entered into [facility] - see Media section or FOTO account episode details.    Intake Score: 79         TREATMENT     Total Treatment time (time-based codes) separate from Evaluation: 15 minutes     Laverne received the treatments listed below:      Therapeutic Exercises to develop ROM, flexibility, and posture for 15 minutes including:    Intervention 12/4/2024  Parameters   Chin tuck supine [x] 10x3"   UT stretch without UE  [x] 2x30" each side   LS stretch  [x] 2x30" each side    Cervical rotation in supine with overpressure [x] 10x5" each side    Educated to keep stretches in pain free range     PATIENT EDUCATION AND HOME EXERCISES     Education provided:   Patient  was instructed in home exercise program  to address the deficits listed above and to address overall condition and quality of life.  Patient  was encouraged to participate in cardiovascular exercise and wellness exercise in fitness center with assistance of health  at [address].   Patient was educated on all the above exercise prior/during/after for proper posture, positioning, and execution for safe performance with home exercise program.    Written Home Exercises Provided: Patient instructed to cont prior HEP. Exercises were reviewed and Laverne was able to demonstrate them prior to the end of the session.  Laverne demonstrated good  understanding of the education provided. See EMR " under Patient Instructions for exercises provided during therapy sessions.    ASSESSMENT     Laverne is a 84 y.o. female referred to outpatient Physical Therapy with a medical diagnosis of Neck pain on right side . The patient presents with signs and symptoms consistent with this diagnosis and impairments which include decreased cervical range of motion, soft tissue extensibility and flexibility restrictions, and pain limiting overall function.  Patient prognosis is Excellent.   Patient will benefit from skilled outpatient Physical Therapy to address the deficits stated above and in the chart below, provide patient /family education, and to maximize patientt's level of independence.     Plan of care discussed with patient: Yes  Patient's spiritual, cultural and educational needs considered and patient is agreeable to the plan of care and goals as stated below:     Anticipated Barriers for therapy: none    Medical Necessity is demonstrated by the following   History  Co-morbidities and personal factors that may impact the plan of care [] LOW: no personal factors / co-morbidities  [x] MODERATE: 1-2 personal factors / co-morbidities  [] HIGH: 3+ personal factors / co-morbidities    Moderate / High Support Documentation:   Past Medical History:   Diagnosis Date    Allergy     Anxiety     Arthritis     Asthma     mild    Cataract     CKD (chronic kidney disease) stage 3, GFR 30-59 ml/min     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     Clinical Presentation [x] LOW: stable  [] MODERATE: Evolving  [] HIGH: Unstable     Decision Making/ Complexity Score: low       Goals:  Number Goal Progress    Patient will be independent with self management of her condition with home exercise program, posture, positioning and improved body mechanics. Progressing - 12/4/2024    Patient will demonstrate improved cervical Range of motion by >/=10-15 degrees in all planes. Progressing - 12/4/2024    Pt to report pain </=2/10 with cervical AROM to improve symptoms  Progressing - 12/4/2024    Pt to report >/=75% improvement in pain to improve QoL Progressing - 12/4/2024     PLAN   Plan of care Certification: 12/4/2024 to 12/26/24.    Outpatient Physical Therapy 1 times/  3 week(s) to include the following interventions: Manual Therapy, Moist Heat/ Ice, Neuromuscular Re-ed, Patient Education, Self Care, Therapeutic Activities, and Therapeutic Exercise to establish safe and effective exercise program that patient can perform independently. Health  will contact patient either by phone or in person weekly to monitor exercise program, answer questions regarding exercises and encourage follow up in fitness center. Health  will communicate any concerns with treating therapist and will recommend follow up with physical therapist as needed.    Rosa Mora, PT, DPT

## 2024-12-09 NOTE — PROGRESS NOTES
"[facility]   Physical Therapy Treatment Note      Name: Laverne CELIS [place]  Clinic Number: [id number]    Therapy Diagnosis:   Encounter Diagnosis   Name Primary?    Impaired range of motion of cervical spine Yes     Physician: Carolyne James MD    Visit Date: 12/10/2024    Physician Orders: PT Eval and Treat   Medical Diagnosis from Referral: Neck pain on right side   Evaluation Date: 12/4/2024  Authorization Period Expiration: 12/31/24  Plan of Care Expiration: 12/26/24  Progress Note Due: 1/4/25  Visit # / Visits authorized: 1/ 10      Foto  Date  Score    #1/3 12/4/24 79   #2/3       #3/3          Precautions: CKD      Time In: 706 AM  Time Out: 746 AM  Total Appointment Time (timed & untimed codes): 40 min  PTA Visit #: 0/5       Subjective     Patient reports: symptoms are the same, maybe a little better.  She was compliant with home exercise program.  Response to previous treatment: IE   Functional change: ongoing    Pain: 3/10  Location: posterior neck    Objective      Objective Measures updated at progress report unless specified.     Treatment     Laverne received the treatments listed below:      therapeutic exercises to develop ROM and flexibility for 10 minutes including:  UT stretch 2x30"  LS stretch 2x30"  Cervical rotation supine with overpressure x20 x5" holds    manual therapy techniques: Soft tissue Mobilization were applied to the: neck for 15 minutes, including:  STM to cervical paraspinals and suboccipitals, SOR    neuromuscular re-education activities to improve: Posture for 15 minutes. The following activities were included:  Chin tuck with towel under neck 3x10 with 3" holds  +seated scap squeeze 2x10 ,5" hold  +rows GTB 3x10  +extensions GTB 3x10  +supine horizontal abduction RTB 3x10      Patient Education and Home Exercises       Education provided:   Patient  was instructed in home exercise program  to address the deficits listed above and to address overall " condition and quality of life.  Patient  was encouraged to participate in cardiovascular exercise and wellness exercise in fitness center with assistance of health  at [address].   Patient was educated on all the above exercise prior/during/after for proper posture, positioning, and execution for safe performance with home exercise program    Written Home Exercises Provided: Yes. Exercises were reviewed and Laverne was able to demonstrate them prior to the end of the session.  Laverne demonstrated good  understanding of the education provided. See Electronic Medical Record under Patient Instructions for exercises provided during therapy sessions    Assessment     Mrs. Humphries does great with session. She tolerates manual therapy interventions well with reduction in pain. She is able tolerate progressions this session without increase in symptoms. HEP updated and reviewed with patient. Progress as tolerated.    Laverne Is progressing well towards her goals.   Patient prognosis is Excellent.     Patient will continue to benefit from skilled outpatient physical therapy to address the deficits listed in the problem list box on initial evaluation, provide pt/family education and to maximize pt's level of independence in the home and community environment.     Patient's spiritual, cultural and educational needs considered and pt agreeable to plan of care and goals.     Anticipated barriers to physical therapy: none    Goals:  Number Goal Progress     Patient will be independent with self management of her condition with home exercise program, posture, positioning and improved body mechanics. Progressing      Patient will demonstrate improved cervical Range of motion by >/=10-15 degrees in all planes. Progressing      Pt to report pain </=2/10 with cervical AROM to improve symptoms  Progressing      Pt to report >/=75% improvement in pain to improve QoL Progressing        Plan   PT POC from eval:  Plan  of care Certification: 12/4/2024 to 12/26/24.     Outpatient Physical Therapy 1 times/  3 week(s) to include the following interventions: Manual Therapy, Moist Heat/ Ice, Neuromuscular Re-ed, Patient Education, Self Care, Therapeutic Activities, and Therapeutic Exercise to establish safe and effective exercise program that patient can perform independently. Health  will contact patient either by phone or in person weekly to monitor exercise program, answer questions regarding exercises and encourage follow up in fitness center. Health  will communicate any concerns with treating therapist and will recommend follow up with physical therapist as needed.         Rosa Mora, PT

## 2024-12-10 ENCOUNTER — CLINICAL SUPPORT (OUTPATIENT)
Dept: REHABILITATION | Facility: HOSPITAL | Age: 84
End: 2024-12-10
Payer: MEDICARE

## 2024-12-10 DIAGNOSIS — M53.82 IMPAIRED RANGE OF MOTION OF CERVICAL SPINE: Primary | ICD-10-CM

## 2024-12-10 PROCEDURE — 97112 NEUROMUSCULAR REEDUCATION: CPT | Mod: HCNC,PO

## 2024-12-10 PROCEDURE — 97140 MANUAL THERAPY 1/> REGIONS: CPT | Mod: HCNC,PO

## 2024-12-10 PROCEDURE — 97110 THERAPEUTIC EXERCISES: CPT | Mod: HCNC,PO

## 2024-12-13 ENCOUNTER — PATIENT MESSAGE (OUTPATIENT)
Dept: PRIMARY CARE CLINIC | Facility: CLINIC | Age: 84
End: 2024-12-13
Payer: MEDICARE

## 2024-12-16 NOTE — PROGRESS NOTES
"[facility]   Physical Therapy Treatment Note      Name: Laverne CELIS [place]  Clinic Number: [id number]    Therapy Diagnosis:   Encounter Diagnosis   Name Primary?    Impaired range of motion of cervical spine Yes       Physician: Carolyne James MD    Visit Date: 12/17/2024    Physician Orders: PT Eval and Treat   Medical Diagnosis from Referral: Neck pain on right side   Evaluation Date: 12/4/2024  Authorization Period Expiration: 12/31/24  Plan of Care Expiration: 12/26/24  Progress Note Due: 1/4/25  Visit # / Visits authorized:2/ 10      Foto  Date  Score    #1/3 12/4/24 79   #2/3 12/17/24   85   #3/3          Precautions: CKD      Time In: 702 AM  Time Out: 748 AM  Total Appointment Time (timed & untimed codes): 23 min  PTA Visit #: 0/5       Subjective     Patient reports: she is much better  She was compliant with home exercise program.  Response to previous treatment: good  Functional change: doing much much better    Pain: 1/10  Location: posterior neck    Objective      Objective Measures updated at progress report unless specified.     Treatment     Laverne received the treatments listed below:      therapeutic exercises to develop ROM and flexibility for 15 minutes including:  UT stretch 2x30"  LS stretch 2x30"  Cervical rotation supine with overpressure x20 x5" holds  FOTO    manual therapy techniques: Soft tissue Mobilization were applied to the: neck for 11 minutes, including:  STM to cervical paraspinals and suboccipitals, SOR    neuromuscular re-education activities to improve: Posture for 20 minutes. The following activities were included:  Chin tuck supine- 3x10 with 3" holds  seated scap squeeze 2x10 ,3" hold  rows GTB 3x10  extensions GTB 3x10  supine horizontal abduction GTB 3x10      Patient Education and Home Exercises       Education provided:   Patient  was instructed in home exercise program  to address the deficits listed above and to address overall condition and " quality of life.  Patient  was encouraged to participate in cardiovascular exercise and wellness exercise in fitness center with assistance of health  at [address].   Patient was educated on all the above exercise prior/during/after for proper posture, positioning, and execution for safe performance with home exercise program    Written Home Exercises Provided: Pt instructed to continue prior HEP. Exercises were reviewed and Laverne was able to demonstrate them prior to the end of the session.  Laverne demonstrated good  understanding of the education provided. See Electronic Medical Record under Patient Instructions for exercises provided during therapy sessions    Assessment     Mrs. Humphries does great with session. She reports significant improvement in her symptoms. Her FOTO score has improved to 85. She is educated to remain compliant with her home exercise program to maintain all improvements. She is appropriate for discharge next session and is agreeable to this plan.    Laverne Is progressing well towards her goals.   Patient prognosis is Excellent.     Patient will continue to benefit from skilled outpatient physical therapy to address the deficits listed in the problem list box on initial evaluation, provide pt/family education and to maximize pt's level of independence in the home and community environment.     Patient's spiritual, cultural and educational needs considered and pt agreeable to plan of care and goals.     Anticipated barriers to physical therapy: none    Goals:  Number Goal Progress     Patient will be independent with self management of her condition with home exercise program, posture, positioning and improved body mechanics. MET     Patient will demonstrate improved cervical Range of motion by >/=10-15 degrees in all planes. Progressing      Pt to report pain </=2/10 with cervical AROM to improve symptoms  MET     Pt to report >/=75% improvement in pain to improve QoL  MET       Plan   PT POC from eval:  Plan of care Certification: 12/4/2024 to 12/26/24.     Outpatient Physical Therapy 1 times/  3 week(s) to include the following interventions: Manual Therapy, Moist Heat/ Ice, Neuromuscular Re-ed, Patient Education, Self Care, Therapeutic Activities, and Therapeutic Exercise to establish safe and effective exercise program that patient can perform independently. Health  will contact patient either by phone or in person weekly to monitor exercise program, answer questions regarding exercises and encourage follow up in fitness center. Health  will communicate any concerns with treating therapist and will recommend follow up with physical therapist as needed.      Discharged from next visit.         Rosa Mora, PT

## 2024-12-17 ENCOUNTER — CLINICAL SUPPORT (OUTPATIENT)
Dept: REHABILITATION | Facility: HOSPITAL | Age: 84
End: 2024-12-17
Payer: MEDICARE

## 2024-12-17 DIAGNOSIS — M53.82 IMPAIRED RANGE OF MOTION OF CERVICAL SPINE: Primary | ICD-10-CM

## 2024-12-17 PROCEDURE — 97140 MANUAL THERAPY 1/> REGIONS: CPT | Mod: HCNC,PO

## 2024-12-17 PROCEDURE — 97112 NEUROMUSCULAR REEDUCATION: CPT | Mod: HCNC,PO

## 2024-12-23 NOTE — PROGRESS NOTES
"[facility]   Physical Therapy Treatment Note / Discharge Note     Name: Laverne Humphries  Clinic Number: [id number]    Therapy Diagnosis:   Encounter Diagnosis   Name Primary?    Impaired range of motion of cervical spine Yes         Physician: Carolyne James MD    Visit Date: 12/24/2024    Physician Orders: PT Eval and Treat   Medical Diagnosis from Referral: Neck pain on right side   Evaluation Date: 12/4/2024  Authorization Period Expiration: 12/31/24  Plan of Care Expiration: 12/26/24  Progress Note Due: 1/4/25  Visit # / Visits authorized:3/ 10      Foto  Date  Score    #1/3 12/4/24 79   #2/3 12/17/24   85   #3/3 12/24/24  85       Precautions: CKD      Time In: 7:04 AM  Time Out: 7:42 AM  Total Appointment Time (timed & untimed codes): 38 min  PTA Visit #: 0/5       Subjective     Patient reports: sometimes neck has pain in the morning but when she does her exercises it goes away  She was compliant with home exercise program.  Response to previous treatment: good  Functional change: doing much much better    Pain: 0/10  Location: posterior neck    Objective      Bold=reassessment    Cervical Range of Motion:     Degrees Pain   Flexion 30 50 Yes minimal   Extension 30 45 Yes minimal   Right Rotation 40 45  yes no      Left Rotation 45 45     yes no   Right Side Bending 15 30 Yes no   Left Side Bending 20 30 Yes no                 Intake Outcome Measure for FOTO  Survey     Therapist reviewed FOTO scores for Laverne Humphries   FOTO documents entered into [employer] - see Media section or FOTO account episode details.     Intake Score: 85         Treatment     Laverne received the treatments listed below:      therapeutic exercises to develop ROM and flexibility for 15 minutes including:  UT stretch 2x30"  LS stretch 2x30"  Cervical rotation supine with overpressure x20 x5" holds  FOTO  Reassessment    manual therapy techniques: Soft tissue Mobilization were applied to the: neck for 8 " "minutes, including:  STM to cervical paraspinals and suboccipitals, SOR    neuromuscular re-education activities to improve: Posture for 15 minutes. The following activities were included:  Chin tuck supine- 3x10 with 3" holds  seated scap squeeze 3x10 ,5" hold  rows GTB 3x10  extensions GTB 3x10  supine horizontal abduction GTB 2x10      Patient Education and Home Exercises       Education provided:   Discharge plan and to continue HEP    Written Home Exercises Provided: Pt instructed to continue prior HEP. Exercises were reviewed and Laverne was able to demonstrate them prior to the end of the session.  Laverne demonstrated good  understanding of the education provided. See Electronic Medical Record under Patient Instructions for exercises provided during therapy sessions    Assessment     Laverne has done wonderful with PT plan of care and is progressing excellently with decreased pain and improved neck range of motion. At this time, she is appropriate to discharge to independent home exercise program and is agreeable to this plan.      Goals:  Number Goal Progress     Patient will be independent with self management of her condition with home exercise program, posture, positioning and improved body mechanics. MET     Patient will demonstrate improved cervical Range of motion by >/=10-15 degrees in all planes. ALMOST MET     Pt to report pain </=2/10 with cervical AROM to improve symptoms  MET     Pt to report >/=75% improvement in pain to improve QoL MET       Plan   Patient is discharged today      Rosa Mora, PT , DPT        "

## 2024-12-24 ENCOUNTER — CLINICAL SUPPORT (OUTPATIENT)
Dept: REHABILITATION | Facility: HOSPITAL | Age: 84
End: 2024-12-24
Payer: MEDICARE

## 2024-12-24 DIAGNOSIS — M53.82 IMPAIRED RANGE OF MOTION OF CERVICAL SPINE: Primary | ICD-10-CM

## 2024-12-24 PROCEDURE — 97140 MANUAL THERAPY 1/> REGIONS: CPT | Mod: HCNC,PO

## 2024-12-24 PROCEDURE — 97112 NEUROMUSCULAR REEDUCATION: CPT | Mod: HCNC,PO

## 2024-12-24 PROCEDURE — 97110 THERAPEUTIC EXERCISES: CPT | Mod: HCNC,PO

## 2024-12-30 ENCOUNTER — OFFICE VISIT (OUTPATIENT)
Dept: PRIMARY CARE CLINIC | Facility: CLINIC | Age: 84
End: 2024-12-30
Payer: MEDICARE

## 2024-12-30 VITALS
WEIGHT: 121.81 LBS | DIASTOLIC BLOOD PRESSURE: 72 MMHG | HEART RATE: 71 BPM | BODY MASS INDEX: 23.79 KG/M2 | OXYGEN SATURATION: 98 % | SYSTOLIC BLOOD PRESSURE: 132 MMHG

## 2024-12-30 DIAGNOSIS — M53.82 DECREASED ROM OF INTERVERTEBRAL DISCS OF CERVICAL SPINE: ICD-10-CM

## 2024-12-30 DIAGNOSIS — L29.9 ITCHY SKIN: Primary | ICD-10-CM

## 2024-12-30 DIAGNOSIS — I27.20 PULMONARY HYPERTENSION: ICD-10-CM

## 2024-12-30 PROCEDURE — 99213 OFFICE O/P EST LOW 20 MIN: CPT | Mod: HCNC,S$GLB,, | Performed by: PHYSICIAN ASSISTANT

## 2024-12-30 PROCEDURE — 1157F ADVNC CARE PLAN IN RCRD: CPT | Mod: HCNC,CPTII,S$GLB, | Performed by: PHYSICIAN ASSISTANT

## 2024-12-30 PROCEDURE — 99999 PR PBB SHADOW E&M-EST. PATIENT-LVL III: CPT | Mod: PBBFAC,HCNC,, | Performed by: PHYSICIAN ASSISTANT

## 2024-12-30 PROCEDURE — 1101F PT FALLS ASSESS-DOCD LE1/YR: CPT | Mod: HCNC,CPTII,S$GLB, | Performed by: PHYSICIAN ASSISTANT

## 2024-12-30 PROCEDURE — 1160F RVW MEDS BY RX/DR IN RCRD: CPT | Mod: HCNC,CPTII,S$GLB, | Performed by: PHYSICIAN ASSISTANT

## 2024-12-30 PROCEDURE — 3288F FALL RISK ASSESSMENT DOCD: CPT | Mod: HCNC,CPTII,S$GLB, | Performed by: PHYSICIAN ASSISTANT

## 2024-12-30 PROCEDURE — 1159F MED LIST DOCD IN RCRD: CPT | Mod: HCNC,CPTII,S$GLB, | Performed by: PHYSICIAN ASSISTANT

## 2024-12-30 PROCEDURE — 1126F AMNT PAIN NOTED NONE PRSNT: CPT | Mod: HCNC,CPTII,S$GLB, | Performed by: PHYSICIAN ASSISTANT

## 2024-12-30 RX ORDER — TRIAMCINOLONE ACETONIDE 1 MG/G
CREAM TOPICAL 2 TIMES DAILY
Qty: 80 G | Refills: 1 | Status: SHIPPED | OUTPATIENT
Start: 2024-12-30

## 2024-12-30 NOTE — PROGRESS NOTES
Primary Care Provider Appointment   [facility]        Subjective:       Patient ID:  Laverne padgett a 84 y.o. female being seen for Neck Pain (Has gotten better with therapy ) and Burn (Lower back, hot water in the shower)      Chief Complaint: Neck Pain (Has gotten better with therapy ) and Burn (Lower back, hot water in the shower)    HPI: 85 yo female presents for follow up neck pain and skin eruption.   Neck pain resolved with PT. Doing the exercises at home, PT is done. Not taking any medications for it.   Skin issue: 1 week. Recurrent issue. Low mid back above buttocks. Itching, no pain. Used steroid cream in the past with relief needs refill.       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   Integumentary:  Positive for rash. Negative for wound and mole/lesion.             Health Maintenance         Date Due Completion Date    RSV Vaccine (Age 60+ and Pregnant patients) (1 - 1-dose 75+ series) Never done ---    COVID-19 Vaccine (4 - 2024-25 season) 09/01/2024 1/5/2022    DEXA Scan 01/20/2025 1/20/2023    Lipid Panel 04/18/2029 4/18/2024    TETANUS VACCINE 11/12/2033 11/12/2023                     Objective:      Vitals:    12/30/24 0803   BP: 132/72   BP Location: Left arm   Patient Position: Sitting   Pulse: 71   SpO2: 98%   Weight: 55.3 kg (121 lb 12.9 oz)     Estimated body mass index is 23.79 kg/m² as calculated from the following:    Height as of 11/18/24: 5' (1.524 m).    Weight as of this encounter: 55.3 kg (121 lb 12.9  oz).  Physical Exam  Constitutional:       Appearance: Normal appearance.   HENT:      Head: Normocephalic and atraumatic.   Musculoskeletal:      Cervical back: No pain with movement, spinous process tenderness or muscular tenderness. Decreased range of motion.   Skin:         Neurological:      Mental Status: She is alert.           Assessment and Plan:         1. Itchy skin  -     triamcinolone acetonide 0.1% (KENALOG) 0.1 % cream; Apply topically 2 (two) times daily.  Dispense: 80 g; Refill: 1    2. Decreased ROM of intervertebral discs of cervical spine  Assessment & Plan:  Doing well, continue home PT exercises      3. Pulmonary hypertension  Assessment & Plan:  36 on echo  BP controlled  No SOB           Follow Up:   F/u in Feb with pcp        Amy Lado, PA-C [facility]

## 2025-01-13 DIAGNOSIS — Z00.00 ENCOUNTER FOR MEDICARE ANNUAL WELLNESS EXAM: ICD-10-CM

## 2025-02-03 RX ORDER — FLUTICASONE FUROATE 100 UG/1
POWDER RESPIRATORY (INHALATION)
Qty: 90 EACH | Refills: 3 | Status: SHIPPED | OUTPATIENT
Start: 2025-02-03

## 2025-02-07 ENCOUNTER — OFFICE VISIT (OUTPATIENT)
Dept: URGENT CARE | Facility: CLINIC | Age: 85
End: 2025-02-07
Payer: MEDICARE

## 2025-02-07 VITALS
OXYGEN SATURATION: 97 % | DIASTOLIC BLOOD PRESSURE: 75 MMHG | WEIGHT: 121 LBS | BODY MASS INDEX: 23.63 KG/M2 | HEART RATE: 80 BPM | SYSTOLIC BLOOD PRESSURE: 145 MMHG | TEMPERATURE: 99 F

## 2025-02-07 DIAGNOSIS — J06.9 VIRAL URI: ICD-10-CM

## 2025-02-07 DIAGNOSIS — R05.9 COUGH, UNSPECIFIED TYPE: Primary | ICD-10-CM

## 2025-02-07 LAB
CTP QC/QA: YES
CTP QC/QA: YES
POC MOLECULAR INFLUENZA A AGN: NEGATIVE
POC MOLECULAR INFLUENZA B AGN: NEGATIVE
SARS CORONAVIRUS 2 ANTIGEN: NEGATIVE

## 2025-02-07 PROCEDURE — 99214 OFFICE O/P EST MOD 30 MIN: CPT | Mod: S$GLB,,,

## 2025-02-07 PROCEDURE — 87502 INFLUENZA DNA AMP PROBE: CPT | Mod: QW,S$GLB,,

## 2025-02-07 PROCEDURE — 87811 SARS-COV-2 COVID19 W/OPTIC: CPT | Mod: QW,S$GLB,,

## 2025-02-07 RX ORDER — GUAIFENESIN 600 MG/1
1200 TABLET, EXTENDED RELEASE ORAL 2 TIMES DAILY
Qty: 20 TABLET | Refills: 0 | Status: SHIPPED | OUTPATIENT
Start: 2025-02-07 | End: 2025-02-12

## 2025-02-07 RX ORDER — BENZONATATE 200 MG/1
200 CAPSULE ORAL 3 TIMES DAILY PRN
Qty: 15 CAPSULE | Refills: 0 | Status: SHIPPED | OUTPATIENT
Start: 2025-02-07 | End: 2025-02-12

## 2025-02-07 RX ORDER — ALBUTEROL SULFATE 90 UG/1
2 INHALANT RESPIRATORY (INHALATION) EVERY 6 HOURS PRN
Qty: 6.7 G | Refills: 0 | Status: SHIPPED | OUTPATIENT
Start: 2025-02-07 | End: 2025-02-14

## 2025-02-07 NOTE — PATIENT INSTRUCTIONS
Your illness is caused by a virus and antibiotics would not be beneficial at this time.    Please drink plenty of fluids and get plenty of rest.    For Congestion:     --  Take over the counter antihistamine such as Claritin, Zyrtec or Allegra to dry you out.     --  Use Flonase 2 sprays/nostril twice per day. It is a local acting steroid nasal spray and will take 3-4 days to work at full strength so please use this consistently.  If you develop a bloody nose, stop using the medication immediately.      For Cough:   --  Use Mucinex (guaifenisin) to break up mucous up to 2400mg/day to loosen any mucous. The Mucinex DM pill has a cough suppressant that can be sedating. It can be used at night to stop the tickle at the back of your throat.    --  May gargle salt water for sore throat, a tablespoon of honey is helpful for cough, especially at night.     If not allergic, please take over the counter Tylenol (Acetaminophen) and/or Motrin (Ibuprofen) as directed for control of pain and/or fever.    Please follow up with your primary care doctor or specialist as needed.    - You must understand that you have received an Urgent Care treatment only and that you may be released before all of your medical problems are known or treated.   - You, the patient, will arrange for follow up care as instructed.   - If your condition worsens or fails to improve we recommend that you receive another evaluation at the ER immediately or contact your PCP to discuss your concerns or return here.   - Follow up with your PCP or specialty clinic as directed in the next 1-2 weeks if not improved or as needed.  You can call [phone] to schedule an appointment with the appropriate provider.      If your symptoms do not improve or worsen, go to the emergency room immediately.

## 2025-02-11 DIAGNOSIS — F41.1 GAD (GENERALIZED ANXIETY DISORDER): ICD-10-CM

## 2025-02-11 DIAGNOSIS — G47.00 INSOMNIA, UNSPECIFIED TYPE: ICD-10-CM

## 2025-02-11 RX ORDER — LORAZEPAM 0.5 MG/1
0.5 TABLET ORAL NIGHTLY PRN
Qty: 30 TABLET | Refills: 2 | Status: SHIPPED | OUTPATIENT
Start: 2025-02-11

## 2025-02-18 ENCOUNTER — TELEPHONE (OUTPATIENT)
Dept: PRIMARY CARE CLINIC | Facility: CLINIC | Age: 85
End: 2025-02-18

## 2025-02-18 ENCOUNTER — OFFICE VISIT (OUTPATIENT)
Dept: PRIMARY CARE CLINIC | Facility: CLINIC | Age: 85
End: 2025-02-18
Payer: MEDICARE

## 2025-02-18 VITALS
BODY MASS INDEX: 23.83 KG/M2 | DIASTOLIC BLOOD PRESSURE: 66 MMHG | OXYGEN SATURATION: 96 % | HEIGHT: 60 IN | WEIGHT: 121.38 LBS | TEMPERATURE: 95 F | HEART RATE: 97 BPM | RESPIRATION RATE: 17 BRPM | SYSTOLIC BLOOD PRESSURE: 130 MMHG

## 2025-02-18 DIAGNOSIS — I70.0 AORTIC ATHEROSCLEROSIS: ICD-10-CM

## 2025-02-18 DIAGNOSIS — H90.3 SENSORINEURAL HEARING LOSS (SNHL) OF BOTH EARS: ICD-10-CM

## 2025-02-18 DIAGNOSIS — G47.00 INSOMNIA, UNSPECIFIED TYPE: Primary | ICD-10-CM

## 2025-02-18 DIAGNOSIS — I48.0 PAROXYSMAL ATRIAL FIBRILLATION: ICD-10-CM

## 2025-02-18 DIAGNOSIS — Z12.31 BREAST CANCER SCREENING BY MAMMOGRAM: ICD-10-CM

## 2025-02-18 DIAGNOSIS — Z78.0 POSTMENOPAUSAL ESTROGEN DEFICIENCY: ICD-10-CM

## 2025-02-18 DIAGNOSIS — I27.20 PULMONARY HYPERTENSION: ICD-10-CM

## 2025-02-18 DIAGNOSIS — F09 MILD COGNITIVE DISORDER: ICD-10-CM

## 2025-02-18 DIAGNOSIS — J43.8 PARASEPTAL EMPHYSEMA: ICD-10-CM

## 2025-02-18 DIAGNOSIS — M81.0 SENILE OSTEOPOROSIS: ICD-10-CM

## 2025-02-18 DIAGNOSIS — K21.9 GASTROESOPHAGEAL REFLUX DISEASE WITHOUT ESOPHAGITIS: ICD-10-CM

## 2025-02-18 DIAGNOSIS — Z90.49 HISTORY OF PARTIAL COLECTOMY: ICD-10-CM

## 2025-02-18 DIAGNOSIS — N18.31 STAGE 3A CHRONIC KIDNEY DISEASE: ICD-10-CM

## 2025-02-18 NOTE — PATIENT INSTRUCTIONS
Schedule bone density to make sure bones are not more brittle.   Start calcium and Vitamin over the counter.   Increase walking.     Schedule mammogram in April.     Labs before you come back to see me in 3 months.

## 2025-02-18 NOTE — PROGRESS NOTES
Subjective:       Patient ID: Laverne Humphries is a 84 y.o. female.    Chief Complaint: Follow-up    HPI  Lives by self. No assistance w/ ADLs. Still drives. Helps her sister who is in hospice at home currently. May take a nap if she's tired. Cooks and grocery shows and cleans.   Last eye exam 10/2/24 w/ Dr. Pond  MMG 4/2024 neg.  DEXA 1/20/23 - OSTEOPOROSIS. Declines meds due to potential side effects. Will take calcium and vitamin D.   Recently went to  and dx w/ viral URI 2/7/25 (flu and COVID neg).  SLUMS done today 23 of 30 - finished < high school.       2/18/2025     9:07 AM 4/18/2024    10:20 AM 1/2/2024     3:29 PM 12/5/2023     8:14 AM 11/13/2023    10:27 AM 8/22/2023     8:08 AM 2/3/2023    10:16 AM   Depression Patient Health Questionnaire   Over the last two weeks how often have you been bothered by little interest or pleasure in doing things Not at all Nearly every day Not at all Not at all Not at all Not at all Not at all    Over the last two weeks how often have you been bothered by feeling down, depressed or hopeless Not at all Not at all Not at all Not at all Not at all Not at all Not at all   PHQ-2 Total Score 0 3 0 0 0 0 0   Over the last two weeks how often have you been bothered by trouble falling or staying asleep, or sleeping too much  Not at all        Over the last two weeks how often have you been bothered by feeling tired or having little energy  Several days        Over the last two weeks how often have you been bothered by a poor appetite or overeating  Not at all        Over the last two weeks how often have you been bothered by feeling bad about yourself - or that you are a failure or have let yourself or your family down  Not at all        Over the last two weeks how often have you been bothered by trouble concentrating on things, such as reading the newspaper or watching television  Not at all        Over the last two weeks how often have you been bothered by moving or  speaking so slowly that other people could have noticed. Or the opposite - being so fidgety or restless that you have been moving around a lot more than usual.  Not at all        Over the last two weeks how often have you been bothered by thoughts that you would be better off dead, or of hurting yourself  Not at all        PHQ-9 Score  4        PHQ-9 Interpretation  Minimal or None            Data saved with a previous flowsheet row definition         1/4/2019     3:04 PM 4/18/2024    10:20 AM   BELLO-7   Was test performed?  Yes   1. Feeling nervous, anxious, or on edge? Several days Not at all   2. Not being able to stop or control worrying? Not at all Several days   3. Worrying too much about different things?  Several days   4. Trouble relaxing?  Not at all   5. Being so restless that it is hard to sit still?  Not at all   6. Becoming easily annoyed or irritable?  Not at all   7. Feeling afraid as if something awful might happen?  Not at all   BELLO-7 Score  2   Number answered (out of first 7) 2 7   Interpretation  Normal       Insomnia - on ativan 0.5mg qhs for many years. Understands risks associated w/ it but would wish to continue.     pAFib - eliquis 2.5mg BID, toprol xl 25mg daily.   TTE 4/2022 -   The left ventricle is normal in size with mild eccentric hypertrophy - mildly sigmoid septum - and normal systolic function.  The estimated ejection fraction is 65%.  A diastolic pattern consistent with atrial fibrillation observed.  Normal right ventricular size with normal right ventricular systolic function.  Mild aortic regurgitation.  Moderate aortic valve sclerosis.  Moderate mitral regurgitation.  Moderate tricuspid regurgitation.  The estimated PA systolic pressure is 36 mmHg.  Normal central venous pressure (3 mmHg).  Has f/u appt w/ Dr. Yeh 3/12/25.    H/o diverticulitis. No abdominal pain/nausea/vomiting.   Cscope 1/2022 - internal hemorrhoids. Diverticulosis.   The colonoscope was only able to be  passed to                          25cm proximal to the anus. Due to dense pelvic                          adhesions and restricted mobility of the colon the                          scope was unable to be advanced beyond this                          location in spite of using pressure and placing                          the patient on her back.   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 Mcclain  GERD - protonix 40mg qam. Taking med daily now. Helps.   EGD 1/2022  Impression:            - Normal esophagus.                          - Z-line regular, 39 cm from the incisors.                          - Multiple gastric polyps. Biopsied.                          - Normal examined duodenum.                          - Biopsies were taken with a cold forceps for                          histology.   Saw Dr. Rosales 6/2024. F/u prn.    EMG 2023 - B CTS, mod on R and mild on L.  Reports doing ok.     Aortic atherosclerosis.   Previously was on crestor 5mg but thought to elevate her Tbili and she's no longer on it. Declines atorvastatin.    CT A/P 10/2021 - Lungs: Mild paraseptal emphysema.   CT A/P 3/2022 - Lung bases: Mild interstitial thickening/fibrotic changes. No lung consolidation.   Nonsmoker.    CKD3a - last Cr 1, eGFR 55.2    Daughter told her her hearing is not great.     Review of Systems   Constitutional:  Negative for activity change and unexpected weight change.   HENT:  Positive for hearing loss (daughter told her she may be having some hearing loss). Negative for rhinorrhea and trouble swallowing.    Eyes:  Negative for discharge and visual disturbance.   Respiratory:  Negative for chest tightness and wheezing.    Cardiovascular:  Negative for chest pain and palpitations.   Gastrointestinal:  Negative for blood in stool, constipation, diarrhea and vomiting.   Endocrine: Negative for polydipsia and polyuria.   Genitourinary:  Negative for difficulty urinating, dysuria,  hematuria and menstrual problem.   Musculoskeletal:  Negative for arthralgias, joint swelling and neck pain.   Neurological:  Positive for weakness (generalized sometimes. able to complete ADLs.) and headaches (sometimes).   Psychiatric/Behavioral:  Negative for confusion and dysphoric mood.          Objective:      Physical Exam    /66 (BP Location: Left arm, Patient Position: Sitting)   Pulse 97   Temp (!) 95.2 °F (35.1 °C) (Oral)   Resp 17   Ht 5' (1.524 m)   Wt 55 kg (121 lb 5.8 oz)   LMP  (LMP Unknown)   SpO2 96%   BMI 23.70 kg/m²     GEN - A+OX4, NAD   HEENT - PERRL, EOMI, OP clear. MMM. TM normal.   Neck - No thyromegaly or cervical LAD. No thyroid masses felt.  CV - RRR, no m/r   Chest - CTAB, no wheezing or rhonchi  Abd - S/NT/ND/+BS.   Ext - 2+BDP and radial pulses. No C/C/E.  Neuro - PERRL, EOMI, no nystagmus, eyebrow raise, facial sensation, hearing, m of mastication, smile, palatal raise, shoulder shrug, tongue protrusion symmetric and intact. 5/5 BUE and BLE strength. 2+ BDTRs.  MSK - no spinal tenderness to palpation. Normal gait.  Skin - No rash.     Previous labs reviewed.     Assessment/Plan     Laverne was seen today for follow-up.    Diagnoses and all orders for this visit:    Insomnia, unspecified type - doing well on current meds. Understands risks of staying on ativan - risk of falls, dependence, tolerance, etc. Pt would like to remain on it.    Senile osteoporosis - declines medicines due to potential side effects. Increase walking. Will restart Ca and D. Light weight bearing exercises.   -     DXA Bone Density Axial Skeleton 1 or more sites; Future    Mild cognitive disorder - able to complete all her ADLs. Helps to take care of sister who is at home hospice. Likely due to age. Will cont to monitor.     Paroxysmal atrial fibrillation - cont current meds.   -     Comprehensive Metabolic Panel; Future  -     CBC Auto Differential; Future    Pulmonary hypertension - euvolemic. BP  ok. Cont to monitor.     History of partial colectomy - no issues.     Gastroesophageal reflux disease without esophagitis - reports taking meds daily now and helps control symptoms.     Aortic atherosclerosis  -     Comprehensive Metabolic Panel; Future  -     Lipid Panel; Future    Paraseptal emphysema - seen on imaging only. Asymptomatic.     Stage 3a chronic kidney disease  -     Microalbumin/Creatinine Ratio, Urine; Future  -     PTH, Intact; Future    Postmenopausal estrogen deficiency  -     DXA Bone Density Axial Skeleton 1 or more sites; Future    Breast cancer screening by mammogram  -     Mammo Digital Screening Bilat w/ Eliazar (XPD); Future    Sensorineural hearing loss (SNHL) of both ears  -     Comprehensive audiogram; Future  -     Ambulatory referral/consult to Audiology; Future    Advance Care Planning     Date: 02/18/2025    ACP Reviewed/No Changes  Voluntary advance care planning discussion had today with patient. Previously completed HCPOA and LW in electronic medical record is current, no changes made.      A total of 3 min was spent on advance care planning, goals of care discussion, emotional support, formulating and communicating prognosis and exploring burden/benefit of various approaches of treatment. This discussion occurred on a fully voluntary basis with the verbal consent of the patient and/or family.           Follow up in about 3 months (around 5/18/2025).      Carolyne James MD  Department of Internal Medicine - [facility]  8:12 AM

## 2025-03-12 ENCOUNTER — OFFICE VISIT (OUTPATIENT)
Dept: CARDIOLOGY | Facility: CLINIC | Age: 85
End: 2025-03-12
Attending: INTERNAL MEDICINE
Payer: MEDICARE

## 2025-03-12 VITALS
HEART RATE: 80 BPM | OXYGEN SATURATION: 97 % | BODY MASS INDEX: 23.66 KG/M2 | WEIGHT: 120.5 LBS | DIASTOLIC BLOOD PRESSURE: 58 MMHG | HEIGHT: 60 IN | SYSTOLIC BLOOD PRESSURE: 120 MMHG

## 2025-03-12 DIAGNOSIS — N18.31 STAGE 3A CHRONIC KIDNEY DISEASE: ICD-10-CM

## 2025-03-12 DIAGNOSIS — I70.0 ATHEROSCLEROSIS OF AORTA: ICD-10-CM

## 2025-03-12 DIAGNOSIS — I48.0 PAROXYSMAL ATRIAL FIBRILLATION: ICD-10-CM

## 2025-03-12 DIAGNOSIS — Z79.01 CHRONIC ANTICOAGULATION: ICD-10-CM

## 2025-03-12 PROCEDURE — 3288F FALL RISK ASSESSMENT DOCD: CPT | Mod: HCNC,CPTII,S$GLB, | Performed by: INTERNAL MEDICINE

## 2025-03-12 PROCEDURE — 99999 PR PBB SHADOW E&M-EST. PATIENT-LVL III: CPT | Mod: PBBFAC,HCNC,, | Performed by: INTERNAL MEDICINE

## 2025-03-12 PROCEDURE — 93000 ELECTROCARDIOGRAM COMPLETE: CPT | Mod: HCNC,S$GLB,, | Performed by: INTERNAL MEDICINE

## 2025-03-12 PROCEDURE — 1101F PT FALLS ASSESS-DOCD LE1/YR: CPT | Mod: HCNC,CPTII,S$GLB, | Performed by: INTERNAL MEDICINE

## 2025-03-12 PROCEDURE — 93005 ELECTROCARDIOGRAM TRACING: CPT | Mod: HCNC

## 2025-03-12 PROCEDURE — 1159F MED LIST DOCD IN RCRD: CPT | Mod: HCNC,CPTII,S$GLB, | Performed by: INTERNAL MEDICINE

## 2025-03-12 PROCEDURE — 1157F ADVNC CARE PLAN IN RCRD: CPT | Mod: HCNC,CPTII,S$GLB, | Performed by: INTERNAL MEDICINE

## 2025-03-12 PROCEDURE — 99214 OFFICE O/P EST MOD 30 MIN: CPT | Mod: HCNC,25,S$GLB, | Performed by: INTERNAL MEDICINE

## 2025-03-12 PROCEDURE — 1126F AMNT PAIN NOTED NONE PRSNT: CPT | Mod: HCNC,CPTII,S$GLB, | Performed by: INTERNAL MEDICINE

## 2025-03-12 PROCEDURE — 1160F RVW MEDS BY RX/DR IN RCRD: CPT | Mod: HCNC,CPTII,S$GLB, | Performed by: INTERNAL MEDICINE

## 2025-03-12 RX ORDER — METOPROLOL SUCCINATE 25 MG/1
25 TABLET, EXTENDED RELEASE ORAL DAILY
Qty: 90 TABLET | Refills: 3 | Status: SHIPPED | OUTPATIENT
Start: 2025-03-12

## 2025-03-12 NOTE — PROGRESS NOTES
Subjective:     Laverne Humphries is a 84 y.o. female with no history of any cardiac issues. She has had diverticulitis and developed a colovaginal fistula. She came in with plans for elective surgery on 4/1/2022. She was noted to be in atrial fibrillation with a ventricular response rate of about 120-140 bpm. She denied any palpitations, chest pain or shortness of breath. She said she had fair exercise tolerance. It was unclear for how long she had been in atrial fibrillation. An electrocardiogram from 2019 revealed she was in sinus rhythm at that time. She was prescribed metoprolol 25 mg Q12. The surgery got rescheduled for 4/26/2022.  She had a computed tomography scan of her abdomen in 2021 that revealed mild atherosclerosis. No exertional shortness of breath. No palpitations or weak spells. No bleeding. No issues with any of her prescribed medications. Feeling well overall.      Atrial Fibrillation  Presents for follow-up visit. Symptoms are negative for bradycardia, chest pain, dizziness, hemodynamic instability, hypertension, hypotension, palpitations, shortness of breath, syncope, tachycardia and weakness. The symptoms have been stable.       Review of Systems   Constitutional: Negative for chills, fever and malaise/fatigue.   HENT:  Negative for nosebleeds and tinnitus.    Eyes:  Negative for double vision, vision loss in left eye and vision loss in right eye.   Cardiovascular:  Negative for chest pain, claudication, dyspnea on exertion, irregular heartbeat, leg swelling, near-syncope, orthopnea, palpitations, paroxysmal nocturnal dyspnea and syncope.   Respiratory:  Negative for cough, hemoptysis, shortness of breath and wheezing.    Endocrine: Negative for cold intolerance and heat intolerance.   Hematologic/Lymphatic: Negative for bleeding problem. Does not bruise/bleed easily.   Skin:  Negative for color change and rash.   Musculoskeletal:  Negative for back pain, falls, muscle weakness and myalgias.    Gastrointestinal:  Negative for abdominal pain, diarrhea, dysphagia, heartburn, hematemesis, hematochezia, hemorrhoids, jaundice, melena, nausea and vomiting.   Genitourinary:  Negative for dysuria and hematuria.   Neurological:  Negative for dizziness, focal weakness, headaches, light-headedness, loss of balance, numbness, tremors, vertigo and weakness.   Psychiatric/Behavioral:  Negative for altered mental status, depression and memory loss. The patient is not nervous/anxious.    Allergic/Immunologic: Negative for hives and persistent infections.       Current Outpatient Medications on File Prior to Visit   Medication Sig Dispense Refill    apixaban (ELIQUIS) 2.5 mg Tab Take 1 tablet (2.5 mg total) by mouth 2 (two) times daily. 180 tablet 3    azelastine (ASTELIN) 137 mcg (0.1 %) nasal spray SPRAY 2 SPRAYS BY NASAL ROUTE 2 TIMES DAILY. 30 mL 11    calcium-vitamin D3 500 mg(1,250mg) -200 unit per tablet Take 1 tablet by mouth Daily.      carboxymethylcellulose (REFRESH PLUS) 0.5 % Dpet 1 drop 3 (three) times daily as needed.      fluticasone furoate (ARNUITY ELLIPTA) 100 mcg/actuation inhaler INHALE 1 PUFF ONE TIME DAILY 90 each 3    LORazepam (ATIVAN) 0.5 MG tablet TAKE 1 TABLET BY MOUTH NIGHTLY AS NEEDED FOR ANXIETY. 30 tablet 2    metoprolol succinate (TOPROL-XL) 25 MG 24 hr tablet Take 1 tablet (25 mg total) by mouth once daily. 90 tablet 3    nystatin (MYCOSTATIN) cream Apply topically 2 (two) times daily. 30 g 3    pantoprazole (PROTONIX) 40 MG tablet Take 1 tablet (40 mg total) by mouth once daily. 90 tablet 3    sucralfate (CARAFATE) 1 gram tablet Take 1 tablet (1 g total) by mouth 2 (two) times daily as needed (indigestion). 90 tablet 3    triamcinolone acetonide 0.1% (KENALOG) 0.1 % cream Apply topically 2 (two) times daily. 80 g 1    albuterol (VENTOLIN HFA) 90 mcg/actuation inhaler Inhale 2 puffs into the lungs every 6 (six) hours as needed for Wheezing. Rescue 6.7 g 0    clobetasol 0.05% (TEMOVATE)  0.05 % Oint Apply topically 2 (two) times daily. for 7 days 45 g 0     No current facility-administered medications on file prior to visit.       BP (!) 120/58   Pulse 80   Ht 5' (1.524 m)   Wt 54.6 kg (120 lb 7.7 oz)   LMP  (LMP Unknown)   SpO2 97%   BMI 23.53 kg/m²     Objective:     Physical Exam  Constitutional:       General: She is not in acute distress.     Appearance: Normal appearance. She is well-developed. She is not toxic-appearing or diaphoretic.   HENT:      Head: Normocephalic and atraumatic.      Nose: Nose normal.   Eyes:      General:         Right eye: No discharge.         Left eye: No discharge.      Conjunctiva/sclera:      Right eye: Right conjunctiva is not injected.      Left eye: Left conjunctiva is not injected.      Pupils: Pupils are equal.      Right eye: Pupil is round.      Left eye: Pupil is round.   Neck:      Thyroid: No thyromegaly.      Vascular: No carotid bruit or JVD.   Cardiovascular:      Rate and Rhythm: Normal rate and regular rhythm. No extrasystoles are present.     Chest Wall: PMI is not displaced.      Pulses:           Radial pulses are 2+ on the right side and 2+ on the left side.        Femoral pulses are 2+ on the right side and 2+ on the left side.       Dorsalis pedis pulses are 2+ on the right side and 2+ on the left side.        Posterior tibial pulses are 2+ on the right side and 2+ on the left side.      Heart sounds: S1 normal and S2 normal. Murmur heard.      High-pitched blowing holosystolic murmur is present with a grade of 2/6 at the apex.      No gallop.   Pulmonary:      Effort: Pulmonary effort is normal.      Breath sounds: Normal breath sounds.   Abdominal:      Palpations: Abdomen is soft.      Tenderness: There is no abdominal tenderness.   Musculoskeletal:      Cervical back: Neck supple.      Right lower leg: Normal. No swelling. No edema.      Left lower leg: Normal. No swelling. No edema.   Lymphadenopathy:      Head:      Right side of  head: No submandibular adenopathy.      Left side of head: No submandibular adenopathy.      Cervical: No cervical adenopathy.   Skin:     General: Skin is warm and dry.      Findings: No rash.      Nails: There is no clubbing.   Neurological:      General: No focal deficit present.      Mental Status: She is alert and oriented to person, place, and time. She is not disoriented.      Cranial Nerves: No cranial nerve deficit.   Psychiatric:         Attention and Perception: Attention and perception normal.         Mood and Affect: Mood and affect normal.         Speech: Speech normal.         Behavior: Behavior normal.         Thought Content: Thought content normal.         Cognition and Memory: Cognition and memory normal.         Judgment: Judgment normal.         Assessment:     1. Paroxysmal atrial fibrillation    2. Chronic anticoagulation    3. Atherosclerosis of aorta    4. Stage 3a chronic kidney disease        Plan:     1. Atrial Fibrillation               4/1/2022: Diagnosed with paroxysmal atrial fibrillation.               Fast VRR. Asymptomatic.              [id number] (A2VSc).   4/1/2022: Echo: Normal left ventricular size and systolic function. Mildly sigmoid septum. EF 65%. AF. Moderate aortic valve sclerosis. Mild AR. Moderate MR.   4/5/2022: Metoprolol 25 mg Q24 was changed to metoprolol 50 mg Q24. The dose was later reduced to metoprolol 25 mg Q24.   5/12/2022: Apixiban 2.5 mg Q12 was begun.   On apixiban 2.5 mg Q12.   On metoprolol 25 mg Q24.  No clinical recurrence but never had any symptoms.     2. Chronic Anticoagulation              4/1/2022: Diagnosed with paroxysmal atrial fibrillation.               [id number]=5 (A2VSc).   5/12/2022: Apixiban 2.5 mg Q12 was begun.   On apixiban 2.5 mg Q12.   No aspirin or NSAID.   No bleeding.     3. Atherosclerosis of Aorta    11/10/2021: CT abdomen: Mild scattered calcific atherosclerosis.    2024: Rosuvastatin 5 mg Q24 was discontinued due to a rise  in bilirubin.   No aspirin as anticoagulated.    4. Chronic Kidney Disease, Stage 3   11/18/2024: BUN/crea 21/1.0. eGFR 55.               5. Colovaginal Fistula              4/1/2022: Plan was surgery.   4/26/2022: Underwent surgery.              Dr. Alison Mcclain.    6. Primary Care              Dr. Carolyne James.     F/u 6 months.    Lisa Yeh M.D.

## 2025-03-13 LAB
OHS QRS DURATION: 74 MS
OHS QTC CALCULATION: 427 MS

## 2025-03-18 ENCOUNTER — OFFICE VISIT (OUTPATIENT)
Dept: ALLERGY | Facility: CLINIC | Age: 85
End: 2025-03-18
Payer: MEDICARE

## 2025-03-18 VITALS — HEIGHT: 60 IN | BODY MASS INDEX: 23.85 KG/M2 | WEIGHT: 121.5 LBS

## 2025-03-18 DIAGNOSIS — J30.9 ALLERGIC RHINITIS, UNSPECIFIED SEASONALITY, UNSPECIFIED TRIGGER: ICD-10-CM

## 2025-03-18 DIAGNOSIS — J01.90 ACUTE NON-RECURRENT SINUSITIS, UNSPECIFIED LOCATION: ICD-10-CM

## 2025-03-18 DIAGNOSIS — R05.9 COUGH, UNSPECIFIED TYPE: Primary | ICD-10-CM

## 2025-03-18 PROCEDURE — 1157F ADVNC CARE PLAN IN RCRD: CPT | Mod: CPTII,S$GLB,, | Performed by: ALLERGY & IMMUNOLOGY

## 2025-03-18 PROCEDURE — 1126F AMNT PAIN NOTED NONE PRSNT: CPT | Mod: CPTII,S$GLB,, | Performed by: ALLERGY & IMMUNOLOGY

## 2025-03-18 PROCEDURE — 99999 PR PBB SHADOW E&M-EST. PATIENT-LVL III: CPT | Mod: PBBFAC,,, | Performed by: ALLERGY & IMMUNOLOGY

## 2025-03-18 PROCEDURE — 1159F MED LIST DOCD IN RCRD: CPT | Mod: CPTII,S$GLB,, | Performed by: ALLERGY & IMMUNOLOGY

## 2025-03-18 PROCEDURE — 1101F PT FALLS ASSESS-DOCD LE1/YR: CPT | Mod: CPTII,S$GLB,, | Performed by: ALLERGY & IMMUNOLOGY

## 2025-03-18 PROCEDURE — 3288F FALL RISK ASSESSMENT DOCD: CPT | Mod: CPTII,S$GLB,, | Performed by: ALLERGY & IMMUNOLOGY

## 2025-03-18 PROCEDURE — 99214 OFFICE O/P EST MOD 30 MIN: CPT | Mod: S$GLB,,, | Performed by: ALLERGY & IMMUNOLOGY

## 2025-03-18 RX ORDER — DOXYCYCLINE 100 MG/1
100 CAPSULE ORAL EVERY 12 HOURS
Qty: 14 CAPSULE | Refills: 0 | Status: SHIPPED | OUTPATIENT
Start: 2025-03-18 | End: 2025-03-25

## 2025-03-18 RX ORDER — ALBUTEROL SULFATE 90 UG/1
2 INHALANT RESPIRATORY (INHALATION) EVERY 6 HOURS PRN
Qty: 18 G | Refills: 3 | Status: SHIPPED | OUTPATIENT
Start: 2025-03-18

## 2025-03-18 NOTE — PROGRESS NOTES
Subjective:       Patient ID: Laverne Humphries is a 84 y.o. female.    Chief Complaint:    cough        HPI:     Pt presents for eval cough, x > 2 weeks. Was evaluated at  for cough x 1 week in early Feb. Temp improvement w benzonatate, guiafenisin. Did not use albuterol as rx'd. After brief well period cough recurred/continued, now present for > 3 weeks. Notes assoc fatigue. No fever. + some rhionrrhea, sinus pressure. Cough productive.  Throughoug last year has continued arnuity, flonase, astelin. Also on PPI for GERD.        Hx 2/19/24:  Pt w x allergic rhinitis, GERD, recurrent cough presents w concern of cough that has continued since she recovered from an influenza infection and pneumonia in December. S/p levaquin. Albuterol not always helpful. Continues PPI and flonase and astelin. No fever.    Hx from 7/20/23:  Pt presents w concern of increased cough, rhinorrhea x 3 weeks. Also w sensation of thick post nasal drip.  No fever. No known sick contact.  No improvement w albuterol. No improvement w tessalon perles or dextromethorophan.  Hasn't been using astelin or nasacort  Is on pepcid for gerd. Stopped PPI per GI    Hx from 11/10/22  82 yo female with a history of allergic rhinitis, GERD, and chronic/recurrent cough.  Skin test in March 2013 w multiple positives, as below.  Cough and rhinitis had been well controlled since LV until about the last 2 weeks, during which cough, post-nasal drip, and reflux sx's have increased. Notes eating sweets and supine position aggravate cough. It has been years since she was on PPI.   Has also noted epistaxis w flonase rescently.        Environmental History:   Pets in the home: none.    Moon: tile or linoleum floors   Climate Control: central or room air conditioning   Dust Mite Controls: Dust mite controls are already in place.   Tobacco Smoke in Home: no     Review of Systems   Constitutional: Negative for fever, chills, appetite change, fatigue and unexpected  weight change.   HENT: rhinorrhea  Eyes: Negative for pain, discharge, redness and itching.   Respiratory: Positive for cough. Negative for chest tightness, shortness of breath and wheezing.    Cardiovascular: Negative for chest pain, palpitations and leg swelling.   Gastrointestinal: Negative for nausea, vomiting, abdominal pain, diarrhea, constipation, blood in stool and abdominal distention.   Genitourinary: Negative for dysuria, urgency, frequency and difficulty urinating.   Musculoskeletal: back pain w cough  Skin: Negative for color change, pallor, rash and wound.   Neurological: Negative for dizziness, seizures, light-headedness, numbness and headaches.   Hematological: Negative for adenopathy. Does not bruise/bleed easily.   Psychiatric/Behavioral: Negative for sleep disturbance and dysphoric mood. The patient is not nervous/anxious.         Objective:        Physical Exam  Constitutional:       Appearance: She is well-developed.   HENT:      Head: Normocephalic and atraumatic.      Right Ear: External ear normal.      Left Ear: External ear normal.   Eyes:      General: No scleral icterus.        Right eye: No discharge.         Left eye: No discharge.   Neck:      Thyroid: No thyromegaly.   Cardiovascular:      Rate and Rhythm: Regular rhythm.      Heart sounds: Normal heart sounds.   Pulmonary:      Effort: Pulmonary effort is normal. No respiratory distress.      Breath sounds: Normal breath sounds. No stridor. No wheezing.   Abdominal:      General: Bowel sounds are normal. There is no distension.      Palpations: Abdomen is soft.      Tenderness: There is no abdominal tenderness.   Musculoskeletal:         General: Normal range of motion.      Cervical back: Neck supple.   Lymphadenopathy:      Cervical: No cervical adenopathy.   Skin:     General: Skin is warm.      Findings: No erythema or rash. Rash is not urticarial.   Neurological:      Mental Status: She is alert and oriented to person, place,  and time.   Psychiatric:         Behavior: Behavior normal.           Laboratory:     3/13/13   Inhalant Skin Testing   4+ Dust mites (D.p. And D.f.)   3+ White francesca, Mixed birch, Box elder, Bald cypress   2+ Cat, Dog, American elm, Hackberry, Red maple, Red mulberry, Mixed oak, Black willow, Lambs quarter, Marsh elder, Bahia grass, Bermuda grass, Jared grass, Kaleb grass   1+ Cockroach, Red cedar, Eastern cottonwood, Mugwort, Rough pigweed, English plantain, Mixed ragweed, Russian thistle, Acremonium, Alternaria, Epicoccum, Fusarium, Pullularia, Rhizopus, Rhodotorula  With adequate histamine and saline controls             Assessment:     Allergic rhinitis, chronic  2.  Cough  3. URI, sinusitis    Plan:     1.  Nasacort 2 sen twice daily  2. Astelin 2 sen twice daily  3. Albuterol prn--hadn't been using prior  4. arnuity  5. doycycline

## 2025-03-25 ENCOUNTER — TELEPHONE (OUTPATIENT)
Dept: OTOLARYNGOLOGY | Facility: CLINIC | Age: 85
End: 2025-03-25
Payer: MEDICARE

## 2025-03-25 NOTE — TELEPHONE ENCOUNTER
----- Message from Alia Motta sent at 3/25/2025  2:30 PM CDT -----  Patient needs to be scheduled w/ audio + ent for hearing loss.ThanksAlia

## 2025-03-30 ENCOUNTER — PATIENT MESSAGE (OUTPATIENT)
Dept: CARDIOLOGY | Facility: CLINIC | Age: 85
End: 2025-03-30
Payer: MEDICARE

## 2025-03-31 ENCOUNTER — CLINICAL SUPPORT (OUTPATIENT)
Dept: AUDIOLOGY | Facility: CLINIC | Age: 85
End: 2025-03-31
Payer: MEDICARE

## 2025-03-31 ENCOUNTER — OFFICE VISIT (OUTPATIENT)
Dept: OTOLARYNGOLOGY | Facility: CLINIC | Age: 85
End: 2025-03-31
Payer: MEDICARE

## 2025-03-31 DIAGNOSIS — H61.23 BILATERAL IMPACTED CERUMEN: ICD-10-CM

## 2025-03-31 DIAGNOSIS — H90.3 ASYMMETRICAL SENSORINEURAL HEARING LOSS: Primary | ICD-10-CM

## 2025-03-31 PROCEDURE — 1126F AMNT PAIN NOTED NONE PRSNT: CPT | Mod: CPTII,S$GLB,,

## 2025-03-31 PROCEDURE — 92557 COMPREHENSIVE HEARING TEST: CPT | Mod: S$GLB,,, | Performed by: AUDIOLOGIST

## 2025-03-31 PROCEDURE — 3288F FALL RISK ASSESSMENT DOCD: CPT | Mod: CPTII,S$GLB,,

## 2025-03-31 PROCEDURE — 99999 PR PBB SHADOW E&M-EST. PATIENT-LVL I: CPT | Mod: PBBFAC,HCNC,, | Performed by: AUDIOLOGIST

## 2025-03-31 PROCEDURE — 99999 PR PBB SHADOW E&M-EST. PATIENT-LVL III: CPT | Mod: PBBFAC,HCNC,,

## 2025-03-31 PROCEDURE — 1159F MED LIST DOCD IN RCRD: CPT | Mod: CPTII,S$GLB,,

## 2025-03-31 PROCEDURE — 99213 OFFICE O/P EST LOW 20 MIN: CPT | Mod: 25,S$GLB,,

## 2025-03-31 PROCEDURE — 92567 TYMPANOMETRY: CPT | Mod: S$GLB,,, | Performed by: AUDIOLOGIST

## 2025-03-31 PROCEDURE — 1157F ADVNC CARE PLAN IN RCRD: CPT | Mod: CPTII,S$GLB,,

## 2025-03-31 PROCEDURE — 1101F PT FALLS ASSESS-DOCD LE1/YR: CPT | Mod: CPTII,S$GLB,,

## 2025-03-31 PROCEDURE — G0268 REMOVAL OF IMPACTED WAX MD: HCPCS | Mod: S$GLB,,,

## 2025-03-31 NOTE — PROGRESS NOTES
Subjective:   Laverne Humphries is a 84 y.o. female with PMHx of HLD, afib (on Eliquis), osteoporosis and CKD who was referred to me for hearing loss. She reports bilateral progressive hearing loss for the past several years. She endorses having a hard time hearing in certain environments, like during mass or when talking on the phone. She notes that family members have noticed she misses parts of conversation and asks others to repeat themselves often. She feels she hears the same from both ears. Denies sudden change in hearing. Denies tinnitus, ear pressure/fullness, otalgia, otorrhea or dizziness. She has a previous history of vertigo several years ago and  has not had recurrence since seeing PT. There is not a prior history of ear surgery. There is not a prior history of ear infections. There is not a history of ear trauma. She denies a history of significant noise exposure.     Past Medical History  She has a past medical history of Allergy, Anxiety, Arthritis, Asthma, Cataract, CKD (chronic kidney disease) stage 3, GFR 30-59 ml/min, Familial hypocalciuric hypercalcemia, Hepatitis, IBS (irritable bowel syndrome), Moderate aortic valve stenosis, Osteoporosis, Paroxysmal atrial fibrillation, Reflux esophagitis, and Torus palatinus.    Past Surgical History  She has a past surgical history that includes Cataract extraction, bilateral; Cataract extraction w/  intraocular lens implant (Bilateral); Blepharoptosis repair (Bilateral); Cholecystectomy; Hysterectomy; Eye surgery; Oophorectomy; Esophagogastroduodenoscopy (N/A, 01/20/2022); Colonoscopy (N/A, 01/20/2022); Robot-assisted laparoscopic colectomy using da Gera Xi (N/A, 4/26/2022); Flexible sigmoidoscopy (4/26/2022); Arthroscopic debridement of shoulder (Right, 3/21/2023); Arthroscopy of shoulder with decompression of subacromial space (Right, 3/21/2023); Arthroscopic repair of rotator cuff of shoulder (Right, 3/21/2023); and lysis, adhesions, shoulder,  alvin (3/21/2023).    Family History  Her family history includes Asthma in her sister; Cancer in her brother and mother; Cataracts in her father and sister; Diabetes in her father; No Known Problems in her daughter and son.    Social History  She reports that she has never smoked. She has never been exposed to tobacco smoke. She has never used smokeless tobacco. She reports that she does not drink alcohol and does not use drugs.    Allergies  She is allergic to codeine, sulfa (sulfonamide antibiotics), tizanidine, and crestor [rosuvastatin].    Medications  She has a current medication list which includes the following prescription(s): albuterol, apixaban, azelastine, calcium-vitamin d3, carboxymethylcellulose, clobetasol 0.05%, arnuity ellipta, lorazepam, metoprolol succinate, nystatin, pantoprazole, sucralfate, and triamcinolone acetonide 0.1%.    Review of Systems     Constitutional: Negative for appetite change, chills, fatigue, fever and unexpected weight loss.      HENT: Positive for hearing loss.  Negative for ear discharge, ear infection, ear pain and ringing in the ears.      Eyes:  Negative for change in eyesight, eye drainage, eye itching and photophobia.     Respiratory:  Positive for cough.      Cardiovascular:  Negative for chest pain, foot swelling, irregular heartbeat and swollen veins.     Gastrointestinal:  Negative for abdominal pain, acid reflux, constipation, diarrhea, heartburn and vomiting.     Genitourinary: Negative for difficulty urinating, sexual problems and frequent urination.     Musc: Negative for aching joints, aching muscles, back pain and neck pain.     Skin: Negative for rash.     Allergy: Positive for seasonal allergies.     Endocrine: Negative for cold intolerance and heat intolerance.      Neurological: Negative for dizziness, headaches, light-headedness, seizures and tremors.      Hematologic: Negative for bruises/bleeds easily and swollen glands.      Psychiatric:  Positive for sleep disturbance.         Objective:     Constitutional:   She appears well-developed and well-nourished. She appears alert.  Non-toxic appearance. She does not have a sickly appearance. She does not appear ill. Normal speech.      Head:  Normocephalic and atraumatic. Head is without abrasion and without contusion.     Ears:  Hearing normal to normal and whispered voice; external ear normal without scars, lesions, or masses; ear canal, tympanic membrane, and middle ear normal..   Right Ear: No drainage, swelling or tenderness. Tympanic membrane is not perforated, not erythematous, not retracted and not bulging. Tympanic membrane mobility is normal. No middle ear effusion.   Left Ear: No drainage, swelling or tenderness. Tympanic membrane is not perforated, not erythematous, not retracted and not bulging. Tympanic membrane mobility is normal.  No middle ear effusion.   Ceruminous debris obstructing bilateral TMs removed under microscopy      Pulmonary/Chest:   Effort normal.     Psychiatric:   She has a normal mood and affect. Her speech is normal and behavior is normal.     Neurological:   She is alert.     Procedure  Cerumen removal performed.  See procedure note.  Procedure Note:  The patient was brought to the minor procedure room and placed under the operating microscope of the right and left ear canal which was cleaned of ceruminous debris. Using a combination of suction, curettes and cup forceps the patient's cerumen was removed. The patient tolerated the procedure well. There were no complications.     Audiology     I independently reviewed the tracings of the complete audiometric evaluation performed today. I reviewed the audiogram with the patient as well. Pertinent findings include: mild sloping to severe SNHL AS, moderately-severe sloping to severe SNHL AD. Asymmetry noted. SRT 45 dBHL AD and 25 dBHL AS. Speech discrimination 52% AD and 88% AS. Type C tympanogram AD and type A tympanogram  AS.     Imaging:   No pertinent imaging available.    Assessment:     1. Asymmetrical sensorineural hearing loss    2. Bilateral impacted cerumen      Plan:   Laverne was seen today for hearing loss.  Diagnoses and all orders for this visit:    Asymmetrical sensorineural hearing loss  - MRI IAC/Temporal Bones W W/O Contrast; Future  - Creatinine, Serum; Future  We discussed the potential causes of asymmetrical hearing loss including: normal aging (presbycusis), noise-induced hearing loss, genetic causes, medications/drugs, microvascular changes, injury to the head or ear and structural problems of the inner ear. An MRI was recommended to rule out a definite cause and help clarify whether there is an underlying structural abnormality. She is in agreement with plan. Will follow up with MRI results. She is medically cleared for hearing amplification, and will follow-up with Audiology if interested. Plan to repeat audiogram in 1 year.     Bilateral impacted cerumen  Cerumen impaction removed under microscopy. Patient tolerated procedure well and noted improvement upon impaction removal.

## 2025-03-31 NOTE — PROGRESS NOTES
Ms. Laverne Humphries was seen in the clinic today for an audiological evaluation.  Ms. Humphries reported decreased hearing of both ears.    Audiological testing revealed a mild to severe sensorineural hearing loss for the right ear and a moderate to severe sensorineural hearing loss for the left ear.  A speech reception threshold was obtained at 45 dBHL for the right ear and at 25 dBHL for the left ear.  Speech discrimination was 52% for the right ear and 88% for the left ear.      Tympanometry testing revealed a Type A tympanogram for the right ear and a Type C tympanogram for the left ear.      Recommendations:  1. Otologic evaluation  2. Annual audiological evaluation  3. Hearing protection when in noise   4. Hearing aid consultation following medical clearance

## 2025-04-01 ENCOUNTER — PATIENT MESSAGE (OUTPATIENT)
Dept: OTOLARYNGOLOGY | Facility: CLINIC | Age: 85
End: 2025-04-01
Payer: MEDICARE

## 2025-04-02 ENCOUNTER — LAB VISIT (OUTPATIENT)
Dept: LAB | Facility: HOSPITAL | Age: 85
End: 2025-04-02
Attending: PHYSICIAN ASSISTANT
Payer: MEDICARE

## 2025-04-02 DIAGNOSIS — H90.3 ASYMMETRICAL SENSORINEURAL HEARING LOSS: ICD-10-CM

## 2025-04-02 LAB
CREAT SERPL-MCNC: 1 MG/DL (ref 0.5–1.4)
GFR SERPLBLD CREATININE-BSD FMLA CKD-EPI: 55 ML/MIN/1.73/M2

## 2025-04-02 PROCEDURE — 36415 COLL VENOUS BLD VENIPUNCTURE: CPT

## 2025-04-02 PROCEDURE — 82565 ASSAY OF CREATININE: CPT

## 2025-04-10 ENCOUNTER — HOSPITAL ENCOUNTER (OUTPATIENT)
Dept: RADIOLOGY | Facility: HOSPITAL | Age: 85
Discharge: HOME | End: 2025-04-10
Attending: PHYSICIAN ASSISTANT
Payer: MEDICARE

## 2025-04-10 DIAGNOSIS — H90.3 ASYMMETRICAL SENSORINEURAL HEARING LOSS: ICD-10-CM

## 2025-04-10 PROCEDURE — 25500020 PHARM REV CODE 255: Performed by: PHYSICIAN ASSISTANT

## 2025-04-10 PROCEDURE — 70553 MRI BRAIN STEM W/O & W/DYE: CPT | Mod: 26,,, | Performed by: STUDENT IN AN ORGANIZED HEALTH CARE EDUCATION/TRAINING PROGRAM

## 2025-04-10 PROCEDURE — A9585 GADOBUTROL INJECTION: HCPCS | Performed by: PHYSICIAN ASSISTANT

## 2025-04-10 PROCEDURE — 70553 MRI BRAIN STEM W/O & W/DYE: CPT | Mod: TC

## 2025-04-10 RX ORDER — GADOBUTROL 604.72 MG/ML
10 INJECTION INTRAVENOUS
Status: COMPLETED | OUTPATIENT
Start: 2025-04-10 | End: 2025-04-10

## 2025-04-10 RX ADMIN — GADOBUTROL 10 ML: 604.72 INJECTION INTRAVENOUS at 09:04

## 2025-04-11 ENCOUNTER — TELEPHONE (OUTPATIENT)
Dept: OTOLARYNGOLOGY | Facility: CLINIC | Age: 85
End: 2025-04-11
Payer: MEDICARE

## 2025-04-11 ENCOUNTER — RESULTS FOLLOW-UP (OUTPATIENT)
Dept: OTOLARYNGOLOGY | Facility: CLINIC | Age: 85
End: 2025-04-11

## 2025-04-11 NOTE — TELEPHONE ENCOUNTER
----- Message from Radha Gold PA-C sent at 4/10/2025 11:51 AM CDT -----    ----- Message -----  From: Interface, Rad Results In  Sent: 4/10/2025  11:15 AM CDT  To: Radha Gold PA-C

## 2025-04-30 ENCOUNTER — OFFICE VISIT (OUTPATIENT)
Dept: INTERNAL MEDICINE | Facility: CLINIC | Age: 85
End: 2025-04-30
Payer: MEDICARE

## 2025-04-30 VITALS
OXYGEN SATURATION: 96 % | HEIGHT: 60 IN | HEART RATE: 75 BPM | WEIGHT: 121.06 LBS | SYSTOLIC BLOOD PRESSURE: 120 MMHG | DIASTOLIC BLOOD PRESSURE: 62 MMHG | BODY MASS INDEX: 23.77 KG/M2

## 2025-04-30 DIAGNOSIS — E83.52 FAMILIAL HYPOCALCIURIC HYPERCALCEMIA: ICD-10-CM

## 2025-04-30 DIAGNOSIS — J98.4 CALCIFIED GRANULOMA OF LUNG: Chronic | ICD-10-CM

## 2025-04-30 DIAGNOSIS — I48.0 PAROXYSMAL ATRIAL FIBRILLATION: ICD-10-CM

## 2025-04-30 DIAGNOSIS — F41.9 ANXIETY: Chronic | ICD-10-CM

## 2025-04-30 DIAGNOSIS — I27.20 PULMONARY HYPERTENSION: ICD-10-CM

## 2025-04-30 DIAGNOSIS — I70.0 ATHEROSCLEROSIS OF AORTA: ICD-10-CM

## 2025-04-30 DIAGNOSIS — M81.0 AGE-RELATED OSTEOPOROSIS WITHOUT CURRENT PATHOLOGICAL FRACTURE: Chronic | ICD-10-CM

## 2025-04-30 DIAGNOSIS — Z00.00 ENCOUNTER FOR MEDICARE ANNUAL WELLNESS EXAM: Primary | ICD-10-CM

## 2025-04-30 DIAGNOSIS — E78.5 HYPERLIPIDEMIA, UNSPECIFIED HYPERLIPIDEMIA TYPE: Chronic | ICD-10-CM

## 2025-04-30 DIAGNOSIS — N18.31 STAGE 3A CHRONIC KIDNEY DISEASE: ICD-10-CM

## 2025-04-30 PROCEDURE — 99999 PR PBB SHADOW E&M-EST. PATIENT-LVL V: CPT | Mod: PBBFAC,HCNC,, | Performed by: NURSE PRACTITIONER

## 2025-04-30 NOTE — PATIENT INSTRUCTIONS
Counseling and Referral of Other Preventative  (Italic type indicates deductible and co-insurance are waived)    Patient Name: Laverne Mesa  Today's Date: 4/30/2025    Health Maintenance       Date Due Completion Date    RSV Vaccine (Age 60+ and Pregnant patients) (1 - 1-dose 75+ series) Never done ---    COVID-19 Vaccine (4 - 2024-25 season) 02/18/2026 (Originally 9/1/2024) 1/5/2022    Lipid Panel 04/18/2029 4/18/2024    TETANUS VACCINE 11/12/2033 11/12/2023        No orders of the defined types were placed in this encounter.    The following information is provided to all patients.  This information is to help you find resources for any of the problems found today that may be affecting your health:                  Living healthy guide: www.[facility].[place].gov      Understanding Diabetes: www.diabetes.org      Eating healthy: www.cdc.gov/healthyweight      CDC home safety checklist: www.cdc.gov/steadi/patient.html      Agency on Aging: www.goea.[place].[facility]      Alcoholics anonymous (AA): www.aa.org      Physical Activity: www.connie.nih.gov/en3goxx      Tobacco use: www.quitwithusla.org

## 2025-04-30 NOTE — PROGRESS NOTES
Laverne Humphries presented for a follow-up Medicare AWV today. The following components were reviewed and updated:    Medical history  Family History  Social history  Allergies and Current Medications  Health Risk Assessment  Health Maintenance  Care Team    **See Completed Assessments for Annual Wellness visit with in the encounter summary    The following assessments were completed:  Depression Screening  Cognitive function Screening    Timed Get Up Test  Whisper Test      Opioid documentation:      Patient does not have a current opioid prescription.          Vitals:    04/30/25 0809   BP: 120/62   BP Location: Right arm   Patient Position: Sitting   Pulse: 75   SpO2: 96%   Weight: 54.9 kg (121 lb 0.5 oz)   Height: 5' (1.524 m)     Body mass index is 23.64 kg/m².       Physical Exam  Vitals reviewed.   Constitutional:       Appearance: She is well-developed.   HENT:      Head: Normocephalic.      Right Ear: External ear normal.      Left Ear: External ear normal.      Nose: Nose normal.      Mouth/Throat:      Pharynx: No oropharyngeal exudate.   Eyes:      Pupils: Pupils are equal, round, and reactive to light.   Neck:      Thyroid: No thyromegaly.      Vascular: No JVD.      Trachea: No tracheal deviation.   Cardiovascular:      Rate and Rhythm: Normal rate and regular rhythm.      Heart sounds: Normal heart sounds. No murmur heard.     No friction rub. No gallop.   Pulmonary:      Effort: Pulmonary effort is normal. No respiratory distress.      Breath sounds: Normal breath sounds. No wheezing or rales.   Abdominal:      General: Bowel sounds are normal. There is no distension.      Palpations: Abdomen is soft.      Tenderness: There is no abdominal tenderness.   Musculoskeletal:         General: No tenderness. Normal range of motion.      Cervical back: Neck supple.   Lymphadenopathy:      Cervical: No cervical adenopathy.   Skin:     General: Skin is warm and dry.      Findings: No rash.   Neurological:       Mental Status: She is alert and oriented to person, place, and time.   Psychiatric:         Behavior: Behavior normal.            Diagnoses and health risks identified today and associated recommendations/orders:  1. Encounter for Medicare annual wellness exam  All age and gender related screenings discussed   - Ambulatory Referral/Consult to Enhanced Annual Wellness Visit (eAWV)    2. Pulmonary hypertension  Stable. Will cont metoprolol and monitor echo. F/u with cardiology     3. Hyperlipidemia, unspecified hyperlipidemia type  Stable. Will cont Mediterranean diet and monitor fasting lipids     4. Paroxysmal atrial fibrillation  Stable. Rate controlled. Will cont eliquis and metoprolol and f/u with EP     5. Atherosclerosis of aorta  Stable. Will cont Mediterranean diet and monitor fasting lipids     6. Calcified granuloma of lung  Stable. Reviewed imaging. Will monitor     7. Anxiety  Stable. Will cont lorazepam sparingly per PCP recommendations     8. Age-related osteoporosis without current pathological fracture  Stable. Will cont vitamin D and calcium and monitor     9. Stage 3a chronic kidney disease  Stable. Will cont BP control and monitor BMP     10. Familial hypocalciuric hypercalcemia  Stable. Will monitor BMP       Provided Prakasha with a 5-10 year written screening schedule and personal prevention plan. Recommendations were developed using the USPSTF age appropriate recommendations. Education, counseling, and referrals were provided as needed.  After Visit Summary printed and given to patient which includes a list of additional screenings\tests needed.    Post Discharge Follow-up Today:   Future Appointments:  Future Appointments   Date Time Provider Department Center   5/15/2025  9:00 AM SANT[facility], [facility] [place]   5/20/2025  9:00 AM Carolyne James MD [facility] [id number] 65+ [place]   5/22/2025  7:30 AM [facility] DEXA1 [facility] [place]   5/22/2025  8:00 AM [facility]  MAMMO1 [facility] [place]   9/10/2025  11:30 AM Lisa Yeh MD [id number]         I offered to discuss advanced care planning, including how to pick a person who would make decisions for you if you were unable to make them for yourself, called a health care power of , and what kind of decisions you might make such as use of life sustaining treatments such as ventilators and tube feeding when faced with a life limiting illness recorded on a living will that they will need to know. (How you want to be cared for as you near the end of your natural life)     X  Patient has advanced directives on file, which we reviewed, and they do not wish to make changes.    Shaunna ALVARADO, APRN, FNP-c  Nurse Practitioner   Internal Medicine   [address] [facility] [address] [id number] [phone]

## 2025-05-07 DIAGNOSIS — K21.9 GASTROESOPHAGEAL REFLUX DISEASE WITHOUT ESOPHAGITIS: ICD-10-CM

## 2025-05-07 RX ORDER — PANTOPRAZOLE SODIUM 40 MG/1
40 TABLET, DELAYED RELEASE ORAL DAILY
Qty: 90 TABLET | Refills: 3 | Status: SHIPPED | OUTPATIENT
Start: 2025-05-07 | End: 2026-05-07

## 2025-05-08 DIAGNOSIS — F41.1 GAD (GENERALIZED ANXIETY DISORDER): ICD-10-CM

## 2025-05-08 DIAGNOSIS — G47.00 INSOMNIA, UNSPECIFIED TYPE: ICD-10-CM

## 2025-05-09 RX ORDER — LORAZEPAM 0.5 MG/1
0.5 TABLET ORAL NIGHTLY PRN
Qty: 30 TABLET | Refills: 0 | Status: SHIPPED | OUTPATIENT
Start: 2025-05-09

## 2025-05-15 ENCOUNTER — RESULTS FOLLOW-UP (OUTPATIENT)
Dept: PRIMARY CARE CLINIC | Facility: CLINIC | Age: 85
End: 2025-05-15

## 2025-05-15 ENCOUNTER — LAB VISIT (OUTPATIENT)
Dept: LAB | Facility: HOSPITAL | Age: 85
End: 2025-05-15
Attending: INTERNAL MEDICINE
Payer: MEDICARE

## 2025-05-15 DIAGNOSIS — I48.0 PAROXYSMAL ATRIAL FIBRILLATION: ICD-10-CM

## 2025-05-15 DIAGNOSIS — I70.0 AORTIC ATHEROSCLEROSIS: ICD-10-CM

## 2025-05-15 DIAGNOSIS — N18.31 STAGE 3A CHRONIC KIDNEY DISEASE: ICD-10-CM

## 2025-05-15 LAB
ABSOLUTE EOSINOPHIL (OHS): 0.28 K/UL
ABSOLUTE MONOCYTE (OHS): 0.7 K/UL (ref 0.3–1)
ABSOLUTE NEUTROPHIL COUNT (OHS): 3.19 K/UL (ref 1.8–7.7)
ALBUMIN SERPL BCP-MCNC: 3.7 G/DL (ref 3.5–5.2)
ALP SERPL-CCNC: 62 UNIT/L (ref 40–150)
ALT SERPL W/O P-5'-P-CCNC: 12 UNIT/L (ref 10–44)
ANION GAP (OHS): 8 MMOL/L (ref 8–16)
AST SERPL-CCNC: 23 UNIT/L (ref 11–45)
BASOPHILS # BLD AUTO: 0.1 K/UL
BASOPHILS NFR BLD AUTO: 1.3 %
BILIRUB SERPL-MCNC: 1.6 MG/DL (ref 0.1–1)
BUN SERPL-MCNC: 17 MG/DL (ref 8–23)
CALCIUM SERPL-MCNC: 10 MG/DL (ref 8.7–10.5)
CHLORIDE SERPL-SCNC: 105 MMOL/L (ref 95–110)
CHOLEST SERPL-MCNC: 212 MG/DL (ref 120–199)
CHOLEST/HDLC SERPL: 3.4 {RATIO} (ref 2–5)
CO2 SERPL-SCNC: 27 MMOL/L (ref 23–29)
CREAT SERPL-MCNC: 0.9 MG/DL (ref 0.5–1.4)
ERYTHROCYTE [DISTWIDTH] IN BLOOD BY AUTOMATED COUNT: 14.1 % (ref 11.5–14.5)
GFR SERPLBLD CREATININE-BSD FMLA CKD-EPI: >60 ML/MIN/1.73/M2
GLUCOSE SERPL-MCNC: 99 MG/DL (ref 70–110)
HCT VFR BLD AUTO: 36.8 % (ref 37–48.5)
HDLC SERPL-MCNC: 62 MG/DL (ref 40–75)
HDLC SERPL: 29.2 % (ref 20–50)
HGB BLD-MCNC: 11.6 GM/DL (ref 12–16)
IMM GRANULOCYTES # BLD AUTO: 0.03 K/UL (ref 0–0.04)
IMM GRANULOCYTES NFR BLD AUTO: 0.4 % (ref 0–0.5)
LDLC SERPL CALC-MCNC: 128.8 MG/DL (ref 63–159)
LYMPHOCYTES # BLD AUTO: 3.51 K/UL (ref 1–4.8)
MCH RBC QN AUTO: 28.1 PG (ref 27–31)
MCHC RBC AUTO-ENTMCNC: 31.5 G/DL (ref 32–36)
MCV RBC AUTO: 89 FL (ref 82–98)
NONHDLC SERPL-MCNC: 150 MG/DL
NUCLEATED RBC (/100WBC) (OHS): 0 /100 WBC
PLATELET # BLD AUTO: 245 K/UL (ref 150–450)
PMV BLD AUTO: 11 FL (ref 9.2–12.9)
POTASSIUM SERPL-SCNC: 5 MMOL/L (ref 3.5–5.1)
PROT SERPL-MCNC: 7.4 GM/DL (ref 6–8.4)
PTH-INTACT SERPL-MCNC: 63 PG/ML (ref 9–77)
RBC # BLD AUTO: 4.13 M/UL (ref 4–5.4)
RELATIVE EOSINOPHIL (OHS): 3.6 %
RELATIVE LYMPHOCYTE (OHS): 44.9 % (ref 18–48)
RELATIVE MONOCYTE (OHS): 9 % (ref 4–15)
RELATIVE NEUTROPHIL (OHS): 40.8 % (ref 38–73)
SODIUM SERPL-SCNC: 140 MMOL/L (ref 136–145)
TRIGL SERPL-MCNC: 106 MG/DL (ref 30–150)
WBC # BLD AUTO: 7.81 K/UL (ref 3.9–12.7)

## 2025-05-15 PROCEDURE — 80053 COMPREHEN METABOLIC PANEL: CPT | Mod: HCNC

## 2025-05-15 PROCEDURE — 83970 ASSAY OF PARATHORMONE: CPT | Mod: HCNC

## 2025-05-15 PROCEDURE — 36415 COLL VENOUS BLD VENIPUNCTURE: CPT | Mod: HCNC,PO

## 2025-05-15 PROCEDURE — 85025 COMPLETE CBC W/AUTO DIFF WBC: CPT | Mod: HCNC

## 2025-05-15 PROCEDURE — 82465 ASSAY BLD/SERUM CHOLESTEROL: CPT | Mod: HCNC

## 2025-05-20 ENCOUNTER — OFFICE VISIT (OUTPATIENT)
Dept: PRIMARY CARE CLINIC | Facility: CLINIC | Age: 85
End: 2025-05-20
Payer: MEDICARE

## 2025-05-20 VITALS
TEMPERATURE: 98 F | SYSTOLIC BLOOD PRESSURE: 114 MMHG | OXYGEN SATURATION: 98 % | WEIGHT: 119.69 LBS | BODY MASS INDEX: 23.5 KG/M2 | HEIGHT: 60 IN | HEART RATE: 72 BPM | DIASTOLIC BLOOD PRESSURE: 60 MMHG

## 2025-05-20 DIAGNOSIS — F41.1 GAD (GENERALIZED ANXIETY DISORDER): ICD-10-CM

## 2025-05-20 DIAGNOSIS — H91.91 HEARING LOSS OF RIGHT EAR, UNSPECIFIED HEARING LOSS TYPE: ICD-10-CM

## 2025-05-20 DIAGNOSIS — I70.0 AORTIC ATHEROSCLEROSIS: ICD-10-CM

## 2025-05-20 DIAGNOSIS — G47.00 INSOMNIA, UNSPECIFIED TYPE: ICD-10-CM

## 2025-05-20 DIAGNOSIS — E78.5 HYPERLIPIDEMIA, UNSPECIFIED HYPERLIPIDEMIA TYPE: Primary | ICD-10-CM

## 2025-05-20 PROCEDURE — G2211 COMPLEX E/M VISIT ADD ON: HCPCS | Mod: HCNC,S$GLB,, | Performed by: INTERNAL MEDICINE

## 2025-05-20 PROCEDURE — 3288F FALL RISK ASSESSMENT DOCD: CPT | Mod: CPTII,HCNC,S$GLB, | Performed by: INTERNAL MEDICINE

## 2025-05-20 PROCEDURE — 99999 PR PBB SHADOW E&M-EST. PATIENT-LVL V: CPT | Mod: PBBFAC,HCNC,, | Performed by: INTERNAL MEDICINE

## 2025-05-20 PROCEDURE — 99214 OFFICE O/P EST MOD 30 MIN: CPT | Mod: HCNC,S$GLB,, | Performed by: INTERNAL MEDICINE

## 2025-05-20 PROCEDURE — 1101F PT FALLS ASSESS-DOCD LE1/YR: CPT | Mod: CPTII,HCNC,S$GLB, | Performed by: INTERNAL MEDICINE

## 2025-05-20 PROCEDURE — 1160F RVW MEDS BY RX/DR IN RCRD: CPT | Mod: CPTII,HCNC,S$GLB, | Performed by: INTERNAL MEDICINE

## 2025-05-20 PROCEDURE — 1157F ADVNC CARE PLAN IN RCRD: CPT | Mod: CPTII,HCNC,S$GLB, | Performed by: INTERNAL MEDICINE

## 2025-05-20 PROCEDURE — 1159F MED LIST DOCD IN RCRD: CPT | Mod: CPTII,HCNC,S$GLB, | Performed by: INTERNAL MEDICINE

## 2025-05-20 PROCEDURE — 1126F AMNT PAIN NOTED NONE PRSNT: CPT | Mod: CPTII,HCNC,S$GLB, | Performed by: INTERNAL MEDICINE

## 2025-05-20 RX ORDER — ROSUVASTATIN CALCIUM 5 MG/1
5 TABLET, COATED ORAL DAILY
Qty: 90 TABLET | Refills: 3 | Status: SHIPPED | OUTPATIENT
Start: 2025-05-20 | End: 2026-05-20

## 2025-05-20 RX ORDER — LORAZEPAM 0.5 MG/1
0.5 TABLET ORAL NIGHTLY PRN
Qty: 30 TABLET | Refills: 3 | Status: SHIPPED | OUTPATIENT
Start: 2025-06-09

## 2025-05-20 NOTE — PATIENT INSTRUCTIONS
RSV vaccine at your local pharmacy.     If you have any questions, concerns or develop symptoms that need to be addressed during business hours from 8am-5pm Mon-Friday (except during holidays), please call [phone].   If it is after 5pm on a weekday, on weekends or during holidays, you can call the [facility] On Call nurse line at [phone]. If needed, they can also consult with an emergency room physician virtually to assist with triage.

## 2025-05-20 NOTE — PROGRESS NOTES
Subjective:       Patient ID: Laverne Humphries is a 84 y.o. female.    Chief Complaint: Follow-up    Follow-up  Associated symptoms include weakness. Pertinent negatives include no arthralgias, chest pain, headaches, joint swelling, neck pain or vomiting.     eAWV done 4/30/25 w/ Shaunna Campbell, NP  Visits her sister who has home hospice daily. She cooks sometimes and brings food for sister. No assistance w/ ADLs. No falls. No issues w/ incontinence. No issues w/ driving. Never had a ticket in her life. No MVA.     Saw ENT PA 3/31/25 for asymmetrical hearing loss.   MRI temporal bones 4/10/25 -   The brain and inner ear structures appear within normal limits.  Unremarkable MR appearance of the inner ear structures.  Medically cleared for hearing aids. Doesn't want hearing aids.    Chronic/recurrent cough  Saw Dr. Lopez 3/18/25 for sinusitis/allergic rhinitis. S/p doxycycline. Cont arnuity inhaler w/ albuterol prn; nasacort and astelin. Cough resolved.   Grandchild graduated from college this past Saturday and slept at the [place] and was coughing. Once she got home, she didn't have cough. She keeps her house clean and dust free. Taking Protonix qam.     Insomnia - has been on ativan 0.5mg qhs for many years. Not interested in trying something else as this is working well for her. Does not make her sleepy the next day and allows her to fall asleep. Understands the risks associated with it.  Reports Dr. Castro tried to change it twice and she didn't sleep.    reviewed and appropriate.     Aortic atherosclerosis.   Previously w/ crestor 5 but thought it may have caused bili to go up. However off for a while but Tbili is still mildly high. Abdominal US reviewed. GB absent. Liver ok.    Saw cards for afib. On eliquis and metoprolol. No palpitations.    4Ms for Medical Decision-Making in Older Adults    Last Completed EAWV: 4/30/2025    MEDICATIONS:  High Risk Medications:  Total Active Medications:  1  LORazepam - 0.5 MG    MOBILITY:  Activities of Daily Livin/30/2025     8:13 AM   Activities of Daily Living   Ambulation Independent   Dressing Independent   Transfers Independent   Toileting Continent of bladder;Continent of bowel   Feeding Independent   Cleaning home/Chores Independent   Telephone use Independent   Shopping Independent   Paying bills Independent   Taking meds Independent     Fall Risk:      2025     9:00 AM 2025     8:30 AM 3/31/2025     8:40 AM   Fall Risk Assessment - Outpatient   Mobility Status Ambulatory Ambulatory Ambulatory   Number of falls 0 0 0   Identified as fall risk False False False     Disability Status:      2025     8:15 AM   Disability Status   Are you deaf or do you have serious difficulty hearing? Y   Are you blind or do you have serious difficulty seeing, even when wearing glasses? N   Because of a physical, mental, or emotional condition, do you have serious difficulty concentrating, remembering, or making decisions? N   Do you have serious difficulty walking or climbing stairs? N   Do you have difficulty dressing or bathing? N   Because of a physical, mental, or emotional condition, do you have difficulty doing errands alone such as visiting a doctor's office or shopping? N     Nutrition Screenin/30/2025     8:12 AM   Nutrition Screening   Has food intake declined over the past three months due to loss of appetite, digestive problems, chewing or swallowing difficulties? No decrease in food intake   Involuntary weight loss during the last 3 months? No weight loss   Mobility? Goes out   Has the patient suffered psychological stress or acute disease in the past three months? No   Neuropsychological problems? No psychological problems   Body Mass Index (BMI)?  BMI 23 or greater   Screening Score 14   Interpretation Normal nutritional status    Screening Score: 0-7 Malnourished, 8-11 At Risk, 12-14 Normal  Get Up and Go:      2025      8:12 AM 2023    10:28 AM 3/9/2021     8:11 AM 2020     8:35 AM 2019     9:05 AM 2018     8:55 AM 10/10/2017     9:00 AM   Get Up and Go   Trial 1 10 seconds 9 seconds 10 seconds 11 seconds 14 seconds 10 seconds 10 seconds     Whisper Test:      2025     8:12 AM   Whisper Test   Whisper Test Abnormal           MENTATION:   Has Dementia Dx: No  Has Anxiety Dx: Yes    Depression Patient Health Questionnaire:      2025     8:08 AM   Depression Patient Health Questionnaire   Over the last two weeks how often have you been bothered by little interest or pleasure in doing things Not at all   Over the last two weeks how often have you been bothered by feeling down, depressed or hopeless Not at all   PHQ-2 Total Score 0     Cognitive Function Screenin/30/2025     8:12 AM   Cognitive Function Screening   Clock Drawing Test 2   Mini-Cog 3 Minute Recall 3   Cognitive Function Screening 5     Cognitive Function Screening Total - Less than 4 = Abnormal,  Greater than or equal to 4 = Normal        WHAT MATTERS MOST:  Advance Care Planning   ACP Status:   Patient has had an ACP conversation  Living Will: Yes  Power of : Yes  [place]: No    What is most important right now is to focus on spending time at home, avoiding the hospital, remaining as independent as possible, and quality of life, even if it means sacrificing a little time    Accordingly, we have decided that the best plan to meet the patient's goals includes continuing with treatment      What matters most to patient today is: staying independent.             Review of Systems   Constitutional:  Negative for activity change and unexpected weight change.   HENT:  Positive for hearing loss. Negative for rhinorrhea and trouble swallowing.    Eyes:  Negative for discharge and visual disturbance.   Respiratory:  Negative for chest tightness and wheezing.    Cardiovascular:  Negative for chest pain and palpitations.    Gastrointestinal:  Positive for diarrhea. Negative for blood in stool, constipation and vomiting.   Endocrine: Negative for polydipsia and polyuria.   Genitourinary:  Negative for difficulty urinating, dysuria, hematuria and menstrual problem.   Musculoskeletal:  Negative for arthralgias, joint swelling and neck pain.   Neurological:  Positive for weakness. Negative for headaches.   Psychiatric/Behavioral:  Negative for confusion and dysphoric mood.          Objective:      Physical Exam    /60   Pulse 72   Temp 98.1 °F (36.7 °C) (Oral)   Ht 5' (1.524 m)   Wt 54.3 kg (119 lb 11.4 oz)   LMP  (LMP Unknown)   SpO2 98%   BMI 23.38 kg/m²     GEN - A+OX4, NAD   HEENT - PERRL, EOMI, OP clear. MMM. TM normal.   Neck - No cervical LAD.   CV - RRR, no m/r   Chest - CTAB, no wheezing or rhonchi  Abd - S/NT/ND/+BS.   Ext - 2+BDP and radial pulses. No C/C/E.  Neuro - 5/5 BUE and BLE strength. Normal gait. 2+ b patellar DTRs.  LN - No axillary LAD.  Skin - No rash.    Previous labs reviewed.    Assessment/Plan     Laverne was seen today for follow-up.    Diagnoses and all orders for this visit:    Hyperlipidemia, unspecified hyperlipidemia type  -     rosuvastatin (CRESTOR) 5 MG tablet; Take 1 tablet (5 mg total) by mouth once daily.  -     Comprehensive Metabolic Panel; Future  -     Lipid Panel; Future    Aortic atherosclerosis  -     rosuvastatin (CRESTOR) 5 MG tablet; Take 1 tablet (5 mg total) by mouth once daily.  -     Comprehensive Metabolic Panel; Future  -     Lipid Panel; Future    Hearing loss of right ear, unspecified hearing loss type - not interested in hearing aids right now.     Insomnia, unspecified type - Discussed risks and benefits of controlled substance, including potential for tolerance and dependence, w/ pt. Reports current regimen and dosage controls the symptoms. Defers decreasing dosage/trying another medicine. Agrees that benefits outweigh risks at this time. Will reassess at next  visit.   -     LORazepam (ATIVAN) 0.5 MG tablet; Take 1 tablet (0.5 mg total) by mouth nightly as needed for Anxiety (or insomnia).    BELLO (generalized anxiety disorder)  -     LORazepam (ATIVAN) 0.5 MG tablet; Take 1 tablet (0.5 mg total) by mouth nightly as needed for Anxiety (or insomnia).    Cont Ca and Vit D. Fall precautions. Still declines osteoporosis medicines. Increase activities.  Restart crestor 5mg daily.    Follow up in about 3 months (around 8/20/2025). Labs prior.      Carolyne James MD  Department of Internal Medicine - [facility]  9:02 AM

## 2025-05-22 ENCOUNTER — HOSPITAL ENCOUNTER (OUTPATIENT)
Dept: RADIOLOGY | Facility: HOSPITAL | Age: 85
Discharge: HOME | End: 2025-05-22
Attending: INTERNAL MEDICINE
Payer: MEDICARE

## 2025-05-22 VITALS — HEIGHT: 60 IN | BODY MASS INDEX: 23.36 KG/M2 | WEIGHT: 119 LBS

## 2025-05-22 DIAGNOSIS — Z12.31 BREAST CANCER SCREENING BY MAMMOGRAM: ICD-10-CM

## 2025-05-22 DIAGNOSIS — M81.0 SENILE OSTEOPOROSIS: ICD-10-CM

## 2025-05-22 DIAGNOSIS — Z78.0 POSTMENOPAUSAL ESTROGEN DEFICIENCY: ICD-10-CM

## 2025-05-22 PROCEDURE — 77063 BREAST TOMOSYNTHESIS BI: CPT | Mod: 26,,, | Performed by: RADIOLOGY

## 2025-05-22 PROCEDURE — 77080 DXA BONE DENSITY AXIAL: CPT | Mod: TC

## 2025-05-22 PROCEDURE — 77067 SCR MAMMO BI INCL CAD: CPT | Mod: 26,,, | Performed by: RADIOLOGY

## 2025-05-22 PROCEDURE — 77067 SCR MAMMO BI INCL CAD: CPT | Mod: TC

## 2025-06-18 DIAGNOSIS — Z79.01 CHRONIC ANTICOAGULATION: ICD-10-CM

## 2025-06-18 DIAGNOSIS — I48.0 PAROXYSMAL ATRIAL FIBRILLATION: ICD-10-CM

## 2025-07-14 NOTE — PROGRESS NOTES
"      [facility]  Occupational Therapy Treatment Note     Date: 9/21/2023  Name: Laverne CELIS Kristel  [facility] Number: [id number]    Therapy Diagnosis:   Encounter Diagnoses   Name Primary?    Decreased range of motion of right shoulder Yes    Localized edema     Pain     Hand pain, right                Physician: Luis Angel Wheeler MD    Physician Orders:Eval and treat;  s/p RCR/lymphedema treatments into hand  Medical Diagnosis: Z98.890 (ICD-10-CM) - S/P right rotator cuff repair  Surgical Procedure and Date: 3/21/2023,   1. Right shoulder Arthroscopic massive complex rotator cuff repair    2. Right shoulder Arthroscopic extensive debridement   3. Right shoulder Arthroscopic lysis of adhesions   4. Right shoulder Arthroscopic subacromial decompression and bursectomy   5. Right shoulder arthroscopic loose body removal    Evaluation Date: 5/3/2023  Insurance Authorization Period Expiration: 12/31/23  Plan of Care Expiration:  9/22/2023   Date of Return to MD: 7/3/2023  Visit # / Visits authorized: 40 / 40  FOTO: (date and score)     Precautions:  Standard       Time In:     08:34 AM   Time Out:     09:15       AM  Total Appointment Time (timed & untimed codes):    40      minutes       SUBJECTIVE         Pt reports:  "my hand feels real good"  She was compliant with home exercise program given .   Response to previous treatment: increased composite fist, decreased edema   Functional change:  able to sultana/doff bra     Pain: 0/10  Location: right hand     OBJECTIVE   Objective Measures updated at progress report unless specified.    Observation/Appearance: mod edema in hand        Edema. Measured in centimeters.    5/9/2023 5/9/2023 8/3/2023 8/3/2023     Left Right Left  Right                            Wrist Crease 14.0 cm  16.0 cm               MCPs 18.4.0 cm  20.5 cm 18.0 cm  19.0 cm                  8/7/2023 IF LF RF SF    Right        P1 6.5 6.1 5.7 5.0    PIP 5.3 5.2 4.6 4.2    P2 4.6 4.3 " What Is The Reason For Today's Visit?: Full Body Skin Examination 3.8 3.5    Left        P1 5.3 5.0 4.7 4.4    PIP 4.6 4.6      P2               Shoulder ROM. Measured in degrees    5/3/2022 5/3/2023 5/11/2023 6/9/2023 6/30/2023 6/30/2023 7/25/23     Left Right PROM Right PROM Right AROM  Right AROM Right PROM R AROM   Shoulder Flexion  WFL 85 degrees  90 90 115 125 110   Shoulder Abduction  WFL NT 80 90 90 95 100   Shoulder Extension    NT NT     50   Shoulder IR   NT NT L5   37, L5   Shoulder ER  70 NT 20 65   60   Shoulder ER @0       48          Elbow and Wrist ROM. Measured in degrees.    5/9/2023 5/9/2023 9/21/2023     Left Right Right   Elbow Ext/Flex WNL/WNL  12/135     Supination/Pronation WNL/WNL  66/75    Wrist Ext/Flex 55/70 39/6 55/46   Wrist RD/UD              Hand ROM. Measured in degrees.    5/9/2023 5/9/2023 6/22/2023 8/18/2023 9/19/2023     Left Right Right  Right Right          After tx Before tx    Index: MP    68 67 66 70              PIP       50 91 78 85              DIP   30 40 30 53              PITTS   148 198 174 208              Long:  MP   65 78 80 81              PIP   58 80 85 91              DIP   62 55 55 61              PITTS   185 213 220 233              Ring:   MP   55 80 80 80              PIP   60 92 95 104              DIP   32 45 45 45              PITTS   147 217 220 229                       Strength (Dynamometer) and Pinch Strength (Pinch Gauge)  Measured in pounds.    6/9/2023 6/9/2023 7/7/2023 7/18/2023 9/19/2023     Left Right Right Right Right   Rung II  41 20 25 21 25   Key Pinch  9     7   3pt Pinch       6.5   2pt Pinch  6     6           Limitation/Restriction for FOTO initial eval; shoulder Survey     Therapist reviewed FOTO scores for Laverne LALITA Kristel on 5/3/2023.   FOTO documents entered into [employer] - see Media section.     Limitation Score: needs to be taken%        Treatment     Leovidia received the treatments listed below:           Supervised modalities: MHP R shoulder  for 10 minutes to promote increased blood flow  +  What Is The Reason For Today's Visit? (Being Monitored For X): the development of new lesions paraffin with MHP to R hand          Manual therapy techniques: Manual Lymphatic Drainage were applied to the: RUE for 15 minutes, including:  - STM with hawk's  flexors   - passive shoulder in D1 pattern x 10  reps   - passive ROM LF (hook grasp) x 10 reps   - passive LF hyperextension holding 10s x 10 reps          Therapeutic exercises to develop ROM for 25 minutes, including:    - prayer stretch holding for 30s x 3 reps   - digit extension holding each for 5s (2 min)   - push (flat palm) power web holding for 10s x 10 reps (2 sets) NT   - concentric teres major/subscapularis with RED theraband x 10 reps each (2 sets)   - B ext rotation with RED theraband x 10 reps (1 set) NT   - inclined on mat, scaption/flexion with 3# dowel x 10 reps (2 sets) each   - stabilization holding tan ball ABC (2 sets)   - serratuspunches prone on mat x 10 reps (2 sets)     Direct contact modality:   Patient received ultrasound to R palmar area to increase blood flow, circulation, tissue elasticity, and for pain management for 8 minutes @ 3 Mhz, Intensity 1.0 w/cm2 at 100% duty cycle.           Not performed today:   - AAROM flexion/scaption with 4# dowel x 10 reps supine on mat (2 sets)  - sidelying abduction and ER, 2 x 15 reps  - supine dynamic stabilization with 1#, alphabet x 1 set   - AAROM serratus punch with 2# dowel 2 x 10 reps   - light elbow PRE, elbow flexion palm up and FA in neutral 2 x 10 reps each, 1# NT   - RED theraband rows x 10 reps (3 sets)   - 4# dowel shoulder extension x 10 reps (2 sets)  - yellow theraband shoulder extension x 10 reps (3 sets)   - yellow theraband shoulder ER/IR x 15 reps (2 sets) each         Neuro re-ed: 10 min NT   - rice bin (6 min)   - in hand manipulation with marbles         Patient Education and Home Exercises      Education provided:   - cont HEP  - Progress towards goals       Written Home Exercises Provided: Patient instructed to cont prior HEP.  Exercises were reviewed and  Laverne was able to demonstrate them prior to the end of the session.  Laverne demonstrated good  understanding of the HEP provided. See EMR under Patient Instructions for exercises provided during therapy sessions.      ASSESSMENT       Good response to US. Good maintenance with shoulder ROM and endurance.     Pt participated well and is motivated.     Laverne is progressing fairly well towards her goals and there are no updates to goals at this time. Pt prognosis is fair.     Pt will continue to benefit from skilled outpatient occupational therapy to address the deficits listed in the problem list on initial evaluation, provide pt/family education and to maximize pt's level of independence in the home and community environment.     Pt's spiritual, cultural and educational needs considered and pt agreeable to plan of care and goals.    Anticipated barriers to occupational therapy: preexisting conditions         Goals:  Long term goals:   Pt will achieve shoulder AROM in flexion/scaption/abduction ~120 degrees ---progressing  2.   Assess MMT when appropriate------progressing  3.   Pt will demo shoulder AROM WFL to perform daily and community activities ---progressing  4.   Pt will report improvement in FOTO score by measuring 20% points. ---progressing        Short term goals:   Pt will be complaint with HEP and pain management ---progressing  Pt will achieve shoulder PROM in flexion and scaption ~110 degrees ---progressing  Pt will report improvement in FOTO by decreasing 10% limitation points ---progressing  Pt will achieve shoulder AAROM WFL to aid in ADLs ---progressing  Pt will be able to demo full composite fist with R hand.---progressing    PLAN     Continue skilled occupational therapy with individualized plan of care focusing on improving functional independence with use of RUE    Updates/Grading for next session: cont per large massive type III protocol. Needs reassessment next session and updated  "POC    Shaunna Stevenson, WILFREDO                                                    [facility]  Occupational Therapy Treatment Note    Date: 9/21/2023  Name: Laverne Humphries  Clinic Number: [id number]    Therapy Diagnosis:   Encounter Diagnoses   Name Primary?    Decreased range of motion of right shoulder Yes    Localized edema     Pain     Hand pain, right                                    Physician: Luis Angel Wheeler MD    Physician Orders:Eval and treat;  s/p RCR/lymphedema treatments into hand  Medical Diagnosis: Z98.890 (ICD-10-CM) - S/P right rotator cuff repair  Surgical Procedure and Date: 3/21/2023,   1. Right shoulder Arthroscopic massive complex rotator cuff repair    2. Right shoulder Arthroscopic extensive debridement   3. Right shoulder Arthroscopic lysis of adhesions   4. Right shoulder Arthroscopic subacromial decompression and bursectomy   5. Right shoulder arthroscopic loose body removal    Evaluation Date: 5/3/2023  Insurance Authorization Period Expiration: 05/07/2023  Plan of Care Expiration: 12 weeks; 7/28/2023  Date of Return to MD: 7/3/2023  Visit # / Visits authorized: 24 / 40  FOTO: (date and score)     Precautions:  Standard     Time In:     08:05 AM   Time Out:      09:05   AM     Total Appointment Time (timed & untimed codes): 60 minutes       SUBJECTIVE     Pt reports: "Yesterday when I left here my swelling went down but last night my hand was more swollen and my shoulder was sore"   She was compliant with home exercise program given last session.   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 Reaching easier and opening things easier    Pain: 0/10  Location: right hand     OBJECTIVE   Objective Measures updated at progress report unless specified.    Observation/Appearance: mod edema in hand      Edema. Measured in centimeters.    5/9/2023 5/9/2023     Left Right "   2in. Above elbow       2in. Below elbow       Wrist Crease 14.0 cm  16.0 cm    Figure of 8       MCPs 18.4.0 cm  20.5 cm               Elbow and Wrist ROM. Measured in degrees.    5/9/2023 5/9/2023     Left Right   Elbow Ext/Flex WNL/WNL  12/135    Supination/Pronation WNL/WNL  66/75   Wrist Ext/Flex 55/70 39/6   Wrist RD/UD             Hand ROM. Measured in degrees.    5/9/2023 5/9/2023 6/22/2023 8/18/2023      Left Right Right  Right             Index: MP    68 67 75              PIP       50 91 8/80              DIP   30 40 40              PITTS   148               Long:  MP   65 78 80              PIP   58 80 5/85              DIP   62 55 50              PITTS   185               Ring:   MP   55 80 80              PIP   60 92 10/95              DIP   32 45 46              PITTS   147                        Strength (Dynamometer) and Pinch Strength (Pinch Gauge)  Measured in pounds.    6/9/2023 6/9/2023 7/7/2023 7/18/2023 8/18/2023     Left Right Right Right Right   Rung II  41 20 25 21    Key Pinch          3pt Pinch          2pt Pinch                Manual Muscle Test    5/9/2023 5/9/2023     Left Right   Wrist Extension        Wrist Flexion       Radial Deviation       Ulnar Deviation       Supination       Pronation       Elbow Extension       Elbow Flexion             Limitation/Restriction for FOTO initial eval; shoulder Survey     Therapist reviewed FOTO scores for Laverne CELIS [place] on 5/3/2023.   FOTO documents entered into [employer] - see Media section.     Limitation Score: needs to be taken%        Treatment     Laverne received the treatments listed below:       Fluidotherapy: To R hand for 10 min, continuous air, 115 deg, air speed 50 to decrease pain, edema & scar tissue, sensory re- education, and increased tissue extensibility prior to therex  Supervised modalities: MHP for 10 minutes to promote increased blood flow         Manual therapy techniques: Manual Lymphatic Drainage were applied to the:  RUE for 35 minutes, including:  - passive ROM to flexion/scaption/abduction/ER/IR  x 10 reps each   - passive ROM LF (hook grasp)  - passive ROM LF extension with MP hyperextended for increased stretch   - STM using star tool palmar fascia, focus near A1 and A3 pulley   - passive horizontal abduction with ER/IR x 10 reps each   - passive D1 pattern x 10 reps           Therapeutic exercises to develop ROM for 15  minutes, including:  - horizontal abduction, concentric  ER and IR with RED theraband x 10 reps each   - supine dynamic stabilization with 1#, alphabet x 1 set        Not performed today:   - AAROM flexion/scaption/abduction with 2# dowel x 10 reps each  incline on mat (2 sets)  - sidelying abduction and ER, 2 x 15 reps  - AAROM serratus punch with 2# dowel x 10 reps   - light elbow PRE, elbow flexion palm up and FA in neutral 2 x 10 reps each, 1# NT   - yellow theraband rows x 10 reps (3 sets) NT  - yellow theraband shoulder extension x 10 reps (3 sets) NT  - yellow theraband shoulder ER/IR x 10 reps (2 sets) each NT            Patient Education and Home Exercises      Education provided:   - added towel IR stretch   - Progress towards goals       Written Home Exercises Provided: Patient instructed to cont prior HEP.  Exercises were reviewed and Laverne was able to demonstrate them prior to the end of the session.  Laverne demonstrated good  understanding of the HEP provided. See EMR under Patient Instructions for exercises provided during therapy sessions.      ASSESSMENT     Discussed with patient to return to OT 2x/wk due to increased shoulder tightness.  Cont to have intrinsic tightness and extrinsic tightness in hand.     Laverne is progressing fairly well towards her goals and there are no updates to goals at this time. Pt prognosis is fair.     Pt will continue to benefit from skilled outpatient occupational therapy to address the deficits listed in the problem list on initial evaluation, provide  pt/family education and to maximize pt's level of independence in the home and community environment.     Pt's spiritual, cultural and educational needs considered and pt agreeable to plan of care and goals.    Anticipated barriers to occupational therapy: preexisting conditions       Goals:  Long term goals:           Previous Short Term Goals Status:   Pt will be complaint with HEP and pain management   Pt will achieve shoulder PROM in flexion and scaption ~110 degrees  MET   Pt will report improvement in FOTO by decreasing 10% limitation points   Pt will achieve shoulder AAROM WFL to aid in ADLs MET   Pt will be able to demo full composite fist with R hand.  Long Term Goals Status:   MODIFIED     Reasons for Recertification of Therapy:   continue to be limited in full independence of ADLs due to hand and shoulder       GOALS:      Pt will achieve shoulder AROM in flexion/scaption/abduction ~120 degrees   2.   Pt will report pain no greater than 1/10 in her R hand during functional use.   3.   Pt will demo shoulder AROM WFL to perform daily and community activities MET   4.   Pt will report improvement in FOTO score by measuring 20% points.   5.  Pt will report improved PITTS in her LF on R hand by 10-15 degrees.   6. Pt will report R hand  strength 30# or higher to be able to open jars, hold pots/pan when cooking    PLAN     Updated Certification Period: 7/28/2023 to 9/22/2023   Recommended Treatment Plan: 2-3 times per week for 8 weeks:  Electrical Stimulation  , Fluidotherapy, Manual Therapy, Moist Heat/ Ice, Neuromuscular Re-ed, Orthotic Management and Training, Paraffin, Patient Education, Self Care, Therapeutic Activities, and Therapeutic Exercise  Other Recommendations: Lymphedema tx with lymphatouch     Shaunna Stevenson OT

## 2025-07-22 ENCOUNTER — OFFICE VISIT (OUTPATIENT)
Dept: URGENT CARE | Facility: CLINIC | Age: 85
End: 2025-07-22
Payer: MEDICARE

## 2025-07-22 VITALS
WEIGHT: 116 LBS | HEIGHT: 60 IN | OXYGEN SATURATION: 98 % | HEART RATE: 98 BPM | RESPIRATION RATE: 20 BRPM | TEMPERATURE: 98 F | BODY MASS INDEX: 22.78 KG/M2 | SYSTOLIC BLOOD PRESSURE: 105 MMHG | DIASTOLIC BLOOD PRESSURE: 62 MMHG

## 2025-07-22 DIAGNOSIS — R09.81 SINUS CONGESTION: ICD-10-CM

## 2025-07-22 DIAGNOSIS — R05.9 COUGH IN ADULT: ICD-10-CM

## 2025-07-22 DIAGNOSIS — R05.8 RECURRENT COUGH: Primary | ICD-10-CM

## 2025-07-22 LAB
CTP QC/QA: YES
SARS-COV+SARS-COV-2 AG RESP QL IA.RAPID: NEGATIVE

## 2025-07-22 PROCEDURE — 71046 X-RAY EXAM CHEST 2 VIEWS: CPT | Mod: S$GLB,,, | Performed by: RADIOLOGY

## 2025-07-22 PROCEDURE — 99214 OFFICE O/P EST MOD 30 MIN: CPT | Mod: S$GLB,,,

## 2025-07-22 PROCEDURE — 87811 SARS-COV-2 COVID19 W/OPTIC: CPT | Mod: QW,S$GLB,,

## 2025-07-22 RX ORDER — GUAIFENESIN 600 MG/1
600 TABLET, EXTENDED RELEASE ORAL 2 TIMES DAILY
Qty: 14 TABLET | Refills: 0 | Status: SHIPPED | OUTPATIENT
Start: 2025-07-22 | End: 2025-07-29

## 2025-07-22 RX ORDER — ALBUTEROL SULFATE 90 UG/1
2 INHALANT RESPIRATORY (INHALATION) EVERY 6 HOURS PRN
Qty: 8 G | Refills: 0 | Status: SHIPPED | OUTPATIENT
Start: 2025-07-22 | End: 2025-07-29

## 2025-07-22 NOTE — PROGRESS NOTES
Subjective:      Patient ID: Laverne Humphries is a 84 y.o. female.    Vitals:  height is 5' (1.524 m) and weight is 52.6 kg (116 lb). Her oral temperature is 97.8 °F (36.6 °C). Her blood pressure is 105/62 and her pulse is 98. Her respiration is 20 and oxygen saturation is 98%.     Chief Complaint: Cough    This is a 84 y.o. female who presents today with a chief complaint of coughing with sputum, chest congestion, runny nose that began 6 days ago.  Pt has taken OTC Tylenol (last dose yesterday) to help with symptoms.  Of chronic cough, sees Allergy doctor for this condition.  She states that the Tessalon Perles she has been given do not work, she has not been using her prescribed inhaler recently.  She denies any shortness a breath, chest pain, fever, GI symptoms, sore throat or other complaints.    Cough  This is a new problem. The current episode started in the past 7 days. The problem has been gradually worsening. The problem occurs constantly. The cough is Productive of sputum. Associated symptoms include headaches and postnasal drip. Pertinent negatives include no chest pain, chills, ear congestion, ear pain, fever, heartburn, hemoptysis, myalgias, nasal congestion, rash, rhinorrhea, sore throat, shortness of breath, sweats, weight loss or wheezing. The symptoms are aggravated by lying down. Treatments tried: OTC Tylenol. The treatment provided mild relief. There is no history of asthma, bronchiectasis, bronchitis, COPD, emphysema, environmental allergies or pneumonia.       Constitution: Negative for chills, fever and generalized weakness.   HENT:  Positive for congestion and postnasal drip. Negative for ear pain, sinus pain and sore throat.    Neck: Negative for neck pain.   Cardiovascular:  Negative for chest pain.   Respiratory:  Positive for cough. Negative for bloody sputum, shortness of breath and wheezing.    Gastrointestinal:  Negative for abdominal pain and heartburn.   Musculoskeletal:  Negative  for muscle ache.   Skin:  Negative for rash.   Allergic/Immunologic: Negative for environmental allergies.   Neurological:  Positive for headaches.      Objective:     Physical Exam   Constitutional: She is oriented to person, place, and time. She appears well-developed. She is cooperative.  Non-toxic appearance. She does not appear ill. No distress.      Comments:Patient is very well-appearing, sitting comfortably in exam chair, speaking in full sentences without difficulty     HENT:   Head: Normocephalic and atraumatic.   Ears:   Right Ear: Hearing, external ear and ear canal normal. A middle ear effusion (Mild, serous) is present.   Left Ear: Hearing, external ear and ear canal normal. A middle ear effusion (Mild, serous) is present.   Nose: Nose normal. No mucosal edema, rhinorrhea or nasal deformity. No epistaxis. Right sinus exhibits no maxillary sinus tenderness and no frontal sinus tenderness. Left sinus exhibits no maxillary sinus tenderness and no frontal sinus tenderness.   Mouth/Throat: Uvula is midline, oropharynx is clear and moist and mucous membranes are normal. No trismus in the jaw. Normal dentition. No uvula swelling. No oropharyngeal exudate, posterior oropharyngeal edema or posterior oropharyngeal erythema.   Eyes: Conjunctivae and lids are normal. No scleral icterus.   Neck: Trachea normal and phonation normal. Neck supple. No edema present. No erythema present. No neck rigidity present.   Cardiovascular: Normal rate, regular rhythm, normal heart sounds and normal pulses.   Pulmonary/Chest: Effort normal and breath sounds normal. No respiratory distress. She has no decreased breath sounds. She has no rhonchi.   Breath sounds clear bilaterally.  No wheezing.  No increased work of breathing, no cough heard during exam         Comments: Breath sounds clear bilaterally.  No wheezing.  No increased work of breathing, no cough heard during exam    Abdominal: Normal appearance.   Musculoskeletal:  Normal range of motion.         General: No deformity. Normal range of motion.   Neurological: She is alert and oriented to person, place, and time. She exhibits normal muscle tone. Coordination normal.   Skin: Skin is warm, dry, intact, not diaphoretic and not pale.   Psychiatric: Her speech is normal and behavior is normal. Judgment and thought content normal.   Nursing note and vitals reviewed.      Assessment:     1. Recurrent cough    2. Sinus congestion    3. Cough in adult        Plan:   Patient is very well-appearing, no increased work of breathing, wheezing, crackles, difficulty speaking, or cough heard during exam.  Based on history, symptoms, and chart review, I believe she is having a flare-up of a chronic cough that is well documented throughout her chart.  On independent interpretation, x-rays clear I discussed with patient that Mucinex would be beneficial, as well as an antihistamine such as Zyrtec, and I will prescribe a refill of the albuterol inhaler as she states this has helped her in the past.  On chart review she has not has had mixed results over past visits with [facility], she states she does not want Tessalon Perles today as she feels like they are not helpful.  I do not feel like a steroid would be beneficial at this time as there is no wheezing or indications of inflammation.  Encouraged patient to use Flonase and Zyrtec to help reduce postnasal drip, and to follow up with her PCP or her allergy specialist for further evaluation if her symptoms continue.  Return precautions discussed and she acknowledges understanding.    XR CHEST PA AND LATERAL  Result Date: 7/22/2025  EXAMINATION: XR CHEST PA AND LATERAL CLINICAL HISTORY: Cough, unspecified FINDINGS: Chest two views: Heart size is normal.  Bones reveal DJD.  There is biapical scar.  There are chronic lung changes.  No acute process or worrisome change from 12/16/2022.     No acute process seen. Electronically signed by: Philippe  MD Chung Date:    07/22/2025 Time:    08:58        Results for orders placed or performed in visit on 07/22/25   SARS Coronavirus 2 Antigen, POCT Manual Read    Collection Time: 07/22/25  8:40 AM   Result Value Ref Range    SARS Coronavirus 2 Antigen Negative Negative, Presumptive Negative     Acceptable Yes          Recurrent cough  -     guaiFENesin (MUCINEX) 600 mg 12 hr tablet; Take 1 tablet (600 mg total) by mouth 2 (two) times daily. for 7 days  Dispense: 14 tablet; Refill: 0  -     albuterol (VENTOLIN HFA) 90 mcg/actuation inhaler; Inhale 2 puffs into the lungs every 6 (six) hours as needed for Wheezing. Rescue  Dispense: 8 g; Refill: 0    Sinus congestion  -     SARS Coronavirus 2 Antigen, POCT Manual Read    Cough in adult  -     XR CHEST PA AND LATERAL; Future; Expected date: 07/22/2025  -     guaiFENesin (MUCINEX) 600 mg 12 hr tablet; Take 1 tablet (600 mg total) by mouth 2 (two) times daily. for 7 days  Dispense: 14 tablet; Refill: 0  -     albuterol (VENTOLIN HFA) 90 mcg/actuation inhaler; Inhale 2 puffs into the lungs every 6 (six) hours as needed for Wheezing. Rescue  Dispense: 8 g; Refill: 0

## 2025-07-28 ENCOUNTER — OFFICE VISIT (OUTPATIENT)
Dept: PRIMARY CARE CLINIC | Facility: CLINIC | Age: 85
End: 2025-07-28
Payer: MEDICARE

## 2025-07-28 VITALS
HEART RATE: 82 BPM | SYSTOLIC BLOOD PRESSURE: 140 MMHG | BODY MASS INDEX: 23.19 KG/M2 | OXYGEN SATURATION: 97 % | WEIGHT: 118.69 LBS | DIASTOLIC BLOOD PRESSURE: 64 MMHG

## 2025-07-28 DIAGNOSIS — J31.0 RHINITIS, UNSPECIFIED TYPE: ICD-10-CM

## 2025-07-28 DIAGNOSIS — J06.9 VIRAL URI: Primary | ICD-10-CM

## 2025-07-28 DIAGNOSIS — K21.9 GASTROESOPHAGEAL REFLUX DISEASE, UNSPECIFIED WHETHER ESOPHAGITIS PRESENT: ICD-10-CM

## 2025-07-28 DIAGNOSIS — R05.9 COUGH, UNSPECIFIED TYPE: ICD-10-CM

## 2025-07-28 PROCEDURE — 99999 PR PBB SHADOW E&M-EST. PATIENT-LVL IV: CPT | Mod: PBBFAC,HCNC,, | Performed by: INTERNAL MEDICINE

## 2025-07-28 PROCEDURE — 1160F RVW MEDS BY RX/DR IN RCRD: CPT | Mod: CPTII,HCNC,S$GLB, | Performed by: INTERNAL MEDICINE

## 2025-07-28 PROCEDURE — 1157F ADVNC CARE PLAN IN RCRD: CPT | Mod: CPTII,HCNC,S$GLB, | Performed by: INTERNAL MEDICINE

## 2025-07-28 PROCEDURE — 1101F PT FALLS ASSESS-DOCD LE1/YR: CPT | Mod: CPTII,HCNC,S$GLB, | Performed by: INTERNAL MEDICINE

## 2025-07-28 PROCEDURE — 1159F MED LIST DOCD IN RCRD: CPT | Mod: CPTII,HCNC,S$GLB, | Performed by: INTERNAL MEDICINE

## 2025-07-28 PROCEDURE — 1126F AMNT PAIN NOTED NONE PRSNT: CPT | Mod: CPTII,HCNC,S$GLB, | Performed by: INTERNAL MEDICINE

## 2025-07-28 PROCEDURE — 96372 THER/PROPH/DIAG INJ SC/IM: CPT | Mod: HCNC,S$GLB,, | Performed by: INTERNAL MEDICINE

## 2025-07-28 PROCEDURE — 3288F FALL RISK ASSESSMENT DOCD: CPT | Mod: CPTII,HCNC,S$GLB, | Performed by: INTERNAL MEDICINE

## 2025-07-28 PROCEDURE — 99214 OFFICE O/P EST MOD 30 MIN: CPT | Mod: HCNC,25,S$GLB, | Performed by: INTERNAL MEDICINE

## 2025-07-28 RX ORDER — PROMETHAZINE HYDROCHLORIDE 6.25 MG/5ML
6.25 SYRUP ORAL EVERY 6 HOURS PRN
Qty: 120 ML | Refills: 0 | Status: SHIPPED | OUTPATIENT
Start: 2025-07-28

## 2025-07-28 RX ORDER — TRIAMCINOLONE ACETONIDE 40 MG/ML
40 INJECTION, SUSPENSION INTRA-ARTICULAR; INTRAMUSCULAR ONCE
Status: COMPLETED | OUTPATIENT
Start: 2025-07-28 | End: 2025-07-28

## 2025-07-28 RX ORDER — AZELASTINE 1 MG/ML
2 SPRAY, METERED NASAL 2 TIMES DAILY PRN
Qty: 30 ML | Refills: 11 | Status: SHIPPED | OUTPATIENT
Start: 2025-07-28

## 2025-07-28 RX ORDER — FLUTICASONE PROPIONATE 50 MCG
2 SPRAY, SUSPENSION (ML) NASAL DAILY
Qty: 16 G | Refills: 1 | Status: SHIPPED | OUTPATIENT
Start: 2025-07-28

## 2025-07-28 RX ORDER — GUAIFENESIN AND DEXTROMETHORPHAN HYDROBROMIDE 1200; 60 MG/1; MG/1
1 TABLET, EXTENDED RELEASE ORAL 2 TIMES DAILY PRN
Qty: 20 TABLET | Refills: 0 | Status: SHIPPED | OUTPATIENT
Start: 2025-07-28 | End: 2025-08-07

## 2025-07-28 RX ADMIN — TRIAMCINOLONE ACETONIDE 40 MG: 40 INJECTION, SUSPENSION INTRA-ARTICULAR; INTRAMUSCULAR at 02:07

## 2025-07-29 NOTE — PROGRESS NOTES
Subjective:       Patient ID: Laverne Humphries is a 84 y.o. female.    Chief Complaint: Cough, Generalized Body Aches, and Shortness of Breath      HPI  Laverne Humphries is a 84 y.o. female with hypertension, hyperlipidemia, pulmonary hypertension, CKD 3A, anxiety who presents today for Cough, Generalized Body Aches, and Shortness of Breath    Laverne presents today with persistent cough and congestion    She reports a persistent cough for two weeks, occurring both day and night. Coughing episodes cause choking sensation with difficulty clearing secretions and feeling of running out of air. She expectorates clear, white phlegm from both nose and mouth and experiences significant nasal drip. She reports chest tightness, back pain during frequent coughing, headache, and chills without fever. During coughing fits, she experiences weakness and occasional nausea. She visited Urgent Care one week ago where chest XR showed no evidence of pneumonia and COVID testing was performed.  The doctor evaluated her but did not prescribe any medications.  Has not been using any other medication then the Astelin spray and maintenance inhaler Arnuity.  Denies wheezing or shortness a breath except when she has the coughing episodes.  Tesalon perles have not helped in the past.    MEDICAL HISTORY:  She has a history of ulcer and ongoing reflux affecting medication tolerance. She has bilateral hearing loss, confirmed by testing this year. She experiences persistent ear pain on one side following ear cleaning by a medical assistant, causing discomfort when lying on that side.    MEDICATIONS:  She takes Lorazepam 1 mg nightly for sleep and uses Arnuity inhaler with mouth rinsing after use. She recently took Cetirizine which caused significant morning drowsiness.    SOCIAL HISTORY:  She lives alone with frequent visits from her daughter.      ROS:  General: -fever, +chills, -fatigue, -weight gain, -weight loss, +weakness  Eyes:  -vision changes, -redness, -discharge  ENT: +ear pain, -nasal congestion, -sore throat, +nasal discharge, +runny nose, +ear pressure, +difficulty hearing, +hearing loss.  Cardiovascular: -chest pain, -palpitations, -lower extremity edema  Respiratory: +cough, +shortness of breath when coughing episodes, +waking at night coughing, +chest tightness when coughing, +choking sensation with mucus.  No wheezing.  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 OR;  Service: Orthopedics;  Laterality: Right;    ARTHROSCOPIC REPAIR OF ROTATOR CUFF OF SHOULDER Right 3/21/2023    Procedure: REPAIR, ROTATOR CUFF, ARTHROSCOPIC;  Surgeon: Luis Angel Wheeler MD;  Location: [facility] OR;  Service: Orthopedics;  Laterality: Right;    ARTHROSCOPY OF SHOULDER WITH DECOMPRESSION OF SUBACROMIAL SPACE Right 3/21/2023    Procedure: ARTHROSCOPY, SHOULDER, WITH SUBACROMIAL SPACE DECOMPRESSION;  Surgeon: Luis Angel Wheeler MD;  Location: [facility] OR;  Service: Orthopedics;  Laterality: Right;    BELPHAROPTOSIS REPAIR Bilateral      CATARACT EXTRACTION W/  INTRAOCULAR LENS IMPLANT Bilateral     CATARACT EXTRACTION, BILATERAL      CHOLECYSTECTOMY      COLONOSCOPY N/A 01/20/2022    Procedure: COLONOSCOPY;  Surgeon: Emmanuel Sandoval MD;  [facility]  Service: Endoscopy;  Laterality: N/A;    ESOPHAGOGASTRODUODENOSCOPY N/A 01/20/2022    Procedure: EGD (ESOPHAGOGASTRODUODENOSCOPY) any GI doc, please perform colonoscopy and EGD at same time;  Surgeon: Emmanuel Sandoval MD;  Location: [facility] (4TH FLR);  Service: Endoscopy;  Laterality: N/A;  EGD & Colonoscopy orders combined-MS  fully vaccinated  1/13 covid test 1/17 @ [facility]    EYE SURGERY      FLEXIBLE SIGMOIDOSCOPY  4/26/2022    Procedure: SIGMOIDOSCOPY, FLEXIBLE;  Surgeon: Alison Mcclain MD;  Location: [facility];  Service: Colon and Rectal;;    HYSTERECTOMY      LYSIS, ADHESIONS, SHOULDER, ARTHROSCOPIC  3/21/2023    Procedure: LYSIS,ADHESIONS,SHOULDER,ARTHROSCOPIC;  Surgeon: Luis Angel Wheeler MD;  Location: [facility];  Service: Orthopedics;;    OOPHORECTOMY      ROBOT-ASSISTED LAPAROSCOPIC COLECTOMY USING DA DIANA XI N/A 4/26/2022    Procedure: XI ROBOTIC COLECTOMY with flexible sigmoidoscopy;  Surgeon: Alison Mcclain MD;  Location: [facility];  Service: Colon and Rectal;  Laterality: N/A;       Family History   Problem Relation Name Age of Onset    Cancer Mother          pancreatic    Cataracts Father      Diabetes Father      Asthma Sister x5     Cataracts Sister x5     Cancer Brother 1         liver and esophageal    No Known Problems Daughter      No Known Problems Son      Amblyopia Neg Hx      Blindness Neg Hx      Glaucoma Neg Hx      Macular degeneration Neg Hx      Retinal detachment Neg Hx      Strabismus Neg Hx      Breast cancer Neg Hx      Colon cancer Neg Hx      Ovarian cancer Neg Hx         Social History     Socioeconomic History    Marital status:    Tobacco Use    Smoking status: Never     Passive exposure: Never    Smokeless tobacco: Never    Substance and Sexual Activity    Alcohol use: No    Drug use: No    Sexual activity: Not Currently     Partners: Male     Birth control/protection: Abstinence, None   Other Topics Concern    Are you pregnant or think you may be? No    Breast-feeding No     Social Drivers of Health     Financial Resource Strain: Medium Risk (4/30/2025)    Overall Financial Resource Strain (CARDIA)     Difficulty of Paying Living Expenses: Somewhat hard   Food Insecurity: No Food Insecurity (4/30/2025)    Hunger Vital Sign     Worried About Running Out of Food in the Last Year: Never true     Ran Out of Food in the Last Year: Never true   Transportation Needs: No Transportation Needs (4/30/2025)    PRAPARE - Transportation     Lack of Transportation (Medical): No     Lack of Transportation (Non-Medical): No   Physical Activity: Insufficiently Active (4/30/2025)    Exercise Vital Sign     Days of Exercise per Week: 5 days     Minutes of Exercise per Session: 10 min   Stress: No Stress Concern Present (4/30/2025)    [employer] [place] - Occupational Stress Questionnaire     Feeling of Stress : Only a little   Housing Stability: Low Risk  (4/30/2025)    Housing Stability Vital Sign     Unable to Pay for Housing in the Last Year: No     Number of Times Moved in the Last Year: 0     Homeless in the Last Year: No       Current Medications[1]    Review of patient's allergies indicates:   Allergen Reactions    Codeine      Other reaction(s): Syncope, can take tylenol    Sulfa (sulfonamide antibiotics)      Other reaction(s): Stomach upset    Tizanidine      Nausea/dizziness         Objective:       Last 3 sets of Vitals        5/22/2025     7:28 AM 7/22/2025     8:22 AM 7/28/2025     1:03 PM   Vitals - 1 value per visit   SYSTOLIC  105 140   DIASTOLIC  62 64   Pulse  98 82   Temp  97.8 °F (36.6 °C)    Resp  20    SPO2  98 % 97 %   Weight (lb) 119 116 118.72   Weight (kg) 53.978 52.617 53.85   Height 5' (1.524 m) 5'  (1.524 m)    BMI (Calculated) 23.2 22.7    Pain Score  Zero Zero   Physical Exam  Constitutional:       General: She is not in acute distress.  HENT:      Head: Normocephalic.      Right Ear: Tympanic membrane, ear canal and external ear normal.      Left Ear: Tympanic membrane, ear canal and external ear normal.      Ears:      Comments: Wax noted on right ear without impaction.     Nose: Nose normal.      Mouth/Throat:      Mouth: Mucous membranes are moist.      Pharynx: No oropharyngeal exudate.   Eyes:      General: No scleral icterus.     Extraocular Movements: Extraocular movements intact.      Conjunctiva/sclera: Conjunctivae normal.   Neck:      Vascular: No carotid bruit.      Comments: No goiter.  Cardiovascular:      Rate and Rhythm: Normal rate and regular rhythm.      Pulses: Normal pulses.      Heart sounds: Normal heart sounds.   Pulmonary:      Effort: Pulmonary effort is normal.      Breath sounds: Normal breath sounds. No wheezing or rales.   Chest:      Chest wall: No tenderness.   Abdominal:      General: Bowel sounds are normal. There is no distension.      Palpations: Abdomen is soft. There is no mass.      Tenderness: There is no abdominal tenderness.   Musculoskeletal:         General: No swelling.   Lymphadenopathy:      Cervical: No cervical adenopathy.   Skin:     General: Skin is warm and dry.   Neurological:      General: No focal deficit present.      Mental Status: She is alert and oriented to person, place, and time.   Psychiatric:         Mood and Affect: Mood normal.         Behavior: Behavior normal.           CBC:  Recent Labs   Lab 07/19/24  1145 11/18/24  0936 05/15/25  0819   WBC 9.49 8.94 7.81   RBC 3.96 L 4.27 4.13   Hemoglobin 11.4 L 12.3  --    HGB  --   --  11.6 L   Hematocrit 35.8 L 37.8  --    HCT  --   --  36.8 L   Platelet Count  --   --  245   Platelets 273 259  --    MCV 90 89 89   MCH 28.8 28.8 28.1   MCHC 31.8 L 32.5 31.5 L     CMP:  Recent Labs   Lab  07/19/24  1145 11/18/24  0936 04/02/25  0754 05/15/25  0819   Glucose 89 99  --  99   Calcium 10.3 10.5  --  10.0   Albumin 3.9 3.8  --  3.7   Protein Total  --   --   --  7.4   Total Protein 7.6 7.3  --   --    Sodium 139 136  --  140   Potassium 4.5 4.4  --  5.0   CO2 21 L 23  --  27   Chloride 103 103  --  105   BUN 19 21  --  17   Creatinine 1.0 1.0   < > 0.9   Alkaline Phosphatase 67 68  --   --    ALP  --   --   --  62   ALT 16 10  --  12   AST 26 23  --  23   Total Bilirubin 0.9 1.1 H  --   --    Bilirubin Total  --   --   --  1.6 H    < > = values in this interval not displayed.     URINALYSIS:  Recent Labs   Lab 03/04/23  0917 04/25/23  1647   Color, UA Yellow Yellow   Specific Gravity, UA 1.020 1.020   pH, UA 5.0 7.0   Protein, UA Negative Negative   Bacteria Rare  --    Nitrite, UA Negative Negative   Leukocytes, UA Trace A Negative      LIPIDS:  Recent Labs   Lab 02/03/23  1103 04/18/24  0930 05/15/25  0819   TSH 1.345  --   --    HDL 58 61  --    HDL Cholesterol  --   --  62   Cholesterol Total  --   --  212 H   Cholesterol 179 196  --    Triglycerides 146 100  --    Triglyceride  --   --  106   LDL Cholesterol 91.8 115.0 128.8   HDL/Cholesterol Ratio 32.4 31.1 29.2   Non-HDL Cholesterol 121 135  --    Non HDL Cholesterol  --   --  150   Total Cholesterol/HDL Ratio 3.1 3.2  --    Cholesterol/HDL Ratio  --   --  3.4     TSH:  Recent Labs   Lab 02/03/23  1103   TSH 1.345       A1C:        Imaging:  XR CHEST PA AND LATERAL  Narrative: EXAMINATION:  XR CHEST PA AND LATERAL    CLINICAL HISTORY:  Cough, unspecified    FINDINGS:  Chest two views:    Heart size is normal.  Bones reveal DJD.  There is biapical scar.  There are chronic lung changes.  No acute process or worrisome change from 12/16/2022.  Impression: No acute process seen.    Electronically signed by: Philippe Wilkes MD  Date:    07/22/2025  Time:    08:58      Assessment:       1. Viral URI    2. Rhinitis, unspecified type    3. Cough, unspecified  type    4. Gastroesophageal reflux disease, unspecified whether esophagitis present            Plan:       1. Viral URI    2. Rhinitis, unspecified type  -     fluticasone propionate (FLONASE) 50 mcg/actuation nasal spray; 2 sprays (100 mcg total) by Each Nostril route once daily.  Dispense: 16 g; Refill: 1  -     azelastine (ASTELIN) 137 mcg (0.1 %) nasal spray; 2 sprays (274 mcg total) by Nasal route 2 (two) times daily as needed for Rhinitis.  Dispense: 30 mL; Refill: 11    3. Cough, unspecified type  -     promethazine (PHENERGAN) 6.25 mg/5 mL syrup; Take 5 mLs (6.25 mg total) by mouth every 6 (six) hours as needed (cough).  Dispense: 120 mL; Refill: 0  -     dextromethorphan-guaiFENesin (MUCINEX DM) 60-1,200 mg per 12 hr tablet; Take 1 tablet by mouth 2 (two) times daily as needed (cough and congestion).  Dispense: 20 tablet; Refill: 0    4. Gastroesophageal reflux disease, unspecified whether esophagitis present  Comments:  On pantoprazole    Other orders  -     triamcinolone acetonide injection 40 mg       Assessment & Plan    > Assessed symptoms as likely viral rather than bacterial, noting chills, absence of fever, and colored mucus.  > Considered bronchitis as possibility due to reported chest tightness and significant coughing.  > Opted for symptomatic treatment approach focusing on cough and congestion management.    COUGH:  - Laverne has been experiencing coughing fits day and night for 2 weeks with associated weakness.  - Lungs are clear on auscultation despite the cough.  - The cough sounds somewhat like bronchitis, but bacterial infection is not suspected.  - Leovidia reports clear, white phlegm.  - Prescribed promethazine syrup for cough (up to 3 times daily as needed, with caution regarding drowsiness) and Mucinex DM 12-hour tablets for congestion and cough (to be taken as directed).  - Administered low-dose steroid injection in office for immediate relief, discussed possible side effects  including restlessness, hot flashes, and palpitations.    POSTNASAL DRIP:  - Noted nasal drip which is likely viral and allergy component.  - Prescribed Fluticasone (Flonase) nasal spray, 2 sprays per nostril daily to address nasal inflammation and post-nasal drip.  - Discussed use of antihistamine nasal spray for faster symptom relief for runny nose.      RIGHT EAR PAIN:  - Leboodia reports pain in the right ear, especially when lying down.  - Reports discomfort after ear was cleaned by a doctor's assistant.  - Examination shows no erythema or exudate noted.  - Reports has a appointment with ENT.      GENERAL MANAGEMENT:  - Betodia advised to drink plenty of fluids, especially water, and to stay home to avoid spreading illness.         Health Maintenance Due   Topic Date Due    RSV Vaccine (Age 60+ and Pregnant patients) (1 - 1-dose 75+ series) Never done        I spent a total of 30 minutes on the day of the visit.This includes face to face time and non-face to face time preparing to see the patient (eg, review of tests), obtaining and/or reviewing separately obtained history, documenting clinical information in the electronic or other health record, independently interpreting results and communicating results to the patient/family/caregiver, or care coordinator.     RETURN TO CLINIC IN: As scheduled    FOR NEXT VISIT: MEDICATION MONITORING       Kanwal Sousa MD  [facility]  Disclaimer:  This note has been generated using voice-recognition software. There may be grammatical or spelling errors that have been missed during proof-reading      This note was generated with the assistance of ambient listening technology. Verbal consent was obtained by the patient and accompanying visitor(s) for the recording of patient appointment to facilitate this note. I attest to having reviewed and edited the generated note for accuracy, though some syntax or spelling errors may persist. Please contact the author of this  note for any clarification.          [1]   Current Outpatient Medications   Medication Sig Dispense Refill    albuterol (PROVENTIL HFA) 90 mcg/actuation inhaler Inhale 2 puffs into the lungs every 6 (six) hours as needed for Wheezing. Rescue 18 g 3    albuterol (VENTOLIN HFA) 90 mcg/actuation inhaler Inhale 2 puffs into the lungs every 6 (six) hours as needed for Wheezing. Rescue 8 g 0    apixaban (ELIQUIS) 2.5 mg Tab Take 1 tablet (2.5 mg total) by mouth 2 (two) times daily. 180 tablet 3    calcium-vitamin D3 500 mg(1,250mg) -200 unit per tablet Take 1 tablet by mouth Daily.      carboxymethylcellulose (REFRESH PLUS) 0.5 % Dpet 1 drop 3 (three) times daily as needed.      fluticasone furoate (ARNUITY ELLIPTA) 100 mcg/actuation inhaler INHALE 1 PUFF ONE TIME DAILY 90 each 3    guaiFENesin (MUCINEX) 600 mg 12 hr tablet Take 1 tablet (600 mg total) by mouth 2 (two) times daily. for 7 days 14 tablet 0    LORazepam (ATIVAN) 0.5 MG tablet Take 1 tablet (0.5 mg total) by mouth nightly as needed for Anxiety (or insomnia). 30 tablet 3    metoprolol succinate (TOPROL-XL) 25 MG 24 hr tablet Take 1 tablet (25 mg total) by mouth once daily. 90 tablet 3    nystatin (MYCOSTATIN) cream Apply topically 2 (two) times daily. 30 g 3    pantoprazole (PROTONIX) 40 MG tablet TAKE 1 TABLET (40 MG TOTAL) BY MOUTH ONCE DAILY. 90 tablet 3    rosuvastatin (CRESTOR) 5 MG tablet Take 1 tablet (5 mg total) by mouth once daily. 90 tablet 3    sucralfate (CARAFATE) 1 gram tablet Take 1 tablet (1 g total) by mouth 2 (two) times daily as needed (indigestion). 90 tablet 3    triamcinolone acetonide 0.1% (KENALOG) 0.1 % cream Apply topically 2 (two) times daily. 80 g 1    azelastine (ASTELIN) 137 mcg (0.1 %) nasal spray 2 sprays (274 mcg total) by Nasal route 2 (two) times daily as needed for Rhinitis. 30 mL 11    clobetasol 0.05% (TEMOVATE) 0.05 % Oint Apply topically 2 (two) times daily. for 7 days 45 g 0    dextromethorphan-guaiFENesin (MUCINEX DM)  60-1,200 mg per 12 hr tablet Take 1 tablet by mouth 2 (two) times daily as needed (cough and congestion). 20 tablet 0    fluticasone propionate (FLONASE) 50 mcg/actuation nasal spray 2 sprays (100 mcg total) by Each Nostril route once daily. 16 g 1    promethazine (PHENERGAN) 6.25 mg/5 mL syrup Take 5 mLs (6.25 mg total) by mouth every 6 (six) hours as needed (cough). 120 mL 0     No current facility-administered medications for this visit.

## 2025-08-04 ENCOUNTER — PATIENT MESSAGE (OUTPATIENT)
Dept: ALLERGY | Facility: CLINIC | Age: 85
End: 2025-08-04
Payer: MEDICARE

## 2025-08-05 ENCOUNTER — PATIENT MESSAGE (OUTPATIENT)
Dept: PRIMARY CARE CLINIC | Facility: CLINIC | Age: 85
End: 2025-08-05
Payer: MEDICARE

## 2025-08-05 NOTE — TELEPHONE ENCOUNTER
Call to patient who reported she is feeling worse. She is still coughing and spitting up whitish phlegm.   She reports drinking plenty of water.  Using medications as prescribed.   She wants to be seen by physician or given po abx. Scheduled with Dr Sousa 08/06/2025

## 2025-08-05 NOTE — TELEPHONE ENCOUNTER
Called pt at [phone].  Pt stated that she has a cough for 3 weeks offered appt. Went to pCP and given medication but feels it is not working.Pt declined stating that she needed something sooner.informed pt that I can schedule her now and add to wait list.

## 2025-08-06 ENCOUNTER — OFFICE VISIT (OUTPATIENT)
Dept: PRIMARY CARE CLINIC | Facility: CLINIC | Age: 85
End: 2025-08-06
Payer: MEDICARE

## 2025-08-06 VITALS
WEIGHT: 116.75 LBS | HEIGHT: 60 IN | OXYGEN SATURATION: 98 % | HEART RATE: 74 BPM | DIASTOLIC BLOOD PRESSURE: 70 MMHG | SYSTOLIC BLOOD PRESSURE: 126 MMHG | RESPIRATION RATE: 18 BRPM | TEMPERATURE: 98 F | BODY MASS INDEX: 22.92 KG/M2

## 2025-08-06 DIAGNOSIS — K21.9 GASTROESOPHAGEAL REFLUX DISEASE, UNSPECIFIED WHETHER ESOPHAGITIS PRESENT: ICD-10-CM

## 2025-08-06 DIAGNOSIS — J06.9 UPPER RESPIRATORY TRACT INFECTION, UNSPECIFIED TYPE: Primary | ICD-10-CM

## 2025-08-06 PROCEDURE — 3288F FALL RISK ASSESSMENT DOCD: CPT | Mod: CPTII,HCNC,S$GLB, | Performed by: INTERNAL MEDICINE

## 2025-08-06 PROCEDURE — 1101F PT FALLS ASSESS-DOCD LE1/YR: CPT | Mod: CPTII,HCNC,S$GLB, | Performed by: INTERNAL MEDICINE

## 2025-08-06 PROCEDURE — 1159F MED LIST DOCD IN RCRD: CPT | Mod: CPTII,HCNC,S$GLB, | Performed by: INTERNAL MEDICINE

## 2025-08-06 PROCEDURE — 1160F RVW MEDS BY RX/DR IN RCRD: CPT | Mod: CPTII,HCNC,S$GLB, | Performed by: INTERNAL MEDICINE

## 2025-08-06 PROCEDURE — 1126F AMNT PAIN NOTED NONE PRSNT: CPT | Mod: CPTII,HCNC,S$GLB, | Performed by: INTERNAL MEDICINE

## 2025-08-06 PROCEDURE — 99214 OFFICE O/P EST MOD 30 MIN: CPT | Mod: HCNC,S$GLB,, | Performed by: INTERNAL MEDICINE

## 2025-08-06 PROCEDURE — 1157F ADVNC CARE PLAN IN RCRD: CPT | Mod: CPTII,HCNC,S$GLB, | Performed by: INTERNAL MEDICINE

## 2025-08-06 PROCEDURE — 99999 PR PBB SHADOW E&M-EST. PATIENT-LVL V: CPT | Mod: PBBFAC,HCNC,, | Performed by: INTERNAL MEDICINE

## 2025-08-06 RX ORDER — AZITHROMYCIN 250 MG/1
TABLET, FILM COATED ORAL
Qty: 6 TABLET | Refills: 0 | Status: SHIPPED | OUTPATIENT
Start: 2025-08-06 | End: 2025-08-11

## 2025-08-06 RX ORDER — LEVOCETIRIZINE DIHYDROCHLORIDE 5 MG/1
5 TABLET, FILM COATED ORAL NIGHTLY PRN
Qty: 30 TABLET | Refills: 0 | Status: SHIPPED | OUTPATIENT
Start: 2025-08-06 | End: 2026-08-06

## 2025-08-07 NOTE — PROGRESS NOTES
Subjective:       Patient ID: Laverne Humphries is a 84 y.o. female.    Chief Complaint: Follow-up (Cough, congestion, fatigue - meds not helping (3 weeks)) and Chills      HPI  Laverne Humphries is a 84 y.o. female with hypertension, hyperlipidemia, pulmonary hypertension, CKD 3A, anxiety who presents today for Follow-up (Cough, congestion, fatigue - meds not helping (3 weeks)) and Chills    Laverne presents today for persistent cough of three weeks duration.    RESPIRATORY:  She reports a persistent dry cough without phlegm for three weeks, which she describes as her most bothersome symptom. The cough only produces clear sputum when using albuterol. She denies fever and notes the cough feels dry without significant chest congestion. She experiences chills when lying down and requires additional covering at night.    CURRENT MEDICATIONS:  She takes liquid cough medicine (promethazine) for 2 days, initially 3 times daily, then only at night due to drowsiness. She uses two nasal sprays including Flonase, and albuterol inhaler daily with two sprays in each nostril. She takes Ativan (Lorazepam) 1 mg at 9 PM for sleep, pantoprazole in the morning before coffee, sucralfate twice daily for digestion, and Zyrtec inconsistently.    ALLERGIES:  She reports allergies to sulfa and codeine. Codeine allergy is associated with a fainting reaction.    GERD:  She experiences a recent reflux episode after consuming a tamale and bread with cheese in the earlier in the day with her abdomen was empty.    DIET:  She reports minimal dietary intake with her best meal occurring at lunch and minimal dinner consumption. She drinks more than half a gallon of water daily and acknowledges understanding the importance of maintaining electrolyte balance while hydrating.    SLEEP:  She sleeps well with assistance of Lorazepam 1 mg at 9 PM and goes to bed at 10 PM without significant sleep disturbances.      ROS:  General: -fever, +chills,  -fatigue, -weight gain, -weight loss, +weakness  Eyes: -vision changes, -redness, -discharge  ENT: -ear pain, -nasal congestion, -sore throat  Cardiovascular: -chest pain, -palpitations, -lower extremity edema  Respiratory: +cough, -shortness of breath, +productive cough  Gastrointestinal: -abdominal pain, -nausea, -vomiting, -diarrhea, -constipation, -blood in stool, +indigestion  Genitourinary: -dysuria, -hematuria, -frequency  Musculoskeletal: -joint pain, -muscle pain  Skin: -rash, -lesion  Neurological: -headache, -dizziness, -numbness, -tingling  Psychiatric: -anxiety, -depression, -sleep difficulty  Allergic: +allergic reactions            Past Medical History:   Diagnosis Date    Allergy     Anxiety     Arthritis     Asthma     mild    Cataract     CKD (chronic kidney disease) stage 3, GFR 30-59 ml/min     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    Hepatitis     IBS (irritable bowel syndrome)     Moderate aortic valve stenosis     Osteoporosis     Paroxysmal atrial fibrillation     Reflux esophagitis     Torus palatinus        Past Surgical History:   Procedure Laterality Date    ARTHROSCOPIC DEBRIDEMENT OF SHOULDER Right 3/21/2023    Procedure: DEBRIDEMENT, SHOULDER, ARTHROSCOPIC;  Surgeon: Luis Angel Wheeler MD;  Location: [facility] OR;  Service: Orthopedics;  Laterality: Right;    ARTHROSCOPIC REPAIR OF ROTATOR CUFF OF SHOULDER Right 3/21/2023    Procedure: REPAIR, ROTATOR CUFF, ARTHROSCOPIC;  Surgeon: Luis Angel Wheeler MD;  Location: [facility] OR;  Service: Orthopedics;  Laterality: Right;    ARTHROSCOPY OF SHOULDER WITH DECOMPRESSION OF SUBACROMIAL SPACE Right 3/21/2023    Procedure: ARTHROSCOPY, SHOULDER, WITH SUBACROMIAL SPACE DECOMPRESSION;  Surgeon: Luis Angel Wheeler MD;  Location: [facility] OR;  Service: Orthopedics;  Laterality: Right;    BELPHAROPTOSIS REPAIR Bilateral     CATARACT EXTRACTION W/  INTRAOCULAR LENS IMPLANT Bilateral     CATARACT EXTRACTION, BILATERAL      CHOLECYSTECTOMY      COLONOSCOPY N/A  01/20/2022    Procedure: COLONOSCOPY;  Surgeon: Emmanuel Sandoval MD;  Location: [facility] ENDO (4TH FLR);  Service: Endoscopy;  Laterality: N/A;    ESOPHAGOGASTRODUODENOSCOPY N/A 01/20/2022    Procedure: EGD (ESOPHAGOGASTRODUODENOSCOPY) any GI doc, please perform colonoscopy and EGD at same time;  Surgeon: Emmanuel Sandoval MD;  Location: [facility] ENDO (4TH FLR);  Service: Endoscopy;  Laterality: N/A;  EGD & Colonoscopy orders combined-MS  fully vaccinated  1/13 covid test 1/17 @ [facility]    EYE SURGERY      FLEXIBLE SIGMOIDOSCOPY  4/26/2022    Procedure: SIGMOIDOSCOPY, FLEXIBLE;  Surgeon: Alison Mcclain MD;  Location: [facility];  Service: Colon and Rectal;;    HYSTERECTOMY      LYSIS, ADHESIONS, SHOULDER, ARTHROSCOPIC  3/21/2023    Procedure: LYSIS,ADHESIONS,SHOULDER,ARTHROSCOPIC;  Surgeon: Luis Angel Wheeler MD;  Location: [facility];  Service: Orthopedics;;    OOPHORECTOMY      ROBOT-ASSISTED LAPAROSCOPIC COLECTOMY USING DA DIANA XI N/A 4/26/2022    Procedure: XI ROBOTIC COLECTOMY with flexible sigmoidoscopy;  Surgeon: Alison Mcclain MD;  Location: [facility];  Service: Colon and Rectal;  Laterality: N/A;       Family History   Problem Relation Name Age of Onset    Cancer Mother          pancreatic    Cataracts Father      Diabetes Father      Asthma Sister x5     Cataracts Sister x5     Cancer Brother 1         liver and esophageal    No Known Problems Daughter      No Known Problems Son      Amblyopia Neg Hx      Blindness Neg Hx      Glaucoma Neg Hx      Macular degeneration Neg Hx      Retinal detachment Neg Hx      Strabismus Neg Hx      Breast cancer Neg Hx      Colon cancer Neg Hx      Ovarian cancer Neg Hx         Social History     Socioeconomic History    Marital status:    Tobacco Use    Smoking status: Never     Passive exposure: Never    Smokeless tobacco: Never   Substance and Sexual Activity    Alcohol use: No    Drug use: No    Sexual activity: Not Currently     Partners: Male     Birth  control/protection: Abstinence, None   Other Topics Concern    Are you pregnant or think you may be? No    Breast-feeding No     Social Drivers of Health     Financial Resource Strain: Medium Risk (4/30/2025)    Overall Financial Resource Strain (CARDIA)     Difficulty of Paying Living Expenses: Somewhat hard   Food Insecurity: No Food Insecurity (4/30/2025)    Hunger Vital Sign     Worried About Running Out of Food in the Last Year: Never true     Ran Out of Food in the Last Year: Never true   Transportation Needs: No Transportation Needs (4/30/2025)    PRAPARE - Transportation     Lack of Transportation (Medical): No     Lack of Transportation (Non-Medical): No   Physical Activity: Insufficiently Active (4/30/2025)    Exercise Vital Sign     Days of Exercise per Week: 5 days     Minutes of Exercise per Session: 10 min   Stress: No Stress Concern Present (4/30/2025)    [employer] - Occupational Stress Questionnaire     Feeling of Stress : Only a little   Housing Stability: Low Risk  (4/30/2025)    Housing Stability Vital Sign     Unable to Pay for Housing in the Last Year: No     Number of Times Moved in the Last Year: 0     Homeless in the Last Year: No       Current Medications[1]    Review of patient's allergies indicates:   Allergen Reactions    Codeine      Other reaction(s): Syncope, can take tylenol    Sulfa (sulfonamide antibiotics)      Other reaction(s): Stomach upset    Tizanidine      Nausea/dizziness         Objective:       Last 3 sets of Vitals        7/22/2025     8:22 AM 7/28/2025     1:03 PM 8/6/2025    11:14 AM   Vitals - 1 value per visit   SYSTOLIC 105 140 126   DIASTOLIC 62 64 70   Pulse 98 82 74   Temp 97.8 °F (36.6 °C)  97.8 °F (36.6 °C)   Resp 20  18   SPO2 98 % 97 % 98 %   Weight (lb) 116 118.72 116.73   Weight (kg) 52.617 53.85 52.95   Height 5' (1.524 m)  5' (1.524 m)   BMI (Calculated) 22.7  22.8   Pain Score Zero Zero Zero   Physical Exam  Constitutional:        General: She is not in acute distress.  HENT:      Head: Normocephalic.      Right Ear: Tympanic membrane, ear canal and external ear normal.      Left Ear: Tympanic membrane, ear canal and external ear normal.      Nose: Nose normal.      Comments: Voice sometimes may sound nasal.     Mouth/Throat:      Mouth: Mucous membranes are moist.      Comments: Beige color postnasal drip and mild erythema on lateral sides of the throat.  Eyes:      General: No scleral icterus.     Extraocular Movements: Extraocular movements intact.      Conjunctiva/sclera: Conjunctivae normal.   Neck:      Vascular: No carotid bruit.      Comments: No goiter.  Cardiovascular:      Rate and Rhythm: Normal rate and regular rhythm.      Pulses: Normal pulses.      Heart sounds: Normal heart sounds.   Pulmonary:      Effort: Pulmonary effort is normal.      Breath sounds: Normal breath sounds. No wheezing or rhonchi.   Abdominal:      General: Bowel sounds are normal. There is no distension.      Palpations: Abdomen is soft. There is no mass.      Tenderness: There is no abdominal tenderness.   Musculoskeletal:         General: No swelling.   Lymphadenopathy:      Cervical: No cervical adenopathy.   Skin:     General: Skin is warm and dry.   Neurological:      General: No focal deficit present.      Mental Status: She is alert and oriented to person, place, and time.   Psychiatric:         Mood and Affect: Mood normal.         Behavior: Behavior normal.           CBC:  Recent Labs   Lab 07/19/24  1145 11/18/24  0936 05/15/25  0819   WBC 9.49 8.94 7.81   RBC 3.96 L 4.27 4.13   Hemoglobin 11.4 L 12.3  --    HGB  --   --  11.6 L   Hematocrit 35.8 L 37.8  --    HCT  --   --  36.8 L   Platelet Count  --   --  245   Platelets 273 259  --    MCV 90 89 89   MCH 28.8 28.8 28.1   MCHC 31.8 L 32.5 31.5 L     CMP:  Recent Labs   Lab 07/19/24  1145 11/18/24  0936 04/02/25  0754 05/15/25  0819   Glucose 89 99  --  99   Calcium 10.3 10.5  --  10.0    Albumin 3.9 3.8  --  3.7   Protein Total  --   --   --  7.4   Total Protein 7.6 7.3  --   --    Sodium 139 136  --  140   Potassium 4.5 4.4  --  5.0   CO2 21 L 23  --  27   Chloride 103 103  --  105   BUN 19 21  --  17   Creatinine 1.0 1.0   < > 0.9   Alkaline Phosphatase 67 68  --   --    ALP  --   --   --  62   ALT 16 10  --  12   AST 26 23  --  23   Total Bilirubin 0.9 1.1 H  --   --    Bilirubin Total  --   --   --  1.6 H    < > = values in this interval not displayed.     URINALYSIS:  Recent Labs   Lab 03/04/23  0917 04/25/23  1647   Color, UA Yellow Yellow   Specific Gravity, UA 1.020 1.020   pH, UA 5.0 7.0   Protein, UA Negative Negative   Bacteria Rare  --    Nitrite, UA Negative Negative   Leukocytes, UA Trace A Negative      LIPIDS:  Recent Labs   Lab 02/03/23  1103 04/18/24  0930 05/15/25  0819   TSH 1.345  --   --    HDL 58 61  --    HDL Cholesterol  --   --  62   Cholesterol Total  --   --  212 H   Cholesterol 179 196  --    Triglycerides 146 100  --    Triglyceride  --   --  106   LDL Cholesterol 91.8 115.0 128.8   HDL/Cholesterol Ratio 32.4 31.1 29.2   Non-HDL Cholesterol 121 135  --    Non HDL Cholesterol  --   --  150   Total Cholesterol/HDL Ratio 3.1 3.2  --    Cholesterol/HDL Ratio  --   --  3.4     TSH:  Recent Labs   Lab 02/03/23  1103   TSH 1.345       A1C:        Imaging:  XR CHEST PA AND LATERAL  Narrative: EXAMINATION:  XR CHEST PA AND LATERAL    CLINICAL HISTORY:  Cough, unspecified    FINDINGS:  Chest two views:    Heart size is normal.  Bones reveal DJD.  There is biapical scar.  There are chronic lung changes.  No acute process or worrisome change from 12/16/2022.  Impression: No acute process seen.    Electronically signed by: Philippe Wilkes MD  Date:    07/22/2025  Time:    08:58      Assessment:       1. Upper respiratory tract infection, unspecified type            Plan:       1. Upper respiratory tract infection, unspecified type  -     levocetirizine (XYZAL) 5 MG tablet; Take 1  tablet (5 mg total) by mouth nightly as needed for Allergies (Rhinitis).  Dispense: 30 tablet; Refill: 0  -     azithromycin (Z-BEKAH) 250 MG tablet; Take 2 tablets by mouth on day 1; Take 1 tablet by mouth on days 2-5  Dispense: 6 tablet; Refill: 0       - Leovidia's cough is primarily dry except when using albuterol, which produces phlegm and feels better.  - Lungs are clear with no phlegm in the chest.  - Prescribed azithromycin (Zithromax) for 5 days to address potential bacterial infection due to prolonged symptoms and colored postnasal drip.  - reviewed albuterol inhaler with proper technique.  - Continue Mucinex as needed for mucus and use Flonase for additional symptom management.  - Contact office if symptoms persist or worsen after completing antibiotics.  - can try changing antihistamine.  - reminded her that promethazine can cause drowsiness and probably best use at bedtime as needed.    GASTROESOPHAGEAL REFLUX DISEASE:  - Reflux symptoms occur especially after consuming certain foods like bread with cheese and tamales.  - Continue current medication regimen of pantoprazole and sucralfate as needed for reflux management.    NUTRITION AND RECOVERY SUPPORT:  - Maintain adequate hydration by drinking at least 0.5 gallon of water daily.  - Incorporate more protein-rich foods into diet, especially at breakfast, with protein supplementation split between mid-morning and mid-afternoon to support recovery.  - Consider adding yogurt to diet for probiotic benefits while taking antibiotics.    FOLLOW-UP:  - Follow up with PCP and allergist as scheduled.        Health Maintenance Due   Topic Date Due    RSV Vaccine (Age 60+ and Pregnant patients) (1 - 1-dose 75+ series) Never done        I spent a total of 35 minutes on the day of the visit.This includes face to face time and non-face to face time preparing to see the patient (eg, review of tests), obtaining and/or reviewing separately obtained history, documenting  clinical information in the electronic or other health record, independently interpreting results and communicating results to the patient/family/caregiver, or care coordinator.     RETURN TO CLINIC IN: As scheduled    FOR NEXT VISIT: MEDICATION MONITORING       Kanwal Sousa MD  [facility]  Disclaimer:  This note has been generated using voice-recognition software. There may be grammatical or spelling errors that have been missed during proof-reading      This note was generated with the assistance of ambient listening technology. Verbal consent was obtained by the patient and accompanying visitor(s) for the recording of patient appointment to facilitate this note. I attest to having reviewed and edited the generated note for accuracy, though some syntax or spelling errors may persist. Please contact the author of this note for any clarification.          [1]   Current Outpatient Medications   Medication Sig Dispense Refill    albuterol (PROVENTIL HFA) 90 mcg/actuation inhaler Inhale 2 puffs into the lungs every 6 (six) hours as needed for Wheezing. Rescue 18 g 3    apixaban (ELIQUIS) 2.5 mg Tab Take 1 tablet (2.5 mg total) by mouth 2 (two) times daily. 180 tablet 3    azelastine (ASTELIN) 137 mcg (0.1 %) nasal spray 2 sprays (274 mcg total) by Nasal route 2 (two) times daily as needed for Rhinitis. 30 mL 11    calcium-vitamin D3 500 mg(1,250mg) -200 unit per tablet Take 1 tablet by mouth Daily.      carboxymethylcellulose (REFRESH PLUS) 0.5 % Dpet 1 drop 3 (three) times daily as needed.      dextromethorphan-guaiFENesin (MUCINEX DM) 60-1,200 mg per 12 hr tablet Take 1 tablet by mouth 2 (two) times daily as needed (cough and congestion). 20 tablet 0    fluticasone furoate (ARNUITY ELLIPTA) 100 mcg/actuation inhaler INHALE 1 PUFF ONE TIME DAILY 90 each 3    fluticasone propionate (FLONASE) 50 mcg/actuation nasal spray 2 sprays (100 mcg total) by Each Nostril route once daily. 16 g 1    LORazepam (ATIVAN) 0.5  MG tablet Take 1 tablet (0.5 mg total) by mouth nightly as needed for Anxiety (or insomnia). 30 tablet 3    metoprolol succinate (TOPROL-XL) 25 MG 24 hr tablet Take 1 tablet (25 mg total) by mouth once daily. 90 tablet 3    nystatin (MYCOSTATIN) cream Apply topically 2 (two) times daily. 30 g 3    pantoprazole (PROTONIX) 40 MG tablet TAKE 1 TABLET (40 MG TOTAL) BY MOUTH ONCE DAILY. 90 tablet 3    promethazine (PHENERGAN) 6.25 mg/5 mL syrup Take 5 mLs (6.25 mg total) by mouth every 6 (six) hours as needed (cough). 120 mL 0    rosuvastatin (CRESTOR) 5 MG tablet Take 1 tablet (5 mg total) by mouth once daily. 90 tablet 3    sucralfate (CARAFATE) 1 gram tablet Take 1 tablet (1 g total) by mouth 2 (two) times daily as needed (indigestion). 90 tablet 3    triamcinolone acetonide 0.1% (KENALOG) 0.1 % cream Apply topically 2 (two) times daily. 80 g 1    azithromycin (Z-BEKAH) 250 MG tablet Take 2 tablets by mouth on day 1; Take 1 tablet by mouth on days 2-5 6 tablet 0    clobetasol 0.05% (TEMOVATE) 0.05 % Oint Apply topically 2 (two) times daily. for 7 days 45 g 0    levocetirizine (XYZAL) 5 MG tablet Take 1 tablet (5 mg total) by mouth nightly as needed for Allergies (Rhinitis). 30 tablet 0     No current facility-administered medications for this visit.

## 2025-08-11 ENCOUNTER — OFFICE VISIT (OUTPATIENT)
Dept: ALLERGY | Facility: CLINIC | Age: 85
End: 2025-08-11
Payer: MEDICARE

## 2025-08-11 VITALS — BODY MASS INDEX: 22.64 KG/M2 | HEIGHT: 60 IN | WEIGHT: 115.31 LBS

## 2025-08-11 DIAGNOSIS — R05.3 CHRONIC COUGH: Primary | ICD-10-CM

## 2025-08-11 DIAGNOSIS — J30.9 ALLERGIC RHINITIS, UNSPECIFIED SEASONALITY, UNSPECIFIED TRIGGER: ICD-10-CM

## 2025-08-11 DIAGNOSIS — Z80.1 FAMILY HX OF LUNG CANCER: ICD-10-CM

## 2025-08-11 PROCEDURE — 1157F ADVNC CARE PLAN IN RCRD: CPT | Mod: CPTII,S$GLB,, | Performed by: ALLERGY & IMMUNOLOGY

## 2025-08-11 PROCEDURE — 99214 OFFICE O/P EST MOD 30 MIN: CPT | Mod: S$GLB,,, | Performed by: ALLERGY & IMMUNOLOGY

## 2025-08-11 PROCEDURE — 99999 PR PBB SHADOW E&M-EST. PATIENT-LVL III: CPT | Mod: PBBFAC,HCNC,, | Performed by: ALLERGY & IMMUNOLOGY

## 2025-08-11 PROCEDURE — 1159F MED LIST DOCD IN RCRD: CPT | Mod: CPTII,S$GLB,, | Performed by: ALLERGY & IMMUNOLOGY

## 2025-08-11 PROCEDURE — 1126F AMNT PAIN NOTED NONE PRSNT: CPT | Mod: CPTII,S$GLB,, | Performed by: ALLERGY & IMMUNOLOGY

## 2025-08-13 ENCOUNTER — RESULTS FOLLOW-UP (OUTPATIENT)
Dept: ALLERGY | Facility: CLINIC | Age: 85
End: 2025-08-13
Payer: MEDICARE

## 2025-08-13 ENCOUNTER — HOSPITAL ENCOUNTER (OUTPATIENT)
Dept: RADIOLOGY | Facility: HOSPITAL | Age: 85
Discharge: HOME | End: 2025-08-13
Attending: ALLERGY & IMMUNOLOGY
Payer: MEDICARE

## 2025-08-13 ENCOUNTER — HOSPITAL ENCOUNTER (OUTPATIENT)
Dept: PULMONOLOGY | Facility: HOSPITAL | Age: 85
Discharge: HOME | End: 2025-08-13
Attending: INTERNAL MEDICINE
Payer: MEDICARE

## 2025-08-13 DIAGNOSIS — R05.3 CHRONIC COUGH: ICD-10-CM

## 2025-08-13 LAB
FEF 25 75 LLN: 0.49
FEF 25 75 PRE REF: 170.8 %
FEF 25 75 REF: 1.26
FET100 CHG: 12.4 %
FEV05 LLN: 0.43
FEV05 REF: 1.29
FEV1 CHG: -1.5 %
FEV1 FVC LLN: 61
FEV1 FVC PRE REF: 111 %
FEV1 FVC REF: 77
FEV1 LLN: 1.05
FEV1 PRE REF: 99 %
FEV1 REF: 1.55
FEV1 VOL CHG: -0.02
FVC CHG: -1.4 %
FVC LLN: 1.39
FVC PRE REF: 87.9 %
FVC REF: 2.04
FVC VOL CHG: -0.03
PEF LLN: 2.08
PEF PRE REF: 172.3 %
PEF REF: 3.59
PHYSICIAN COMMENT: ABNORMAL
POST FEF 25 75: 2.42 L/S (ref 0.49–2.48)
POST FET 100: 7.48 SEC
POST FEV1 FVC: 85.32 % (ref 61.21–90.58)
POST FEV1: 1.51 L (ref 1.05–2.02)
POST FEV5: 1.36 L (ref 0.43–2.14)
POST FVC: 1.77 L (ref 1.39–2.74)
POST PEF: 5.81 L/S (ref 2.08–5.09)
PRE FEF 25 75: 2.16 L/S (ref 0.49–2.48)
PRE FET 100: 6.65 SEC
PRE FEV05 REF: 104.8 %
PRE FEV1 FVC: 85.33 % (ref 61.21–90.58)
PRE FEV1: 1.53 L (ref 1.05–2.02)
PRE FEV5: 1.35 L (ref 0.43–2.14)
PRE FVC: 1.8 L (ref 1.39–2.74)
PRE PEF: 6.18 L/S (ref 2.08–5.09)

## 2025-08-13 PROCEDURE — 94010 BREATHING CAPACITY TEST: CPT

## 2025-08-13 PROCEDURE — 71250 CT THORAX DX C-: CPT | Mod: 26,,, | Performed by: RADIOLOGY

## 2025-08-13 PROCEDURE — 94060 EVALUATION OF WHEEZING: CPT | Mod: 26,,, | Performed by: INTERNAL MEDICINE

## 2025-08-13 PROCEDURE — 71250 CT THORAX DX C-: CPT | Mod: TC

## 2025-08-14 ENCOUNTER — PATIENT MESSAGE (OUTPATIENT)
Dept: PRIMARY CARE CLINIC | Facility: CLINIC | Age: 85
End: 2025-08-14
Payer: MEDICARE

## 2025-08-14 ENCOUNTER — TELEPHONE (OUTPATIENT)
Dept: ALLERGY | Facility: CLINIC | Age: 85
End: 2025-08-14
Payer: MEDICARE

## 2025-08-14 ENCOUNTER — PATIENT MESSAGE (OUTPATIENT)
Dept: ALLERGY | Facility: CLINIC | Age: 85
End: 2025-08-14
Payer: MEDICARE

## 2025-08-14 DIAGNOSIS — R05.3 CHRONIC COUGH: Primary | ICD-10-CM

## 2025-08-14 DIAGNOSIS — R93.89 ABNORMAL CHEST CT: ICD-10-CM

## 2025-08-18 ENCOUNTER — PATIENT MESSAGE (OUTPATIENT)
Dept: ALLERGY | Facility: CLINIC | Age: 85
End: 2025-08-18
Payer: MEDICARE

## 2025-08-20 ENCOUNTER — OFFICE VISIT (OUTPATIENT)
Dept: PRIMARY CARE CLINIC | Facility: CLINIC | Age: 85
End: 2025-08-20
Payer: MEDICARE

## 2025-08-20 ENCOUNTER — TELEPHONE (OUTPATIENT)
Dept: RADIOLOGY | Facility: HOSPITAL | Age: 85
End: 2025-08-20
Payer: MEDICARE

## 2025-08-20 VITALS
OXYGEN SATURATION: 99 % | WEIGHT: 116.63 LBS | HEIGHT: 60 IN | HEART RATE: 61 BPM | BODY MASS INDEX: 22.9 KG/M2 | DIASTOLIC BLOOD PRESSURE: 74 MMHG | SYSTOLIC BLOOD PRESSURE: 122 MMHG | TEMPERATURE: 97 F

## 2025-08-20 DIAGNOSIS — J47.9 BRONCHIECTASIS WITHOUT COMPLICATION: Primary | ICD-10-CM

## 2025-08-20 DIAGNOSIS — J84.9 INTERSTITIAL LUNG DISEASE: ICD-10-CM

## 2025-08-20 DIAGNOSIS — N82.4 COLOVAGINAL FISTULA: ICD-10-CM

## 2025-08-20 DIAGNOSIS — I70.0 AORTIC ATHEROSCLEROSIS: ICD-10-CM

## 2025-08-20 DIAGNOSIS — K21.9 GASTROESOPHAGEAL REFLUX DISEASE, UNSPECIFIED WHETHER ESOPHAGITIS PRESENT: ICD-10-CM

## 2025-08-20 DIAGNOSIS — R92.1 BREAST CALCIFICATION, RIGHT: ICD-10-CM

## 2025-08-20 PROCEDURE — 99999 PR PBB SHADOW E&M-EST. PATIENT-LVL IV: CPT | Mod: PBBFAC,HCNC,, | Performed by: INTERNAL MEDICINE

## 2025-08-20 PROCEDURE — 3288F FALL RISK ASSESSMENT DOCD: CPT | Mod: CPTII,S$GLB,, | Performed by: INTERNAL MEDICINE

## 2025-08-20 PROCEDURE — 1101F PT FALLS ASSESS-DOCD LE1/YR: CPT | Mod: CPTII,S$GLB,, | Performed by: INTERNAL MEDICINE

## 2025-08-20 PROCEDURE — 99215 OFFICE O/P EST HI 40 MIN: CPT | Mod: S$GLB,,, | Performed by: INTERNAL MEDICINE

## 2025-08-20 PROCEDURE — 1159F MED LIST DOCD IN RCRD: CPT | Mod: CPTII,S$GLB,, | Performed by: INTERNAL MEDICINE

## 2025-08-20 PROCEDURE — 1126F AMNT PAIN NOTED NONE PRSNT: CPT | Mod: CPTII,S$GLB,, | Performed by: INTERNAL MEDICINE

## 2025-08-20 PROCEDURE — G2211 COMPLEX E/M VISIT ADD ON: HCPCS | Mod: S$GLB,,, | Performed by: INTERNAL MEDICINE

## 2025-08-20 PROCEDURE — 1157F ADVNC CARE PLAN IN RCRD: CPT | Mod: CPTII,S$GLB,, | Performed by: INTERNAL MEDICINE

## 2025-08-20 PROCEDURE — 1160F RVW MEDS BY RX/DR IN RCRD: CPT | Mod: CPTII,S$GLB,, | Performed by: INTERNAL MEDICINE

## 2025-08-21 RX ORDER — FLUTICASONE FUROATE 100 UG/1
POWDER RESPIRATORY (INHALATION)
Qty: 90 EACH | Refills: 3 | Status: SHIPPED | OUTPATIENT
Start: 2025-08-21

## 2025-08-22 ENCOUNTER — PATIENT MESSAGE (OUTPATIENT)
Dept: PRIMARY CARE CLINIC | Facility: CLINIC | Age: 85
End: 2025-08-22
Payer: MEDICARE

## 2025-08-28 DIAGNOSIS — J06.9 UPPER RESPIRATORY TRACT INFECTION, UNSPECIFIED TYPE: ICD-10-CM

## 2025-08-28 RX ORDER — LEVOCETIRIZINE DIHYDROCHLORIDE 5 MG/1
5 TABLET, FILM COATED ORAL NIGHTLY PRN
Qty: 90 TABLET | Refills: 3 | Status: SHIPPED | OUTPATIENT
Start: 2025-08-28 | End: 2026-08-28

## 2025-09-03 ENCOUNTER — PATIENT MESSAGE (OUTPATIENT)
Dept: PRIMARY CARE CLINIC | Facility: CLINIC | Age: 85
End: 2025-09-03
Payer: MEDICARE

## 2025-09-03 RX ORDER — DOXYCYCLINE 100 MG/1
100 CAPSULE ORAL EVERY 12 HOURS
Qty: 14 CAPSULE | Refills: 0 | Status: SHIPPED | OUTPATIENT
Start: 2025-09-03 | End: 2025-09-10

## (undated) DEVICE — ELECTRODE REM PLYHSV RETURN 9

## (undated) DEVICE — LUBRICANT SURGILUBE 2 OZ

## (undated) DEVICE — BNDG COFLEX FOAM LF2 ST 6X5YD

## (undated) DEVICE — SYR IRRIGATION BULB STER 60ML

## (undated) DEVICE — PAD DERMAPROX THCK 11X15X1IN

## (undated) DEVICE — KIT TRACTION SHLDR ST DISP LF

## (undated) DEVICE — Device

## (undated) DEVICE — SOL NACL IRR 3000ML

## (undated) DEVICE — SUT ETHILON 3-0 PS2 18 BLK

## (undated) DEVICE — BLADE SURG CARBON STEEL #10

## (undated) DEVICE — OBTURATOR BLADELESS 8MM XI CLR

## (undated) DEVICE — SUT PDS II 1 CT VIL MONO 36

## (undated) DEVICE — APPLICATOR CHLORAPREP ORN 26ML

## (undated) DEVICE — ELECTRODE COOLPULSE 90 W/HAND

## (undated) DEVICE — GRASPER EPIX 5X20MM 45CM

## (undated) DEVICE — SUT PDS II 0 CT VIL MONO 36

## (undated) DEVICE — CONNECTOR TUBING STR 5 IN 1

## (undated) DEVICE — DRAPE THREE-QTR REINF 53X77IN

## (undated) DEVICE — DRAPE ARM DAVINCI XI

## (undated) DEVICE — ADHESIVE DERMABOND ADVANCED

## (undated) DEVICE — TAPE SURG MEDIPORE 6X72IN

## (undated) DEVICE — TUBING SUC UNIV W/CONN 12FT

## (undated) DEVICE — SEALER VESSEL EXTEND

## (undated) DEVICE — SOL ELECTROLUBE ANTI-STIC

## (undated) DEVICE — DRAPE COLUMN DAVINCI XI

## (undated) DEVICE — SOL NACL IRR 1000ML BTL

## (undated) DEVICE — GOWN ECLIPSE REINF LV4 XLNG XL

## (undated) DEVICE — TOWEL OR DISP STRL BLUE 4/PK

## (undated) DEVICE — SUT V-LOC 180 ABD 2/0 GS-21

## (undated) DEVICE — BRACE ARC 2.0 SHOULDER UNIV

## (undated) DEVICE — SCRUB 10% POVIDONE IODINE 4OZ

## (undated) DEVICE — SUT MCRYL PLUS 4-0 PS2 27IN

## (undated) DEVICE — RELOAD SUREFORM 45 3.5 BLU 6R

## (undated) DEVICE — SOL CLEARIFY VISUALIZATION LAP

## (undated) DEVICE — NDL SPINAL 18GX3.5 SPINOCAN

## (undated) DEVICE — SUT 0 54IN COATED VICRYL U

## (undated) DEVICE — CLIPPER BLADE MOD 4406 (CAREF)

## (undated) DEVICE — GLOVE PROTEXIS LTX MICRO 8

## (undated) DEVICE — WRAP SHLDR HIP ACCU THRM PACK

## (undated) DEVICE — SEE MEDLINE ITEM 146420

## (undated) DEVICE — BLADE SURG 13CMX4.2MM

## (undated) DEVICE — GOWN ECLIPSE REINF LVL4 TWL XL

## (undated) DEVICE — CANNULA PASSPORT 8 MM X 3CM

## (undated) DEVICE — COVER TIP CURVED SCISSORS XI

## (undated) DEVICE — SUT 3-0 VICRYL / SH (J416)

## (undated) DEVICE — SUT 3-0 VICRYL SH CR/8 18

## (undated) DEVICE — DRESSING N ADH OIL EMUL 3X8

## (undated) DEVICE — NDL SUTURE FLEXIBLE

## (undated) DEVICE — SYR 50CC LL

## (undated) DEVICE — CANNULA REDUCER 12-8MM

## (undated) DEVICE — CANNULA PASSPORT 8 MM X 5CM

## (undated) DEVICE — MAT SUCTION PUDDLEVAC ORANGE

## (undated) DEVICE — SET TRI-LUMEN FILTERED TUBE

## (undated) DEVICE — DRAPE STERI U-SHAPED 47X51IN

## (undated) DEVICE — STAPLER ECHELON PWR CIR 29MM

## (undated) DEVICE — DRAPE STERI-DRAPE 1000 17X11IN

## (undated) DEVICE — TIP YANKAUERS BULB NO VENT

## (undated) DEVICE — PAD ABDOMINAL STERILE 8X10IN

## (undated) DEVICE — KIT WING PAD POSITIONING

## (undated) DEVICE — NDL ARTHSCP MF SCORPION

## (undated) DEVICE — GAUZE SPONGE 4X4 12PLY

## (undated) DEVICE — PACK ROBOTIC

## (undated) DEVICE — SEAL UNIVERSAL 5MM-8MM XI

## (undated) DEVICE — SUT MONOCRYL 3-0 PS-2 UND

## (undated) DEVICE — SUT SUTURETAPE X 1.3MM 40IN

## (undated) DEVICE — DRAPE INCISE IOBAN 2 23X17IN

## (undated) DEVICE — SUT VICRYL CTD 2-0 GI 27 SH

## (undated) DEVICE — SUT 2-0 VICRYL / SH (J417)

## (undated) DEVICE — BURR OVAL CUTTING 6 MM

## (undated) DEVICE — GLOVE PROTEXIS LIGHT BROWN 8.5

## (undated) DEVICE — OBTURATOR BLADELESS 8MM XI

## (undated) DEVICE — SUT 1 36IN PDS II VIO MONO

## (undated) DEVICE — CANNULA SEAL 12MM

## (undated) DEVICE — SUT PROLENE 2-0 SH 36IN BLU

## (undated) DEVICE — NDL INSUFFLATION VERRES 120MM

## (undated) DEVICE — SET EXT MALE LL 34IN

## (undated) DEVICE — SET INFLOW TUBE ARTHSCP